# Patient Record
Sex: FEMALE | Race: WHITE | Employment: OTHER | URBAN - NONMETROPOLITAN AREA
[De-identification: names, ages, dates, MRNs, and addresses within clinical notes are randomized per-mention and may not be internally consistent; named-entity substitution may affect disease eponyms.]

---

## 2017-01-06 ENCOUNTER — OFFICE VISIT (OUTPATIENT)
Dept: FAMILY MEDICINE | Facility: OTHER | Age: 67
End: 2017-01-06
Attending: FAMILY MEDICINE
Payer: MEDICARE

## 2017-01-06 VITALS
HEART RATE: 73 BPM | DIASTOLIC BLOOD PRESSURE: 62 MMHG | OXYGEN SATURATION: 95 % | WEIGHT: 164.5 LBS | SYSTOLIC BLOOD PRESSURE: 132 MMHG | BODY MASS INDEX: 33.16 KG/M2 | TEMPERATURE: 97.6 F | HEIGHT: 59 IN

## 2017-01-06 DIAGNOSIS — N30.10 IC (INTERSTITIAL CYSTITIS): ICD-10-CM

## 2017-01-06 DIAGNOSIS — I10 ESSENTIAL HYPERTENSION: ICD-10-CM

## 2017-01-06 DIAGNOSIS — R82.79 ABNORMAL FINDINGS ON MICROBIOLOGICAL EXAMINATION OF URINE: ICD-10-CM

## 2017-01-06 DIAGNOSIS — R39.11 URINARY HESITANCY: ICD-10-CM

## 2017-01-06 DIAGNOSIS — Z23 NEED FOR PROPHYLACTIC VACCINATION AND INOCULATION AGAINST INFLUENZA: ICD-10-CM

## 2017-01-06 DIAGNOSIS — Z78.9 HEALTH MAINTENANCE ALTERATION: ICD-10-CM

## 2017-01-06 DIAGNOSIS — M17.0 PRIMARY OSTEOARTHRITIS OF BOTH KNEES: Primary | ICD-10-CM

## 2017-01-06 LAB
ALBUMIN UR-MCNC: NEGATIVE MG/DL
APPEARANCE UR: CLEAR
BACTERIA #/AREA URNS HPF: ABNORMAL /HPF
BILIRUB UR QL STRIP: NEGATIVE
COLOR UR AUTO: YELLOW
GLUCOSE UR STRIP-MCNC: NEGATIVE MG/DL
HGB UR QL STRIP: NEGATIVE
KETONES UR STRIP-MCNC: NEGATIVE MG/DL
LEUKOCYTE ESTERASE UR QL STRIP: ABNORMAL
NITRATE UR QL: NEGATIVE
PH UR STRIP: 5.5 PH (ref 5–7)
RBC #/AREA URNS AUTO: ABNORMAL /HPF (ref 0–2)
SP GR UR STRIP: 1.02 (ref 1–1.03)
URN SPEC COLLECT METH UR: ABNORMAL
UROBILINOGEN UR STRIP-ACNC: 0.2 EU/DL (ref 0.2–1)
WBC #/AREA URNS AUTO: ABNORMAL /HPF (ref 0–2)

## 2017-01-06 PROCEDURE — 81001 URINALYSIS AUTO W/SCOPE: CPT | Performed by: FAMILY MEDICINE

## 2017-01-06 PROCEDURE — 90662 IIV NO PRSV INCREASED AG IM: CPT | Performed by: FAMILY MEDICINE

## 2017-01-06 PROCEDURE — 87086 URINE CULTURE/COLONY COUNT: CPT | Performed by: FAMILY MEDICINE

## 2017-01-06 PROCEDURE — 99212 OFFICE O/P EST SF 10 MIN: CPT | Mod: 25

## 2017-01-06 PROCEDURE — G0008 ADMIN INFLUENZA VIRUS VAC: HCPCS | Performed by: FAMILY MEDICINE

## 2017-01-06 PROCEDURE — 99214 OFFICE O/P EST MOD 30 MIN: CPT | Performed by: FAMILY MEDICINE

## 2017-01-06 RX ORDER — GEMFIBROZIL 600 MG/1
TABLET, FILM COATED ORAL
COMMUNITY
Start: 2009-08-18 | End: 2017-01-06

## 2017-01-06 RX ORDER — POTASSIUM CHLORIDE 750 MG/1
TABLET, EXTENDED RELEASE ORAL
COMMUNITY
Start: 2009-08-18 | End: 2017-01-06

## 2017-01-06 RX ORDER — OXYCODONE AND ACETAMINOPHEN 5; 325 MG/1; MG/1
TABLET ORAL
Qty: 60 TABLET | Refills: 0 | Status: SHIPPED | OUTPATIENT
Start: 2017-01-06 | End: 2017-02-15

## 2017-01-06 RX ORDER — CIMETIDINE 800 MG
TABLET ORAL
COMMUNITY
Start: 2009-08-18 | End: 2017-01-06

## 2017-01-06 ASSESSMENT — PAIN SCALES - GENERAL: PAINLEVEL: SEVERE PAIN (7)

## 2017-01-06 NOTE — PROGRESS NOTES
Sdyni Hardin    2017    Chief Complaint   Patient presents with     RECHECK     Follow up on right knee surgery.     Flu Shot     fu vaccine.     Imm/Inj     prevnar       SUBJECTIVE:  Here for f/u.  Doing well.  Had 20 percocet sent and will need more.  She is reliable.  She is worried about a lot of aspects of the surgery and we discussed at length.  She is having urinary hesitancy without pain and we discussed.      Past Medical History   Diagnosis Date     Urinary tract infection, site not specified 2007     Osteoarthrosis, unspecified whether generalized or localized, lower leg 10/4/2001     Special screening for malignant neoplasms, colon 2006     Abnormal arterial blood gases 2006     Calculus of gallbladder without mention of cholecystitis or obstruction 2006     Acute upper respiratory infections of unspecified site 2001     Nonallopathic lesion of cervical region, not elsewhere classified 2003     Follow-up examination, following other surgery 2006     Headache(784.0) 12/10/2003     Lipoma of other skin and subcutaneous tissue 2006     History of CVA (cerebrovascular accident) 2011     Anxiety 10/24/2011     HTN (hypertension) 2012     Depression 2012     Insomnia 3/13/2012       Past Surgical History   Procedure Laterality Date     Shoulder x2  2009     left     Biopsy breast  2000     left - free of disease     Cholecystectomy  2006      section        section       Hysterectomy       Colonoscopy  2008     repeat in 10 years     Arthroscopy knee       Orthopedic surgery  2014     right shoulder replacement     Head & neck surgery       hematoma on head drained       Current Outpatient Prescriptions   Medication Sig Dispense Refill     oxyCODONE-acetaminophen (PERCOCET) 5-325 MG per tablet TAKE 1 TO 2 TABLETS BY MOUTH EVERY 4 HOURS AS NEEDED FOR PAIN 60 tablet 0     potassium chloride (K-TAB,KLOR-CON) 10  MEQ tablet TAKE 1 TABLET BY MOUTH 2 TIMES DAILY WITH FOOD 180 tablet 1     hydrochlorothiazide (HYDRODIURIL) 25 MG tablet TAKE 1 TABLET BY MOUTH DAILY 90 tablet 3     meloxicam (MOBIC) 15 MG tablet TAKE 1 TABLET BY MOUTH DAILY 90 tablet 1     atorvastatin (LIPITOR) 40 MG tablet TAKE 1 TABLET BY MOUTH DAILY 90 tablet 0     montelukast (SINGULAIR) 10 MG tablet TAKE 1 TABLET BY MOUTH DAILY 90 tablet 3     order for DME Equipment being ordered: Nebulizer supplies 2 each 11     order for DME Equipment being ordered: arely walker 1 each 0     ALPRAZolam (XANAX) 1 MG tablet TAKE 1 TABLET BY MOUTH 3 TIMES DAILY 90 tablet 3     escitalopram (LEXAPRO) 20 MG tablet TAKE 1 TABLET BY MOUTH 2 TIMES A  tablet 0     benazepril (LOTENSIN) 10 MG tablet TAKE 1 TABLET BY MOUTH DAILY 90 tablet 0     EPIPEN 2-JAIRO 0.3 MG/0.3ML injection INJECT 1 SYRINGE INTRAMUSCULARLY ONCE AS NEEDED FOR ANAPHYLAXIS 2 each 0     benzonatate (TESSALON) 200 MG capsule TAKE 1 CAPSULE BY MOUTH 3 TIMES A DAY AS NEEDED FOR COUGH 60 capsule 3     temazepam (RESTORIL) 30 MG capsule TAKE 1 CAPSULE BY MOUTH NIGHTLY AS NEEDED FOR SLEEP 90 capsule 5     DoxePIN (SINEQUAN) 25 MG capsule TAKE 1 CAPSULE BY MOUTH AT BEDTIME 90 capsule 3     VENTOLIN  (90 BASE) MCG/ACT inhaler USE 1 PUFF FOUR TIMES A DAY AS NEEDED 18 g 3     UNABLE TO FIND 1 suppository daily MEDICATION NAME: vaginal Valium       butalbital-acetaminophen-caffeine (FIORICET, ESGIC) -40 MG per tablet Take 1 tablet by mouth every 6 hours as needed 40 tablet 5     ipratropium - albuterol 0.5 mg/2.5 mg/3 mL (DUONEB) 0.5-2.5 (3) MG/3ML nebulization Take 1 vial (3 mLs) by nebulization every 6 hours as needed 360 mL 2     cimetidine (TAGAMET) 800 MG tablet Take 1 tablet (800 mg) by mouth At Bedtime As needed 90 tablet 3     aspirin 81 MG tablet Take 1 tablet by mouth daily       budesonide-formoterol (SYMBICORT) 160-4.5 MCG/ACT inhaler Inhale 2 puffs into the lungs 2 times daily. As needed 1  "Inhaler 11     [DISCONTINUED] Meloxicam (MOBIC PO) Take 10 mg by mouth       nystatin (MYCOSTATIN) 560468 UNIT/ML suspension Take 5 mLs (500,000 Units) by mouth 4 times daily 60 mL 0       Allergies   Allergen Reactions     Bee Pollen Other (See Comments)     Throat and eyes close up     Keflex [Cephalexin Hcl] Hives     Sulfonamide Derivatives Other (See Comments)     Throat closes     Ciprofloxacin Rash     Rash, bumps on lips     Bupropion Hcl Other (See Comments)     Made her very irritable     Celecoxib Other (See Comments) and Cough     Celebrex - wheeze     Clonazepam Hives     Codeine Difficulty breathing     \"My throat closes shut.\"        Erythromycin Other (See Comments)     Erythromycin base - abdominal pain     Lisinopril Other (See Comments)     Zestril - headaches     Nifedipine Hives     Procardia     Tramadol Hcl        Family History   Problem Relation Age of Onset     CANCER Daughter      cervical      CANCER Mother      skin     Breast Cancer Mother      CEREBROVASCULAR DISEASE Mother      Alzheimer Disease Mother      DIABETES Maternal Grandfather      HEART DISEASE Maternal Grandfather      MI     DIABETES Maternal Grandmother      DIABETES Paternal Grandmother      DIABETES Paternal Grandfather      Myocardial Infarction Father 76     myocardial infarction - cause of death       Social History     Social History     Marital Status:      Spouse Name: N/A     Number of Children: N/A     Years of Education: N/A     Occupational History     Not on file.     Social History Main Topics     Smoking status: Current Every Day Smoker -- 1.00 packs/day for 46 years     Types: Cigarettes     Smokeless tobacco: Never Used      Comment: tried to quit, relapse due to craving     Alcohol Use: 0.0 oz/week     0 Standard drinks or equivalent per week      Comment: rarely     Drug Use: No     Sexual Activity: No     Other Topics Concern      Service No     Blood Transfusions Yes     Permits if " needed     Caffeine Concern Yes     2 cups     Occupational Exposure No     Hobby Hazards No     Sleep Concern Yes     doxipen and xanax at bedtime     Stress Concern No     Weight Concern No     Special Diet No     Back Care No     Exercise No     Seat Belt No     encouraged to wear seat belt for safety     Self-Exams Yes     Parent/Sibling W/ Cabg, Mi Or Angioplasty Before 65f 55m? Yes     father     Social History Narrative       5 point ROS negative except as noted above in HPI, including Gen., Resp., CV, GI &  system review.     OBJECTIVE:  B/P: 132/62, T: 97.6, P: 73, R: Data Unavailable    GENERAL APPEARANCE: Alert, no acute distress  CV: regular rate and rhythm, no murmur, rub or gallop  RESP: lungs clear to auscultation bilaterally  ABDOMEN: normal bowel sounds, soft, nontender, no hepatosplenomegaly or other masses  SKIN: no suspicious lesions or rashes to visualized skin  NEURO: Alert, oriented x 3, speech and mentation normal    ASSESSMENT and PLAN:  (M17.0) Primary osteoarthritis of both knees  (primary encounter diagnosis)  Comment: ongoing  Plan: oxyCODONE-acetaminophen (PERCOCET) 5-325 MG per        tablet        Ok for percocet bid prn.  60 mg sent.     (R39.11) Urinary hesitancy  Comment: discussed.    Plan: *UA reflex to Microscopic and Culture        Getting UA.  Consider her IC as well.     (N30.10) IC (interstitial cystitis)  Comment: ongoing sx.   Plan: reviewed.  No change in the plans.      (Z78.9) Health maintenance alteration  Comment: flu shot.   Plan: will give prevnar at the preop as well.     (I10) Essential hypertension  Comment: stable.   Plan: no change.

## 2017-01-06 NOTE — PROGRESS NOTES
Injectable Influenza Immunization Documentation    1.  Is the person to be vaccinated sick today?  No    2. Does the person to be vaccinated have an allergy to eggs or to a component of the vaccine?  No    3. Has the person to be vaccinated today ever had a serious reaction to influenza vaccine in the past?  No    4. Has the person to be vaccinated ever had Guillain-Rio [place] syndrome?  No     Form completed by Rose Chaparro

## 2017-01-06 NOTE — MR AVS SNAPSHOT
After Visit Summary   1/6/2017    Sydni Hardin    MRN: [id number]           Patient Information     Date Of Birth          1950        Visit Information        Provider Department      1/6/2017 3:00 PM Gibran Alarcon MD [facility]        Today's Diagnoses     Primary osteoarthritis of both knees    -  1     Urinary hesitancy         IC (interstitial cystitis)         Health maintenance alteration         Essential hypertension           Care Instructions    F/u with ongoing concerns.         Follow-ups after your visit        Your next 10 appointments already scheduled     Feb 20, 2017  1:45 PM   (Arrive by 1:30 PM)   Pre-Op physical with Gibran Alarcon MD   [facility] ([facility])    [address]  [facility] [id number] [phone]            Feb 24, 2017  1:00 PM   (Arrive by 12:45 PM)   Return Visit with Moisés Olson MD    [facility] ([facility])    [address]  [facility] [address] [phone]              Who to contact     If you have questions or need follow up information about today's clinic visit or your schedule please contact [facility] ([facility] directly at [phone].  Normal or non-critical lab and imaging results will be communicated to you by MyChart, letter or phone within 4 business days after the clinic has received the results. If you do not hear from us within 7 days, please contact the clinic through MyChart or phone. If you have a critical or abnormal lab result, we will notify you by phone as soon as possible.  Submit refill requests through [employer] or call your pharmacy and they will forward the refill request to us. Please allow 3 business days for your refill to be completed.          Additional Information About Your Visit        [employer] Information     [employer] lets you send messages to your doctor, view your test results, renew your prescriptions, schedule appointments and more. To  "sign up, go to [email or web address] . Click on \"Log in\" on the left side of the screen, which will take you to the Welcome page. Then click on \"Sign up Now\" on the right side of the page.     You will be asked to enter the access code listed below, as well as some personal information. Please follow the directions to create your username and password.     Your access code is: [email or web address]  Expires: 3/7/2017 11:23 AM     Your access code will  in 90 days. If you need help or a new code, please call your [place] clinic or [phone].        Care EveryWhere ID     This is your Care EveryWhere ID. This could be used by other organizations to access your [place] medical records  [id number]        Your Vitals Were     Pulse Temperature Height BMI (Body Mass Index) Pulse Oximetry       73 97.6  F (36.4  C) (Tympanic) 4' 11\" (1.499 m) 33.21 kg/m2 95%        Blood Pressure from Last 3 Encounters:   17 132/62   16 132/74   10/05/16 126/76    Weight from Last 3 Encounters:   17 164 lb 8 oz (74.617 kg)   16 166 lb (75.297 kg)   10/05/16 166 lb (75.297 kg)              We Performed the Following     *UA reflex to Microscopic and Culture          Today's Medication Changes          These changes are accurate as of: 17  3:34 PM.  If you have any questions, ask your nurse or doctor.               These medicines have changed or have updated prescriptions.        Dose/Directions    meloxicam 15 MG tablet   Commonly known as:  MOBIC   This may have changed:  Another medication with the same name was removed. Continue taking this medication, and follow the directions you see here.   Used for:  Pain   Changed by:  Gibran Alarcon MD        TAKE 1 TABLET BY MOUTH DAILY   Quantity:  90 tablet   Refills:  1         Stop taking these medicines if you haven't already. Please contact your care team if you have questions.     LOPID 600 MG tablet   Generic drug:  gemfibrozil   Stopped by:  " Gibran Alarcon MD                Where to get your medicines      Some of these will need a paper prescription and others can be bought over the counter.  Ask your nurse if you have questions.     Bring a paper prescription for each of these medications    - oxyCODONE-acetaminophen 5-325 MG per tablet             Primary Care Provider Office Phone # Fax #    Gibran Alarcon [phone]       [address] [address]        Thank you!     Thank you for choosing [facility]  for your care. Our goal is always to provide you with excellent care. Hearing back from our patients is one way we can continue to improve our services. Please take a few minutes to complete the written survey that you may receive in the mail after your visit with us. Thank you!             Your Updated Medication List - Protect others around you: Learn how to safely use, store and throw away your medicines at www.disposemymeds.org.          This list is accurate as of: 1/6/17  3:34 PM.  Always use your most recent med list.                   Brand Name Dispense Instructions for use    ALPRAZolam 1 MG tablet    XANAX    90 tablet    TAKE 1 TABLET BY MOUTH 3 TIMES DAILY       aspirin 81 MG tablet      Take 1 tablet by mouth daily       atorvastatin 40 MG tablet    LIPITOR    90 tablet    TAKE 1 TABLET BY MOUTH DAILY       benazepril 10 MG tablet    LOTENSIN    90 tablet    TAKE 1 TABLET BY MOUTH DAILY       benzonatate 200 MG capsule    TESSALON    60 capsule    TAKE 1 CAPSULE BY MOUTH 3 TIMES A DAY AS NEEDED FOR COUGH       budesonide-formoterol 160-4.5 MCG/ACT Inhaler    SYMBICORT    1 Inhaler    Inhale 2 puffs into the lungs 2 times daily. As needed       butalbital-acetaminophen-caffeine -40 MG per tablet    FIORICET/ESGIC    40 tablet    Take 1 tablet by mouth every 6 hours as needed       cimetidine 800 MG tablet    TAGAMET    90 tablet    Take 1 tablet (800 mg) by  mouth At Bedtime As needed       doxepin 25 MG capsule    SINEquan    90 capsule    TAKE 1 CAPSULE BY MOUTH AT BEDTIME       EPIPEN 2-JAIRO 0.3 MG/0.3ML injection   Generic drug:  EPINEPHrine     2 each    INJECT 1 SYRINGE INTRAMUSCULARLY ONCE AS NEEDED FOR ANAPHYLAXIS       escitalopram 20 MG tablet    LEXAPRO    180 tablet    TAKE 1 TABLET BY MOUTH 2 TIMES A DAY       hydrochlorothiazide 25 MG tablet    HYDRODIURIL    90 tablet    TAKE 1 TABLET BY MOUTH DAILY       ipratropium - albuterol 0.5 mg/2.5 mg/3 mL 0.5-2.5 (3) MG/3ML neb solution    DUONEB    360 mL    Take 1 vial (3 mLs) by nebulization every 6 hours as needed       meloxicam 15 MG tablet    MOBIC    90 tablet    TAKE 1 TABLET BY MOUTH DAILY       montelukast 10 MG tablet    SINGULAIR    90 tablet    TAKE 1 TABLET BY MOUTH DAILY       nystatin 348005 UNIT/ML suspension    MYCOSTATIN    60 mL    Take 5 mLs (500,000 Units) by mouth 4 times daily       * order for DME     2 each    Equipment being ordered: Nebulizer supplies       * order for DME     1 each    Equipment being ordered: mcgee walker       oxyCODONE-acetaminophen 5-325 MG per tablet    PERCOCET    60 tablet    TAKE 1 TO 2 TABLETS BY MOUTH EVERY 4 HOURS AS NEEDED FOR PAIN       potassium chloride 10 MEQ tablet    K-TAB,KLOR-CON    180 tablet    TAKE 1 TABLET BY MOUTH 2 TIMES DAILY WITH FOOD       temazepam 30 MG capsule    RESTORIL    90 capsule    TAKE 1 CAPSULE BY MOUTH NIGHTLY AS NEEDED FOR SLEEP       UNABLE TO FIND      1 suppository daily MEDICATION NAME: vaginal Valium       VENTOLIN  (90 BASE) MCG/ACT Inhaler   Generic drug:  albuterol     18 g    USE 1 PUFF FOUR TIMES A DAY AS NEEDED       * Notice:  This list has 2 medication(s) that are the same as other medications prescribed for you. Read the directions carefully, and ask your doctor or other care provider to review them with you.

## 2017-01-06 NOTE — NURSING NOTE
"Chief Complaint   Patient presents with     RECHECK     Follow up on right knee surgery.     Flu Shot     fu vaccine.     Imm/Inj     prevnar       Initial /62 mmHg  Pulse 73  Temp(Src) 97.6  F (36.4  C) (Tympanic)  Ht 4' 11\" (1.499 m)  Wt 164 lb 8 oz (74.617 kg)  BMI 33.21 kg/m2  SpO2 95% Estimated body mass index is 33.21 kg/(m^2) as calculated from the following:    Height as of this encounter: 4' 11\" (1.499 m).    Weight as of this encounter: 164 lb 8 oz (74.617 kg).  BP completed using cuff size: regular  Josephine Kennedy LPN      "

## 2017-01-09 LAB
BACTERIA SPEC CULT: ABNORMAL
Lab: ABNORMAL
MICRO REPORT STATUS: ABNORMAL
SPECIMEN SOURCE: ABNORMAL

## 2017-02-15 DIAGNOSIS — M17.0 PRIMARY OSTEOARTHRITIS OF BOTH KNEES: ICD-10-CM

## 2017-02-16 RX ORDER — OXYCODONE AND ACETAMINOPHEN 5; 325 MG/1; MG/1
TABLET ORAL
Qty: 60 TABLET | Refills: 0 | Status: SHIPPED | OUTPATIENT
Start: 2017-02-16 | End: 2017-03-08

## 2017-02-16 NOTE — TELEPHONE ENCOUNTER
Per pt request [facility] . Will be there 02/17/17 in the afternoon.

## 2017-02-23 ENCOUNTER — OFFICE VISIT (OUTPATIENT)
Dept: FAMILY MEDICINE | Facility: OTHER | Age: 67
End: 2017-02-23
Attending: FAMILY MEDICINE
Payer: MEDICARE

## 2017-02-23 ENCOUNTER — TELEPHONE (OUTPATIENT)
Dept: FAMILY MEDICINE | Facility: OTHER | Age: 67
End: 2017-02-23

## 2017-02-23 VITALS
DIASTOLIC BLOOD PRESSURE: 57 MMHG | RESPIRATION RATE: 16 BRPM | BODY MASS INDEX: 33.06 KG/M2 | OXYGEN SATURATION: 95 % | HEIGHT: 59 IN | SYSTOLIC BLOOD PRESSURE: 94 MMHG | TEMPERATURE: 98.4 F | WEIGHT: 164 LBS | HEART RATE: 82 BPM

## 2017-02-23 DIAGNOSIS — Z72.0 TOBACCO ABUSE: ICD-10-CM

## 2017-02-23 DIAGNOSIS — R82.90 ABNORMAL URINE FINDINGS: ICD-10-CM

## 2017-02-23 DIAGNOSIS — R82.90 ABNORMAL URINALYSIS: Primary | ICD-10-CM

## 2017-02-23 DIAGNOSIS — M17.11 PRIMARY OSTEOARTHRITIS OF RIGHT KNEE: ICD-10-CM

## 2017-02-23 DIAGNOSIS — G47.00 PERSISTENT INSOMNIA: ICD-10-CM

## 2017-02-23 DIAGNOSIS — Z01.818 PREOP GENERAL PHYSICAL EXAM: Primary | ICD-10-CM

## 2017-02-23 LAB
ALBUMIN UR-MCNC: NEGATIVE MG/DL
APPEARANCE UR: CLEAR
BACTERIA #/AREA URNS HPF: ABNORMAL /HPF
BASOPHILS # BLD AUTO: 0.1 10E9/L (ref 0–0.2)
BASOPHILS NFR BLD AUTO: 0.6 %
BILIRUB UR QL STRIP: NEGATIVE
COLOR UR AUTO: YELLOW
DIFFERENTIAL METHOD BLD: NORMAL
EOSINOPHIL # BLD AUTO: 0.1 10E9/L (ref 0–0.7)
EOSINOPHIL NFR BLD AUTO: 1.4 %
ERYTHROCYTE [DISTWIDTH] IN BLOOD BY AUTOMATED COUNT: 13.3 % (ref 10–15)
GLUCOSE UR STRIP-MCNC: NEGATIVE MG/DL
HCT VFR BLD AUTO: 43.9 % (ref 35–47)
HGB BLD-MCNC: 15.1 G/DL (ref 11.7–15.7)
HGB UR QL STRIP: ABNORMAL
KETONES UR STRIP-MCNC: NEGATIVE MG/DL
LEUKOCYTE ESTERASE UR QL STRIP: ABNORMAL
LYMPHOCYTES # BLD AUTO: 2.9 10E9/L (ref 0.8–5.3)
LYMPHOCYTES NFR BLD AUTO: 28.2 %
MCH RBC QN AUTO: 30.4 PG (ref 26.5–33)
MCHC RBC AUTO-ENTMCNC: 34.4 G/DL (ref 31.5–36.5)
MCV RBC AUTO: 89 FL (ref 78–100)
MONOCYTES # BLD AUTO: 0.7 10E9/L (ref 0–1.3)
MONOCYTES NFR BLD AUTO: 6.9 %
NEUTROPHILS # BLD AUTO: 6.5 10E9/L (ref 1.6–8.3)
NEUTROPHILS NFR BLD AUTO: 62.9 %
NITRATE UR QL: NEGATIVE
PH UR STRIP: 5.5 PH (ref 5–7)
PLATELET # BLD AUTO: 321 10E9/L (ref 150–450)
RBC # BLD AUTO: 4.96 10E12/L (ref 3.8–5.2)
RBC #/AREA URNS AUTO: ABNORMAL /HPF (ref 0–2)
SP GR UR STRIP: 1.02 (ref 1–1.03)
URN SPEC COLLECT METH UR: ABNORMAL
UROBILINOGEN UR STRIP-ACNC: 0.2 EU/DL (ref 0.2–1)
WBC # BLD AUTO: 10.3 10E9/L (ref 4–11)
WBC #/AREA URNS AUTO: ABNORMAL /HPF (ref 0–2)

## 2017-02-23 PROCEDURE — 99214 OFFICE O/P EST MOD 30 MIN: CPT | Mod: 25 | Performed by: FAMILY MEDICINE

## 2017-02-23 PROCEDURE — 93005 ELECTROCARDIOGRAM TRACING: CPT

## 2017-02-23 PROCEDURE — 71020 ZZHC CHEST TWO VIEWS, FRONT/LAT: CPT | Mod: TC

## 2017-02-23 PROCEDURE — 36415 COLL VENOUS BLD VENIPUNCTURE: CPT | Performed by: FAMILY MEDICINE

## 2017-02-23 PROCEDURE — 85025 COMPLETE CBC W/AUTO DIFF WBC: CPT | Performed by: FAMILY MEDICINE

## 2017-02-23 PROCEDURE — 93010 ELECTROCARDIOGRAM REPORT: CPT | Performed by: INTERNAL MEDICINE

## 2017-02-23 PROCEDURE — 81001 URINALYSIS AUTO W/SCOPE: CPT | Performed by: FAMILY MEDICINE

## 2017-02-23 PROCEDURE — 87086 URINE CULTURE/COLONY COUNT: CPT | Performed by: FAMILY MEDICINE

## 2017-02-23 PROCEDURE — 99212 OFFICE O/P EST SF 10 MIN: CPT | Mod: 25

## 2017-02-23 PROCEDURE — 80048 BASIC METABOLIC PNL TOTAL CA: CPT | Performed by: FAMILY MEDICINE

## 2017-02-23 RX ORDER — BENZONATATE 200 MG/1
CAPSULE ORAL
Qty: 60 CAPSULE | Refills: 3 | Status: SHIPPED | OUTPATIENT
Start: 2017-02-23 | End: 2018-07-19

## 2017-02-23 RX ORDER — NICOTINE 21 MG/24HR
1 PATCH, TRANSDERMAL 24 HOURS TRANSDERMAL EVERY 24 HOURS
COMMUNITY
End: 2017-04-18

## 2017-02-23 RX ORDER — TEMAZEPAM 30 MG
CAPSULE ORAL
Qty: 90 CAPSULE | Refills: 5 | Status: SHIPPED | OUTPATIENT
Start: 2017-02-23 | End: 2017-03-24

## 2017-02-23 ASSESSMENT — ANXIETY QUESTIONNAIRES
5. BEING SO RESTLESS THAT IT IS HARD TO SIT STILL: SEVERAL DAYS
7. FEELING AFRAID AS IF SOMETHING AWFUL MIGHT HAPPEN: SEVERAL DAYS
GAD7 TOTAL SCORE: 7
2. NOT BEING ABLE TO STOP OR CONTROL WORRYING: SEVERAL DAYS
6. BECOMING EASILY ANNOYED OR IRRITABLE: SEVERAL DAYS
3. WORRYING TOO MUCH ABOUT DIFFERENT THINGS: SEVERAL DAYS
1. FEELING NERVOUS, ANXIOUS, OR ON EDGE: SEVERAL DAYS
IF YOU CHECKED OFF ANY PROBLEMS ON THIS QUESTIONNAIRE, HOW DIFFICULT HAVE THESE PROBLEMS MADE IT FOR YOU TO DO YOUR WORK, TAKE CARE OF THINGS AT HOME, OR GET ALONG WITH OTHER PEOPLE: SOMEWHAT DIFFICULT

## 2017-02-23 ASSESSMENT — PAIN SCALES - GENERAL: PAINLEVEL: SEVERE PAIN (6)

## 2017-02-23 ASSESSMENT — PATIENT HEALTH QUESTIONNAIRE - PHQ9: 5. POOR APPETITE OR OVEREATING: SEVERAL DAYS

## 2017-02-23 NOTE — PROGRESS NOTES
[address]  [facility] [place] [address]  [phone]  Dept: [phone]    PRE-OP EVALUATION:  Today's date: 2017    Sydni Hardin (: 1950) presents for pre-operative evaluation assessment as requested by Dr. Olson.  She requires evaluation and anesthesia risk assessment prior to undergoing surgery/procedure for treatment of right knee pain .  Proposed procedure: right total knee    Date of Surgery/ Procedure: 17    Hospital/Surgical Facility: [facility] [place]    Primary Physician: Gibran Alarcon  Type of Anesthesia Anticipated: to be determined    Patient has a Health Care Directive or Living Will:  YES     1. NO - Do you have a history of heart attack, stroke, stent, bypass or surgery on an artery in the head, neck, heart or legs?  2. NO - Do you ever have any pain or discomfort in your chest?  3. NO - Do you have a history of  Heart Failure?  4. YES - ARE YOUR TROUBLED BY SHORTNESS OF BREATH WHEN WALKING ON THE LEVEL, UP A SLIGHT HILL OR AT NIGHT? yes  5. NO - Do you currently have a cold, bronchitis or other respiratory infection?  6. YES - DO YOU HAVE A COUGH, SHORTNESS OF BREATH OR WHEEZING? yes  7. YES - DO YOU SOMETIMES GET PAINS IN THE CALVES OF YOUR LEGS WHEN YOU WALK? yes  8. NO - Do you or anyone in your family have previous history of blood clots?  9. NO - Do you or does anyone in your family have a serious bleeding problem such as prolonged bleeding following surgeries or cuts?  10. NO - Have you ever had problems with anemia or been told to take iron pills?  11. NO - Have you had any abnormal blood loss such as black, tarry or bloody stools, or abnormal vaginal bleeding?  12. NO - Have you ever had a blood transfusion?  13. YES - HAVE YOU OR ANY OF YOUR RELATIVES EVER HAD PROBLEMS WITH ANESTHESIA? Had low bp with anesthesia.  14. NO - Do you have sleep apnea, excessive snoring or daytime drowsiness?  15. NO - Do you have any prosthetic heart valves?  16.  YES - DO YOU HAVE PROSTHETIC JOINTS? shoulder  17. NO - Is there any chance that you may be pregnant?      HPI:                                                      Brief HPI related to upcoming procedure: severe knee oa, right      See problem list for active medical problems.  Problems all longstanding and stable, except as noted/documented.  See ROS for pertinent symptoms related to these conditions.                                                                                                  .    MEDICAL HISTORY:                                                      Patient Active Problem List    Diagnosis Date Noted     ACP (advance care planning) 10/05/2016     Priority: Medium     Advance Care Planning 10/5/2016: ACP Review of Chart / Resources Provided:  Reviewed chart for advance care plan.  Sydni SKINNER Bassemzach has no plan or code status on file however states presence of ACP document. Copy requested. Confirmed code status reflects current choices pending receipt of document/advance care plan review.  Confirmed/documented legally designated decision makers.  Added by Janie Robledo             Primary osteoarthritis of both knees 10/05/2016     Priority: Medium     IC (interstitial cystitis) 10/28/2015     Priority: Medium     SCC (squamous cell carcinoma), face 08/05/2015     Priority: Medium     Lung nodules 06/19/2015     Priority: Medium     Tobacco abuse 06/19/2015     Priority: Medium     Dysthymia 12/31/2014     Priority: Medium     Advanced care planning/counseling discussion 11/28/2012     Priority: Medium     Essential hypertension 01/05/2012     Priority: Medium     Problem list name updated by automated process. Provider to review       Calculus of gallbladder 02/02/2006     Priority: Medium     Problem list name updated by automated process. Provider to review       Headache 12/10/2003     Priority: Medium     Problem list name updated by automated process. Provider to review        Osteoarthrosis, unspecified whether generalized or localized, involving lower leg 10/04/2001     Priority: Medium     Problem list name updated by automated process. Provider to review        Past Medical History   Diagnosis Date     Abnormal arterial blood gases 2006     Acute upper respiratory infections of unspecified site 2001     Anxiety 10/24/2011     Calculus of gallbladder without mention of cholecystitis or obstruction 2006     Depression 2012     Follow-up examination, following other surgery 2006     Headache(784.0) 12/10/2003     History of CVA (cerebrovascular accident) 2011     HTN (hypertension) 2012     Insomnia 3/13/2012     Lipoma of other skin and subcutaneous tissue 2006     Nonallopathic lesion of cervical region, not elsewhere classified 2003     Osteoarthrosis, unspecified whether generalized or localized, lower leg 10/4/2001     Special screening for malignant neoplasms, colon 2006     Urinary tract infection, site not specified 2007     Past Surgical History   Procedure Laterality Date     Shoulder x2  2009     left     Biopsy breast  2000     left - free of disease     Cholecystectomy  2006      section        section       Hysterectomy       Colonoscopy  2008     repeat in 10 years     Arthroscopy knee       Orthopedic surgery  2014     right shoulder replacement     Head & neck surgery       hematoma on head drained     Current Outpatient Prescriptions   Medication Sig Dispense Refill     oxyCODONE-acetaminophen (PERCOCET) 5-325 MG per tablet TAKE 1 TO 2 TABLETS BY MOUTH EVERY 4 HOURS AS NEEDED FOR PAIN 60 tablet 0     doxepin (SINEQUAN) 25 MG capsule TAKE 1 CAPSULE BY MOUTH AT BEDTIME 90 capsule 3     potassium chloride (K-TAB,KLOR-CON) 10 MEQ tablet TAKE 1 TABLET BY MOUTH 2 TIMES DAILY WITH FOOD 180 tablet 1     hydrochlorothiazide (HYDRODIURIL) 25 MG tablet TAKE 1 TABLET BY MOUTH DAILY 90 tablet 3      meloxicam (MOBIC) 15 MG tablet TAKE 1 TABLET BY MOUTH DAILY 90 tablet 1     atorvastatin (LIPITOR) 40 MG tablet TAKE 1 TABLET BY MOUTH DAILY 90 tablet 0     montelukast (SINGULAIR) 10 MG tablet TAKE 1 TABLET BY MOUTH DAILY 90 tablet 3     order for DME Equipment being ordered: Nebulizer supplies 2 each 11     order for DME Equipment being ordered: mcgee walker 1 each 0     ALPRAZolam (XANAX) 1 MG tablet TAKE 1 TABLET BY MOUTH 3 TIMES DAILY 90 tablet 3     escitalopram (LEXAPRO) 20 MG tablet TAKE 1 TABLET BY MOUTH 2 TIMES A  tablet 0     benazepril (LOTENSIN) 10 MG tablet TAKE 1 TABLET BY MOUTH DAILY 90 tablet 0     EPIPEN 2-JAIRO 0.3 MG/0.3ML injection INJECT 1 SYRINGE INTRAMUSCULARLY ONCE AS NEEDED FOR ANAPHYLAXIS 2 each 0     benzonatate (TESSALON) 200 MG capsule TAKE 1 CAPSULE BY MOUTH 3 TIMES A DAY AS NEEDED FOR COUGH 60 capsule 3     temazepam (RESTORIL) 30 MG capsule TAKE 1 CAPSULE BY MOUTH NIGHTLY AS NEEDED FOR SLEEP 90 capsule 5     VENTOLIN  (90 BASE) MCG/ACT inhaler USE 1 PUFF FOUR TIMES A DAY AS NEEDED 18 g 3     nystatin (MYCOSTATIN) 150108 UNIT/ML suspension Take 5 mLs (500,000 Units) by mouth 4 times daily 60 mL 0     UNABLE TO FIND 1 suppository daily MEDICATION NAME: vaginal Valium       butalbital-acetaminophen-caffeine (FIORICET, ESGIC) -40 MG per tablet Take 1 tablet by mouth every 6 hours as needed 40 tablet 5     ipratropium - albuterol 0.5 mg/2.5 mg/3 mL (DUONEB) 0.5-2.5 (3) MG/3ML nebulization Take 1 vial (3 mLs) by nebulization every 6 hours as needed 360 mL 2     cimetidine (TAGAMET) 800 MG tablet Take 1 tablet (800 mg) by mouth At Bedtime As needed 90 tablet 3     aspirin 81 MG tablet Take 1 tablet by mouth daily       budesonide-formoterol (SYMBICORT) 160-4.5 MCG/ACT inhaler Inhale 2 puffs into the lungs 2 times daily. As needed 1 Inhaler 11     OTC products: None, except as noted above    Allergies   Allergen Reactions     Bee Pollen Other (See Comments)     Throat and  "eyes close up     Keflex [Cephalexin Hcl] Hives     Sulfonamide Derivatives Other (See Comments)     Throat closes     Ciprofloxacin Rash     Rash, bumps on lips     Bupropion Hcl Other (See Comments)     Made her very irritable     Celecoxib Other (See Comments) and Cough     Celebrex - wheeze     Clonazepam Hives     Codeine Difficulty breathing     \"My throat closes shut.\"        Erythromycin Other (See Comments)     Erythromycin base - abdominal pain     Lisinopril Other (See Comments)     Zestril - headaches     Nifedipine Hives     Procardia     Tramadol Hcl       Latex Allergy: NO    Social History   Substance Use Topics     Smoking status: Current Every Day Smoker     Packs/day: 1.00     Years: 46.00     Types: Cigarettes     Smokeless tobacco: Never Used      Comment: tried to quit, relapse due to craving     Alcohol use 0.0 oz/week     0 Standard drinks or equivalent per week      Comment: rarely     History   Drug Use No       REVIEW OF SYSTEMS:                                                    C: NEGATIVE for fever, chills, change in weight  I: NEGATIVE for worrisome rashes, moles or lesions  E: NEGATIVE for vision changes or irritation  E/M: NEGATIVE for ear, mouth and throat problems  R: NEGATIVE for significant cough or SOB  CV: NEGATIVE for chest pain, palpitations or peripheral edema  GI: NEGATIVE for nausea, abdominal pain, heartburn, or change in bowel habits  : NEGATIVE for frequency, dysuria, or hematuria  M: NEGATIVE for significant arthralgias or myalgia  N: NEGATIVE for weakness, dizziness or paresthesias  E: NEGATIVE for temperature intolerance, skin/hair changes  H: NEGATIVE for bleeding problems  P: NEGATIVE for changes in mood or affect    EXAM:                                                    There were no vitals taken for this visit.    GENERAL APPEARANCE: healthy, alert and no distress     EYES: EOMI,- PERRL     HENT: ear canals and TM's normal and nose and mouth without ulcers or " lesions     NECK: no adenopathy, no asymmetry, masses, or scars and thyroid normal to palpation     RESP: lungs clear to auscultation - no rales, rhonchi or wheezes     CV: regular rates and rhythm, normal S1 S2, no S3 or S4 and no murmur, click or rub -     ABDOMEN:  soft, nontender, no HSM or masses and bowel sounds normal     MS: extremities normal- no gross deformities noted, no evidence of inflammation in joints, FROM in all extremities.     SKIN: no suspicious lesions or rashes     NEURO: Normal strength and tone, sensory exam grossly normal, mentation intact and speech normal     PSYCH: mentation appears normal. and affect normal/bright     LYMPHATICS: No axillary, cervical, inguinal, or supraclavicular nodes    DIAGNOSTICS:                                                    EKG: appears normal, NSR, normal axis, normal intervals, no acute ST/T changes c/w ischemia, no LVH by voltage criteria, unchanged from previous tracings.  CXR clear.  Cbc normal.  Bmp pending.      Recent Labs   Lab Test  12/07/16   1024  06/22/16   1126   HGB  15.9*  16.1*   PLT  338  340   NA  133  135   POTASSIUM  4.1  4.4   CR  1.06*  0.86        IMPRESSION:                                                    Reason for surgery/procedure: right knee oa.   Diagnosis/reason for consult: preoperative clearance    The proposed surgical procedure is considered INTERMEDIATE risk.    REVISED CARDIAC RISK INDEX  The patient has the following serious cardiovascular risks for perioperative complications such as (MI, PE, VFib and 3  AV Block):  No serious cardiac risks  INTERPRETATION: 1 risks: Class II (low risk - 0.9% complication rate)    The patient has the following additional risks for perioperative complications:  No identified additional risks    No diagnosis found.    RECOMMENDATIONS:                                                      --Consult hospital rounder / IM to assist post-op medical management    --Patient is to take all  scheduled medications on the day of surgery EXCEPT for modifications listed below.    APPROVAL GIVEN to proceed with proposed procedure, without further diagnostic evaluation.  Patient is very worried about developing sepsis postoperatively, which has happened to her.  I told her the surgical staff and Dr. Olson would take every precaution to prevent this.         Signed Electronically by: Gibran Alarcon MD    Copy of this evaluation report is provided to requesting physician.    [facility] Preop Guidelines

## 2017-02-23 NOTE — NURSING NOTE
"Chief Complaint   Patient presents with     Pre-Op Exam     Smoking Cessation     Pt is in the process of quitting smoking. [place].       Initial BP 94/57 (BP Location: Right arm, Patient Position: Chair, Cuff Size: Adult Regular)  Pulse 82  Temp 98.4  F (36.9  C) (Tympanic)  Resp 16  Ht 4' 11\" (1.499 m)  Wt 166 lb 12.8 oz (75.7 kg)  SpO2 95%  BMI 33.69 kg/m2 Estimated body mass index is 33.69 kg/(m^2) as calculated from the following:    Height as of this encounter: 4' 11\" (1.499 m).    Weight as of this encounter: 166 lb 12.8 oz (75.7 kg).  Medication Reconciliation: complete   Rose Chaparro    "

## 2017-02-23 NOTE — MR AVS SNAPSHOT
After Visit Summary   2/23/2017    Sydni Hardin    MRN: [id number]           Patient Information     Date Of Birth          1950        Visit Information        Provider Department      2/23/2017 1:45 PM Gibran Alarcon MD [facility]        Today's Diagnoses     Preop general physical exam    -  1    Persistent insomnia        Primary osteoarthritis of right knee        Tobacco abuse        Abnormal urine findings          Care Instructions      Before Your Surgery      Call your surgeon if there is any change in your health. This includes signs of a cold or flu (such as a sore throat, runny nose, cough, rash or fever).    Do not smoke, drink alcohol or take over the counter medicine (unless your surgeon or primary care doctor tells you to) for the 24 hours before and after surgery.    If you take prescribed drugs: Follow your doctor s orders about which medicines to take and which to stop until after surgery.    Eating and drinking prior to surgery: follow the instructions from your surgeon    Take a shower or bath the night before surgery. Use the soap your surgeon gave you to gently clean your skin. If you do not have soap from your surgeon, use your regular soap. Do not shave or scrub the surgery site.  Wear clean pajamas and have clean sheets on your bed.         Follow-ups after your visit        Your next 10 appointments already scheduled     Feb 24, 2017  1:00 PM CST   (Arrive by 12:45 PM)   Return Visit with Moisés Olson MD    ORTHOPEDICS ([facility])    [address] [facility] [address] [phone]              Who to contact     If you have questions or need follow up information about today's clinic visit or your schedule please contact [facility] directly at [phone].  Normal or non-critical lab and imaging results will be communicated to you by MyChart, letter or phone within 4 business days after the clinic has  "received the results. If you do not hear from us within 7 days, please contact the clinic through [employer] or phone. If you have a critical or abnormal lab result, we will notify you by phone as soon as possible.  Submit refill requests through [employer] or call your pharmacy and they will forward the refill request to us. Please allow 3 business days for your refill to be completed.          Additional Information About Your Visit        Care EveryWhere ID     This is your Care EveryWhere ID. This could be used by other organizations to access your [facility] medical records  [id number]        Your Vitals Were     Pulse Temperature Respirations Height Pulse Oximetry BMI (Body Mass Index)    82 98.4  F (36.9  C) (Tympanic) 16 4' 10.75\" (1.492 m) 95% 33.41 kg/m2       Blood Pressure from Last 3 Encounters:   02/23/17 94/57   01/06/17 132/62   12/07/16 132/74    Weight from Last 3 Encounters:   02/23/17 164 lb (74.4 kg)   01/06/17 164 lb 8 oz (74.6 kg)   12/07/16 166 lb (75.3 kg)              We Performed the Following     *UA reflex to Microscopic and Culture     Basic metabolic panel     CBC with platelets and differential     EKG 12-lead complete w/read - (Clinic Performed)     Urine Culture Aerobic Bacterial     Urine Microscopic     XR CHEST 2 VW (Clinic Performed)          Today's Medication Changes          These changes are accurate as of: 2/23/17  5:27 PM.  If you have any questions, ask your nurse or doctor.               These medicines have changed or have updated prescriptions.        Dose/Directions    benzonatate 200 MG capsule   Commonly known as:  TESSALON   This may have changed:  See the new instructions.   Used for:  Preop general physical exam, Tobacco abuse   Changed by:  Gibran Alarcon MD        TAKE 1 CAPSULE BY MOUTH 3 TIMES A DAY AS NEEDED FOR COUGH   Quantity:  60 capsule   Refills:  3            Where to get your medicines      These medications were sent to [facility] - " [facility] - [address]  [address] [place] [address]     Phone:  [phone]     benzonatate 200 MG capsule         Some of these will need a paper prescription and others can be bought over the counter.  Ask your nurse if you have questions.     Bring a paper prescription for each of these medications     temazepam 30 MG capsule                Primary Care Provider Office Phone # Fax #    Gibran Alarcon [phone]       [facility] [address]  [facility] [address]        Thank you!     Thank you for choosing [facility]  for your care. Our goal is always to provide you with excellent care. Hearing back from our patients is one way we can continue to improve our services. Please take a few minutes to complete the written survey that you may receive in the mail after your visit with us. Thank you!             Your Updated Medication List - Protect others around you: Learn how to safely use, store and throw away your medicines at www.disposemymeds.org.          This list is accurate as of: 2/23/17  5:27 PM.  Always use your most recent med list.                   Brand Name Dispense Instructions for use    ALPRAZolam 1 MG tablet    XANAX    90 tablet    TAKE 1 TABLET BY MOUTH 3 TIMES DAILY       aspirin 81 MG tablet      Take 1 tablet by mouth daily       atorvastatin 40 MG tablet    LIPITOR    90 tablet    TAKE 1 TABLET BY MOUTH DAILY       benazepril 10 MG tablet    LOTENSIN    90 tablet    TAKE 1 TABLET BY MOUTH DAILY       benzonatate 200 MG capsule    TESSALON    60 capsule    TAKE 1 CAPSULE BY MOUTH 3 TIMES A DAY AS NEEDED FOR COUGH       budesonide-formoterol 160-4.5 MCG/ACT Inhaler    SYMBICORT    1 Inhaler    Inhale 2 puffs into the lungs 2 times daily. As needed       butalbital-acetaminophen-caffeine -40 MG per tablet    FIORICET/ESGIC    40 tablet    Take 1 tablet by mouth every 6 hours as needed       cimetidine 800 MG tablet    TAGAMET     90 tablet    Take 1 tablet (800 mg) by mouth At Bedtime As needed       doxepin 25 MG capsule    SINEquan    90 capsule    TAKE 1 CAPSULE BY MOUTH AT BEDTIME       EPIPEN 2-JAIRO 0.3 MG/0.3ML injection   Generic drug:  EPINEPHrine     2 each    INJECT 1 SYRINGE INTRAMUSCULARLY ONCE AS NEEDED FOR ANAPHYLAXIS       escitalopram 20 MG tablet    LEXAPRO    180 tablet    TAKE 1 TABLET BY MOUTH 2 TIMES A DAY       hydrochlorothiazide 25 MG tablet    HYDRODIURIL    90 tablet    TAKE 1 TABLET BY MOUTH DAILY       ipratropium - albuterol 0.5 mg/2.5 mg/3 mL 0.5-2.5 (3) MG/3ML neb solution    DUONEB    360 mL    Take 1 vial (3 mLs) by nebulization every 6 hours as needed       meloxicam 15 MG tablet    MOBIC    90 tablet    TAKE 1 TABLET BY MOUTH DAILY       montelukast 10 MG tablet    SINGULAIR    90 tablet    TAKE 1 TABLET BY MOUTH DAILY       nicotine 21 MG/24HR 24 hr patch    NICODERM CQ     Place 1 patch onto the skin every 24 hours       nystatin [id number] UNIT/ML suspension    MYCOSTATIN    60 mL    Take 5 mLs (500,000 Units) by mouth 4 times daily       * order for DME     2 each    Equipment being ordered: Nebulizer supplies       * order for DME     1 each    Equipment being ordered: mcgee walker       oxyCODONE-acetaminophen 5-325 MG per tablet    PERCOCET    60 tablet    TAKE 1 TO 2 TABLETS BY MOUTH EVERY 4 HOURS AS NEEDED FOR PAIN       potassium chloride 10 MEQ tablet    K-TAB,KLOR-CON    180 tablet    TAKE 1 TABLET BY MOUTH 2 TIMES DAILY WITH FOOD       temazepam 30 MG capsule    RESTORIL    90 capsule    TAKE 1 CAPSULE BY MOUTH NIGHTLY AS NEEDED FOR SLEEP       VENTOLIN  (90 BASE) MCG/ACT Inhaler   Generic drug:  albuterol     18 g    USE 1 PUFF FOUR TIMES A DAY AS NEEDED       * Notice:  This list has 2 medication(s) that are the same as other medications prescribed for you. Read the directions carefully, and ask your doctor or other care provider to review them with you.

## 2017-02-23 NOTE — NURSING NOTE
"Chief Complaint   Patient presents with     Pre-Op Exam     Smoking Cessation     Pt is in the process of quitting smoking. [place]       Initial BP 94/57 (BP Location: Right arm, Patient Position: Chair, Cuff Size: Adult Regular)  Pulse 82  Temp 98.4  F (36.9  C) (Tympanic)  Resp 16  Ht 4' 10.75\" (1.492 m)  Wt 164 lb (74.4 kg)  SpO2 95%  BMI 33.41 kg/m2 Estimated body mass index is 33.41 kg/(m^2) as calculated from the following:    Height as of this encounter: 4' 10.75\" (1.492 m).    Weight as of this encounter: 164 lb (74.4 kg).  Medication Reconciliation: complete   Rose Chaparro    "

## 2017-02-24 ENCOUNTER — OFFICE VISIT (OUTPATIENT)
Dept: ORTHOPEDICS | Facility: OTHER | Age: 67
End: 2017-02-24
Attending: ORTHOPAEDIC SURGERY
Payer: COMMERCIAL

## 2017-02-24 VITALS
BODY MASS INDEX: 32.25 KG/M2 | SYSTOLIC BLOOD PRESSURE: 112 MMHG | HEIGHT: 59 IN | OXYGEN SATURATION: 95 % | DIASTOLIC BLOOD PRESSURE: 76 MMHG | HEART RATE: 96 BPM | WEIGHT: 160 LBS | TEMPERATURE: 98.2 F

## 2017-02-24 DIAGNOSIS — M17.0 PRIMARY OSTEOARTHRITIS OF BOTH KNEES: Primary | ICD-10-CM

## 2017-02-24 LAB
ANION GAP SERPL CALCULATED.3IONS-SCNC: 12 MMOL/L (ref 3–14)
BACTERIA SPEC CULT: NORMAL
BUN SERPL-MCNC: 35 MG/DL (ref 7–30)
CALCIUM SERPL-MCNC: 8.8 MG/DL (ref 8.5–10.1)
CHLORIDE SERPL-SCNC: 104 MMOL/L (ref 94–109)
CO2 SERPL-SCNC: 21 MMOL/L (ref 20–32)
CREAT SERPL-MCNC: 1.43 MG/DL (ref 0.52–1.04)
GFR SERPL CREATININE-BSD FRML MDRD: 36 ML/MIN/1.7M2
GLUCOSE SERPL-MCNC: 107 MG/DL (ref 70–99)
MICRO REPORT STATUS: NORMAL
POTASSIUM SERPL-SCNC: 3.9 MMOL/L (ref 3.4–5.3)
SODIUM SERPL-SCNC: 137 MMOL/L (ref 133–144)
SPECIMEN SOURCE: NORMAL

## 2017-02-24 PROCEDURE — 99213 OFFICE O/P EST LOW 20 MIN: CPT | Performed by: ORTHOPAEDIC SURGERY

## 2017-02-24 PROCEDURE — 99212 OFFICE O/P EST SF 10 MIN: CPT

## 2017-02-24 ASSESSMENT — ANXIETY QUESTIONNAIRES: GAD7 TOTAL SCORE: 7

## 2017-02-24 ASSESSMENT — PAIN SCALES - GENERAL: PAINLEVEL: SEVERE PAIN (7)

## 2017-02-24 ASSESSMENT — PATIENT HEALTH QUESTIONNAIRE - PHQ9: SUM OF ALL RESPONSES TO PHQ QUESTIONS 1-9: 15

## 2017-02-24 NOTE — PROGRESS NOTES
Chief complaint: Severe DJD right knee    Subjective: This 67-year-old female has a long history of osteoarthritis of both knees.  Symptoms are worse on the right.  She has failed conservative treatment in the form of an oral NSAID, steroid injections, bilateral knee arthroscopies, and the use of a cane.  She requires Percocet and Tylenol to control her pain.  Her pain is severe enough to interfere with activities of daily living such as the distance she can walk and the number stairs she can climb.  She is in the process of planning a right TKA.    Since seen last she has attended the total joint class, seen her PCP and gained preoperative medical clearance and has chosen to go to inpatient rehabilitation from the hospital before returning home where she lives alone.  She has made arrangements for family members to attend to her when she returns home. She is trying to quit smoking and is down to less than 1 pack of  Cigarettes per day, which is a significant decrease.    Since seen last nothing is changed with respect to her musculoskeletal or neurologic review of systems.    Examination: Elderly but otherwise healthy-appearing female with appropriate mood and affect.  She ambulated into the office using a rolling walker.  Her right knee range of motion is 0-120 .  The knee is stable to ligamentous stressing.  The neurovascular that limb is intact.    X-rays: Most recent plain films the right knee were taken on 12/7/16 and show Kellgren-Varghsee Grade 4 changes.    Labs: Yesterday her H&H was 15 and 43.9. Her MRSA screen was negative.     Impression: Severe DJD right knee, failure conservative treatment.    Plan: Right TKA.  We selected a date March 13.  Today I answered all her remaining questions.  She understands that TKA is major surgery with the potential for major complications.  She consented to the procedure.  Her preoperative orders have been placed.    Joint Replacement Indications: This patient's  degenerative joint disease is evidenced by conventional radiography as described in the x-ray interpretation in the office note or notes, is producing pain that is interfering with activities of daily living as described in the office note or notes hence creating a functional disability, and has failed nonsurgical management for at least 3 months.  The patient therefore meets the preoperative criteria for total joint arthroplasty.

## 2017-02-24 NOTE — MR AVS SNAPSHOT
"              After Visit Summary   2/24/2017    Sydni Hardin    MRN: [id number]           Patient Information     Date Of Birth          1950        Visit Information        Provider Department      2/24/2017 1:00 PM Moisés Olson MD  ORTHOPEDICS        Today's Diagnoses     Primary osteoarthritis of both knees    -  1       Follow-ups after your visit        Your next 10 appointments already scheduled     Apr 12, 2017  1:20 PM CDT   (Arrive by 1:00 PM)   Return Visit with Moisés Olson MD    ORTHOPEDICS ([facility])    [address] [facility] [address] [phone]              Who to contact     If you have questions or need follow up information about today's clinic visit or your schedule please contact  ORTHOPEDICS directly at [phone].  Normal or non-critical lab and imaging results will be communicated to you by MyChart, letter or phone within 4 business days after the clinic has received the results. If you do not hear from us within 7 days, please contact the clinic through MyChart or phone. If you have a critical or abnormal lab result, we will notify you by phone as soon as possible.  Submit refill requests through [employer] or call your pharmacy and they will forward the refill request to us. Please allow 3 business days for your refill to be completed.          Additional Information About Your Visit        Care EveryWhere ID     This is your Care EveryWhere ID. This could be used by other organizations to access your [place] medical records  [id number]        Your Vitals Were     Pulse Temperature Height Pulse Oximetry BMI (Body Mass Index)       96 98.2  F (36.8  C) (Tympanic) 4' 11\" (1.499 m) 95% 32.32 kg/m2        Blood Pressure from Last 3 Encounters:   02/24/17 112/76   02/23/17 94/57   01/06/17 132/62    Weight from Last 3 Encounters:   02/24/17 160 lb (72.6 kg)   02/23/17 164 lb (74.4 kg)   01/06/17 164 lb 8 oz (74.6 kg)              Today, you had " the following     No orders found for display       Primary Care Provider Office Phone # Fax #    Gibran Alacron [phone]       [address] [facility] [address]        Thank you!     Thank you for choosing  ORTHOPEDICS  for your care. Our goal is always to provide you with excellent care. Hearing back from our patients is one way we can continue to improve our services. Please take a few minutes to complete the written survey that you may receive in the mail after your visit with us. Thank you!             Your Updated Medication List - Protect others around you: Learn how to safely use, store and throw away your medicines at www.disposemymeds.org.          This list is accurate as of: 2/24/17  2:18 PM.  Always use your most recent med list.                   Brand Name Dispense Instructions for use    ALPRAZolam 1 MG tablet    XANAX    90 tablet    TAKE 1 TABLET BY MOUTH 3 TIMES DAILY       aspirin 81 MG tablet      Take 1 tablet by mouth daily       atorvastatin 40 MG tablet    LIPITOR    90 tablet    TAKE 1 TABLET BY MOUTH DAILY       benazepril 10 MG tablet    LOTENSIN    90 tablet    TAKE 1 TABLET BY MOUTH DAILY       benzonatate 200 MG capsule    TESSALON    60 capsule    TAKE 1 CAPSULE BY MOUTH 3 TIMES A DAY AS NEEDED FOR COUGH       budesonide-formoterol 160-4.5 MCG/ACT Inhaler    SYMBICORT    1 Inhaler    Inhale 2 puffs into the lungs 2 times daily. As needed       butalbital-acetaminophen-caffeine -40 MG per tablet    FIORICET/ESGIC    40 tablet    Take 1 tablet by mouth every 6 hours as needed       cimetidine 800 MG tablet    TAGAMET    90 tablet    Take 1 tablet (800 mg) by mouth At Bedtime As needed       doxepin 25 MG capsule    SINEquan    90 capsule    TAKE 1 CAPSULE BY MOUTH AT BEDTIME       EPIPEN 2-JAIRO 0.3 MG/0.3ML injection   Generic drug:  EPINEPHrine     2 each    INJECT 1 SYRINGE INTRAMUSCULARLY ONCE AS NEEDED FOR ANAPHYLAXIS        escitalopram 20 MG tablet    LEXAPRO    180 tablet    TAKE 1 TABLET BY MOUTH 2 TIMES A DAY       hydrochlorothiazide 25 MG tablet    HYDRODIURIL    90 tablet    TAKE 1 TABLET BY MOUTH DAILY       ipratropium - albuterol 0.5 mg/2.5 mg/3 mL 0.5-2.5 (3) MG/3ML neb solution    DUONEB    360 mL    Take 1 vial (3 mLs) by nebulization every 6 hours as needed       meloxicam 15 MG tablet    MOBIC    90 tablet    TAKE 1 TABLET BY MOUTH DAILY       montelukast 10 MG tablet    SINGULAIR    90 tablet    TAKE 1 TABLET BY MOUTH DAILY       nicotine 21 MG/24HR 24 hr patch    NICODERM CQ     Place 1 patch onto the skin every 24 hours       nystatin 437117 UNIT/ML suspension    MYCOSTATIN    60 mL    Take 5 mLs (500,000 Units) by mouth 4 times daily       * order for DME     2 each    Equipment being ordered: Nebulizer supplies       * order for DME     1 each    Equipment being ordered: mcgee walker       oxyCODONE-acetaminophen 5-325 MG per tablet    PERCOCET    60 tablet    TAKE 1 TO 2 TABLETS BY MOUTH EVERY 4 HOURS AS NEEDED FOR PAIN       potassium chloride 10 MEQ tablet    K-TAB,KLOR-CON    180 tablet    TAKE 1 TABLET BY MOUTH 2 TIMES DAILY WITH FOOD       temazepam 30 MG capsule    RESTORIL    90 capsule    TAKE 1 CAPSULE BY MOUTH NIGHTLY AS NEEDED FOR SLEEP       VENTOLIN  (90 BASE) MCG/ACT Inhaler   Generic drug:  albuterol     18 g    USE 1 PUFF FOUR TIMES A DAY AS NEEDED       * Notice:  This list has 2 medication(s) that are the same as other medications prescribed for you. Read the directions carefully, and ask your doctor or other care provider to review them with you.

## 2017-02-25 LAB
BACTERIA SPEC CULT: ABNORMAL
MICRO REPORT STATUS: ABNORMAL
SPECIMEN SOURCE: ABNORMAL

## 2017-02-27 ENCOUNTER — TELEPHONE (OUTPATIENT)
Dept: FAMILY MEDICINE | Facility: OTHER | Age: 67
End: 2017-02-27

## 2017-02-27 DIAGNOSIS — B37.31 CANDIDIASIS OF VULVA AND VAGINA: Primary | ICD-10-CM

## 2017-02-27 RX ORDER — FLUCONAZOLE 150 MG/1
150 TABLET ORAL
Qty: 2 TABLET | Refills: 0 | Status: ON HOLD | OUTPATIENT
Start: 2017-02-27 | End: 2017-03-24

## 2017-02-27 NOTE — TELEPHONE ENCOUNTER
10:17 AM    Reason for Call: Phone Call    Description:  Sydni would like the nurse to call her right away with her lab results    Was an appointment offered for this call? No    Preferred method for responding to this message: [phone]    If we cannot reach you directly, may we leave a detailed response at the number you provided?   Yes      Queta Smith

## 2017-03-01 NOTE — H&P (VIEW-ONLY)
[address]  [facility] [address]  [phone]  Dept: [phone]    PRE-OP EVALUATION:  Today's date: 2017    Sydni Hardin (: 1950) presents for pre-operative evaluation assessment as requested by Dr. Olson.  She requires evaluation and anesthesia risk assessment prior to undergoing surgery/procedure for treatment of right knee pain .  Proposed procedure: right total knee    Date of Surgery/ Procedure: 17    Hospital/Surgical Facility: [facility]    Primary Physician: Gibran Alarcon  Type of Anesthesia Anticipated: to be determined    Patient has a Health Care Directive or Living Will:  YES     1. NO - Do you have a history of heart attack, stroke, stent, bypass or surgery on an artery in the head, neck, heart or legs?  2. NO - Do you ever have any pain or discomfort in your chest?  3. NO - Do you have a history of  Heart Failure?  4. YES - ARE YOUR TROUBLED BY SHORTNESS OF BREATH WHEN WALKING ON THE LEVEL, UP A SLIGHT HILL OR AT NIGHT? yes  5. NO - Do you currently have a cold, bronchitis or other respiratory infection?  6. YES - DO YOU HAVE A COUGH, SHORTNESS OF BREATH OR WHEEZING? yes  7. YES - DO YOU SOMETIMES GET PAINS IN THE CALVES OF YOUR LEGS WHEN YOU WALK? yes  8. NO - Do you or anyone in your family have previous history of blood clots?  9. NO - Do you or does anyone in your family have a serious bleeding problem such as prolonged bleeding following surgeries or cuts?  10. NO - Have you ever had problems with anemia or been told to take iron pills?  11. NO - Have you had any abnormal blood loss such as black, tarry or bloody stools, or abnormal vaginal bleeding?  12. NO - Have you ever had a blood transfusion?  13. YES - HAVE YOU OR ANY OF YOUR RELATIVES EVER HAD PROBLEMS WITH ANESTHESIA? Had low bp with anesthesia.  14. NO - Do you have sleep apnea, excessive snoring or daytime drowsiness?  15. NO - Do you have any prosthetic heart valves?  16.  YES - DO YOU HAVE PROSTHETIC JOINTS? shoulder  17. NO - Is there any chance that you may be pregnant?      HPI:                                                      Brief HPI related to upcoming procedure: severe knee oa, right      See problem list for active medical problems.  Problems all longstanding and stable, except as noted/documented.  See ROS for pertinent symptoms related to these conditions.                                                                                                  .    MEDICAL HISTORY:                                                      Patient Active Problem List    Diagnosis Date Noted     ACP (advance care planning) 10/05/2016     Priority: Medium     Advance Care Planning 10/5/2016: ACP Review of Chart / Resources Provided:  Reviewed chart for advance care plan.  Sydni SKINNER Bassemzach has no plan or code status on file however states presence of ACP document. Copy requested. Confirmed code status reflects current choices pending receipt of document/advance care plan review.  Confirmed/documented legally designated decision makers.  Added by Janie Robledo             Primary osteoarthritis of both knees 10/05/2016     Priority: Medium     IC (interstitial cystitis) 10/28/2015     Priority: Medium     SCC (squamous cell carcinoma), face 08/05/2015     Priority: Medium     Lung nodules 06/19/2015     Priority: Medium     Tobacco abuse 06/19/2015     Priority: Medium     Dysthymia 12/31/2014     Priority: Medium     Advanced care planning/counseling discussion 11/28/2012     Priority: Medium     Essential hypertension 01/05/2012     Priority: Medium     Problem list name updated by automated process. Provider to review       Calculus of gallbladder 02/02/2006     Priority: Medium     Problem list name updated by automated process. Provider to review       Headache 12/10/2003     Priority: Medium     Problem list name updated by automated process. Provider to review        Osteoarthrosis, unspecified whether generalized or localized, involving lower leg 10/04/2001     Priority: Medium     Problem list name updated by automated process. Provider to review        Past Medical History   Diagnosis Date     Abnormal arterial blood gases 2006     Acute upper respiratory infections of unspecified site 2001     Anxiety 10/24/2011     Calculus of gallbladder without mention of cholecystitis or obstruction 2006     Depression 2012     Follow-up examination, following other surgery 2006     Headache(784.0) 12/10/2003     History of CVA (cerebrovascular accident) 2011     HTN (hypertension) 2012     Insomnia 3/13/2012     Lipoma of other skin and subcutaneous tissue 2006     Nonallopathic lesion of cervical region, not elsewhere classified 2003     Osteoarthrosis, unspecified whether generalized or localized, lower leg 10/4/2001     Special screening for malignant neoplasms, colon 2006     Urinary tract infection, site not specified 2007     Past Surgical History   Procedure Laterality Date     Shoulder x2  2009     left     Biopsy breast  2000     left - free of disease     Cholecystectomy  2006      section        section       Hysterectomy       Colonoscopy  2008     repeat in 10 years     Arthroscopy knee       Orthopedic surgery  2014     right shoulder replacement     Head & neck surgery       hematoma on head drained     Current Outpatient Prescriptions   Medication Sig Dispense Refill     oxyCODONE-acetaminophen (PERCOCET) 5-325 MG per tablet TAKE 1 TO 2 TABLETS BY MOUTH EVERY 4 HOURS AS NEEDED FOR PAIN 60 tablet 0     doxepin (SINEQUAN) 25 MG capsule TAKE 1 CAPSULE BY MOUTH AT BEDTIME 90 capsule 3     potassium chloride (K-TAB,KLOR-CON) 10 MEQ tablet TAKE 1 TABLET BY MOUTH 2 TIMES DAILY WITH FOOD 180 tablet 1     hydrochlorothiazide (HYDRODIURIL) 25 MG tablet TAKE 1 TABLET BY MOUTH DAILY 90 tablet 3      meloxicam (MOBIC) 15 MG tablet TAKE 1 TABLET BY MOUTH DAILY 90 tablet 1     atorvastatin (LIPITOR) 40 MG tablet TAKE 1 TABLET BY MOUTH DAILY 90 tablet 0     montelukast (SINGULAIR) 10 MG tablet TAKE 1 TABLET BY MOUTH DAILY 90 tablet 3     order for DME Equipment being ordered: Nebulizer supplies 2 each 11     order for DME Equipment being ordered: mcgee walker 1 each 0     ALPRAZolam (XANAX) 1 MG tablet TAKE 1 TABLET BY MOUTH 3 TIMES DAILY 90 tablet 3     escitalopram (LEXAPRO) 20 MG tablet TAKE 1 TABLET BY MOUTH 2 TIMES A  tablet 0     benazepril (LOTENSIN) 10 MG tablet TAKE 1 TABLET BY MOUTH DAILY 90 tablet 0     EPIPEN 2-JAIRO 0.3 MG/0.3ML injection INJECT 1 SYRINGE INTRAMUSCULARLY ONCE AS NEEDED FOR ANAPHYLAXIS 2 each 0     benzonatate (TESSALON) 200 MG capsule TAKE 1 CAPSULE BY MOUTH 3 TIMES A DAY AS NEEDED FOR COUGH 60 capsule 3     temazepam (RESTORIL) 30 MG capsule TAKE 1 CAPSULE BY MOUTH NIGHTLY AS NEEDED FOR SLEEP 90 capsule 5     VENTOLIN  (90 BASE) MCG/ACT inhaler USE 1 PUFF FOUR TIMES A DAY AS NEEDED 18 g 3     nystatin (MYCOSTATIN) 935879 UNIT/ML suspension Take 5 mLs (500,000 Units) by mouth 4 times daily 60 mL 0     UNABLE TO FIND 1 suppository daily MEDICATION NAME: vaginal Valium       butalbital-acetaminophen-caffeine (FIORICET, ESGIC) -40 MG per tablet Take 1 tablet by mouth every 6 hours as needed 40 tablet 5     ipratropium - albuterol 0.5 mg/2.5 mg/3 mL (DUONEB) 0.5-2.5 (3) MG/3ML nebulization Take 1 vial (3 mLs) by nebulization every 6 hours as needed 360 mL 2     cimetidine (TAGAMET) 800 MG tablet Take 1 tablet (800 mg) by mouth At Bedtime As needed 90 tablet 3     aspirin 81 MG tablet Take 1 tablet by mouth daily       budesonide-formoterol (SYMBICORT) 160-4.5 MCG/ACT inhaler Inhale 2 puffs into the lungs 2 times daily. As needed 1 Inhaler 11     OTC products: None, except as noted above    Allergies   Allergen Reactions     Bee Pollen Other (See Comments)     Throat and  "eyes close up     Keflex [Cephalexin Hcl] Hives     Sulfonamide Derivatives Other (See Comments)     Throat closes     Ciprofloxacin Rash     Rash, bumps on lips     Bupropion Hcl Other (See Comments)     Made her very irritable     Celecoxib Other (See Comments) and Cough     Celebrex - wheeze     Clonazepam Hives     Codeine Difficulty breathing     \"My throat closes shut.\"        Erythromycin Other (See Comments)     Erythromycin base - abdominal pain     Lisinopril Other (See Comments)     Zestril - headaches     Nifedipine Hives     Procardia     Tramadol Hcl       Latex Allergy: NO    Social History   Substance Use Topics     Smoking status: Current Every Day Smoker     Packs/day: 1.00     Years: 46.00     Types: Cigarettes     Smokeless tobacco: Never Used      Comment: tried to quit, relapse due to craving     Alcohol use 0.0 oz/week     0 Standard drinks or equivalent per week      Comment: rarely     History   Drug Use No       REVIEW OF SYSTEMS:                                                    C: NEGATIVE for fever, chills, change in weight  I: NEGATIVE for worrisome rashes, moles or lesions  E: NEGATIVE for vision changes or irritation  E/M: NEGATIVE for ear, mouth and throat problems  R: NEGATIVE for significant cough or SOB  CV: NEGATIVE for chest pain, palpitations or peripheral edema  GI: NEGATIVE for nausea, abdominal pain, heartburn, or change in bowel habits  : NEGATIVE for frequency, dysuria, or hematuria  M: NEGATIVE for significant arthralgias or myalgia  N: NEGATIVE for weakness, dizziness or paresthesias  E: NEGATIVE for temperature intolerance, skin/hair changes  H: NEGATIVE for bleeding problems  P: NEGATIVE for changes in mood or affect    EXAM:                                                    There were no vitals taken for this visit.    GENERAL APPEARANCE: healthy, alert and no distress     EYES: EOMI,- PERRL     HENT: ear canals and TM's normal and nose and mouth without ulcers or " lesions     NECK: no adenopathy, no asymmetry, masses, or scars and thyroid normal to palpation     RESP: lungs clear to auscultation - no rales, rhonchi or wheezes     CV: regular rates and rhythm, normal S1 S2, no S3 or S4 and no murmur, click or rub -     ABDOMEN:  soft, nontender, no HSM or masses and bowel sounds normal     MS: extremities normal- no gross deformities noted, no evidence of inflammation in joints, FROM in all extremities.     SKIN: no suspicious lesions or rashes     NEURO: Normal strength and tone, sensory exam grossly normal, mentation intact and speech normal     PSYCH: mentation appears normal. and affect normal/bright     LYMPHATICS: No axillary, cervical, inguinal, or supraclavicular nodes    DIAGNOSTICS:                                                    EKG: appears normal, NSR, normal axis, normal intervals, no acute ST/T changes c/w ischemia, no LVH by voltage criteria, unchanged from previous tracings.  CXR clear.  Cbc normal.  Bmp pending.      Recent Labs   Lab Test  12/07/16   1024  06/22/16   1126   HGB  15.9*  16.1*   PLT  338  340   NA  133  135   POTASSIUM  4.1  4.4   CR  1.06*  0.86        IMPRESSION:                                                    Reason for surgery/procedure: right knee oa.   Diagnosis/reason for consult: preoperative clearance    The proposed surgical procedure is considered INTERMEDIATE risk.    REVISED CARDIAC RISK INDEX  The patient has the following serious cardiovascular risks for perioperative complications such as (MI, PE, VFib and 3  AV Block):  No serious cardiac risks  INTERPRETATION: 1 risks: Class II (low risk - 0.9% complication rate)    The patient has the following additional risks for perioperative complications:  No identified additional risks    No diagnosis found.    RECOMMENDATIONS:                                                      --Consult hospital rounder / IM to assist post-op medical management    --Patient is to take all  scheduled medications on the day of surgery EXCEPT for modifications listed below.    APPROVAL GIVEN to proceed with proposed procedure, without further diagnostic evaluation.  Patient is very worried about developing sepsis postoperatively, which has happened to her.  I told her the surgical staff and Dr. Olson would take every precaution to prevent this.         Signed Electronically by: Gibran Alarcon MD    Copy of this evaluation report is provided to requesting physician.    [place] Preop Guidelines

## 2017-03-01 NOTE — H&P (VIEW-ONLY)
Chief complaint: Severe DJD right knee    Subjective: This 67-year-old female has a long history of osteoarthritis of both knees.  Symptoms are worse on the right.  She has failed conservative treatment in the form of an oral NSAID, steroid injections, bilateral knee arthroscopies, and the use of a cane.  She requires Percocet and Tylenol to control her pain.  Her pain is severe enough to interfere with activities of daily living such as the distance she can walk and the number stairs she can climb.  She is in the process of planning a right TKA.    Since seen last she has attended the total joint class, seen her PCP and gained preoperative medical clearance and has chosen to go to inpatient rehabilitation from the hospital before returning home where she lives alone.  She has made arrangements for family members to attend to her when she returns home. She is trying to quit smoking and is down to less than 1 pack of  Cigarettes per day, which is a significant decrease.    Since seen last nothing is changed with respect to her musculoskeletal or neurologic review of systems.    Examination: Elderly but otherwise healthy-appearing female with appropriate mood and affect.  She ambulated into the office using a rolling walker.  Her right knee range of motion is 0-120 .  The knee is stable to ligamentous stressing.  The neurovascular that limb is intact.    X-rays: Most recent plain films the right knee were taken on 12/7/16 and show Kellgren-Varghese Grade 4 changes.    Labs: Yesterday her H&H was 15 and 43.9. Her MRSA screen was negative.     Impression: Severe DJD right knee, failure conservative treatment.    Plan: Right TKA.  We selected a date March 13.  Today I answered all her remaining questions.  She understands that TKA is major surgery with the potential for major complications.  She consented to the procedure.  Her preoperative orders have been placed.    Joint Replacement Indications: This patient's  degenerative joint disease is evidenced by conventional radiography as described in the x-ray interpretation in the office note or notes, is producing pain that is interfering with activities of daily living as described in the office note or notes hence creating a functional disability, and has failed nonsurgical management for at least 3 months.  The patient therefore meets the preoperative criteria for total joint arthroplasty.

## 2017-03-06 ENCOUNTER — TELEPHONE (OUTPATIENT)
Dept: ORTHOPEDICS | Facility: OTHER | Age: 67
End: 2017-03-06

## 2017-03-06 DIAGNOSIS — Z98.890 POSTOPERATIVE STATE: Primary | ICD-10-CM

## 2017-03-06 NOTE — TELEPHONE ENCOUNTER
Disregard last sentence, patients daughter stated apartment complex is telling them that they are not considering Dr. lAarcon recommendations , patients daughter advised that we have not any thing to do with long term care and that Dr. Olson will only give order according to her care for after TKA patients kirsty also stated she is still itching and Dr. Alarcon did put her on Diflucan for yeast infection, patients daughter stated they did reach out to her but no return call, writer called nurse and left message and will send this message to call patient due to possible treatment given for yeast infection is not working, Patient notified that writer has reach out to clinic,  and patient notified writer that  Dr. Alarcon nurse called and Dr. Alarcon would like to see her.  Please Read and close if you have taken care of this, Thank you.   Sandy Salazar LPN

## 2017-03-06 NOTE — TELEPHONE ENCOUNTER
Please sign the pt's order for a seat riser. Appt offered on 03/08/17 per BT or she could go to  to be seen sooner. Pt opts to wait for 03/08/17. Appt is scheduled.

## 2017-03-06 NOTE — OR NURSING
Email sent to total joint replacement team at [facility] as follows;     We have Sydni Hardin : 1950 is coming 17 for RTKA with Dr. Olson.  She attended total joint replacement class.  She has a daughter that lives with her but she is planning to go to SNF after hospitalization, hopefully [facility].  She did not know about Cryo-cuff and was instructed on it and will get prescription from Dr. Olson.  She s aware she will need to bring it in with her.  She has walker and grab bars, but does not have high rise toilet seat.  Bedroom and bathroom on main level and states she does not have steps to enter home.  Currently using NSAID daily and will hold for surgery.  Needs reinforcement with education.    Thank you,  Aimee Zhou R.N.  Pre-Anesthesia Testing  [facility]

## 2017-03-06 NOTE — TELEPHONE ENCOUNTER
1. Pt calls she has burning with urination and still has vaginal itching. I spoke to Dr Alarcon and notified appt is needed.  2. Pt needs a toilet seat riser for postop problems.

## 2017-03-06 NOTE — TELEPHONE ENCOUNTER
FYI: Pt has already been contacted regarding the vaginal itching and appt is to be scheduled this week. The approval for her daughter to live with her was for postop help only for a limited time. Dr Alarcon will discuss this with her at her appt.

## 2017-03-08 DIAGNOSIS — M17.0 PRIMARY OSTEOARTHRITIS OF BOTH KNEES: ICD-10-CM

## 2017-03-09 ENCOUNTER — DOCUMENTATION ONLY (OUTPATIENT)
Dept: ORTHOPEDICS | Facility: OTHER | Age: 67
End: 2017-03-09

## 2017-03-09 RX ORDER — OXYCODONE AND ACETAMINOPHEN 5; 325 MG/1; MG/1
TABLET ORAL
Qty: 60 TABLET | Refills: 0 | Status: ON HOLD | OUTPATIENT
Start: 2017-03-09 | End: 2017-03-24

## 2017-03-13 ENCOUNTER — ANESTHESIA (OUTPATIENT)
Dept: SURGERY | Facility: HOSPITAL | Age: 67
DRG: 470 | End: 2017-03-13
Payer: MEDICARE

## 2017-03-13 ENCOUNTER — HOSPITAL ENCOUNTER (OUTPATIENT)
Facility: HOSPITAL | Age: 67
Discharge: HOME | End: 2017-03-13
Attending: ORTHOPAEDIC SURGERY | Admitting: ORTHOPAEDIC SURGERY
Payer: MEDICARE

## 2017-03-13 ENCOUNTER — ANESTHESIA EVENT (OUTPATIENT)
Dept: SURGERY | Facility: HOSPITAL | Age: 67
DRG: 470 | End: 2017-03-13
Payer: MEDICARE

## 2017-03-13 ENCOUNTER — SURGERY (OUTPATIENT)
Age: 67
End: 2017-03-13

## 2017-03-13 VITALS
OXYGEN SATURATION: 95 % | WEIGHT: 160 LBS | RESPIRATION RATE: 18 BRPM | HEIGHT: 58 IN | HEART RATE: 78 BPM | SYSTOLIC BLOOD PRESSURE: 138 MMHG | TEMPERATURE: 98.2 F | DIASTOLIC BLOOD PRESSURE: 67 MMHG | BODY MASS INDEX: 33.58 KG/M2

## 2017-03-13 PROCEDURE — 25000125 ZZHC RX 250: Performed by: ANESTHESIOLOGY

## 2017-03-13 PROCEDURE — 25800025 ZZH RX 258: Performed by: ANESTHESIOLOGY

## 2017-03-13 PROCEDURE — 94640 AIRWAY INHALATION TREATMENT: CPT

## 2017-03-13 PROCEDURE — 25000125 ZZHC RX 250: Performed by: ORTHOPAEDIC SURGERY

## 2017-03-13 RX ORDER — ALBUTEROL SULFATE 0.83 MG/ML
2.5 SOLUTION RESPIRATORY (INHALATION) EVERY 4 HOURS PRN
Status: CANCELLED | OUTPATIENT
Start: 2017-03-13

## 2017-03-13 RX ORDER — DEXAMETHASONE SODIUM PHOSPHATE 4 MG/ML
4 INJECTION, SOLUTION INTRA-ARTICULAR; INTRALESIONAL; INTRAMUSCULAR; INTRAVENOUS; SOFT TISSUE
Status: CANCELLED | OUTPATIENT
Start: 2017-03-13

## 2017-03-13 RX ORDER — ONDANSETRON 4 MG/1
4 TABLET, ORALLY DISINTEGRATING ORAL EVERY 30 MIN PRN
Status: CANCELLED | OUTPATIENT
Start: 2017-03-13

## 2017-03-13 RX ORDER — ONDANSETRON 2 MG/ML
4 INJECTION INTRAMUSCULAR; INTRAVENOUS EVERY 30 MIN PRN
Status: CANCELLED | OUTPATIENT
Start: 2017-03-13

## 2017-03-13 RX ORDER — FENTANYL CITRATE 50 UG/ML
25-50 INJECTION, SOLUTION INTRAMUSCULAR; INTRAVENOUS
Status: CANCELLED | OUTPATIENT
Start: 2017-03-13

## 2017-03-13 RX ORDER — VANCOMYCIN HYDROCHLORIDE 1 G/200ML
1000 INJECTION, SOLUTION INTRAVENOUS
Status: COMPLETED | OUTPATIENT
Start: 2017-03-13 | End: 2017-03-13

## 2017-03-13 RX ORDER — MEPERIDINE HYDROCHLORIDE 25 MG/ML
12.5 INJECTION INTRAMUSCULAR; INTRAVENOUS; SUBCUTANEOUS EVERY 5 MIN PRN
Status: CANCELLED | OUTPATIENT
Start: 2017-03-13

## 2017-03-13 RX ORDER — VANCOMYCIN HYDROCHLORIDE 1 G/200ML
1000 INJECTION, SOLUTION INTRAVENOUS SEE ADMIN INSTRUCTIONS
Status: DISCONTINUED | OUTPATIENT
Start: 2017-03-13 | End: 2017-03-13 | Stop reason: HOSPADM

## 2017-03-13 RX ORDER — ALBUTEROL SULFATE 0.83 MG/ML
2.5 SOLUTION RESPIRATORY (INHALATION) ONCE
Status: COMPLETED | OUTPATIENT
Start: 2017-03-13 | End: 2017-03-13

## 2017-03-13 RX ORDER — LABETALOL HYDROCHLORIDE 5 MG/ML
10 INJECTION, SOLUTION INTRAVENOUS
Status: CANCELLED | OUTPATIENT
Start: 2017-03-13

## 2017-03-13 RX ORDER — BUPIVACAINE HYDROCHLORIDE 2.5 MG/ML
INJECTION, SOLUTION EPIDURAL; INFILTRATION; INTRACAUDAL
Status: DISCONTINUED
Start: 2017-03-13 | End: 2017-03-13 | Stop reason: HOSPADM

## 2017-03-13 RX ORDER — SODIUM CHLORIDE, SODIUM LACTATE, POTASSIUM CHLORIDE, CALCIUM CHLORIDE 600; 310; 30; 20 MG/100ML; MG/100ML; MG/100ML; MG/100ML
INJECTION, SOLUTION INTRAVENOUS CONTINUOUS
Status: DISCONTINUED | OUTPATIENT
Start: 2017-03-13 | End: 2017-03-13 | Stop reason: HOSPADM

## 2017-03-13 RX ORDER — HYDRALAZINE HYDROCHLORIDE 20 MG/ML
2.5-5 INJECTION INTRAMUSCULAR; INTRAVENOUS EVERY 10 MIN PRN
Status: CANCELLED | OUTPATIENT
Start: 2017-03-13

## 2017-03-13 RX ORDER — SODIUM CHLORIDE, SODIUM LACTATE, POTASSIUM CHLORIDE, CALCIUM CHLORIDE 600; 310; 30; 20 MG/100ML; MG/100ML; MG/100ML; MG/100ML
INJECTION, SOLUTION INTRAVENOUS CONTINUOUS
Status: CANCELLED | OUTPATIENT
Start: 2017-03-13

## 2017-03-13 RX ADMIN — VANCOMYCIN HYDROCHLORIDE 1000 MG: 1 INJECTION, SOLUTION INTRAVENOUS at 10:55

## 2017-03-13 RX ADMIN — SODIUM CHLORIDE, POTASSIUM CHLORIDE, SODIUM LACTATE AND CALCIUM CHLORIDE: 600; 310; 30; 20 INJECTION, SOLUTION INTRAVENOUS at 10:55

## 2017-03-13 RX ADMIN — ALBUTEROL SULFATE 2.5 MG: 2.5 SOLUTION RESPIRATORY (INHALATION) at 11:39

## 2017-03-13 ASSESSMENT — LIFESTYLE VARIABLES: TOBACCO_USE: 1

## 2017-03-13 ASSESSMENT — COPD QUESTIONNAIRES
COPD: 1
CAT_SEVERITY: MILD

## 2017-03-13 NOTE — OR NURSING
Case cancelled.Patient educated re:nerve block.Stood at bedside with assistance of 2.Daughter and brother to spend night.

## 2017-03-13 NOTE — IP AVS SNAPSHOT
MRN:[id number]                      After Visit Summary   3/13/2017    Sydni Hardin    MRN: [id number]           Thank you!     Thank you for choosing [facility] for your care. Our goal is always to provide you with excellent care. Hearing back from our patients is one way we can continue to improve our services. Please take a few minutes to complete the written survey that you may receive in the mail after you visit with us. Thank you!        Patient Information     Date Of Birth          1950        About your hospital stay     You were admitted on:  March 13, 2017 You last received care in the:  [facility] Preop/Phase II    You were discharged on:  March 13, 2017       Who to Call     For medical emergencies, please call 911.  For non-urgent questions about your medical care, please call your primary care provider or clinic, [phone]  For questions related to your surgery, please call your surgery clinic        Attending Provider     Provider Specialty    Moisés Olson MD Orthopedics       Primary Care Provider Office Phone # Fax #    Gibran Alarcon [phone]       [address] [facility] [address]        Your next 10 appointments already scheduled     Mar 21, 2017   Procedure with Moisés Olson MD   [facility] Periop Services ([facility] )    [address] [place] [id number] [phone]            Apr 12, 2017  1:20 PM CDT   (Arrive by 1:00 PM)   Return Visit with Moisés Olson MD    ORTHOPEDICS ([facility])    [address] [place] [id number] [phone]              Pending Results     No orders found from 3/11/2017 to 3/14/2017.            Admission Information     Date & Time Provider Department Dept. Phone    3/13/2017 Moisés Olson MD [facility] Preop/Phase [phone]      Your Vitals Were     Blood Pressure Pulse Temperature Respirations Height Weight    130/85 78 98.2  F (36.8  C)  "(Oral) 16 1.473 m (4' 10\") 72.6 kg (160 lb)    Pulse Oximetry BMI (Body Mass Index)                95% 33.44 kg/m2          Care EveryWhere ID     This is your Care EveryWhere ID. This could be used by other organizations to access your [facility] medical records  [id number]           Review of your medicines      UNREVIEWED medicines. Ask your doctor about these medicines        Dose / Directions    ALPRAZolam 1 MG tablet   Commonly known as:  XANAX   Used for:  Anxiety        TAKE 1 TABLET BY MOUTH 3 TIMES DAILY   Quantity:  90 tablet   Refills:  3       aspirin 81 MG tablet        Dose:  1 tablet   Take 1 tablet by mouth daily   Refills:  0       atorvastatin 40 MG tablet   Commonly known as:  LIPITOR   Used for:  Mixed hyperlipidemia        TAKE 1 TABLET BY MOUTH DAILY   Quantity:  90 tablet   Refills:  0       benazepril 10 MG tablet   Commonly known as:  LOTENSIN   Used for:  HTN (hypertension)        TAKE 1 TABLET BY MOUTH DAILY   Quantity:  90 tablet   Refills:  0       benzonatate 200 MG capsule   Commonly known as:  TESSALON   Used for:  Preop general physical exam, Tobacco abuse        TAKE 1 CAPSULE BY MOUTH 3 TIMES A DAY AS NEEDED FOR COUGH   Quantity:  60 capsule   Refills:  3       budesonide-formoterol 160-4.5 MCG/ACT Inhaler   Commonly known as:  SYMBICORT   Used for:  Asthma        Dose:  2 puff   Inhale 2 puffs into the lungs 2 times daily. As needed   Quantity:  1 Inhaler   Refills:  11       butalbital-acetaminophen-caffeine -40 MG per tablet   Commonly known as:  FIORICET/ESGIC   Used for:  Scalp hematoma, sequela        Dose:  1 tablet   Take 1 tablet by mouth every 6 hours as needed   Quantity:  40 tablet   Refills:  5       cimetidine 800 MG tablet   Commonly known as:  TAGAMET   Used for:  Esophageal reflux        Dose:  800 mg   Take 1 tablet (800 mg) by mouth At Bedtime As needed   Quantity:  90 tablet   Refills:  3       doxepin 25 MG capsule   Commonly known as:  SINEquan   Used " for:  Anxiety        TAKE 1 CAPSULE BY MOUTH AT BEDTIME   Quantity:  90 capsule   Refills:  3       EPIPEN 2-JAIRO 0.3 MG/0.3ML injection   Used for:  Anaphylactic reaction   Generic drug:  EPINEPHrine        INJECT 1 SYRINGE INTRAMUSCULARLY ONCE AS NEEDED FOR ANAPHYLAXIS   Quantity:  2 each   Refills:  0       escitalopram 20 MG tablet   Commonly known as:  LEXAPRO   Used for:  Dysthymia        TAKE 1 TABLET BY MOUTH 2 TIMES A DAY   Quantity:  180 tablet   Refills:  0       fluconazole 150 MG tablet   Commonly known as:  DIFLUCAN   Used for:  Candidiasis of vulva and vagina        Dose:  150 mg   Take 1 tablet (150 mg) by mouth every 72 hours   Quantity:  2 tablet   Refills:  0       hydrochlorothiazide 25 MG tablet   Commonly known as:  HYDRODIURIL   Used for:  Benign essential hypertension        TAKE 1 TABLET BY MOUTH DAILY   Quantity:  90 tablet   Refills:  3       ipratropium - albuterol 0.5 mg/2.5 mg/3 mL 0.5-2.5 (3) MG/3ML neb solution   Commonly known as:  DUONEB   Used for:  Acute bronchospasm        Dose:  1 vial   Take 1 vial (3 mLs) by nebulization every 6 hours as needed   Quantity:  360 mL   Refills:  2       meloxicam 15 MG tablet   Commonly known as:  MOBIC   Used for:  Pain        TAKE 1 TABLET BY MOUTH DAILY   Quantity:  90 tablet   Refills:  1       montelukast 10 MG tablet   Commonly known as:  SINGULAIR   Used for:  Mixed hyperlipidemia        TAKE 1 TABLET BY MOUTH DAILY   Quantity:  90 tablet   Refills:  3       nicotine 21 MG/24HR 24 hr patch   Commonly known as:  NICODERM CQ        Dose:  1 patch   Place 1 patch onto the skin every 24 hours   Refills:  0       nystatin 087689 UNIT/ML suspension   Commonly known as:  MYCOSTATIN   Used for:  Thrush        Dose:  749391 Units   Take 5 mLs (500,000 Units) by mouth 4 times daily   Quantity:  60 mL   Refills:  0       oxyCODONE-acetaminophen 5-325 MG per tablet   Commonly known as:  PERCOCET   Used for:  Primary osteoarthritis of both knees         TAKE 1 TO 2 TABLETS BY MOUTH EVERY 4 HOURS AS NEEDED FOR PAIN   Quantity:  60 tablet   Refills:  0       potassium chloride 10 MEQ tablet   Commonly known as:  K-TAB,KLOR-CON   Used for:  Benign essential hypertension        TAKE 1 TABLET BY MOUTH 2 TIMES DAILY WITH FOOD   Quantity:  180 tablet   Refills:  1       temazepam 30 MG capsule   Commonly known as:  RESTORIL   Used for:  Persistent insomnia        TAKE 1 CAPSULE BY MOUTH NIGHTLY AS NEEDED FOR SLEEP   Quantity:  90 capsule   Refills:  5       VENTOLIN  (90 BASE) MCG/ACT Inhaler   Used for:  Tobacco abuse   Generic drug:  albuterol        USE 1 PUFF FOUR TIMES A DAY AS NEEDED   Quantity:  18 g   Refills:  3         CONTINUE these medicines which have NOT CHANGED        Dose / Directions    * order for DME   Used for:  Primary osteoarthritis of right knee        Equipment being ordered: Nebulizer supplies   Quantity:  2 each   Refills:  11       * order for DME   Used for:  Primary osteoarthritis of right knee        Equipment being ordered: mcgee walker   Quantity:  1 each   Refills:  0       * order for DME   Used for:  Postoperative state        Equipment being ordered: toilet seat riser   Quantity:  1 each   Refills:  0       * Notice:  This list has 3 medication(s) that are the same as other medications prescribed for you. Read the directions carefully, and ask your doctor or other care provider to review them with you.             Protect others around you: Learn how to safely use, store and throw away your medicines at www.disposemymeds.org.             Medication List: This is a list of all your medications and when to take them. Check marks below indicate your daily home schedule. Keep this list as a reference.      Medications           Morning Afternoon Evening Bedtime As Needed    ALPRAZolam 1 MG tablet   Commonly known as:  XANAX   TAKE 1 TABLET BY MOUTH 3 TIMES DAILY                                aspirin 81 MG tablet   Take 1 tablet by  mouth daily                                atorvastatin 40 MG tablet   Commonly known as:  LIPITOR   TAKE 1 TABLET BY MOUTH DAILY                                benazepril 10 MG tablet   Commonly known as:  LOTENSIN   TAKE 1 TABLET BY MOUTH DAILY                                benzonatate 200 MG capsule   Commonly known as:  TESSALON   TAKE 1 CAPSULE BY MOUTH 3 TIMES A DAY AS NEEDED FOR COUGH                                budesonide-formoterol 160-4.5 MCG/ACT Inhaler   Commonly known as:  SYMBICORT   Inhale 2 puffs into the lungs 2 times daily. As needed                                butalbital-acetaminophen-caffeine -40 MG per tablet   Commonly known as:  FIORICET/ESGIC   Take 1 tablet by mouth every 6 hours as needed                                cimetidine 800 MG tablet   Commonly known as:  TAGAMET   Take 1 tablet (800 mg) by mouth At Bedtime As needed                                doxepin 25 MG capsule   Commonly known as:  SINEquan   TAKE 1 CAPSULE BY MOUTH AT BEDTIME                                EPIPEN 2-JAIRO 0.3 MG/0.3ML injection   INJECT 1 SYRINGE INTRAMUSCULARLY ONCE AS NEEDED FOR ANAPHYLAXIS   Generic drug:  EPINEPHrine                                escitalopram 20 MG tablet   Commonly known as:  LEXAPRO   TAKE 1 TABLET BY MOUTH 2 TIMES A DAY                                fluconazole 150 MG tablet   Commonly known as:  DIFLUCAN   Take 1 tablet (150 mg) by mouth every 72 hours                                hydrochlorothiazide 25 MG tablet   Commonly known as:  HYDRODIURIL   TAKE 1 TABLET BY MOUTH DAILY                                ipratropium - albuterol 0.5 mg/2.5 mg/3 mL 0.5-2.5 (3) MG/3ML neb solution   Commonly known as:  DUONEB   Take 1 vial (3 mLs) by nebulization every 6 hours as needed                                meloxicam 15 MG tablet   Commonly known as:  MOBIC   TAKE 1 TABLET BY MOUTH DAILY                                montelukast 10 MG tablet   Commonly known as:   SINGULAIR   TAKE 1 TABLET BY MOUTH DAILY                                nicotine 21 MG/24HR 24 hr patch   Commonly known as:  NICODERM CQ   Place 1 patch onto the skin every 24 hours                                nystatin [id number] UNIT/ML suspension   Commonly known as:  MYCOSTATIN   Take 5 mLs (500,000 Units) by mouth 4 times daily                                * order for DME   Equipment being ordered: Nebulizer supplies                                * order for DME   Equipment being ordered: mcgee walker                                * order for DME   Equipment being ordered: toilet seat riser                                oxyCODONE-acetaminophen 5-325 MG per tablet   Commonly known as:  PERCOCET   TAKE 1 TO 2 TABLETS BY MOUTH EVERY 4 HOURS AS NEEDED FOR PAIN                                potassium chloride 10 MEQ tablet   Commonly known as:  K-TAB,KLOR-CON   TAKE 1 TABLET BY MOUTH 2 TIMES DAILY WITH FOOD                                temazepam 30 MG capsule   Commonly known as:  RESTORIL   TAKE 1 CAPSULE BY MOUTH NIGHTLY AS NEEDED FOR SLEEP                                VENTOLIN  (90 BASE) MCG/ACT Inhaler   USE 1 PUFF FOUR TIMES A DAY AS NEEDED   Generic drug:  albuterol                                * Notice:  This list has 3 medication(s) that are the same as other medications prescribed for you. Read the directions carefully, and ask your doctor or other care provider to review them with you.              More Information        Anesthesia: Regional Anesthesia  You re scheduled for surgery. During surgery, you ll receive medication called anesthesia to keep you comfortable and pain-free. Your surgeon has decided that you ll receive regional anesthesia. This sheet tells you what to expect with this type of anesthesia.  What is regional anesthesia?  Regional anesthesia numbs one region of your body. The anesthesia may be given around nerves or into veins in your arms, neck, or legs (nerve block  or Medley block). Or it may be sent into the spinal fluid (spinal anesthesia) or into the space just outside the spinal fluid (epidural anesthesia). You may also be given sedatives to help you relax.  Nerve block or Della block  A small area of the body, such as an arm or leg, can be numbed using a nerve block or Della block.    Nerve block. During a nerve block, your skin is numbed. A needle is then inserted near nerves that serve the area to be numbed. Anesthetic is sent through the needle.    IV regional or Medley block. For this type of block, an IV line is put into a vein. The blood flow to the area to be numbed is blocked for a short time. Anesthetic is sent through the IV.  Spinal anesthesia  Spinal anesthesia numbs your body from about the waist down.    Anesthetic is injected into the spinal fluid. This is a substance that surrounds the spinal cord in your spinal column. The anesthetic blocks pain traveling from the body to the brain.    To receive the anesthetic, your skin is numbed at the injection site on your back.    A needle is then inserted into the spinal space. Anesthetic is sent into the spinal fluid through the needle.    Epidural anesthesia  Epidural anesthesia is most commonly used during childbirth and may also be used after surgical procedures of the chest, abdomen, and legs.    Anesthetic is injected into the epidural space. This is just outside the dural sac which contains the spinal fluid.    To receive the anesthetic, your skin is numbed at the injection site on your back.    A needle is then inserted into the epidural space. Anesthetic is sent into the epidural space through the needle.    A small flexible catheter may be attached to the needle and left in place. This allows for continuous injections or infusions of anesthetic.  Anesthesia tools and medications that might be near you during your procedure    Local anesthetic.  This medication is given through a needle numbs one region of your  body.    Electrocardiography leads (electrodes). These are used to record your heart rate and rhythm.    Blood pressure cuff. A cuff is placed on your arm to keep track of your blood pressure.    Pulse oximeter. This small clip is placed on the end of the finger. It measures your blood oxygen level.    Sedatives. These medications may be given through an IV. They help to relax you and keep you comfortable. You may stay awake or sleep lightly.    Oxygen. You may be given oxygen through a facemask.  Risks and possible complications  Regional anesthesia carries some risks. These include:    Nausea and vomiting    Headache    Backache    Decreased blood pressure    Allergic reaction to the anesthetic    Ongoing numbness (rare)    Irregular heartbeat (rare)    Cardiac arrest (rare)     0152-0270 [employer]. [address] [place] [address], [place] [address]. All rights reserved. This information is not intended as a substitute for professional medical care. Always follow your healthcare professional's instructions.

## 2017-03-13 NOTE — OR NURSING
Email sent to total joint replacement team at [facility] as follows;    We had Sydni Wilfred : 1950 was scheduled for RTKA with Dr. Olson and was cancelled and rescheduled to 17 afternoon.  Her plans for after surgery are the same.    Thank you,  Aimee Zhou R.N.  Pre-Anesthesia Testing

## 2017-03-13 NOTE — IP AVS SNAPSHOT
[facility] Preop/Phase II    [address] [address]    Phone:  [phone]                                       After Visit Summary   3/13/2017    Sydni Hardin    MRN: [id number]           After Visit Summary Signature Page     I have received my discharge instructions, and my questions have been answered. I have discussed any challenges I see with this plan with the nurse or doctor.    ..........................................................................................................................................  Patient/Patient Representative Signature      ..........................................................................................................................................  Patient Representative Print Name and Relationship to Patient    ..................................................               ................................................  Date                                            Time    ..........................................................................................................................................  Reviewed by Signature/Title    ...................................................              ..............................................  Date                                                            Time

## 2017-03-13 NOTE — ANESTHESIA PREPROCEDURE EVALUATION
Anesthesia Evaluation     . Pt has had prior anesthetic.     History of anesthetic complications    Per Patient: h/o Low BP      ROS/MED HX    ENT/Pulmonary:     (+)tobacco use, Current use 1.0 PPD packs/day  mild COPD, , . Other pulmonary disease Lung nodules.    Neurologic:     (+)migraines, CVA date: 9/2011     Cardiovascular:     (+) Dyslipidemia, hypertension-range: not on beta blocker, ---. : . . BRAND, . :. . Previous cardiac testing date:results:date: results:ECG reviewed date:2/23/2017 results:NSR@71, OWN date: results:          METS/Exercise Tolerance:     Hematologic:  - neg hematologic  ROS       Musculoskeletal:   (+) arthritis, , , other musculoskeletal- DJD, Cervicalgia, DJD      GI/Hepatic:  - neg GI/hepatic ROS       Renal/Genitourinary:     (+) chronic renal disease (Stage 3 (moderate)), type: CRI, Other Renal/ Genitourinary, IC (interstitial cystitis)      Endo:     (+) Obesity, .      Psychiatric:     (+) psychiatric history anxiety and depression      Infectious Disease:  - neg infectious disease ROS       Malignancy:   (+) Malignancy History of Skin          Other:    (+) H/O Chronic Pain,H/O chronic opiod use ,                    Physical Exam  Normal systems: cardiovascular and dental    Airway   Mallampati: III  TM distance: >3 FB  Neck ROM: full    Dental     Cardiovascular   Rhythm and rate: regular and normal      Pulmonary (+) decreased breath sounds and wheezes                       Anesthesia Plan      History & Physical Review  History and physical reviewed and following examination; no interval change.    ASA Status:  3 .    NPO Status:  > 8 hours    Plan for General, LMA and Periph. Nerve Block for postop pain with Intravenous and Propofol induction. Maintenance will be Balanced.    PONV prophylaxis:  Ondansetron (or other 5HT-3) and Dexamethasone or Solumedrol  Will provide Preop Neb      Postoperative Care  Postoperative pain management:  IV analgesics, Oral pain medications and  Peripheral nerve block (Single Shot).      Consents  Anesthetic plan, risks, benefits and alternatives discussed with:  Patient and Daughter/Son..                          .

## 2017-03-13 NOTE — ANESTHESIA PROCEDURE NOTES
Peripheral nerve/Neuraxial procedure note : Adductor canal  Pre-Procedure    Location: OR    Procedure Times:3/13/2017 12:00 PM and 3/13/2017 12:16 PM  Pre-Anesthestic Checklist: patient identified, IV checked, site marked, risks and benefits discussed, informed consent, monitors and equipment checked, pre-op evaluation, at physician/surgeon's request and post-op pain management    Timeout  Correct Patient: Yes   Correct Procedure: Yes   Correct Site: Yes   Correct Laterality: Yes   Correct Position: Yes   Site Marked: Yes   .   Procedure Documentation    .    Procedure:    Adductor canal.  Local skin infiltrated with mL of 2% lidocaine.     Ultrasound used to identify targeted nerve, plexus, or vascular marker and placed a needle adjacent to it. A permanent image is entered into the patient's record.  Patient Prep;mask, sterile gloves, chlorhexidine gluconate and isopropyl alcohol.  .  Needle: insulated, short bevel (22 G. 4 in). .  Spinal Needle: . . Insertion Method: Single Shot.     Assessment/Narrative  Paresthesias: No.  .  The placement was negative for: blood aspirated, painful injection and site bleeding.  Bolus given via needle..   Secured via.   Complications: none. Comments:  X30cc 0.375% Ropivacaine + 1:200K Epi + 4mg Decadron

## 2017-03-14 NOTE — H&P (VIEW-ONLY)
[address]  [facility] [address]  [phone]  Dept: [phone]    PRE-OP EVALUATION:  Today's date: 2017    Sydni Hardin (: 1950) presents for pre-operative evaluation assessment as requested by Dr. Olson.  She requires evaluation and anesthesia risk assessment prior to undergoing surgery/procedure for treatment of right knee pain .  Proposed procedure: right total knee    Date of Surgery/ Procedure: 17    [place]ospital/[place] [facility] [place]    Primary Physician: Gibran Alarcon  Type of Anesthesia Anticipated: to be determined    Patient has a Health Care Directive or Living Will:  YES     1. NO - Do you have a history of heart attack, stroke, stent, bypass or surgery on an artery in the head, neck, heart or legs?  2. NO - Do you ever have any pain or discomfort in your chest?  3. NO - Do you have a history of  Heart Failure?  4. YES - ARE YOUR TROUBLED BY SHORTNESS OF BREATH WHEN WALKING ON THE LEVEL, UP A SLIGHT HILL OR AT NIGHT? yes  5. NO - Do you currently have a cold, bronchitis or other respiratory infection?  6. YES - DO YOU HAVE A COUGH, SHORTNESS OF BREATH OR WHEEZING? yes  7. YES - DO YOU SOMETIMES GET PAINS IN THE CALVES OF YOUR LEGS WHEN YOU WALK? yes  8. NO - Do you or anyone in your family have previous history of blood clots?  9. NO - Do you or does anyone in your family have a serious bleeding problem such as prolonged bleeding following surgeries or cuts?  10. NO - Have you ever had problems with anemia or been told to take iron pills?  11. NO - Have you had any abnormal blood loss such as black, tarry or bloody stools, or abnormal vaginal bleeding?  12. NO - Have you ever had a blood transfusion?  13. YES - HAVE YOU OR ANY OF YOUR RELATIVES EVER HAD PROBLEMS WITH ANESTHESIA? Had low bp with anesthesia.  14. NO - Do you have sleep apnea, excessive snoring or daytime drowsiness?  15. NO - Do you have any prosthetic heart valves?  16.  YES - DO YOU HAVE PROSTHETIC JOINTS? shoulder  17. NO - Is there any chance that you may be pregnant?      HPI:                                                      Brief HPI related to upcoming procedure: severe knee oa, right      See problem list for active medical problems.  Problems all longstanding and stable, except as noted/documented.  See ROS for pertinent symptoms related to these conditions.                                                                                                  .    MEDICAL HISTORY:                                                      Patient Active Problem List    Diagnosis Date Noted     ACP (advance care planning) 10/05/2016     Priority: Medium     Advance Care Planning 10/5/2016: ACP Review of Chart / Resources Provided:  Reviewed chart for advance care plan.  Sydni SKINNER Bassemzach has no plan or code status on file however states presence of ACP document. Copy requested. Confirmed code status reflects current choices pending receipt of document/advance care plan review.  Confirmed/documented legally designated decision makers.  Added by Janie Robledo             Primary osteoarthritis of both knees 10/05/2016     Priority: Medium     IC (interstitial cystitis) 10/28/2015     Priority: Medium     SCC (squamous cell carcinoma), face 08/05/2015     Priority: Medium     Lung nodules 06/19/2015     Priority: Medium     Tobacco abuse 06/19/2015     Priority: Medium     Dysthymia 12/31/2014     Priority: Medium     Advanced care planning/counseling discussion 11/28/2012     Priority: Medium     Essential hypertension 01/05/2012     Priority: Medium     Problem list name updated by automated process. Provider to review       Calculus of gallbladder 02/02/2006     Priority: Medium     Problem list name updated by automated process. Provider to review       Headache 12/10/2003     Priority: Medium     Problem list name updated by automated process. Provider to review        Osteoarthrosis, unspecified whether generalized or localized, involving lower leg 10/04/2001     Priority: Medium     Problem list name updated by automated process. Provider to review        Past Medical History   Diagnosis Date     Abnormal arterial blood gases 2006     Acute upper respiratory infections of unspecified site 2001     Anxiety 10/24/2011     Calculus of gallbladder without mention of cholecystitis or obstruction 2006     Depression 2012     Follow-up examination, following other surgery 2006     Headache(784.0) 12/10/2003     History of CVA (cerebrovascular accident) 2011     HTN (hypertension) 2012     Insomnia 3/13/2012     Lipoma of other skin and subcutaneous tissue 2006     Nonallopathic lesion of cervical region, not elsewhere classified 2003     Osteoarthrosis, unspecified whether generalized or localized, lower leg 10/4/2001     Special screening for malignant neoplasms, colon 2006     Urinary tract infection, site not specified 2007     Past Surgical History   Procedure Laterality Date     Shoulder x2  2009     left     Biopsy breast  2000     left - free of disease     Cholecystectomy  2006      section        section       Hysterectomy       Colonoscopy  2008     repeat in 10 years     Arthroscopy knee       Orthopedic surgery  2014     right shoulder replacement     Head & neck surgery       hematoma on head drained     Current Outpatient Prescriptions   Medication Sig Dispense Refill     oxyCODONE-acetaminophen (PERCOCET) 5-325 MG per tablet TAKE 1 TO 2 TABLETS BY MOUTH EVERY 4 HOURS AS NEEDED FOR PAIN 60 tablet 0     doxepin (SINEQUAN) 25 MG capsule TAKE 1 CAPSULE BY MOUTH AT BEDTIME 90 capsule 3     potassium chloride (K-TAB,KLOR-CON) 10 MEQ tablet TAKE 1 TABLET BY MOUTH 2 TIMES DAILY WITH FOOD 180 tablet 1     hydrochlorothiazide (HYDRODIURIL) 25 MG tablet TAKE 1 TABLET BY MOUTH DAILY 90 tablet 3      meloxicam (MOBIC) 15 MG tablet TAKE 1 TABLET BY MOUTH DAILY 90 tablet 1     atorvastatin (LIPITOR) 40 MG tablet TAKE 1 TABLET BY MOUTH DAILY 90 tablet 0     montelukast (SINGULAIR) 10 MG tablet TAKE 1 TABLET BY MOUTH DAILY 90 tablet 3     order for DME Equipment being ordered: Nebulizer supplies 2 each 11     order for DME Equipment being ordered: mcgee walker 1 each 0     ALPRAZolam (XANAX) 1 MG tablet TAKE 1 TABLET BY MOUTH 3 TIMES DAILY 90 tablet 3     escitalopram (LEXAPRO) 20 MG tablet TAKE 1 TABLET BY MOUTH 2 TIMES A  tablet 0     benazepril (LOTENSIN) 10 MG tablet TAKE 1 TABLET BY MOUTH DAILY 90 tablet 0     EPIPEN 2-JAIRO 0.3 MG/0.3ML injection INJECT 1 SYRINGE INTRAMUSCULARLY ONCE AS NEEDED FOR ANAPHYLAXIS 2 each 0     benzonatate (TESSALON) 200 MG capsule TAKE 1 CAPSULE BY MOUTH 3 TIMES A DAY AS NEEDED FOR COUGH 60 capsule 3     temazepam (RESTORIL) 30 MG capsule TAKE 1 CAPSULE BY MOUTH NIGHTLY AS NEEDED FOR SLEEP 90 capsule 5     VENTOLIN  (90 BASE) MCG/ACT inhaler USE 1 PUFF FOUR TIMES A DAY AS NEEDED 18 g 3     nystatin (MYCOSTATIN) 734911 UNIT/ML suspension Take 5 mLs (500,000 Units) by mouth 4 times daily 60 mL 0     UNABLE TO FIND 1 suppository daily MEDICATION NAME: vaginal Valium       butalbital-acetaminophen-caffeine (FIORICET, ESGIC) -40 MG per tablet Take 1 tablet by mouth every 6 hours as needed 40 tablet 5     ipratropium - albuterol 0.5 mg/2.5 mg/3 mL (DUONEB) 0.5-2.5 (3) MG/3ML nebulization Take 1 vial (3 mLs) by nebulization every 6 hours as needed 360 mL 2     cimetidine (TAGAMET) 800 MG tablet Take 1 tablet (800 mg) by mouth At Bedtime As needed 90 tablet 3     aspirin 81 MG tablet Take 1 tablet by mouth daily       budesonide-formoterol (SYMBICORT) 160-4.5 MCG/ACT inhaler Inhale 2 puffs into the lungs 2 times daily. As needed 1 Inhaler 11     OTC products: None, except as noted above    Allergies   Allergen Reactions     Bee Pollen Other (See Comments)     Throat and  "eyes close up     Keflex [Cephalexin Hcl] Hives     Sulfonamide Derivatives Other (See Comments)     Throat closes     Ciprofloxacin Rash     Rash, bumps on lips     Bupropion Hcl Other (See Comments)     Made her very irritable     Celecoxib Other (See Comments) and Cough     Celebrex - wheeze     Clonazepam Hives     Codeine Difficulty breathing     \"My throat closes shut.\"        Erythromycin Other (See Comments)     Erythromycin base - abdominal pain     Lisinopril Other (See Comments)     Zestril - headaches     Nifedipine Hives     Procardia     Tramadol Hcl       Latex Allergy: NO    Social History   Substance Use Topics     Smoking status: Current Every Day Smoker     Packs/day: 1.00     Years: 46.00     Types: Cigarettes     Smokeless tobacco: Never Used      Comment: tried to quit, relapse due to craving     Alcohol use 0.0 oz/week     0 Standard drinks or equivalent per week      Comment: rarely     History   Drug Use No       REVIEW OF SYSTEMS:                                                    C: NEGATIVE for fever, chills, change in weight  I: NEGATIVE for worrisome rashes, moles or lesions  E: NEGATIVE for vision changes or irritation  E/M: NEGATIVE for ear, mouth and throat problems  R: NEGATIVE for significant cough or SOB  CV: NEGATIVE for chest pain, palpitations or peripheral edema  GI: NEGATIVE for nausea, abdominal pain, heartburn, or change in bowel habits  : NEGATIVE for frequency, dysuria, or hematuria  M: NEGATIVE for significant arthralgias or myalgia  N: NEGATIVE for weakness, dizziness or paresthesias  E: NEGATIVE for temperature intolerance, skin/hair changes  H: NEGATIVE for bleeding problems  P: NEGATIVE for changes in mood or affect    EXAM:                                                    There were no vitals taken for this visit.    GENERAL APPEARANCE: healthy, alert and no distress     EYES: EOMI,- PERRL     HENT: ear canals and TM's normal and nose and mouth without ulcers or " lesions     NECK: no adenopathy, no asymmetry, masses, or scars and thyroid normal to palpation     RESP: lungs clear to auscultation - no rales, rhonchi or wheezes     CV: regular rates and rhythm, normal S1 S2, no S3 or S4 and no murmur, click or rub -     ABDOMEN:  soft, nontender, no HSM or masses and bowel sounds normal     MS: extremities normal- no gross deformities noted, no evidence of inflammation in joints, FROM in all extremities.     SKIN: no suspicious lesions or rashes     NEURO: Normal strength and tone, sensory exam grossly normal, mentation intact and speech normal     PSYCH: mentation appears normal. and affect normal/bright     LYMPHATICS: No axillary, cervical, inguinal, or supraclavicular nodes    DIAGNOSTICS:                                                    EKG: appears normal, NSR, normal axis, normal intervals, no acute ST/T changes c/w ischemia, no LVH by voltage criteria, unchanged from previous tracings.  CXR clear.  Cbc normal.  Bmp pending.      Recent Labs   Lab Test  12/07/16   1024  06/22/16   1126   HGB  15.9*  16.1*   PLT  338  340   NA  133  135   POTASSIUM  4.1  4.4   CR  1.06*  0.86        IMPRESSION:                                                    Reason for surgery/procedure: right knee oa.   Diagnosis/reason for consult: preoperative clearance    The proposed surgical procedure is considered INTERMEDIATE risk.    REVISED CARDIAC RISK INDEX  The patient has the following serious cardiovascular risks for perioperative complications such as (MI, PE, VFib and 3  AV Block):  No serious cardiac risks  INTERPRETATION: 1 risks: Class II (low risk - 0.9% complication rate)    The patient has the following additional risks for perioperative complications:  No identified additional risks    No diagnosis found.    RECOMMENDATIONS:                                                      --Consult hospital rounder / IM to assist post-op medical management    --Patient is to take all  scheduled medications on the day of surgery EXCEPT for modifications listed below.    APPROVAL GIVEN to proceed with proposed procedure, without further diagnostic evaluation.  Patient is very worried about developing sepsis postoperatively, which has happened to her.  I told her the surgical staff and Dr. Olson would take every precaution to prevent this.         Signed Electronically by: Gibran Alarcon MD    Copy of this evaluation report is provided to requesting physician.    [facility] Preop Guidelines

## 2017-03-15 ENCOUNTER — OFFICE VISIT (OUTPATIENT)
Dept: ORTHOPEDICS | Facility: OTHER | Age: 67
End: 2017-03-15
Attending: ORTHOPAEDIC SURGERY
Payer: COMMERCIAL

## 2017-03-15 VITALS
HEART RATE: 66 BPM | HEIGHT: 58 IN | SYSTOLIC BLOOD PRESSURE: 132 MMHG | BODY MASS INDEX: 33.58 KG/M2 | TEMPERATURE: 98.4 F | OXYGEN SATURATION: 93 % | DIASTOLIC BLOOD PRESSURE: 68 MMHG | WEIGHT: 160 LBS

## 2017-03-15 DIAGNOSIS — M17.0 PRIMARY OSTEOARTHRITIS OF BOTH KNEES: Primary | ICD-10-CM

## 2017-03-15 PROCEDURE — 99213 OFFICE O/P EST LOW 20 MIN: CPT | Performed by: ORTHOPAEDIC SURGERY

## 2017-03-15 PROCEDURE — 99212 OFFICE O/P EST SF 10 MIN: CPT

## 2017-03-15 ASSESSMENT — PAIN SCALES - GENERAL: PAINLEVEL: SEVERE PAIN (6)

## 2017-03-15 NOTE — NURSING NOTE
"Chief Complaint   Patient presents with     Consult     Discuss surgery.       Initial /68 (BP Location: Left arm, Patient Position: Chair, Cuff Size: Adult Regular)  Pulse 66  Temp 98.4  F (36.9  C) (Tympanic)  Ht 4' 10\" (1.473 m)  Wt 160 lb (72.6 kg)  SpO2 93%  BMI 33.44 kg/m2 Estimated body mass index is 33.44 kg/(m^2) as calculated from the following:    Height as of this encounter: 4' 10\" (1.473 m).    Weight as of this encounter: 160 lb (72.6 kg).  Medication Reconciliation: complete   Josephine Kennedy LPN      "

## 2017-03-15 NOTE — PROGRESS NOTES
Chief complaint: Severe DJD right knee    Subjective: This 67-year-old female was supposed to get her right knee replaced yesterday afternoon.  She was ready to be brought into the OR room but an instrument was found to have been insufficiently cleaned after it had been used on a previous surgery, so all the instruments had to be resterilized, a process that took 4 hours, and would have put start of the case into the evening hours.  It was therefore canceled and rescheduled for 3/21/17.  The patient stated that she wanted to see me in the office for a private discussion before hand.  We therefore added her on to today's schedule.    Objective: Healthy-appearing elderly female who is obviously emotionally upset about yesterday's incident.  She was accompanied to the office by her daughter.  She ambulates with a rolling walker.    Patient counseling: The patient expressed an interest in not having the surgery performed until next fall.  Her daughter and I both told her that we thought that would be a mistake.  I emphasized that happened yesterday was done as a safety precaution that was in her best interest.  If the instruments were not properly cleansed and sterilized, she'd be at  risk of an infection in her new knee, which could be catastrophic.  The longer she puts off the surgery the more time she has to relive the unpleasant emotions that the cancellation caused her.  She finally agreed to proceed with the surgery as planned on 3/21/17.    Impression: Severe DJD right knee, failure conservative treatment.    Plan: Right TKA 3/21/17    Medical decision makin minutes was spent with this patient and her daughter in a face-to-face encounter, 27 minutes of which were spent in addressing her fears, explaining what happened, and counseling her on her options.

## 2017-03-15 NOTE — MR AVS SNAPSHOT
"              After Visit Summary   3/15/2017    Sydni Hardin    MRN: [id number]           Patient Information     Date Of Birth          1950        Visit Information        Provider Department      3/15/2017 1:40 PM Moisés Olson MD  ORTHOPEDICS         Follow-ups after your visit        Your next 10 appointments already scheduled     Mar 21, 2017   Procedure with Moisés Olson MD   [facility] ([facility] )    [address] [place] [id number] [phone]            Apr 18, 2017  2:20 PM CDT   (Arrive by 2:00 PM)   Return Visit with Moisés Olson MD    [facility] ([facility])    [address] [id number] [phone]              Who to contact     If you have questions or need follow up information about today's clinic visit or your schedule please contact  ORTHOPEDICS directly at [phone].  Normal or non-critical lab and imaging results will be communicated to you by MyChart, letter or phone within 4 business days after the clinic has received the results. If you do not hear from us within 7 days, please contact the clinic through [employer] or phone. If you have a critical or abnormal lab result, we will notify you by phone as soon as possible.  Submit refill requests through [employer] or call your pharmacy and they will forward the refill request to us. Please allow 3 business days for your refill to be completed.          Additional Information About Your Visit        Care EveryWhere ID     This is your Care EveryWhere ID. This could be used by other organizations to access your [facility] medical records  [id number]        Your Vitals Were     Pulse Temperature Height Pulse Oximetry BMI (Body Mass Index)       66 98.4  F (36.9  C) (Tympanic) 4' 10\" (1.473 m) 93% 33.44 kg/m2        Blood Pressure from Last 3 Encounters:   03/15/17 132/68   03/13/17 138/67   02/24/17 112/76    Weight from Last 3 Encounters:   03/15/17 160 " lb (72.6 kg)   03/13/17 160 lb (72.6 kg)   02/24/17 160 lb (72.6 kg)              Today, you had the following     No orders found for display       Primary Care Provider Office Phone # Fax #    Gibran Alarcon [phone]       [facility] [address] [facility] [address]        Thank you!     Thank you for choosing  ORTHOPEDICS  for your care. Our goal is always to provide you with excellent care. Hearing back from our patients is one way we can continue to improve our services. Please take a few minutes to complete the written survey that you may receive in the mail after your visit with us. Thank you!             Your Updated Medication List - Protect others around you: Learn how to safely use, store and throw away your medicines at www.disposemymeds.org.          This list is accurate as of: 3/15/17  2:03 PM.  Always use your most recent med list.                   Brand Name Dispense Instructions for use    ALPRAZolam 1 MG tablet    XANAX    90 tablet    TAKE 1 TABLET BY MOUTH 3 TIMES DAILY       aspirin 81 MG tablet      Take 1 tablet by mouth daily       atorvastatin 40 MG tablet    LIPITOR    90 tablet    TAKE 1 TABLET BY MOUTH DAILY       benazepril 10 MG tablet    LOTENSIN    90 tablet    TAKE 1 TABLET BY MOUTH DAILY       benzonatate 200 MG capsule    TESSALON    60 capsule    TAKE 1 CAPSULE BY MOUTH 3 TIMES A DAY AS NEEDED FOR COUGH       budesonide-formoterol 160-4.5 MCG/ACT Inhaler    SYMBICORT    1 Inhaler    Inhale 2 puffs into the lungs 2 times daily. As needed       butalbital-acetaminophen-caffeine -40 MG per tablet    FIORICET/ESGIC    40 tablet    Take 1 tablet by mouth every 6 hours as needed       cimetidine 800 MG tablet    TAGAMET    90 tablet    Take 1 tablet (800 mg) by mouth At Bedtime As needed       doxepin 25 MG capsule    SINEquan    90 capsule    TAKE 1 CAPSULE BY MOUTH AT BEDTIME       EPIPEN 2-JAIRO 0.3 MG/0.3ML injection   Generic drug:   EPINEPHrine     2 each    INJECT 1 SYRINGE INTRAMUSCULARLY ONCE AS NEEDED FOR ANAPHYLAXIS       escitalopram 20 MG tablet    LEXAPRO    180 tablet    TAKE 1 TABLET BY MOUTH 2 TIMES A DAY       fluconazole 150 MG tablet    DIFLUCAN    2 tablet    Take 1 tablet (150 mg) by mouth every 72 hours       hydrochlorothiazide 25 MG tablet    HYDRODIURIL    90 tablet    TAKE 1 TABLET BY MOUTH DAILY       ipratropium - albuterol 0.5 mg/2.5 mg/3 mL 0.5-2.5 (3) MG/3ML neb solution    DUONEB    360 mL    Take 1 vial (3 mLs) by nebulization every 6 hours as needed       meloxicam 15 MG tablet    MOBIC    90 tablet    TAKE 1 TABLET BY MOUTH DAILY       montelukast 10 MG tablet    SINGULAIR    90 tablet    TAKE 1 TABLET BY MOUTH DAILY       nicotine 21 MG/24HR 24 hr patch    NICODERM CQ     Place 1 patch onto the skin every 24 hours       nystatin [id number] UNIT/ML suspension    MYCOSTATIN    60 mL    Take 5 mLs (500,000 Units) by mouth 4 times daily       * order for DME     2 each    Equipment being ordered: Nebulizer supplies       * order for DME     1 each    Equipment being ordered: arely walker       * order for DME     1 each    Equipment being ordered: toilet seat riser       oxyCODONE-acetaminophen 5-325 MG per tablet    PERCOCET    60 tablet    TAKE 1 TO 2 TABLETS BY MOUTH EVERY 4 HOURS AS NEEDED FOR PAIN       potassium chloride 10 MEQ tablet    K-TAB,KLOR-CON    180 tablet    TAKE 1 TABLET BY MOUTH 2 TIMES DAILY WITH FOOD       temazepam 30 MG capsule    RESTORIL    90 capsule    TAKE 1 CAPSULE BY MOUTH NIGHTLY AS NEEDED FOR SLEEP       VENTOLIN  (90 BASE) MCG/ACT Inhaler   Generic drug:  albuterol     18 g    USE 1 PUFF FOUR TIMES A DAY AS NEEDED       * Notice:  This list has 3 medication(s) that are the same as other medications prescribed for you. Read the directions carefully, and ask your doctor or other care provider to review them with you.

## 2017-03-20 ENCOUNTER — TELEPHONE (OUTPATIENT)
Dept: ORTHOPEDICS | Facility: OTHER | Age: 67
End: 2017-03-20

## 2017-03-20 NOTE — TELEPHONE ENCOUNTER
Patients daughter called and was stating what time should she not eat or drink patients daughter informed NPO after 4 am , patients daughter informed writer surgery states to be in admitting at 12:30, writer informed patients daughter be in admitting at the time they want you there. All questions were answered and patient daughter agreed to plan.  Sandy Salazar LPN

## 2017-03-21 ENCOUNTER — SURGERY (OUTPATIENT)
Age: 67
End: 2017-03-21

## 2017-03-21 ENCOUNTER — HOSPITAL ENCOUNTER (INPATIENT)
Facility: HOSPITAL | Age: 67
LOS: 3 days | Discharge: NURSING, REHAB OR LONG-TERM CARE | DRG: 470 | End: 2017-03-24
Attending: ORTHOPAEDIC SURGERY | Admitting: ORTHOPAEDIC SURGERY
Payer: MEDICARE

## 2017-03-21 DIAGNOSIS — K59.1 FUNCTIONAL DIARRHEA: ICD-10-CM

## 2017-03-21 DIAGNOSIS — M17.0 PRIMARY OSTEOARTHRITIS OF BOTH KNEES: ICD-10-CM

## 2017-03-21 DIAGNOSIS — S00.03XS SCALP HEMATOMA, SEQUELA: ICD-10-CM

## 2017-03-21 DIAGNOSIS — Z96.651 S/P TOTAL KNEE REPLACEMENT USING CEMENT, RIGHT: Primary | ICD-10-CM

## 2017-03-21 DIAGNOSIS — G47.00 PERSISTENT INSOMNIA: ICD-10-CM

## 2017-03-21 DIAGNOSIS — F41.9 ANXIETY: ICD-10-CM

## 2017-03-21 PROCEDURE — 25800025 ZZH RX 258: Performed by: ANESTHESIOLOGY

## 2017-03-21 PROCEDURE — C1776 JOINT DEVICE (IMPLANTABLE): HCPCS | Performed by: ORTHOPAEDIC SURGERY

## 2017-03-21 PROCEDURE — 36000093 ZZH SURGERY LEVEL 4 1ST 30 MIN: Performed by: ORTHOPAEDIC SURGERY

## 2017-03-21 PROCEDURE — 27447 TOTAL KNEE ARTHROPLASTY: CPT | Performed by: NURSE ANESTHETIST, CERTIFIED REGISTERED

## 2017-03-21 PROCEDURE — 12000000 ZZH R&B MED SURG/OB

## 2017-03-21 PROCEDURE — 40000275 ZZH STATISTIC RCP TIME EA 10 MIN

## 2017-03-21 PROCEDURE — 71000014 ZZH RECOVERY PHASE 1 LEVEL 2 FIRST HR: Performed by: ORTHOPAEDIC SURGERY

## 2017-03-21 PROCEDURE — 25000125 ZZHC RX 250

## 2017-03-21 PROCEDURE — 27110028 ZZH OR GENERAL SUPPLY NON-STERILE: Performed by: ORTHOPAEDIC SURGERY

## 2017-03-21 PROCEDURE — 40000305 ZZH STATISTIC PRE PROC ASSESS I: Performed by: ORTHOPAEDIC SURGERY

## 2017-03-21 PROCEDURE — 71000015 ZZH RECOVERY PHASE 1 LEVEL 2 EA ADDTL HR: Performed by: ORTHOPAEDIC SURGERY

## 2017-03-21 PROCEDURE — 25000128 H RX IP 250 OP 636: Performed by: NURSE ANESTHETIST, CERTIFIED REGISTERED

## 2017-03-21 PROCEDURE — 25800025 ZZH RX 258: Performed by: PHYSICIAN ASSISTANT

## 2017-03-21 PROCEDURE — 76942 ECHO GUIDE FOR BIOPSY: CPT | Mod: 26 | Performed by: ANESTHESIOLOGY

## 2017-03-21 PROCEDURE — 37000009 ZZH ANESTHESIA TECHNICAL FEE, EACH ADDTL 15 MIN: Performed by: ORTHOPAEDIC SURGERY

## 2017-03-21 PROCEDURE — 27447 TOTAL KNEE ARTHROPLASTY: CPT | Mod: AS | Performed by: PHYSICIAN ASSISTANT

## 2017-03-21 PROCEDURE — 3E0T3BZ INTRODUCTION OF ANESTHETIC AGENT INTO PERIPHERAL NERVES AND PLEXI, PERCUTANEOUS APPROACH: ICD-10-PCS | Performed by: ANESTHESIOLOGY

## 2017-03-21 PROCEDURE — 25000125 ZZHC RX 250: Performed by: NURSE ANESTHETIST, CERTIFIED REGISTERED

## 2017-03-21 PROCEDURE — 25000125 ZZHC RX 250: Performed by: ANESTHESIOLOGY

## 2017-03-21 PROCEDURE — 88300 SURGICAL PATH GROSS: CPT | Mod: TC | Performed by: ORTHOPAEDIC SURGERY

## 2017-03-21 PROCEDURE — 40000786 ZZHCL STATISTIC ACTIVE MRSA SURVEILLANCE CULTURE: Performed by: ORTHOPAEDIC SURGERY

## 2017-03-21 PROCEDURE — 25000128 H RX IP 250 OP 636: Performed by: ORTHOPAEDIC SURGERY

## 2017-03-21 PROCEDURE — 25000125 ZZHC RX 250: Performed by: ORTHOPAEDIC SURGERY

## 2017-03-21 PROCEDURE — 94640 AIRWAY INHALATION TREATMENT: CPT | Mod: 76

## 2017-03-21 PROCEDURE — 25000128 H RX IP 250 OP 636: Performed by: PHYSICIAN ASSISTANT

## 2017-03-21 PROCEDURE — A9270 NON-COVERED ITEM OR SERVICE: HCPCS | Mod: GY | Performed by: ORTHOPAEDIC SURGERY

## 2017-03-21 PROCEDURE — 27810169 ZZH OR IMPLANT GENERAL: Performed by: ORTHOPAEDIC SURGERY

## 2017-03-21 PROCEDURE — 37000008 ZZH ANESTHESIA TECHNICAL FEE, 1ST 30 MIN: Performed by: ORTHOPAEDIC SURGERY

## 2017-03-21 PROCEDURE — C9290 INJ, BUPIVACAINE LIPOSOME: HCPCS | Performed by: ORTHOPAEDIC SURGERY

## 2017-03-21 PROCEDURE — 99222 1ST HOSP IP/OBS MODERATE 55: CPT | Performed by: INTERNAL MEDICINE

## 2017-03-21 PROCEDURE — 27447 TOTAL KNEE ARTHROPLASTY: CPT | Performed by: ANESTHESIOLOGY

## 2017-03-21 PROCEDURE — 25000132 ZZH RX MED GY IP 250 OP 250 PS 637: Mod: GY | Performed by: PHYSICIAN ASSISTANT

## 2017-03-21 PROCEDURE — 25000132 ZZH RX MED GY IP 250 OP 250 PS 637: Mod: GY | Performed by: ORTHOPAEDIC SURGERY

## 2017-03-21 PROCEDURE — 0SRC0J9 REPLACEMENT OF RIGHT KNEE JOINT WITH SYNTHETIC SUBSTITUTE, CEMENTED, OPEN APPROACH: ICD-10-PCS | Performed by: ORTHOPAEDIC SURGERY

## 2017-03-21 PROCEDURE — 36000063 ZZH SURGERY LEVEL 4 EA 15 ADDTL MIN: Performed by: ORTHOPAEDIC SURGERY

## 2017-03-21 PROCEDURE — 94640 AIRWAY INHALATION TREATMENT: CPT

## 2017-03-21 PROCEDURE — 25000566 ZZH SEVOFLURANE, EA 15 MIN: Performed by: ANESTHESIOLOGY

## 2017-03-21 PROCEDURE — 27447 TOTAL KNEE ARTHROPLASTY: CPT | Mod: RT | Performed by: ORTHOPAEDIC SURGERY

## 2017-03-21 PROCEDURE — 27210794 ZZH OR GENERAL SUPPLY STERILE: Performed by: ORTHOPAEDIC SURGERY

## 2017-03-21 PROCEDURE — A9270 NON-COVERED ITEM OR SERVICE: HCPCS | Mod: GY | Performed by: PHYSICIAN ASSISTANT

## 2017-03-21 PROCEDURE — 64447 NJX AA&/STRD FEMORAL NRV IMG: CPT | Mod: 59 | Performed by: ANESTHESIOLOGY

## 2017-03-21 DEVICE — IMPLANTABLE DEVICE: Type: IMPLANTABLE DEVICE | Site: KNEE | Status: FUNCTIONAL

## 2017-03-21 DEVICE — BONE CEMENT-SIMPLEX: Type: IMPLANTABLE DEVICE | Site: KNEE | Status: FUNCTIONAL

## 2017-03-21 RX ORDER — FENTANYL CITRATE 50 UG/ML
25-50 INJECTION, SOLUTION INTRAMUSCULAR; INTRAVENOUS
Status: DISCONTINUED | OUTPATIENT
Start: 2017-03-21 | End: 2017-03-21 | Stop reason: HOSPADM

## 2017-03-21 RX ORDER — NALOXONE HYDROCHLORIDE 0.4 MG/ML
.1-.4 INJECTION, SOLUTION INTRAMUSCULAR; INTRAVENOUS; SUBCUTANEOUS
Status: DISCONTINUED | OUTPATIENT
Start: 2017-03-21 | End: 2017-03-24 | Stop reason: HOSPADM

## 2017-03-21 RX ORDER — HYDROCHLOROTHIAZIDE 25 MG/1
25 TABLET ORAL DAILY
Status: DISCONTINUED | OUTPATIENT
Start: 2017-03-21 | End: 2017-03-24 | Stop reason: HOSPADM

## 2017-03-21 RX ORDER — SODIUM CHLORIDE, SODIUM LACTATE, POTASSIUM CHLORIDE, CALCIUM CHLORIDE 600; 310; 30; 20 MG/100ML; MG/100ML; MG/100ML; MG/100ML
INJECTION, SOLUTION INTRAVENOUS CONTINUOUS
Status: DISCONTINUED | OUTPATIENT
Start: 2017-03-21 | End: 2017-03-22

## 2017-03-21 RX ORDER — LIDOCAINE HYDROCHLORIDE 20 MG/ML
INJECTION, SOLUTION INFILTRATION; PERINEURAL PRN
Status: DISCONTINUED | OUTPATIENT
Start: 2017-03-21 | End: 2017-03-21

## 2017-03-21 RX ORDER — FENTANYL CITRATE 50 UG/ML
INJECTION, SOLUTION INTRAMUSCULAR; INTRAVENOUS PRN
Status: DISCONTINUED | OUTPATIENT
Start: 2017-03-21 | End: 2017-03-21

## 2017-03-21 RX ORDER — ALBUTEROL SULFATE 0.83 MG/ML
SOLUTION RESPIRATORY (INHALATION)
Status: COMPLETED
Start: 2017-03-21 | End: 2017-03-21

## 2017-03-21 RX ORDER — DEXAMETHASONE SODIUM PHOSPHATE 4 MG/ML
4 INJECTION, SOLUTION INTRA-ARTICULAR; INTRALESIONAL; INTRAMUSCULAR; INTRAVENOUS; SOFT TISSUE
Status: DISCONTINUED | OUTPATIENT
Start: 2017-03-21 | End: 2017-03-21 | Stop reason: HOSPADM

## 2017-03-21 RX ORDER — ALBUTEROL SULFATE 0.83 MG/ML
2.5 SOLUTION RESPIRATORY (INHALATION) ONCE
Status: COMPLETED | OUTPATIENT
Start: 2017-03-21 | End: 2017-03-21

## 2017-03-21 RX ORDER — NICOTINE 21 MG/24HR
1 PATCH, TRANSDERMAL 24 HOURS TRANSDERMAL EVERY 24 HOURS
Status: DISCONTINUED | OUTPATIENT
Start: 2017-03-21 | End: 2017-03-24 | Stop reason: HOSPADM

## 2017-03-21 RX ORDER — ACETAMINOPHEN 325 MG/1
975 TABLET ORAL EVERY 8 HOURS
Status: COMPLETED | OUTPATIENT
Start: 2017-03-21 | End: 2017-03-24

## 2017-03-21 RX ORDER — ALBUTEROL SULFATE 90 UG/1
1 AEROSOL, METERED RESPIRATORY (INHALATION) EVERY 6 HOURS PRN
Status: DISCONTINUED | OUTPATIENT
Start: 2017-03-21 | End: 2017-03-24 | Stop reason: HOSPADM

## 2017-03-21 RX ORDER — HYDROXYZINE HYDROCHLORIDE 10 MG/1
10 TABLET, FILM COATED ORAL EVERY 6 HOURS PRN
Status: DISCONTINUED | OUTPATIENT
Start: 2017-03-21 | End: 2017-03-24 | Stop reason: HOSPADM

## 2017-03-21 RX ORDER — CIMETIDINE 800 MG
800 TABLET ORAL AT BEDTIME
Status: DISCONTINUED | OUTPATIENT
Start: 2017-03-21 | End: 2017-03-22

## 2017-03-21 RX ORDER — IPRATROPIUM BROMIDE AND ALBUTEROL SULFATE 2.5; .5 MG/3ML; MG/3ML
1 SOLUTION RESPIRATORY (INHALATION) EVERY 6 HOURS PRN
Status: DISCONTINUED | OUTPATIENT
Start: 2017-03-21 | End: 2017-03-24 | Stop reason: HOSPADM

## 2017-03-21 RX ORDER — PROPOFOL 10 MG/ML
INJECTION, EMULSION INTRAVENOUS PRN
Status: DISCONTINUED | OUTPATIENT
Start: 2017-03-21 | End: 2017-03-21

## 2017-03-21 RX ORDER — MONTELUKAST SODIUM 10 MG/1
10 TABLET ORAL AT BEDTIME
Status: DISCONTINUED | OUTPATIENT
Start: 2017-03-21 | End: 2017-03-24 | Stop reason: HOSPADM

## 2017-03-21 RX ORDER — ONDANSETRON 2 MG/ML
4 INJECTION INTRAMUSCULAR; INTRAVENOUS EVERY 30 MIN PRN
Status: DISCONTINUED | OUTPATIENT
Start: 2017-03-21 | End: 2017-03-21 | Stop reason: HOSPADM

## 2017-03-21 RX ORDER — ALPRAZOLAM 1 MG
1 TABLET ORAL 3 TIMES DAILY PRN
Status: DISCONTINUED | OUTPATIENT
Start: 2017-03-21 | End: 2017-03-24 | Stop reason: HOSPADM

## 2017-03-21 RX ORDER — LIDOCAINE 40 MG/G
CREAM TOPICAL
Status: DISCONTINUED | OUTPATIENT
Start: 2017-03-25 | End: 2017-03-24 | Stop reason: HOSPADM

## 2017-03-21 RX ORDER — LABETALOL HYDROCHLORIDE 5 MG/ML
10 INJECTION, SOLUTION INTRAVENOUS
Status: DISCONTINUED | OUTPATIENT
Start: 2017-03-21 | End: 2017-03-21 | Stop reason: HOSPADM

## 2017-03-21 RX ORDER — ESCITALOPRAM OXALATE 20 MG/1
20 TABLET ORAL 2 TIMES DAILY
Status: DISCONTINUED | OUTPATIENT
Start: 2017-03-21 | End: 2017-03-24 | Stop reason: HOSPADM

## 2017-03-21 RX ORDER — VANCOMYCIN HYDROCHLORIDE 1 G/200ML
1000 INJECTION, SOLUTION INTRAVENOUS SEE ADMIN INSTRUCTIONS
Status: DISCONTINUED | OUTPATIENT
Start: 2017-03-21 | End: 2017-03-21

## 2017-03-21 RX ORDER — POLYMYXIN B SULFATE 500000 [IU]/1
INJECTION, POWDER, LYOPHILIZED, FOR SOLUTION INTRAMUSCULAR; INTRATHECAL; INTRAVENOUS; OPHTHALMIC PRN
Status: DISCONTINUED | OUTPATIENT
Start: 2017-03-21 | End: 2017-03-21 | Stop reason: HOSPADM

## 2017-03-21 RX ORDER — OXYCODONE HYDROCHLORIDE 5 MG/1
5-10 TABLET ORAL
Status: DISCONTINUED | OUTPATIENT
Start: 2017-03-21 | End: 2017-03-24 | Stop reason: HOSPADM

## 2017-03-21 RX ORDER — MEPERIDINE HYDROCHLORIDE 25 MG/ML
12.5 INJECTION INTRAMUSCULAR; INTRAVENOUS; SUBCUTANEOUS EVERY 5 MIN PRN
Status: DISCONTINUED | OUTPATIENT
Start: 2017-03-21 | End: 2017-03-21 | Stop reason: HOSPADM

## 2017-03-21 RX ORDER — SODIUM CHLORIDE, SODIUM LACTATE, POTASSIUM CHLORIDE, CALCIUM CHLORIDE 600; 310; 30; 20 MG/100ML; MG/100ML; MG/100ML; MG/100ML
INJECTION, SOLUTION INTRAVENOUS CONTINUOUS
Status: DISCONTINUED | OUTPATIENT
Start: 2017-03-21 | End: 2017-03-21 | Stop reason: HOSPADM

## 2017-03-21 RX ORDER — VANCOMYCIN HYDROCHLORIDE 1 G/200ML
1000 INJECTION, SOLUTION INTRAVENOUS EVERY 12 HOURS
Status: COMPLETED | OUTPATIENT
Start: 2017-03-22 | End: 2017-03-22

## 2017-03-21 RX ORDER — HYDROMORPHONE HYDROCHLORIDE 1 MG/ML
0.2 INJECTION, SOLUTION INTRAMUSCULAR; INTRAVENOUS; SUBCUTANEOUS
Status: DISCONTINUED | OUTPATIENT
Start: 2017-03-21 | End: 2017-03-24 | Stop reason: HOSPADM

## 2017-03-21 RX ORDER — ASPIRIN 325 MG
325 TABLET ORAL 2 TIMES DAILY
Status: DISCONTINUED | OUTPATIENT
Start: 2017-03-21 | End: 2017-03-22

## 2017-03-21 RX ORDER — DEXAMETHASONE SODIUM PHOSPHATE 10 MG/ML
INJECTION, SOLUTION INTRAMUSCULAR; INTRAVENOUS PRN
Status: DISCONTINUED | OUTPATIENT
Start: 2017-03-21 | End: 2017-03-21

## 2017-03-21 RX ORDER — BENZONATATE 100 MG/1
200 CAPSULE ORAL 3 TIMES DAILY PRN
Status: DISCONTINUED | OUTPATIENT
Start: 2017-03-21 | End: 2017-03-24 | Stop reason: HOSPADM

## 2017-03-21 RX ORDER — AMOXICILLIN 250 MG
1-2 CAPSULE ORAL 2 TIMES DAILY
Status: DISCONTINUED | OUTPATIENT
Start: 2017-03-21 | End: 2017-03-22

## 2017-03-21 RX ORDER — LISINOPRIL 10 MG/1
10 TABLET ORAL ONCE
Status: COMPLETED | OUTPATIENT
Start: 2017-03-21 | End: 2017-03-21

## 2017-03-21 RX ORDER — VANCOMYCIN HYDROCHLORIDE 1 G/200ML
1000 INJECTION, SOLUTION INTRAVENOUS
Status: COMPLETED | OUTPATIENT
Start: 2017-03-21 | End: 2017-03-21

## 2017-03-21 RX ORDER — ONDANSETRON 2 MG/ML
INJECTION INTRAMUSCULAR; INTRAVENOUS PRN
Status: DISCONTINUED | OUTPATIENT
Start: 2017-03-21 | End: 2017-03-21

## 2017-03-21 RX ORDER — BENAZEPRIL HYDROCHLORIDE 10 MG/1
10 TABLET ORAL DAILY
Status: DISCONTINUED | OUTPATIENT
Start: 2017-03-22 | End: 2017-03-24 | Stop reason: HOSPADM

## 2017-03-21 RX ORDER — HYDRALAZINE HYDROCHLORIDE 20 MG/ML
2.5-5 INJECTION INTRAMUSCULAR; INTRAVENOUS EVERY 10 MIN PRN
Status: DISCONTINUED | OUTPATIENT
Start: 2017-03-21 | End: 2017-03-21 | Stop reason: HOSPADM

## 2017-03-21 RX ORDER — ATORVASTATIN CALCIUM 40 MG/1
40 TABLET, FILM COATED ORAL DAILY
Status: DISCONTINUED | OUTPATIENT
Start: 2017-03-21 | End: 2017-03-24 | Stop reason: HOSPADM

## 2017-03-21 RX ORDER — TEMAZEPAM 15 MG/1
30 CAPSULE ORAL
Status: DISCONTINUED | OUTPATIENT
Start: 2017-03-21 | End: 2017-03-24 | Stop reason: HOSPADM

## 2017-03-21 RX ORDER — POTASSIUM CHLORIDE 750 MG/1
10 TABLET, EXTENDED RELEASE ORAL 2 TIMES DAILY
Status: DISCONTINUED | OUTPATIENT
Start: 2017-03-21 | End: 2017-03-24 | Stop reason: HOSPADM

## 2017-03-21 RX ORDER — ONDANSETRON 4 MG/1
4 TABLET, ORALLY DISINTEGRATING ORAL EVERY 6 HOURS PRN
Status: DISCONTINUED | OUTPATIENT
Start: 2017-03-21 | End: 2017-03-24 | Stop reason: HOSPADM

## 2017-03-21 RX ORDER — DOXEPIN HYDROCHLORIDE 25 MG/1
25 CAPSULE ORAL AT BEDTIME
Status: DISCONTINUED | OUTPATIENT
Start: 2017-03-21 | End: 2017-03-24 | Stop reason: HOSPADM

## 2017-03-21 RX ORDER — BUPIVACAINE HYDROCHLORIDE AND EPINEPHRINE 2.5; 5 MG/ML; UG/ML
INJECTION, SOLUTION INFILTRATION; PERINEURAL PRN
Status: DISCONTINUED | OUTPATIENT
Start: 2017-03-21 | End: 2017-03-21 | Stop reason: HOSPADM

## 2017-03-21 RX ORDER — ALBUTEROL SULFATE 0.83 MG/ML
2.5 SOLUTION RESPIRATORY (INHALATION) EVERY 4 HOURS PRN
Status: DISCONTINUED | OUTPATIENT
Start: 2017-03-21 | End: 2017-03-21 | Stop reason: HOSPADM

## 2017-03-21 RX ORDER — ONDANSETRON 2 MG/ML
4 INJECTION INTRAMUSCULAR; INTRAVENOUS EVERY 6 HOURS PRN
Status: DISCONTINUED | OUTPATIENT
Start: 2017-03-21 | End: 2017-03-24 | Stop reason: HOSPADM

## 2017-03-21 RX ORDER — ONDANSETRON 4 MG/1
4 TABLET, ORALLY DISINTEGRATING ORAL EVERY 30 MIN PRN
Status: DISCONTINUED | OUTPATIENT
Start: 2017-03-21 | End: 2017-03-21 | Stop reason: HOSPADM

## 2017-03-21 RX ADMIN — VANCOMYCIN HYDROCHLORIDE 1000 MG: 1 INJECTION, SOLUTION INTRAVENOUS at 12:42

## 2017-03-21 RX ADMIN — DEXAMETHASONE SODIUM PHOSPHATE 10 MG: 10 INJECTION, SOLUTION INTRAMUSCULAR; INTRAVENOUS at 14:12

## 2017-03-21 RX ADMIN — SODIUM CHLORIDE, POTASSIUM CHLORIDE, SODIUM LACTATE AND CALCIUM CHLORIDE: 600; 310; 30; 20 INJECTION, SOLUTION INTRAVENOUS at 14:30

## 2017-03-21 RX ADMIN — ALBUTEROL SULFATE 1 PUFF: 90 AEROSOL, METERED RESPIRATORY (INHALATION) at 23:07

## 2017-03-21 RX ADMIN — DOXEPIN HYDROCHLORIDE 25 MG: 25 CAPSULE ORAL at 22:40

## 2017-03-21 RX ADMIN — HYDROMORPHONE HYDROCHLORIDE 0.2 MG: 1 INJECTION, SOLUTION INTRAMUSCULAR; INTRAVENOUS; SUBCUTANEOUS at 19:52

## 2017-03-21 RX ADMIN — LISINOPRIL 10 MG: 10 TABLET ORAL at 19:51

## 2017-03-21 RX ADMIN — FENTANYL CITRATE 50 MCG: 50 INJECTION, SOLUTION INTRAMUSCULAR; INTRAVENOUS at 17:15

## 2017-03-21 RX ADMIN — ALBUTEROL SULFATE 2.5 MG: 0.83 SOLUTION RESPIRATORY (INHALATION) at 15:44

## 2017-03-21 RX ADMIN — ASPIRIN 325 MG: 325 TABLET ORAL at 22:40

## 2017-03-21 RX ADMIN — HYDROCHLOROTHIAZIDE 25 MG: 25 TABLET ORAL at 19:51

## 2017-03-21 RX ADMIN — LIDOCAINE HYDROCHLORIDE 40 MG: 20 INJECTION, SOLUTION INFILTRATION; PERINEURAL at 13:38

## 2017-03-21 RX ADMIN — SODIUM CHLORIDE, POTASSIUM CHLORIDE, SODIUM LACTATE AND CALCIUM CHLORIDE 1000 ML: 600; 310; 30; 20 INJECTION, SOLUTION INTRAVENOUS at 12:32

## 2017-03-21 RX ADMIN — VANCOMYCIN HYDROCHLORIDE 1 G: 1 INJECTION, SOLUTION INTRAVENOUS at 12:45

## 2017-03-21 RX ADMIN — FENTANYL CITRATE 50 MCG: 50 INJECTION, SOLUTION INTRAMUSCULAR; INTRAVENOUS at 13:38

## 2017-03-21 RX ADMIN — ALBUTEROL SULFATE 2.5 MG: 2.5 SOLUTION RESPIRATORY (INHALATION) at 12:22

## 2017-03-21 RX ADMIN — ALBUTEROL SULFATE 2.5 MG: 2.5 SOLUTION RESPIRATORY (INHALATION) at 15:44

## 2017-03-21 RX ADMIN — FENTANYL CITRATE 50 MCG: 50 INJECTION, SOLUTION INTRAMUSCULAR; INTRAVENOUS at 13:29

## 2017-03-21 RX ADMIN — BUPIVACAINE 20 ML: 13.3 INJECTION, SUSPENSION, LIPOSOMAL INFILTRATION at 14:47

## 2017-03-21 RX ADMIN — ACETAMINOPHEN 975 MG: 325 TABLET, FILM COATED ORAL at 19:51

## 2017-03-21 RX ADMIN — MIDAZOLAM HYDROCHLORIDE 2 MG: 1 INJECTION, SOLUTION INTRAMUSCULAR; INTRAVENOUS at 13:37

## 2017-03-21 RX ADMIN — BUPIVACAINE HYDROCHLORIDE AND EPINEPHRINE BITARTRATE 30 ML: 2.5; .005 INJECTION, SOLUTION INFILTRATION; PERINEURAL at 14:48

## 2017-03-21 RX ADMIN — PROPOFOL 150 MG: 10 INJECTION, EMULSION INTRAVENOUS at 13:38

## 2017-03-21 RX ADMIN — FENTANYL CITRATE 50 MCG: 50 INJECTION, SOLUTION INTRAMUSCULAR; INTRAVENOUS at 16:07

## 2017-03-21 RX ADMIN — ESCITALOPRAM 20 MG: 20 TABLET, FILM COATED ORAL at 22:40

## 2017-03-21 RX ADMIN — FENTANYL CITRATE 50 MCG: 50 INJECTION, SOLUTION INTRAMUSCULAR; INTRAVENOUS at 14:07

## 2017-03-21 RX ADMIN — SODIUM CHLORIDE, POTASSIUM CHLORIDE, SODIUM LACTATE AND CALCIUM CHLORIDE: 600; 310; 30; 20 INJECTION, SOLUTION INTRAVENOUS at 18:54

## 2017-03-21 RX ADMIN — ONDANSETRON 4 MG: 2 INJECTION INTRAMUSCULAR; INTRAVENOUS at 14:12

## 2017-03-21 RX ADMIN — FENTANYL CITRATE 50 MCG: 50 INJECTION, SOLUTION INTRAMUSCULAR; INTRAVENOUS at 14:03

## 2017-03-21 RX ADMIN — MONTELUKAST 10 MG: 10 TABLET, FILM COATED ORAL at 22:41

## 2017-03-21 RX ADMIN — POLYMYXIN B 500000 UNITS: 500000 INJECTION, POWDER, LYOPHILIZED, FOR SOLUTION INTRAMUSCULAR; INTRATHECAL; INTRAVENOUS; OPHTHALMIC at 14:47

## 2017-03-21 RX ADMIN — RANITIDINE HYDROCHLORIDE 150 MG: 150 TABLET, FILM COATED ORAL at 22:42

## 2017-03-21 NOTE — IP AVS SNAPSHOT
` ` Patient Information     Patient Name Sex Sydni Holguin ([id number]) Female 1950       Room Bed    3230 3230-1      Patient Demographics     Address Phone    [address]  [place] [address] [phone] (Home)  [phone] (Mobile) *Preferred*      Patient Ethnicity & Race     Ethnic Group Patient Race    American White      Emergency Contact(s)     Name Relation Home Work Mobile    Devorah Townsend Daughter   [phone]    GABY TOWNSEND Daughter [phone]      Yenifer Elliott Friend [phone]      Axel Isaac Brother [phone]      eugenia kaur [phone]        Documents on File        Status Date Received Description       Documents for the Patient    Consent for Services - Hospital/Clinic-ESign Received () 13     [facility] Privacy Notice-ESign Received 13     Insurance Card Received 13 MDCR    Patient ID Received 13 [place]    Consent for EHR Access-Received-ESign Received 13     Consent for EHR Access-Decline-ESign       Consent for EHR Access  03/15/13     Business/Insurance/Care Coordination/Health Form - Patient.1   PHYSICIAN FYI NOTIFICATION OF MISSED VISIT - 3/22/13 VISIT MISSED - DR KHAN    Business/Insurance/Care Coordination/Health Form - Patient.1   PHYSICIAN FYI NOTIFICATION OF MISSED VISIT - 3/26/13 VISIT MISSED - DR KHAN    Physical Therapy Certification Received 13    Insurance Card Received () 13 BCBS    Power of  Received 13 per pt on file     Advance Directives and Living Will Received 13 per pt on file    HIM JASMINE Authorization  13 Patient    Insurance Card Received () 14 MDCA    Physical Therapy Certification Received 03/28/14 3-21-14    Consent for Services - Hospital/Clinic-ESign Received () 14     Physical Therapy Certification Received 14    Physical Therapy Certification Received 14 RECEIVED  14    Consent for Services - Hospital/Clinic Received () 14     HIM JASMINE Authorization  14 SFR    HIM JASMINE Authorization  07/01/15 [facility]    Consent for Services - Hospital/Clinic-ESign Received () 08/05/15     Physical Therapy Certification Received 08/06/15     Consent for Services/Privacy Notice - Hospital/Clinic-Esign       Privacy Notice - [place] Received 17     Consent for Services/Privacy Notice - Hospital/Clinic-Esign Received () 16     HIM JASMINE Authorization - File Only  16 AUTHORIZATION FOR RELEASE OF PROTECTED HEALTH INFORMATION    Business/Insurance/Care Coordination/Health Form - Patient   PRESCRIPTION DRUG APPROVAL-[employer]-2016    Consent for Services/Privacy Notice - Hospital/Clinic Received 17     Insurance Card Received 17 [facility]    Business/Insurance/Care Coordination/Health Form - Patient   ACCOMMODATION REQUEST--[facility]--2017    Business/Insurance/Care Coordination/Health Form - Patient   REASONABLE ACCOMMODATION REQUEST VERIFACATION 2017       Documents for the Encounter    CMS IM for Patient Signature Received 17 Given 3/22 Epic down     Discharge Attachment   TOTAL KNEE REPLACEMENT, DISCHARGE INSTRUCTIONS FOR (ENGLISH)    Discharge Attachment   KNEE REPLACEMENT, AFTER: HOSPITAL RECOVERY (ENGLISH)    Discharge Attachment   KNEE REPLACEMENT, AFTER: CONTROLLING SWELLING (ENGLISH)    Discharge Attachment   PREVENTING DEEP VEIN THROMBOSIS AFTER SURGERY (ENGLISH)    Discharge Attachment   ASPIRIN ORAL TABLET (ENGLISH)    Discharge Attachment   LOPERAMIDE HYDROCHLORIDE ORAL TABLET (ENGLISH)    Discharge Attachment   KNEE REPLACEMENT, TOTAL (ENGLISH)    Discharge Attachment   OXYCODONE HYDROCHLORIDE, ACETAMINOPHEN ORAL TABLET (ENGLISH)    Discharge Attachment   ALPRAZOLAM ORAL TABLET (ENGLISH)      Admission Information     Attending Provider Admitting Provider Admission Type Admission Date/Time     Moisés Olson MD Denoncourt, Paul M, MD Elective 03/21/17  1142    Discharge Date Hospital Service Auth/Cert Status Service Area     General Medicine Incomplete [facility] SERVICES    Unit Room/Bed Admission Status       [facility] MEDICAL SURGICAL 3230/3230-1 Admission (Confirmed)       Admission     Complaint    OSTEOARTHRITIS BOTH KNEES, S/P total knee replacement using cement, right      Hospital Account     Name Acct ID Class Status Primary Coverage    Sydni Tovar [id number] Inpatient Open MEDICARE - MEDICARE FOR HB SUPPLEMENT            Guarantor Account (for Hospital Account #[id number])     Name Relation to Pt Service Area Active? Acct Type    Sydni Tovar Self RANGE Yes Personal/Family    Address Phone          [address]  [place] [address] [phone](H)              Coverage Information (for Hospital Account #[id number])     1. MEDICARE/MEDICARE FOR HB SUPPLEMENT     F/O Payor/Plan Precert #    MEDICARE/MEDICARE FOR HB SUPPLEMENT     Subscriber Subscriber #    Sydni Tovar [id number]    Address Phone    [employer] CLAIMS  [address]  [place] [address] [phone]          2. BCBS/BCBS PLATINUM BLUE     F/O Payor/Plan Precert #    BCBS/BCBS PLATINUM BLUE     Subscriber Subscriber #    Sydni Tovar [id number]    Address Phone    [address] [address]  [place] [address] [phone]

## 2017-03-21 NOTE — ANESTHESIA PROCEDURE NOTES
Peripheral nerve/Neuraxial procedure note : Adductor canal  Pre-Procedure    Location: pre-op    Procedure Times:3/21/2017 12:45 PM and 3/21/2017 1:08 PM  Pre-Anesthestic Checklist: patient identified, IV checked, site marked, risks and benefits discussed, informed consent, monitors and equipment checked, pre-op evaluation, at physician/surgeon's request and post-op pain management    Timeout  Correct Patient: Yes   Correct Procedure: Yes   Correct Site: Yes   Correct Laterality: Yes   Correct Position: Yes   Site Marked: Yes   .   Procedure Documentation    .    Procedure:    Adductor canal.  Local skin infiltrated with 2 mL of 2% lidocaine.     Ultrasound used to identify targeted nerve, plexus, or vascular marker and placed a needle adjacent to it. A permanent image is entered into the patient's record.  Patient Prep;mask, sterile gloves, chlorhexidine gluconate and isopropyl alcohol.  .  Needle: insulated, short bevel (22 G. 2 in). .  Spinal Needle: . . Insertion Method: Single Shot.     Assessment/Narrative  Paresthesias: No.  .  The placement was negative for: blood aspirated, painful injection and site bleeding.  Bolus given via needle..   Secured via.   Complications: none. Comments:  X30cc 0.375% Ropivacaine + 1:200K Epi + 4mg Decadron

## 2017-03-21 NOTE — OR NURSING
Pt into PACU, pt has audible wheezes, neb given and pt continues to have wheezes.  O2sats om 2L NC is 92%.  Pt denies pain

## 2017-03-21 NOTE — OR NURSING
Pateint discharged to med/surg.  Shivam score 9/10. Pain level 4/10.  Discharged from unit via bed.  Hand off report given to himanshu healy

## 2017-03-21 NOTE — IP AVS SNAPSHOT
Joseyjarvis Sydni SUSANNE #[id number] (CSN: [id number])  (67 year old F)  (Adm: 17)     [id number]               [facility]: [phone]            Patient Demographics     Patient Name Sex          Age SSN Address Phone    Sydni Tovar Female 1950 (67 year old) [id number]  [place] [address] [phone] (Home)  [phone] (Mobile) *Preferred*      Emergency Contact(s)     Name Relation Home Work Mobile    Devorah Townsend Daughter   [phone]    GABY TOWNSEND Daughter [phone]      Yenifer Elliott Friend [phone]      Axel Isaac Brother [phone]      eugenia kaur [phone]        Admission Information     Attending Provider Admitting Provider Admission Type Admission Date/Time    Moisés Olson MD Denoncourt, Paul M, MD Elective 17  1142    Discharge Date Hospital Service Auth/Cert Status Service Area     General Medicine Incomplete [facility] SERVICES    Unit Room/Bed Admission Status       [facility] 323/3230-1 Admission (Confirmed)       Admission     Complaint    OSTEOARTHRITIS BOTH KNEES, S/P total knee replacement using cement, right      Hospital Account     Name Acct ID Class Status Primary Coverage    JoseylazachSydni SUSANNE [id number] Inpatient Open MEDICARE - MEDICARE FOR HB SUPPLEMENT            Guarantor Account (for Hospital Account #[id number])     Name Relation to Pt Service Area Active? Acct Type    Sydni Tovar Self RANGE Yes Personal/Family    Address Phone          [address]  [place] [address] [phone](H)              Coverage Information (for Hospital Account #[id number])     1. MEDICARE/MEDICARE FOR HB SUPPLEMENT     F/O Payor/Plan Precert #    MEDICARE/MEDICARE FOR HB SUPPLEMENT     Subscriber Subscriber #    JoseyjarvisUbaldory SUSANNE [id number]    Address Phone    ATTN CLAIMS  [address]  [facility] [place] [address] [phone]          2. BCBS/BCBS  "PLATINUM BLUE     F/O Payor/Plan Precert #    BCBS/BCBS PLATINUM BLUE     Subscriber Subscriber #    Sydni Tovar [id number]    Address Phone    [address] [address]  [place] [address] [phone]                                                      INTERAGENCY TRANSFER FORM - PHYSICIAN ORDERS   3/21/2017                       [facility]: [phone]            Attending Provider: Moisés Olson MD     Allergies:  Bee Pollen, Codeine, Keflex [Cephalexin Hcl], Sulfonamide Derivatives, Ciprofloxacin, Bupropion Hcl, Celecoxib, Clonazepam, Erythromycin, Lisinopril, Nifedipine, Tramadol Hcl    Infection:  None   Service:  GENERAL MEDI    Ht:  1.473 m (4' 10\")   Wt:  72.6 kg (160 lb)   Admission Wt:  72.6 kg (160 lb)    BMI:  33.44 kg/m 2   BSA:  1.72 m 2            ED Clinical Impression     Diagnosis Description Comment Added By Time Added    Primary osteoarthritis of both knees [M17.0] Primary osteoarthritis of both knees [M17.0]  Jennifer Junior NP 3/24/2017 10:54 AM    S/P total knee replacement using cement, right [Z96.651] S/P total knee replacement using cement, right [Z96.651]  Jennifer Junior NP 3/24/2017 10:54 AM    Anxiety [F41.9] Anxiety [F41.9]  Jennifer Junior NP 3/24/2017 10:55 AM    Functional diarrhea [K59.1] Functional diarrhea [K59.1]  Jennifer Junior NP 3/24/2017 10:57 AM      Hospital Problems as of 3/24/2017              Priority Class Noted POA    Essential hypertension   1/5/2012 Yes    Dysthymia   12/31/2014 Yes    Tobacco abuse   6/19/2015 Yes    S/P total knee replacement using cement, right Medium  3/21/2017 Yes      Non-Hospital Problems as of 3/24/2017              Priority Class Noted    Osteoarthrosis, unspecified whether generalized or localized, involving lower leg   10/4/2001    Headache   12/10/2003    Calculus of gallbladder   2/2/2006    Asthma Medium  3/20/2009    Advanced care planning/counseling discussion   11/28/2012    Lung nodules   " 6/19/2015    SCC (squamous cell carcinoma), face Medium  8/5/2015    IC (interstitial cystitis) Medium  10/28/2015    Primary osteoarthritis of both knees Medium  10/5/2016    ACP (advance care planning)   10/5/2016      Code Status History     Date Active Date Inactive Code Status Order ID Comments User Context    3/24/2017 11:05 AM  Full Code [id number]  Jennifer Junior, NP Outpatient    3/21/2017  6:12 PM 3/24/2017 11:05 AM Full Code [id number]  Mitchell Pelletier PA-C Inpatient      Current Code Status     Date Active Code Status Order ID Comments User Context       Prior      Summary of Visit     Reason for your hospital stay       Total knee replacement                Medication Review      START taking        Dose / Directions Comments    aspirin 81 MG EC tablet   Replaces:  aspirin 81 MG tablet        Dose:  81 mg   Take 1 tablet (81 mg) by mouth 2 times daily   Refills:  0        loperamide 2 MG capsule   Commonly known as:  IMODIUM   Used for:  Functional diarrhea        Dose:  2 mg   Take 1 capsule (2 mg) by mouth 4 times daily as needed for diarrhea   Quantity:  20 capsule   Refills:  0          CONTINUE these medications which may have CHANGED, or have new prescriptions. If we are uncertain of the size of tablets/capsules you have at home, strength may be listed as something that might have changed.        Dose / Directions Comments    ALPRAZolam 1 MG tablet   Commonly known as:  XANAX   This may have changed:  See the new instructions.   Used for:  Anxiety        Dose:  1 mg   Take 1 tablet (1 mg) by mouth 3 times daily as needed for anxiety   Quantity:  30 tablet   Refills:  0        oxyCODONE-acetaminophen 5-325 MG per tablet   Commonly known as:  PERCOCET   This may have changed:  See the new instructions.   Used for:  Primary osteoarthritis of both knees        Dose:  1-2 tablet   Take 1-2 tablets by mouth every 4 hours as needed for moderate to severe pain   Quantity:  30 tablet   Refills:  0           CONTINUE these medications which have NOT CHANGED        Dose / Directions Comments    atorvastatin 40 MG tablet   Commonly known as:  LIPITOR   Used for:  Mixed hyperlipidemia        TAKE 1 TABLET BY MOUTH DAILY   Quantity:  90 tablet   Refills:  0        benazepril 10 MG tablet   Commonly known as:  LOTENSIN   Used for:  HTN (hypertension)        TAKE 1 TABLET BY MOUTH DAILY   Quantity:  90 tablet   Refills:  0        benzonatate 200 MG capsule   Commonly known as:  TESSALON   Used for:  Preop general physical exam, Tobacco abuse        TAKE 1 CAPSULE BY MOUTH 3 TIMES A DAY AS NEEDED FOR COUGH   Quantity:  60 capsule   Refills:  3        budesonide-formoterol 160-4.5 MCG/ACT Inhaler   Commonly known as:  SYMBICORT   Used for:  Asthma        Dose:  2 puff   Inhale 2 puffs into the lungs 2 times daily. As needed   Quantity:  1 Inhaler   Refills:  11        butalbital-acetaminophen-caffeine -40 MG per tablet   Commonly known as:  FIORICET/ESGIC   Used for:  Scalp hematoma, sequela        Dose:  1 tablet   Take 1 tablet by mouth every 6 hours as needed   Quantity:  40 tablet   Refills:  5        cimetidine 800 MG tablet   Commonly known as:  TAGAMET   Used for:  Esophageal reflux        Dose:  800 mg   Take 1 tablet (800 mg) by mouth At Bedtime As needed   Quantity:  90 tablet   Refills:  3        doxepin 25 MG capsule   Commonly known as:  SINEquan   Used for:  Anxiety        TAKE 1 CAPSULE BY MOUTH AT BEDTIME   Quantity:  90 capsule   Refills:  3        EPIPEN 2-JAIRO 0.3 MG/0.3ML injection   Used for:  Anaphylactic reaction   Generic drug:  EPINEPHrine        INJECT 1 SYRINGE INTRAMUSCULARLY ONCE AS NEEDED FOR ANAPHYLAXIS   Quantity:  2 each   Refills:  0        escitalopram 20 MG tablet   Commonly known as:  LEXAPRO   Used for:  Dysthymia        TAKE 1 TABLET BY MOUTH 2 TIMES A DAY   Quantity:  180 tablet   Refills:  0        hydrochlorothiazide 25 MG tablet   Commonly known as:  HYDRODIURIL   Used  for:  Benign essential hypertension        TAKE 1 TABLET BY MOUTH DAILY   Quantity:  90 tablet   Refills:  3        ipratropium - albuterol 0.5 mg/2.5 mg/3 mL 0.5-2.5 (3) MG/3ML neb solution   Commonly known as:  DUONEB   Used for:  Acute bronchospasm        Dose:  1 vial   Take 1 vial (3 mLs) by nebulization every 6 hours as needed   Quantity:  360 mL   Refills:  2        meloxicam 15 MG tablet   Commonly known as:  MOBIC   Used for:  Pain        TAKE 1 TABLET BY MOUTH DAILY   Quantity:  90 tablet   Refills:  1        montelukast 10 MG tablet   Commonly known as:  SINGULAIR   Used for:  Mixed hyperlipidemia        TAKE 1 TABLET BY MOUTH DAILY   Quantity:  90 tablet   Refills:  3        nicotine 21 MG/24HR 24 hr patch   Commonly known as:  NICODERM CQ        Dose:  1 patch   Place 1 patch onto the skin every 24 hours   Refills:  0        * order for DME   Used for:  Primary osteoarthritis of right knee        Equipment being ordered: Nebulizer supplies   Quantity:  2 each   Refills:  11        * order for DME   Used for:  Primary osteoarthritis of right knee        Equipment being ordered: mcgee walker   Quantity:  1 each   Refills:  0        * order for DME   Used for:  Postoperative state        Equipment being ordered: toilet seat riser   Quantity:  1 each   Refills:  0        potassium chloride 10 MEQ tablet   Commonly known as:  K-TAB,KLOR-CON   Used for:  Benign essential hypertension        TAKE 1 TABLET BY MOUTH 2 TIMES DAILY WITH FOOD   Quantity:  180 tablet   Refills:  1        temazepam 30 MG capsule   Commonly known as:  RESTORIL   Used for:  Persistent insomnia        TAKE 1 CAPSULE BY MOUTH NIGHTLY AS NEEDED FOR SLEEP   Quantity:  90 capsule   Refills:  5        VENTOLIN  (90 BASE) MCG/ACT Inhaler   Used for:  Tobacco abuse   Generic drug:  albuterol        USE 1 PUFF FOUR TIMES A DAY AS NEEDED   Quantity:  18 g   Refills:  3        * Notice:  This list has 3 medication(s) that are the same as  other medications prescribed for you. Read the directions carefully, and ask your doctor or other care provider to review them with you.      STOP taking     aspirin 81 MG tablet   Replaced by:  aspirin 81 MG EC tablet                   After Care     Activity - Ambulate in hallway       Every shift       Activity - Up with assistive device           Advance Diet as Tolerated       Follow this diet upon discharge: Orders Placed This Encounter      Regular Diet Adult       Fall precautions           General info for SNF       Length of Stay Estimate: Short Term Care: Estimated # of Days <30  Condition at Discharge: Improving  Level of care:skilled   Rehabilitation Potential: Fair  Admission H&P remains valid and up-to-date: Yes  Recent Chemotherapy: N/A  Use Nursing Home Standing Orders: Yes       Mantoux instructions       Give two-step Mantoux (PPD) Per Facility Policy Yes       Weight bearing status       As tolerated       Wound care (specify)       Site:   Right knee  Instructions:  Change dressing daily or when soiled. May shower with press and seal wrap over dressing. Change dressing if it becomes wet. Sutures/staples to be removed 14 days post-op at follow-up.             Referrals     Occupational Therapy Adult Consult       Evaluate and treat as clinically indicated.    Reason:  TKA       Physical Therapy Adult Consult       Evaluate and treat as clinically indicated.    Reason:  TKA             Follow-Up Appointment Instructions     Follow-up and recommended labs and tests        Dr. Olson/Mitchell Pelletier 4 weeks post-op.             Your next 10 appointments already scheduled     Apr 18, 2017  2:20 PM CDT   (Arrive by 2:00 PM)   Return Visit with Moisés Olson MD    ORTHOPEDICS ([facility])    [address] [facility] [address]   [phone]              Statement of Approval     Ordered          03/24/17 1105  I have reviewed and agree with all the recommendations and orders  "detailed in this document.  EFFECTIVE NOW     Approved and electronically signed by:  Jennifer Junior NP                                                 INTERAGENCY TRANSFER FORM - NURSING   3/21/2017                       [facility]: [phone]            Attending Provider: Moisés Olson MD     Allergies:  Bee Pollen, Codeine, Keflex [Cephalexin Hcl], Sulfonamide Derivatives, Ciprofloxacin, Bupropion Hcl, Celecoxib, Clonazepam, Erythromycin, Lisinopril, Nifedipine, Tramadol Hcl    Infection:  None   Service:  GENERAL MEDI    Ht:  1.473 m (4' 10\")   Wt:  72.6 kg (160 lb)   Admission Wt:  72.6 kg (160 lb)    BMI:  33.44 kg/m 2   BSA:  1.72 m 2            Advance Directives        Does patient have a scanned Advance Directive/ACP document in EPIC?           No        Immunizations     Name Date      Influenza (High Dose) 3 valent vaccine 01/06/17     Influenza (High Dose) 3 valent vaccine 11/17/14     Influenza Vaccine IM 3yrs+ 4 Valent IIV4 12/26/13     Pneumococcal 23 valent 10/18/12     TDAP Vaccine (Boostrix) 08/15/13       ASSESSMENT     Discharge Profile Flowsheet     EXPECTED DISCHARGE     Patient's communication style  spoken language (English or Bilingual) 12/18/15 1357    Expected Discharge Date  03/24/17 03/22/17 1239   FINAL RESOURCES      DISCHARGE NEEDS ASSESSMENT     Resources List  Skilled Nursing Facility 03/22/17 1239    Patient/family verbalizes understanding of discharge plan recommendations?  Yes 03/22/17 1239   Other Resources  Transportation Services 03/24/17 1125    Medical Team notified of plan?  yes 03/23/17 0750   Skilled Nursing Facility  -- ([facility]) 03/22/17 1239    Readmission Within The Last 30 Days  no previous admission in last 30 days 03/24/17 1047   PAS Number  -- ([id number]) 03/24/17 1047    Anticipated Changes Related to Illness  none 03/21/17 2319   Senior Linkage Line Referral Placed  03/24/17 03/24/17 1047    Equipment Currently Used at Home  " "walker, rolling 03/22/17 1513   Referrals Placed  SNF;Transportation 03/24/17 1047    Transportation Available  car 03/22/17 1513   SKIN      # of Referrals Placed by CTS  Senior Linkage Line;;Transportation;Communication hand-offs to next level of Care Providers (SNF) 03/24/17 1047   Inspection  Full 03/24/17 0814    Key Recommendations  -- (F/U with PCP and F/U with Dr Olson ) 03/24/17 1125   Skin areas NOT inspected  Buttock, left;Buttock, right;Coccyx 03/24/17 0233    ASSESSMENT OF FUNCTIONAL STATUS     Procedural focused assessment (identify areas inspected)   Knee, right 03/24/17 0814    Prior to admission patient needed assistance with:  -- (N/A) 03/23/17 0750   Skin WDL  ex 03/24/17 0814    GASTROINTESTINAL (ADULT,PEDIATRIC,OB)     Skin Temperature  warm 03/24/17 0814    GI WDL  ex 03/24/17 1014   Skin Moisture  dry 03/24/17 0814    Abdominal Appearance  obese;nondistended 03/24/17 1014   Skin Elasticity  quick return to original state 03/24/17 0814    Last Bowel Movement  03/23/17 03/24/17 1014   Skin Integrity  bruise(s);incision(s);tattoo(s) 03/24/17 0814    GI Signs/Symptoms  diarrhea 03/23/17 2118   SAFETY      Passing flatus  yes 03/24/17 1014   Safety WDL  WDL 03/24/17 0814    COMMUNICATION ASSESSMENT                        Assessment WDL (Within Defined Limits) Definitions           Safety WDL     Effective: 09/28/15    Row Information: <b>WDL Definition:</b> Bed in low position, wheels locked; call light in reach; upper side rails up x 2; ID band on<br> <font color=\"gray\"><i>Item=AS safety wdl>>List=AS safety wdl>>Version=F14</i></font>      Skin WDL     Effective: 09/28/15    Row Information: <b>WDL Definition:</b> Warm; dry; intact; elastic; without discoloration; pressure points without redness<br> <font color=\"gray\"><i>Item=AS skin wdl>>List=AS skin wdl>>Version=F14</i></font>      Vitals     Vital Signs Flowsheet     VITAL SIGNS     Pain Orientation  Right 03/24/17 1132    Temp  " 97.6  F (36.4  C) 03/24/17 0758   Pain Descriptors  Burning 03/24/17 1132    Temp src  Tympanic 03/24/17 0758   Pain Management Interventions  analgesia administered 03/24/17 1132    Resp  16 03/24/17 0758   Pain Intervention(s)  Medication (See eMAR) 03/24/17 1132    Pulse  72 03/21/17 1220   Response to Interventions  Decrease in pain 03/24/17 1133    Heart Rate  77 03/24/17 0758   HEIGHT AND WEIGHT      Pulse/Heart Rate Source  Monitor 03/24/17 0758   Weight  72.6 kg (160 lb) 03/21/17 1220    BP  135/63 03/24/17 0758   POSITIONING      Patient Position  Lying 03/21/17 1220   Body Position  independently positioning 03/24/17 1014    OXYGEN THERAPY     Head of Bed (HOB)  HOB at 20 degrees 03/23/17 2244    SpO2  92 % 03/24/17 0758   Chair  Upright in chair 03/24/17 1014    O2 Device  None (Room air) 03/24/17 0758   Positioning/Transfer Devices  pillows;in use 03/23/17 2244    Oxygen Delivery  1.5 LPM 03/24/17 0315   ECG      PAIN/COMFORT     ECG Rhythm  Normal sinus rhythm 03/21/17 1540    Patient Currently in Pain  yes 03/24/17 1132   DAILY CARE      Preferred Pain Scale  number (Numeric Rating Pain Scale) 03/24/17 1132   Activity Type  up in chair 03/24/17 1014    Patient's Stated Pain Goal  No pain 03/24/17 0721   Activity Level of Assistance  assistance, 1 person 03/24/17 1014    0-10 Pain Scale  5 03/24/17 1132   Activity Assistive Device  gait belt;walker 03/24/17 1014    Pain Location  Knee 03/24/17 1132                 Patient Lines/Drains/Airways Status    Active LINES/DRAINS/AIRWAYS     Name: Placement date: Placement time: Site: Days: Last dressing change:    Peripheral IV 03/13/17 Right  03/13/17   1050      11     Wound 10/16/13 Right Finger (Comment which one) 10/16/13   2013   Finger (Comment which one)   1254     Wound 10/16/13 Lip 10/16/13   2018   Lip   1254     Wound 08/08/14 Right Arm Abrasion(s) 08/08/14   1735   Arm   958     Incision/Surgical Site 03/21/17 Right Knee 03/21/17   1449    2              Patient Lines/Drains/Airways Status    Active PICC/CVC     None            Intake/Output Detail Report     Date Intake     Output   Net    Shift P.O. I.V. IV Piggyback Total Urine Blood Total       Noc 03/22/17 2300 - 03/23/17 0659 -- -- -- -- 0 -- -- 0    Day 03/23/17 0700 - 03/23/17 1459 100 -- -- 100 -- -- -- 100    Jessie 03/23/17 1500 - 03/23/17 2259 180 -- -- 180 400 -- 400 -220    Noc 03/23/17 2300 - 03/24/17 0659 -- -- -- -- -- -- -- 0    Day 03/24/17 0700 - 03/24/17 1459 240 -- -- 240 400 -- 400 -160      Last Void/BM       Most Recent Value    Urine Occurrence 1 at 03/24/2017 1019    Stool Occurrence 1 at 03/23/2017 2241      Case Management/Discharge Planning     Case Management/Discharge Planning Flowsheet     REFERRAL INFORMATION     Major Change/Loss/Stressor  child(whit) 03/23/17 0750    Did the Initial Social Work Assessment result in a Social Work Case?  Yes 03/24/17 1047   EXPECTED DISCHARGE      Admission Type  inpatient 03/22/17 1034   Expected Discharge Date  03/24/17 03/22/17 1239    Arrived From  admitted as an inpatient 03/22/17 1034   DISCHARGE PLANNING      Referral Source  admission list;case finding 03/22/17 1034   Patient/family verbalizes understanding of discharge plan recommendations?  Yes 03/22/17 1239    # of Referrals Placed by CTS  Senior Linkage Line;;Transportation;Communication hand-offs to next level of Care Providers (SNF) 03/24/17 1047   Medical Team notified of plan?  yes 03/23/17 0750    Reason For Consult  discharge planning 03/22/17 1239   Readmission Within The Last 30 Days  no previous admission in last 30 days 03/24/17 1047    Record Reviewed  history and physical;medical record;patient profile;plan of care 03/22/17 1239   Anticipated Changes Related to Illness  none 03/21/17 2319     Assigned to Case  Malathi BRADSHAW 03/22/17 1239   Transportation Available  car 03/22/17 1513    [facility] 03/22/17 1239   Key  Recommendations  -- (F/U with PCP and F/U with Dr Olson ) 03/24/17 1125    Primary Care MD Name  Gibran Alarcon 03/22/17 1239   FINAL NOTE      LIVING ENVIRONMENT     Final Note  -- (See Care Plan) 03/23/17 0750    Lives With  child(whit), adult 03/22/17 1513   FINAL RESOURCES      Living Arrangements  apartment 03/22/17 1513   Equipment Currently Used at Home  walker, rolling 03/22/17 1513    Provides Primary Care For  no one 03/23/17 0750   Resources List  Skilled Nursing Facility 03/22/17 1239    Quality Of Family Relationships  supportive 03/24/17 1125   Other Resources  Transportation Services 03/24/17 1125    Able to Return to Prior Living Arrangements  other (see comments) (SNF prior to home) 03/23/17 0750   Skilled Nursing Facility  -- ([facility]) 03/22/17 1239    HOME SAFETY     PAS Number  -- ([id number]) 03/24/17 1047    Patient Feels Safe Living in Home?  yes 03/23/17 0750   Senior Linkage Line Referral Placed  03/24/17 03/24/17 1047    ASSESSMENT OF FAMILY/SOCIAL SUPPORT     Referrals Placed  SNF;Transportation 03/24/17 1047    Marital Status   03/23/17 0750   ABUSE RISK SCREEN      Who is your support system?  Sibling(s) 03/23/17 0750   QUESTION TO PATIENT:  Has a member of your family or a partner(now or in the past) intimidated, hurt, manipulated, or controlled you in any way?  no 03/21/17 1205    ASSESSMENT OF FUNCTIONAL STATUS     QUESTION TO PATIENT: Do you feel safe going back to the place where you are living?  yes 03/21/17 1205    Prior to admission patient needed assistance with:  -- (N/A) 03/23/17 0750   OBSERVATION: Is there reason to believe there has been maltreatment of a vulnerable adult (ie. Physical/Sexual/Emotional abuse, self neglect, lack of adequate food, shelter, medical care, or financial exploitation)?  no 03/21/17 1205    EMPLOYMENT     (R) MENTAL HEALTH SUICIDE RISK      Do you work full or part-time?  no 03/23/17 0750   Are you depressed or being treated  for depression?  Yes 03/21/17 2319    COPING/STRESS                         [facility]: [phone]            Medication Administration Report for Sydni Hardin as of 03/24/17 1145   Legend:    Given Hold Not Given Due Canceled Entry Other Actions    Time Time (Time) Time  Time-Action       Inactive    Active    Linked        Medications 03/18/17 03/19/17 03/20/17 03/21/17 03/22/17 03/23/17 03/24/17    albuterol (PROAIR HFA/PROVENTIL HFA/VENTOLIN HFA) Inhaler 1 puff  Dose: 1 puff Freq: EVERY 6 HOURS PRN Route: IN  PRN Reason: wheezing  Start: 03/21/17 1812   Admin Instructions: Call pharmacy if needed        2307 (1 puff)-Given        0741 (1 puff)-Given       1528 (1 puff)-Given [C]             ALPRAZolam (XANAX) tablet 1 mg  Dose: 1 mg Freq: 3 TIMES DAILY PRN Route: PO  PRN Reason: anxiety  Start: 03/21/17 1812   Admin Instructions: Avoid taking with grapefruit juice         1252 (1 mg)-Given       2102 (1 mg)-Given             aspirin EC EC tablet 81 mg  Dose: 81 mg Freq: 2 TIMES DAILY Route: PO  Start: 03/22/17 0930   Admin Instructions: DO NOT CRUSH.         0943 (81 mg)-Given       2102 (81 mg)-Given        1020 (81 mg)-Given       2007 (81 mg)-Given        0903 (81 mg)-Given       [ ] 2100           atorvastatin (LIPITOR) tablet 40 mg  Dose: 40 mg Freq: DAILY Route: PO  Start: 03/21/17 2100       (2244)-Not Given        2103 (40 mg)-Given        2007 (40 mg)-Given        [ ] 2100           benazepril (LOTENSIN) tablet 10 mg  Dose: 10 mg Freq: DAILY Route: PO  Start: 03/22/17 0900        (0919)-Not Given        (1324)-Not Given [C]        (0908)-Not Given           benzocaine-menthol (CEPACOL) 15-3.6 MG lozenge 1-2 lozenge  Dose: 1-2 lozenge Freq: EVERY 1 HOUR PRN Route: BU  PRN Reason: sore throat  PRN Comment: sore throat without fever  Start: 03/21/17 1812              benzonatate (TESSALON) capsule 200 mg  Dose: 200 mg Freq: 3 TIMES DAILY PRN Route: PO  PRN Reason: cough  Start: 03/21/17  1812              doxepin (SINEquan) capsule 25 mg  Dose: 25 mg Freq: AT BEDTIME Route: PO  Start: 03/21/17 2200       2240 (25 mg)-Given        2103 (25 mg)-Given        2151 (25 mg)-Given        [ ] 2200           escitalopram (LEXAPRO) tablet 20 mg  Dose: 20 mg Freq: 2 TIMES DAILY Route: PO  Start: 03/21/17 2100       2240 (20 mg)-Given        0937 (20 mg)-Given       2106 (20 mg)-Given        1019 (20 mg)-Given       2007 (20 mg)-Given        0904 (20 mg)-Given       [ ] 2100           famotidine (PEPCID) tablet 20 mg  Dose: 20 mg Freq: DAILY Route: PO  Start: 03/23/17 0900         1215 (20 mg)-Given        0904 (20 mg)-Given           fluticasone-vilanterol (BREO ELLIPTA) 100-25 MCG/INH oral inhaler 1 puff  Dose: 1 puff Freq: DAILY Route: IN  Start: 03/22/17 0700   Admin Instructions: *Do not use more frequently than twice daily.*  Rinse mouth after use.<br>Formulary alternate for symbicort         0741 (1 puff)-Given        0712 (1 puff)-Given        0811 (1 puff)-Given           hydrochlorothiazide (HYDRODIURIL) tablet 25 mg  Dose: 25 mg Freq: DAILY Route: PO  Start: 03/21/17 1815       1951 (25 mg)-Given        0936 (25 mg)-Given        1214 (25 mg)-Given        0904 (25 mg)-Given           HYDROmorphone (PF) (DILAUDID) injection 0.2 mg  Dose: 0.2 mg Freq: EVERY 2 HOURS PRN Route: IV  PRN Reason: severe pain  PRN Comment: or if patient unable to take PO  Start: 03/21/17 1812   Admin Instructions: Hold while on PCA.        1952 (0.2 mg)-Given        0033 (0.2 mg)-Given       1756 (0.2 mg)-Given        0554 (0.2 mg)-Given       1017 (0.2 mg)-Given       1248 (0.2 mg)-Given       1437 (0.2 mg)-Given       2300 (0.2 mg)-Given            hydrOXYzine (ATARAX) tablet 10 mg  Dose: 10 mg Freq: EVERY 6 HOURS PRN Route: PO  PRN Reason: itching  Start: 03/21/17 1812   Admin Instructions: Caution to be used when administering multiple CNS depressing meds within a short time frame.               ipratropium - albuterol  "0.5 mg/2.5 mg/3 mL (DUONEB) neb solution 3 mL  Dose: 1 vial Freq: EVERY 6 HOURS PRN Route: NEBULIZATION  PRN Reason: shortness of breath / dyspnea  Start: 03/21/17 1812         0712 (3 mL)-Given       1338 (3 mL)-Given            lidocaine 1 % 1 mL  Dose: 1 mL Freq: EVERY 1 HOUR PRN Route: OTHER  PRN Comment: mild pain with VAD insertion or accessing implanted port,  Start: 03/21/17 1147   Admin Instructions: Do NOT give if patient has a history of allergy to any local anesthetic or any \"deepthi\" product. MAX dose 1 mL subcutaneous OR intradermal in divided doses.               loperamide (IMODIUM) capsule 2 mg  Dose: 2 mg Freq: 4 TIMES DAILY PRN Route: PO  PRN Reason: diarrhea  Start: 03/23/17 0741              montelukast (SINGULAIR) tablet 10 mg  Dose: 10 mg Freq: AT BEDTIME Route: PO  Start: 03/21/17 2200       2241 (10 mg)-Given        2106 (10 mg)-Given        2151 (10 mg)-Given        [ ] 2200           naloxone (NARCAN) injection 0.1-0.4 mg  Dose: 0.1-0.4 mg Freq: EVERY 2 MIN PRN Route: IV  PRN Reason: opioid reversal  Start: 03/21/17 1541   Admin Instructions: For respiratory rate LESS than or EQUAL to 8.  Partial reversal dose:  0.1 mg titrated q 2 minutes for Analgesia Side Effects Monitoring Sedation Level of 3 (frequently drowsy, arousable, drifts to sleep during conversation).Full reversal dose:  0.4 mg bolus for Analgesia Side Effects Monitoring Sedation Level of 4 (somnolent, minimal or no response to stimulation).               naloxone (NARCAN) injection 0.1-0.4 mg  Dose: 0.1-0.4 mg Freq: EVERY 2 MIN PRN Route: IV  PRN Reason: opioid reversal  Start: 03/21/17 1812   Admin Instructions: For respiratory rate LESS than or EQUAL to 8.  Partial reversal dose:  0.1 mg titrated q 2 minutes for Analgesia Side Effects Monitoring Sedation Level of 3 (frequently drowsy, arousable, drifts to sleep during conversation).Full reversal dose:  0.4 mg bolus for Analgesia Side Effects Monitoring Sedation Level of 4 " (somnolent, minimal or no response to stimulation).               nicotine (NICODERM CQ) 21 MG/24HR 24 hr patch 1 patch  Dose: 1 patch Freq: EVERY 24 HOURS Route: TD  Start: 03/21/17 1815       (2009)-Not Given        (1924)-Not Given        (1820)-Not Given        [ ] 1815           nicotine Patch in Place  Freq: EVERY 8 HOURS Route: TD  Start: 03/21/17 1845   Admin Instructions: Chart every shift, confirming that patch is still in place on patient (no barcode scan needed). See patch order for dose information.        (2009)-Not Given        (0157)-Not Given [C]              (1924)-Not Given        (0415)-Not Given [C]       (1218)-Not Given       (1820)-Not Given [C]        0629 ( )-Patch Removed [C]              [ ] 1845           nicotine patch REMOVAL  Freq: DAILY Route: TD  Start: 03/22/17 0900   Admin Instructions: Remove patch when new patch is applied or patch is discontinued.         0936-Hold [C]        1218-Hold [C]        0905 ( )-Patch Removed [C]           ondansetron (ZOFRAN-ODT) ODT tab 4 mg  Dose: 4 mg Freq: EVERY 6 HOURS PRN Route: PO  PRN Reason: nausea  Start: 03/21/17 1812   Admin Instructions: This is Step 1 of nausea and vomiting management.  If nausea not resolved in 15 minutes, go to Step 2 prochlorperazine (COMPAZINE). Do not push through foil backing. Peel back foil and gently remove. Place on tongue immediately. Administration with liquid unnecessary              Or  ondansetron (ZOFRAN) injection 4 mg  Dose: 4 mg Freq: EVERY 6 HOURS PRN Route: IV  PRN Reasons: nausea,vomiting  Start: 03/21/17 1812   Admin Instructions: This is Step 1 of nausea and vomiting management.  If nausea not resolved in 15 minutes, go to Step 2 prochlorperazine (COMPAZINE).  Irritant.               oxyCODONE (ROXICODONE) IR tablet 5-10 mg  Dose: 5-10 mg Freq: EVERY 3 HOURS PRN Route: PO  PRN Reason: moderate to severe pain  Start: 03/21/17 1812   Admin Instructions: IF CrCl is UNKNOWN start at lowest end of  dosing range.  Hold while on PCA or with regular IV opioid dosing.         0033 (5 mg)-Given       0530 (5 mg)-Given       0934 (10 mg)-Given [C]       1404 (10 mg)-Given During Downtime       1756 (5 mg)-Given       2103 (10 mg)-Given        0426 (10 mg)-Given       1212 (10 mg)-Given       1751 (10 mg)-Given       2151 (10 mg)-Given        0126 (10 mg)-Given       0904 (5 mg)-Given           potassium chloride (K-TAB,KLOR-CON) CR tablet 10 mEq  Dose: 10 mEq Freq: 2 TIMES DAILY Route: PO  Start: 03/21/17 2100   Admin Instructions: DO NOT CRUSH.        (2242)-Not Given        0936 (10 mEq)-Given       2102 (10 mEq)-Given        1020 (10 mEq)-Given       2151 (10 mEq)-Given        0904 (10 mEq)-Given       [ ] 2100           sodium chloride (PF) 0.9% PF flush 3 mL  Dose: 3 mL Freq: EVERY 8 HOURS Route: IK  Start: 03/21/17 1815   Admin Instructions: And Q1H PRN, to lock peripheral IV dormant line.        (2009)-Not Given        (0156)-Not Given       0938 (3 mL)-Given       1756 (3 mL)-Given        0215 (3 mL)-Given       1217 (3 mL)-Given       1819 (3 mL)-Given        0126 (3 mL)-Given              [ ] 1815           sodium chloride (PF) 0.9% PF flush 3 mL  Dose: 3 mL Freq: EVERY 1 HOUR PRN Route: IK  PRN Reason: line flush  PRN Comment: for peripheral IV flush post IV meds  Start: 03/21/17 1812              sodium chloride (PF) 0.9% PF flush 3 mL  Dose: 3 mL Freq: EVERY 1 HOUR PRN Route: IK  PRN Reasons: line flush,post meds or blood draw  Start: 03/21/17 1147   Admin Instructions: for peripheral IV flush post IV meds               temazepam (RESTORIL) capsule 30 mg  Dose: 30 mg Freq: AT BEDTIME PRN Route: PO  PRN Reason: sleep  Start: 03/21/17 1812        0033 (30 mg)-Given            Future Medications  Medications 03/18/17 03/19/17 03/20/17 03/21/17 03/22/17 03/23/17 03/24/17       lidocaine (LMX4) kit  Freq: EVERY 1 HOUR PRN Route: Top  PRN Reason: pain  PRN Comment: with VAD insertion or accessing implanted  "port.  Start: 03/25/17 1500   Admin Instructions: Do NOT give if patient has a history of allergy to any local anesthetic or any \"deepthi\" product.   Apply 30 minutes prior to VAD insertion or port access.  MAX Dose:  2.5 g (  of 5 g tube)               lidocaine 1 % 1 mL  Dose: 1 mL Freq: EVERY 1 HOUR PRN Route: OTHER  PRN Comment: mild pain with VAD insertion or accessing implanted port  Start: 03/25/17 1500   Admin Instructions: Do NOT give if patient has a history of allergy to any local anesthetic or any \"deepthi\" product. MAX dose 1 mL subcutaneous OR intradermal in divided doses.              Completed Medications  Medications 03/18/17 03/19/17 03/20/17 03/21/17 03/22/17 03/23/17 03/24/17         Dose: 975 mg Freq: EVERY 8 HOURS Route: PO  Start: 03/21/17 1930   End: 03/24/17 1131   Admin Instructions: Do not use if patient has an active opioid/acetaminophen analgesic order for pain  Maximum acetaminophen dose from all sources = 75 mg/kg/day not to exceed 4 grams/day.        1951 (975 mg)-Given        0430 (975 mg)-Given During Downtime       1206 (975 mg)-Given [C]       1922 (975 mg)-Given        0425 (975 mg)-Given       1214 (975 mg)-Given       2005 (975 mg)-Given        0303 (975 mg)-Given       1131 (975 mg)-Given             Dose: 2.5 mg Freq: ONCE Route: NEBULIZATION  Start: 03/21/17 1200   End: 03/21/17 1222   Admin Instructions: Give in Pre Op        1222 (2.5 mg)-Given                Dose: 150 mg Freq: ONCE Route: PO  Indications of Use: FUNGAL INFECTION PROPHYLAXIS  Start: 03/22/17 0930   End: 03/22/17 1207        1207 (150 mg)-Given [C]               Dose: 10 mg Freq: ONCE Route: PO  Start: 03/21/17 1945   End: 03/21/17 1951   Admin Instructions: Patient willing to take 1 lisinopril tonight until family can bring in benazepril tomorrow (allergy reaction is \"headache\")        1951 (10 mg)-Given                Dose: 150 mg Freq: AT BEDTIME Route: PO  Start: 03/21/17 2200   End: 03/21/17 2242   Admin " Instructions: Until family can bring in cimetidine tomorrow        2242 (150 mg)-Given                Dose: 1,000 mg Freq: EVERY 12 HOURS Route: IV  Indications of Use: SURGICAL PROPHYLAXIS  Start: 03/22/17 0045   End: 03/22/17 0117   Admin Instructions: To be given 12 hours after intra op dose, see eMAR  Vesicant. IF central catheter, doses below 2 g may be given over 1 hour.         0017 (1,000 mg)-New Bag               Dose: 1,000 mg Freq: PRE-OP/PRE-PROCEDURE Route: IV  Indications of Use: SURGICAL PROPHYLAXIS  Start: 03/21/17 1227   End: 03/21/17 1342   Admin Instructions: Give dose within 60 minutes PRIOR to incision. Infuse over 60 minutes. If patient has experienced Red Man's Syndrome with vancomycin (VANCOCIN) infusion, prolong the infusion to 120 minutes.  Vesicant. IF central catheter, doses below 2 g may be given over 1 hour.        1242 (1,000 mg)-New Bag             Discontinued Medications  Medications 03/18/17 03/19/17 03/20/17 03/21/17 03/22/17 03/23/17 03/24/17         Dose: 2.5 mg Freq: EVERY 4 HOURS PRN Route: NEBULIZATION  PRN Reason: shortness of breath / dyspnea  Start: 03/21/17 1536   End: 03/21/17 1757       1544 (2.5 mg)-Given       1757-Med Discontinued            Dose: 325 mg Freq: 2 TIMES DAILY Route: PO  Start: 03/21/17 2100   End: 03/22/17 0928       2240 (325 mg)-Given        0928-Med Discontinued  (0931)-Not Given               Freq: PRN  Start: 03/21/17 1448   End: 03/21/17 1757       1448 (30 mL)-Given       1757-Med Discontinued            Freq: PRN  Start: 03/21/17 1447   End: 03/21/17 1757       1447 (20 mL)-Given       1757-Med Discontinued            Dose: 800 mg Freq: AT BEDTIME Route: PO  Start: 03/21/17 2200   End: 03/22/17 0925       (2245)-Not Given        0925-Med Discontinued           Dose: 4 mg Freq: ONCE PRN Route: IV  PRN Reason: nausea  PRN Comment: nausea  (If not administered in the OR and Post Op Nausea and Vomiting persists after droperidol dosing if  ordered)  Start: 03/21/17 1536   End: 03/21/17 1757       1757-Med Discontinued            Dose: 4 mg Freq: ONCE PRN Route: IV  PRN Reason: other  PRN Comment: nausea  (If not administered in the OR and Post Op Nausea and Vomiting persists after droperidol dosing if ordered)  Start: 03/21/17 1536   End: 03/21/17 1757   Admin Instructions: May repeat times 1        1757-Med Discontinued            Dose: 20 mg Freq: 2 TIMES DAILY Route: PO  Start: 03/22/17 0930   End: 03/22/17 2035        0943 (20 mg)-Given       2035-Med Discontinued           Dose: 25-50 mcg Freq: EVERY 2 MIN PRN Route: IV  PRN Reason: other  PRN Comment: acute pain  Start: 03/21/17 1536   End: 03/21/17 1757   Admin Instructions: MAX cumulative dose = 250 mcg.    Use Fentanyl initially, as a short acting agent for acute pain control.  If insufficient, or a longer acting agent is needed, begin Morphine or Hydromorphone if ordered.        1607 (50 mcg)-Given       1715 (50 mcg)-Given       1757-Med Discontinued            Dose: 2.5-5 mg Freq: EVERY 10 MIN PRN Route: IV  PRN Reason: high blood pressure  PRN Comment: for Systolic Blood Pressure greater than 160 and Heart Rate greater than 60 bpm.  Start: 03/21/17 1536   End: 03/21/17 1757   Admin Instructions: Max cumulative dose = 20 mg.  FOR USE IN PACU ONLY, DC WHEN TRANSFERRED TO FLOOR.        1757-Med Discontinued            Dose: 10 mg Freq: ONCE PRN Route: IV  PRN Reason: high blood pressure  PRN Comment: for Systemic Blood Pressure greater than 160 and Heart Rate greater than 60 bpm.    Start: 03/21/17 1536   End: 03/21/17 1757   Admin Instructions: For PACU USE ONLY.  DC WHEN TRANSFERRED TO FLOOR.        1757-Med Discontinued            Rate: 75 mL/hr Freq: CONTINUOUS Route: IV  Start: 03/21/17 1815   End: 03/22/17 0930   Admin Instructions: Change to saline lock when PO well tolerated.        1854 ( )-New Bag        0930-Med Discontinued           Rate: 10 mL/hr Freq: CONTINUOUS Route:  IV  Last Dose: 1,000 mL (03/21/17 1232)  Start: 03/21/17 1200   End: 03/22/17 0930       1232 (1,000 mL)-New Bag        0930-Med Discontinued           Rate: 100 mL/hr Freq: CONTINUOUS Route: IV  Last Dose: 800 mL (03/21/17 1548)  Start: 03/21/17 1545   End: 03/21/17 1757   Admin Instructions: Continue until IV catheter is weaned        1548 (800 mL)-Rate/Dose Verify [C]       1757-Med Discontinued            Dose: 12.5 mg Freq: EVERY 5 MIN PRN Route: IV  PRN Comment: post anesthesia shivering if Respiratory Rate greater than 10  Start: 03/21/17 1536   End: 03/21/17 1757       1757-Med Discontinued            Dose: 4 mg Freq: EVERY 30 MIN PRN Route: PO  PRN Reason: nausea  Start: 03/21/17 1536   End: 03/21/17 1757   Admin Instructions: MAX total dose = 8 mg, including OR dosing. If not resolved in 15 minutes, then go to step 2 (Prochlorperazine if ordered).        1757-Med Discontinued         Or    Dose: 4 mg Freq: EVERY 30 MIN PRN Route: IV  PRN Reason: nausea  Start: 03/21/17 1536   End: 03/21/17 1757   Admin Instructions: MAX total dose = 8 mg, including OR dosing. If not resolved in 15 minutes, then go to step 2 (Prochlorperazine if ordered).  Irritant.        1757-Med Discontinued            Freq: PRN  Start: 03/21/17 1447   End: 03/21/17 1757       1447 (500,000 Units)-Given       1757-Med Discontinued            Dose: 5 mg Freq: EVERY 6 HOURS PRN Route: IV  PRN Reasons: nausea,vomiting  Start: 03/21/17 1536   End: 03/21/17 1757   Admin Instructions: This is Step 2 of the nausea and vomiting protocol.   If nausea not resolved in 15 minutes, give Metoclopramide if ordered (step 3 of nausea and vomiting protocol)          1757-Med Discontinued            Dose: 1-2 tablet Freq: 2 TIMES DAILY Route: PO  Start: 03/21/17 2100   End: 03/22/17 1001   Admin Instructions: Start with 1 tablet PO BID, If no bowel movement in 24 hours, increase to 2 tablets PO BID.  Hold for loose stools.        (4117)-Not Given         (0937)-Not Given [C]       1001-Med Discontinued           Dose: 1,000 mg Freq: SEE ADMIN INSTRUCTIONS Route: IV  Indications of Use: SURGICAL PROPHYLAXIS  Start: 03/21/17 1227   End: 03/21/17 1857   Admin Instructions: Intra Op Dose. First dose due 8 hours after pre-op dose. ONLY give if CrCl greater than 50 mL/min. Infuse over 60 minutes. If patient has experienced Red Man's Syndrome with vancomycin (VANCOCIN) infusion, prolong the infusion to 120 minutes.  Give every 8 hours while patient in OR.  Vesicant. IF central catheter, doses below 2 g may be given over 1 hour.        1245 (1 g)-Given              1857-Med Discontinued       Medications 03/18/17 03/19/17 03/20/17 03/21/17 03/22/17 03/23/17 03/24/17               INTERAGENCY TRANSFER FORM - NOTES (H&P, Discharge Summary, Consults, Procedures, Therapies)   3/21/2017                       [facility]: [phone]               History & Physicals      Interval H&P Note by Aimee Zhou RN at 3/14/2017  9:56 AM     Author:  Aimee Zhou RN Service:  (none) Author Type:  Registered Nurse    Filed:  3/14/2017  9:57 AM Date of Service:  3/14/2017  9:56 AM Note Created:  3/14/2017  9:56 AM    Related:  Original note: H&P (View-Only) by Gibran Alarcon MD filed at 2/23/2017  5:27 PM Status:  Signed :  Aimee Zhou RN (Registered Nurse)         [facility][JK1.1]     Revision History        User Key Date/Time User Provider Type Action    > JK1.1 3/14/2017  9:57 AM Aimee Zhou RN Registered Nurse Sign            Interval H&P Note by Aimee Zhou RN at 3/14/2017  9:56 AM     Author:  Aimee Zhou RN Service:  (none) Author Type:  Registered Nurse    Filed:  3/14/2017  9:56 AM Date of Service:  3/14/2017  9:56 AM Note Created:  3/14/2017  9:56 AM    Related:  Original note: H&P (View-Only) by Moisés Olson MD filed at 2/24/2017  5:38 PM Status:  Signed :  Aimee Zhou RN (Registered Nurse)         [facility][JK1.1]     Revision History         User Key Date/Time User Provider Type Action    > JK1.1 3/14/2017  9:56 AM Aimee Zhou RN Registered Nurse Sign            H&P (View-Only) by Moisés Olson MD at 2/24/2017  1:00 PM     Author:  Moisés Olson MD Service:  (none) Author Type:  Physician    Filed:  2/24/2017  5:38 PM Date of Service:  2/24/2017  1:00 PM Note Created:  3/14/2017  9:56 AM    Status:  Signed :  Moisés Olson MD (Physician)         Chief complaint: Severe DJD right knee    Subjective: This 67-year-old female has a long history of osteoarthritis of both knees.  Symptoms are worse on the right.  She has failed conservative treatment in the form of an oral NSAID, steroid injections, bilateral knee arthroscopies, and the use of a cane.  She requires Percocet and Tylenol to control her pain.  Her pain is severe enough to interfere with activities of daily living such as the distance she can walk and the number stairs she can climb.  She is in the process of planning a right TKA.    Since seen last she has attended the total joint class, seen her PCP and gained preoperative medical clearance and has chosen to go to inpatient rehabilitation from the hospital before returning home where she lives alone.  She has made arrangements for family members to attend to her when she returns home.[PD1.1] She is trying to quit smoking and is down to less than 1 pack of  Cigarettes per day, which is a significant decrease.[PD1.2]    Since seen last nothing is changed with respect to her musculoskeletal or neurologic review of systems.    Examination: Elderly but otherwise healthy-appearing female with appropriate mood and affect.  She ambulated into the office using a rolling walker.  Her right knee range of motion is 0-120 .  The knee is stable to ligamentous stressing.  The neurovascular that limb is intact.    X-rays: Most recent plain films the right knee were taken on 12/7/16 and show Kellgren-Varghese Grade 4  changes.    Labs: Yesterday her H&H was 15 and 43.9.[PD1.1] Her MRSA screen was negative.[PD1.2]     Impression: Severe DJD right knee, failure conservative treatment.    Plan: Right TKA.  We selected a date .  Today I answered all her remaining questions.  She understands that TKA is major surgery with the potential for major complications.  She consented to the procedure.  Her preoperative orders have been placed.    Joint Replacement Indications: This patient's degenerative joint disease is evidenced by conventional radiography as described in the x-ray interpretation in the office note or notes, is producing pain that is interfering with activities of daily living as described in the office note or notes hence creating a functional disability, and has failed nonsurgical management for at least 3 months.  The patient therefore meets the preoperative criteria for total joint arthroplasty.[PD1.1]               Revision History        User Key Date/Time User Provider Type Action    > PD1.2 3/14/2017  9:56 AM Moisés Olson MD Physician Sign     PD1.1 2017  1:45 PM Moisés Olson MD Physician             H&P (View-Only) by Gibran Alarcon MD at 2017  2:00 PM     Author:  Gibran Alarcon MD Service:  (none) Author Type:  Physician    Filed:  2017  5:27 PM Date of Service:  2017  2:00 PM Note Created:  3/14/2017  9:57 AM    Status:  Signed :  Gibran Alarcon MD (Physician)           [address] [address]  [phone]  Dept: [phone]    PRE-OP EVALUATION:  Today's date: 2017    Sydni Hardin (: 1950) presents for pre-operative evaluation assessment as requested by Dr. Olson.  She requires evaluation and anesthesia risk assessment prior to undergoing surgery/procedure for treatment of[SR1.1] right knee pain[SR1.2] .  Proposed procedure:[SR1.1] right total knee[SR1.2]    Date of Surgery/  Procedure:[SR1.1] 03-06-17[SR1.2]    Hospital/Surgical Facility: [facility] [place]    Primary Physician: Gibran Alarcon  Type of Anesthesia Anticipated: to be determined    Patient has a Health Care Directive or Living Will:  YES[SR1.1]     1. NO - Do you have a history of heart attack, stroke, stent, bypass or surgery on an artery in the head, neck, heart or legs?  2. NO - Do you ever have any pain or discomfort in your chest?  3. NO - Do you have a history of  Heart Failure?  4. YES - ARE YOUR TROUBLED BY SHORTNESS OF BREATH WHEN WALKING ON THE LEVEL, UP A SLIGHT HILL OR AT NIGHT? yes  5. NO - Do you currently have a cold, bronchitis or other respiratory infection?  6. YES - DO YOU HAVE A COUGH, SHORTNESS OF BREATH OR WHEEZING? yes  7. YES - DO YOU SOMETIMES GET PAINS IN THE CALVES OF YOUR LEGS WHEN YOU WALK? yes  8. NO - Do you or anyone in your family have previous history of blood clots?  9. NO - Do you or does anyone in your family have a serious bleeding problem such as prolonged bleeding following surgeries or cuts?  10. NO - Have you ever had problems with anemia or been told to take iron pills?  11. NO - Have you had any abnormal blood loss such as black, tarry or bloody stools, or abnormal vaginal bleeding?  12. NO - Have you ever had a blood transfusion?  13. YES - HAVE YOU OR ANY OF YOUR RELATIVES EVER HAD PROBLEMS WITH ANESTHESIA?[SR1.2] Had low bp with anesthesia.[BT1.1]  14. NO - Do you have sleep apnea, excessive snoring or daytime drowsiness?  15. NO - Do you have any prosthetic heart valves?  16. YES - DO YOU HAVE PROSTHETIC JOINTS? shoulder  17. NO - Is there any chance that you may be pregnant?      HPI:[SR1.2]                                                      Brief HPI related to upcoming procedure:[SR1.1] severe knee oa, right      See problem list for active medical problems.  Problems all longstanding and stable, except as noted/documented.  See ROS for pertinent symptoms related to these  conditions.                                                                                                  .[BT1.1]    MEDICAL HISTORY:                                                      Patient Active Problem List    Diagnosis Date Noted     ACP (advance care planning) 10/05/2016     Priority: Medium     Advance Care Planning 10/5/2016: ACP Review of Chart / Resources Provided:  Reviewed chart for advance care plan.  Sydni Hardin has no plan or code status on file however states presence of ACP document. Copy requested. Confirmed code status reflects current choices pending receipt of document/advance care plan review.  Confirmed/documented legally designated decision makers.  Added by Janie Robledo             Primary osteoarthritis of both knees 10/05/2016     Priority: Medium     IC (interstitial cystitis) 10/28/2015     Priority: Medium     SCC (squamous cell carcinoma), face 08/05/2015     Priority: Medium     Lung nodules 06/19/2015     Priority: Medium     Tobacco abuse 06/19/2015     Priority: Medium     Dysthymia 12/31/2014     Priority: Medium     Advanced care planning/counseling discussion 11/28/2012     Priority: Medium     Essential hypertension 01/05/2012     Priority: Medium     Problem list name updated by automated process. Provider to review       Calculus of gallbladder 02/02/2006     Priority: Medium     Problem list name updated by automated process. Provider to review       Headache 12/10/2003     Priority: Medium     Problem list name updated by automated process. Provider to review       Osteoarthrosis, unspecified whether generalized or localized, involving lower leg 10/04/2001     Priority: Medium     Problem list name updated by automated process. Provider to review        Past Medical History   Diagnosis Date     Abnormal arterial blood gases 4/26/2006     Acute upper respiratory infections of unspecified site 7/2/2001     Anxiety 10/24/2011     Calculus of gallbladder  without mention of cholecystitis or obstruction 2006     Depression 2012     Follow-up examination, following other surgery 2006     Headache(784.0) 12/10/2003     History of CVA (cerebrovascular accident) 2011     HTN (hypertension) 2012     Insomnia 3/13/2012     Lipoma of other skin and subcutaneous tissue 2006     Nonallopathic lesion of cervical region, not elsewhere classified 2003     Osteoarthrosis, unspecified whether generalized or localized, lower leg 10/4/2001     Special screening for malignant neoplasms, colon 2006     Urinary tract infection, site not specified 2007     Past Surgical History   Procedure Laterality Date     Shoulder x2  2009     left     Biopsy breast  2000     left - free of disease     Cholecystectomy  2006      section        section       Hysterectomy       Colonoscopy  2008     repeat in 10 years     Arthroscopy knee       Orthopedic surgery  2014     right shoulder replacement     Head & neck surgery       hematoma on head drained     Current Outpatient Prescriptions   Medication Sig Dispense Refill     oxyCODONE-acetaminophen (PERCOCET) 5-325 MG per tablet TAKE 1 TO 2 TABLETS BY MOUTH EVERY 4 HOURS AS NEEDED FOR PAIN 60 tablet 0     doxepin (SINEQUAN) 25 MG capsule TAKE 1 CAPSULE BY MOUTH AT BEDTIME 90 capsule 3     potassium chloride (K-TAB,KLOR-CON) 10 MEQ tablet TAKE 1 TABLET BY MOUTH 2 TIMES DAILY WITH FOOD 180 tablet 1     hydrochlorothiazide (HYDRODIURIL) 25 MG tablet TAKE 1 TABLET BY MOUTH DAILY 90 tablet 3     meloxicam (MOBIC) 15 MG tablet TAKE 1 TABLET BY MOUTH DAILY 90 tablet 1     atorvastatin (LIPITOR) 40 MG tablet TAKE 1 TABLET BY MOUTH DAILY 90 tablet 0     montelukast (SINGULAIR) 10 MG tablet TAKE 1 TABLET BY MOUTH DAILY 90 tablet 3     order for DME Equipment being ordered: Nebulizer supplies 2 each 11     order for DME Equipment being ordered: mcgee walker 1 each 0     ALPRAZolam  "(XANAX) 1 MG tablet TAKE 1 TABLET BY MOUTH 3 TIMES DAILY 90 tablet 3     escitalopram (LEXAPRO) 20 MG tablet TAKE 1 TABLET BY MOUTH 2 TIMES A  tablet 0     benazepril (LOTENSIN) 10 MG tablet TAKE 1 TABLET BY MOUTH DAILY 90 tablet 0     EPIPEN 2-JAIRO 0.3 MG/0.3ML injection INJECT 1 SYRINGE INTRAMUSCULARLY ONCE AS NEEDED FOR ANAPHYLAXIS 2 each 0     benzonatate (TESSALON) 200 MG capsule TAKE 1 CAPSULE BY MOUTH 3 TIMES A DAY AS NEEDED FOR COUGH 60 capsule 3     temazepam (RESTORIL) 30 MG capsule TAKE 1 CAPSULE BY MOUTH NIGHTLY AS NEEDED FOR SLEEP 90 capsule 5     VENTOLIN  (90 BASE) MCG/ACT inhaler USE 1 PUFF FOUR TIMES A DAY AS NEEDED 18 g 3     nystatin (MYCOSTATIN) 698503 UNIT/ML suspension Take 5 mLs (500,000 Units) by mouth 4 times daily 60 mL 0     UNABLE TO FIND 1 suppository daily MEDICATION NAME: vaginal Valium       butalbital-acetaminophen-caffeine (FIORICET, ESGIC) -40 MG per tablet Take 1 tablet by mouth every 6 hours as needed 40 tablet 5     ipratropium - albuterol 0.5 mg/2.5 mg/3 mL (DUONEB) 0.5-2.5 (3) MG/3ML nebulization Take 1 vial (3 mLs) by nebulization every 6 hours as needed 360 mL 2     cimetidine (TAGAMET) 800 MG tablet Take 1 tablet (800 mg) by mouth At Bedtime As needed 90 tablet 3     aspirin 81 MG tablet Take 1 tablet by mouth daily       budesonide-formoterol (SYMBICORT) 160-4.5 MCG/ACT inhaler Inhale 2 puffs into the lungs 2 times daily. As needed 1 Inhaler 11     OTC products:[SR1.1] None, except as noted above[BT1.1]    Allergies   Allergen Reactions     Bee Pollen Other (See Comments)     Throat and eyes close up     Keflex [Cephalexin Hcl] Hives     Sulfonamide Derivatives Other (See Comments)     Throat closes     Ciprofloxacin Rash     Rash, bumps on lips     Bupropion Hcl Other (See Comments)     Made her very irritable     Celecoxib Other (See Comments) and Cough     Celebrex - wheeze     Clonazepam Hives     Codeine Difficulty breathing     \"My throat closes " "shut.\"        Erythromycin Other (See Comments)     Erythromycin base - abdominal pain     Lisinopril Other (See Comments)     Zestril - headaches     Nifedipine Hives     Procardia     Tramadol Hcl       Latex Allergy:[SR1.1] NO[BT1.1]    Social History   Substance Use Topics     Smoking status: Current Every Day Smoker     Packs/day: 1.00     Years: 46.00     Types: Cigarettes     Smokeless tobacco: Never Used      Comment: tried to quit, relapse due to craving     Alcohol use 0.0 oz/week     0 Standard drinks or equivalent per week      Comment: rarely     History   Drug Use No       REVIEW OF SYSTEMS:[SR1.1]                                                    C: NEGATIVE for fever, chills, change in weight  I: NEGATIVE for worrisome rashes, moles or lesions  E: NEGATIVE for vision changes or irritation  E/M: NEGATIVE for ear, mouth and throat problems  R: NEGATIVE for significant cough or SOB  CV: NEGATIVE for chest pain, palpitations or peripheral edema  GI: NEGATIVE for nausea, abdominal pain, heartburn, or change in bowel habits  : NEGATIVE for frequency, dysuria, or hematuria  M: NEGATIVE for significant arthralgias or myalgia  N: NEGATIVE for weakness, dizziness or paresthesias  E: NEGATIVE for temperature intolerance, skin/hair changes  H: NEGATIVE for bleeding problems  P: NEGATIVE for changes in mood or affect    EXAM:[BT1.1]                                                    There were no vitals taken for this visit.[SR1.1]    GENERAL APPEARANCE: healthy, alert and no distress     EYES: EOMI,- PERRL     HENT: ear canals and TM's normal and nose and mouth without ulcers or lesions     NECK: no adenopathy, no asymmetry, masses, or scars and thyroid normal to palpation     RESP: lungs clear to auscultation - no rales, rhonchi or wheezes     CV: regular rates and rhythm, normal S1 S2, no S3 or S4 and no murmur, click or rub -     ABDOMEN:  soft, nontender, no HSM or masses and bowel sounds normal     MS: " extremities normal- no gross deformities noted, no evidence of inflammation in joints, FROM in all extremities.     SKIN: no suspicious lesions or rashes     NEURO: Normal strength and tone, sensory exam grossly normal, mentation intact and speech normal     PSYCH: mentation appears normal. and affect normal/bright     LYMPHATICS: No axillary, cervical, inguinal, or supraclavicular nodes    DIAGNOSTI[BT1.1]CS:[SR1.1]                                                    EKG: appears normal, NSR, normal axis, normal intervals, no acute ST/T changes c/w ischemia, no LVH by voltage criteria, unchanged from previous tracings[BT1.1].  CXR clear.  Cbc normal.  Bmp pending.[BT1.2]      Recent Labs   Lab Test  12/07/16   1024  06/22/16   1126   HGB  15.9*  16.1*   PLT  338  340   NA  133  135   POTASSIUM  4.1  4.4   CR  1.06*  0.86        IMPRESSION:[SR1.1]                                                    Reason for surgery/procedure: right knee oa.   Diagnosis/reason for consult: preoperative clearance[BT1.1]    The proposed surgical procedure is considered[SR1.1] INTERMEDIATE[BT1.1] risk.    REVISED CARDIAC RISK INDEX  The patient has the following serious cardiovascular risks for perioperative complications such as (MI, PE, VFib and 3  AV Block):[SR1.1]  No serious cardiac risks[BT1.1]  INTERPRETATION:[SR1.1] 1 risks: Class II (low risk - 0.9% complication rate)[BT1.1]    The patient has the following additional risks for perioperative complications:[SR1.1]  No identified additional risks[BT1.1]    No diagnosis found.    RECOMMENDATIONS:[SR1.1]                                                      --Consult hospital rounder / IM to assist post-op medical management    --Patient is to take all scheduled medications on the day of surgery EXCEPT for modifications listed below.    APPROVAL GIVEN to proceed with proposed procedure, without further diagnostic evaluation[BT1.1].  Patient is very worried about developing sepsis  postoperatively, which has happened to her.  I told her the surgical staff and Dr. Olson would take every precaution to prevent this.[BT1.2]         Signed Electronically by: Gibran Alarcon MD    Copy of this evaluation report is provided to requesting physician.    [facility] Preop Guidelines[SR1.1]     Revision History        User Key Date/Time User Provider Type Action    > BT1.2 3/14/2017  9:57 AM Gibran Alarcon MD Physician Sign     BT1.1 2/23/2017  2:47 PM Gibran Alarcon MD Physician      SR1.2 2/23/2017  2:06 PM Rose Chaparro LPN Licensed Nurse      SR1.1 2/23/2017  2:00 PM Rose Chaparro LPN Licensed Nurse                   Discharge Summaries     No notes of this type exist for this encounter.         Consult Notes      Consults signed by James Guerra MD at 3/22/2017  6:08 AM      Author:  James Guerra MD Service:  Internal Medicine Author Type:  Physician    Filed:  3/22/2017  6:08 AM Date of Service:  3/21/2017  6:24 PM Note Created:  3/21/2017  9:01 PM    Status:  Signed :  James Guerra MD (Physician)         REASON FOR CONSULTATION:  Dr. Olson asked me to evaluate this patient perioperatively for medical management.      HISTORY OF PRESENT ILLNESS:  Ms. Sydni Tovar is a 67-year-old with history of obesity, osteoarthritis.  The patient was admitted for elective right TKA.  The patient was seen postoperatively.  Patient has no immediate postoperative complications or new symptoms.      PAST MEDICAL HISTORY:   1.  Osteoarthritis.   2.  Several orthopedic procedures, including knee and shoulder surgeries.   3.  Hysterectomy.   4.  Hypertension.   5.  Obesity.   6.  Hypercholesterolemia.   7.  Depression and anxiety.   8.  Insomnia.      MEDICATIONS:  Prior to admission include:   1.  Albuterol p.r.n.   2.  Xanax p.r.n.   3.  Lipitor q.h.s.    4.  Lotensin 10 q. day.     5.  Tessalon Perles p.r.n.   6.  Symbicort b.i.d.    7.  Tagamet at bedtime.    8.  Doxepin q.h.s.    9.  Lexapro twice a day.   10.  Hydrochlorothiazide q. day.   11.  P.r.n. DuoNeb.   12.  Singular 10 mg q. day.   13.  Nicoderm CQ as needed.   14.  Potassium chloride.   15.  Temazepam 30 mg at bedtime.     16.  Previously on aspirin 81 mg p.o. q. day.    17.  P.r.n. Fioricet.   18.  Epinephrine pen p.r.n.    19.  Diflucan every 3 days.   20.  Mobic 15 mg q. day.   21.  Nystatin suspension 4 times a day.   22.  Oxycodone/acetaminophen p.r.n.      ALLERGIES:  Adverse reactions to bee, pollen, Keflex, sulfonamide, Cipro, bupropion, celecoxib, codeine, clonazepam, erythromycin and lisinopril.      SOCIAL HISTORY:  The patient has up until recently been a smoker.  The patient's alcohol use is negligible.      FAMILY HISTORY:  The patient reports no relevant family history.      REVIEW OF SYSTEMS:  Other than discussed in the HPI above, noncontributory on comprehensive review.      PHYSICAL EXAMINATION:   GENERAL:  The patient was resting comfortably in the bed.  She was examined in the postoperative holding area.   VITAL SIGNS:  Blood pressure is 130s/60s, heart rate is in the 70s.  She is afebrile. Oxygenation is 95% on 4 L of oxygen.   PSYCHIATRIC:  Pleasant affect.   NEUROLOGIC:  Alert and oriented with no peripheral nervous system deficit.   PRECORDIUM:  No heave or thrill.  Normal S1, normal S2.   LUNGS:  Distant in keeping with adiposity but clear with no added sounds.   ABDOMEN:  Adipose, soft, nontender.   EXTREMITIES:  The patient is able to move toes of both feet.  The patient's right leg is in an immobilizer and bandaged.   SKIN:  Warm, nondiaphoretic.      IMPRESSION:   1.  Osteoarthritis, status post total knee arthroplasty.  Pain control and prophylaxis for deep venous thrombosis per Orthopedic Surgery.  We will focus on pain control.   2.  History of hyperactive airways disorder.  Almost certainly has obesity hypoventilation syndrome and sleep apnea.  We will provide supplemental  oxygen.  She does not appear to have any new exacerbating factors other than anesthetic.   3.  Hypertension.  We will continue her PTA medications.   4.  Hypercholesterolemia.  We will continue statin.   5.  Obesity.   6.  Prophylaxis for deep venous thrombosis per Orthopedic Surgery.   7.  Code status is full code.        I want to thank Dr. Olson for involving us in this charming lady's care.         JAMES GUERRA MD             D: 2017 18:24   T: 2017 19:01   MT: [id number]      Name:     AGUSTINA BIRMINGHAM   MRN:      [id number]        Account:       [id number]   :      1950           Consult Date:  2017      Document: [id number]       cc: Moisés Olson MD   [PS1.1]   Revision History        User Key Date/Time User Provider Type Action    > PS1.1 3/22/2017  6:08 AM James Guerra MD Physician Sign                     Progress Notes - Physician (Notes for yesterday and today)      Progress Notes by Moisés Olson MD at 3/24/2017  7:55 AM     Author:  Moisés Olson MD Service:  Orthopedics Author Type:  Physician    Filed:  3/24/2017  8:24 AM Date of Service:  3/24/2017  7:55 AM Note Created:  3/24/2017  7:55 AM    Status:  Signed :  Moisés Olson MD (Physician)         Orthopedic Inpatient Progress Note    Chief complaint: Postoperative day 3  right TKA    Subjective: Leeanne had more pain yesterday, due to the Exparel wearing off.  As a result she only ambulated 30 feet.  She tolerated 90  on the CPM.  Overnight she refused the knee immobilizer.  She states she still is having loose stools.  The hospitalists have investigated her loose stools and found that this is a chronic condition, predating her knee surgery, and related to anxiety.    Objective: Temperature 97.6.  /63.  Pulse 77.  She is awake alert and oriented this morning.  The neurovascular status of her right leg was intact.  Her dressing was changed.  The wound was clean and dry.   There was a small bloodstain on the overlying dressing.  Her calves were not swollen.     X-rays: Her postoperative x-ray taken on 3/22/17 shows a well aligned TKA with no complicating features.    Labs: A C. difficile toxin screen on 3/22/17 was negative.    Impression: Orthopedically stable on postoperative day #3    Plan: She will be transferred to an inpatient rehabilitation Center for inpatient PT, today.  She will follow-up in my office at 4 weeks postop.    She'll require orders for the rehabilitation facility for right knee physical therapy--weightbearing as tolerated, BID adult strength aspirin for DVT prophylaxis, cold therapy ( she has her own Cryo-cuff) and pain medication.  She has an appointment to follow up with me 4 weeks after surgery. The cold therapy may be discontinued on 3/28/17.[PD1.1]     Revision History        User Key Date/Time User Provider Type Action    > PD1.1 3/24/2017  8:24 AM Moisés Olson MD Physician Sign            Progress Notes by Mitchell Pelletier PA-C at 3/23/2017  8:00 AM     Author:  Mitchell Pelletier PA-C Service:  (none) Author Type:  Physician Assistant - ALIYA    Filed:  3/23/2017  8:04 AM Date of Service:  3/23/2017  8:00 AM Note Created:  3/23/2017  8:00 AM    Status:  Signed :  Mitchell Pelletier PA-C (Physician Assistant - C)         [facility]    Orthopedics  Daily Post-Op Note    Assessment & Plan   Procedure(s):  ARTHROPLASTY KNEE, RIGHT   -2 Days Post-Op  Doing well.  Clean wound without signs of infection.  Normal healing wound.  No immediate surgical complications identified.  No excessive bleeding  Pain control less this morning likely due to block wearing off.  Tolerating physical therapy and rehabilitation well.  Plan:  -Ambulate  -Advance activity as tolerated  -Continue supportive and symptomatic treatment  -Start or continue physical therapy  -Pain control measures  -Advance diet as tolerated  -Routine wound care  -Aspirin  for DVT prophylaxis  -Additional problems being followed by medicine service  -D/C planning for once patient meets criteria  Active Problems:    Essential hypertension    Dysthymia    Tobacco abuse    S/P total knee replacement using cement, right      Mitchell Yovanny Pelletier PA-C    Interval History   Patient seen today along with Dr. Olson and physical therapist and .  Doing well.  Continues to improve.  Pain control not as good this morning as block appears to be wearing off.  No fevers.  Continues to have diarrhea being worked up by medicine.  Ambulated 250 feet yesterday with PT.  CPM to 90, flexion in seated position to 110.  No further bleeding from incision.    Physical Exam   Temp: 98.9  F (37.2  C) Temp src: Tympanic BP: 119/60   Heart Rate: 70 Resp: 18 SpO2: 94 % O2 Device: None (Room air)    Vitals:    03/21/17 1214   Weight: 160 lb (72.6 kg)     Vital Signs with Ranges  Temp:  [98.1  F (36.7  C)-98.9  F (37.2  C)] 98.9  F (37.2  C)  Heart Rate:  [66-70] 70  Resp:  [16-18] 18  BP: (104-119)/(42-60) 119/60  SpO2:  [92 %-94 %] 94 %  I/O last 3 completed shifts:  In: 663 [P.O.:660; I.V.:3]  Out: 2300 [Urine:2300]    Dressing dry and intact.  Wound clean and dry with no drainage today.  Surrounding skin with minimal erythema.  Compartments soft and nttp.  Distal CMS intact RLE.      Data[TH1.1]   Results for orders placed or performed during the hospital encounter of 03/21/17 (from the past 24 hour(s))   XR Post Surgical Imaging [place]    Narrative    RIGHT KNEE POST-SURGICAL IMAGING    REPORT: There is a total knee prosthesis in place.  Prosthetic  elements appear well seated.  Gas is seen in the soft tissues.  Exam Date: Mar 22, 2017 09:28:00 AM  Author: NAYAN RENEE  This report is final and signed     Clostridium difficile toxin B PCR   Result Value Ref Range    Specimen Description Feces     C Diff Toxin B PCR  NEG     Negative  Negative: Clostridium difficile target DNA sequences NOT  detected, presumed   negative for Clostridium difficile toxin B or the number of bacteria present   may be below the limit of detection for the test.   FDA approved assay performed using [employer] GeneXpert real-time PCR.   A negative result does not exclude actual disease due to Clostridium difficile   and may be due to improper collection, handling and storage of the specimen or   the number of organisms in the specimen is below the detection limit of the   assay.     Hemoglobin   Result Value Ref Range    Hemoglobin 11.4 (L) 11.7 - 15.7 g/dL[TH1.2]          Revision History        User Key Date/Time User Provider Type Action    > TH1.2 3/23/2017  8:04 AM Mitchell Pelletier PA-C Physician Assistant - ALIYA Sign     TH1.1 3/23/2017  8:00 AM Mitchell Pelletier PA-C Physician Assistant - ALIYA                   Procedure Notes     No notes of this type exist for this encounter.         Progress Notes - Therapies (Notes from 03/21/17 through 03/24/17)      Progress Notes by Marie Frye OTR/L at 3/22/2017  3:13 PM     Author:  Marie Frye OTR/L Service:  Acute IP Rehab Author Type:  Occupational Therapist    Filed:  3/22/2017  3:13 PM Date of Service:  3/22/2017  3:13 PM Note Created:  3/22/2017  3:13 PM    Status:  Signed :  Marie Frye OTR/L (Occupational Therapist)          03/22/17 1310   Quick Adds   Type of Visit Initial Occupational Therapy Evaluation   Living Environment   Lives With child(whit), adult   Living Arrangements apartment   Home Accessibility no concerns   Transportation Available car   Living Environment Comment Daughter has been living with pt since last November   Self-Care   Dominant Hand right   Usual Activity Tolerance good   Current Activity Tolerance moderate   Regular Exercise no   Equipment Currently Used at Home walker, rolling   Functional Level Prior   Ambulation 1-->assistive equipment   Transferring 0-->independent   Toileting 0-->independent   Bathing  0-->independent   Dressing 0-->independent   Eating 0-->independent   Communication 0-->understands/communicates without difficulty   Swallowing 0-->swallows foods/liquids without difficulty   Cognition 0 - no cognition issues reported   Fall history within last six months no   General Information   Onset of Illness/Injury or Date of Surgery - Date 03/21/17   Referring Physician Mitchell Pelletier PA-C   Patient/Family Goals Statement go to short-term rehab then return home   Additional Occupational Profile Info/Pertinent History of Current Problem Pt is S/P R TKA. Pt's PMH includes osteoarthritis, depression, anxiety and insomnia. Pt has also had several shoulder surgeries that have caused limitations in UE ROM   Precautions/Limitations fall precautions   Weight-Bearing Status - LUE full weight-bearing   Weight-Bearing Status - RUE full weight-bearing   Weight-Bearing Status - LLE full weight-bearing   Weight-Bearing Status - RLE weight-bearing as tolerated   Cognitive Status Examination   Orientation orientation to person, place and time   Level of Consciousness alert   Able to Follow Commands WNL/WFL   Personal Safety (Cognitive) WNL/WFL   Memory intact   Attention No deficits were identified   Organization/Problem Solving No deficits were identified   Executive Function No deficits were identified   Visual Perception   Visual Perception Wears glasses   Sensory Examination   Sensory Quick Adds No deficits were identified   Pain Assessment   Patient Currently in Pain Yes, see Vital Sign flowsheet  (4/10)   Range of Motion (ROM)   ROM Comment B UE WFL but limited due to previous shoulder surgeries   Strength   Strength Comments B UE grossly 3/5   Muscle Tone Assessment   Muscle Tone Quick Adds No deficits were identified   Coordination   Upper Extremity Coordination No deficits were identified   Transfer Skill: Bed to Chair/Chair to Bed   Level of [place]: Bed to Chair stand-by assist   Physical Assist/Nonphysical  Assist: Bed to Chair verbal cues;supervision   Weight-Bearing Restrictions weight-bearing as tolerated   Assistive Device - Transfer Skill Bed to Chair Chair to Bed Rehab Eval standard walker   Toilet Transfer   Toilet Transfer Toilet Transfer Skill   Transfer Skill: Toilet Transfer   Level of [place]: Toilet stand-by assist   Physical Assist/Nonphysical Assist: Toilet verbal cues;supervision   Weight-Bearing Restrictions: Toilet weight-bearing as tolerated   Assistive Device grab bars;standard walker   Upper Body Dressing   Level of [place]: Dress Upper Body independent   Lower Body Dressing   Level of [place]: Dress Lower Body stand-by assist   Physical Assist/Nonphysical Assist: Dress Lower Body supervision;verbal cues   Toileting   Level of [place]: Toilet stand-by assist   Physical Assist/Nonphysical Assist: Toilet supervision   Grooming   Level of [place]: Grooming stand-by assist   Physical Assist/Nonphysical Assist: Grooming set-up required   Instrumental Activities of Daily Living (IADL)   Previous Responsibilities meal prep;housekeeping;shopping;medication management   Activities of Daily Living Analysis   Impairments Contributing to Impaired Activities of Daily Living pain;post surgical precautions;flexibility decreased   General Therapy Interventions   Planned Therapy Interventions ADL retraining;progressive activity/exercise   Clinical Impression   Criteria for Skilled Therapeutic Interventions Met yes, treatment indicated   OT Diagnosis decreased ADL function   Influenced by the following impairments pain, post-surgical precautions   Assessment of Occupational Performance 3-5 Performance Deficits   Identified Performance Deficits pain, decreased flexibility and strength in UEs, psychosocial    Clinical Decision Making (Complexity) Moderate complexity   Therapy Frequency 5 times/wk   Predicted Duration of Therapy Intervention (days/wks) 3 days   Anticipated Discharge Disposition  "Transitional Care Facility   Risks and Benefits of Treatment have been explained. Yes   Patient, Family & other staff in agreement with plan of care Yes   Clinical Impression Comments Pt may benefit from short-term rehab to return to prior level of function   [facility] TM \"6 Clicks\"   2016, Trustees of [facility], under license to [employer].  All rights reserved.   6 Clicks Short Forms Daily Activity Inpatient Short Form   [facility]  \"6 Clicks\" Daily Activity Inpatient Short Form   1. Putting on and taking off regular lower body clothing? 3 - A Little   2. Bathing (including washing, rinsing, drying)? 3 - A Little   3. Toileting, which includes using toilet, bedpan or urinal? 4 - None   4. Putting on and taking off regular upper body clothing? 4 - None   5. Taking care of personal grooming such as brushing teeth? 3 - A Little   6. Eating meals? 4 - None   Daily Activity Raw Score (Score out of 24.Lower scores equate to lower levels of function) 21   Total Evaluation Time   Total Evaluation Time (Minutes) 30[ME1.1]        Revision History        User Key Date/Time User Provider Type Action    > ME1.1 3/22/2017  3:13 PM Marie Frye, OTR/L Occupational Therapist Sign            Progress Notes by Danielle Shin PT at 3/22/2017 12:45 PM     Author:  Danielle Shin, PT Service:  (none) Author Type:  Physical Therapist    Filed:  3/22/2017 12:45 PM Date of Service:  3/22/2017 12:45 PM Note Created:  3/22/2017 12:45 PM    Status:  Signed :  Danielle Shin PT (Physical Therapist)          03/22/17 1100   Quick Adds   Type of Visit Initial PT Evaluation   Living Environment   Lives With alone   Living Arrangements apartment   Home Accessibility no concerns   Number of Stairs to Enter Home 0   Transportation Available family or friend will provide   Self-Care   Usual Activity Tolerance good   Current Activity Tolerance moderate   Equipment Currently " Used at Home walker, rolling   Functional Level Prior   Ambulation 1-->assistive equipment   Transferring 0-->independent   Toileting 0-->independent   Bathing 0-->independent   Dressing 0-->independent   Eating 0-->independent   Communication 0-->understands/communicates without difficulty   Swallowing 0-->swallows foods/liquids without difficulty   Cognition 0 - no cognition issues reported   Fall history within last six months no   Which of the above functional risks had a recent onset or change? ambulation;transferring;toileting   General Information   Onset of Illness/Injury or Date of Surgery - Date 03/21/17   Referring Physician Dr. Olson   Pertinent History of Current Problem (include personal factors and/or comorbidities that impact the POC) S/P R TKA    Precautions/Limitations no known precautions/limitations   Weight-Bearing Status - LLE full weight-bearing   Weight-Bearing Status - RLE full weight-bearing   Cognitive Status Examination   Orientation orientation to person, place and time   Level of Consciousness alert   Follows Commands and Answers Questions 100% of the time   Personal Safety and Judgment intact   Memory intact   Pain Assessment   Patient Currently in Pain No   Integumentary/Edema   Integumentary/Edema Comments Very mild edema   Posture    Posture Not impaired   Range of Motion (ROM)   ROM Comment R knee ROM actively 0-110 degrees   Strength   Strength Comments Good quad set, SLR independently   Bed Mobility   Bed Mobility Bed mobility skill: Sit to supine   Bed Mobility Skill: Sit to Supine   Level of [place]: Sit/Supine independent   Physical Assist/Nonphysical Assist: Sit/Supine set-up required   Transfer Skills   Transfer Transfer Skill: Sit to Stand   Transfer Skill:  Sit to Stand   Level of [place]: Sit/Stand contact guard   Physical Assist/Nonphysical Assist: Sit/Stand 1 person assist   Weightbearing Restrictions: Sit/Stand full weight-bearing   Assistive Device for  "Transfer: Sit/Stand rolling walker   Gait   Gait Gait Skill   Gait Skills   Level of [place]: Gait contact guard   Physical Assist/Nonphysical Assist: Gait 1 person assist   Weight-Bearing Restrictions: Gait full weight-bearing   Gait Distance 200 feet   Balance   Balance no deficits were identified   Sensory Examination   Sensory Perception no deficits were identified   Coordination   Coordination no deficits were identified   Muscle Tone   Muscle Tone no deficits were identified   Modality Interventions   Planned Modality Interventions Cryotherapy   General Therapy Interventions   Planned Therapy Interventions bed mobility training;gait training;joint mobilization;manual therapy;ROM;strengthening;stretching;transfer training;home program guidelines;progressive activity/exercise   Clinical Impression   Criteria for Skilled Therapeutic Intervention yes, treatment indicated   PT Diagnosis Weakness, pain, impaired gait   Influenced by the following impairments Impaired gait, weakness, impaired ROM   Functional limitations due to impairments Decreased tolerance for functional activity tasks   Clinical Presentation Stable/Uncomplicated   Clinical Decision Making (Complexity) Low complexity   Therapy Frequency` daily   Predicted Duration of Therapy Intervention (days/wks) 7 days   Anticipated Discharge Disposition Transitional Care Facility;Home with Home Therapy   Risk & Benefits of therapy have been explained Yes   Patient, Family & other staff in agreement with plan of care Yes   [email or web address] TM \"6 Clicks\"   2016, Trustees of [facility], under license to [employer].  All rights reserved.   6 Clicks Short Forms Basic Mobility Inpatient Short Form   [facility]  \"6 Clicks\" V.2 Basic Mobility Inpatient Short Form   1. Turning from your back to your side while in a flat bed without using bedrails? 4 - None   2. Moving from lying on your back to sitting on the side of a flat bed " without using bedrails? 4 - None   3. Moving to and from a bed to a chair (including a wheelchair)? 3 - A Little   4. Standing up from a chair using your arms (e.g., wheelchair, or bedside chair)? 3 - A Little   5. To walk in hospital room? 3 - A Little   6. Climbing 3-5 steps with a railing? 2 - A Lot   Basic Mobility Raw Score (Score out of 24.Lower scores equate to lower levels of function) 19   Total Evaluation Time   Total Evaluation Time (Minutes) 15[NF1.1]        Revision History        User Key Date/Time User Provider Type Action    > NF1.1 3/22/2017 12:45 PM Danielle Shin, PT Physical Therapist Sign                                                      INTERAGENCY TRANSFER FORM - LAB / IMAGING / EKG / EMG RESULTS   3/21/2017                       [facility]: [phone]            Unresulted Labs (24h ago through future)    Start       Ordered    Unscheduled  Hemoglobin  CONDITIONAL X 2,   Routine     Comments:  Release on POD 1 and POD 2 if the morning Hgb is less than 8.0    03/21/17 1812         Lab Results - 3 Days      Surgical pathology exam [[id number]]  Resulted: 03/23/17 1644, Result status: Final result    Ordering provider: Moisés Olson MD  03/21/17 1444 Resulting lab: [facility]    Specimen Information    Type Source Collected On   Tissue Knee, Right 03/21/17 1444          Components       Value Reference Range Flag Lab   Copath Report --      Result:         Patient Name: AGUSTINA BIRMINGHAM  MR#: [id number]  Specimen #:   Collected: 3/21/2017  Received: 3/22/2017  Reported: 3/23/2017 16:38  Ordering Phy(s): MOISÉS OLSON  Additional Phy(s): WYATT KHAN    For improved result formatting, select 'View Enhanced Report Format'  under Linked Documents section.    SPECIMEN(S):  Bone fragment(s) and tissue, right knee    FINAL DIAGNOSIS:  Right knee, total arthroplasty  - Tissue removed for arthritis    Electronically signed out by:    Chad Herrmann M.D.    CLINICAL  HISTORY:  Osteoarthritis, both knees; right total knee replacement    GROSS:  There are pieces of bone and soft tissue which are approximately 8 x 8 x  4.5 cm in aggregate.  The articular surfaces have severe eburnation with  fibrillation.  The cut surfaces have a uniform yellow trabeculated  appearance.  The soft tissue is yellow and caba. (Dictated by: Chad Herrmann MD 3/22/2017 03:30 PM)    CPT Codes  A: 22568-XX    TESTING LAB LOCATION:  [facility] enter  [address] [place] [id number]  [phone]    COLLECTION SITE:  Client: [facility]  Location: [facility]              Active MRSA Surveillance Culture [[id number]]  Resulted: 03/23/17 0658, Result status: Final result    Ordering provider: Moisés Olson MD  03/21/17 2157 Resulting lab: [facility]    Specimen Information    Type Source Collected On   Swab Nose 03/21/17 2200          Components       Value Reference Range Flag Lab   Specimen Description Nares   HI   Culture Micro No MRSA isolated   HI   Micro Report Status FINAL 03/23/2017   [facility]            Hemoglobin [[id number]] (Abnormal)  Resulted: 03/23/17 0614, Result status: Final result    Ordering provider: Mitchell Pelletier PA-C  03/23/17 0000 Resulting lab: [facility]    Specimen Information    Type Source Collected On   Blood  03/23/17 0557          Components       Value Reference Range Flag Lab   Hemoglobin 11.4 11.7 - 15.7 g/dL L HI            Clostridium difficile toxin B PCR [[id number]]  Resulted: 03/22/17 1941, Result status: Final result    Ordering provider: Jennifer Junior NP  03/22/17 0932 Resulting lab: [facility]    Specimen Information    Type Source Collected On   Stool  03/22/17 1848          Components       Value Reference Range Flag Lab   Specimen Description Feces   HI   C Diff Toxin B PCR -- NEG  HI   Result:         Negative  Negative: Clostridium difficile target DNA  sequences NOT detected, presumed   negative for Clostridium difficile toxin B or the number of bacteria present   may be below the limit of detection for the test.   FDA approved assay performed using [employer] GeneXpert real-time PCR.   A negative result does not exclude actual disease due to Clostridium difficile   and may be due to improper collection, handling and storage of the specimen or   the number of organisms in the specimen is below the detection limit of the   assay.              Glucose [[id number]] (Abnormal)  Resulted: 03/22/17 0802, Result status: Final result    Ordering provider: Moisés Olson MD  03/22/17 0752 Resulting lab: [facility]    Specimen Information    Type Source Collected On   Blood  03/22/17 0545          Components       Value Reference Range Flag Lab   Glucose 129 70 - 99 mg/dL H HI            CBC with platelets [[id number]] (Abnormal)  Resulted: 03/22/17 0557, Result status: Final result    Ordering provider: James Guerra MD  03/22/17 0001 Resulting lab: [facility]    Specimen Information    Type Source Collected On   Blood  03/22/17 0545          Components       Value Reference Range Flag Lab   WBC 7.9 4.0 - 11.0 10e9/L  HI   RBC Count 3.89 3.8 - 5.2 10e12/L  HI   Hemoglobin 12.0 11.7 - 15.7 g/dL  HI   Hematocrit 34.9 35.0 - 47.0 % L HI   MCV 90 78 - 100 fl  HI   MCH 30.8 26.5 - 33.0 pg  HI   MCHC 34.4 31.5 - 36.5 g/dL  HI   RDW 12.7 10.0 - 15.0 %  HI   Platelet Count 214 150 - 450 10e9/L  HI            Testing Performed By     Lab - Abbreviation Name Director Address Valid Date Range    88 - Unknown [facility] Unknown Unknown 10/30/02 0000 - Present    210 - HI [facility] Unknown [address]  [place] [address] 05/08/15 1057 - Present               Imaging Results - 3 Days      XR [facility] [place] [[id number]]  Resulted: 03/22/17 1048, Result status: Final result    Ordering provider: Wesley  Moisés GALLO MD  03/22/17 0909 Resulted by: Henry Sanches MD    Performed: 03/22/17 0915 - 03/22/17 0928 Resulting lab: [facility]    Narrative:           RIGHT KNEE POST-SURGICAL IMAGING    REPORT: There is a total knee prosthesis in place.  Prosthetic  elements appear well seated.  Gas is seen in the soft tissues.  Exam Date: Mar 22, 2017 09:28:00 AM  Author: HENRY SANCHES  This report is final and signed      Specimen Information    Type Source Collected On     03/22/17 0912            Testing Performed By     Lab - Abbreviation Name Director Address Valid Date Range    183 - QP [facility] Unknown Unknown 11/29/12 1227 - Present            Encounter-Level Documents:     There are no encounter-level documents.      Order-Level Documents:     There are no order-level documents.

## 2017-03-21 NOTE — ANESTHESIA CARE TRANSFER NOTE
Patient: Sydni Hardin    Procedure(s):  RIGHT TOTAL KNEE REPLACEMENT - Wound Class: I-Clean    Diagnosis: OSTEOARTHRITIS BOTH KNEES  Diagnosis Additional Information: No value filed.    Anesthesia Type:   General, LMA, Periph. Nerve Block for postop pain     Note:  Airway :Nasal Cannula  Patient transferred to:PACU        Vitals: (Last set prior to Anesthesia Care Transfer)    CRNA VITALS  3/21/2017 1457 - 3/21/2017 1534      3/21/2017             Resp Rate (observed): (!)  1    Resp Rate (set): 8                Electronically Signed By: ANTONY Roberto CRNA  March 21, 2017  3:34 PM

## 2017-03-21 NOTE — OR NURSING
Dr. Olson in to talk to pt, pt able to answer questions appropriately, will bring to floor, does fall asleep quickly after talking to pt.

## 2017-03-21 NOTE — IP AVS SNAPSHOT
MRN:[id number]                      After Visit Summary   3/21/2017    Sydni Hardin    MRN: [id number]           Thank you!     Thank you for choosing [facility] for your care. Our goal is always to provide you with excellent care. Hearing back from our patients is one way we can continue to improve our services. Please take a few minutes to complete the written survey that you may receive in the mail after you visit with us. Thank you!        Patient Information     Date Of Birth          1950        About your hospital stay     You were admitted on:  March 21, 2017 You last received care in the:  [facility]    You were discharged on:  March 24, 2017        Reason for your hospital stay       Total knee replacement                  Who to Call     For medical emergencies, please call 911.  For non-urgent questions about your medical care, please call your primary care provider or clinic, [phone]  For questions related to your surgery, please call your surgery clinic        Attending Provider     Provider Specialty    Moisés Olson MD Orthopedics       Primary Care Provider Office Phone # Fax #    Gibran Alarcon [phone]       [address] [address]        After Care Instructions     Activity - Ambulate in hallway       Every shift            Activity - Up with assistive device           Advance Diet as Tolerated       Follow this diet upon discharge: Orders Placed This Encounter      Regular Diet Adult            Fall precautions           General info for SNF       Length of Stay Estimate: Short Term Care: Estimated # of Days <30  Condition at Discharge: Improving  Level of care:skilled   Rehabilitation Potential: Fair  Admission H&P remains valid and up-to-date: Yes  Recent Chemotherapy: N/A  Use Nursing Home Standing Orders: Yes            Mantoux instructions       Give two-step Mantoux (PPD) Per Facility  Policy Yes            Weight bearing status       As tolerated            Wound care (specify)       Site:   Right knee  Instructions:  Change dressing daily or when soiled. May shower with press and seal wrap over dressing. Change dressing if it becomes wet. Sutures/staples to be removed 14 days post-op at follow-up.                  Follow-up Appointments     Follow-up and recommended labs and tests        Dr. Olson/Mitchell Pelletier 4 weeks post-op.                  Your next 10 appointments already scheduled     Apr 18, 2017  2:20 PM CDT   (Arrive by 2:00 PM)   Return Visit with Moisés Olson MD    ORTHOPEDICS ([facility])    [address] [facility] [id number] [phone]              Additional Services     Occupational Therapy Adult Consult       Evaluate and treat as clinically indicated.    Reason:  TKA            Physical Therapy Adult Consult       Evaluate and treat as clinically indicated.    Reason:  TKA                  Pending Results     No orders found from 3/19/2017 to 3/22/2017.            Statement of Approval     Ordered          03/24/17 1105  I have reviewed and agree with all the recommendations and orders detailed in this document.  EFFECTIVE NOW     Approved and electronically signed by:  Jennifer Junior NP             Admission Information     Date & Time Provider Department Dept. Phone    3/21/2017 Moisés Olson MD [facility] [phone]      Your Vitals Were     Blood Pressure Pulse Temperature Respirations Weight Pulse Oximetry    135/63 72 97.6  F (36.4  C) (Tympanic) 16 72.6 kg (160 lb) 92%    BMI (Body Mass Index)                   33.44 kg/m2           Care EveryWhere ID     This is your Care EveryWhere ID. This could be used by other organizations to access your [facility] medical records  [id number]           Review of your medicines      START taking        Dose / Directions    aspirin 81 MG EC tablet   Replaces:  aspirin 81 MG tablet         Dose:  81 mg   Take 1 tablet (81 mg) by mouth 2 times daily   Refills:  0       loperamide 2 MG capsule   Commonly known as:  IMODIUM   Used for:  Functional diarrhea        Dose:  2 mg   Take 1 capsule (2 mg) by mouth 4 times daily as needed for diarrhea   Quantity:  20 capsule   Refills:  0         CONTINUE these medicines which may have CHANGED, or have new prescriptions. If we are uncertain of the size of tablets/capsules you have at home, strength may be listed as something that might have changed.        Dose / Directions    ALPRAZolam 1 MG tablet   Commonly known as:  XANAX   This may have changed:  See the new instructions.   Used for:  Anxiety        Dose:  1 mg   Take 1 tablet (1 mg) by mouth 3 times daily as needed for anxiety   Quantity:  30 tablet   Refills:  0       oxyCODONE-acetaminophen 5-325 MG per tablet   Commonly known as:  PERCOCET   This may have changed:  See the new instructions.   Used for:  Primary osteoarthritis of both knees        Dose:  1-2 tablet   Take 1-2 tablets by mouth every 4 hours as needed for moderate to severe pain   Quantity:  30 tablet   Refills:  0         CONTINUE these medicines which have NOT CHANGED        Dose / Directions    atorvastatin 40 MG tablet   Commonly known as:  LIPITOR   Used for:  Mixed hyperlipidemia        TAKE 1 TABLET BY MOUTH DAILY   Quantity:  90 tablet   Refills:  0       benazepril 10 MG tablet   Commonly known as:  LOTENSIN   Used for:  HTN (hypertension)        TAKE 1 TABLET BY MOUTH DAILY   Quantity:  90 tablet   Refills:  0       benzonatate 200 MG capsule   Commonly known as:  TESSALON   Used for:  Preop general physical exam, Tobacco abuse        TAKE 1 CAPSULE BY MOUTH 3 TIMES A DAY AS NEEDED FOR COUGH   Quantity:  60 capsule   Refills:  3       budesonide-formoterol 160-4.5 MCG/ACT Inhaler   Commonly known as:  SYMBICORT   Used for:  Asthma        Dose:  2 puff   Inhale 2 puffs into the lungs 2 times daily. As needed   Quantity:  1  Inhaler   Refills:  11       butalbital-acetaminophen-caffeine -40 MG per tablet   Commonly known as:  FIORICET/ESGIC   Used for:  Scalp hematoma, sequela        Dose:  1 tablet   Take 1 tablet by mouth every 6 hours as needed   Quantity:  40 tablet   Refills:  5       cimetidine 800 MG tablet   Commonly known as:  TAGAMET   Used for:  Esophageal reflux        Dose:  800 mg   Take 1 tablet (800 mg) by mouth At Bedtime As needed   Quantity:  90 tablet   Refills:  3       doxepin 25 MG capsule   Commonly known as:  SINEquan   Used for:  Anxiety        TAKE 1 CAPSULE BY MOUTH AT BEDTIME   Quantity:  90 capsule   Refills:  3       EPIPEN 2-JAIRO 0.3 MG/0.3ML injection   Used for:  Anaphylactic reaction   Generic drug:  EPINEPHrine        INJECT 1 SYRINGE INTRAMUSCULARLY ONCE AS NEEDED FOR ANAPHYLAXIS   Quantity:  2 each   Refills:  0       escitalopram 20 MG tablet   Commonly known as:  LEXAPRO   Used for:  Dysthymia        TAKE 1 TABLET BY MOUTH 2 TIMES A DAY   Quantity:  180 tablet   Refills:  0       hydrochlorothiazide 25 MG tablet   Commonly known as:  HYDRODIURIL   Used for:  Benign essential hypertension        TAKE 1 TABLET BY MOUTH DAILY   Quantity:  90 tablet   Refills:  3       ipratropium - albuterol 0.5 mg/2.5 mg/3 mL 0.5-2.5 (3) MG/3ML neb solution   Commonly known as:  DUONEB   Used for:  Acute bronchospasm        Dose:  1 vial   Take 1 vial (3 mLs) by nebulization every 6 hours as needed   Quantity:  360 mL   Refills:  2       meloxicam 15 MG tablet   Commonly known as:  MOBIC   Used for:  Pain        TAKE 1 TABLET BY MOUTH DAILY   Quantity:  90 tablet   Refills:  1       montelukast 10 MG tablet   Commonly known as:  SINGULAIR   Used for:  Mixed hyperlipidemia        TAKE 1 TABLET BY MOUTH DAILY   Quantity:  90 tablet   Refills:  3       nicotine 21 MG/24HR 24 hr patch   Commonly known as:  NICODERM CQ        Dose:  1 patch   Place 1 patch onto the skin every 24 hours   Refills:  0       * order for  DME   Used for:  Primary osteoarthritis of right knee        Equipment being ordered: Nebulizer supplies   Quantity:  2 each   Refills:  11       * order for DME   Used for:  Primary osteoarthritis of right knee        Equipment being ordered: mcgee walker   Quantity:  1 each   Refills:  0       * order for DME   Used for:  Postoperative state        Equipment being ordered: toilet seat riser   Quantity:  1 each   Refills:  0       potassium chloride 10 MEQ tablet   Commonly known as:  K-TAB,KLOR-CON   Used for:  Benign essential hypertension        TAKE 1 TABLET BY MOUTH 2 TIMES DAILY WITH FOOD   Quantity:  180 tablet   Refills:  1       temazepam 30 MG capsule   Commonly known as:  RESTORIL   Used for:  Persistent insomnia        TAKE 1 CAPSULE BY MOUTH NIGHTLY AS NEEDED FOR SLEEP   Quantity:  90 capsule   Refills:  5       VENTOLIN  (90 BASE) MCG/ACT Inhaler   Used for:  Tobacco abuse   Generic drug:  albuterol        USE 1 PUFF FOUR TIMES A DAY AS NEEDED   Quantity:  18 g   Refills:  3       * Notice:  This list has 3 medication(s) that are the same as other medications prescribed for you. Read the directions carefully, and ask your doctor or other care provider to review them with you.      STOP taking     aspirin 81 MG tablet   Replaced by:  aspirin 81 MG EC tablet                Where to get your medicines      Some of these will need a paper prescription and others can be bought over the counter. Ask your nurse if you have questions.     Bring a paper prescription for each of these medications     ALPRAZolam 1 MG tablet    oxyCODONE-acetaminophen 5-325 MG per tablet       You don't need a prescription for these medications     aspirin 81 MG EC tablet    loperamide 2 MG capsule                Protect others around you: Learn how to safely use, store and throw away your medicines at www.disposemymeds.org.             Medication List: This is a list of all your medications and when to take them. Check  marks below indicate your daily home schedule. Keep this list as a reference.      Medications           Morning Afternoon Evening Bedtime As Needed    ALPRAZolam 1 MG tablet   Commonly known as:  XANAX   Take 1 tablet (1 mg) by mouth 3 times daily as needed for anxiety   Last time this was given:  1 mg on 3/22/2017  9:02 PM                                aspirin 81 MG EC tablet   Take 1 tablet (81 mg) by mouth 2 times daily   Last time this was given:  81 mg on 3/24/2017  9:03 AM                                atorvastatin 40 MG tablet   Commonly known as:  LIPITOR   TAKE 1 TABLET BY MOUTH DAILY   Last time this was given:  40 mg on 3/23/2017  8:07 PM                                benazepril 10 MG tablet   Commonly known as:  LOTENSIN   TAKE 1 TABLET BY MOUTH DAILY                                benzonatate 200 MG capsule   Commonly known as:  TESSALON   TAKE 1 CAPSULE BY MOUTH 3 TIMES A DAY AS NEEDED FOR COUGH                                budesonide-formoterol 160-4.5 MCG/ACT Inhaler   Commonly known as:  SYMBICORT   Inhale 2 puffs into the lungs 2 times daily. As needed                                butalbital-acetaminophen-caffeine -40 MG per tablet   Commonly known as:  FIORICET/ESGIC   Take 1 tablet by mouth every 6 hours as needed                                cimetidine 800 MG tablet   Commonly known as:  TAGAMET   Take 1 tablet (800 mg) by mouth At Bedtime As needed                                doxepin 25 MG capsule   Commonly known as:  SINEquan   TAKE 1 CAPSULE BY MOUTH AT BEDTIME   Last time this was given:  25 mg on 3/23/2017  9:51 PM                                EPIPEN 2-JAIRO 0.3 MG/0.3ML injection   INJECT 1 SYRINGE INTRAMUSCULARLY ONCE AS NEEDED FOR ANAPHYLAXIS   Generic drug:  EPINEPHrine                                escitalopram 20 MG tablet   Commonly known as:  LEXAPRO   TAKE 1 TABLET BY MOUTH 2 TIMES A DAY   Last time this was given:  20 mg on 3/24/2017  9:04 AM                                 hydrochlorothiazide 25 MG tablet   Commonly known as:  HYDRODIURIL   TAKE 1 TABLET BY MOUTH DAILY   Last time this was given:  25 mg on 3/24/2017  9:04 AM                                ipratropium - albuterol 0.5 mg/2.5 mg/3 mL 0.5-2.5 (3) MG/3ML neb solution   Commonly known as:  DUONEB   Take 1 vial (3 mLs) by nebulization every 6 hours as needed   Last time this was given:  3 mLs on 3/23/2017  1:38 PM                                loperamide 2 MG capsule   Commonly known as:  IMODIUM   Take 1 capsule (2 mg) by mouth 4 times daily as needed for diarrhea                                meloxicam 15 MG tablet   Commonly known as:  MOBIC   TAKE 1 TABLET BY MOUTH DAILY                                montelukast 10 MG tablet   Commonly known as:  SINGULAIR   TAKE 1 TABLET BY MOUTH DAILY   Last time this was given:  10 mg on 3/23/2017  9:51 PM                                nicotine 21 MG/24HR 24 hr patch   Commonly known as:  NICODERM CQ   Place 1 patch onto the skin every 24 hours                                * order for DME   Equipment being ordered: Nebulizer supplies                                * order for DME   Equipment being ordered: mcgee walker                                * order for DME   Equipment being ordered: toilet seat riser                                oxyCODONE-acetaminophen 5-325 MG per tablet   Commonly known as:  PERCOCET   Take 1-2 tablets by mouth every 4 hours as needed for moderate to severe pain                                potassium chloride 10 MEQ tablet   Commonly known as:  K-TAB,KLOR-CON   TAKE 1 TABLET BY MOUTH 2 TIMES DAILY WITH FOOD   Last time this was given:  10 mEq on 3/24/2017  9:04 AM                                temazepam 30 MG capsule   Commonly known as:  RESTORIL   TAKE 1 CAPSULE BY MOUTH NIGHTLY AS NEEDED FOR SLEEP   Last time this was given:  30 mg on 3/22/2017 12:33 AM                                VENTOLIN  (90 BASE) MCG/ACT Inhaler    USE 1 PUFF FOUR TIMES A DAY AS NEEDED   Last time this was given:  1 puff on 3/22/2017  3:28 PM   Generic drug:  albuterol                                * Notice:  This list has 3 medication(s) that are the same as other medications prescribed for you. Read the directions carefully, and ask your doctor or other care provider to review them with you.              More Information        Discharge Instructions for Total Knee Replacement  You have undergone knee replacement surgery. The knee joint forms where the thighbone, shinbone, and kneecap meet. The knee joint is supported by muscles and ligaments, and is lined with a cushioning called cartilage. Over time, cartilage wears away. This can make the knee feel stiff and painful. Your doctor replaced your painful joint with a knee prosthesis (artificial joint) to relieve pain and restore movement. Here are some instructions to follow once at home.  Home care    When you are allowed to shower, carefully wash your incision with soap and water. Rinse the incision well. Then gently pat it dry. Don t rub the incision, or apply creams or lotions. Sit on a shower stool or chair when you shower to keep from falling.    Take pain medication as directed by your doctor.  Sitting and sleeping    Sit in chairs with arms. The arms make it easier for you to stand up or sit down.    Don t sit for more than 30 to 45 minutes at one time.    Nap if you are tired, but don t stay in bed all day.    Sleep with a pillow under your ankle, not your knee. Be sure to change the position of your leg during the night.  Moving safely    The key to successful recovery is movement with walking and exercising your knee as directed by your doctor. You should be able to put full weight on your leg unless your doctor tells you otherwise.     Walk up and down stairs with support. Try one step at a time--good knee up, bad knee down. Use the railing if possible.    Don t drive until your doctor says  it s OK. Most people can start driving about 6 weeks after surgery. Don t drive while you are taking opioid pain medication.  Other precautions    Avoid soaking your knee in water (no hot tubs, bathtubs, swimming pools) until your doctor says it s OK.    Wear the support stockings you were given in the hospital, as instructed by your doctor. You may wear these stockings for four to six weeks after surgery. If needed, you can place a bandage over the incision to prevent irritation from clothing or support stockings.    Arrange your household to keep the items you need handy. Keep everything else out of the way. Remove items that may cause you to fall, such as throw rugs and electrical cords.    Use nonslip bath mats, grab bars, an elevated toilet seat, and a shower chair in your bathroom.    Until your balance, flexibility, and strength improve, use a cane, crutches, a walker, handrails, or someone to help you.    Keep your hands free by using a backpack, flores pack, apron, or pockets to carry things.    Prevent infection. Ask your doctor for instructions if you haven t already received them. Any infection will need to be treated immediately with antibiotics. Call your doctor right away if you think you might have an infection.    Tell your dentist that you have an artificial joint and take antibiotics as prescribed before any dental work.    Tell all your health care providers about your artificial joint before any medical procedure.    Maintain a healthy weight. Get help to lose any extra pounds. Added body weight puts stress on the knee.    Take any medication you may have been given after surgery. This may include blood-thinning medications to prevent blood clots or antibiotics to prevent infection.  Follow-up  You will need to have your staples removed two to three weeks following surgery.     When to seek medical attention  Call 911 right away if you have:    Chest pain.    Shortness of breath.    Any pain or  tenderness in your calf.  Otherwise, call your doctor immediately if you have:    Fever of 100.4 degrees Fahrenheit (38 degrees Celsius) or higher, or shaking chills.    Stiffness, or inability to move the knee.    Increased swelling in your leg.    Increased redness, tenderness, or swelling in or around the knee incision.    Drainage from the knee incision.    Increased knee pain.     5418-8016 [employer]. [address] [place] [address], [place] [address]. All rights reserved. This information is not intended as a substitute for professional medical care. Always follow your healthcare professional's instructions.                After Knee Replacement: Hospital Recovery  Your healthcare team will help you get up soon after surgery. They will show you how to sit up, stand, and use the bathroom safely. You ll also spend time in bed. Use this time to do exercises as directed by your healthcare team. When lying down, place a rolled-up towel under your ankle to help straighten your knee. Don't put a rolled-up towel or pillow under the knee itself.     Sitting up  The first few times you sit up, a healthcare provider will help you move your leg. Soon, you ll be able to sit by yourself at the edge of the bed with your feet touching the floor.    Standing with support  A healthcare provider will help you stand. A safety belt may be placed around your waist to prevent a fall. A knee immobilizer may be put on your leg to protect the knee as you stand and use a walker.  Going to the bathroom  At first you ll be assisted to the bathroom. Later, you ll go on your own with a walker. A commode chair over the toilet helps make sitting easier. The toilet seat will be raised to make sitting easier. To use it, follow these steps:    Back up until you feel the back of your legs touch the commode.    With your weight on your unoperated leg, place the operated leg in front of you. Reach back for the armrest. Lower yourself onto  the commode.    To stand up, push up using the armrest, keeping your weight on your unoperated leg.    0647-9670 [employer]. [address] [place] [address]. All rights reserved. This information is not intended as a substitute for professional medical care. Always follow your healthcare professional's instructions.                After Knee Replacement: Controlling Swelling  Swelling is common after total knee replacement. It may be worse after exercise. To help control swelling, follow the steps below.  Ice your knee  Wrap an ice pack or bag of frozen peas in a thin cloth, then place it on your knee. Don t use ice for more than 20 minutes at a time. If you have an ice machine, use it as directed.  Elevate your leg  Elevate your leg above your heart. Ask your health care provider about safe positions to do this.  Do ankle pumps    Continue doing ankle pumps. They help reduce swelling, improve circulation, and prevent blood clots. Point, then flex both feet slowly. Repeat this 10 to 30 times each hour.    4599-0098 [employer]. [address] [place] [address]. All rights reserved. This information is not intended as a substitute for professional medical care. Always follow your healthcare professional's instructions.                Preventing Deep Vein Thrombosis After Surgery  In the days and weeks after surgery you have the highest chance of developing deep vein thrombosis (DVT). This condition occurs when a blood clot forms in a deep vein, often in the leg. If the clot breaks loose and travels to a lung, severe health problems and even death can result. This sheet describes ways that DVT can be prevented after surgery. Follow your health care providers  instructions so that you will have a safe recovery.  In the Facility    Your doctor will prescribe one or more of the following:    Anticoagulant (blood thinner). This is medicine that prevents blood clots. It is  given to you right after surgery by a shot or through an IV. Or you take the medicine by mouth. The most common blood thinners are aspirin, warfarin (also called Coumadin) and heparin.    Compression stockings. These are elastic stockings that fit tightly around the ankle, reducing in pressure as they go up the leg. This helps keep blood flowing toward the heart, so that it is less likely to pool in the legs and cause a blood clot. When they re first put on, the stockings may be uncomfortable. But after a while you should become used to them. However, if the stockings cause pain, let the nurse know right away.    Exercises. Simple exercises while in bed or sitting in a chair can help prevent DVT. Move your feet in a [place] or up and down 10 times an hour to improve blood flow.    Ambulation (getting out of bed and walking). After surgery as soon as you re able, you ll be helped out of bed by your nurse. Moving around improves blood flow and helps prevent blood clots.    Sequential compression devices (also called intermittent pneumatic compression devices). Special sleeves are wrapped around the legs. Air tubes connect the sleeves to an electric pump. The pump inflates and deflates the sleeves. As the sleeves inflate, they gently squeeze the muscles of the legs. As they deflate, the muscles relax. This action promotes blood flow and prevents blood clots.  At Home    Deep vein thrombosis can still occur even after you go home. To prevent DVT, follow any specific instructions your health care provider gave you and the guidelines below.    Anticoagulant (blood thinner). Take this medicine as directed. Be sure you know what foods and other medicines can interact with your blood thinner. Follow any steps to avoid harmful interactions. Also, ask your health care provider what to do if you forget to take a dose.    Compression stockings. Wear these as instructed. Your health care provider will tell you how often to wear  and remove the stockings. Follow these instructions closely. Each time you remove the stockings, check your legs and feet for red spots or sores. If you see anything unusual, call your health care provider right away.    Returning to activity. The key to preventing DVT after surgery is to be active as much as you can. This improves circulation so that blood flow doesn t slow down enough to form a clot. Day by day, increase the amount of activity you do around your home. When resting in bed or sitting in a chair, keep doing the foot exercises you did in the hospital.    Sequential compression device. Your doctor may have you use this at home for a time. If so, follow the instructions carefully on how often and for how long each time to use the device.  When to Seek Medical Attention  Call 911 right away if you have any of the following:    Sudden chest pain    Shortness of breath    Fast heartbeat    Sweating    Fainting    Coughing up blood    Pain, swelling, redness, or warmth in the calf or thigh  Otherwise, call your doctor right away if you have any of the following:    Signs of problems with your blood thinner, such as:    Cough with blood or bloody sputum    Bruises    Heavy or uncontrolled bleeding    Bleeding in the urine, stool, or vomit    Black or tarry stools     7178-3059 [employer]. [address] [place] [address], [place] [id number]. All rights reserved. This information is not intended as a substitute for professional medical care. Always follow your healthcare professional's instructions.                Patient Education    Aspirin Chewable tablet    Aspirin Chewing-gum, medicated    Aspirin Gastro-resistant tablet    Aspirin Oral tablet    Aspirin Oral tablet, extended-release    Aspirin Rectal suppository  Aspirin Oral tablet  What is this medicine?  ASPIRIN (AS pir in) is a pain reliever. It is used to treat mild pain and fever. This medicine is also used as directed by a doctor to prevent  and to treat heart attacks, to prevent strokes, and to treat arthritis or inflammation.  This medicine may be used for other purposes; ask your health care provider or pharmacist if you have questions.  What should I tell my health care provider before I take this medicine?  They need to know if you have any of these conditions:    anemia    asthma    bleeding problems    child with chickenpox, the flu, or other viral infection    diabetes    gout    if you frequently drink alcohol containing drinks    kidney disease    liver disease    low level of vitamin K    lupus    smoke tobacco    stomach ulcers or other problems    an unusual or allergic reaction to aspirin, tartrazine dye, other medicines, dyes, or preservatives    pregnant or trying to get pregnant    breast-feeding  How should I use this medicine?  Take this medicine by mouth with a glass of water. Follow the directions on the package or prescription label. You can take this medicine with or without food. If it upsets your stomach, take it with food. Do not take your medicine more often than directed.  Talk to your pediatrician regarding the use of this medicine in children. While this drug may be prescribed for children as young as 12 years of age for selected conditions, precautions do apply. Children and teenagers should not use this medicine to treat chicken pox or flu symptoms unless directed by a doctor.  Patients over 65 years old may have a stronger reaction and need a smaller dose.  Overdosage: If you think you have taken too much of this medicine contact a poison control center or emergency room at once.  NOTE: This medicine is only for you. Do not share this medicine with others.  What if I miss a dose?  If you are taking this medicine on a regular schedule and miss a dose, take it as soon as you can. If it is almost time for your next dose, take only that dose. Do not take double or extra doses.  What may interact with this medicine?  Do not  take this medicine with any of the following medications:    cidofovir    ketorolac    probenecid  This medicine may also interact with the following medications:    alcohol    alendronate    bismuth subsalicylate    flavocoxid    herbal supplements like feverfew, garlic, alice, ginkgo biloba, horse chestnut    medicines for diabetes or glaucoma like acetazolamide, methazolamide    medicines for gout    medicines that treat or prevent blood clots like enoxaparin, heparin, ticlopidine, warfarin    other aspirin and aspirin-like medicines    NSAIDs, medicines for pain and inflammation, like ibuprofen or naproxen    pemetrexed    sulfinpyrazone    varicella live vaccine  This list may not describe all possible interactions. Give your health care provider a list of all the medicines, herbs, non-prescription drugs, or dietary supplements you use. Also tell them if you smoke, drink alcohol, or use illegal drugs. Some items may interact with your medicine.  What should I watch for while using this medicine?  If you are treating yourself for pain, tell your doctor or health care professional if the pain lasts more than 10 days, if it gets worse, or if there is a new or different kind of pain. Tell your doctor if you see redness or swelling. Also, check with your doctor if you have a fever that lasts for more than 3 days. Only take this medicine to prevent heart attacks or blood clotting if prescribed by your doctor or health care professional.  Do not take aspirin or aspirin-like medicines with this medicine. Too much aspirin can be dangerous. Always read the labels carefully.  This medicine can irritate your stomach or cause bleeding problems. Do not smoke cigarettes or drink alcohol while taking this medicine. Do not lie down for 30 minutes after taking this medicine to prevent irritation to your throat.  If you are scheduled for any medical or dental procedure, tell your healthcare provider that you are taking this  medicine. You may need to stop taking this medicine before the procedure.  This medicine may be used to treat migraines. If you take migraine medicines for 10 or more days a month, your migraines may get worse. Keep a diary of headache days and medicine use. Contact your healthcare professional if your migraine attacks occur more frequently.  What side effects may I notice from receiving this medicine?  Side effects that you should report to your doctor or health care professional as soon as possible:    allergic reactions like skin rash, itching or hives, swelling of the face, lips, or tongue    breathing problems    changes in hearing, ringing in the ears    confusion    general ill feeling or flu-like symptoms    pain on swallowing    redness, blistering, peeling or loosening of the skin, including inside the mouth or nose    signs and symptoms of bleeding such as bloody or black, tarry stools; red or dark-brown urine; spitting up blood or brown material that looks like coffee grounds; red spots on the skin; unusual bruising or bleeding from the eye, gums, or nose    trouble passing urine or change in the amount of urine    unusually weak or tired    yellowing of the eyes or skin  Side effects that usually do not require medical attention (report to your doctor or health care professional if they continue or are bothersome):    diarrhea or constipation    headache    nausea, vomiting    stomach gas, heartburn  This list may not describe all possible side effects. Call your doctor for medical advice about side effects. You may report side effects to FDA at 7-266-FDA-7402.  Where should I keep my medicine?  Keep out of the reach of children.  Store at room temperature between 15 and 30 degrees C (59 and 86 degrees F). Protect from heat and moisture. Do not use this medicine if it has a strong vinegar smell. Throw away any unused medicine after the expiration date.  NOTE:This sheet is a summary. It may not cover all  possible information. If you have questions about this medicine, talk to your doctor, pharmacist, or health care provider. Copyright  2016 [employer]                Patient Education    Loperamide Hydrochloride Oral capsule    Loperamide Hydrochloride Oral solution    Loperamide Hydrochloride Oral suspension    Loperamide Hydrochloride Oral tablet  Loperamide Hydrochloride Oral tablet  What is this medicine?  LOPERAMIDE (sergo PER a mide) is used to treat diarrhea.  This medicine may be used for other purposes; ask your health care provider or pharmacist if you have questions.  What should I tell my health care provider before I take this medicine?  They need to know if you have any of these conditions:    a black or bloody stool    bacterial food poisoning    colitis or mucus in your stool    currently taking an antibiotic medication for an infection    fever    liver disease    severe abdominal pain, swelling or bulging    an unusual or allergic reaction to loperamide, other medicines, foods, dyes, or preservatives    pregnant or trying to get pregnant    breast-feeding  How should I use this medicine?  Take this medicine by mouth with a glass of water. Follow the directions on the prescription label. Take your doses at regular intervals. Do not take your medicine more often than directed.  Talk to your pediatrician regarding the use of this medicine in children. Special care may be needed.  Overdosage: If you think you have taken too much of this medicine contact a poison control center or emergency room at once.  NOTE: This medicine is only for you. Do not share this medicine with others.  What if I miss a dose?  This does not apply. This medicine is not for regular use. Only take this medicine while you continue to have loose bowel movements. Do not take more medicine than recommended by the packaging label or by your healthcare professional.  What may interact with this medicine?  Do not take this medicine  with any of the following medications:    alosetron  This medicine may also interact with the following medications:    quinidine    ritonavir    saquinavir  This list may not describe all possible interactions. Give your health care provider a list of all the medicines, herbs, non-prescription drugs, or dietary supplements you use. Also tell them if you smoke, drink alcohol, or use illegal drugs. Some items may interact with your medicine.  What should I watch for while using this medicine?  Do not take this medicine for more than 1 week without asking your doctor or health care professional. If your symptoms do not start to get better after two days, you may have a problem that needs further evaluation. Check with your doctor or health care professional right away if you develop a fever, severe abdominal pain, swelling or bulging, or if you have have bloody/black diarrhea or stools.  You may get drowsy or dizzy. Do not drive, use machinery, or do anything that needs mental alertness until you know how this medicine affects you. Do not stand or sit up quickly, especially if you are an older patient. This reduces the risk of dizzy or fainting spells. Alcohol can increase possible drowsiness and dizziness. Avoid alcoholic drinks.  Your mouth may get dry. Chewing sugarless gum or sucking hard candy, and drinking plenty of water may help. Contact your doctor if the problem does not go away or is severe. Drinking plenty of water can also help prevent dehydration that can occur with diarrhea.  Elderly patients may have a more variable response to the effects of this medicine, and are more susceptible to the effects of dehydration.  What side effects may I notice from receiving this medicine?  Side effects that you should report to your doctor or health care professional as soon as possible:    allergic reactions like skin rash, itching or hives, swelling of the face, lips, or tongue    bloated, swollen feeling in your  abdomen    blurred vision    loss of appetite    stomach pain  Side effects that usually do not require medical attention (report to your doctor or health care professional if they continue or are bothersome):    constipation    drowsiness or dizziness    dry mouth    nausea, vomiting  This list may not describe all possible side effects. Call your doctor for medical advice about side effects. You may report side effects to FDA at 2-336-IOT-6503.  Where should I keep my medicine?  Keep out of the reach of children.  Store at room temperature between 15 and 25 degrees C (59 and 77 degrees F). Keep container tightly closed. Throw away any unused medicine after the expiration date.  NOTE:This sheet is a summary. It may not cover all possible information. If you have questions about this medicine, talk to your doctor, pharmacist, or health care provider. Copyright  2016 [employer]                Total Knee Replacement  During total knee replacement surgery, your damaged knee joint is replaced with an artificial joint, called a prosthesis. This surgery almost always reduces joint pain and improves your quality of life.     The parts of the prosthesis are secured to the bones of the knee. Together they form the new joint.   Before your surgery  You will most likely arrive at the hospital on the morning of the surgery. Be sure to follow all of your doctor s instructions on preparing for surgery:    Stop eating or drinking 10 hours before surgery.    If you take a daily medicine, ask if you should still take it the morning of surgery.    At the hospital, your temperature, pulse, breathing, and blood pressure will be checked.    An IV (intravenous) line will be started to provide fluids and medicines needed during surgery.  The surgical procedure  When the surgical team is ready, you ll be taken to the operating room. There you ll be given anesthesia to help you sleep through surgery, or to make you numb from the waist  down. Then an incision is made on the front or side of your knee. Any damaged bone is cleaned away, and the new joint components are put into place. The incision is closed with surgical staples or stitches.  After your surgery  After surgery, you ll be sent to the recovery room. When you are fully awake, you ll be moved to your room. The nurses will give you medicines to ease your pain. You may have a catheter (small tube) in your bladder. A continuous passive motion machine may be used on your knee to keep it from getting stiff. A sequential compression machine may be used to prevent blood clots by gently squeezing then releasing your leg. You may be given medicine to prevent blood clots. Soon, healthcare providers will help you get up and moving.  When to call your doctor  Once at home, call your doctor if you have any of the symptoms below:    An increase in knee pain    Pain or swelling in the calf or leg    Unusual redness, heat, or drainage at the incision site    Fever of 100.4 F (38 C) or higher    Shaking chills    Trouble breathing or chest pain (call 911)     5367-8135 [employer]. [address] [place] [address]. All rights reserved. This information is not intended as a substitute for professional medical care. Always follow your healthcare professional's instructions.                Patient Education    Oxycodone Hydrochloride, Acetaminophen Oral capsule    Oxycodone Hydrochloride, Acetaminophen Oral solution    Oxycodone Hydrochloride, Acetaminophen Oral tablet    Oxycodone Hydrochloride, Acetaminophen Oral tablet, biphasic release  Oxycodone Hydrochloride, Acetaminophen Oral tablet  What is this medicine?  ACETAMINOPHEN; OXYCODONE (a set a RICHARD scott fen; ox i KOE done) is a pain reliever. It is used to treat moderate to severe pain.  This medicine may be used for other purposes; ask your health care provider or pharmacist if you have questions.  What should I tell my health care  provider before I take this medicine?  They need to know if you have any of these conditions:    brain tumor    Crohn's disease, inflammatory bowel disease, or ulcerative colitis    drug abuse or addiction    head injury    heart or circulation problems    if you often drink alcohol    kidney disease or problems going to the bathroom    liver disease    lung disease, asthma, or breathing problems    an unusual or allergic reaction to acetaminophen, oxycodone, other opioid analgesics, other medicines, foods, dyes, or preservatives    pregnant or trying to get pregnant    breast-feeding  How should I use this medicine?  Take this medicine by mouth with a full glass of water. Follow the directions on the prescription label. You can take it with or without food. If it upsets your stomach, take it with food. Take your medicine at regular intervals. Do not take it more often than directed.  Talk to your pediatrician regarding the use of this medicine in children. Special care may be needed.  Patients over 65 years old may have a stronger reaction and need a smaller dose.  Overdosage: If you think you have taken too much of this medicine contact a poison control center or emergency room at once.  NOTE: This medicine is only for you. Do not share this medicine with others.  What if I miss a dose?  If you miss a dose, take it as soon as you can. If it is almost time for your next dose, take only that dose. Do not take double or extra doses.  What may interact with this medicine?    alcohol    antihistamines    barbiturates like amobarbital, butalbital, butabarbital, methohexital, pentobarbital, phenobarbital, thiopental, and secobarbital    benztropine    drugs for bladder problems like solifenacin, trospium, oxybutynin, tolterodine, hyoscyamine, and methscopolamine    drugs for breathing problems like ipratropium and tiotropium    drugs for certain stomach or intestine problems like propantheline, homatropine methylbromide,  glycopyrrolate, atropine, belladonna, and dicyclomine    general anesthetics like etomidate, ketamine, nitrous oxide, propofol, desflurane, enflurane, halothane, isoflurane, and sevoflurane    medicines for depression, anxiety, or psychotic disturbances    medicines for sleep    muscle relaxants    naltrexone    narcotic medicines (opiates) for pain    phenothiazines like perphenazine, thioridazine, chlorpromazine, mesoridazine, fluphenazine, prochlorperazine, promazine, and trifluoperazine    scopolamine    tramadol    trihexyphenidyl  This list may not describe all possible interactions. Give your health care provider a list of all the medicines, herbs, non-prescription drugs, or dietary supplements you use. Also tell them if you smoke, drink alcohol, or use illegal drugs. Some items may interact with your medicine.  What should I watch for while using this medicine?  Tell your doctor or health care professional if your pain does not go away, if it gets worse, or if you have new or a different type of pain. You may develop tolerance to the medicine. Tolerance means that you will need a higher dose of the medication for pain relief. Tolerance is normal and is expected if you take this medicine for a long time.  Do not suddenly stop taking your medicine because you may develop a severe reaction. Your body becomes used to the medicine. This does NOT mean you are addicted. Addiction is a behavior related to getting and using a drug for a non-medical reason. If you have pain, you have a medical reason to take pain medicine. Your doctor will tell you how much medicine to take. If your doctor wants you to stop the medicine, the dose will be slowly lowered over time to avoid any side effects.  You may get drowsy or dizzy. Do not drive, use machinery, or do anything that needs mental alertness until you know how this medicine affects you. Do not stand or sit up quickly, especially if you are an older patient. This reduces  the risk of dizzy or fainting spells. Alcohol may interfere with the effect of this medicine. Avoid alcoholic drinks.  There are different types of narcotic medicines (opiates) for pain. If you take more than one type at the same time, you may have more side effects. Give your health care provider a list of all medicines you use. Your doctor will tell you how much medicine to take. Do not take more medicine than directed. Call emergency for help if you have problems breathing.  The medicine will cause constipation. Try to have a bowel movement at least every 2 to 3 days. If you do not have a bowel movement for 3 days, call your doctor or health care professional.  Do not take Tylenol (acetaminophen) or medicines that have acetaminophen with this medicine. Too much acetaminophen can be very dangerous. Many nonprescription medicines contain acetaminophen. Always read the labels carefully to avoid taking more acetaminophen.  What side effects may I notice from receiving this medicine?  Side effects that you should report to your doctor or health care professional as soon as possible:    allergic reactions like skin rash, itching or hives, swelling of the face, lips, or tongue    breathing difficulties, wheezing    confusion    light headedness or fainting spells    severe stomach pain    unusually weak or tired    yellowing of the skin or the whites of the eyes  Side effects that usually do not require medical attention (report to your doctor or health care professional if they continue or are bothersome):    dizziness    drowsiness    nausea    vomiting  This list may not describe all possible side effects. Call your doctor for medical advice about side effects. You may report side effects to FDA at 5-873-FDA-8720.  Where should I keep my medicine?  Keep out of the reach of children. This medicine can be abused. Keep your medicine in a safe place to protect it from theft. Do not share this medicine with anyone.  Selling or giving away this medicine is dangerous and against the law.  Store at room temperature between 20 and 25 degrees C (68 and 77 degrees F). Keep container tightly closed. Protect from light.  This medicine may cause accidental overdose and death if it is taken by other adults, children, or pets. Flush any unused medicine down the toilet to reduce the chance of harm. Do not use the medicine after the expiration date.  NOTE: This sheet is a summary. It may not cover all possible information. If you have questions about this medicine, talk to your doctor, pharmacist, or health care provider.  NOTE:This sheet is a summary. It may not cover all possible information. If you have questions about this medicine, talk to your doctor, pharmacist, or health care provider. Copyright  2016 [employer]                Patient Education    Alprazolam Oral disintegrating tablet    Alprazolam Oral solution    Alprazolam Oral tablet    Alprazolam Oral tablet, extended-release  Alprazolam Oral tablet  What is this medicine?  ALPRAZOLAM (al PRAY carri barrientos) is a benzodiazepine. It is used to treat anxiety and panic attacks.  This medicine may be used for other purposes; ask your health care provider or pharmacist if you have questions.  What should I tell my health care provider before I take this medicine?  They need to know if you have any of these conditions:    an alcohol or drug abuse problem    bipolar disorder, depression, psychosis or other mental health conditions    glaucoma    kidney or liver disease    lung or breathing disease    myasthenia gravis    Parkinson's disease    porphyria    seizures or a history of seizures    suicidal thoughts    an unusual or allergic reaction to alprazolam, other benzodiazepines, foods, dyes, or preservatives    pregnant or trying to get pregnant    breast-feeding  How should I use this medicine?  Take this medicine by mouth with a glass of water. Follow the directions on the  prescription label. Take your medicine at regular intervals. Do not take it more often than directed. If you have been taking this medicine regularly for some time, do not suddenly stop taking it. You must gradually reduce the dose or you may get severe side effects. Ask your doctor or health care professional for advice. Even after you stop taking this medicine it can still affect your body for several days.  Talk to your pediatrician regarding the use of this medicine in children. Special care may be needed.  Overdosage: If you think you have taken too much of this medicine contact a poison control center or emergency room at once.  NOTE: This medicine is only for you. Do not share this medicine with others.  What if I miss a dose?  If you miss a dose, take it as soon as you can. If it is almost time for your next dose, take only that dose. Do not take double or extra doses.  What may interact with this medicine?  Do not take this medicine with any of the following medications:    certain medicines for HIV infection or AIDS    ketoconazole    itraconazole  This medicine may also interact with the following medications:    birth control pills    certain macrolide antibiotics like clarithromycin, erythromycin, troleandomycin    cimetidine    cyclosporine    ergotamine    grapefruit juice    herbal or dietary supplements like kava kava, melatonin, dehydroepiandrosterone, DHEA, [place] Wort or valerian    imatinib, STI-571    isoniazid    levodopa    medicines for depression, anxiety, or psychotic disturbances    prescription pain medicines    rifampin, rifapentine, or rifabutin    some medicines for blood pressure or heart problems    some medicines for seizures like carbamazepine, oxcarbazepine, phenobarbital, phenytoin, primidone  This list may not describe all possible interactions. Give your health care provider a list of all the medicines, herbs, non-prescription drugs, or dietary supplements you use. Also  tell them if you smoke, drink alcohol, or use illegal drugs. Some items may interact with your medicine.  What should I watch for while using this medicine?  Visit your doctor or health care professional for regular checks on your progress. Your body can become dependent on this medicine. Ask your doctor or health care professional if you still need to take it.  You may get drowsy or dizzy. Do not drive, use machinery, or do anything that needs mental alertness until you know how this medicine affects you. To reduce the risk of dizzy and fainting spells, do not stand or sit up quickly, especially if you are an older patient. Alcohol may increase dizziness and drowsiness. Avoid alcoholic drinks.  Do not treat yourself for coughs, colds or allergies without asking your doctor or health care professional for advice. Some ingredients can increase possible side effects.  What side effects may I notice from receiving this medicine?  Side effects that you should report to your doctor or health care professional as soon as possible:    allergic reactions like skin rash, itching or hives, swelling of the face, lips, or tongue    confusion, forgetfulness    depression    difficulty sleeping    difficulty speaking    feeling faint or lightheaded, falls    mood changes, excitability or aggressive behavior    muscle cramps    trouble passing urine or change in the amount of urine    unusually weak or tired  Side effects that usually do not require medical attention (report to your doctor or health care professional if they continue or are bothersome):    change in sex drive or performance    changes in appetite  This list may not describe all possible side effects. Call your doctor for medical advice about side effects. You may report side effects to FDA at 8-344-FDA-9151.  Where should I keep my medicine?  Keep out of the reach of children. This medicine can be abused. Keep your medicine in a safe place to protect it from  theft. Do not share this medicine with anyone. Selling or giving away this medicine is dangerous and against the law.  Store at room temperature between 20 and 25 degrees C (68 and 77 degrees F). This medicine may cause accidental overdose and death if taken by other adults, children, or pets. Mix any unused medicine with a substance like cat litter or coffee grounds. Then throw the medicine away in a sealed container like a sealed bag or a coffee can with a lid. Do not use the medicine after the expiration date.  NOTE: This sheet is a summary. It may not cover all possible information. If you have questions about this medicine, talk to your doctor, pharmacist, or health care provider.  NOTE:This sheet is a summary. It may not cover all possible information. If you have questions about this medicine, talk to your doctor, pharmacist, or health care provider. Copyright  2016 Gold Standard

## 2017-03-21 NOTE — IP AVS SNAPSHOT
[facility]    [address] [place] [id number]    Phone:  [phone]    Fax:  [phone]                                       After Visit Summary   3/21/2017    Sydni Hardin    MRN: [id number]           After Visit Summary Signature Page     I have received my discharge instructions, and my questions have been answered. I have discussed any challenges I see with this plan with the nurse or doctor.    ..........................................................................................................................................  Patient/Patient Representative Signature      ..........................................................................................................................................  Patient Representative Print Name and Relationship to Patient    ..................................................               ................................................  Date                                            Time    ..........................................................................................................................................  Reviewed by Signature/Title    ...................................................              ..............................................  Date                                                            Time

## 2017-03-21 NOTE — BRIEF OP NOTE
[facility] Brief Operative Note    Pre-operative diagnosis: OSTEOARTHRITIS Right Knee   Post-operative diagnosis Same   Procedure: Procedure(s):  RIGHT TOTAL KNEE REPLACEMENT - Wound Class: I-Clean   Surgeon: Moisés Olson MD   Assistants(s): CLIFFORD KHAN   Estimated blood loss: 20ml    Specimens: Bone cuts   Findings:

## 2017-03-22 ENCOUNTER — APPOINTMENT (OUTPATIENT)
Dept: GENERAL RADIOLOGY | Facility: HOSPITAL | Age: 67
DRG: 470 | End: 2017-03-22
Attending: ORTHOPAEDIC SURGERY
Payer: MEDICARE

## 2017-03-22 ENCOUNTER — APPOINTMENT (OUTPATIENT)
Dept: PHYSICAL THERAPY | Facility: HOSPITAL | Age: 67
DRG: 470 | End: 2017-03-22
Attending: ORTHOPAEDIC SURGERY
Payer: MEDICARE

## 2017-03-22 ENCOUNTER — APPOINTMENT (OUTPATIENT)
Dept: OCCUPATIONAL THERAPY | Facility: HOSPITAL | Age: 67
DRG: 470 | End: 2017-03-22
Attending: PHYSICIAN ASSISTANT
Payer: MEDICARE

## 2017-03-22 LAB
C DIFF TOX B STL QL: NORMAL
ERYTHROCYTE [DISTWIDTH] IN BLOOD BY AUTOMATED COUNT: 12.7 % (ref 10–15)
GLUCOSE SERPL-MCNC: 129 MG/DL (ref 70–99)
HCT VFR BLD AUTO: 34.9 % (ref 35–47)
HGB BLD-MCNC: 12 G/DL (ref 11.7–15.7)
MCH RBC QN AUTO: 30.8 PG (ref 26.5–33)
MCHC RBC AUTO-ENTMCNC: 34.4 G/DL (ref 31.5–36.5)
MCV RBC AUTO: 90 FL (ref 78–100)
PLATELET # BLD AUTO: 214 10E9/L (ref 150–450)
RBC # BLD AUTO: 3.89 10E12/L (ref 3.8–5.2)
SPECIMEN SOURCE: NORMAL
WBC # BLD AUTO: 7.9 10E9/L (ref 4–11)

## 2017-03-22 PROCEDURE — 97166 OT EVAL MOD COMPLEX 45 MIN: CPT | Mod: GO

## 2017-03-22 PROCEDURE — 25000125 ZZHC RX 250: Performed by: PHYSICIAN ASSISTANT

## 2017-03-22 PROCEDURE — 12000000 ZZH R&B MED SURG/OB

## 2017-03-22 PROCEDURE — 40000832 H STATISTIC POST OPERATIVE IMAGING: Mod: TC

## 2017-03-22 PROCEDURE — 94640 AIRWAY INHALATION TREATMENT: CPT

## 2017-03-22 PROCEDURE — 40000275 ZZH STATISTIC RCP TIME EA 10 MIN

## 2017-03-22 PROCEDURE — 85027 COMPLETE CBC AUTOMATED: CPT | Performed by: INTERNAL MEDICINE

## 2017-03-22 PROCEDURE — 82947 ASSAY GLUCOSE BLOOD QUANT: CPT | Performed by: ORTHOPAEDIC SURGERY

## 2017-03-22 PROCEDURE — 25000128 H RX IP 250 OP 636: Performed by: PHYSICIAN ASSISTANT

## 2017-03-22 PROCEDURE — A9270 NON-COVERED ITEM OR SERVICE: HCPCS | Mod: GY | Performed by: NURSE PRACTITIONER

## 2017-03-22 PROCEDURE — 97110 THERAPEUTIC EXERCISES: CPT | Mod: GP

## 2017-03-22 PROCEDURE — 36415 COLL VENOUS BLD VENIPUNCTURE: CPT | Performed by: INTERNAL MEDICINE

## 2017-03-22 PROCEDURE — 97161 PT EVAL LOW COMPLEX 20 MIN: CPT | Mod: GP

## 2017-03-22 PROCEDURE — 87493 C DIFF AMPLIFIED PROBE: CPT | Performed by: NURSE PRACTITIONER

## 2017-03-22 PROCEDURE — 40000133 ZZH STATISTIC OT WARD VISIT

## 2017-03-22 PROCEDURE — 94640 AIRWAY INHALATION TREATMENT: CPT | Mod: 76

## 2017-03-22 PROCEDURE — 40000193 ZZH STATISTIC PT WARD VISIT

## 2017-03-22 PROCEDURE — 97530 THERAPEUTIC ACTIVITIES: CPT | Mod: GP

## 2017-03-22 PROCEDURE — 25000132 ZZH RX MED GY IP 250 OP 250 PS 637: Mod: GY | Performed by: NURSE PRACTITIONER

## 2017-03-22 PROCEDURE — 25000132 ZZH RX MED GY IP 250 OP 250 PS 637: Mod: GY | Performed by: PHYSICIAN ASSISTANT

## 2017-03-22 PROCEDURE — A9270 NON-COVERED ITEM OR SERVICE: HCPCS | Mod: GY | Performed by: PHYSICIAN ASSISTANT

## 2017-03-22 RX ORDER — FLUCONAZOLE 150 MG/1
150 TABLET ORAL ONCE
Status: COMPLETED | OUTPATIENT
Start: 2017-03-22 | End: 2017-03-22

## 2017-03-22 RX ORDER — ASPIRIN 81 MG/1
81 TABLET ORAL 2 TIMES DAILY
Status: DISCONTINUED | OUTPATIENT
Start: 2017-03-22 | End: 2017-03-24 | Stop reason: HOSPADM

## 2017-03-22 RX ORDER — FAMOTIDINE 20 MG/1
20 TABLET, FILM COATED ORAL 2 TIMES DAILY
Status: DISCONTINUED | OUTPATIENT
Start: 2017-03-22 | End: 2017-03-22

## 2017-03-22 RX ORDER — FAMOTIDINE 20 MG/1
20 TABLET, FILM COATED ORAL DAILY
Status: DISCONTINUED | OUTPATIENT
Start: 2017-03-23 | End: 2017-03-24 | Stop reason: HOSPADM

## 2017-03-22 RX ADMIN — ACETAMINOPHEN 975 MG: 325 TABLET, FILM COATED ORAL at 12:06

## 2017-03-22 RX ADMIN — ALBUTEROL SULFATE 1 PUFF: 90 AEROSOL, METERED RESPIRATORY (INHALATION) at 07:41

## 2017-03-22 RX ADMIN — OXYCODONE HYDROCHLORIDE 10 MG: 5 TABLET ORAL at 09:34

## 2017-03-22 RX ADMIN — ESCITALOPRAM 20 MG: 20 TABLET, FILM COATED ORAL at 21:06

## 2017-03-22 RX ADMIN — FLUTICASONE FUROATE AND VILANTEROL TRIFENATATE 1 PUFF: 100; 25 POWDER RESPIRATORY (INHALATION) at 07:41

## 2017-03-22 RX ADMIN — FLUCONAZOLE 150 MG: 150 TABLET ORAL at 12:07

## 2017-03-22 RX ADMIN — ASPIRIN 81 MG: 81 TABLET, COATED ORAL at 09:43

## 2017-03-22 RX ADMIN — TEMAZEPAM 30 MG: 15 CAPSULE ORAL at 00:33

## 2017-03-22 RX ADMIN — OXYCODONE HYDROCHLORIDE 5 MG: 5 TABLET ORAL at 17:56

## 2017-03-22 RX ADMIN — FAMOTIDINE 20 MG: 20 TABLET, FILM COATED ORAL at 09:43

## 2017-03-22 RX ADMIN — ATORVASTATIN CALCIUM 40 MG: 40 TABLET, FILM COATED ORAL at 21:03

## 2017-03-22 RX ADMIN — ESCITALOPRAM 20 MG: 20 TABLET, FILM COATED ORAL at 09:37

## 2017-03-22 RX ADMIN — POTASSIUM CHLORIDE 10 MEQ: 750 TABLET, FILM COATED, EXTENDED RELEASE ORAL at 21:02

## 2017-03-22 RX ADMIN — ACETAMINOPHEN 975 MG: 325 TABLET, FILM COATED ORAL at 19:22

## 2017-03-22 RX ADMIN — VANCOMYCIN HYDROCHLORIDE 1000 MG: 1 INJECTION, SOLUTION INTRAVENOUS at 00:17

## 2017-03-22 RX ADMIN — ASPIRIN 81 MG: 81 TABLET, COATED ORAL at 21:02

## 2017-03-22 RX ADMIN — OXYCODONE HYDROCHLORIDE 10 MG: 5 TABLET ORAL at 14:04

## 2017-03-22 RX ADMIN — OXYCODONE HYDROCHLORIDE 5 MG: 5 TABLET ORAL at 05:30

## 2017-03-22 RX ADMIN — ALBUTEROL SULFATE 1 PUFF: 90 AEROSOL, METERED RESPIRATORY (INHALATION) at 15:28

## 2017-03-22 RX ADMIN — OXYCODONE HYDROCHLORIDE 5 MG: 5 TABLET ORAL at 00:33

## 2017-03-22 RX ADMIN — OXYCODONE HYDROCHLORIDE 10 MG: 5 TABLET ORAL at 21:03

## 2017-03-22 RX ADMIN — ALPRAZOLAM 1 MG: 1 TABLET ORAL at 12:52

## 2017-03-22 RX ADMIN — ACETAMINOPHEN 975 MG: 325 TABLET, FILM COATED ORAL at 04:30

## 2017-03-22 RX ADMIN — HYDROMORPHONE HYDROCHLORIDE 0.2 MG: 1 INJECTION, SOLUTION INTRAMUSCULAR; INTRAVENOUS; SUBCUTANEOUS at 00:33

## 2017-03-22 RX ADMIN — DOXEPIN HYDROCHLORIDE 25 MG: 25 CAPSULE ORAL at 21:03

## 2017-03-22 RX ADMIN — HYDROCHLOROTHIAZIDE 25 MG: 25 TABLET ORAL at 09:36

## 2017-03-22 RX ADMIN — MONTELUKAST 10 MG: 10 TABLET, FILM COATED ORAL at 21:06

## 2017-03-22 RX ADMIN — HYDROMORPHONE HYDROCHLORIDE 0.2 MG: 1 INJECTION, SOLUTION INTRAMUSCULAR; INTRAVENOUS; SUBCUTANEOUS at 17:56

## 2017-03-22 RX ADMIN — ALPRAZOLAM 1 MG: 1 TABLET ORAL at 21:02

## 2017-03-22 RX ADMIN — POTASSIUM CHLORIDE 10 MEQ: 750 TABLET, FILM COATED, EXTENDED RELEASE ORAL at 09:36

## 2017-03-22 ASSESSMENT — PAIN DESCRIPTION - DESCRIPTORS
DESCRIPTORS: CONSTANT
DESCRIPTORS: CONSTANT

## 2017-03-22 ASSESSMENT — ACTIVITIES OF DAILY LIVING (ADL): PREVIOUS_RESPONSIBILITIES: MEAL PREP;HOUSEKEEPING;SHOPPING;MEDICATION MANAGEMENT

## 2017-03-22 NOTE — PLAN OF CARE
Problem: Patient Goal: Rt Focus  Goal: 1. Patient Goal: RT Focus  [facility] - Respiratory Clinical Assessment     Current Patient History:    Respiratory History: COPD    Smoking History: 1 ppd    Oxygen dependency: No,    Oxygen prescribed: N/A     3/21/2017 7:56 PM Patient Initial Assessment:     Level of Consciousness: alert , cooperative    Skin color: pink    Lung sounds:Clear and diminshed    Respirations:  Normal with easy respirations and no use of accessory muscles to breathe    Respiratory symptoms: non-productive cough    Cough/Sputum:  dry    Current oxygenation status: 92% R/A

## 2017-03-22 NOTE — OP NOTE
DATE OF PROCEDURE:  03/21/2017.      PREOPERATIVE DIAGNOSIS:  Osteoarthritis, right knee.      POSTOPERATIVE DIAGNOSIS:  Osteoarthritis, right knee.      PROCEDURE:  Right total knee arthroplasty.      SURGEON:  Moisés Olson MD      ASSISTANT:  Mitchell Pelletier PA-C      ANESTHESIA:  General plus adductor canal block.      ESTIMATED BLOOD LOSS:  20 mL.      TOTAL TOURNIQUET TIME:  49 minutes at 275 mmHg.      SPECIMENS:  Bone cuts to pathology.      DRAINS:  None.      COMPLICATIONS:  None.      COMPONENTS IMPLANTED:  Cemented Moisés Persona size 7 narrow femur, size C tibia with a 10 mm polyethylene insert with vitamin E, 32 mm all polyethylene patella.      OPERATIVE SUMMARY:  Preoperatively, Sydni Hardin received intravenous vancomycin for prophylaxis against infection.  Postoperatively, her orders contain an order for less than 24 hours of IV vancomycin at which time the medication is discontinued.  Vancomycin was selected as the antibiotic because, although the patient says she does not recall it, her daughter claims that she believes her mother had an infection with MRSA.      Preoperatively she received intravenous tranexamic acid for intraoperative hemostasis.  She also received an adductor canal block by the anesthesiologist for postoperative analgesia.  In the operating room, she was given a general anesthetic in a supine position.  The right lower extremity was triple prepped then draped.  A timeout was held.  Following Esmarch exsanguination of the leg a right thigh tourniquet was inflated to 275 mmHg.  A longitudinal anterior midline incision was made over the right knee approximately 9 inches in length.  It was continued through the subcutaneous tissues and hemostasis accomplished by electrocautery.  A medial parapatellar arthrotomy was performed, permitting patellar eversion.  Both menisci and the anterior cruciate ligament and most of the retropatellar fat pad were sharply excised.  Severe DJD  was noted in all 3 compartments.  Intramedullary alignment was used to position the distal femoral cutting jig set to remove 10 mm of distal femur in 5 degrees of anatomic valgus and in 0 degrees of flexion.  This and all other bone cuts were made with an oscillating saw while the soft tissues were protected with retractors.  Intramedullary alignment was used to position the proximal tibial cutting jig set to remove 10 mm of proximal tibia as referenced from the relatively unaffected lateral tibial plateau, perpendicular to the long axis of the tibia, rotationally aligned with the center of the ankle, in 3 degrees of posterior slope.  The cut surface of the femur was measured to a size 7 narrow component.  Size 7, 4-in-1 cutting block was used to make the anterior, posterior and chamfer cuts on the femur.  One of the pieces of chamfered bone was placed in the femoral intramedullary canal hole to act as a cement restrictor.  All remaining osteophytes were now removed from the femur, tibia and patella.  The flexion and extension gaps were assessed.  They each measured 10 mm, but both were tight medially.  The superficial medial collateral ligament and the posterior oblique ligament were elevated from the proximal tibia which gave appropriate collateral ligament balance.  The posterior compartments were examined.  No posterior osteophytes or loose bodies were found.  A cocktail of Exparel plus Marcaine plus epinephrine was injected into the posterior capsular tissues as well as into the tissues on the medial and lateral gutter for postoperative analgesia.  The cut surface of the tibia was sized to a size C component.  With the trial components in place, the knee was fully extended.  Attention was turned to the patella.      The patella measured 21 mm in thickness.  It was sized to a 32 mm component.  8.5 mm of articular surface were removed with a patellar cutting jig.  Three lug holes were drilled.  The lateral portal  patella was chamfered.  A size 32 mm patellar trial component was applied.  The patella was reduced.  With the 10 mm trial tibial spacer and the tibial component trial, the knee was ranged.  It was found to extend fully without hyperextending.  It flexed to at least 120 degrees.  It was stable to varus and valgus stresses in flexion and in mid flexion and in extension.  The posterior drawer test was negative as was the lift off test.  The trial components were removed.  The tibial component was reapplied and pinned in place with its central axis aligned with the center of the tibial tubercle.  It was used to guide the drill and keel punch for the real component.  They were then removed, so the bone surfaces could be lavaged with a liter of saline followed by a liter of antibiotic solution through a Pulsavac system.  The bone surfaces were dried while 1 bag of methyl methacrylate was mixed in a vacuum mixer.  Three real components were cemented into place.  Excess cement was removed.  A trial 10 mm polyethylene insert was placed in the tibial component and the knee was placed in extension to pressurize the components while the remaining cement hardened.  Range of motion and stability were rechecked and were unchanged, so the real 10 mm thick tibial insert impregnated with vitamin E was inserted.  Additional Exparel plus Marcaine plus epinephrine was injected into the anterior soft tissues.  The tourniquet was released after a total time of 49 minutes.  Prompt return of cap refill was noted in the foot.  Hemostasis was accomplished with electrocautery.  The wound was copiously irrigated with saline.      One-half gram of vancomycin powder was placed in the joint.  The arthrotomy was then closed with #1 PDS suture, except for the distal 2 sutures which were placed with #1 Vicryl to prevent a painful knot.  One-half gram of vancomycin powder was placed in the subcutaneous tissues.  Those tissues was then closed with 2-0  Vicryl sutures.  The skin was closed with 2-0 nylon sutures.  Xeroform and a sterile dressing were applied followed by a cold therapy pad.  A knee immobilizer was obtained for the patient to use at night.  The patient was uneventfully awakened and transferred to a bed where her right leg was placed in a CPM machine locked at 90 degrees.  The leg will remain in that position for an hour to encourage hemostasis.  The CPM machine will then start to range her at a range of 0-70 degrees.      The patient tolerated the procedure well and there were no complications.  Estimated blood loss was 20 mL.  All sponges and instrument counts were correct.  The bone cuts were sent to pathology as a specimen.  No drains were used.        The first assistant was necessary for leg holding and ranging, irrigation lavage and aspiration, removal of cutting jigs and trial implants and excess cement, and application of dressings.         NAYANA HERNANDEZ MD             D: 2017 15:33   T: 2017 21:16   MT:       Name:     AGUSTINA BIRMINGHAM   MRN:      [id number]        Account:        [id number]   :      1950           Procedure Date: 2017      Document: [id number]

## 2017-03-22 NOTE — PLAN OF CARE
Problem: Goal Outcome Summary  Goal: Goal Outcome Summary  Outcome: Therapy, progress toward functional goals as expected  GAIT/AMBULATION: wheeled walker  EQUIPMENT NEEDS AT DISCHARGE: wheeled walker tor bailey  D/C RECOMMENDATIONS: nursing home  TREATMENT AT D/C: rehab  COMMENTS: continue with current rehab plan

## 2017-03-22 NOTE — PLAN OF CARE
Face to face report given with opportunity to observe patient.  Report given to ALANIS Osorio and Charleen Jackman  3/22/2017, 7:52 AM

## 2017-03-22 NOTE — PROGRESS NOTES
[facility]    Hospitalist Progress Note    Date of Service (when I saw the patient): 03/22/2017    Assessment & Plan   Sydni Hardin is a 67 year old female who was admitted on 3/21/2017.     Active Problems:    S/P total knee replacement using cement, right: Doing well. No complication noted. Pain control was difficult overnight but she is more comfortable today since muscle spasms have abated.       Essential hypertension: Refuses lisinopril this morning as it gives her headache. Attempting to get benazopril from home.       Dysthymia: Continue home dose SSRI.       Tobacco abuse: Nicotine patch.     DVT Prophylaxis: Refuses DVT prophylaxis due to history gastric ulcer. She does finally agree to ASA 81mg EC BID.  Code Status: Full Code    Disposition: Expected discharge in 2 days once surgical cleared.    Jennifer Junior, CNP    Interval History Denies chest pain, shortness of breath, abdominal pain or nausea. Eating and drinking well.     -Data reviewed today: I reviewed all new labs and imaging results over the last 24 hours. I personally reviewed no images or EKG's today.    Physical Exam   Temp: 98.2  F (36.8  C) Temp src: Tympanic BP: (!) 104/47   Heart Rate: 66 Resp: 16 SpO2: 92 % O2 Device: None (Room air) Oxygen Delivery: 3 LPM  Vitals:    03/21/17 1214   Weight: 72.6 kg (160 lb)     Vital Signs with Ranges  Temp:  [97.4  F (36.3  C)-98.8  F (37.1  C)] 98.2  F (36.8  C)  Heart Rate:  [66-92] 66  Resp:  [10-24] 16  BP: (101-150)/(47-94) 104/47  SpO2:  [86 %-96 %] 92 %  I/O last 3 completed shifts:  In: 1439 [P.O.:1080; I.V.:359]  Out: 1170 [Urine:1150; Blood:20]    Peripheral IV 03/13/17 Right  (Active)   Site Assessment WDL 3/13/2017 11:07 AM   Line Status Infusing 3/13/2017 11:07 AM   Phlebitis Scale 0-->no symptoms 3/13/2017 11:07 AM   Infiltration Scale 0 3/13/2017 11:07 AM   Extravasation? No 3/13/2017 11:07 AM   Number of days:9       Peripheral IV 03/21/17 Right Hand (Active)    Site Assessment WDL 3/22/2017 12:00 PM   Line Status Saline locked 3/22/2017 12:00 PM   Phlebitis Scale 0-->no symptoms 3/22/2017 12:00 PM   Infiltration Scale 0 3/22/2017 12:00 PM   Extravasation? No 3/22/2017 12:00 PM   Dressing Intervention New dressing  3/21/2017 12:31 PM   Number of days:1       Wound 10/16/13 Right Finger (Comment which one) (Active)   Number of days:1253       Wound 10/16/13 Lip (Active)   Number of days:1253       Wound 08/08/14 Right Arm Abrasion(s) (Active)   Number of days:957       Incision/Surgical Site 03/21/17 Right Knee (Active)   Incision Assessment WDL 3/22/2017  8:29 AM   Juanita-Incision Assessment UTV 3/22/2017  8:29 AM   Closure Sutures 3/22/2017  5:00 AM   Incision Drainage Amount Moderate 3/22/2017  5:00 AM   Drainage Description Serosanguinous 3/22/2017  5:00 AM   Dressing Intervention Dressing removed 3/22/2017  5:00 AM   Number of days:1     Line/device assessment(s) completed for medical necessity    Constitutional: Awake and alert, no acute distress.   Respiratory: Clear but diminished bilaterally. No rhonchi,wheezes or crackles.   Cardiovascular: HRR, no murmur, rubs,thrills.  GI: Obese,soft,nontender. Bowel sounds are present.   Skin/Integumen: No open areas,rashes or bruises.   Other:  Left leg ace wrapped with ice pack.  No drainage on dressing. CMS intact bilateral LE.    Medications        famotidine  20 mg Oral BID     aspirin EC  81 mg Oral BID     atorvastatin  40 mg Oral Daily     benazepril  10 mg Oral Daily     fluticasone-vilanterol  1 puff Inhalation Daily     doxepin  25 mg Oral At Bedtime     escitalopram  20 mg Oral BID     hydrochlorothiazide  25 mg Oral Daily     montelukast  10 mg Oral At Bedtime     nicotine  1 patch Transdermal Q24H     potassium chloride  10 mEq Oral BID     sodium chloride (PF)  3 mL Intracatheter Q8H     acetaminophen  975 mg Oral Q8H     nicotine   Transdermal Daily     nicotine   Transdermal Q8H       Data     Recent Labs  Lab  03/22/17  0545   WBC 7.9   HGB 12.0   MCV 90      *       Recent Results (from the past 24 hour(s))   [facility] [place]    Narrative    RIGHT KNEE POST-SURGICAL IMAGING    REPORT: There is a total knee prosthesis in place.  Prosthetic  elements appear well seated.  Gas is seen in the soft tissues.  Exam Date: Mar 22, 2017 09:28:00 AM  Author: NAYAN RENEE  This report is final and signed

## 2017-03-22 NOTE — CONSULTS
REASON FOR CONSULTATION:  Dr. Olson asked me to evaluate this patient perioperatively for medical management.      HISTORY OF PRESENT ILLNESS:  Ms. Sydni Tovar is a 67-year-old with history of obesity, osteoarthritis.  The patient was admitted for elective right TKA.  The patient was seen postoperatively.  Patient has no immediate postoperative complications or new symptoms.      PAST MEDICAL HISTORY:   1.  Osteoarthritis.   2.  Several orthopedic procedures, including knee and shoulder surgeries.   3.  Hysterectomy.   4.  Hypertension.   5.  Obesity.   6.  Hypercholesterolemia.   7.  Depression and anxiety.   8.  Insomnia.      MEDICATIONS:  Prior to admission include:   1.  Albuterol p.r.n.   2.  Xanax p.r.n.   3.  Lipitor q.h.s.    4.  Lotensin 10 q. day.     5.  Tessalon Perles p.r.n.   6.  Symbicort b.i.d.    7.  Tagamet at bedtime.   8.  Doxepin q.h.s.    9.  Lexapro twice a day.   10.  Hydrochlorothiazide q. day.   11.  P.r.n. DuoNeb.   12.  Singular 10 mg q. day.   13.  Nicoderm CQ as needed.   14.  Potassium chloride.   15.  Temazepam 30 mg at bedtime.     16.  Previously on aspirin 81 mg p.o. q. day.    17.  P.r.n. Fioricet.   18.  Epinephrine pen p.r.n.    19.  Diflucan every 3 days.   20.  Mobic 15 mg q. day.   21.  Nystatin suspension 4 times a day.   22.  Oxycodone/acetaminophen p.r.n.      ALLERGIES:  Adverse reactions to bee, pollen, Keflex, sulfonamide, Cipro, bupropion, celecoxib, codeine, clonazepam, erythromycin and lisinopril.      SOCIAL HISTORY:  The patient has up until recently been a smoker.  The patient's alcohol use is negligible.      FAMILY HISTORY:  The patient reports no relevant family history.      REVIEW OF SYSTEMS:  Other than discussed in the HPI above, noncontributory on comprehensive review.      PHYSICAL EXAMINATION:   GENERAL:  The patient was resting comfortably in the bed.  She was examined in the postoperative holding area.   VITAL SIGNS:  Blood pressure is  130s/60s, heart rate is in the 70s.  She is afebrile. Oxygenation is 95% on 4 L of oxygen.   PSYCHIATRIC:  Pleasant affect.   NEUROLOGIC:  Alert and oriented with no peripheral nervous system deficit.   PRECORDIUM:  No heave or thrill.  Normal S1, normal S2.   LUNGS:  Distant in keeping with adiposity but clear with no added sounds.   ABDOMEN:  Adipose, soft, nontender.   EXTREMITIES:  The patient is able to move toes of both feet.  The patient's right leg is in an immobilizer and bandaged.   SKIN:  Warm, nondiaphoretic.      IMPRESSION:   1.  Osteoarthritis, status post total knee arthroplasty.  Pain control and prophylaxis for deep venous thrombosis per Orthopedic Surgery.  We will focus on pain control.   2.  History of hyperactive airways disorder.  Almost certainly has obesity hypoventilation syndrome and sleep apnea.  We will provide supplemental oxygen.  She does not appear to have any new exacerbating factors other than anesthetic.   3.  Hypertension.  We will continue her PTA medications.   4.  Hypercholesterolemia.  We will continue statin.   5.  Obesity.   6.  Prophylaxis for deep venous thrombosis per Orthopedic Surgery.   7.  Code status is full code.        I want to thank Dr. Olson for involving us in this charming lady's care.         ANGELINE ESPOSITO MD             D: 2017 18:24   T: 2017 19:01   MT: [id number]      Name:     AGUSTINA BIRMINGHAM   MRN:      [id number]        Account:       [id number]   :      [birth year]           Consult Date:  2017      Document: [id number]       cc: Moisés Olson MD

## 2017-03-22 NOTE — PROGRESS NOTES
[facility]    Orthopedics  Daily Post-Op Note    Assessment & Plan   Procedure(s):  ARTHROPLASTY KNEE, RIGHT   -1 Day Post-Op  Doing overall well.  No immediate surgical complications identified.  Did have dressing change already today due to bleeding through bandages.  Has not yet worked with PT.  Patient reports diarrhea for past 4-5 days.  Did not inform anyone of this prior to surgery.  Plan:  -Ambulate  -Continue supportive and symptomatic treatment  -Start or continue physical therapy  -Pain control measures  -Advance diet as tolerated  -Routine wound care with daily dressing changes.    -Aspirin for DVT prophylaxis.  Hospitalist to change to 81 mg EC aspirin twice daily due to ulcer.  -Additional problems being followed by medicine service.  Discussed recent onset diarrhea.  Active Problems:    S/P total knee replacement using cement, right      Mitchell Pelletier PA-C    Interval History   Patient seen today along with Dr. Olson and physical therapist and .  Stable.  Pain appears reasonably controlled.  No fevers. Patient reports recent onset of diarrhea 4-5 days back.  This has continued.  Has not worked with PT yet.  Has been on CPM up to 75.      Physical Exam   Temp: 98.1  F (36.7  C) Temp src: Tympanic BP: 112/57 Pulse: 72 Heart Rate: 74 Resp: 20 SpO2: 93 % O2 Device: None (Room air) Oxygen Delivery: 3 LPM  Vitals:    03/21/17 1214   Weight: 160 lb (72.6 kg)     Vital Signs with Ranges  Temp:  [97.2  F (36.2  C)-98.8  F (37.1  C)] 98.1  F (36.7  C)  Pulse:  [72] 72  Heart Rate:  [71-92] 74  Resp:  [10-24] 20  BP: (101-150)/(48-94) 112/57  SpO2:  [86 %-96 %] 93 %  I/O last 3 completed shifts:  In: 956 [P.O.:600; I.V.:356]  Out: 420 [Urine:400; Blood:20]    New dressing dry and intact.  Compartments soft and nttp BLE.  Distal CMS intact RLE.    Data   Results for orders placed or performed during the hospital encounter of 03/21/17 (from the past 24 hour(s))   Active MRSA  Surveillance Culture   Result Value [facility]    Specimen Description Nares     Culture Micro Culture in progress     Micro Report Status Pending    CBC with platelets   Result Value [facility]    WBC 7.9 4.0 - 11.0 10e9/L    RBC Count 3.89 3.8 - 5.2 10e12/L    Hemoglobin 12.0 11.7 - 15.7 g/dL    Hematocrit 34.9 (L) 35.0 - 47.0 %    MCV 90 78 - 100 fl    MCH 30.8 26.5 - 33.0 pg    MCHC 34.4 31.5 - 36.5 g/dL    RDW 12.7 10.0 - 15.0 %    Platelet Count 214 150 - 450 10e9/L   Glucose   Result Value [facility]    Glucose 129 (H) 70 - 99 mg/dL

## 2017-03-22 NOTE — PROGRESS NOTES
03/22/17 1100   Quick Adds   Type of Visit Initial PT Evaluation   Living Environment   Lives With alone   Living Arrangements apartment   Home Accessibility no concerns   Number of Stairs to Enter Home 0   Transportation Available family or friend will provide   Self-Care   Usual Activity Tolerance good   Current Activity Tolerance moderate   Equipment Currently Used at Home walker, rolling   Functional Level Prior   Ambulation 1-->assistive equipment   Transferring 0-->independent   Toileting 0-->independent   Bathing 0-->independent   Dressing 0-->independent   Eating 0-->independent   Communication 0-->understands/communicates without difficulty   Swallowing 0-->swallows foods/liquids without difficulty   Cognition 0 - no cognition issues reported   Fall history within last six months no   Which of the above functional risks had a recent onset or change? ambulation;transferring;toileting   General Information   Onset of Illness/Injury or Date of Surgery - Date 03/21/17   Referring Physician Dr. Olson   Pertinent History of Current Problem (include personal factors and/or comorbidities that impact the POC) S/P R TKA    Precautions/Limitations no known precautions/limitations   Weight-Bearing Status - LLE full weight-bearing   Weight-Bearing Status - RLE full weight-bearing   Cognitive Status Examination   Orientation orientation to person, place and time   Level of Consciousness alert   Follows Commands and Answers Questions 100% of the time   Personal Safety and Judgment intact   Memory intact   Pain Assessment   Patient Currently in Pain No   Integumentary/Edema   Integumentary/Edema Comments Very mild edema   Posture    Posture Not impaired   Range of Motion (ROM)   ROM Comment R knee ROM actively 0-110 degrees   Strength   Strength Comments Good quad set, SLR independently   Bed Mobility   Bed Mobility Bed mobility skill: Sit to supine   Bed Mobility Skill: Sit to Supine   Level of Felt:  "Sit/Supine independent   Physical Assist/Nonphysical Assist: Sit/Supine set-up required   Transfer Skills   Transfer Transfer Skill: Sit to Stand   Transfer Skill:  Sit to Stand   Level of Taylor: Sit/Stand contact guard   Physical Assist/Nonphysical Assist: Sit/Stand 1 person assist   Weightbearing Restrictions: Sit/Stand full weight-bearing   Assistive Device for Transfer: Sit/Stand rolling walker   Gait   Gait Gait Skill   Gait Skills   Level of Taylor: Gait contact guard   Physical Assist/Nonphysical Assist: Gait 1 person assist   Weight-Bearing Restrictions: Gait full weight-bearing   Gait Distance 200 feet   Balance   Balance no deficits were identified   Sensory Examination   Sensory Perception no deficits were identified   Coordination   Coordination no deficits were identified   Muscle Tone   Muscle Tone no deficits were identified   Modality Interventions   Planned Modality Interventions Cryotherapy   General Therapy Interventions   Planned Therapy Interventions bed mobility training;gait training;joint mobilization;manual therapy;ROM;strengthening;stretching;transfer training;home program guidelines;progressive activity/exercise   Clinical Impression   Criteria for Skilled Therapeutic Intervention yes, treatment indicated   PT Diagnosis Weakness, pain, impaired gait   Influenced by the following impairments Impaired gait, weakness, impaired ROM   Functional limitations due to impairments Decreased tolerance for functional activity tasks   Clinical Presentation Stable/Uncomplicated   Clinical Decision Making (Complexity) Low complexity   Therapy Frequency` daily   Predicted Duration of Therapy Intervention (days/wks) 7 days   Anticipated Discharge Disposition Transitional Care Facility;Home with Home Therapy   Risk & Benefits of therapy have been explained Yes   Patient, Family & other staff in agreement with plan of care Yes   [facility] AM-PAC TM \"6 Clicks\"   2016, Trustees of [place] " "[place], under license to [employer].  All rights reserved.   6 Clicks Short Forms Basic Mobility Inpatient Short Form   [facility] AM-PAC  \"6 Clicks\" V.2 Basic Mobility Inpatient Short Form   1. Turning from your back to your side while in a flat bed without using bedrails? 4 - None   2. Moving from lying on your back to sitting on the side of a flat bed without using bedrails? 4 - None   3. Moving to and from a bed to a chair (including a wheelchair)? 3 - A Little   4. Standing up from a chair using your arms (e.g., wheelchair, or bedside chair)? 3 - A Little   5. To walk in hospital room? 3 - A Little   6. Climbing 3-5 steps with a railing? 2 - A Lot   Basic Mobility Raw Score (Score out of 24.Lower scores equate to lower levels of function) 19   Total Evaluation Time   Total Evaluation Time (Minutes) 15     "

## 2017-03-22 NOTE — PLAN OF CARE
Problem: Goal Outcome Summary  Goal: Goal Outcome Summary  Outcome: No Change  Pt came up to floor from surgery at approx 1800. Pt A&O x3, VSS accept at 2300 BP was 101/50 MD notified (see note).  Pain managed with IV DIlaudid and scheduled Tylenol.  Bleeding thru dressing (size of quarter) upon admit but has not increased.  CMS is good, pt ambulated minimum of 100 ft with asst x1 and use of walker and tolerated very well.  Was on CPM for 2 hours, cryocuff on and ice changed at 2300, SCD's on and immobilizer placed on at night.  Has continuous pulse ox on.  Tolerated clear liquid diet to be advanced. Uses IS with encouragement, TCDB with encouragement.  Pt voided twice this shift and had 1 small amt  loose stool. Has LR infusing at 75 mls/hr.  Bed alarm on and call light in reach. Pt has meds in purse and refused to have sent to pharmacy, states she will not take home meds here but didn't want to leave them home as daughter had a recent OD and didn't want them left with her.  Pt says friend Florencia is coming tomorrow and she is sending meds home with him.    Face to face report given with opportunity to observe patient.     Report given to Emilia Turner   3/21/2017  11:41 PM

## 2017-03-22 NOTE — PLAN OF CARE
Problem: Goal Outcome Summary  Goal: Goal Outcome Summary  Outcome: Improving  Temp: 98.1  F (36.7  C) Temp src: Tympanic BP: 112/57 Pulse: 72 Heart Rate: 74 Resp: 20 SpO2: 93 % O2 Device: None (Room air) Pt A&O x4, c/o of right knee pain 9/10 prior to PRN pain relievers. Pt advanced to regular diet, eats boxed meal prior to HS. Pt sleeps without interruption until 0430 when up for scheduled Tylenol. CMS intact. Pt ambulates with assist of one and FWW. Immobilizer on for HS. SCD's in use. Cryocuff on while pt is in bed. Dressing saturated with serosaguinous drainage, old dressing removed and new 4x4's, ABD pads, kerlix and ACE wrap applied. Pt on CPM 0530, set to 90 degrees, pt tolerating well. Pt makes needs known, safety checks and rounding complete, call light within reach, bed alarms in use, no falls or injury this shift.     Problem: Knee Replacement, Total (Adult)  Goal: Signs and Symptoms of Listed Potential Problems Will be Absent or Manageable (Knee Replacement, Total)  Signs and symptoms of listed potential problems will be absent or manageable by discharge/transition of care (reference Knee Replacement, Total (Adult) CPG).   Outcome: Improving    03/22/17 0655   Knee Replacement, Total   Problems Assessed (Total Knee Replacement) all   Problems Present (Total Knee Replacement) pain

## 2017-03-22 NOTE — PROGRESS NOTES
03/22/17 1310   Quick Adds   Type of Visit Initial Occupational Therapy Evaluation   Living Environment   Lives With child(whit), adult   Living Arrangements apartment   Home Accessibility no concerns   Transportation Available car   Living Environment Comment Daughter has been living with pt since last November   Self-Care   Dominant Hand right   Usual Activity Tolerance good   Current Activity Tolerance moderate   Regular Exercise no   Equipment Currently Used at Home walker, rolling   Functional Level Prior   Ambulation 1-->assistive equipment   Transferring 0-->independent   Toileting 0-->independent   Bathing 0-->independent   Dressing 0-->independent   Eating 0-->independent   Communication 0-->understands/communicates without difficulty   Swallowing 0-->swallows foods/liquids without difficulty   Cognition 0 - no cognition issues reported   Fall history within last six months no   General Information   Onset of Illness/Injury or Date of Surgery - Date 03/21/17   Referring Physician Mitchell Pelletier PA-C   Patient/Family Goals Statement go to short-term rehab then return home   Additional Occupational Profile Info/Pertinent History of Current Problem Pt is S/P R TKA. Pt's PMH includes osteoarthritis, depression, anxiety and insomnia. Pt has also had several shoulder surgeries that have caused limitations in UE ROM   Precautions/Limitations fall precautions   Weight-Bearing Status - LUE full weight-bearing   Weight-Bearing Status - RUE full weight-bearing   Weight-Bearing Status - LLE full weight-bearing   Weight-Bearing Status - RLE weight-bearing as tolerated   Cognitive Status Examination   Orientation orientation to person, place and time   Level of Consciousness alert   Able to Follow Commands WNL/WFL   Personal Safety (Cognitive) WNL/WFL   Memory intact   Attention No deficits were identified   Organization/Problem Solving No deficits were identified   Executive Function No deficits were identified   Visual  Perception   Visual Perception Wears glasses   Sensory Examination   Sensory Quick Adds No deficits were identified   Pain Assessment   Patient Currently in Pain Yes, see Vital Sign flowsheet  (4/10)   Range of Motion (ROM)   ROM Comment B UE WFL but limited due to previous shoulder surgeries   Strength   Strength Comments B UE grossly 3/5   Muscle Tone Assessment   Muscle Tone Quick Adds No deficits were identified   Coordination   Upper Extremity Coordination No deficits were identified   Transfer Skill: Bed to Chair/Chair to Bed   Level of St[place]: Bed to Chair stand-by assist   Physical Assist/Nonphysical Assist: Bed to Chair verbal cues;supervision   Weight-Bearing Restrictions weight-bearing as tolerated   Assistive Device - Transfer Skill Bed to Chair Chair to Bed Rehab Eval standard walker   Toilet Transfer   Toilet Transfer Toilet Transfer Skill   Transfer Skill: Toilet Transfer   Level of [place]: Toilet stand-by assist   Physical Assist/Nonphysical Assist: Toilet verbal cues;supervision   Weight-Bearing Restrictions: Toilet weight-bearing as tolerated   Assistive Device grab bars;standard walker   Upper Body Dressing   Level of [place]: Dress Upper Body independent   Lower Body Dressing   Level of [place]: Dress Lower Body stand-by assist   Physical Assist/Nonphysical Assist: Dress Lower Body supervision;verbal cues   Toileting   Level of [place]: Toilet stand-by assist   Physical Assist/Nonphysical Assist: Toilet supervision   Grooming   Level of Stitzer: Grooming stand-by assist   Physical Assist/Nonphysical Assist: Grooming set-up required   Instrumental Activities of Daily Living (IADL)   Previous Responsibilities meal prep;housekeeping;shopping;medication management   Activities of Daily Living Analysis   Impairments Contributing to Impaired Activities of Daily Living pain;post surgical precautions;flexibility decreased   General Therapy Interventions   Planned Therapy  "Interventions ADL retraining;progressive activity/exercise   Clinical Impression   Criteria for Skilled Therapeutic Interventions Met yes, treatment indicated   OT Diagnosis decreased ADL function   Influenced by the following impairments pain, post-surgical precautions   Assessment of Occupational Performance 3-5 Performance Deficits   Identified Performance Deficits pain, decreased flexibility and strength in UEs, psychosocial    Clinical Decision Making (Complexity) Moderate complexity   Therapy Frequency 5 times/wk   Predicted Duration of Therapy Intervention (days/wks) 3 days   Anticipated Discharge Disposition Transitional Care Facility   Risks and Benefits of Treatment have been explained. Yes   Patient, Family & other staff in agreement with plan of care Yes   Clinical Impression Comments Pt may benefit from short-term rehab to return to prior level of function   [facility] TM \"6 Clicks\"   2016, Trustees of [facility], under license to [employer].  All rights reserved.   6 Clicks Short Forms Daily Activity Inpatient Short Form   [facility]  \"6 Clicks\" Daily Activity Inpatient Short Form   1. Putting on and taking off regular lower body clothing? 3 - A Little   2. Bathing (including washing, rinsing, drying)? 3 - A Little   3. Toileting, which includes using toilet, bedpan or urinal? 4 - None   4. Putting on and taking off regular upper body clothing? 4 - None   5. Taking care of personal grooming such as brushing teeth? 3 - A Little   6. Eating meals? 4 - None   Daily Activity Raw Score (Score out of 24.Lower scores equate to lower levels of function) 21   Total Evaluation Time   Total Evaluation Time (Minutes) 30     "

## 2017-03-22 NOTE — PLAN OF CARE
[facility] Inpatient Admission Note:    Patient admitted to 3230/3230-1 at approximately 1800 via bed accompanied by transport tech from surgery . Report received from Ivis THAPA in SBAR format at 1800 via face to face in room. Patient came up in bed to bed via na. Patient is alert and oriented X 3, reports pain; rates at 7 on 0-10 scale.  Patient oriented to room, unit, hourly rounding, and plan of care. Explained admission packet and patient handbook with patient bill of rights brochure. Will continue to monitor and document as needed.     Inpatient Nursing criteria listed below was met:      Health care directives status obtained and documented: Yes      Care Everywhere authorization obtained Yes      MRSA swab completed for patient 65 years and older: Yes      Patient identifies a surrogate decision maker: No If yes, who: Contact Information:      Core Measure diagnosis present:Yes. If yes, state diagnosis: SCIP       If initial lactic acid >2.0, repeat lactic acid drawn within one hour of arrival to unit: NA. If no, state reason:       Vaccination assessment and education completed: Yes   Vaccinations received prior to admission: Pneumovax yes  Influenza(seasonal)  YES   Vaccination(s) ordered: pt up to date      Clergy visit ordered if patient requests: N/A      Skin issues/needs documented: Yes      Isolation Patient: no Education given, correct sign in place and documentation row added to PCS:  na      Fall Prevention Yes: Care plan updated, education given and documented, sticker and magnet in place: Yes      Care Plan initiated: Yes      Education Documented (including assessment): Yes      Patient has discharge needs : Yes If yes, please explain:rehab for knee replacement

## 2017-03-22 NOTE — PROVIDER NOTIFICATION
Notified MD of pts /50 .  's systolic all shift until after receiving Lisinipril and Hydrochlorothiazide.  MD ok with recent BP.

## 2017-03-22 NOTE — PLAN OF CARE
Problem: Goal Outcome Summary  Goal: Goal Outcome Summary  Outcome: No Change  Pt is A&Ox 4 but forgetful at times, reorients herself. Heart rate regular and S1, S2 heard. Trace edema noted in  RLE. Inspiritory wheezes noted and on the right base that did clear with cough. Bowel sounds present in all 4 quadrants but diminished, non-distended, non-tender to palpation. Pain this shift  7/10 down to 5/10. Pt reports pain to be Constant in right knee. No falls or injuries this shift, uses call light appropriately. All vitals stable, pt's bp was 104/47, Ledy was notified and stated to give her antihypertensives.  Pt was informed that pharmacy can not get one of her antihypertensives and was told to call her family to bring it in.  Daughter was her with pt and had multiple questions about pain medications and discharge plan.  Pt also had multiple questions and this writer and ALANIS Osorio tried to explain but pt and daughter verbalized understanding but needs reinforcement d/t continually asking different staff. Continuous o2 can be removed at 1800 and dressing change can be done at that time per orders. Pt reports diarrhea and is on enteric isolation until stool sample is collected. Pt was placed on CPM machine at 1430.          Face to face report given with opportunity to observe patient.    Report given to ALANIS Mae   3/22/2017  3:35 PM

## 2017-03-23 ENCOUNTER — APPOINTMENT (OUTPATIENT)
Dept: PHYSICAL THERAPY | Facility: HOSPITAL | Age: 67
DRG: 470 | End: 2017-03-23
Attending: ORTHOPAEDIC SURGERY
Payer: MEDICARE

## 2017-03-23 LAB
BACTERIA SPEC CULT: NORMAL
COPATH REPORT: NORMAL
HGB BLD-MCNC: 11.4 G/DL (ref 11.7–15.7)
MICRO REPORT STATUS: NORMAL
SPECIMEN SOURCE: NORMAL

## 2017-03-23 PROCEDURE — 97110 THERAPEUTIC EXERCISES: CPT | Mod: GP

## 2017-03-23 PROCEDURE — 40000275 ZZH STATISTIC RCP TIME EA 10 MIN

## 2017-03-23 PROCEDURE — 85018 HEMOGLOBIN: CPT | Performed by: PHYSICIAN ASSISTANT

## 2017-03-23 PROCEDURE — 25000132 ZZH RX MED GY IP 250 OP 250 PS 637: Mod: GY | Performed by: PHYSICIAN ASSISTANT

## 2017-03-23 PROCEDURE — 99232 SBSQ HOSP IP/OBS MODERATE 35: CPT | Performed by: NURSE PRACTITIONER

## 2017-03-23 PROCEDURE — 94640 AIRWAY INHALATION TREATMENT: CPT

## 2017-03-23 PROCEDURE — A9270 NON-COVERED ITEM OR SERVICE: HCPCS | Mod: GY | Performed by: ORTHOPAEDIC SURGERY

## 2017-03-23 PROCEDURE — 97116 GAIT TRAINING THERAPY: CPT | Mod: GP

## 2017-03-23 PROCEDURE — 25000125 ZZHC RX 250: Performed by: PHYSICIAN ASSISTANT

## 2017-03-23 PROCEDURE — A9270 NON-COVERED ITEM OR SERVICE: HCPCS | Mod: GY | Performed by: PHYSICIAN ASSISTANT

## 2017-03-23 PROCEDURE — 25000128 H RX IP 250 OP 636: Performed by: PHYSICIAN ASSISTANT

## 2017-03-23 PROCEDURE — 36415 COLL VENOUS BLD VENIPUNCTURE: CPT | Performed by: PHYSICIAN ASSISTANT

## 2017-03-23 PROCEDURE — 25000132 ZZH RX MED GY IP 250 OP 250 PS 637: Mod: GY | Performed by: ORTHOPAEDIC SURGERY

## 2017-03-23 PROCEDURE — 94640 AIRWAY INHALATION TREATMENT: CPT | Mod: 76

## 2017-03-23 PROCEDURE — 25000132 ZZH RX MED GY IP 250 OP 250 PS 637: Mod: GY | Performed by: NURSE PRACTITIONER

## 2017-03-23 PROCEDURE — A9270 NON-COVERED ITEM OR SERVICE: HCPCS | Mod: GY | Performed by: NURSE PRACTITIONER

## 2017-03-23 PROCEDURE — 12000000 ZZH R&B MED SURG/OB

## 2017-03-23 PROCEDURE — 40000193 ZZH STATISTIC PT WARD VISIT

## 2017-03-23 PROCEDURE — 94799 UNLISTED PULMONARY SVC/PX: CPT

## 2017-03-23 RX ORDER — LOPERAMIDE HCL 2 MG
2 CAPSULE ORAL 4 TIMES DAILY PRN
Status: DISCONTINUED | OUTPATIENT
Start: 2017-03-23 | End: 2017-03-24 | Stop reason: HOSPADM

## 2017-03-23 RX ADMIN — ACETAMINOPHEN 975 MG: 325 TABLET, FILM COATED ORAL at 20:05

## 2017-03-23 RX ADMIN — POTASSIUM CHLORIDE 10 MEQ: 750 TABLET, FILM COATED, EXTENDED RELEASE ORAL at 10:20

## 2017-03-23 RX ADMIN — ACETAMINOPHEN 975 MG: 325 TABLET, FILM COATED ORAL at 12:14

## 2017-03-23 RX ADMIN — OXYCODONE HYDROCHLORIDE 10 MG: 5 TABLET ORAL at 04:26

## 2017-03-23 RX ADMIN — IPRATROPIUM BROMIDE AND ALBUTEROL SULFATE 3 ML: .5; 3 SOLUTION RESPIRATORY (INHALATION) at 07:12

## 2017-03-23 RX ADMIN — OXYCODONE HYDROCHLORIDE 10 MG: 5 TABLET ORAL at 17:51

## 2017-03-23 RX ADMIN — FLUTICASONE FUROATE AND VILANTEROL TRIFENATATE 1 PUFF: 100; 25 POWDER RESPIRATORY (INHALATION) at 07:12

## 2017-03-23 RX ADMIN — HYDROCHLOROTHIAZIDE 25 MG: 25 TABLET ORAL at 12:14

## 2017-03-23 RX ADMIN — ESCITALOPRAM 20 MG: 20 TABLET, FILM COATED ORAL at 10:19

## 2017-03-23 RX ADMIN — HYDROMORPHONE HYDROCHLORIDE 0.2 MG: 1 INJECTION, SOLUTION INTRAMUSCULAR; INTRAVENOUS; SUBCUTANEOUS at 14:37

## 2017-03-23 RX ADMIN — ASPIRIN 81 MG: 81 TABLET, COATED ORAL at 10:20

## 2017-03-23 RX ADMIN — DOXEPIN HYDROCHLORIDE 25 MG: 25 CAPSULE ORAL at 21:51

## 2017-03-23 RX ADMIN — ACETAMINOPHEN 975 MG: 325 TABLET, FILM COATED ORAL at 04:25

## 2017-03-23 RX ADMIN — ASPIRIN 81 MG: 81 TABLET, COATED ORAL at 20:07

## 2017-03-23 RX ADMIN — HYDROMORPHONE HYDROCHLORIDE 0.2 MG: 1 INJECTION, SOLUTION INTRAMUSCULAR; INTRAVENOUS; SUBCUTANEOUS at 10:17

## 2017-03-23 RX ADMIN — MONTELUKAST 10 MG: 10 TABLET, FILM COATED ORAL at 21:51

## 2017-03-23 RX ADMIN — ATORVASTATIN CALCIUM 40 MG: 40 TABLET, FILM COATED ORAL at 20:07

## 2017-03-23 RX ADMIN — POTASSIUM CHLORIDE 10 MEQ: 750 TABLET, FILM COATED, EXTENDED RELEASE ORAL at 21:51

## 2017-03-23 RX ADMIN — ESCITALOPRAM 20 MG: 20 TABLET, FILM COATED ORAL at 20:07

## 2017-03-23 RX ADMIN — FAMOTIDINE 20 MG: 20 TABLET ORAL at 12:15

## 2017-03-23 RX ADMIN — OXYCODONE HYDROCHLORIDE 10 MG: 5 TABLET ORAL at 12:12

## 2017-03-23 RX ADMIN — HYDROMORPHONE HYDROCHLORIDE 0.2 MG: 1 INJECTION, SOLUTION INTRAMUSCULAR; INTRAVENOUS; SUBCUTANEOUS at 05:54

## 2017-03-23 RX ADMIN — OXYCODONE HYDROCHLORIDE 10 MG: 5 TABLET ORAL at 21:51

## 2017-03-23 RX ADMIN — HYDROMORPHONE HYDROCHLORIDE 0.2 MG: 1 INJECTION, SOLUTION INTRAMUSCULAR; INTRAVENOUS; SUBCUTANEOUS at 12:48

## 2017-03-23 RX ADMIN — IPRATROPIUM BROMIDE AND ALBUTEROL SULFATE 3 ML: .5; 3 SOLUTION RESPIRATORY (INHALATION) at 13:38

## 2017-03-23 RX ADMIN — HYDROMORPHONE HYDROCHLORIDE 0.2 MG: 1 INJECTION, SOLUTION INTRAMUSCULAR; INTRAVENOUS; SUBCUTANEOUS at 23:00

## 2017-03-23 ASSESSMENT — PAIN DESCRIPTION - DESCRIPTORS
DESCRIPTORS: SHARP
DESCRIPTORS: ACHING
DESCRIPTORS: SHARP
DESCRIPTORS: SHARP

## 2017-03-23 NOTE — PROGRESS NOTES
AGUSTINA WOODARD -  visit during rounds. Patient just up from surgery and somewhat confused. No spiritual care requests noted at this time.

## 2017-03-23 NOTE — PLAN OF CARE
Problem: Goal Outcome Summary  Goal: Goal Outcome Summary  Outcome: Improving  Temp: 98.9  F (37.2  C) Temp src: Tympanic BP: 119/60   Heart Rate: 70 Resp: 18 SpO2: 92 % O2 Device: None (Room air)   Pt sleeps without interruption until 0212 when she is assessed. CMS intact, pt denies numbness or tingling. LS clear/diminished. Immobilizer, Cryocuff, SCD's in use.  Pt falls back asleep following assessment. Pt denies pain at 0420 when assessed for scheduled pain medication and PRN dose prior to activity and placement of CPM. Pt up to bathroom with assist of 1 and FWW, one loose BM. CPM on at 0520, pt c/o pain, paused for 5 minutes, speed decreased and PRN dose of 0.2mg IV dilaudid given. Pt tolerating CPM at 90 degrees at a slower setting. Pt makes needs known, safety checks and rounding complete, call light within reach, bed alarms on for safety, no falls or injury this shift.    Problem: Knee Replacement, Total (Adult)  Goal: Signs and Symptoms of Listed Potential Problems Will be Absent or Manageable (Knee Replacement, Total)  Signs and symptoms of listed potential problems will be absent or manageable by discharge/transition of care (reference Knee Replacement, Total (Adult) CPG).   Outcome: Improving    03/23/17 0650   Knee Replacement, Total   Problems Assessed (Total Knee Replacement) all   Problems Present (Total Knee Replacement) pain

## 2017-03-23 NOTE — PLAN OF CARE
Problem: Goal Outcome Summary  Goal: Goal Outcome Summary  Outcome: No Change  Attempted to see pt 2xs today. Pt was not available at 1st attempt and was eating and confused at 2nd attempt.

## 2017-03-23 NOTE — PROGRESS NOTES
[facility]    Orthopedics  Daily Post-Op Note    Assessment & Plan   Procedure(s):  ARTHROPLASTY KNEE, RIGHT   -2 Days Post-Op  Doing well.  Clean wound without signs of infection.  Normal healing wound.  No immediate surgical complications identified.  No excessive bleeding  Pain control less this morning likely due to block wearing off.  Tolerating physical therapy and rehabilitation well.  Plan:  -Ambulate  -Advance activity as tolerated  -Continue supportive and symptomatic treatment  -Start or continue physical therapy  -Pain control measures  -Advance diet as tolerated  -Routine wound care  -Aspirin for DVT prophylaxis  -Additional problems being followed by medicine service  -D/C planning for once patient meets criteria  Active Problems:    Essential hypertension    Dysthymia    Tobacco abuse    S/P total knee replacement using cement, right      ERIN Warren-ALIYA    Interval History   Patient seen today along with Dr. Olson and physical therapist and .  Doing well.  Continues to improve.  Pain control not as good this morning as block appears to be wearing off.  No fevers.  Continues to have diarrhea being worked up by medicine.  Ambulated 250 feet yesterday with PT.  CPM to 90, flexion in seated position to 110.  No further bleeding from incision.    Physical Exam   Temp: 98.9  F (37.2  C) Temp src: Tympanic BP: 119/60   Heart Rate: 70 Resp: 18 SpO2: 94 % O2 Device: None (Room air)    Vitals:    03/21/17 1214   Weight: 160 lb (72.6 kg)     Vital Signs with Ranges  Temp:  [98.1  F (36.7  C)-98.9  F (37.2  C)] 98.9  F (37.2  C)  Heart Rate:  [66-70] 70  Resp:  [16-18] 18  BP: (104-119)/(42-60) 119/60  SpO2:  [92 %-94 %] 94 %  I/O last 3 completed shifts:  In: 663 [P.O.:660; I.V.:3]  Out: 2300 [Urine:2300]    Dressing dry and intact.  Wound clean and dry with no drainage today.  Surrounding skin with minimal erythema.  Compartments soft and nttp.  Distal CMS intact  RLE.      Data   Results for orders placed or performed during the hospital encounter of 03/21/17 (from the past 24 hour(s))   XR Post Surgical Imaging [place]    Narrative    RIGHT KNEE POST-SURGICAL IMAGING    REPORT: There is a total knee prosthesis in place.  Prosthetic  elements appear well seated.  Gas is seen in the soft tissues.  Exam Date: Mar 22, 2017 09:28:00 AM  Author: NAYAN RENEE  This report is final and signed     Clostridium difficile toxin B PCR   Result Value Ref Range    Specimen Description Feces     C Diff Toxin B PCR  NEG     Negative  Negative: Clostridium difficile target DNA sequences NOT detected, presumed   negative for Clostridium difficile toxin B or the number of bacteria present   may be below the limit of detection for the test.   FDA approved assay performed using [employer] GeneXpert real-time PCR.   A negative result does not exclude actual disease due to Clostridium difficile   and may be due to improper collection, handling and storage of the specimen or   the number of organisms in the specimen is below the detection limit of the   assay.     Hemoglobin   Result Value Ref Range    Hemoglobin 11.4 (L) 11.7 - 15.7 g/dL

## 2017-03-23 NOTE — ANESTHESIA POSTPROCEDURE EVALUATION
Patient: Sydni Hardin    Procedure(s):  RIGHT TOTAL KNEE REPLACEMENT - Wound Class: I-Clean    Diagnosis:OSTEOARTHRITIS BOTH KNEES  Diagnosis Additional Information: No value filed.    Anesthesia Type:  General, LMA, Periph. Nerve Block for postop pain    Note:  Anesthesia Post Evaluation    Patient location during evaluation: PACU, Floor and Bedside  Patient participation: Able to fully participate in evaluation  Level of consciousness: awake and alert  Pain management: adequate  Airway patency: patent  Cardiovascular status: acceptable  Respiratory status: acceptable  Hydration status: stable  PONV: none     Anesthetic complications: None          Last vitals:  Vitals:    03/23/17 0212 03/23/17 0712 03/23/17 1208   BP: 119/60  134/60   Pulse:      Resp: 18  16   Temp: 98.9  F (37.2  C)  98  F (36.7  C)   SpO2: 92% 94% 92%         Electronically Signed By: Fadi Wright MD  March 23, 2017  12:28 PM

## 2017-03-23 NOTE — PROGRESS NOTES
Participated in care rounds, met with patient.    Patient plans to go to [facility] when discharged from hospital. Will continue to work with patient on discharge planning.

## 2017-03-23 NOTE — PROGRESS NOTES
[facility]    Hospitalist Progress Note    Date of Service (when I saw the patient): 03/23/2017    Assessment & Plan   Sydni Hardin is a 67 year old female who was admitted on 3/21/2017.     Active Problems:    S/P total knee replacement using cement, right: Doing well. No complication noted. Pain control remains difficult.       Essential hypertension: Attempting to get benazopril from home.       Dysthymia: Continue home dose SSRI.       Tobacco abuse: Nicotine patch.     DVT Prophylaxis: Refuses DVT prophylaxis due to history gastric ulcer. She does finally agree to ASA 81mg EC BID.  Code Status: Full Code    Disposition: Expected discharge in 2 days once surgical cleared.    Jennifer Junior, CNP    Interval History Denies chest pain, shortness of breath, abdominal pain or nausea. Eating and drinking well.     -Data reviewed today: I reviewed all new labs and imaging results over the last 24 hours. I personally reviewed no images or EKG's today.    Physical Exam   Temp: 98  F (36.7  C) Temp src: Tympanic BP: 134/60   Heart Rate: 76 Resp: 16 SpO2: 96 % O2 Device: None (Room air)    Vitals:    03/21/17 1214   Weight: 72.6 kg (160 lb)     Vital Signs with Ranges  Temp:  [98  F (36.7  C)-98.9  F (37.2  C)] 98  F (36.7  C)  Heart Rate:  [67-76] 76  Resp:  [16-18] 16  BP: (119-134)/(42-60) 134/60  SpO2:  [92 %-96 %] 96 %  I/O last 3 completed shifts:  In: 280 [P.O.:280]  Out: 1550 [Urine:1550]    Peripheral IV 03/13/17 Right  (Active)   Site Assessment WDL 3/13/2017 11:07 AM   Line Status Infusing 3/13/2017 11:07 AM   Phlebitis Scale 0-->no symptoms 3/13/2017 11:07 AM   Infiltration Scale 0 3/13/2017 11:07 AM   Extravasation? No 3/13/2017 11:07 AM   Number of days:10       Peripheral IV 03/21/17 Right Hand (Active)   Site Assessment WDL 3/23/2017 12:28 PM   Line Status Saline locked;Checked every 1-2 hour 3/23/2017 12:28 PM   Phlebitis Scale 0-->no symptoms 3/23/2017 12:28 PM   Infiltration Scale 0  3/23/2017  2:16 AM   Extravasation? No 3/22/2017 12:00 PM   Dressing Intervention New dressing  3/21/2017 12:31 PM   Number of days:2       Wound 10/16/13 Right Finger (Comment which one) (Active)   Number of days:1254       Wound 10/16/13 Lip (Active)   Number of days:1254       Wound 08/08/14 Right Arm Abrasion(s) (Active)   Number of days:958       Incision/Surgical Site 03/21/17 Right Knee (Active)   Incision Assessment WDL 3/23/2017 12:56 PM   Juanita-Incision Assessment Ecchymosis 3/23/2017 12:56 PM   Closure Sutures 3/23/2017 12:56 PM   Incision Drainage Amount Small 3/23/2017 12:56 PM   Drainage Description Serosanguinous 3/23/2017 12:56 PM   Incision Care Other (Comment) 3/22/2017  8:37 PM   Dressing Intervention New dressing applied 3/23/2017 12:56 PM   Number of days:2     Line/device assessment(s) completed for medical necessity    Constitutional: Awake and alert, no acute distress.   Respiratory: Clear but diminished bilaterally. No rhonchi,wheezes or crackles.   Cardiovascular: HRR, no murmur, rubs,thrills.  GI: Obese,soft,nontender. Bowel sounds are present.   Skin/Integumen: No open areas,rashes or bruises.   Other:  Left leg ace wrapped with ice pack.  No drainage on dressing. CMS intact bilateral LE.    Medications        aspirin EC  81 mg Oral BID     famotidine  20 mg Oral Daily     atorvastatin  40 mg Oral Daily     benazepril  10 mg Oral Daily     fluticasone-vilanterol  1 puff Inhalation Daily     doxepin  25 mg Oral At Bedtime     escitalopram  20 mg Oral BID     hydrochlorothiazide  25 mg Oral Daily     montelukast  10 mg Oral At Bedtime     nicotine  1 patch Transdermal Q24H     potassium chloride  10 mEq Oral BID     sodium chloride (PF)  3 mL Intracatheter Q8H     acetaminophen  975 mg Oral Q8H     nicotine   Transdermal Daily     nicotine   Transdermal Q8H       Data     Recent Labs  Lab 03/23/17  0557 03/22/17  0545   WBC  --  7.9   HGB 11.4* 12.0   MCV  --  90   PLT  --  214   GLC  --   129*       No results found for this or any previous visit (from the past 24 hour(s)).

## 2017-03-23 NOTE — PLAN OF CARE
Problem: Patient Goal: Social Work Focus  Goal: 1. Patient Goal: Social Work Focus  Met with patient, assessment completed.     She lives in an apartment with her daughter. She shared that her daughter is only there temporary. She said her daughter has a lot of Mental Health issues going on now so not much of a support. Her brother You Isaac is her POA and her Guardian. She shared that her brother is a good support for her. She uses [facility] and see's Dr. Alarcon for her PCP.      She smokes a pack and a half a day and is not interested in any information on quitting. She says she is a social drinker and only drinks when she goes out.      Her plan is to go to [facility] for SNF. Updated Zoë at [facility] on possible placement for Friday.      Patient see's Isaias Kilgore in [place] every other week. She also see's Father Syed more often.

## 2017-03-23 NOTE — PLAN OF CARE
Problem: Patient Goal: Rt Focus  Goal: 1. Patient Goal: RT Focus  Outcome: No Change  Continues on room air. Q6 nebs given at patients request. Flutter instruction done

## 2017-03-23 NOTE — PLAN OF CARE
Problem: Goal Outcome Summary  Goal: Goal Outcome Summary  Outcome: No Change  A&O. Sleepy and tearful with pain once during the shift due to c/o right knee pain, see MAR for medication administration with minimal relief. VSS, afebrile. RLE with good cms, pulse palpable, denies numbness or tingling. Dressing change this shift per plan of care. The incision is wnl with surrounding skin having some bruising present. Cryocuff to site for comfort and swelling maintenance. CPM on this afternoon following PT working with Pt. LS audibly wheezy at times.No appetite, encouraged to eat and drink. IV saline locked. Declined a nicotine patch. Asked Pt to have her Lotensin brought in to use during hospitalization, with no arrival yet.     Face to face report given with opportunity to observe patient.     Report given to ALANIS Post   3/23/2017  4:51 PM

## 2017-03-23 NOTE — PLAN OF CARE
Problem: Goal Outcome Summary  Goal: Goal Outcome Summary  Outcome: Improving  Pt A&O x4, VSS, rates pain 7/10 and pain managed with IV Dilaudid, 5-10 mg Roxicode and Xanax given at bedtime for anxiety.  Dressing changed tonite, serosanguinous drainage as charted.  CMS intact, pulses palpableCryocuff in place and ice changed this shift, immobilizer on tonite at bedtime, was on CPM 2x this shift and tolerated well at 0-90 degrees.  SCD's on.  Pt gets up to BR with minimal asst and steady on feet.  Up in chair for supper.  Family and friends here to visit this evening.  Stool sample sent to lab and Enteric precautions removed as C-Diff was negative.  Pt is tolerating a regular diet. Alarms on and call light in reach.   Face to face report given with opportunity to observe patient.     Report given to Emilia Turner   3/22/2017  11:06 PM

## 2017-03-24 ENCOUNTER — APPOINTMENT (OUTPATIENT)
Dept: PHYSICAL THERAPY | Facility: HOSPITAL | Age: 67
DRG: 470 | End: 2017-03-24
Attending: ORTHOPAEDIC SURGERY
Payer: MEDICARE

## 2017-03-24 VITALS
OXYGEN SATURATION: 92 % | DIASTOLIC BLOOD PRESSURE: 63 MMHG | SYSTOLIC BLOOD PRESSURE: 135 MMHG | TEMPERATURE: 97.6 F | RESPIRATION RATE: 16 BRPM | HEART RATE: 72 BPM | BODY MASS INDEX: 33.44 KG/M2 | WEIGHT: 160 LBS

## 2017-03-24 PROCEDURE — A9270 NON-COVERED ITEM OR SERVICE: HCPCS | Mod: GY | Performed by: NURSE PRACTITIONER

## 2017-03-24 PROCEDURE — A9270 NON-COVERED ITEM OR SERVICE: HCPCS | Mod: GY | Performed by: ORTHOPAEDIC SURGERY

## 2017-03-24 PROCEDURE — 25000132 ZZH RX MED GY IP 250 OP 250 PS 637: Mod: GY | Performed by: NURSE PRACTITIONER

## 2017-03-24 PROCEDURE — 99239 HOSP IP/OBS DSCHRG MGMT >30: CPT | Performed by: NURSE PRACTITIONER

## 2017-03-24 PROCEDURE — 94640 AIRWAY INHALATION TREATMENT: CPT

## 2017-03-24 PROCEDURE — 25000132 ZZH RX MED GY IP 250 OP 250 PS 637: Mod: GY | Performed by: ORTHOPAEDIC SURGERY

## 2017-03-24 PROCEDURE — A9270 NON-COVERED ITEM OR SERVICE: HCPCS | Mod: GY | Performed by: PHYSICIAN ASSISTANT

## 2017-03-24 PROCEDURE — 94799 UNLISTED PULMONARY SVC/PX: CPT

## 2017-03-24 PROCEDURE — 40000275 ZZH STATISTIC RCP TIME EA 10 MIN

## 2017-03-24 PROCEDURE — 40000193 ZZH STATISTIC PT WARD VISIT: Performed by: PHYSICAL THERAPIST

## 2017-03-24 PROCEDURE — 97530 THERAPEUTIC ACTIVITIES: CPT | Mod: GP | Performed by: PHYSICAL THERAPIST

## 2017-03-24 PROCEDURE — 25000132 ZZH RX MED GY IP 250 OP 250 PS 637: Mod: GY | Performed by: PHYSICIAN ASSISTANT

## 2017-03-24 PROCEDURE — 97116 GAIT TRAINING THERAPY: CPT | Mod: GP | Performed by: PHYSICAL THERAPIST

## 2017-03-24 RX ORDER — BUTALBITAL, ACETAMINOPHEN AND CAFFEINE 50; 325; 40 MG/1; MG/1; MG/1
1 TABLET ORAL EVERY 6 HOURS PRN
Qty: 5 TABLET | Refills: 0 | Status: SHIPPED | OUTPATIENT
Start: 2017-03-24 | End: 2017-08-21

## 2017-03-24 RX ORDER — TEMAZEPAM 30 MG
CAPSULE ORAL
Qty: 10 CAPSULE | Refills: 0 | Status: SHIPPED | OUTPATIENT
Start: 2017-03-24 | End: 2017-10-23

## 2017-03-24 RX ORDER — ALPRAZOLAM 1 MG
1 TABLET ORAL 3 TIMES DAILY PRN
Qty: 30 TABLET | Refills: 0 | Status: SHIPPED | OUTPATIENT
Start: 2017-03-24 | End: 2017-05-17

## 2017-03-24 RX ORDER — OXYCODONE AND ACETAMINOPHEN 5; 325 MG/1; MG/1
1-2 TABLET ORAL EVERY 4 HOURS PRN
Qty: 30 TABLET | Refills: 0 | Status: ON HOLD | OUTPATIENT
Start: 2017-03-24 | End: 2017-03-27

## 2017-03-24 RX ORDER — LOPERAMIDE HCL 2 MG
2 CAPSULE ORAL 4 TIMES DAILY PRN
Qty: 20 CAPSULE | DISCHARGE
Start: 2017-03-24 | End: 2017-04-18

## 2017-03-24 RX ADMIN — FAMOTIDINE 20 MG: 20 TABLET ORAL at 09:04

## 2017-03-24 RX ADMIN — HYDROCHLOROTHIAZIDE 25 MG: 25 TABLET ORAL at 09:04

## 2017-03-24 RX ADMIN — OXYCODONE HYDROCHLORIDE 10 MG: 5 TABLET ORAL at 01:26

## 2017-03-24 RX ADMIN — ACETAMINOPHEN 975 MG: 325 TABLET, FILM COATED ORAL at 11:31

## 2017-03-24 RX ADMIN — ACETAMINOPHEN 975 MG: 325 TABLET, FILM COATED ORAL at 03:03

## 2017-03-24 RX ADMIN — FLUTICASONE FUROATE AND VILANTEROL TRIFENATATE 1 PUFF: 100; 25 POWDER RESPIRATORY (INHALATION) at 08:11

## 2017-03-24 RX ADMIN — OXYCODONE HYDROCHLORIDE 5 MG: 5 TABLET ORAL at 09:04

## 2017-03-24 RX ADMIN — POTASSIUM CHLORIDE 10 MEQ: 750 TABLET, FILM COATED, EXTENDED RELEASE ORAL at 09:04

## 2017-03-24 RX ADMIN — ASPIRIN 81 MG: 81 TABLET, COATED ORAL at 09:03

## 2017-03-24 RX ADMIN — ESCITALOPRAM 20 MG: 20 TABLET, FILM COATED ORAL at 09:04

## 2017-03-24 ASSESSMENT — PAIN DESCRIPTION - DESCRIPTORS
DESCRIPTORS: BURNING
DESCRIPTORS: BURNING;SHARP
DESCRIPTORS: BURNING
DESCRIPTORS: BURNING;SHARP
DESCRIPTORS: SHARP

## 2017-03-24 NOTE — PLAN OF CARE
Problem: Goal Outcome Summary  Goal: Goal Outcome Summary  Outcome: No Change  Occupational Therapy Discharge Summary     Reason for therapy discharge:    Discharged to transitional care facility.     Progress towards therapy goal(s). See goals on Care Plan in [facility] electronic health record for goal details.  Goals partially met.  Barriers to achieving goals:   limited tolerance for therapy and discharge from facility.     Therapy recommendation(s):    Continued therapy is recommended.  Rationale/Recommendations:  pt is not at baseline for ADL function.

## 2017-03-24 NOTE — PLAN OF CARE
Problem: Goal Outcome Summary  Goal: Goal Outcome Summary  Outcome: Adequate for Discharge Date Met:  03/24/17  Physical Therapy Discharge Summary     Reason for therapy discharge:    Discharged to transitional care facility.     Progress towards therapy goal(s). See goals on Care Plan in [facility] electronic health record for goal details.  Goals partially met.  Barriers to achieving goals:   limited tolerance for therapy and discharge from facility.     Therapy recommendation(s):    Continued therapy is recommended.  Rationale/Recommendations:  to increase ROM, strength, and safety/independence with functional mobility.

## 2017-03-24 NOTE — PLAN OF CARE
Problem: Discharge Planning  Goal: Discharge Planning (Adult, OB, Behavioral, Peds)  Outcome: Adequate for Discharge Date Met:  03/24/17  Patient discharged at 12:25 PM via wheel chair accompanied by other:[facility]  and staff. Prescriptions filled and sent with patient upon discharge. All belongings sent with patient.      Discharge instructions reviewed with Yaneth at [facility]. Listed belongings gathered and returned to patient. Yes     Patient discharged to [facility].   Report called to Nursing Home:  Yaneth     Core Measures and Vaccines  Core Measures applicable during stay: Yes. If yes, state diagnosis: Total Right Knee Replacement  Pneumonia and Influenza given prior to discharge, if indicated: No     Surgical Patient   Surgical Procedures during stay: Yes  Did patient receive discharge instruction on wound care and recognition of infection symptoms? Yes     MISC  Follow up appointment made:  Yes  Home and hospital aquired medications returned to patient: N/A  Patient reports pain was well managed at discharge: Yes

## 2017-03-24 NOTE — PLAN OF CARE
Problem: Goal Outcome Summary  Goal: Goal Outcome Summary  Outcome: No Change  Patient slept well on shift. Patient continuously refused immobilizer on shift despite constant education and reinforcement. Bandage remains in place with no drainage noted. Patient received PRN milan x1 and scheduled tylenol. Patient expressed pain 8/10 despite falling asleep during conversation. CMS remains intact. Also refused cryotherapy on shift. CPM machine placed at 0500 patient quickly returned to sleep following the beginning of use. Patient was not out of bed on shift. Vitals remain stable. /60  Pulse 72  Temp 98.3  F (36.8  C) (Tympanic)  Resp 16  Wt 72.6 kg (160 lb)  SpO2 92%  BMI 33.44 kg/m2. IV remains saline locked. Remains free of any fall or injury on shift.         Problem: Knee Replacement, Total (Adult)  Goal: Signs and Symptoms of Listed Potential Problems Will be Absent or Manageable (Knee Replacement, Total)  Signs and symptoms of listed potential problems will be absent or manageable by discharge/transition of care (reference Knee Replacement, Total (Adult) CPG).   Outcome: No Change    03/23/17 1642   Knee Replacement, Total   Problems Assessed (Total Knee Replacement) all   Problems Present (Total Knee Replacement) pain         Face to face report given with opportunity to observe patient.    Report given to ALANIS Rodas   3/24/2017  7:24 AM

## 2017-03-24 NOTE — DISCHARGE SUMMARY
[facility]    Discharge Summary  Hospitalist    Date of Admission:  3/21/2017  Date of Discharge:  3/24/2017 12:30 PM  Discharging Provider: Jennifer Junior NP  Date of Service (when I saw the patient): 03/24/17    Discharge Diagnoses   Active Problems:    S/P total knee replacement using cement, right    Essential hypertension    Dysthymia    Tobacco abuse      History of Present Illness   Sydni Hardin is an 67 year old female who presented for planned [facility] total knee arthroplasty.     Hospital Course       S/P total knee replacement using cement, right: Post operative period was uneventful with exception of pain control. Her pain has been generally well controlled except with any movements or ambulating. Last night her pain control did improve significantly. She has been ambulating the halls with physical therapy. Will continue with ice through 3/28, prescription written for oxycodone. Patient adamantly refuses any DVT prophylaxis including Xarelto, Lovenox, and full strength aspirin due to an old ulcer. She does agree to take 81 mg EC ASA BID and is sent out on this. I did educate her again on discharge the risks of DVT after joint surgery and importance of frequent ambulation.       Essential hypertension: Stable during her stay. No changes made on home medications.     Dysthymia    Tobacco abuse     Jennifer Junior, CNP    Code Status   Full Code       Primary Care Physician   Gibran Alarcon      Discharge Disposition   Discharged to nursing home  Condition at discharge: Stable    Consultations This Hospital Stay   OCCUPATIONAL THERAPY ADULT IP CONSULT  PHYSICAL THERAPY ADULT IP CONSULT  HOSPITALIST IP CONSULT  SOCIAL WORK IP CONSULT  PHYSICAL THERAPY ADULT IP CONSULT  OCCUPATIONAL THERAPY ADULT IP CONSULT    Time Spent on this Encounter   IJennifer, personally saw the patient today and spent less than or equal to 30 minutes discharging this patient.    Discharge Orders      General info for SNF   Length of Stay Estimate: Short Term Care: Estimated # of Days <30  Condition at Discharge: Improving  Level of care:skilled   Rehabilitation Potential: Fair  Admission H&P remains valid and up-to-date: Yes  Recent Chemotherapy: N/A  Use Nursing Home Standing Orders: Yes     Mantoux instructions   Give two-step Mantoux (PPD) Per Facility Policy Yes     Reason for your hospital stay   Total knee replacement     Wound care (specify)   Site:   Right knee  Instructions:  Change dressing daily or when soiled. May shower with press and seal wrap over dressing. Change dressing if it becomes wet. Sutures/staples to be removed 14 days post-op at follow-up.     Activity - Up with assistive device     Activity - Ambulate in hallway   Every shift     Weight bearing status   As tolerated     Follow-up and recommended labs and tests    Dr. Olson/Mitchell Pelletier 14 days post-op for follow-up/suture removal.     Full Code     Physical Therapy Adult Consult   Evaluate and treat as clinically indicated.    Reason:  TKA     Occupational Therapy Adult Consult   Evaluate and treat as clinically indicated.    Reason:  TKA     Fall precautions     Advance Diet as Tolerated   Follow this diet upon discharge: Orders Placed This Encounter     Regular Diet Adult       Discharge Medications   Current Discharge Medication List      START taking these medications    Details   aspirin EC 81 MG EC tablet Take 1 tablet (81 mg) by mouth 2 times daily    Associated Diagnoses: S/P total knee replacement using cement, right      loperamide (IMODIUM) 2 MG capsule Take 1 capsule (2 mg) by mouth 4 times daily as needed for diarrhea  Qty: 20 capsule    Associated Diagnoses: Functional diarrhea         CONTINUE these medications which have CHANGED    Details   oxyCODONE-acetaminophen (PERCOCET) 5-325 MG per tablet Take 1-2 tablets by mouth every 4 hours as needed for moderate to severe pain  Qty: 30 tablet, Refills: 0     Associated Diagnoses: Primary osteoarthritis of both knees      ALPRAZolam (XANAX) 1 MG tablet Take 1 tablet (1 mg) by mouth 3 times daily as needed for anxiety  Qty: 30 tablet, Refills: 0    Associated Diagnoses: Anxiety         CONTINUE these medications which have NOT CHANGED    Details   !! order for DME Equipment being ordered: toilet seat riser  Qty: 1 each, Refills: 0    Associated Diagnoses: Postoperative state      temazepam (RESTORIL) 30 MG capsule TAKE 1 CAPSULE BY MOUTH NIGHTLY AS NEEDED FOR SLEEP  Qty: 90 capsule, Refills: 5    Associated Diagnoses: Persistent insomnia      benzonatate (TESSALON) 200 MG capsule TAKE 1 CAPSULE BY MOUTH 3 TIMES A DAY AS NEEDED FOR COUGH  Qty: 60 capsule, Refills: 3    Associated Diagnoses: Preop general physical exam; Tobacco abuse      doxepin (SINEQUAN) 25 MG capsule TAKE 1 CAPSULE BY MOUTH AT BEDTIME  Qty: 90 capsule, Refills: 3    Associated Diagnoses: Anxiety      potassium chloride (K-TAB,KLOR-CON) 10 MEQ tablet TAKE 1 TABLET BY MOUTH 2 TIMES DAILY WITH FOOD  Qty: 180 tablet, Refills: 1    Associated Diagnoses: Benign essential hypertension      hydrochlorothiazide (HYDRODIURIL) 25 MG tablet TAKE 1 TABLET BY MOUTH DAILY  Qty: 90 tablet, Refills: 3    Associated Diagnoses: Benign essential hypertension      meloxicam (MOBIC) 15 MG tablet TAKE 1 TABLET BY MOUTH DAILY  Qty: 90 tablet, Refills: 1    Associated Diagnoses: Pain      atorvastatin (LIPITOR) 40 MG tablet TAKE 1 TABLET BY MOUTH DAILY  Qty: 90 tablet, Refills: 0    Associated Diagnoses: Mixed hyperlipidemia      montelukast (SINGULAIR) 10 MG tablet TAKE 1 TABLET BY MOUTH DAILY  Qty: 90 tablet, Refills: 3    Associated Diagnoses: Mixed hyperlipidemia      !! order for DME Equipment being ordered: Nebulizer supplies  Qty: 2 each, Refills: 11    Associated Diagnoses: Primary osteoarthritis of right knee      !! order for DME Equipment being ordered: mcgee walker  Qty: 1 each, Refills: 0    Associated Diagnoses:  "Primary osteoarthritis of right knee      escitalopram (LEXAPRO) 20 MG tablet TAKE 1 TABLET BY MOUTH 2 TIMES A DAY  Qty: 180 tablet, Refills: 0    Associated Diagnoses: Dysthymia      benazepril (LOTENSIN) 10 MG tablet TAKE 1 TABLET BY MOUTH DAILY  Qty: 90 tablet, Refills: 0    Associated Diagnoses: HTN (hypertension)      butalbital-acetaminophen-caffeine (FIORICET, ESGIC) -40 MG per tablet Take 1 tablet by mouth every 6 hours as needed  Qty: 40 tablet, Refills: 5    Associated Diagnoses: Scalp hematoma, sequela      ipratropium - albuterol 0.5 mg/2.5 mg/3 mL (DUONEB) 0.5-2.5 (3) MG/3ML nebulization Take 1 vial (3 mLs) by nebulization every 6 hours as needed  Qty: 360 mL, Refills: 2    Associated Diagnoses: Acute bronchospasm      cimetidine (TAGAMET) 800 MG tablet Take 1 tablet (800 mg) by mouth At Bedtime As needed  Qty: 90 tablet, Refills: 3    Associated Diagnoses: Esophageal reflux      budesonide-formoterol (SYMBICORT) 160-4.5 MCG/ACT inhaler Inhale 2 puffs into the lungs 2 times daily. As needed  Qty: 1 Inhaler, Refills: 11    Associated Diagnoses: Asthma      nicotine (NICODERM CQ) 21 MG/24HR 24 hr patch Place 1 patch onto the skin every 24 hours      EPIPEN 2-JAIRO 0.3 MG/0.3ML injection INJECT 1 SYRINGE INTRAMUSCULARLY ONCE AS NEEDED FOR ANAPHYLAXIS  Qty: 2 each, Refills: 0    Associated Diagnoses: Anaphylactic reaction      VENTOLIN  (90 BASE) MCG/ACT inhaler USE 1 PUFF FOUR TIMES A DAY AS NEEDED  Qty: 18 g, Refills: 3    Associated Diagnoses: Tobacco abuse       !! - Potential duplicate medications found. Please discuss with provider.      STOP taking these medications       aspirin 81 MG tablet Comments:   Reason for Stopping:             Allergies   Allergies   Allergen Reactions     Bee Pollen Other (See Comments)     Throat and eyes close up     Codeine Difficulty breathing     \"My throat closes shut.\"     Takes Percocet at home     Keflex [Cephalexin Hcl] Hives     Sulfonamide " Derivatives Other (See Comments)     Throat closes     Ciprofloxacin Rash     Rash, bumps on lips     Bupropion Hcl Other (See Comments)     Made her very irritable     Celecoxib Other (See Comments) and Cough     Celebrex - wheeze     Clonazepam Hives     Erythromycin Other (See Comments)     Erythromycin base - abdominal pain     Lisinopril Other (See Comments)     Zestril - headaches     Nifedipine Hives     Procardia     Tramadol Hcl      Data   Most Recent 3 CBC's:  Recent Labs   Lab Test  03/23/17   0557  03/22/17   0545  02/23/17   1453  12/07/16   1024   WBC   --   7.9  10.3  8.6   HGB  11.4*  12.0  15.1  15.9*   MCV   --   90  89  88   PLT   --   214  321  338      Most Recent 3 BMP's:  Recent Labs   Lab Test  03/22/17   0545  02/23/17   1453  12/07/16   1024  06/22/16   1126   NA   --   137  133  135   POTASSIUM   --   3.9  4.1  4.4   CHLORIDE   --   104  98  100   CO2   --   21  28  30   BUN   --   35*  29  18   CR   --   1.43*  1.06*  0.86   ANIONGAP   --   12  7  5   JOSÉ   --   8.8  9.1  9.1   GLC  129*  107*  95  107*     Most Recent 2 LFT's:  Recent Labs   Lab Test  12/07/16   1024  06/22/16   1126   AST  18  19   ALT  25  28   ALKPHOS  134  152*   BILITOTAL  0.5  0.4     Most Recent INR's and Anticoagulation Dosing History:  Anticoagulation Dose History     There is no flowsheet data to display.        Most Recent 3 Troponin's:No lab results found.  Most Recent Cholesterol Panel:  Recent Labs   Lab Test  03/03/16   0916   CHOL  133   LDL  68   HDL  29*   TRIG  180*     Most Recent 6 Bacteria Isolates From Any Culture (See EPIC Reports for Culture Details):  Recent Labs   Lab Test  03/21/17   2200  02/23/17   1758  02/23/17   1504  01/06/17   1548  12/07/16   1135  12/07/16   1027   CULT  No MRSA isolated  Canceled, Test credited  Duplicate request    >100,000 colonies/mL Mixed bacterial brian No further identification or   sensitivity done  *  10,000 to 50,000 colonies/mL Mixed bacterial brian No  further identification or   sensitivity done  *  No MRSA isolated  10,000 to 50,000 colonies/mL Mixed bacterial brian No further identification or   sensitivity done  *     Most Recent TSH, T4 and A1c Labs:No lab results found.  Results for orders placed or performed during the hospital encounter of 03/21/17   XR Post Surgical Imaging [place]    Narrative    RIGHT KNEE POST-SURGICAL IMAGING    REPORT: There is a total knee prosthesis in place.  Prosthetic  elements appear well seated.  Gas is seen in the soft tissues.  Exam Date: Mar 22, 2017 09:28:00 AM  Author: NAYAN RENEE  This report is final and signed

## 2017-03-24 NOTE — PROGRESS NOTES
Name: Sydni Hardin    MRN#: [id number]    Reason for Hospitalization: S/P total knee replacement using cement, right [Z96.651]    Discharge Date: March 24, 2017    Patient / Family response to discharge plan: Agreeable to SNF at [facility] via [facility]     Follow-Up Appt: Future Appointments  Date Time Provider Department Center   3/24/2017 11:00 AM Marie Frye OTR/L [facility]   4/18/2017 2:20 PM Moisés Olson MD [facility]       Other Providers (Care Coordinator, County Services, PCA services etc): No    Discharge Disposition: short-term care facility    [facility] via [facility] 12:00 pm    Met with patient this morning, she wanted SW to call her daughter and ask if she was able to transport patient to [facility]. Daughter Devorah unable to transport. Patient unhappy with daughter not transporting but okay with SW setting up ride. She would like [facility] for transportation. [facility] able to provide transportation at 12:00pm (noon).    Patient also agreeable to the NO smoking policy at [facility] and that her room would be shared.  [id number]          Carin Nunes

## 2017-03-24 NOTE — PLAN OF CARE
Problem: Goal Outcome Summary  Goal: Goal Outcome Summary  Outcome: Therapy, progress toward functional goals as expected  Patient uses CPM machine with little to no complaints.  Adminstered pain medication as ordered PRN.  Daughter and patient educated on pain measure times.  Daughter questions transportation to [facility] in AM.  Daughter states she would rather her mother use the safest means to get there.  Patient ambulated with gait belt, walker and assist of 1 staff to and from the bathroom with little to no difficulty.  Was up in chair for dinner, ate 2 small pieces of pizza, and took in 240 ml's of fluids. Observed right knee, no drainage noted on dressing. Patient continues to use cryo-cuff and CPM. Will continue to observe.     Face to face report given with opportunity to observe patient.    Report given to ALANIS Villalobos.    Flaquita Zhu   3/23/2017  11:58 PM

## 2017-03-24 NOTE — PROGRESS NOTES
Orthopedic Inpatient Progress Note    Chief complaint: Postoperative day 3  right TKA    Subjective: Leeanne had more pain yesterday, due to the Exparel wearing off.  As a result she only ambulated 30 feet.  She tolerated 90  on the CPM.  Overnight she refused the knee immobilizer.  She states she still is having loose stools.  The hospitalists have investigated her loose stools and found that this is a chronic condition, predating her knee surgery, and related to anxiety.    Objective: Temperature 97.6.  /63.  Pulse 77.  She is awake alert and oriented this morning.  The neurovascular status of her right leg was intact.  Her dressing was changed.  The wound was clean and dry.  There was a small bloodstain on the overlying dressing.  Her calves were not swollen.     X-rays: Her postoperative x-ray taken on 3/22/17 shows a well aligned TKA with no complicating features.    Labs: A C. difficile toxin screen on 3/22/17 was negative.    Impression: Orthopedically stable on postoperative day #3    Plan: She will be transferred to an inpatient rehabilitation Center for inpatient PT, today.  She will follow-up in my office at 4 weeks postop.    She'll require orders for the rehabilitation facility for right knee physical therapy--weightbearing as tolerated, BID adult strength aspirin for DVT prophylaxis, cold therapy ( she has her own Cryo-cuff) and pain medication.  She has an appointment to follow up with me 4 weeks after surgery. The cold therapy may be discontinued on 3/28/17.

## 2017-03-25 ENCOUNTER — HOSPITAL ENCOUNTER (OUTPATIENT)
Facility: HOSPITAL | Age: 67
Setting detail: OBSERVATION
Discharge: NURSING, REHAB OR LONG-TERM CARE | End: 2017-03-27
Attending: PHYSICIAN ASSISTANT | Admitting: NURSE PRACTITIONER
Payer: MEDICARE

## 2017-03-25 ENCOUNTER — MEDICAL CORRESPONDENCE (OUTPATIENT)
Dept: HEALTH INFORMATION MANAGEMENT | Facility: HOSPITAL | Age: 67
End: 2017-03-25

## 2017-03-25 DIAGNOSIS — M17.0 PRIMARY OSTEOARTHRITIS OF BOTH KNEES: ICD-10-CM

## 2017-03-25 DIAGNOSIS — Z98.890 POSTOPERATIVE STATE: ICD-10-CM

## 2017-03-25 DIAGNOSIS — J18.9 PNEUMONIA OF LEFT LOWER LOBE DUE TO INFECTIOUS ORGANISM: Primary | ICD-10-CM

## 2017-03-25 DIAGNOSIS — D72.829 LEUKOCYTOSIS, UNSPECIFIED TYPE: ICD-10-CM

## 2017-03-25 DIAGNOSIS — N17.9 ACUTE KIDNEY INJURY (H): ICD-10-CM

## 2017-03-25 PROBLEM — E86.0 DEHYDRATION: Status: ACTIVE | Noted: 2017-03-25

## 2017-03-25 PROBLEM — M25.569 KNEE PAIN: Status: ACTIVE | Noted: 2017-03-25

## 2017-03-25 LAB
ALBUMIN UR-MCNC: NEGATIVE MG/DL
ANION GAP SERPL CALCULATED.3IONS-SCNC: 7 MMOL/L (ref 3–14)
APPEARANCE UR: CLEAR
BASOPHILS # BLD AUTO: 0.1 10E9/L (ref 0–0.2)
BASOPHILS NFR BLD AUTO: 0.3 %
BILIRUB UR QL STRIP: NEGATIVE
BUN SERPL-MCNC: 21 MG/DL (ref 7–30)
CALCIUM SERPL-MCNC: 8.9 MG/DL (ref 8.5–10.1)
CHLORIDE SERPL-SCNC: 98 MMOL/L (ref 94–109)
CO2 SERPL-SCNC: 29 MMOL/L (ref 20–32)
COLOR UR AUTO: YELLOW
CREAT SERPL-MCNC: 1.6 MG/DL (ref 0.52–1.04)
CRP SERPL-MCNC: 140 MG/L (ref 0–8)
DIFFERENTIAL METHOD BLD: ABNORMAL
EOSINOPHIL # BLD AUTO: 0.2 10E9/L (ref 0–0.7)
EOSINOPHIL NFR BLD AUTO: 0.9 %
ERYTHROCYTE [DISTWIDTH] IN BLOOD BY AUTOMATED COUNT: 12.6 % (ref 10–15)
ERYTHROCYTE [SEDIMENTATION RATE] IN BLOOD BY WESTERGREN METHOD: 39 MM/H (ref 0–30)
GFR SERPL CREATININE-BSD FRML MDRD: 32 ML/MIN/1.7M2
GLUCOSE SERPL-MCNC: 117 MG/DL (ref 70–99)
GLUCOSE UR STRIP-MCNC: NEGATIVE MG/DL
HCT VFR BLD AUTO: 35.2 % (ref 35–47)
HGB BLD-MCNC: 12 G/DL (ref 11.7–15.7)
HGB UR QL STRIP: NEGATIVE
IMM GRANULOCYTES # BLD: 0.1 10E9/L (ref 0–0.4)
IMM GRANULOCYTES NFR BLD: 0.8 %
KETONES UR STRIP-MCNC: NEGATIVE MG/DL
LACTATE SERPL-SCNC: 1.5 MMOL/L (ref 0.4–2)
LEUKOCYTE ESTERASE UR QL STRIP: NEGATIVE
LYMPHOCYTES # BLD AUTO: 2.9 10E9/L (ref 0.8–5.3)
LYMPHOCYTES NFR BLD AUTO: 17.3 %
MCH RBC QN AUTO: 30.8 PG (ref 26.5–33)
MCHC RBC AUTO-ENTMCNC: 34.1 G/DL (ref 31.5–36.5)
MCV RBC AUTO: 90 FL (ref 78–100)
MONOCYTES # BLD AUTO: 1 10E9/L (ref 0–1.3)
MONOCYTES NFR BLD AUTO: 5.9 %
NEUTROPHILS # BLD AUTO: 12.6 10E9/L (ref 1.6–8.3)
NEUTROPHILS NFR BLD AUTO: 74.8 %
NITRATE UR QL: NEGATIVE
NRBC # BLD AUTO: 0 10*3/UL
NRBC BLD AUTO-RTO: 0 /100
PH UR STRIP: 5.5 PH (ref 4.7–8)
PLATELET # BLD AUTO: 341 10E9/L (ref 150–450)
POTASSIUM SERPL-SCNC: 3.3 MMOL/L (ref 3.4–5.3)
RBC # BLD AUTO: 3.9 10E12/L (ref 3.8–5.2)
SODIUM SERPL-SCNC: 134 MMOL/L (ref 133–144)
SP GR UR STRIP: 1.01 (ref 1–1.03)
URN SPEC COLLECT METH UR: NORMAL
UROBILINOGEN UR STRIP-MCNC: NORMAL MG/DL (ref 0–2)
WBC # BLD AUTO: 16.9 10E9/L (ref 4–11)

## 2017-03-25 PROCEDURE — 96374 THER/PROPH/DIAG INJ IV PUSH: CPT

## 2017-03-25 PROCEDURE — 96375 TX/PRO/DX INJ NEW DRUG ADDON: CPT

## 2017-03-25 PROCEDURE — 99285 EMERGENCY DEPT VISIT HI MDM: CPT | Mod: 25

## 2017-03-25 PROCEDURE — 40000275 ZZH STATISTIC RCP TIME EA 10 MIN

## 2017-03-25 PROCEDURE — 85025 COMPLETE CBC W/AUTO DIFF WBC: CPT | Performed by: PHYSICIAN ASSISTANT

## 2017-03-25 PROCEDURE — 87040 BLOOD CULTURE FOR BACTERIA: CPT | Performed by: NURSE PRACTITIONER

## 2017-03-25 PROCEDURE — 80048 BASIC METABOLIC PNL TOTAL CA: CPT | Performed by: PHYSICIAN ASSISTANT

## 2017-03-25 PROCEDURE — 25000132 ZZH RX MED GY IP 250 OP 250 PS 637: Mod: GY | Performed by: NURSE PRACTITIONER

## 2017-03-25 PROCEDURE — 99284 EMERGENCY DEPT VISIT MOD MDM: CPT | Performed by: PHYSICIAN ASSISTANT

## 2017-03-25 PROCEDURE — 25000128 H RX IP 250 OP 636: Performed by: PHYSICIAN ASSISTANT

## 2017-03-25 PROCEDURE — 86140 C-REACTIVE PROTEIN: CPT | Performed by: PHYSICIAN ASSISTANT

## 2017-03-25 PROCEDURE — 71020 ZZHC CHEST TWO VIEWS, FRONT/LAT: CPT | Mod: TC

## 2017-03-25 PROCEDURE — 81003 URINALYSIS AUTO W/O SCOPE: CPT | Performed by: PHYSICIAN ASSISTANT

## 2017-03-25 PROCEDURE — A9270 NON-COVERED ITEM OR SERVICE: HCPCS | Mod: GY | Performed by: HOSPITALIST

## 2017-03-25 PROCEDURE — 25000132 ZZH RX MED GY IP 250 OP 250 PS 637: Mod: GY | Performed by: PHYSICIAN ASSISTANT

## 2017-03-25 PROCEDURE — 25000125 ZZHC RX 250: Performed by: PHYSICIAN ASSISTANT

## 2017-03-25 PROCEDURE — 40000786 ZZHCL STATISTIC ACTIVE MRSA SURVEILLANCE CULTURE: Performed by: NURSE PRACTITIONER

## 2017-03-25 PROCEDURE — 96361 HYDRATE IV INFUSION ADD-ON: CPT

## 2017-03-25 PROCEDURE — 25000132 ZZH RX MED GY IP 250 OP 250 PS 637: Mod: GY | Performed by: HOSPITALIST

## 2017-03-25 PROCEDURE — 85652 RBC SED RATE AUTOMATED: CPT | Performed by: PHYSICIAN ASSISTANT

## 2017-03-25 PROCEDURE — G0378 HOSPITAL OBSERVATION PER HR: HCPCS

## 2017-03-25 PROCEDURE — 99220 ZZC INITIAL OBSERVATION CARE,LEVL III: CPT | Performed by: NURSE PRACTITIONER

## 2017-03-25 PROCEDURE — 25000128 H RX IP 250 OP 636: Performed by: NURSE PRACTITIONER

## 2017-03-25 PROCEDURE — A9270 NON-COVERED ITEM OR SERVICE: HCPCS | Mod: GY | Performed by: NURSE PRACTITIONER

## 2017-03-25 PROCEDURE — 36415 COLL VENOUS BLD VENIPUNCTURE: CPT | Performed by: NURSE PRACTITIONER

## 2017-03-25 PROCEDURE — 83605 ASSAY OF LACTIC ACID: CPT | Performed by: NURSE PRACTITIONER

## 2017-03-25 PROCEDURE — A9270 NON-COVERED ITEM OR SERVICE: HCPCS | Mod: GY | Performed by: PHYSICIAN ASSISTANT

## 2017-03-25 PROCEDURE — 25000125 ZZHC RX 250: Performed by: NURSE PRACTITIONER

## 2017-03-25 RX ORDER — IPRATROPIUM BROMIDE AND ALBUTEROL SULFATE 2.5; .5 MG/3ML; MG/3ML
1 SOLUTION RESPIRATORY (INHALATION) EVERY 4 HOURS PRN
Status: DISCONTINUED | OUTPATIENT
Start: 2017-03-25 | End: 2017-03-27 | Stop reason: HOSPADM

## 2017-03-25 RX ORDER — NALOXONE HYDROCHLORIDE 0.4 MG/ML
.1-.4 INJECTION, SOLUTION INTRAMUSCULAR; INTRAVENOUS; SUBCUTANEOUS
Status: DISCONTINUED | OUTPATIENT
Start: 2017-03-25 | End: 2017-03-27 | Stop reason: HOSPADM

## 2017-03-25 RX ORDER — OXYCODONE AND ACETAMINOPHEN 5; 325 MG/1; MG/1
1-2 TABLET ORAL EVERY 4 HOURS PRN
Status: DISCONTINUED | OUTPATIENT
Start: 2017-03-25 | End: 2017-03-26

## 2017-03-25 RX ORDER — ATORVASTATIN CALCIUM 40 MG/1
40 TABLET, FILM COATED ORAL DAILY
Status: DISCONTINUED | OUTPATIENT
Start: 2017-03-26 | End: 2017-03-27 | Stop reason: HOSPADM

## 2017-03-25 RX ORDER — DOXEPIN HYDROCHLORIDE 25 MG/1
25 CAPSULE ORAL AT BEDTIME
Status: DISCONTINUED | OUTPATIENT
Start: 2017-03-25 | End: 2017-03-27 | Stop reason: HOSPADM

## 2017-03-25 RX ORDER — ONDANSETRON 4 MG/1
4 TABLET, ORALLY DISINTEGRATING ORAL EVERY 6 HOURS PRN
Status: DISCONTINUED | OUTPATIENT
Start: 2017-03-25 | End: 2017-03-27 | Stop reason: HOSPADM

## 2017-03-25 RX ORDER — LOPERAMIDE HCL 2 MG
2 CAPSULE ORAL 4 TIMES DAILY PRN
Status: DISCONTINUED | OUTPATIENT
Start: 2017-03-25 | End: 2017-03-27 | Stop reason: HOSPADM

## 2017-03-25 RX ORDER — ESCITALOPRAM OXALATE 20 MG/1
20 TABLET ORAL 2 TIMES DAILY
Status: DISCONTINUED | OUTPATIENT
Start: 2017-03-25 | End: 2017-03-27 | Stop reason: HOSPADM

## 2017-03-25 RX ORDER — ESCITALOPRAM OXALATE 20 MG/1
20 TABLET ORAL AT BEDTIME
Status: DISCONTINUED | OUTPATIENT
Start: 2017-03-25 | End: 2017-03-25

## 2017-03-25 RX ORDER — NICOTINE 21 MG/24HR
1 PATCH, TRANSDERMAL 24 HOURS TRANSDERMAL EVERY 24 HOURS
Status: DISCONTINUED | OUTPATIENT
Start: 2017-03-26 | End: 2017-03-27 | Stop reason: HOSPADM

## 2017-03-25 RX ORDER — BENAZEPRIL HYDROCHLORIDE 10 MG/1
10 TABLET ORAL DAILY
Status: CANCELLED | OUTPATIENT
Start: 2017-03-25

## 2017-03-25 RX ORDER — ASPIRIN 81 MG/1
81 TABLET ORAL 2 TIMES DAILY
Status: DISCONTINUED | OUTPATIENT
Start: 2017-03-25 | End: 2017-03-27 | Stop reason: HOSPADM

## 2017-03-25 RX ORDER — MONTELUKAST SODIUM 10 MG/1
10 TABLET ORAL DAILY
Status: DISCONTINUED | OUTPATIENT
Start: 2017-03-26 | End: 2017-03-27 | Stop reason: HOSPADM

## 2017-03-25 RX ORDER — POTASSIUM CHLORIDE 1500 MG/1
20 TABLET, EXTENDED RELEASE ORAL ONCE
Status: COMPLETED | OUTPATIENT
Start: 2017-03-25 | End: 2017-03-25

## 2017-03-25 RX ORDER — SODIUM CHLORIDE 9 MG/ML
1000 INJECTION, SOLUTION INTRAVENOUS CONTINUOUS
Status: DISCONTINUED | OUTPATIENT
Start: 2017-03-25 | End: 2017-03-27 | Stop reason: HOSPADM

## 2017-03-25 RX ORDER — OXYCODONE AND ACETAMINOPHEN 5; 325 MG/1; MG/1
1 TABLET ORAL ONCE
Status: COMPLETED | OUTPATIENT
Start: 2017-03-25 | End: 2017-03-25

## 2017-03-25 RX ORDER — POTASSIUM CHLORIDE 750 MG/1
10 TABLET, EXTENDED RELEASE ORAL 2 TIMES DAILY WITH MEALS
Status: DISCONTINUED | OUTPATIENT
Start: 2017-03-25 | End: 2017-03-27 | Stop reason: HOSPADM

## 2017-03-25 RX ORDER — ALPRAZOLAM 1 MG
1 TABLET ORAL 3 TIMES DAILY PRN
Status: DISCONTINUED | OUTPATIENT
Start: 2017-03-25 | End: 2017-03-26

## 2017-03-25 RX ORDER — ONDANSETRON 2 MG/ML
4 INJECTION INTRAMUSCULAR; INTRAVENOUS EVERY 6 HOURS PRN
Status: DISCONTINUED | OUTPATIENT
Start: 2017-03-25 | End: 2017-03-27 | Stop reason: HOSPADM

## 2017-03-25 RX ADMIN — RANITIDINE HYDROCHLORIDE 300 MG: 150 TABLET, FILM COATED ORAL at 16:32

## 2017-03-25 RX ADMIN — ASPIRIN 81 MG: 81 TABLET, COATED ORAL at 21:06

## 2017-03-25 RX ADMIN — AZITHROMYCIN MONOHYDRATE 500 MG: 500 INJECTION, POWDER, LYOPHILIZED, FOR SOLUTION INTRAVENOUS at 19:13

## 2017-03-25 RX ADMIN — SODIUM CHLORIDE 1000 ML: 9 INJECTION, SOLUTION INTRAVENOUS at 18:11

## 2017-03-25 RX ADMIN — OXYCODONE AND ACETAMINOPHEN 1 TABLET: 5; 325 TABLET ORAL at 22:16

## 2017-03-25 RX ADMIN — VANCOMYCIN HYDROCHLORIDE 1500 MG: 1 INJECTION, POWDER, LYOPHILIZED, FOR SOLUTION INTRAVENOUS at 15:50

## 2017-03-25 RX ADMIN — ESCITALOPRAM 20 MG: 20 TABLET, FILM COATED ORAL at 21:06

## 2017-03-25 RX ADMIN — PANTOPRAZOLE SODIUM 40 MG: 40 INJECTION, POWDER, FOR SOLUTION INTRAVENOUS at 19:11

## 2017-03-25 RX ADMIN — ALPRAZOLAM 1 MG: 1 TABLET ORAL at 19:20

## 2017-03-25 RX ADMIN — OXYCODONE HYDROCHLORIDE AND ACETAMINOPHEN 1 TABLET: 5; 325 TABLET ORAL at 16:32

## 2017-03-25 RX ADMIN — POTASSIUM CHLORIDE 20 MEQ: 20 TABLET, EXTENDED RELEASE ORAL at 15:54

## 2017-03-25 RX ADMIN — LOPERAMIDE HYDROCHLORIDE 2 MG: 2 CAPSULE ORAL at 22:10

## 2017-03-25 RX ADMIN — SODIUM CHLORIDE 500 MG: 9 INJECTION, SOLUTION INTRAVENOUS at 18:10

## 2017-03-25 RX ADMIN — SODIUM CHLORIDE 1000 ML: 9 INJECTION, SOLUTION INTRAVENOUS at 15:40

## 2017-03-25 RX ADMIN — DOXEPIN HYDROCHLORIDE 25 MG: 25 CAPSULE ORAL at 21:06

## 2017-03-25 RX ADMIN — OXYCODONE AND ACETAMINOPHEN 1 TABLET: 5; 325 TABLET ORAL at 21:06

## 2017-03-25 RX ADMIN — POTASSIUM CHLORIDE 10 MEQ: 750 TABLET, FILM COATED, EXTENDED RELEASE ORAL at 19:20

## 2017-03-25 ASSESSMENT — ENCOUNTER SYMPTOMS
SHORTNESS OF BREATH: 0
FEVER: 0
DIZZINESS: 0
CHILLS: 0
COLOR CHANGE: 1
DYSURIA: 0
APPETITE CHANGE: 1
HEADACHES: 0
CHEST TIGHTNESS: 0
ACTIVITY CHANGE: 1
NAUSEA: 0
VOMITING: 0
ABDOMINAL PAIN: 0

## 2017-03-25 NOTE — IP AVS SNAPSHOT
[facility]    [address] [place] [id number]    Phone:  [phone]    Fax:  [phone]                                       After Visit Summary   3/25/2017    Sydni Hardin    MRN: [id number]           After Visit Summary Signature Page     I have received my discharge instructions, and my questions have been answered. I have discussed any challenges I see with this plan with the nurse or doctor.    ..........................................................................................................................................  Patient/Patient Representative Signature      ..........................................................................................................................................  Patient Representative Print Name and Relationship to Patient    ..................................................               ................................................  Date                                            Time    ..........................................................................................................................................  Reviewed by Signature/Title    ...................................................              ..............................................  Date                                                            Time

## 2017-03-25 NOTE — IP AVS SNAPSHOT
Sydni Tovar #[id number] (CSN: [id number])  (67 year old F)  (Adm: 17)     [id number]               [facility]: [phone]            Patient Demographics     Patient Name Sex          Age SSN Address Phone    Sydni Tovar Female 1950 (67 year old) [id number]  [place] [address] [phone] (Home)  [phone] (Mobile) *Preferred*      Emergency Contact(s)     Name Relation Home Work Mobile    Devorah Townsend Daughter   [phone]    GABY TOWNSEND Daughter [phone]      Yenifer Elliott Friend [phone]      Axel Isaac Brother [phone]      eugenia kaur [phone]        Admission Information     Attending Provider Admitting Provider Admission Type Admission Date/Time    Shun Iyer MD Sharp, Sereen D, MD Emergency 17  1424    Discharge Date Hospital Service Auth/Cert Status Service Area     General Medicine Incomplete [facility]    Unit Room/Bed Admission Status       [facility] 311/3112-1 Admission (Confirmed)       Admission     Complaint    Knee pain      Hospital Account     Name Acct ID Class Status Primary Coverage    Sydni Tovar [id number] Observation Open MEDICARE - MEDICARE FOR HB SUPPLEMENT            Guarantor Account (for Hospital Account #[id number])     Name Relation to Pt Service Area Active? Acct Type    Sydni Tovar Self RANGE Yes Personal/Family    Address Phone          [address]  [place] [address] [phone](H)              Coverage Information (for Hospital Account #[id number])     1. MEDICARE/MEDICARE FOR HB SUPPLEMENT     F/O Payor/Plan Precert #    MEDICARE/MEDICARE FOR HB SUPPLEMENT     Subscriber Subscriber #    Sydni Tovar [id number]    Address Phone    ATTN CLAIMS  [address]  [facility] [place] [address] [phone]          2. BCBS/BCBS PLATINUM BLUE     F/O Payor/Plan Precert #    BCBS/BCBS PLATINUM  "BLUE     Subscriber Subscriber #    Sydni Tovar [id number]    Address Phone    [address] [address]  [place] [address] [phone]                                                      INTERAGENCY TRANSFER FORM - PHYSICIAN ORDERS   3/25/2017                       [facility]: [phone]            Attending Provider: Shun Iyer MD     Allergies:  Bee Pollen, Codeine, Keflex [Cephalexin Hcl], Sulfonamide Derivatives, Ciprofloxacin, Bupropion Hcl, Celecoxib, Clonazepam, Erythromycin, Lisinopril, Nifedipine, Strawberries [Strawberry], Tramadol Hcl    Infection:  None   Service:  GENERAL MEDI    Ht:  1.473 m (4' 10\")   Wt:  78.2 kg (172 lb 6.4 oz)   Admission Wt:  75.8 kg (167 lb 1.7 oz)    BMI:  36.03 kg/m 2   BSA:  1.79 m 2            ED Clinical Impression     Diagnosis Description Comment Added By Time Added    Acute kidney injury (H) [N17.9] Acute kidney injury (H) [N17.9]  Savi Mahmood PA-C 3/25/2017  3:32 PM    Leukocytosis, unspecified type [D72.829] Leukocytosis, unspecified type [D72.829]  Savi Mahmood PA-C 3/25/2017  6:35 PM    Postoperative state [Z98.890] Postoperative state [Z98.890]  Savi Mahmood PA-C 3/25/2017  6:36 PM      Hospital Problems as of 3/27/2017              Priority Class Noted POA    Asthma Medium  3/20/2009 Yes    Essential hypertension   1/5/2012 Yes    Dysthymia   12/31/2014 Yes    S/P total knee replacement using cement, right Medium  3/21/2017 Yes    * (Principal)Leukocytosis Medium  3/25/2017 Yes    Dehydration Medium  3/25/2017 Yes    Knee pain Medium  3/25/2017 Yes      Non-Hospital Problems as of 3/27/2017              Priority Class Noted    Osteoarthrosis, unspecified whether generalized or localized, involving lower leg   10/4/2001    Headache   12/10/2003    Calculus of gallbladder   2/2/2006    Advanced care planning/counseling discussion   11/28/2012    Lung nodules   6/19/2015    Tobacco abuse   6/19/2015    SCC (squamous cell carcinoma), " face Medium  8/5/2015    IC (interstitial cystitis) Medium  10/28/2015    Primary osteoarthritis of both knees Medium  10/5/2016    ACP (advance care planning)   10/5/2016      Code Status History     Date Active Date Inactive Code Status Order ID Comments User Context    3/27/2017 10:22 AM  Full Code [id number]  Shun Iyer MD Outpatient    3/25/2017  5:30 PM 3/27/2017 10:22 AM Full Code [id number]  Jennifer Junior NP Inpatient    3/24/2017 11:05 AM 3/25/2017  5:30 PM Full Code [id number]  Jennifer Junior NP Outpatient    3/21/2017  6:12 PM 3/24/2017 11:05 AM Full Code [id number]  Mitchell Pelletier PA-C Inpatient      Current Code Status     Date Active Code Status Order ID Comments User Context       Prior      Summary of Visit     Reason for your hospital stay       Sydni Hardin is a 67 year old woman who presented with concerns from her family of possible knee infection after recent TKA and sedation. The patient was concerned as well of knee pain. pertinent history is significant for total knee arthroplasty 3/21 with discharge 3/24.  Her postoperative course had been unremarkable with the exception for moderate degree of knee pain.  Evaluation on her re-presentation showed a mild leukocytosis and chest radiograph with right lower lobe infiltrate suggesting atelectasis versus inflammatory process. She responded to usual treatment.  Preemptive antibiotics were continued.  Chest radiograph this morning is improved.  However, pneumonia cannot be eliminated with confidence.  Oral levofloxacin is continued.  In other respects, she is discharged with plans for her to continue rehabilitation.  Importance of therapy and other rehabilitation emphasized with the patient.  Pain has been under good control with several times daily doses of Percocet.                Medication Review      START taking        Dose / Directions Comments    levofloxacin 750 MG tablet   Commonly known as:  LEVAQUIN    Indication:  Community Acquired Pneumonia   Used for:  Pneumonia of left lower lobe due to infectious organism        Dose:  750 mg   Start taking on:  3/28/2017   Take 1 tablet (750 mg) by mouth daily   Quantity:  3 tablet   Refills:  0          CONTINUE these medications which have NOT CHANGED        Dose / Directions Comments    ALPRAZolam 1 MG tablet   Commonly known as:  XANAX   Used for:  Anxiety        Dose:  1 mg   Take 1 tablet (1 mg) by mouth 3 times daily as needed for anxiety   Quantity:  30 tablet   Refills:  0        aspirin 81 MG EC tablet   Used for:  S/P total knee replacement using cement, right        Dose:  81 mg   Take 1 tablet (81 mg) by mouth 2 times daily   Refills:  0        atorvastatin 40 MG tablet   Commonly known as:  LIPITOR   Used for:  Mixed hyperlipidemia        TAKE 1 TABLET BY MOUTH DAILY   Quantity:  90 tablet   Refills:  0        benazepril 10 MG tablet   Commonly known as:  LOTENSIN   Used for:  HTN (hypertension)        TAKE 1 TABLET BY MOUTH DAILY   Quantity:  90 tablet   Refills:  0        benzonatate 200 MG capsule   Commonly known as:  TESSALON   Used for:  Preop general physical exam, Tobacco abuse        TAKE 1 CAPSULE BY MOUTH 3 TIMES A DAY AS NEEDED FOR COUGH   Quantity:  60 capsule   Refills:  3        budesonide-formoterol 160-4.5 MCG/ACT Inhaler   Commonly known as:  SYMBICORT   Used for:  Asthma        Dose:  2 puff   Inhale 2 puffs into the lungs 2 times daily. As needed   Quantity:  1 Inhaler   Refills:  11        butalbital-acetaminophen-caffeine -40 MG per tablet   Commonly known as:  FIORICET/ESGIC   Used for:  Scalp hematoma, sequela        Dose:  1 tablet   Take 1 tablet by mouth every 6 hours as needed   Quantity:  5 tablet   Refills:  0        cimetidine 800 MG tablet   Commonly known as:  TAGAMET   Used for:  Esophageal reflux        Dose:  800 mg   Take 1 tablet (800 mg) by mouth At Bedtime As needed   Quantity:  90 tablet   Refills:  3         DOXEPIN HCL PO        Dose:  25 mg   Take 25 mg by mouth At Bedtime   Refills:  0        EPIPEN 2-JAIRO 0.3 MG/0.3ML injection   Used for:  Anaphylactic reaction   Generic drug:  EPINEPHrine        INJECT 1 SYRINGE INTRAMUSCULARLY ONCE AS NEEDED FOR ANAPHYLAXIS   Quantity:  2 each   Refills:  0        escitalopram 20 MG tablet   Commonly known as:  LEXAPRO   Used for:  Dysthymia        TAKE 1 TABLET BY MOUTH 2 TIMES A DAY   Quantity:  180 tablet   Refills:  0        hydrochlorothiazide 25 MG tablet   Commonly known as:  HYDRODIURIL   Used for:  Benign essential hypertension        TAKE 1 TABLET BY MOUTH DAILY   Quantity:  90 tablet   Refills:  3        ipratropium - albuterol 0.5 mg/2.5 mg/3 mL 0.5-2.5 (3) MG/3ML neb solution   Commonly known as:  DUONEB   Used for:  Acute bronchospasm        Dose:  1 vial   Take 1 vial (3 mLs) by nebulization every 6 hours as needed   Quantity:  360 mL   Refills:  2        loperamide 2 MG capsule   Commonly known as:  IMODIUM   Used for:  Functional diarrhea        Dose:  2 mg   Take 1 capsule (2 mg) by mouth 4 times daily as needed for diarrhea   Quantity:  20 capsule   Refills:  0        meloxicam 15 MG tablet   Commonly known as:  MOBIC   Used for:  Pain        TAKE 1 TABLET BY MOUTH DAILY   Quantity:  90 tablet   Refills:  1        montelukast 10 MG tablet   Commonly known as:  SINGULAIR   Used for:  Mixed hyperlipidemia        TAKE 1 TABLET BY MOUTH DAILY   Quantity:  90 tablet   Refills:  3        nicotine 21 MG/24HR 24 hr patch   Commonly known as:  NICODERM CQ        Dose:  1 patch   Place 1 patch onto the skin every 24 hours   Refills:  0        * order for DME   Used for:  Primary osteoarthritis of right knee        Equipment being ordered: Nebulizer supplies   Quantity:  2 each   Refills:  11        * order for DME   Used for:  Primary osteoarthritis of right knee        Equipment being ordered: mcgee walker   Quantity:  1 each   Refills:  0        * order for DME         Equipment being ordered: toilet seat riser   Quantity:  1 each   Refills:  0        oxyCODONE-acetaminophen 5-325 MG per tablet   Commonly known as:  PERCOCET   Used for:  Primary osteoarthritis of both knees        Dose:  1-2 tablet   Take 1-2 tablets by mouth every 4 hours as needed for moderate to severe pain   Quantity:  10 tablet   Refills:  0    Not in addition to medication prescribed at discharge 3/24       potassium chloride 10 MEQ tablet   Commonly known as:  K-TAB,KLOR-CON   Used for:  Benign essential hypertension        TAKE 1 TABLET BY MOUTH 2 TIMES DAILY WITH FOOD   Quantity:  180 tablet   Refills:  1        temazepam 30 MG capsule   Commonly known as:  RESTORIL   Used for:  Persistent insomnia        TAKE 1 CAPSULE BY MOUTH NIGHTLY AS NEEDED FOR SLEEP   Quantity:  10 capsule   Refills:  0        VENTOLIN  (90 BASE) MCG/ACT Inhaler   Used for:  Tobacco abuse   Generic drug:  albuterol        USE 1 PUFF FOUR TIMES A DAY AS NEEDED   Quantity:  18 g   Refills:  3        * Notice:  This list has 3 medication(s) that are the same as other medications prescribed for you. Read the directions carefully, and ask your doctor or other care provider to review them with you.            After Care     Activity - Up with assistive device       Per TKA protocol       Activity - Up with nursing assistance       Per TKA protocol       Additional Discharge Instructions       Change dressing daily or when soiled. May shower with press and seal wrap over dressing. Change dressing if it becomes wet. Sutures/staples to be removed 14 days post-op at follow-up.       Advance Diet as Tolerated       Follow this diet upon discharge: Orders Placed This Encounter      Snacks/Supplements Adult: Between Meals      Regular Diet Adult  High protein diet encouraged       Fall precautions           General info for SNF       Length of Stay Estimate: Short Term Care: Estimated # of Days <30  Condition at Discharge:  Improving  Level of care:skilled   Rehabilitation Potential: Good  Admission H&P remains valid and up-to-date: Yes  Recent Chemotherapy: N/A  Use Nursing Home Standing Orders: Yes       Mantoux instructions       Give two-step Mantoux (PPD) Per Facility Policy Yes               Further instructions from your care team       YOU HAVE A FOLLOW UP APPOINTMENT WITH DR. KHAN AT THE [facility] IN [place] ON April 18TH AT 10:45. IF YOU HAVE ANY QUESTIONS OR NEED TO RESCHEDULE PLEASE CALL [phone].      DRESSING CHANGE TO RIGHT KNEE HAS BEEN DONE WITH 4X4 GAUZE AND METAPORE TAPE.     Referrals     Physical Therapy Adult Consult       Evaluate and treat as clinically indicated.    Reason:  Per  Right TKA protocol             Follow-Up Appointment Instructions     Follow Up and recommended labs and tests       Follow up with primary care provider in 2-3 weeks for follow up  Follow up with Dr. Olson in 14 days after surgery (4/4 or as available)             Your next 10 appointments already scheduled     Apr 18, 2017 11:00 AM CDT   (Arrive by 10:45 AM)   SHORT with Gibran Khan MD   [facility] ([facility])    [address]  [facility] [address]   [phone]            Apr 18, 2017  2:20 PM CDT   (Arrive by 2:00 PM)   Return Visit with Moisés Olson MD    ORTHOPEDICS ([facility])    [address] [facility] [id number] [phone]              Statement of Approval     Ordered          03/27/17 1023  I have reviewed and agree with all the recommendations and orders detailed in this document.  EFFECTIVE NOW     Approved and electronically signed by:  Shun Iyer MD                                                 INTERAGENCY TRANSFER FORM - NURSING   3/25/2017                       [facility]: [phone]            Attending Provider: Shun Iyer MD     Allergies:  Bee Pollen, Codeine, Keflex [Cephalexin Hcl], Sulfonamide Derivatives,  "Ciprofloxacin, Bupropion Hcl, Celecoxib, Clonazepam, Erythromycin, Lisinopril, Nifedipine, Strawberries [Strawberry], Tramadol Hcl    Infection:  None   Service:  GENERAL MEDI    Ht:  1.473 m (4' 10\")   Wt:  78.2 kg (172 lb 6.4 oz)   Admission Wt:  75.8 kg (167 lb 1.7 oz)    BMI:  36.03 kg/m 2   BSA:  1.79 m 2            Advance Directives        Does patient have a scanned Advance Directive/ACP document in EPIC?           No        Immunizations     Name Date      Influenza (High Dose) 3 valent vaccine 01/06/17     Influenza (High Dose) 3 valent vaccine 11/17/14     Influenza Vaccine IM 3yrs+ 4 Valent IIV4 12/26/13     Pneumococcal 23 valent 10/18/12     TDAP Vaccine (Boostrix) 08/15/13       ASSESSMENT     Discharge Profile Flowsheet     EXPECTED DISCHARGE     Passing flatus  yes 03/27/17 1025    Expected Discharge Date  03/27/17 03/26/17 1414   COMMUNICATION ASSESSMENT      DISCHARGE NEEDS ASSESSMENT     Patient's communication style  spoken language (English or Bilingual) 03/25/17 1818    Patient/family verbalizes understanding of discharge plan recommendations?  Yes 03/26/17 1414   FINAL RESOURCES      Medical Team notified of plan?  yes 03/26/17 1414   Resources List  Skilled Nursing Facility 03/26/17 1414    Readmission Within The Last 30 Days  current reason for admission unrelated to previous admission 03/26/17 1414   Other Resources  -- (none) 03/26/17 1414    Equipment Currently Used at Home  walker, rolling 03/26/17 1414   Skilled Nursing Facility  -- ([facility]) 03/26/17 1414    Transportation Available  van, wheelchair accessible 03/26/17 1414   PAS Number  -- ([id number]) 03/24/17 1047    Current Discharge Risk  dependent with mobility/activities of daily living 03/26/17 1414   Senior Linkage Line Referral Placed  03/24/17 03/24/17 1047    Interventions provided  reminder about participation in her PT 03/26/17 1414   F/U Appointment Brochure Provided  -- (N/A) 03/26/17 1414 " "   # of Referrals Placed by CTS  Senior Linkage Line;;Transportation;Communication hand-offs to next level of Care Providers (SNF) 03/24/17 1047   Referrals Placed  -- (Skilled Nursing) 03/26/17 1414    Key Recommendations  fully participating in treatment plan, choice of placement in her facility 03/26/17 1414   SKIN      Does Patient Need a Referral for Clinic CC  No 03/26/17 1414   Inspection  Partial (identify areas NOT inspected) 03/27/17 1025    Discharge Planning Comments  -- (discussion of her stressors) 03/26/17 1414   Skin areas NOT inspected  Hip, left;Hip, right;Buttock, left;Buttock, right;Sacrum;Coccyx;Knee, right 03/27/17 1025    ASSESSMENT OF FUNCTIONAL STATUS     Skin WDL  ex 03/27/17 1025    Prior to admission patient needed assistance with:  Shopping 03/26/17 1414   Skin Color/Characteristics  bruised (ecchymotic) 03/27/17 1025    GASTROINTESTINAL (ADULT,PEDIATRIC,OB)     Skin Temperature  warm 03/27/17 1025    GI WDL  WDL 03/27/17 1025   Skin Moisture  dry 03/27/17 1025    All Quadrants Bowel Sounds  audible and active in all quadrants 03/27/17 1025   Skin Integrity  bruise(s);incision(s) 03/27/17 1025    All Quadrants Palpation  soft/nontender 03/25/17 1447   SAFETY      Last Bowel Movement  03/27/17 03/27/17 1025   Safety WDL  WDL 03/27/17 1025    GI Signs/Symptoms  diarrhea 03/26/17 1653                      Assessment WDL (Within Defined Limits) Definitions           Safety WDL     Effective: 09/28/15    Row Information: <b>WDL Definition:</b> Bed in low position, wheels locked; call light in reach; upper side rails up x 2; ID band on<br> <font color=\"gray\"><i>Item=AS safety wdl>>List=AS safety wdl>>Version=F14</i></font>      Skin WDL     Effective: 09/28/15    Row Information: <b>WDL Definition:</b> Warm; dry; intact; elastic; without discoloration; pressure points without redness<br> <font color=\"gray\"><i>Item=AS skin wdl>>List=AS skin wdl>>Version=F14</i></font>      Vitals     " Vital Signs Flowsheet     VITAL SIGNS     Pain Intervention(s)  Repositioned 03/27/17 0939    Temp  98.7  F (37.1  C) 03/27/17 0743   Response to Interventions  Decrease in pain 03/27/17 0939    Temp src  Tympanic 03/27/17 0743   HEIGHT AND WEIGHT      Resp  16 03/27/17 0749   Weight  78.2 kg (172 lb 6.4 oz) (standing) 03/27/17 0701    Pulse  79 03/25/17 1704   MORALES COMA SCALE      Heart Rate  75 03/27/17 0749   Best Eye Response  4-->(E4) spontaneous 03/27/17 1025    Pulse/Heart Rate Source  Monitor 03/27/17 0743   Best Motor Response  6-->(M6) obeys commands 03/27/17 1025    BP  139/61 03/27/17 0751   Best Verbal Response  5-->(V5) oriented 03/27/17 1025    OXYGEN THERAPY     Morales Coma Scale Score  15 03/27/17 1025    SpO2  93 % 03/27/17 0759   POSITIONING      O2 Device  None (Room air) 03/27/17 0759   Body Position  supine, head elevated 03/27/17 1022    Oxygen Delivery  -- 03/26/17 2251   Head of Bed (HOB)  HOB at 30-45 degrees 03/27/17 1022    PAIN/COMFORT     Chair  Recline and up in chair 03/27/17 0919    Patient Currently in Pain  yes 03/27/17 0939   Positioning/Transfer Devices  pillows;in use 03/26/17 1653    Preferred Pain Scale  number (Numeric Rating Pain Scale) 03/27/17 0857   DAILY CARE      0-10 Pain Scale  8 03/27/17 0828   Activity Type  ambulated in room 03/27/17 1022    Pain Location  Knee 03/27/17 0939   Activity Level of Assistance  assistance, 1 person 03/27/17 1022    Pain Orientation  Right 03/27/17 0939   Activity Assistive Device  gait belt;walker 03/27/17 1022    Pain Descriptors  Discomfort 03/27/17 0939                 Patient Lines/Drains/Airways Status    Active LINES/DRAINS/AIRWAYS     Name: Placement date: Placement time: Site: Days: Last dressing change:    Peripheral IV 03/13/17 Right  03/13/17   1050      14     Peripheral IV 03/26/17 Left Lower forearm 03/26/17   2007   Lower forearm   less than 1     Wound 10/16/13 Right Finger (Comment which one) 10/16/13   2013    Finger (Comment which one)   1257     Wound 10/16/13 Lip 10/16/13   2018   Lip   1257     Wound 08/08/14 Right Arm Abrasion(s) 08/08/14   1735   Arm   961     Incision/Surgical Site 03/21/17 Right Knee 03/21/17   1449    5             Patient Lines/Drains/Airways Status    Active PICC/CVC     None            Intake/Output Detail Report     Date Intake     Output   Net    Shift P.O. I.V. IV Piggyback Total Urine Blood Total       Noc 03/25/17 2300 - 03/26/17 0659 -- 1680 -- 1680 -- -- -- 1680    Day 03/26/17 0700 - 03/26/17 1459 240 -- -- 240 -- -- -- 240    Jessie 03/26/17 1500 - 03/26/17 2259 330 2579 -- 2909 -- -- -- 2909    Noc 03/26/17 2300 - 03/27/17 0659 -- -- -- -- -- -- -- 0    Day 03/27/17 0700 - 03/27/17 1459 -- 1132 -- 1132 400 -- 400 732      Last Void/BM       Most Recent Value    Urine Occurrence 3 at 03/27/2017 0701    Stool Occurrence 3 at 03/27/2017 0701      Case Management/Discharge Planning     Case Management/Discharge Planning Flowsheet     REFERRAL INFORMATION     DISCHARGE PLANNING      Did the Initial Social Work Assessment result in a Social Work Case?  Yes 03/26/17 1414   Patient/family verbalizes understanding of discharge plan recommendations?  Yes 03/26/17 1414    Admission Type  observation 03/26/17 1414   Medical Team notified of plan?  yes 03/26/17 1414    Arrived From  skilled nursing facility 03/26/17 1414   Readmission Within The Last 30 Days  current reason for admission unrelated to previous admission 03/26/17 1414    Referral Source  case finding 03/26/17 1414   Transportation Available  van, wheelchair accessible 03/26/17 1414    # of Referrals Placed by CTS  Senior Linkage Line;;Transportation;Communication hand-offs to next level of Care Providers (SNF) 03/24/17 1047   Current Discharge Risk  dependent with mobility/activities of daily living 03/26/17 1414    Reason For Consult  discharge planning 03/26/17 1414   Discharge Planning Comments  -- (discussion of her  stressors) 03/26/17 1414    Record Reviewed  medical record 03/26/17 1414   Interventions provided  reminder about participation in her PT 03/26/17 1414     Assigned to Case  Arin Rico 03/26/17 1414   Key Recommendations  fully participating in treatment plan, choice of placement in her facility 03/26/17 1414    [facility] 03/26/17 1414   Does Patient Need a Referral for Clinic CC  No 03/26/17 1414    Primary Care MD Name  Gibran Alarcon 03/26/17 1414   FINAL NOTE      LIVING ENVIRONMENT     Final Note  See care plan 03/26/17 1414    Lives With  child(whit), adult 03/26/17 1414   FINAL RESOURCES      Living Arrangements  apartment 03/26/17 1414   Equipment Currently Used at Home  walker, rolling 03/26/17 1414    Provides Primary Care For  no one 03/26/17 1414   Resources List  Skilled Nursing Facility 03/26/17 1414    Able to Return to Prior Living Arrangements  yes 03/26/17 1414   Other Resources  -- (none) 03/26/17 1414    HOME SAFETY     Skilled Nursing Facility  -- ([facility]) 03/26/17 1414    Patient Feels Safe Living in Home?  yes 03/26/17 1414   PAS Number  -- ([id number]) 03/24/17 1047    ASSESSMENT OF FAMILY/SOCIAL SUPPORT     Senior Linkage Line Referral Placed  03/24/17 03/24/17 1047    Marital Status   03/26/17 1414   F/U Appointment Brochure Provided  -- (N/A) 03/26/17 1414    Who is your support system?  -- (Friends; Sister Dahiana) 03/26/17 1414   Referrals Placed  -- (Skilled Nursing) 03/26/17 1414    ASSESSMENT OF FUNCTIONAL STATUS     ABUSE RISK SCREEN      Prior to admission patient needed assistance with:  Shopping 03/26/17 1414   QUESTION TO PATIENT:  Has a member of your family or a partner(now or in the past) intimidated, hurt, manipulated, or controlled you in any way?  no 03/25/17 1431    EMPLOYMENT     QUESTION TO PATIENT: Do you feel safe going back to the place where you are living?  yes 03/25/17 1431    Do  you work full or part-time?  no 03/26/17 1414   OBSERVATION: Is there reason to believe there has been maltreatment of a vulnerable adult (ie. Physical/Sexual/Emotional abuse, self neglect, lack of adequate food, shelter, medical care, or financial exploitation)?  no 03/25/17 1431    COPING/STRESS     (R) MENTAL HEALTH SUICIDE RISK      Major Change/Loss/Stressor  child(whit);family problems;hospitalization 03/26/17 1414   Willingness to Contact Staff Member if Feeling Like Hurting Self  yes 03/25/17 1837    EXPECTED DISCHARGE     Are you depressed or being treated for depression?  Yes (Takes antidepressant) 03/25/17 1837    Expected Discharge Date  03/27/17 03/26/17 1414                       [facility]: [phone]            Medication Administration Report for Sydni Hardin as of 03/27/17 1231   Legend:    Given Hold Not Given Due Canceled Entry Other Actions    Time Time (Time) Time  Time-Action       Inactive    Active    Linked        Medications 03/21/17 03/22/17 03/23/17 03/24/17 03/25/17 03/26/17 03/27/17    0.9% sodium chloride infusion  Rate: 125 mL/hr Freq: CONTINUOUS Route: IV  Last Dose: 1,000 mL (03/26/17 0844)  Start: 03/25/17 1507   Admin Instructions: Administer after the bolus.         1811 (1,000 mL)-New Bag        0844 (1,000 mL)-New Bag            ALPRAZolam (XANAX) tablet 0.5 mg  Dose: 0.5 mg Freq: 3 TIMES DAILY PRN Route: PO  PRN Reason: anxiety  Start: 03/26/17 1311   Admin Instructions: Avoid taking with grapefruit juice          2235 (0.5 mg)-Given            aspirin EC EC tablet 81 mg  Dose: 81 mg Freq: 2 TIMES DAILY Route: PO  Start: 03/25/17 2100   Admin Instructions: DO NOT CRUSH.         2106 (81 mg)-Given        1201 (81 mg)-Given       2102 (81 mg)-Given        0831 (81 mg)-Given       [ ] 2100           atorvastatin (LIPITOR) tablet 40 mg  Dose: 40 mg Freq: DAILY Route: PO  Start: 03/26/17 0900         1202 (40 mg)-Given        0831 (40 mg)-Given           doxepin  (SINEquan) capsule 25 mg  Dose: 25 mg Freq: AT BEDTIME Route: PO  Start: 03/25/17 2200        2106 (25 mg)-Given        2102 (25 mg)-Given        [ ] 2200           escitalopram (LEXAPRO) tablet 20 mg  Dose: 20 mg Freq: 2 TIMES DAILY Route: PO  Start: 03/25/17 2100        2106 (20 mg)-Given        1201 (20 mg)-Given       2102 (20 mg)-Given        0831 (20 mg)-Given       [ ] 2100           fluticasone-vilanterol (BREO ELLIPTA) 200-25 MCG/INH oral inhaler 1 puff  Dose: 1 puff Freq: DAILY Route: IN  Start: 03/25/17 1830   Admin Instructions: Formulary alternate for<br>Symbicort 160/4.5 mcg<br>  Rinse mouth after use.         (1924)-Not Given        (0916)-Not Given       1129 (1 puff)-Given       (2102)-Not Given        0759 (1 puff)-Given           ipratropium - albuterol 0.5 mg/2.5 mg/3 mL (DUONEB) neb solution 3 mL  Dose: 1 vial Freq: EVERY 4 HOURS PRN Route: NEBULIZATION  PRN Reasons: wheezing,shortness of breath / dyspnea  Start: 03/25/17 1730        (1754)-Not Given        0338 (3 mL)-Given       (0917)-Not Given       1116 (3 mL)-Given       1458 (3 mL)-Given       2247 (3 mL)-Given        (0759)-Not Given           loperamide (IMODIUM) capsule 2 mg  Dose: 2 mg Freq: 4 TIMES DAILY PRN Route: PO  PRN Reason: diarrhea  Start: 03/25/17 1730        2210 (2 mg)-Given        1201 (2 mg)-Given       1933 (2 mg)-Given            montelukast (SINGULAIR) tablet 10 mg  Dose: 10 mg Freq: DAILY Route: PO  Start: 03/26/17 0900         1201 (10 mg)-Given        0831 (10 mg)-Given           naloxone (NARCAN) injection 0.1-0.4 mg  Dose: 0.1-0.4 mg Freq: EVERY 2 MIN PRN Route: IV  PRN Reason: opioid reversal  Start: 03/25/17 2871   Admin Instructions: For respiratory rate LESS than or EQUAL to 8.  Partial reversal dose:  0.1 mg titrated q 2 minutes for Analgesia Side Effects Monitoring Sedation Level of 3 (frequently drowsy, arousable, drifts to sleep during conversation).Full reversal dose:  0.4 mg bolus for Analgesia Side  Effects Monitoring Sedation Level of 4 (somnolent, minimal or no response to stimulation).               nicotine (NICODERM CQ) 21 MG/24HR 24 hr patch 1 patch  Dose: 1 patch Freq: EVERY 24 HOURS Route: TD  Start: 03/26/17 0900         (1208)-Not Given [C]        (0831)-Not Given           nicotine Patch in Place  Freq: EVERY 8 HOURS Route: TD  Start: 03/25/17 1800   Admin Instructions: Chart every shift, confirming that patch is still in place on patient (no barcode scan needed). See patch order for dose information.         (1859)-Not Given [C]        (0321)-Not Given [C]       (1207)-Not Given               (0224)-Not Given       (0832)-Not Given       [ ] 1600           nicotine patch REMOVAL  Freq: DAILY Route: TD  Start: 03/26/17 0900   Admin Instructions: Remove patch when new patch is applied or patch is discontinued.          1207-Hold [C]        0832 ( )-Patch Removed [C]           ondansetron (ZOFRAN-ODT) ODT tab 4 mg  Dose: 4 mg Freq: EVERY 6 HOURS PRN Route: PO  PRN Reason: nausea  Start: 03/25/17 1730   Admin Instructions: This is Step 1 of nausea and vomiting management.  If nausea not resolved in 15 minutes, go to Step 2 prochlorperazine (COMPAZINE). Do not push through foil backing. Peel back foil and gently remove. Place on tongue immediately. Administration with liquid unnecessary              Or  ondansetron (ZOFRAN) injection 4 mg  Dose: 4 mg Freq: EVERY 6 HOURS PRN Route: IV  PRN Reasons: nausea,vomiting  Start: 03/25/17 1730   Admin Instructions: This is Step 1 of nausea and vomiting management.  If nausea not resolved in 15 minutes, go to Step 2 prochlorperazine (COMPAZINE).  Irritant.               oxyCODONE-acetaminophen (PERCOCET) 5-325 MG per tablet 1 tablet  Dose: 1 tablet Freq: EVERY 4 HOURS PRN Route: PO  PRN Reason: moderate to severe pain  Start: 03/26/17 1643   Admin Instructions: Maximum acetaminophen dose from all sources= 75 mg/kg/day not to exceed 4 grams          1933 (1  tablet)-Given       2346 (1 tablet)-Given        0828 (1 tablet)-Given           pantoprazole (PROTONIX) 40 mg IV push injection  Dose: 40 mg Freq: EVERY 24 HOURS Route: IV  Start: 03/25/17 1745   Admin Instructions: Reconstitute vial with 10mLs Saline and administer IV Push  Irritant.         1911 (40 mg)-Given        2102 (40 mg)-Given        [ ] 1745           potassium chloride (K-TAB,KLOR-CON) CR tablet 10 mEq  Dose: 10 mEq Freq: 2 TIMES DAILY WITH MEALS Route: PO  Start: 03/25/17 1800   Admin Instructions: DO NOT CRUSH.         1920 (10 mEq)-Given        1202 (10 mEq)-Given       1933 (10 mEq)-Given        0831 (10 mEq)-Given       [ ] 1700           sodium chloride (PF) 0.9% PF flush 3 mL  Dose: 3 mL Freq: EVERY 8 HOURS Route: IK  Start: 03/25/17 1434   Admin Instructions: And Q1H PRN, to lock peripheral IV dormant line.         1633 (3 mL)-Given        (0139)-Not Given       (1208)-Not Given       (1620)-Not Given        (0459)-Not Given       (0913)-Not Given       [ ] 1600           sodium chloride (PF) 0.9% PF flush 3 mL  Dose: 3 mL Freq: EVERY 1 HOUR PRN Route: IK  PRN Reason: line flush  PRN Comment: for peripheral IV flush post IV meds  Start: 03/25/17 1433             Future Medications  Medications 03/21/17 03/22/17 03/23/17 03/24/17 03/25/17 03/26/17 03/27/17       levofloxacin (LEVAQUIN) tablet 750 mg  Dose: 750 mg Freq: DAILY Route: PO  Indications of Use: COMMUNITY ACQUIRED PNEUMONIA  Start: 03/28/17 0800   Admin Instructions: Administer at least 2 hrs before or 4 hrs after aluminum, calcium, iron, zinc or magnesium containing products.              Completed Medications  Medications 03/21/17 03/22/17 03/23/17 03/24/17 03/25/17 03/26/17 03/27/17         Dose: 1,000 mL Freq: ONCE Route: IV  Last Dose: 1,000 mL (03/26/17 9515)  Start: 03/26/17 1645   End: 03/26/17 1758         1658 (1,000 mL)-New Bag              Dose: 1,000 mL Freq: ONCE Route: IV  Last Dose: 1,000 mL (03/25/17 1634)  Start:  03/25/17 1507   End: 03/25/17 1628        1540 (1,000 mL)-New Bag       1628-Stopped [C]       1634 (1,000 mL)-Restarted               Dose: 1 tablet Freq: ONCE Route: PO  Start: 03/25/17 1622   End: 03/25/17 1632   Admin Instructions: Maximum acetaminophen dose from all sources= 75 mg/kg/day not to exceed 4 grams         1632 (1 tablet)-Given               Dose: 20 mEq Freq: ONCE Route: PO  Start: 03/25/17 1509   End: 03/25/17 1554   Admin Instructions: DO NOT CRUSH         1554 (20 mEq)-Given               Dose: 300 mg Freq: ONCE Route: PO  Start: 03/25/17 1621   End: 03/25/17 1632        1632 (300 mg)-Given               Dose: 1,500 mg Freq: ONCE Route: IV  Indications of Use: POSTOPERATIVE INFECTION  Last Dose: 1,500 mg (03/25/17 1550)  Start: 03/25/17 1539   End: 03/25/17 1720   Admin Instructions: For an adult with peripheral catheter and dose of 2-2.5 g, infuse over 2 hours.  IF central catheter, doses below 2 g may be given over 1 hour.         1550 (1,500 mg)-New Bag            Discontinued Medications  Medications 03/21/17 03/22/17 03/23/17 03/24/17 03/25/17 03/26/17 03/27/17         Dose: 1 mg Freq: 3 TIMES DAILY PRN Route: PO  PRN Reason: anxiety  Start: 03/25/17 1730   End: 03/26/17 1311   Admin Instructions: Avoid taking with grapefruit juice         1920 (1 mg)-Given        1311-Med Discontinued          Dose: 500 mg Freq: EVERY 24 HOURS Route: IV  Indications of Use: HEALTHCARE-ASSOCIATED PNEUMONIA  Last Dose: 500 mg (03/25/17 1913)  Start: 03/25/17 1800   End: 03/26/17 0642        1913 (500 mg)-New Bag        0642-Med Discontinued          Dose: 20 mg Freq: AT BEDTIME Route: PO  Start: 03/25/17 2200   End: 03/25/17 2059 2059-Med Discontinued           Dose: 750 mg Freq: EVERY 24 HOURS Route: IV  Indications of Use: HEALTHCARE-ASSOCIATED PNEUMONIA  Last Dose: 750 mg (03/27/17 0644)  Start: 03/26/17 0730   End: 03/27/17 0757   Admin Instructions: Irritant. Administer at a rate of no greater  than 100 mL/hr          0726 (750 mg)-New Bag        0644 (750 mg)-New Bag       0757-Med Discontinued         Dose: 500 mg Freq: EVERY 8 HOURS Route: IV  Indications of Use: HEALTHCARE-ASSOCIATED PNEUMONIA  Last Dose: 500 mg (03/26/17 0107)  Start: 03/25/17 1730   End: 03/26/17 0642        1810 (500 mg)-New Bag        0107 (500 mg)-New Bag       0642-Med Discontinued          Dose: 1-2 tablet Freq: EVERY 4 HOURS PRN Route: PO  PRN Reason: moderate to severe pain  Start: 03/25/17 1730   End: 03/26/17 1643   Admin Instructions: Maximum acetaminophen dose from all sources= 75 mg/kg/day not to exceed 4 grams         2106 (1 tablet)-Given       2216 (1 tablet)-Given        1202 (2 tablet)-Given       1643-Med Discontinued     Medications 03/21/17 03/22/17 03/23/17 03/24/17 03/25/17 03/26/17 03/27/17               INTERAGENCY TRANSFER FORM - NOTES (H&P, Discharge Summary, Consults, Procedures, Therapies)   3/25/2017                       [facility]: [phone]               History & Physicals      H&P by Jennifer Junior NP at 3/25/2017  5:07 PM     Author:  Jennifer Junior NP Service:  Hospitalist Author Type:  Nurse Practitioner    Filed:  3/25/2017  5:07 PM Date of Service:  3/25/2017  5:07 PM Note Created:  3/25/2017  4:20 PM    Status:  Signed :  Jennifer Junior NP (Nurse Practitioner)         [facility]    History and Physical  Hospitalist       Date of Admission:  3/25/2017  Date of Service (when I saw the patient):[SL1.1] 03/25/17[SL1.2]    Assessment & Plan   Sydni Hardin is a 67 year old female who presents with concerns of knee incisional infection      Leukocytosis: WBC 16. However, she is afebrile, normotensive, no tachycardia. She is fatigued and sleepy however she took pain medications prior to arriving in the ED. Possible this is just reactive but unlikely as she had a WBC of 7/9 day after surgery. UA is negative. Denies abdominal pain though she does have  "chronic intermittent diarrhea and history of gastric ulcer with continued occasional pain that is unchanged recently. No cough,soutum production or hypoxia. Blood cultures and lactic acid ordered as well as CXR. With diarrhea will get stool for c-diff although she already tested negative 2 days ago.       S/P total knee replacement using cement, right: There is some erythema noted to the incision,particularly the proximal end with some edema, however no clearly defined border or drainage from the incision. No warmth to touch. Will consult orthopedics if she develops indications of worsening infection.      Fatigue: For me her mentation is normal. She is alert and oriented. She is cureently on pain medication as well as anxiolytics.       Asthma: Stable on daily medications. She denies any shortness of breath and lungs sound clear. CXR pending. Will continue home dose medications as well as duonebs as needed.       Dehydration: Mucous membranes dry, mild elevation of creatinine, will continue IVF overnight and reassess in AM. I/O.       Essential hypertension: Stable, will hold benazepril as not on our formulary and blood pressures look good right now. She did have it this AM.       Dysthymia: Daughter reports she has been refusing to eat and refusing supplements at the nursing home. Her daughter also states that \"she hasn't eaten anything since Sunday\". However I have personally knowledge that the patient was eating while she was in the hospital as I physically saw her eating meals. I educated her on importance of good nutrition in wound healing. She related to me during her hospital stay that she has had a lot of stress at home as well as in preparing for surgery. She tells me that she hasn't been eating much at the SNF because she isn't hungry.  Patient tells me she thinks she wants to go home to her house with outpatient PT when she leaves the hospital.     DVT Prophylaxis: Patient refuses any DVT prevention " "other then 81mg ASA BID due to her history of gastric ulcer. Also ordered foot pumps.   Code Status: Full Code    Disposition: Expected discharge in 1-2 days once acute infectious process ruled out.    Jennifer Junior, CNP    Primary Care Physician   Gibran Alarcon    Chief Complaint   Knee pain    History is obtained from the patient    History of Present Illness   Sydni Hardin is a 67 year old female who presents with concerns from family of both knee infection and sedation. I evaluated the patient in the ED. On my arrival she was alone in her room. Upon entering her room she was awake and alert and talking with her nurse at bedside. She appeared to recognize me and stated \"I'm back\". She states that her knee was hurting. She denies any shortness of breath, chest pain or abdominal pain. She is laughing and joking with me initially. Her daughter enters the room and the patient notably shuts down. Her daughter states that she has not eaten since last Sunday, has been refusing supplements at the nursing home and becoming \"combative\" when they tried to give them to her. Her daughter is also concerned that she is so tired and fatigued,and states she has had multiple episodes of sepsis post-op.     Past Medical History    I have reviewed this patient's medical history and updated it with pertinent information if needed.   Past Medical History:   Diagnosis Date     Abnormal arterial blood gases 4/26/2006     Acute upper respiratory infections of unspecified site 7/2/2001     Anxiety 10/24/2011     Calculus of gallbladder without mention of cholecystitis or obstruction 2/2/2006     Depression 1/5/2012     Follow-up examination, following other surgery 2/28/2006     Headache(784.0) 12/10/2003     History of CVA (cerebrovascular accident) 9/28/2011     HTN (hypertension) 1/5/2012     Insomnia 3/13/2012     Lipoma of other skin and subcutaneous tissue 1/18/2006     Nonallopathic lesion of cervical region, not " elsewhere classified 2003     Osteoarthrosis, unspecified whether generalized or localized, lower leg 10/4/2001     Special screening for malignant neoplasms, colon 2006     Urinary tract infection, site not specified 2007       Past Surgical History   I have reviewed this patient's surgical history and updated it with pertinent information if needed.  Past Surgical History:   Procedure Laterality Date     ARTHROPLASTY KNEE Right 3/21/2017    Procedure: ARTHROPLASTY KNEE;  Surgeon: Moisés Olson MD;  Location: [facility]     ARTHROSCOPY KNEE       BIOPSY BREAST  2000    left - free of disease      SECTION        SECTION       CHOLECYSTECTOMY       COLONOSCOPY  2008    repeat in 10 years     HEAD & NECK SURGERY  2014    hematoma on head drained     HYSTERECTOMY       ORTHOPEDIC SURGERY  2014    right shoulder replacement     shoulder x2  2009    left       Prior to Admission Medications   Prior to Admission Medications   Prescriptions Last Dose Informant Patient Reported? Taking?   ALPRAZolam (XANAX) 1 MG tablet 3/24/2017 at Unknown time  No Yes   Sig: Take 1 tablet (1 mg) by mouth 3 times daily as needed for anxiety   DOXEPIN HCL PO 3/24/2017 at Unknown time  Yes Yes   Sig: Take 25 mg by mouth At Bedtime   EPIPEN 2-JAIRO 0.3 MG/0.3ML injection Unknown at Unknown time  No No   Sig: INJECT 1 SYRINGE INTRAMUSCULARLY ONCE AS NEEDED FOR ANAPHYLAXIS   VENTOLIN  (90 BASE) MCG/ACT inhaler 3/24/2017 at Unknown time  No Yes   Sig: USE 1 PUFF FOUR TIMES A DAY AS NEEDED   aspirin EC 81 MG EC tablet 3/25/2017 at Unknown time  No Yes   Sig: Take 1 tablet (81 mg) by mouth 2 times daily   atorvastatin (LIPITOR) 40 MG tablet 3/25/2017 at Unknown time  No Yes   Sig: TAKE 1 TABLET BY MOUTH DAILY   benazepril (LOTENSIN) 10 MG tablet 3/25/2017 at Unknown time  No Yes   Sig: TAKE 1 TABLET BY MOUTH DAILY   benzonatate (TESSALON) 200 MG capsule 3/24/2017 at Unknown time  No Yes   Sig:  TAKE 1 CAPSULE BY MOUTH 3 TIMES A DAY AS NEEDED FOR COUGH   budesonide-formoterol (SYMBICORT) 160-4.5 MCG/ACT inhaler 3/24/2017 at Unknown time  No Yes   Sig: Inhale 2 puffs into the lungs 2 times daily. As needed   butalbital-acetaminophen-caffeine (FIORICET/ESGIC) -40 MG per tablet 3/24/2017 at Unknown time  No Yes   Sig: Take 1 tablet by mouth every 6 hours as needed   cimetidine (TAGAMET) 800 MG tablet 3/24/2017 at Unknown time  No Yes   Sig: Take 1 tablet (800 mg) by mouth At Bedtime As needed   escitalopram (LEXAPRO) 20 MG tablet 3/24/2017 at Unknown time  No Yes   Sig: TAKE 1 TABLET BY MOUTH 2 TIMES A DAY   hydrochlorothiazide (HYDRODIURIL) 25 MG tablet 3/24/2017 at Unknown time  No Yes   Sig: TAKE 1 TABLET BY MOUTH DAILY   ipratropium - albuterol 0.5 mg/2.5 mg/3 mL (DUONEB) 0.5-2.5 (3) MG/3ML nebulization 3/24/2017 at Unknown time  No Yes   Sig: Take 1 vial (3 mLs) by nebulization every 6 hours as needed   loperamide (IMODIUM) 2 MG capsule 3/24/2017 at Unknown time  No Yes   Sig: Take 1 capsule (2 mg) by mouth 4 times daily as needed for diarrhea   meloxicam (MOBIC) 15 MG tablet 3/25/2017 at Unknown time  No Yes   Sig: TAKE 1 TABLET BY MOUTH DAILY   montelukast (SINGULAIR) 10 MG tablet 3/25/2017 at Unknown time  No Yes   Sig: TAKE 1 TABLET BY MOUTH DAILY   nicotine (NICODERM CQ) 21 MG/24HR 24 hr patch 3/25/2017 at Unknown time  Yes Yes   Sig: Place 1 patch onto the skin every 24 hours   order for DME Unknown at Unknown time  No No   Sig: Equipment being ordered: Nebulizer supplies   order for DME Unknown at Unknown time  No No   Sig: Equipment being ordered: mcgee walker   order for DME Unknown at Unknown time  No No   Sig: Equipment being ordered: toilet seat riser   oxyCODONE-acetaminophen (PERCOCET) 5-325 MG per tablet 3/24/2017 at Unknown time  No Yes   Sig: Take 1-2 tablets by mouth every 4 hours as needed for moderate to severe pain   potassium chloride (K-TAB,KLOR-CON) 10 MEQ tablet 3/24/2017  "at Unknown time  No Yes   Sig: TAKE 1 TABLET BY MOUTH 2 TIMES DAILY WITH FOOD   temazepam (RESTORIL) 30 MG capsule 3/24/2017 at Unknown time  No Yes   Sig: TAKE 1 CAPSULE BY MOUTH NIGHTLY AS NEEDED FOR SLEEP      Facility-Administered Medications: None     Allergies   Allergies   Allergen Reactions     Bee Pollen Other (See Comments)     Throat and eyes close up     Codeine Difficulty breathing     \"My throat closes shut.\"     Takes Percocet at home     Keflex [Cephalexin Hcl] Hives     Sulfonamide Derivatives Other (See Comments)     Throat closes     Ciprofloxacin Rash     Rash, bumps on lips     Bupropion Hcl Other (See Comments)     Made her very irritable     Celecoxib Other (See Comments) and Cough     Celebrex - wheeze     Clonazepam Hives     Erythromycin Other (See Comments)     Erythromycin base - abdominal pain     Lisinopril Other (See Comments)     Zestril - headaches     Nifedipine Hives     Procardia     Tramadol Hcl        Social History   I have reviewed this patient's social history and updated it with pertinent information if needed. Sydni SKINNER Wilfred  reports that she has been smoking Cigarettes.  She has a 46.00 pack-year smoking history. She has never used smokeless tobacco. She reports that she drinks alcohol. She reports that she does not use illicit drugs.    Family History   I have reviewed this patient's family history and updated it with pertinent information if needed.   Family History   Problem Relation Age of Onset     CANCER Mother      skin     Breast Cancer Mother      CEREBROVASCULAR DISEASE Mother      Alzheimer Disease Mother      Myocardial Infarction Father 76     myocardial infarction - cause of death     CANCER Daughter      cervical      DIABETES Maternal Grandfather      HEART DISEASE Maternal Grandfather      MI     DIABETES Maternal Grandmother      DIABETES Paternal Grandmother      DIABETES Paternal Grandfather        Review of Systems   CONSTITUTIONAL:  negative for  " "fevers, chills, sweats and malaise. Positive for anorexia that began prior to surgery and fatigue.   HEENT:  negative for  nasal congestion, sore mouth and sore throat  RESPIRATORY:  negative for  dry cough, cough with sputum, wheezing, chest pain and pleuritic pain  CARDIOVASCULAR:  negative for  chest pain, palpitations, orthopnea, exertional chest pressure/discomfort, edema, syncope  GASTROINTESTINAL:  Positive for occasional \"stomach pain\" related to ulcer, denies abdominal pain or nausea/vomiting.   GENITOURINARY:  negative for frequency, dysuria and hesitancy  MUSCULOSKELETAL:  positive for  Right knee pain,generalized pain  BEHAVIOR/PSYCH:  positive for poor appetite, decreased energy level, depressed mood, fatigue and anxiety    Physical Exam   Temp: 96.8  F (36  C) Temp src: Tympanic BP: 124/69   Heart Rate: 80 Resp: 16 SpO2: 93 % O2 Device: None (Room air)    Vital Signs with Ranges  Temp:  [96.8  F (36  C)] 96.8  F (36  C)  Heart Rate:  [80-88] 80  Resp:  [16] 16  BP: (100-124)/(50-85) 124/69  SpO2:  [91 %-94 %] 93 %  0 lbs 0 oz    Constitutional: Awake and alert for me,initially, falls asleep quickly but awakens to voice. Does not appear acutely ill, no acute distress.  HEENT: Normocephalic, pupils equal, mucous membranes pink and dry, no plaques noted.   Respiratory: Clear but diminished bilaterally, no wheezes,rhonchi or crackles.   Cardiovascular: HRR, no murmur, rubs or thrills.   GI: Obese,soft,nontender, bowel sounds hypoactive.   Skin: No rashes or bruising noted. Right knee incision has ome mild erythema,particularly to the proximal end, but edges are well approximated with no drainage. No warmth to touch.   Musculoskeletal: No peripheral edema noted. Moves arms and legs independently. Equal strength upper extremities.  Neurologic/psychiatric: Awake and alert and talking with me animatedly until her daughter arrived. She then stopped speaking and started mumbling and sitting in the cot with her " eyes closed. She occasionally did make some comments but seems confused as to what the current topic of conversation was. She is oriented x3 and has quick recall of recent events. She follows commands easily.      Data   Data reviewed today:  I personally reviewed ED labs and vitals. CXR pending.     Recent Labs  Lab 03/25/17  1439 03/23/17  0557 03/22/17  0545   WBC 16.9*  --  7.9   HGB 12.0 11.4* 12.0   MCV 90  --  90     --  214     --   --    POTASSIUM 3.3*  --   --    CHLORIDE 98  --   --    CO2 29  --   --    BUN 21  --   --    CR 1.60*  --   --    ANIONGAP 7  --   --    JOSÉ 8.9  --   --    *  --  129*       No results found for this or any previous visit (from the past 24 hour(s)).[SL1.1]       Revision History        User Key Date/Time User Provider Type Action    > SL1.2 3/25/2017  5:07 PM Jennifer Junior NP Nurse Practitioner Sign     SL1.1 3/25/2017  4:20 PM Jennifer Junior, NP Nurse Practitioner                   Discharge Summaries     No notes of this type exist for this encounter.      Consult Notes     No notes of this type exist for this encounter.      Progress Notes - Physician (Notes for yesterday and today)     No notes of this type exist for this encounter.      Procedure Notes     No notes of this type exist for this encounter.      Progress Notes - Therapies (Notes from 03/24/17 through 03/27/17)     No notes of this type exist for this encounter.                                          INTERAGENCY TRANSFER FORM - LAB / IMAGING / EKG / EMG RESULTS   3/25/2017                       [facility]: [phone]            Unresulted Labs (24h ago through future)    Start       Ordered    03/27/17 0915  UA with Microscopic reflex to Culture  ROUTINE,   Routine      03/27/17 0904         Lab Results - 3 Days      Urine Culture Aerobic Bacterial [[id number]]  Resulted: 03/27/17 0854, Result status: Preliminary result    Ordering provider: Jennifer Junior, NP   03/26/17 1309 Resulting lab: [facility]    Specimen Information    Type Source Collected On   Urine Urine catheter 03/26/17 1315          Components       Value Reference Range Flag Lab   Specimen Description Pending      Culture Micro --   HI   Result:         Canceled, Test credited  Cancelled by lab - Specimen never received.  Called to Nursing called on 3/26 at 1425 regarding insufficient specimen. Nursing  intended to recollect on 3/26. No specimen received by 3/27. Kristyn called on   3/27 to request new order for UC and recollection at 0848 by MRN.     Micro Report Status Pending   HI   Result:              CBC with platelets differential [[id number]] (Abnormal)  Resulted: 03/27/17 0810, Result status: Final result    Ordering provider: Shun Iyer MD  03/27/17 0756 Resulting lab: [facility]    Specimen Information    Type Source Collected On   Blood  03/27/17 0805          Components       Value Reference Range Flag Lab   WBC 10.8 4.0 - 11.0 10e9/L  HI   RBC Count 3.00 3.8 - 5.2 10e12/L L HI   Hemoglobin 9.3 11.7 - 15.7 g/dL L HI   Hematocrit 28.0 35.0 - 47.0 % L HI   MCV 93 78 - 100 fl  HI   MCH 31.0 26.5 - 33.0 pg  HI   MCHC 33.2 31.5 - 36.5 g/dL  HI   RDW 13.1 10.0 - 15.0 %  HI   Platelet Count 339 150 - 450 10e9/L  HI   Diff Method Automated Method   HI   % Neutrophils 59.4 %  HI   % Lymphocytes 30.0 %  HI   % Monocytes 5.6 %  HI   % Eosinophils 4.3 %  HI   % Basophils 0.4 %  HI   % Immature Granulocytes 0.3 %  HI   Nucleated RBCs 0 0 /100  HI   Absolute Neutrophil 6.4 1.6 - 8.3 10e9/L  HI   Absolute Lymphocytes 3.2 0.8 - 5.3 10e9/L  HI   Absolute Monocytes 0.6 0.0 - 1.3 10e9/L  HI   Absolute Eosinophils 0.5 0.0 - 0.7 10e9/L  HI   Absolute Basophils 0.0 0.0 - 0.2 10e9/L  HI   Abs Immature Granulocytes 0.0 0 - 0.4 10e9/L  HI   Absolute Nucleated RBC 0.0   HI            Active MRSA Surveillance Culture [[id number]]  Resulted: 03/27/17 0712, Result status: Final  result    Ordering provider: Jennifer Junior NP  03/25/17 1848 Resulting lab: [facility]    Specimen Information    Type Source Collected On   Swab Nose 03/25/17 1905          Components       Value Reference Range Flag Lab   Specimen Description Nares   HI   Culture Micro No MRSA isolated   HI   Micro Report Status FINAL 03/27/2017   HI            Blood culture [[id number]]  Resulted: 03/27/17 0706, Result status: Preliminary result    Ordering provider: Jennifer Junior NP  03/25/17 1557 Resulting lab: [facility]    Specimen Information    Type Source Collected On   Blood  03/25/17 1630          Components       Value Reference Range Flag Lab   Specimen Description Blood   HI   Special Requests Left Arm 2 mL PEDI ONLY   HI   Culture Micro No growth after 2 days   HI   Micro Report Status Pending   HI            Blood culture [[id number]]  Resulted: 03/27/17 0706, Result status: Preliminary result    Ordering provider: Jennifer Junior NP  03/25/17 1557 Resulting lab: [facility]    Specimen Information    Type Source Collected On   Blood  03/25/17 1618          Components       Value Reference Range Flag Lab   Specimen Description Blood   HI   Special Requests Right Arm 8mL IN AEROBIC ONLY   HI   Culture Micro No growth after 2 days   HI   Micro Report Status Pending   HI            CBC with platelets differential [[id number]] (Abnormal)  Resulted: 03/26/17 0615, Result status: Final result    Ordering provider: Jennifer Junior NP  03/26/17 0000 Resulting lab: [facility]    Specimen Information    Type Source Collected On   Blood  03/26/17 0536          Components       Value Reference Range Flag Lab   WBC 13.9 4.0 - 11.0 10e9/L H HI   RBC Count 3.08 3.8 - 5.2 10e12/L L HI   Hemoglobin 9.4 11.7 - 15.7 g/dL L HI   Hematocrit 28.0 35.0 - 47.0 % L HI   MCV 91 78 - 100 fl  HI   MCH 30.5 26.5 - 33.0 pg  HI   MCHC 33.6 31.5 - 36.5  g/dL  HI   RDW 13.0 10.0 - 15.0 %  HI   Platelet Count 284 150 - 450 10e9/L  HI   Diff Method Automated Method   HI   % Neutrophils 56.7 %  HI   % Lymphocytes 31.5 %  HI   % Monocytes 6.6 %  HI   % Eosinophils 4.3 %  HI   % Basophils 0.4 %  HI   % Immature Granulocytes 0.5 %  HI   Nucleated RBCs 0 0 /100  HI   Absolute Neutrophil 7.9 1.6 - 8.3 10e9/L  HI   Absolute Lymphocytes 4.4 0.8 - 5.3 10e9/L  HI   Absolute Monocytes 0.9 0.0 - 1.3 10e9/L  HI   Absolute Eosinophils 0.6 0.0 - 0.7 10e9/L  HI   Absolute Basophils 0.1 0.0 - 0.2 10e9/L  HI   Abs Immature Granulocytes 0.1 0 - 0.4 10e9/L  HI   Absolute Nucleated RBC 0.0   HI            Basic metabolic panel [[id number]] (Abnormal)  Resulted: 03/26/17 0611, Result status: Final result    Ordering provider: Jennifer Junior NP  03/26/17 0000 Resulting lab: [facility]    Specimen Information    Type Source Collected On   Blood  03/26/17 0536          Components       Value Reference Range Flag Lab   Sodium 139 133 - 144 mmol/L  HI   Potassium 3.6 3.4 - 5.3 mmol/L  HI   Chloride 107 94 - 109 mmol/L  HI   Carbon Dioxide 23 20 - 32 mmol/L  HI   Anion Gap 9 3 - 14 mmol/L  HI   Glucose 92 70 - 99 mg/dL  HI   Urea Nitrogen 25 7 - 30 mg/dL  HI   Creatinine 1.37 0.52 - 1.04 mg/dL H HI   GFR Estimate 38 >60 mL/min/1.7m2 L HI   Comment:  Non  GFR Calc   GFR Estimate If Black 46 >60 mL/min/1.7m2 L HI   Comment:  African American GFR Calc   Calcium 7.5 8.5 - 10.1 mg/dL L HI            Lactic acid [[id number]]  Resulted: 03/25/17 1715, Result status: Final result    Ordering provider: Jennifer Junior NP  03/25/17 7557 Resulting lab: [facility]    Specimen Information    Type Source Collected On   Blood  03/25/17 1630          Components       Value Reference Range Flag Lab   Lactic Acid 1.5 0.4 - 2.0 mmol/L  HI            UA reflex to Microscopic [[id number]]  Resulted: 03/25/17 1530, Result status: Final result    Ordering  provider: Savi Mahmood PA-C  03/25/17 1508 Resulting lab: [facility]    Specimen Information    Type Source Collected On   Urine  03/25/17 1520          Components       Value Reference Range Flag Lab   Color Urine Yellow   HI   Appearance Urine Clear   HI   Glucose Urine Negative NEG mg/dL  HI   Bilirubin Urine Negative NEG  HI   Ketones Urine Negative NEG mg/dL  HI   Specific Gravity Urine 1.007 1.003 - 1.035  HI   Blood Urine Negative NEG  HI   pH Urine 5.5 4.7 - 8.0 pH  HI   Protein Albumin Urine Negative NEG mg/dL  HI   Urobilinogen mg/dL Normal 0.0 - 2.0 mg/dL  HI   Nitrite Urine Negative NEG  HI   Leukocyte Esterase Urine Negative NEG  HI   Source Catheterized Urine   HI            Erythrocyte sedimentation rate auto [[id number]] (Abnormal)  Resulted: 03/25/17 1505, Result status: Final result    Ordering provider: Savi Mahmood PA-C  03/25/17 1433 Resulting lab: [facility]    Specimen Information    Type Source Collected On   Blood  03/25/17 1439          Components       Value Reference Range Flag Lab   Sed Rate 39 0 - 30 mm/h H HI            Basic metabolic panel [[id number]] (Abnormal)  Resulted: 03/25/17 1505, Result status: Final result    Ordering provider: Savi Mahmood PA-C  03/25/17 1433 Resulting lab: [facility]    Specimen Information    Type Source Collected On   Blood  03/25/17 1439          Components       Value Reference Range Flag Lab   Sodium 134 133 - 144 mmol/L  HI   Potassium 3.3 3.4 - 5.3 mmol/L L HI   Chloride 98 94 - 109 mmol/L  HI   Carbon Dioxide 29 20 - 32 mmol/L  HI   Anion Gap 7 3 - 14 mmol/L  HI   Glucose 117 70 - 99 mg/dL H HI   Urea Nitrogen 21 7 - 30 mg/dL  HI   Creatinine 1.60 0.52 - 1.04 mg/dL H HI   GFR Estimate 32 >60 mL/min/1.7m2 L HI   Comment:  Non  GFR Calc   GFR Estimate If Black 39 >60 mL/min/1.7m2 L HI   Comment:  African American GFR Calc   Calcium 8.9 8.5 - 10.1 mg/dL  HI            CRP  inflammation [[id number]] (Abnormal)  Resulted: 03/25/17 1505, Result status: Final result    Ordering provider: Savi Mahmood PA-C  03/25/17 1433 Resulting lab: [facility]    Specimen Information    Type Source Collected On   Blood  03/25/17 1439          Components       Value Reference Range Flag Lab   CRP Inflammation 140.0 0.0 - 8.0 mg/L H HI            CBC with platelets differential [[id number]] (Abnormal)  Resulted: 03/25/17 1450, Result status: Final result    Ordering provider: Savi Mahmood PA-C  03/25/17 1433 Resulting lab: [facility]    Specimen Information    Type Source Collected On   Blood  03/25/17 1439          Components       Value Reference Range Flag Lab   WBC 16.9 4.0 - 11.0 10e9/L H HI   RBC Count 3.90 3.8 - 5.2 10e12/L  HI   Hemoglobin 12.0 11.7 - 15.7 g/dL  HI   Hematocrit 35.2 35.0 - 47.0 %  HI   MCV 90 78 - 100 fl  HI   MCH 30.8 26.5 - 33.0 pg  HI   MCHC 34.1 31.5 - 36.5 g/dL  HI   RDW 12.6 10.0 - 15.0 %  HI   Platelet Count 341 150 - 450 10e9/L  HI   Diff Method Automated Method   HI   % Neutrophils 74.8 %  HI   % Lymphocytes 17.3 %  HI   % Monocytes 5.9 %  HI   % Eosinophils 0.9 %  HI   % Basophils 0.3 %  HI   % Immature Granulocytes 0.8 %  HI   Nucleated RBCs 0 0 /100  HI   Absolute Neutrophil 12.6 1.6 - 8.3 10e9/L H HI   Absolute Lymphocytes 2.9 0.8 - 5.3 10e9/L  HI   Absolute Monocytes 1.0 0.0 - 1.3 10e9/L  HI   Absolute Eosinophils 0.2 0.0 - 0.7 10e9/L  HI   Absolute Basophils 0.1 0.0 - 0.2 10e9/L  HI   Abs Immature Granulocytes 0.1 0 - 0.4 10e9/L  HI   Absolute Nucleated RBC 0.0   HI            Testing Performed By     Lab - Abbreviation Name Director Address Valid Date Range    210 - HI [facility] Unknown [address] [address] 05/08/15 1057 - Present               Imaging Results - 3 Days      XR Chest 2 Views [[id number]]  Resulted: 03/27/17 1113, Result status: In process    Ordering provider: Jaylon  Shun PLATA MD  03/27/17 0802 Performed: 03/27/17 0815 - 03/27/17 0935    Resulting lab: [facility]           Specimen Information    Type Source Collected On     03/27/17 0925            XR Chest 2 Views [[id number]]  Resulted: 03/26/17 1500, Result status: Final result    Ordering provider: Jennifer Junior NP  03/25/17 1617 Resulted by: Joyce Bruce MD    Performed: 03/25/17 1618 - 03/25/17 1653 Resulting lab: [facility]    Narrative:           CHEST X-RAY    CLINICAL HISTORY:  Postop leukocytosis.    COMPARISON: Today's study is compared to a prior examination which is  dated March 23, 2017.    FINDINGS: Frontal and lateral views of the chest were obtained.  There  are airspace opacities in the left lower lobe which are new.  The  right lung is clear.  Heart size is normal.   No pneumothorax or  pleural effusions.    IMPRESSION: LEFT BASILAR ATELECTASIS OR INFILTRATE.  Exam Date: Mar 25, 2017 04:53:54 PM  Author: JOYCE BRUCE  This report is final and signed      Specimen Information    Type Source Collected On     03/25/17 1635            Testing Performed By     Lab - Abbreviation Name Director Address Valid Date Range    183 - MK FILIPPOJANIS Unknown Unknown 11/29/12 1227 - Present            Encounter-Level Documents:     There are no encounter-level documents.      Order-Level Documents:     There are no order-level documents.

## 2017-03-25 NOTE — ED NOTES
Pt's brother, You, updated on pt's status. You is POA.  Attempted to call and given report. Waiting for call back to give report.

## 2017-03-25 NOTE — IP AVS SNAPSHOT
` ` Patient Information     Patient Name Sex Sydni Holguin ([id number]) Female 1950       Room Bed    3112 3112-1      Patient Demographics     Address Phone    [address]  [place] [address] [phone] (Home)  [phone] (Mobile) *Preferred*      Patient Ethnicity & Race     Ethnic Group Patient Race    American White      Emergency Contact(s)     Name Relation Home Work Mobile    Devorah Townsend Daughter   [phone]    GABY TOWNSEND Daughter [phone]      Yenifer Elliott Friend [phone]      Axel Isaac Brother [phone]      eugenia kaur [phone]        Documents on File        Status Date Received Description       Documents for the Patient    Consent for Services - Hospital/Clinic-ESign Received () 13     [facility] Privacy Notice-ESign Received 13     Insurance Card Received 13 MDCR    Patient ID Received 13 [place]    Consent for EHR Access-Received-ESign Received 13     Consent for EHR Access-Decline-ESign       Consent for EHR Access  03/15/13     Business/Insurance/Care Coordination/Health Form - Patient.1   PHYSICIAN FYI NOTIFICATION OF MISSED VISIT - 3/22/13 VISIT MISSED - DR KHAN    Business/Insurance/Care Coordination/Health Form - Patient.1   PHYSICIAN FYI NOTIFICATION OF MISSED VISIT - 3/26/13 VISIT MISSED - DR KHAN    Physical Therapy Certification Received 13    Insurance Card Received () 13 BCBS    Power of  Received 13 per pt on file     Advance Directives and Living Will Received 13 per pt on file    HIM JASMINE Authorization  13 Patient    Insurance Card Received () 14 MDCA    Physical Therapy Certification Received 03/28/14 3-21-14    Consent for Services - Hospital/Clinic-ESign Received () 14     Physical Therapy Certification Received 14    Physical Therapy Certification Received 14 RECEIVED  14    Consent for Services - Hospital/Clinic Received () 14     HIM JASMINE Authorization  14 [facility]    HIM JASMINE Authorization  07/01/15 [facility]    Consent for Services - Hospital/Clinic-ESign Received () 08/05/15     Physical Therapy Certification Received 08/06/15     Consent for Services/Privacy Notice - Hospital/Clinic-Esign       Privacy Notice - Sargeant Received 17     Consent for Services/Privacy Notice - Hospital/Clinic-Esign Received () 16     HIM JASMINE Authorization - File Only  16 AUTHORIZATION FOR RELEASE OF PROTECTED HEALTH INFORMATION    Business/Insurance/Care Coordination/Health Form - Patient   PRESCRIPTION DRUG APPROVAL-MEDICA-2016    Consent for Services/Privacy Notice - Hospital/Clinic Received 17     Insurance Card Received 17 [facility]    Business/Insurance/Care Coordination/Health Form - Patient   ACCOMMODATION REQUEST--[facility]--2017    Business/Insurance/Care Coordination/Health Form - Patient   REASONABLE ACCOMMODATION REQUEST VERIFACATION 2017       Documents for the Encounter    Observation Notice Received 17     Discharge Attachment   LEVOFLOXACIN ORAL TABLET (ENGLISH)    Discharge Attachment   KNEE, POST-OP TIPS (ENGLISH)      Admission Information     Attending Provider Admitting Provider Admission Type Admission Date/Time    Shun Iyer MD Sharp, Isac ZHAO MD Emergency 17  1424    Discharge Date Hospital Service Auth/Cert Status Service Area     General Medicine Incomplete [facility] SERVICES    Unit Room/Bed Admission Status       [facility] 3112/3112-1 Admission (Confirmed)       Admission     Complaint    Knee pain      Hospital Account     Name Acct ID Class Status Primary Coverage    Sydni Tovar [id number] Observation Open MEDICARE - MEDICARE FOR HB SUPPLEMENT            Guarantor Account (for Hospital Account #[id number])     Name Relation to Pt  Service Area Active? Acct Type    Sydni Tovar Self RANGE Yes Personal/Family    Address Phone          [address]  [place] [address] [phone](H)              Coverage Information (for Hospital Account #[id number])     1. MEDICARE/MEDICARE FOR HB SUPPLEMENT     F/O Payor/Plan Precert #    MEDICARE/MEDICARE FOR HB SUPPLEMENT     Subscriber Subscriber #    Sydni Tovar [id number]    Address Phone    [employer]  [address]  [place] [address] [phone]          2. BCBS/BCBS PLATINUM BLUE     F/O Payor/Plan Precert #    BCBS/BCBS PLATINUM BLUE     Subscriber Subscriber #    Sydni Tovar [id number]    Address Phone    [address] [address]  [place], [place] [id number] [phone]

## 2017-03-25 NOTE — IP AVS SNAPSHOT
MRN:[id number]                      After Visit Summary   3/25/2017    Sydni Hardin    MRN: [id number]           Thank you!     Thank you for choosing [facility] for your care. Our goal is always to provide you with excellent care. Hearing back from our patients is one way we can continue to improve our services. Please take a few minutes to complete the written survey that you may receive in the mail after you visit with us. Thank you!        Patient Information     Date Of Birth          1950        About your hospital stay     You were admitted on:  March 25, 2017 You last received care in the:  [facility]    You were discharged on:  March 27, 2017        Reason for your hospital stay       Sydni Hardin is a 67 year old woman who presented with concerns from her family of possible knee infection after recent TKA and sedation. The patient was concerned as well of knee pain. pertinent history is significant for total knee arthroplasty 3/21 with discharge 3/24.  Her postoperative course had been unremarkable with the exception for moderate degree of knee pain.  Evaluation on her re-presentation showed a mild leukocytosis and chest radiograph with right lower lobe infiltrate suggesting atelectasis versus inflammatory process. She responded to usual treatment.  Preemptive antibiotics were continued.  Chest radiograph this morning is improved.  However, pneumonia cannot be eliminated with confidence.  Oral levofloxacin is continued.  In other respects, she is discharged with plans for her to continue rehabilitation.  Importance of therapy and other rehabilitation emphasized with the patient.  Pain has been under good control with several times daily doses of Percocet.                  Who to Call     For medical emergencies, please call 911.  For non-urgent questions about your medical care, please call your primary care provider or clinic, [phone]          Attending  Provider     Provider Specialty    Savi Mahmood PA-C Emergency Medicine    Jennifer Junior, NP Nurse Practitioner    Shun Iyer MD Internal Medicine       Primary Care Provider Office Phone # Fax #    Gibran Alarcon [phone]       [facility] [address] [facility] [address]  [facility] [address]        After Care Instructions     Activity - Up with assistive device       Per TKA protocol            Activity - Up with nursing assistance       Per TKA protocol            Additional Discharge Instructions       Change dressing daily or when soiled. May shower with press and seal wrap over dressing. Change dressing if it becomes wet. Sutures/staples to be removed 14 days post-op at follow-up.            Advance Diet as Tolerated       Follow this diet upon discharge: Orders Placed This Encounter      Snacks/Supplements Adult: Between Meals      Regular Diet Adult  High protein diet encouraged            Fall precautions           General info for SNF       Length of Stay Estimate: Short Term Care: Estimated # of Days <30  Condition at Discharge: Improving  Level of care:skilled   Rehabilitation Potential: Good  Admission H&P remains valid and up-to-date: Yes  Recent Chemotherapy: N/A  Use Nursing Home Standing Orders: Yes            Mantoux instructions       Give two-step Mantoux (PPD) Per Facility Policy Yes                  Follow-up Appointments     Follow Up and recommended labs and tests       Follow up with primary care provider in 2-3 weeks for follow up  Follow up with Dr. Olson in 14 days after surgery (4/4 or as available)                  Your next 10 appointments already scheduled     Apr 18, 2017 11:00 AM CDT   (Arrive by 10:45 AM)   SHORT with Gibran Alarcon MD   [facility] ([facility])    [address] [facility] [id number] [phone]            Apr 18, 2017  2:20 PM CDT   (Arrive by 2:00 PM)   Return Visit with Moisés Olson MD     ORTHOPEDICS ([facility])    [address] [facility] [address] [phone]              Additional Services     Physical Therapy Adult Consult       Evaluate and treat as clinically indicated.    Reason:  Per  Right TKA protocol                  Further instructions from your care team       YOU HAVE A FOLLOW UP APPOINTMENT WITH DR. KHAN AT THE [facility] IN [place] ON April 18TH AT 10:45. IF YOU HAVE ANY QUESTIONS OR NEED TO RESCHEDULE PLEASE CALL [phone].      DRESSING CHANGE TO RIGHT KNEE HAS BEEN DONE WITH 4X4 GAUZE AND METAPORE TAPE.     Pending Results     Date and Time Order Name Status Description    3/27/2017 0802 XR Chest 2 Views In process     3/25/2017 1557 Blood culture Preliminary     3/25/2017 1557 Blood culture Preliminary             Statement of Approval     Ordered          03/27/17 1023  I have reviewed and agree with all the recommendations and orders detailed in this document.  EFFECTIVE NOW     Approved and electronically signed by:  Shun Iyer MD             Admission Information     Date & Time Provider Department Dept. Phone    3/25/2017 Shun Iyer MD [facility] [phone]      Your Vitals Were     Blood Pressure Pulse Temperature Respirations Weight Pulse Oximetry    139/61 79 98.7  F (37.1  C) (Tympanic) 16 78.2 kg (172 lb 6.4 oz) 93%    BMI (Body Mass Index)                   36.03 kg/m2           Care EveryWhere ID     This is your Care EveryWhere ID. This could be used by other organizations to access your [facility] medical records  [id number]           Review of your medicines      START taking        Dose / Directions    levofloxacin 750 MG tablet   Commonly known as:  LEVAQUIN   Indication:  Community Acquired Pneumonia   Used for:  Pneumonia of left lower lobe due to infectious organism        Dose:  750 mg   Start taking on:  3/28/2017   Take 1 tablet (750 mg) by mouth daily   Quantity:  3 tablet   Refills:  0          CONTINUE these medicines which have NOT CHANGED        Dose / Directions    ALPRAZolam 1 MG tablet   Commonly known as:  XANAX   Used for:  Anxiety        Dose:  1 mg   Take 1 tablet (1 mg) by mouth 3 times daily as needed for anxiety   Quantity:  30 tablet   Refills:  0       aspirin 81 MG EC tablet   Used for:  S/P total knee replacement using cement, right        Dose:  81 mg   Take 1 tablet (81 mg) by mouth 2 times daily   Refills:  0       atorvastatin 40 MG tablet   Commonly known as:  LIPITOR   Used for:  Mixed hyperlipidemia        TAKE 1 TABLET BY MOUTH DAILY   Quantity:  90 tablet   Refills:  0       benazepril 10 MG tablet   Commonly known as:  LOTENSIN   Used for:  HTN (hypertension)        TAKE 1 TABLET BY MOUTH DAILY   Quantity:  90 tablet   Refills:  0       benzonatate 200 MG capsule   Commonly known as:  TESSALON   Used for:  Preop general physical exam, Tobacco abuse        TAKE 1 CAPSULE BY MOUTH 3 TIMES A DAY AS NEEDED FOR COUGH   Quantity:  60 capsule   Refills:  3       budesonide-formoterol 160-4.5 MCG/ACT Inhaler   Commonly known as:  SYMBICORT   Used for:  Asthma        Dose:  2 puff   Inhale 2 puffs into the lungs 2 times daily. As needed   Quantity:  1 Inhaler   Refills:  11       butalbital-acetaminophen-caffeine -40 MG per tablet   Commonly known as:  FIORICET/ESGIC   Used for:  Scalp hematoma, sequela        Dose:  1 tablet   Take 1 tablet by mouth every 6 hours as needed   Quantity:  5 tablet   Refills:  0       cimetidine 800 MG tablet   Commonly known as:  TAGAMET   Used for:  Esophageal reflux        Dose:  800 mg   Take 1 tablet (800 mg) by mouth At Bedtime As needed   Quantity:  90 tablet   Refills:  3       DOXEPIN HCL PO        Dose:  25 mg   Take 25 mg by mouth At Bedtime   Refills:  0       EPIPEN 2-JAIRO 0.3 MG/0.3ML injection   Used for:  Anaphylactic reaction   Generic drug:  EPINEPHrine        INJECT 1 SYRINGE INTRAMUSCULARLY ONCE AS NEEDED FOR ANAPHYLAXIS    Quantity:  2 each   Refills:  0       escitalopram 20 MG tablet   Commonly known as:  LEXAPRO   Used for:  Dysthymia        TAKE 1 TABLET BY MOUTH 2 TIMES A DAY   Quantity:  180 tablet   Refills:  0       hydrochlorothiazide 25 MG tablet   Commonly known as:  HYDRODIURIL   Used for:  Benign essential hypertension        TAKE 1 TABLET BY MOUTH DAILY   Quantity:  90 tablet   Refills:  3       ipratropium - albuterol 0.5 mg/2.5 mg/3 mL 0.5-2.5 (3) MG/3ML neb solution   Commonly known as:  DUONEB   Used for:  Acute bronchospasm        Dose:  1 vial   Take 1 vial (3 mLs) by nebulization every 6 hours as needed   Quantity:  360 mL   Refills:  2       loperamide 2 MG capsule   Commonly known as:  IMODIUM   Used for:  Functional diarrhea        Dose:  2 mg   Take 1 capsule (2 mg) by mouth 4 times daily as needed for diarrhea   Quantity:  20 capsule   Refills:  0       meloxicam 15 MG tablet   Commonly known as:  MOBIC   Used for:  Pain        TAKE 1 TABLET BY MOUTH DAILY   Quantity:  90 tablet   Refills:  1       montelukast 10 MG tablet   Commonly known as:  SINGULAIR   Used for:  Mixed hyperlipidemia        TAKE 1 TABLET BY MOUTH DAILY   Quantity:  90 tablet   Refills:  3       nicotine 21 MG/24HR 24 hr patch   Commonly known as:  NICODERM CQ        Dose:  1 patch   Place 1 patch onto the skin every 24 hours   Refills:  0       * order for DME   Used for:  Primary osteoarthritis of right knee        Equipment being ordered: Nebulizer supplies   Quantity:  2 each   Refills:  11       * order for DME   Used for:  Primary osteoarthritis of right knee        Equipment being ordered: mcgee walker   Quantity:  1 each   Refills:  0       * order for DME        Equipment being ordered: toilet seat riser   Quantity:  1 each   Refills:  0       oxyCODONE-acetaminophen 5-325 MG per tablet   Commonly known as:  PERCOCET   Used for:  Primary osteoarthritis of both knees        Dose:  1-2 tablet   Take 1-2 tablets by mouth every 4  hours as needed for moderate to severe pain   Quantity:  10 tablet   Refills:  0       potassium chloride 10 MEQ tablet   Commonly known as:  K-TAB,KLOR-CON   Used for:  Benign essential hypertension        TAKE 1 TABLET BY MOUTH 2 TIMES DAILY WITH FOOD   Quantity:  180 tablet   Refills:  1       temazepam 30 MG capsule   Commonly known as:  RESTORIL   Used for:  Persistent insomnia        TAKE 1 CAPSULE BY MOUTH NIGHTLY AS NEEDED FOR SLEEP   Quantity:  10 capsule   Refills:  0       VENTOLIN  (90 BASE) MCG/ACT Inhaler   Used for:  Tobacco abuse   Generic drug:  albuterol        USE 1 PUFF FOUR TIMES A DAY AS NEEDED   Quantity:  18 g   Refills:  3       * Notice:  This list has 3 medication(s) that are the same as other medications prescribed for you. Read the directions carefully, and ask your doctor or other care provider to review them with you.         Where to get your medicines      Some of these will need a paper prescription and others can be bought over the counter. Ask your nurse if you have questions.     You don't need a prescription for these medications     levofloxacin 750 MG tablet         Information about where to get these medications is not yet available     ! Ask your nurse or doctor about these medications     oxyCODONE-acetaminophen 5-325 MG per tablet                Protect others around you: Learn how to safely use, store and throw away your medicines at www.disposemymeds.org.             Medication List: This is a list of all your medications and when to take them. Check marks below indicate your daily home schedule. Keep this list as a reference.      Medications           Morning Afternoon Evening Bedtime As Needed    ALPRAZolam 1 MG tablet   Commonly known as:  XANAX   Take 1 tablet (1 mg) by mouth 3 times daily as needed for anxiety   Last time this was given:  0.5 mg on 3/26/2017 10:35 PM                                aspirin 81 MG EC tablet   Take 1 tablet (81 mg) by mouth 2  times daily   Last time this was given:  81 mg on 3/27/2017  8:31 AM                                atorvastatin 40 MG tablet   Commonly known as:  LIPITOR   TAKE 1 TABLET BY MOUTH DAILY   Last time this was given:  40 mg on 3/27/2017  8:31 AM                                benazepril 10 MG tablet   Commonly known as:  LOTENSIN   TAKE 1 TABLET BY MOUTH DAILY                                benzonatate 200 MG capsule   Commonly known as:  TESSALON   TAKE 1 CAPSULE BY MOUTH 3 TIMES A DAY AS NEEDED FOR COUGH                                budesonide-formoterol 160-4.5 MCG/ACT Inhaler   Commonly known as:  SYMBICORT   Inhale 2 puffs into the lungs 2 times daily. As needed                                butalbital-acetaminophen-caffeine -40 MG per tablet   Commonly known as:  FIORICET/ESGIC   Take 1 tablet by mouth every 6 hours as needed                                cimetidine 800 MG tablet   Commonly known as:  TAGAMET   Take 1 tablet (800 mg) by mouth At Bedtime As needed                                DOXEPIN HCL PO   Take 25 mg by mouth At Bedtime   Last time this was given:  25 mg on 3/26/2017  9:02 PM                                EPIPEN 2-JAIRO 0.3 MG/0.3ML injection   INJECT 1 SYRINGE INTRAMUSCULARLY ONCE AS NEEDED FOR ANAPHYLAXIS   Generic drug:  EPINEPHrine                                escitalopram 20 MG tablet   Commonly known as:  LEXAPRO   TAKE 1 TABLET BY MOUTH 2 TIMES A DAY   Last time this was given:  20 mg on 3/27/2017  8:31 AM                                hydrochlorothiazide 25 MG tablet   Commonly known as:  HYDRODIURIL   TAKE 1 TABLET BY MOUTH DAILY                                ipratropium - albuterol 0.5 mg/2.5 mg/3 mL 0.5-2.5 (3) MG/3ML neb solution   Commonly known as:  DUONEB   Take 1 vial (3 mLs) by nebulization every 6 hours as needed   Last time this was given:  3 mLs on 3/26/2017 10:47 PM                                levofloxacin 750 MG tablet   Commonly known as:  LEVAQUIN    Take 1 tablet (750 mg) by mouth daily   Start taking on:  3/28/2017                                loperamide 2 MG capsule   Commonly known as:  IMODIUM   Take 1 capsule (2 mg) by mouth 4 times daily as needed for diarrhea   Last time this was given:  2 mg on 3/26/2017  7:33 PM                                meloxicam 15 MG tablet   Commonly known as:  MOBIC   TAKE 1 TABLET BY MOUTH DAILY                                montelukast 10 MG tablet   Commonly known as:  SINGULAIR   TAKE 1 TABLET BY MOUTH DAILY   Last time this was given:  10 mg on 3/27/2017  8:31 AM                                nicotine 21 MG/24HR 24 hr patch   Commonly known as:  NICODERM CQ   Place 1 patch onto the skin every 24 hours                                * order for DME   Equipment being ordered: Nebulizer supplies                                * order for DME   Equipment being ordered: mcgee walker                                * order for DME   Equipment being ordered: toilet seat riser                                oxyCODONE-acetaminophen 5-325 MG per tablet   Commonly known as:  PERCOCET   Take 1-2 tablets by mouth every 4 hours as needed for moderate to severe pain   Last time this was given:  1 tablet on 3/27/2017  8:28 AM                                potassium chloride 10 MEQ tablet   Commonly known as:  K-TAB,KLOR-CON   TAKE 1 TABLET BY MOUTH 2 TIMES DAILY WITH FOOD   Last time this was given:  10 mEq on 3/27/2017  8:31 AM                                temazepam 30 MG capsule   Commonly known as:  RESTORIL   TAKE 1 CAPSULE BY MOUTH NIGHTLY AS NEEDED FOR SLEEP                                VENTOLIN  (90 BASE) MCG/ACT Inhaler   USE 1 PUFF FOUR TIMES A DAY AS NEEDED   Generic drug:  albuterol                                * Notice:  This list has 3 medication(s) that are the same as other medications prescribed for you. Read the directions carefully, and ask your doctor or other care provider to review them with  you.              More Information        Patient Education    Levofloxacin Ophthalmic drops, solution    Levofloxacin Oral solution    Levofloxacin Oral tablet    Levofloxacin Solution for injection    Levofloxacin, Dextrose Solution for injection  Levofloxacin Oral tablet  What is this medicine?  LEVOFLOXACIN (cornelia laura FLOX a sin) is a quinolone antibiotic. It is used to treat certain kinds of bacterial infections. It will not work for colds, flu, or other viral infections.  This medicine may be used for other purposes; ask your health care provider or pharmacist if you have questions.  What should I tell my health care provider before I take this medicine?  They need to know if you have any of these conditions:    cerebral disease    irregular heartbeat    kidney disease    seizure disorder    an unusual or allergic reaction to levofloxacin, other antibiotics or medicines, foods, dyes, or preservatives    pregnant or trying to get pregnant    breast-feeding  How should I use this medicine?  Take this medicine by mouth with a full glass of water. Follow the directions on the prescription label. This medicine can be taken with or without food. Take your medicine at regular intervals. Do not take your medicine more often than directed. Do not skip doses or stop your medicine early even if you feel better. Do not stop taking except on your doctor's advice.  A special MedGuide will be given to you by the pharmacist with each prescription and refill. Be sure to read this information carefully each time.  Talk to your pediatrician regarding the use of this medicine in children. While this drug may be prescribed for children as young as 6 months for selected conditions, precautions do apply.  Overdosage: If you think you have taken too much of this medicine contact a poison control center or emergency room at once.  NOTE: This medicine is only for you. Do not share this medicine with others.  What if I miss a dose?  If  you miss a dose, take it as soon as you remember. If it is almost time for your next dose, take only that dose. Do not take double or extra doses.  What may interact with this medicine?  Do not take this medicine with any of the following medications:    arsenic trioxide    chloroquine    droperidol    medicines for irregular heart rhythm like amiodarone, disopyramide, dofetilide, flecainide, quinidine, procainamide, sotalol    some medicines for depression or mental problems like phenothiazines, pimozide, and ziprasidone  This medicine may also interact with the following medications:    amoxapine    antacids    cisapride    dairy products    didanosine (ddI) buffered tablets or powder    haloperidol    multivitamins    NSAIDS, medicines for pain and inflammation, like ibuprofen or naproxen    retinoid products like tretinoin or isotretinoin    risperidone    some other antibiotics like clarithromycin or erythromycin    sucralfate    theophylline    warfarin  This list may not describe all possible interactions. Give your health care provider a list of all the medicines, herbs, non-prescription drugs, or dietary supplements you use. Also tell them if you smoke, drink alcohol, or use illegal drugs. Some items may interact with your medicine.  What should I watch for while using this medicine?  Tell your doctor or health care professional if your symptoms do not improve or if they get worse. Drink several glasses of water a day and cut down on drinks that contain caffeine. You must not get dehydrated while taking this medicine.  You may get drowsy or dizzy. Do not drive, use machinery, or do anything that needs mental alertness until you know how this medicine affects you. Do not sit or stand up quickly, especially if you are an older patient. This reduces the risk of dizzy or fainting spells.  This medicine can make you more sensitive to the sun. Keep out of the sun. If you cannot avoid being in the sun, wear  protective clothing and use a sunscreen. Do not use sun lamps or tanning beds/booths. Contact your doctor if you get a sunburn.  If you are a diabetic monitor your blood glucose carefully. If you get an unusual reading stop taking this medicine and call your doctor right away.  Do not treat diarrhea with over-the-counter products. Contact your doctor if you have diarrhea that lasts more than 2 days or if the diarrhea is severe and watery.  Avoid antacids, calcium, iron, and zinc products for 2 hours before and 2 hours after taking a dose of this medicine.  What side effects may I notice from receiving this medicine?  Side effects that you should report to your doctor or health care professional as soon as possible:    allergic reactions like skin rash or hives, swelling of the face, lips, or tongue    changes in vision    confusion, nightmares or hallucinations    difficulty breathing    irregular heartbeat, chest pain    joint, muscle or tendon pain    pain or difficulty passing urine    persistent headache with or without blurred vision    redness, blistering, peeling or loosening of the skin, including inside the mouth    seizures    unusual pain, numbness, tingling, or weakness    vaginal irritation, discharge  Side effects that usually do not require medical attention (report to your doctor or health care professional if they continue or are bothersome):    diarrhea    dry mouth    headache    stomach upset, nausea    trouble sleeping  This list may not describe all possible side effects. Call your doctor for medical advice about side effects. You may report side effects to FDA at 0-347-FDA-2569.  Where should I keep my medicine?  Keep out of the reach of children.  Store at room temperature between 15 and 30 degrees C (59 and 86 degrees F). Keep in a tightly closed container. Throw away any unused medicine after the expiration date.  NOTE:This sheet is a summary. It may not cover all possible information. If  you have questions about this medicine, talk to your doctor, pharmacist, or health care provider. Copyright  2016 [employer]                Post-Op Tips: Knee  At home, you play a major role in your recovery. The better you care for your knee, the faster you may heal. And the faster you heal, the sooner you can resume your normal activities. Follow your doctor s instructions about how to care for your knee after surgery.  Home recovery         Elevating and icing your knee decrease pain and swelling.      Apply a bag of ice to your knee and elevate your knee above heart level, as directed.    For the first few days, your doctor may recommend that you not bear weight on your knee. Use a cane, crutches, or walker as advised by your doctor.    Your doctor may also prescribe pain medications or anti-inflammatories, or both.    To prevent infection, keep your incisions dry by using a plastic bag when icing your knee or taking a shower. Be sure to follow your doctor's instruction on how long to wait before showering or bathing.  Strengthening your knee  You may be instructed to do exercises, possibly using weights. To help strengthen your knee, and avoid injuries, follow these tips:    Start out slowly, then increase repetitions  or duration as recommended.    Take short, frequent walks each day. Ask your doctor when you can start, how long your walks should be and how often you should take them.    Physical therapy may also be recommended to help you strengthen your knee and increase its range of motion.    Increase any exercise you do as approved by your doctor or physical therapist.  Talk with your healthcare provider about what exercise program is best for you. Always tell your healthcare provider if an exercise causes new or lasting pain.     7532-6997 [employer]. [address] [place] [address], [place] [address]. All rights reserved. This information is not intended as a substitute for professional medical  care. Always follow your healthcare professional's instructions.

## 2017-03-25 NOTE — PHARMACY-VANCOMYCIN DOSING SERVICE
Pharmacy consulted to dose Vancomycin for postoperative infection.  Will dose Vancomycin 1500 mg IV x1.  If patient is admitted and provider wishes to continue Vancomycin therapy, please order an inpatient pharmacy consult

## 2017-03-25 NOTE — H&P
"[facility]    History and Physical  Hospitalist       Date of Admission:  3/25/2017  Date of Service (when I saw the patient): 03/25/17    Assessment & Plan   Sydni Hardin is a 67 year old female who presents with concerns of knee incisional infection      Leukocytosis: WBC 16. However, she is afebrile, normotensive, no tachycardia. She is fatigued and sleepy however she took pain medications prior to arriving in the ED. Possible this is just reactive but unlikely as she had a WBC of 7/9 day after surgery. UA is negative. Denies abdominal pain though she does have chronic intermittent diarrhea and history of gastric ulcer with continued occasional pain that is unchanged recently. No cough,soutum production or hypoxia. Blood cultures and lactic acid ordered as well as CXR. With diarrhea will get stool for c-diff although she already tested negative 2 days ago.       S/P total knee replacement using cement, right: There is some erythema noted to the incision,particularly the proximal end with some edema, however no clearly defined border or drainage from the incision. No warmth to touch. Will consult orthopedics if she develops indications of worsening infection.      Fatigue: For me her mentation is normal. She is alert and oriented. She is cureently on pain medication as well as anxiolytics.       Asthma: Stable on daily medications. She denies any shortness of breath and lungs sound clear. CXR pending. Will continue home dose medications as well as duonebs as needed.       Dehydration: Mucous membranes dry, mild elevation of creatinine, will continue IVF overnight and reassess in AM. I/O.       Essential hypertension: Stable, will hold benazepril as not on our formulary and blood pressures look good right now. She did have it this AM.       Dysthymia: Daughter reports she has been refusing to eat and refusing supplements at the nursing home. Her daughter also states that \"she hasn't eaten anything " "since Sunday\". However I have personally knowledge that the patient was eating while she was in the hospital as I physically saw her eating meals. I educated her on importance of good nutrition in wound healing. She related to me during her hospital stay that she has had a lot of stress at home as well as in preparing for surgery. She tells me that she hasn't been eating much at the SNF because she isn't hungry.  Patient tells me she thinks she wants to go home to her house with outpatient PT when she leaves the hospital.     DVT Prophylaxis: Patient refuses any DVT prevention other then 81mg ASA BID due to her history of gastric ulcer. Also ordered foot pumps.   Code Status: Full Code    Disposition: Expected discharge in 1-2 days once acute infectious process ruled out.    Jennifer Junior, CNP    Primary Care Physician   Gibran Alarcon    Chief Complaint   Knee pain    History is obtained from the patient    History of Present Illness   Sydni Hardin is a 67 year old female who presents with concerns from family of both knee infection and sedation. I evaluated the patient in the ED. On my arrival she was alone in her room. Upon entering her room she was awake and alert and talking with her nurse at bedside. She appeared to recognize me and stated \"I'm back\". She states that her knee was hurting. She denies any shortness of breath, chest pain or abdominal pain. She is laughing and joking with me initially. Her daughter enters the room and the patient notably shuts down. Her daughter states that she has not eaten since last Sunday, has been refusing supplements at the nursing home and becoming \"combative\" when they tried to give them to her. Her daughter is also concerned that she is so tired and fatigued,and states she has had multiple episodes of sepsis post-op.     Past Medical History    I have reviewed this patient's medical history and updated it with pertinent information if needed.   Past Medical " History:   Diagnosis Date     Abnormal arterial blood gases 2006     Acute upper respiratory infections of unspecified site 2001     Anxiety 10/24/2011     Calculus of gallbladder without mention of cholecystitis or obstruction 2006     Depression 2012     Follow-up examination, following other surgery 2006     Headache(784.0) 12/10/2003     History of CVA (cerebrovascular accident) 2011     HTN (hypertension) 2012     Insomnia 3/13/2012     Lipoma of other skin and subcutaneous tissue 2006     Nonallopathic lesion of cervical region, not elsewhere classified 2003     Osteoarthrosis, unspecified whether generalized or localized, lower leg 10/4/2001     Special screening for malignant neoplasms, colon 2006     Urinary tract infection, site not specified 2007       Past Surgical History   I have reviewed this patient's surgical history and updated it with pertinent information if needed.  Past Surgical History:   Procedure Laterality Date     ARTHROPLASTY KNEE Right 3/21/2017    Procedure: ARTHROPLASTY KNEE;  Surgeon: Moisés Olson MD;  Location: [facility]     ARTHROSCOPY KNEE       BIOPSY BREAST  2000    left - free of disease      SECTION        SECTION       CHOLECYSTECTOMY       COLONOSCOPY  2008    repeat in 10 years     HEAD & NECK SURGERY  2014    hematoma on head drained     HYSTERECTOMY       ORTHOPEDIC SURGERY  2014    right shoulder replacement     shoulder x2  2009    left       Prior to Admission Medications   Prior to Admission Medications   Prescriptions Last Dose Informant Patient Reported? Taking?   ALPRAZolam (XANAX) 1 MG tablet 3/24/2017 at Unknown time  No Yes   Sig: Take 1 tablet (1 mg) by mouth 3 times daily as needed for anxiety   DOXEPIN HCL PO 3/24/2017 at Unknown time  Yes Yes   Sig: Take 25 mg by mouth At Bedtime   EPIPEN 2-JAIRO 0.3 MG/0.3ML injection Unknown at Unknown time  No No   Sig: INJECT 1 SYRINGE  INTRAMUSCULARLY ONCE AS NEEDED FOR ANAPHYLAXIS   VENTOLIN  (90 BASE) MCG/ACT inhaler 3/24/2017 at Unknown time  No Yes   Sig: USE 1 PUFF FOUR TIMES A DAY AS NEEDED   aspirin EC 81 MG EC tablet 3/25/2017 at Unknown time  No Yes   Sig: Take 1 tablet (81 mg) by mouth 2 times daily   atorvastatin (LIPITOR) 40 MG tablet 3/25/2017 at Unknown time  No Yes   Sig: TAKE 1 TABLET BY MOUTH DAILY   benazepril (LOTENSIN) 10 MG tablet 3/25/2017 at Unknown time  No Yes   Sig: TAKE 1 TABLET BY MOUTH DAILY   benzonatate (TESSALON) 200 MG capsule 3/24/2017 at Unknown time  No Yes   Sig: TAKE 1 CAPSULE BY MOUTH 3 TIMES A DAY AS NEEDED FOR COUGH   budesonide-formoterol (SYMBICORT) 160-4.5 MCG/ACT inhaler 3/24/2017 at Unknown time  No Yes   Sig: Inhale 2 puffs into the lungs 2 times daily. As needed   butalbital-acetaminophen-caffeine (FIORICET/ESGIC) -40 MG per tablet 3/24/2017 at Unknown time  No Yes   Sig: Take 1 tablet by mouth every 6 hours as needed   cimetidine (TAGAMET) 800 MG tablet 3/24/2017 at Unknown time  No Yes   Sig: Take 1 tablet (800 mg) by mouth At Bedtime As needed   escitalopram (LEXAPRO) 20 MG tablet 3/24/2017 at Unknown time  No Yes   Sig: TAKE 1 TABLET BY MOUTH 2 TIMES A DAY   hydrochlorothiazide (HYDRODIURIL) 25 MG tablet 3/24/2017 at Unknown time  No Yes   Sig: TAKE 1 TABLET BY MOUTH DAILY   ipratropium - albuterol 0.5 mg/2.5 mg/3 mL (DUONEB) 0.5-2.5 (3) MG/3ML nebulization 3/24/2017 at Unknown time  No Yes   Sig: Take 1 vial (3 mLs) by nebulization every 6 hours as needed   loperamide (IMODIUM) 2 MG capsule 3/24/2017 at Unknown time  No Yes   Sig: Take 1 capsule (2 mg) by mouth 4 times daily as needed for diarrhea   meloxicam (MOBIC) 15 MG tablet 3/25/2017 at Unknown time  No Yes   Sig: TAKE 1 TABLET BY MOUTH DAILY   montelukast (SINGULAIR) 10 MG tablet 3/25/2017 at Unknown time  No Yes   Sig: TAKE 1 TABLET BY MOUTH DAILY   nicotine (NICODERM CQ) 21 MG/24HR 24 hr patch 3/25/2017 at Unknown time  Yes  "Yes   Sig: Place 1 patch onto the skin every 24 hours   order for DME Unknown at Unknown time  No No   Sig: Equipment being ordered: Nebulizer supplies   order for DME Unknown at Unknown time  No No   Sig: Equipment being ordered: mcgee walker   order for DME Unknown at Unknown time  No No   Sig: Equipment being ordered: toilet seat riser   oxyCODONE-acetaminophen (PERCOCET) 5-325 MG per tablet 3/24/2017 at Unknown time  No Yes   Sig: Take 1-2 tablets by mouth every 4 hours as needed for moderate to severe pain   potassium chloride (K-TAB,KLOR-CON) 10 MEQ tablet 3/24/2017 at Unknown time  No Yes   Sig: TAKE 1 TABLET BY MOUTH 2 TIMES DAILY WITH FOOD   temazepam (RESTORIL) 30 MG capsule 3/24/2017 at Unknown time  No Yes   Sig: TAKE 1 CAPSULE BY MOUTH NIGHTLY AS NEEDED FOR SLEEP      Facility-Administered Medications: None     Allergies   Allergies   Allergen Reactions     Bee Pollen Other (See Comments)     Throat and eyes close up     Codeine Difficulty breathing     \"My throat closes shut.\"     Takes Percocet at home     Keflex [Cephalexin Hcl] Hives     Sulfonamide Derivatives Other (See Comments)     Throat closes     Ciprofloxacin Rash     Rash, bumps on lips     Bupropion Hcl Other (See Comments)     Made her very irritable     Celecoxib Other (See Comments) and Cough     Celebrex - wheeze     Clonazepam Hives     Erythromycin Other (See Comments)     Erythromycin base - abdominal pain     Lisinopril Other (See Comments)     Zestril - headaches     Nifedipine Hives     Procardia     Tramadol Hcl        Social History   I have reviewed this patient's social history and updated it with pertinent information if needed. Sydni SUSANNE Hardin  reports that she has been smoking Cigarettes.  She has a 46.00 pack-year smoking history. She has never used smokeless tobacco. She reports that she drinks alcohol. She reports that she does not use illicit drugs.    Family History   I have reviewed this patient's family history " "and updated it with pertinent information if needed.   Family History   Problem Relation Age of Onset     CANCER Mother      skin     Breast Cancer Mother      CEREBROVASCULAR DISEASE Mother      Alzheimer Disease Mother      Myocardial Infarction Father 76     myocardial infarction - cause of death     CANCER Daughter      cervical      DIABETES Maternal Grandfather      HEART DISEASE Maternal Grandfather      MI     DIABETES Maternal Grandmother      DIABETES Paternal Grandmother      DIABETES Paternal Grandfather        Review of Systems   CONSTITUTIONAL:  negative for  fevers, chills, sweats and malaise. Positive for anorexia that began prior to surgery and fatigue.   HEENT:  negative for  nasal congestion, sore mouth and sore throat  RESPIRATORY:  negative for  dry cough, cough with sputum, wheezing, chest pain and pleuritic pain  CARDIOVASCULAR:  negative for  chest pain, palpitations, orthopnea, exertional chest pressure/discomfort, edema, syncope  GASTROINTESTINAL:  Positive for occasional \"stomach pain\" related to ulcer, denies abdominal pain or nausea/vomiting.   GENITOURINARY:  negative for frequency, dysuria and hesitancy  MUSCULOSKELETAL:  positive for  Right knee pain,generalized pain  BEHAVIOR/PSYCH:  positive for poor appetite, decreased energy level, depressed mood, fatigue and anxiety    Physical Exam   Temp: 96.8  F (36  C) Temp src: Tympanic BP: 124/69   Heart Rate: 80 Resp: 16 SpO2: 93 % O2 Device: None (Room air)    Vital Signs with Ranges  Temp:  [96.8  F (36  C)] 96.8  F (36  C)  Heart Rate:  [80-88] 80  Resp:  [16] 16  BP: (100-124)/(50-85) 124/69  SpO2:  [91 %-94 %] 93 %  0 lbs 0 oz    Constitutional: Awake and alert for me,initially, falls asleep quickly but awakens to voice. Does not appear acutely ill, no acute distress.  HEENT: Normocephalic, pupils equal, mucous membranes pink and dry, no plaques noted.   Respiratory: Clear but diminished bilaterally, no wheezes,rhonchi or crackles. "   Cardiovascular: HRR, no murmur, rubs or thrills.   GI: Obese,soft,nontender, bowel sounds hypoactive.   Skin: No rashes or bruising noted. Right knee incision has ome mild erythema,particularly to the proximal end, but edges are well approximated with no drainage. No warmth to touch.   Musculoskeletal: No peripheral edema noted. Moves arms and legs independently. Equal strength upper extremities.  Neurologic/psychiatric: Awake and alert and talking with me animatedly until her daughter arrived. She then stopped speaking and started mumbling and sitting in the cot with her eyes closed. She occasionally did make some comments but seems confused as to what the current topic of conversation was. She is oriented x3 and has quick recall of recent events. She follows commands easily.      Data   Data reviewed today:  I personally reviewed ED labs and vitals. CXR pending.     Recent Labs  Lab 03/25/17  1439 03/23/17  0557 03/22/17  0545   WBC 16.9*  --  7.9   HGB 12.0 11.4* 12.0   MCV 90  --  90     --  214     --   --    POTASSIUM 3.3*  --   --    CHLORIDE 98  --   --    CO2 29  --   --    BUN 21  --   --    CR 1.60*  --   --    ANIONGAP 7  --   --    JOSÉ 8.9  --   --    *  --  129*       No results found for this or any previous visit (from the past 24 hour(s)).

## 2017-03-25 NOTE — ED NOTES
Daughter is at bedside and has all of pt's belongings from the nursing home. Aware that the pt is going to be admitted to the hospital.

## 2017-03-25 NOTE — ED PROVIDER NOTES
History     Chief Complaint   Patient presents with     Knee Pain     c/o increased rt knee pain, notes total knee replacement and [place] [facility] [place]     The history is provided by the patient.     Sydni Hardin is a 67 year old female who presented to the ED via EMS for evaluation of lethargy and right knee infection.  She underwent total knee on 3/21 by Dr. Olson.  She was discharged to the NH for rehab yesterday.  Staff reports that seems more confused and her knee looks red.  No fevers.  No cough.  She has not been eating.      Past Medical History:   Diagnosis Date     Abnormal arterial blood gases 2006     Acute upper respiratory infections of unspecified site 2001     Anxiety 10/24/2011     Calculus of gallbladder without mention of cholecystitis or obstruction 2006     Depression 2012     Follow-up examination, following other surgery 2006     Headache(784.0) 12/10/2003     History of CVA (cerebrovascular accident) 2011     HTN (hypertension) 2012     Insomnia 3/13/2012     Lipoma of other skin and subcutaneous tissue 2006     Nonallopathic lesion of cervical region, not elsewhere classified 2003     Osteoarthrosis, unspecified whether generalized or localized, lower leg 10/4/2001     Special screening for malignant neoplasms, colon 2006     Urinary tract infection, site not specified 2007      Past Surgical History:   Procedure Laterality Date     ARTHROPLASTY KNEE Right 3/21/2017    Procedure: ARTHROPLASTY KNEE;  Surgeon: Moisés Olson MD;  Location: [facility]     ARTHROSCOPY KNEE       BIOPSY BREAST  2000    left - free of disease      SECTION        SECTION       CHOLECYSTECTOMY       COLONOSCOPY  2008    repeat in 10 years     HEAD & NECK SURGERY  2014    hematoma on head drained     HYSTERECTOMY       ORTHOPEDIC SURGERY  2014    right shoulder replacement     shoulder x2  2009    left      Social  History     Social History     Marital status:      Spouse name: N/A     Number of children: N/A     Years of education: N/A     Social History Main Topics     Smoking status: Current Every Day Smoker     Packs/day: 1.00     Years: 46.00     Types: Cigarettes     Smokeless tobacco: Never Used      Comment: tried to quit, relapse due to craving     Alcohol use 0.0 oz/week     0 Standard drinks or equivalent per week      Comment: rarely     Drug use: No     Sexual activity: No     Other Topics Concern      Service No     Blood Transfusions Yes     Permits if needed     Caffeine Concern Yes     2 cups     Occupational Exposure No     Hobby Hazards No     Sleep Concern Yes     doxipen and xanax at bedtime     Stress Concern No     Weight Concern No     Special Diet No     Back Care No     Exercise No     Seat Belt No     encouraged to wear seat belt for safety     Self-Exams Yes     Parent/Sibling W/ Cabg, Mi Or Angioplasty Before 65f 55m? Yes     father     Social History Narrative      Family History   Problem Relation Age of Onset     CANCER Mother      skin     Breast Cancer Mother      CEREBROVASCULAR DISEASE Mother      Alzheimer Disease Mother      Myocardial Infarction Father 76     myocardial infarction - cause of death     CANCER Daughter      cervical      DIABETES Maternal Grandfather      HEART DISEASE Maternal Grandfather      MI     DIABETES Maternal Grandmother      DIABETES Paternal Grandmother      DIABETES Paternal Grandfather        I have reviewed the Medications, Allergies, Past Medical and Surgical History, and Social History in the Epic system.    Review of Systems   Constitutional: Positive for activity change and appetite change. Negative for chills and fever.   Respiratory: Negative for chest tightness and shortness of breath.    Cardiovascular: Negative for chest pain.   Gastrointestinal: Negative for abdominal pain, nausea and vomiting.   Genitourinary: Negative for dysuria.    Musculoskeletal:        Right knee pain    Skin: Positive for color change.   Neurological: Negative for dizziness and headaches.       Physical Exam   BP: 107/67  Pulse: 79  Heart Rate: 88  Temp: 96.8  F (36  C)  Resp: 16  SpO2: 94 %  Physical Exam   Constitutional: She is oriented to person, place, and time. She appears well-developed and well-nourished. No distress.   Eyes: Conjunctivae and EOM are normal. Pupils are equal, round, and reactive to light.   Neck: Normal range of motion. Neck supple.   Cardiovascular: Normal rate and regular rhythm.    Pulmonary/Chest: Effort normal and breath sounds normal.   Abdominal: Soft. There is no tenderness.   Musculoskeletal: She exhibits edema and tenderness.   Examination of the right knee shows some mild erythema WITHOUT heat.  She also displays rather normal passive knee ROM WITHOUT pain.  No drainage    Neurological: She is alert and oriented to person, place, and time.   No focal findings    Skin: Skin is warm and dry.   Psychiatric: She has a normal mood and affect.   Nursing note and vitals reviewed.      ED Course     ED Course     Procedures        Medications   sodium chloride (PF) 0.9% PF flush 3 mL (not administered)   sodium chloride (PF) 0.9% PF flush 3 mL (3 mLs Intracatheter Given 3/25/17 1633)   0.9% sodium chloride BOLUS (1,000 mLs Intravenous Restarted 3/25/17 1634)     Followed by   0.9% sodium chloride infusion (1,000 mLs Intravenous New Bag 3/25/17 1811)   ALPRAZolam (XANAX) tablet 1 mg (not administered)   aspirin EC EC tablet 81 mg (not administered)   atorvastatin (LIPITOR) tablet 40 mg (not administered)   fluticasone-vilanterol (BREO ELLIPTA) 200-25 MCG/INH oral inhaler 1 puff (not administered)   doxepin (SINEquan) capsule 25 mg (not administered)   escitalopram (LEXAPRO) tablet 20 mg (not administered)   ipratropium - albuterol 0.5 mg/2.5 mg/3 mL (DUONEB) neb solution 3 mL (3 mLs Nebulization Not Given 3/25/17 5314)   loperamide (IMODIUM)  capsule 2 mg (not administered)   montelukast (SINGULAIR) tablet 10 mg (not administered)   nicotine (NICODERM CQ) 21 MG/24HR 24 hr patch 1 patch (not administered)   oxyCODONE-acetaminophen (PERCOCET) 5-325 MG per tablet 1-2 tablet (not administered)   potassium chloride (K-TAB,KLOR-CON) CR tablet 10 mEq (not administered)   naloxone (NARCAN) injection 0.1-0.4 mg (not administered)   ondansetron (ZOFRAN-ODT) ODT tab 4 mg (not administered)     Or   ondansetron (ZOFRAN) injection 4 mg (not administered)   pantoprazole (PROTONIX) 40 mg IV push injection (not administered)   meropenem (MERREM) 500 mg in NaCl 0.9 % 50 mL intermittent infusion (500 mg Intravenous New Bag 3/25/17 1810)   azithromycin (ZITHROMAX) 500 mg in NaCl 0.9 % 250 mL intermittent infusion (not administered)   nicotine Patch in Place (not administered)   nicotine patch REMOVAL (not administered)   potassium chloride SA (K-DUR/KLOR-CON M) CR tablet 20 mEq (20 mEq Oral Given 3/25/17 1554)   vancomycin (VANCOCIN) 1,500 mg in NaCl 0.9 % 500 mL intermittent infusion (1,500 mg Intravenous New Bag 3/25/17 1550)   ranitidine (ZANTAC) tablet 300 mg (300 mg Oral Given 3/25/17 1632)   oxyCODONE-acetaminophen (PERCOCET) 5-325 MG per tablet 1 tablet (1 tablet Oral Given 3/25/17 1632)     Results for orders placed or performed during the hospital encounter of 03/25/17 (from the past 24 hour(s))   CBC with platelets differential   Result Value Ref Range    WBC 16.9 (H) 4.0 - 11.0 10e9/L    RBC Count 3.90 3.8 - 5.2 10e12/L    Hemoglobin 12.0 11.7 - 15.7 g/dL    Hematocrit 35.2 35.0 - 47.0 %    MCV 90 78 - 100 fl    MCH 30.8 26.5 - 33.0 pg    MCHC 34.1 31.5 - 36.5 g/dL    RDW 12.6 10.0 - 15.0 %    Platelet Count 341 150 - 450 10e9/L    Diff Method Automated Method     % Neutrophils 74.8 %    % Lymphocytes 17.3 %    % Monocytes 5.9 %    % Eosinophils 0.9 %    % Basophils 0.3 %    % Immature Granulocytes 0.8 %    Nucleated RBCs 0 0 /100    Absolute Neutrophil 12.6 (H)  1.6 - 8.3 10e9/L    Absolute Lymphocytes 2.9 0.8 - 5.3 10e9/L    Absolute Monocytes 1.0 0.0 - 1.3 10e9/L    Absolute Eosinophils 0.2 0.0 - 0.7 10e9/L    Absolute Basophils 0.1 0.0 - 0.2 10e9/L    Abs Immature Granulocytes 0.1 0 - 0.4 10e9/L    Absolute Nucleated RBC 0.0    Basic metabolic panel   Result Value Ref Range    Sodium 134 133 - 144 mmol/L    Potassium 3.3 (L) 3.4 - 5.3 mmol/L    Chloride 98 94 - 109 mmol/L    Carbon Dioxide 29 20 - 32 mmol/L    Anion Gap 7 3 - 14 mmol/L    Glucose 117 (H) 70 - 99 mg/dL    Urea Nitrogen 21 7 - 30 mg/dL    Creatinine 1.60 (H) 0.52 - 1.04 mg/dL    GFR Estimate 32 (L) >60 mL/min/1.7m2    GFR Estimate If Black 39 (L) >60 mL/min/1.7m2    Calcium 8.9 8.5 - 10.1 mg/dL   CRP inflammation   Result Value Ref Range    CRP Inflammation 140.0 (H) 0.0 - 8.0 mg/L   Erythrocyte sedimentation rate auto   Result Value Ref Range    Sed Rate 39 (H) 0 - 30 mm/h   UA reflex to Microscopic   Result Value Ref Range    Color Urine Yellow     Appearance Urine Clear     Glucose Urine Negative NEG mg/dL    Bilirubin Urine Negative NEG    Ketones Urine Negative NEG mg/dL    Specific Gravity Urine 1.007 1.003 - 1.035    Blood Urine Negative NEG    pH Urine 5.5 4.7 - 8.0 pH    Protein Albumin Urine Negative NEG mg/dL    Urobilinogen mg/dL Normal 0.0 - 2.0 mg/dL    Nitrite Urine Negative NEG    Leukocyte Esterase Urine Negative NEG    Source Catheterized Urine    Lactic acid   Result Value Ref Range    Lactic Acid 1.5 0.4 - 2.0 mmol/L        Critical Care time:  none               Labs Ordered and Resulted from Time of ED Arrival Up to the Time of Departure from the ED   CBC WITH PLATELETS DIFFERENTIAL - Abnormal; Notable for the following:        Result Value    WBC 16.9 (*)     Absolute Neutrophil 12.6 (*)     All other components within normal limits   BASIC METABOLIC PANEL - Abnormal; Notable for the following:     Potassium 3.3 (*)     Glucose 117 (*)     Creatinine 1.60 (*)     GFR Estimate 32 (*)      GFR Estimate If Black 39 (*)     All other components within normal limits   CRP INFLAMMATION - Abnormal; Notable for the following:     CRP Inflammation 140.0 (*)     All other components within normal limits   ERYTHROCYTE SEDIMENTATION RATE AUTO - Abnormal; Notable for the following:     Sed Rate 39 (*)     All other components within normal limits   URINE MACROSCOPIC WITH REFLEX TO MICRO   PERIPHERAL IV CATHETER   BLOOD CULTURE   BLOOD CULTURE       Assessments & Plan (with Medical Decision Making)   With the above findings and just recent discharge, we will admit and follow her status.  Family is very relieved.  I was going to cover her with Levaquin because of the leukocytosis, but she has an allergy to cipro.  I elected to cover her with Vancomycin.  Apparently has an acute change in the edema and skin color, but I cannot find any true exam findings of joint infection. Discussed with Jennifer Junior, who discussed with Dr. Crowell.      I have reviewed the nursing notes.    I have reviewed the findings, diagnosis, plan and need for follow up with the patient.    Current Discharge Medication List          Final diagnoses:   Acute kidney injury (H)   Leukocytosis, unspecified type   Postoperative state       3/25/2017   [facility]     Savi Mahmood PA-C  03/25/17 6995

## 2017-03-25 NOTE — PLAN OF CARE
Problem: Patient Goal: Rt Focus  Goal: 1. Patient Goal: RT Focus  [facility] - Respiratory Clinical Assessment     Current Patient History:    Respiratory History: tobacco abuse    Smoking History: 1.5 ppd    Oxygen dependency: No,    Oxygen prescribed: none     3/25/2017 5:48 PM Patient Initial Assessment:     Level of Consciousness: alert , cooperative    Skin color: pink    Lung sounds:dim but clear    Respirations:  Normal with easy respirations and no use of accessory muscles to breathe    Respiratory symptoms: no other unusual symptoms    Cough/Sputum:  productive of clear sputum    Current oxygenation status: 92% on RA

## 2017-03-25 NOTE — ED NOTES
Pt's daughter reports that the pt hasn't been eating, has been refusing meals at nursing home. Pt reports that she just hasn't been hungry.

## 2017-03-25 NOTE — ED NOTES
Pt comes to the ER via ambulance for knee pain and lethargy. Pt's family reports that the pt is more confused and acting different. Recent knee surgery, was recently discharge from the hospital.  Pt to room 10 from ambulance. Pt is alert, drowsy. Provider to bedside.

## 2017-03-26 LAB
ANION GAP SERPL CALCULATED.3IONS-SCNC: 9 MMOL/L (ref 3–14)
BASOPHILS # BLD AUTO: 0.1 10E9/L (ref 0–0.2)
BASOPHILS NFR BLD AUTO: 0.4 %
BUN SERPL-MCNC: 25 MG/DL (ref 7–30)
CALCIUM SERPL-MCNC: 7.5 MG/DL (ref 8.5–10.1)
CHLORIDE SERPL-SCNC: 107 MMOL/L (ref 94–109)
CO2 SERPL-SCNC: 23 MMOL/L (ref 20–32)
CREAT SERPL-MCNC: 1.37 MG/DL (ref 0.52–1.04)
DIFFERENTIAL METHOD BLD: ABNORMAL
EOSINOPHIL # BLD AUTO: 0.6 10E9/L (ref 0–0.7)
EOSINOPHIL NFR BLD AUTO: 4.3 %
ERYTHROCYTE [DISTWIDTH] IN BLOOD BY AUTOMATED COUNT: 13 % (ref 10–15)
GFR SERPL CREATININE-BSD FRML MDRD: 38 ML/MIN/1.7M2
GLUCOSE SERPL-MCNC: 92 MG/DL (ref 70–99)
HCT VFR BLD AUTO: 28 % (ref 35–47)
HGB BLD-MCNC: 9.4 G/DL (ref 11.7–15.7)
IMM GRANULOCYTES # BLD: 0.1 10E9/L (ref 0–0.4)
IMM GRANULOCYTES NFR BLD: 0.5 %
LYMPHOCYTES # BLD AUTO: 4.4 10E9/L (ref 0.8–5.3)
LYMPHOCYTES NFR BLD AUTO: 31.5 %
MCH RBC QN AUTO: 30.5 PG (ref 26.5–33)
MCHC RBC AUTO-ENTMCNC: 33.6 G/DL (ref 31.5–36.5)
MCV RBC AUTO: 91 FL (ref 78–100)
MONOCYTES # BLD AUTO: 0.9 10E9/L (ref 0–1.3)
MONOCYTES NFR BLD AUTO: 6.6 %
NEUTROPHILS # BLD AUTO: 7.9 10E9/L (ref 1.6–8.3)
NEUTROPHILS NFR BLD AUTO: 56.7 %
NRBC # BLD AUTO: 0 10*3/UL
NRBC BLD AUTO-RTO: 0 /100
PLATELET # BLD AUTO: 284 10E9/L (ref 150–450)
POTASSIUM SERPL-SCNC: 3.6 MMOL/L (ref 3.4–5.3)
RBC # BLD AUTO: 3.08 10E12/L (ref 3.8–5.2)
SODIUM SERPL-SCNC: 139 MMOL/L (ref 133–144)
WBC # BLD AUTO: 13.9 10E9/L (ref 4–11)

## 2017-03-26 PROCEDURE — 40000275 ZZH STATISTIC RCP TIME EA 10 MIN

## 2017-03-26 PROCEDURE — 99225 ZZC SUBSEQUENT OBSERVATION CARE,LEVEL II: CPT | Performed by: NURSE PRACTITIONER

## 2017-03-26 PROCEDURE — 25000128 H RX IP 250 OP 636: Performed by: NURSE PRACTITIONER

## 2017-03-26 PROCEDURE — 25000132 ZZH RX MED GY IP 250 OP 250 PS 637: Mod: GY | Performed by: NURSE PRACTITIONER

## 2017-03-26 PROCEDURE — 25000132 ZZH RX MED GY IP 250 OP 250 PS 637: Mod: GY | Performed by: HOSPITALIST

## 2017-03-26 PROCEDURE — 25000125 ZZHC RX 250: Performed by: NURSE PRACTITIONER

## 2017-03-26 PROCEDURE — 85025 COMPLETE CBC W/AUTO DIFF WBC: CPT | Performed by: NURSE PRACTITIONER

## 2017-03-26 PROCEDURE — 80048 BASIC METABOLIC PNL TOTAL CA: CPT | Performed by: NURSE PRACTITIONER

## 2017-03-26 PROCEDURE — 25000128 H RX IP 250 OP 636

## 2017-03-26 PROCEDURE — 94640 AIRWAY INHALATION TREATMENT: CPT

## 2017-03-26 PROCEDURE — 94640 AIRWAY INHALATION TREATMENT: CPT | Mod: 76

## 2017-03-26 PROCEDURE — 96376 TX/PRO/DX INJ SAME DRUG ADON: CPT

## 2017-03-26 PROCEDURE — A9270 NON-COVERED ITEM OR SERVICE: HCPCS | Mod: GY | Performed by: HOSPITALIST

## 2017-03-26 PROCEDURE — A9270 NON-COVERED ITEM OR SERVICE: HCPCS | Mod: GY | Performed by: NURSE PRACTITIONER

## 2017-03-26 PROCEDURE — G0378 HOSPITAL OBSERVATION PER HR: HCPCS

## 2017-03-26 PROCEDURE — 36415 COLL VENOUS BLD VENIPUNCTURE: CPT | Performed by: NURSE PRACTITIONER

## 2017-03-26 PROCEDURE — 25000128 H RX IP 250 OP 636: Performed by: PHYSICIAN ASSISTANT

## 2017-03-26 PROCEDURE — 96375 TX/PRO/DX INJ NEW DRUG ADDON: CPT

## 2017-03-26 RX ORDER — OXYCODONE AND ACETAMINOPHEN 5; 325 MG/1; MG/1
1 TABLET ORAL EVERY 4 HOURS PRN
Status: DISCONTINUED | OUTPATIENT
Start: 2017-03-26 | End: 2017-03-27 | Stop reason: HOSPADM

## 2017-03-26 RX ORDER — ALPRAZOLAM 0.5 MG
0.5 TABLET ORAL 3 TIMES DAILY PRN
Status: DISCONTINUED | OUTPATIENT
Start: 2017-03-26 | End: 2017-03-27 | Stop reason: HOSPADM

## 2017-03-26 RX ORDER — LEVOFLOXACIN 5 MG/ML
INJECTION, SOLUTION INTRAVENOUS
Status: COMPLETED
Start: 2017-03-26 | End: 2017-03-26

## 2017-03-26 RX ORDER — LEVOFLOXACIN 5 MG/ML
750 INJECTION, SOLUTION INTRAVENOUS EVERY 24 HOURS
Status: DISCONTINUED | OUTPATIENT
Start: 2017-03-26 | End: 2017-03-27

## 2017-03-26 RX ADMIN — SODIUM CHLORIDE 1000 ML: 9 INJECTION, SOLUTION INTRAVENOUS at 08:44

## 2017-03-26 RX ADMIN — ASPIRIN 81 MG: 81 TABLET, COATED ORAL at 12:01

## 2017-03-26 RX ADMIN — IPRATROPIUM BROMIDE AND ALBUTEROL SULFATE 3 ML: .5; 3 SOLUTION RESPIRATORY (INHALATION) at 22:47

## 2017-03-26 RX ADMIN — OXYCODONE AND ACETAMINOPHEN 1 TABLET: 5; 325 TABLET ORAL at 23:46

## 2017-03-26 RX ADMIN — IPRATROPIUM BROMIDE AND ALBUTEROL SULFATE 3 ML: .5; 3 SOLUTION RESPIRATORY (INHALATION) at 14:58

## 2017-03-26 RX ADMIN — ASPIRIN 81 MG: 81 TABLET, COATED ORAL at 21:02

## 2017-03-26 RX ADMIN — IPRATROPIUM BROMIDE AND ALBUTEROL SULFATE 3 ML: .5; 3 SOLUTION RESPIRATORY (INHALATION) at 11:16

## 2017-03-26 RX ADMIN — LEVOFLOXACIN 750 MG: 5 INJECTION, SOLUTION INTRAVENOUS at 07:26

## 2017-03-26 RX ADMIN — POTASSIUM CHLORIDE 10 MEQ: 750 TABLET, FILM COATED, EXTENDED RELEASE ORAL at 12:02

## 2017-03-26 RX ADMIN — ESCITALOPRAM 20 MG: 20 TABLET, FILM COATED ORAL at 12:01

## 2017-03-26 RX ADMIN — SODIUM CHLORIDE 1000 ML: 9 INJECTION, SOLUTION INTRAVENOUS at 16:58

## 2017-03-26 RX ADMIN — LOPERAMIDE HYDROCHLORIDE 2 MG: 2 CAPSULE ORAL at 19:33

## 2017-03-26 RX ADMIN — IPRATROPIUM BROMIDE AND ALBUTEROL SULFATE 3 ML: .5; 3 SOLUTION RESPIRATORY (INHALATION) at 03:38

## 2017-03-26 RX ADMIN — SODIUM CHLORIDE 500 MG: 9 INJECTION, SOLUTION INTRAVENOUS at 01:07

## 2017-03-26 RX ADMIN — DOXEPIN HYDROCHLORIDE 25 MG: 25 CAPSULE ORAL at 21:02

## 2017-03-26 RX ADMIN — MONTELUKAST SODIUM 10 MG: 10 TABLET, FILM COATED ORAL at 12:01

## 2017-03-26 RX ADMIN — POTASSIUM CHLORIDE 10 MEQ: 750 TABLET, FILM COATED, EXTENDED RELEASE ORAL at 19:33

## 2017-03-26 RX ADMIN — FLUTICASONE FUROATE AND VILANTEROL TRIFENATATE 1 PUFF: 200; 25 POWDER RESPIRATORY (INHALATION) at 11:29

## 2017-03-26 RX ADMIN — PANTOPRAZOLE SODIUM 40 MG: 40 INJECTION, POWDER, FOR SOLUTION INTRAVENOUS at 21:02

## 2017-03-26 RX ADMIN — ALPRAZOLAM 0.5 MG: 0.5 TABLET ORAL at 22:35

## 2017-03-26 RX ADMIN — ATORVASTATIN CALCIUM 40 MG: 40 TABLET, FILM COATED ORAL at 12:02

## 2017-03-26 RX ADMIN — ESCITALOPRAM 20 MG: 20 TABLET, FILM COATED ORAL at 21:02

## 2017-03-26 RX ADMIN — LOPERAMIDE HYDROCHLORIDE 2 MG: 2 CAPSULE ORAL at 12:01

## 2017-03-26 RX ADMIN — OXYCODONE AND ACETAMINOPHEN 1 TABLET: 5; 325 TABLET ORAL at 19:33

## 2017-03-26 RX ADMIN — OXYCODONE AND ACETAMINOPHEN 2 TABLET: 5; 325 TABLET ORAL at 12:02

## 2017-03-26 NOTE — PLAN OF CARE
Problem: Goal Outcome Summary  Goal: Goal Outcome Summary  Outcome: No Change  [facility]patient Admission Note:     Patient admitted to 3112/3112-1 at approximately 1711 via cart accompanied by family and transport tech from emergency room . Report received from ER nurse in SBAR format via telephone. Patient ambulated to bed via assist of 1. Patient is alert and oriented X 3, reports pain; rates at 7 on 0-10 scale.  Patient oriented to room, unit, hourly rounding, and plan of care. Explained admission packet and patient handbook with patient bill of rights brochure. Will continue to monitor and document as needed.      Inpatient Nursing criteria listed below was met:        Health care directives status obtained and documented: patient has POLST.        Care Everywhere authorization obtained No        MRSA swab completed for patient 65 years and older: Yes        Patient identifies a surrogate decision maker: Yes If yes, who: her brother, You, is POA. Contact Information: see epic for number.        Core Measure diagnosis present:No.         If initial lactic acid >2.0, repeat lactic acid drawn within one hour of arrival to unit: NA.        Vaccination assessment and education completed: Yes              Vaccinations received prior to admission: Pneumovax yes  Influenza(seasonal)  YES              Vaccination(s) ordered: patient is up to date.        Clergy visit ordered if patient requests: declines.        Skin issues/needs documented: relaying to nights to take knee dressing off.        Isolation Patient: no        Fall Prevention Yes: Care plan updated, education given and documented, sticker and magnet in place: Yes        Care Plan initiated: Yes        Education Documented (including assessment): Yes        Patient has discharge needs : patient states she thinks she would do better at home instead of nursing home.  Patient has c/o right knee pain this shift, she's rated pain 6-8/10. She was given  "Xanax after 1800 due to not being able to have pain medication until after 2030. She was then given 1 Percocet and followed with additional percocet an hour later. Patient arrived to the floor in a decent mood. I assisted her to the bathroom and she was fine, but she then became passive aggressive when I asked her to sit in the chair for supper versus going back to bed and having to get up again. Patient wouldn't answer questions right away. She made comments like \"why didn't they just take me to [facility]?\" She voiced that she was upset with the rehab plan at the nursing home, that she hadn't had any rehab since she arrived there yesterday \"after 5:00 p.m.\" Unsure if patient is confused/mixed up at times because of comments that she makes. Patient was alert and oriented x 3 upon arrival to floor. Daughter was teary while she was in patient's room, she had made the comment during conversation that she was \"in heaven for a month.\" Jw was present during this time. Conversation is flighty at times, changing subjects/topics quickly. Patient did not know where she was when she was woken up after 2100, she was more pleasant though. Patient spoke with a friend on the phone, she spoke around direct conversation as if she didn't want me to know what exactly she was talking about. Xanax was found on patient's belly while she was laying in bed, I was giving her Xanax and thought she dropped it but she had it in her hand with her potassium. I did not ask patient about it, but I removed it and wasted it with another nurse per pharmacy.  She is assist of 1 with the walker, she is steady on her feet. She is unable to do straight leg raises in bed, she needs help moving her leg off a pillow. Patient continues to pull pillow under her right knee after being told she can't have it there. Right knee is swollen as is right leg and right ankle. Bruising noted around right thigh and below right knee. Patient denies numbness or " tingling. Relayed to night shift to take dressing down, ER had it down but we should document it within 24 hours. Cryo cuff remains in place. Oral intake was poor, she only ate a dessert bar for supper. Drinking water. Patient c/o having diarrhea since Tuesday. She had 2 stools this shift, one she was incontinent. Area around rectum is red but blanchable, barrier cream being applied. Urine output adequate.   Alarms in place. IVF and IV antibiotic continue. ASA 81 mg PO bid continues.   Face to face report given with opportunity to observe patient.     Report given to Liz Erazo   3/26/2017  12:28 AM

## 2017-03-26 NOTE — PROGRESS NOTES
[facility]    Hospitalist Progress Note    Date of Service (when I saw the patient): 03/26/2017    Assessment & Plan   Sydni Hardin is a 67 year old female who was admitted on 3/25/2017.       Leukocytosis: Improved overnight with rehydration and antibiotics. Remains afebrile.       HAP: Patient has underlying asthma and per report was not moving or eating at the SNF. Looking at her CXR compared to prior she may have increased density on lateral view as well as patchy opacity in the bilateral bases,radiology read pending. No signs of sepsis. With healthcare exposure and multiple allergies she was started on vanco,meropenem and Azithromycin last night. However, with no signs of respiratory distress or sepsis will de-escalate to Levaquin. She has noted allergy to Cipro however she has taken Levaquin prescribed by her PCP without problems.       S/P total knee replacement using cement, right: Incision looks in normal state of healing with minimal border erythema.       Asthma: She does have some wheezes but air movement is still rather good. Suspect poor pulmonary toilet as much as anything. Minimal hypoxia so will not start steroids at this time.       Lethargy: Staff and SNF concerned about lethargy while she was there. Since arrival she continues to preferentially sleep throughout the day. However she is completely oriented and is able to make her needs known. She wakes without problems to use the bathroom and to ask for PRN medications. Suspect that she may have been taking her home medications in addition to medications given by nursing home staff as she was found with an ativan in her bed here last night that was not dispensed from our pharmacy. Also suspect that she is having acute exacerbation of her depression with anxiety that is contributing to her lethargy as well as her poor oral intake.        Dehydration: Resolved. Her mucous membranes are moist after fluid bolus and IVF overnight.  Voiding without difficulty, creatinine improved to baseline.       Essential hypertension: Stable without her usual benazepril. Okay to continue to hold as not available here.       Depression with anxiety: Continue SSRI, will reduce home ativan from 1 mg to 0.5mg with her fatigue and lethargy. Suspect acute exacerbation clouding her medical issues with recent stressors. She states her daughter was in inpatient psychiatric care after a suicide attempt and has since been living with her. According to the patient they have a long history of a adrián relationship.     DVT Prophylaxis: 81mg EC ASA and foot pumps as she refuses any other chemical DVT prophylaxis   Code Status: Full Code    Disposition: Expected discharge in 1 days once afebrile x48 hours, orthopedics available to assess knee if needed.    Jennifer Junior, CNP    Interval History   Sleeping this morning. Denies chest pain, shortness of breath or abdominal pain. Noted a significant cough during my assessment however she denies that she had a cough during the night.     -Data reviewed today: I reviewed all new labs and imaging results over the last 24 hours. I personally reviewed no images or EKG's today.    Physical Exam   Temp: 98.6  F (37  C) Temp src: Tympanic BP: 123/53 Pulse: 79 Heart Rate: 77 Resp: 18 SpO2: 94 % O2 Device: None (Room air) Oxygen Delivery: 1 LPM  Vitals:    03/26/17 0640   Weight: 75.8 kg (167 lb 1.7 oz)     Vital Signs with Ranges  Temp:  [96.8  F (36  C)-98.9  F (37.2  C)] 98.6  F (37  C)  Pulse:  [79] 79  Heart Rate:  [69-88] 77  Resp:  [16-20] 18  BP: (100-142)/(47-85) 123/53  SpO2:  [85 %-96 %] 94 %  I/O last 3 completed shifts:  In: 1680 [I.V.:1680]  Out: -     Peripheral IV 03/13/17 Right  (Active)   Site Assessment WDL 3/13/2017 11:07 AM   Line Status Infusing 3/13/2017 11:07 AM   Phlebitis Scale 0-->no symptoms 3/13/2017 11:07 AM   Infiltration Scale 0 3/13/2017 11:07 AM   Extravasation? No 3/13/2017 11:07 AM   Number of  days:13       Peripheral IV 03/25/17 Left (Active)   Site Assessment WDL 3/26/2017 12:01 PM   Line Status Infusing;Checked every 1-2 hour 3/26/2017 12:01 PM   Phlebitis Scale 0-->no symptoms 3/26/2017 12:01 PM   Infiltration Scale 0 3/26/2017 12:01 PM   Number of days:1       Wound 10/16/13 Right Finger (Comment which one) (Active)   Number of days:1257       Wound 10/16/13 Lip (Active)   Number of days:1257       Wound 08/08/14 Right Arm Abrasion(s) (Active)   Number of days:961       Incision/Surgical Site 03/21/17 Right Knee (Active)   Incision Assessment Drainage 3/26/2017  6:15 AM   Juanita-Incision Assessment Warm 3/26/2017  6:15 AM   Closure Sutures 3/26/2017  6:15 AM   Incision Drainage Amount Scant 3/26/2017  6:15 AM   Drainage Description Serosanguinous 3/26/2017  6:15 AM   Incision Care Normal saline 3/26/2017  6:15 AM   Dressing Intervention New dressing applied 3/26/2017  6:15 AM   Number of days:5     Line/device assessment(s) completed for medical necessity    Constitutional: Sleeps unless aroused, poor eye contact, no acute distress.  Respiratory: Occasional scattered wheezes, diminished right mid-base but without crackles or rhonchi. Easy respirations with equal chest rise and fall.   Cardiovascular: HRR, no murmur,rubs or thrills. No peripheral pitting edema.   GI: Soft, obese, bowel sounds hypoactive.   Skin/Integumen: Right knee incision intact with minimal serous drainage noted on bandage. No erythema beyond 2-3 cm that would be expected so soon after surgery.       Medications     NaCl 1,000 mL (03/26/17 0844)       levofloxacin  750 mg Intravenous Q24H     sodium chloride (PF)  3 mL Intracatheter Q8H     aspirin  81 mg Oral BID     atorvastatin  40 mg Oral Daily     fluticasone-vilanterol  1 puff Inhalation Daily     doxepin (SINEquan) capsule 25 mg  25 mg Oral At Bedtime     montelukast  10 mg Oral Daily     nicotine  1 patch Transdermal Q24H     potassium chloride  10 mEq Oral BID w/meals      pantoprazole  40 mg Intravenous Q24H     nicotine   Transdermal Q8H     nicotine   Transdermal Daily     escitalopram  20 mg Oral BID       Data     Recent Labs  Lab 03/26/17  0536 03/25/17  1439 03/23/17  0557 03/22/17  0545   WBC 13.9* 16.9*  --  7.9   HGB 9.4* 12.0 11.4* 12.0   MCV 91 90  --  90    341  --  214    134  --   --    POTASSIUM 3.6 3.3*  --   --    CHLORIDE 107 98  --   --    CO2 23 29  --   --    BUN 25 21  --   --    CR 1.37* 1.60*  --   --    ANIONGAP 9 7  --   --    JOSÉ 7.5* 8.9  --   --    GLC 92 117*  --  129*       No results found for this or any previous visit (from the past 24 hour(s)).

## 2017-03-26 NOTE — PLAN OF CARE
Problem: Patient Goal: Rt Focus  Goal: 1. Patient Goal: RT Focus  Pt.refused morning duoneb- Given neb at 11:00 AM -wheezes heard -BS- clearer in the afternoon-SPO2 93% on room Air

## 2017-03-26 NOTE — PROGRESS NOTES
Patient given Xanax 1 mg PO per request after being told it was too early for pain medication. I noticed a blue pill on patient's belly while she was in bed attempting to take Xanax and Potassium that I gave her. I thought patient dropped pill but realized she still had blue pill in her hand as well. I did not ask patient what this blue pill on her belly was, I did take pill. Pharmacy was called and pill was identified as Xanax 1 mg. Wasting Xanax 1 mg that was picked up off patient's belly with Estephanie Kendall RN down sink per pharmacy. Unit assistant did go through patient's purse looking for her phone  and did not find any pill bottles or other pills loose in her purse. Will monitor.

## 2017-03-26 NOTE — PLAN OF CARE
Problem: Patient Goal: Social Work Focus  Goal: 1. Patient Goal: Social Work Focus  This is a 67 year old woman who returned from [facility].  She left on Friday.  She states that she did not  Have any therapy when she went to the facility.  She also shared she does not like the room.  She would like to be in the homemakers section.  I shared I would ask this on Monday morning.  She is inquiring if there are other options.  I did say there are two other facilities that are choices there is [facility] and [facility].  She did say that her friend went to [facility].  I did offer this as an option if there is not a chance she can get changed to a new space at [facility].   Her MG score is low.  She sees Dr Pollack on [address] for her teeth and Dr Vega for her eyes.  She has a health  directive.  Her medications are at [facility].  She has a daughter who is living with her that causes her stress.  Daily she is able to dress herself independently.    For mobility she uses a walker with a seat.  She has had a couple of falls in the last year.  She is able to drive and does her own cooking.  In inquiring about her work history she shared she worked at [employer] for several years the [employer] and [employer].    She resides in the [facility] on the main floor, with a grab bar on the wall in the shower.  Her daughter is currently residing with her and this causes her stress.  Her stress is also helped by her seeing her counselor Isaias Kilgore.  She is a  of many years.  At night she takes medicine to helps her sleep at night.  Her libertad is very important to her.  She did have the [place] [facility] prior to her surgery.  Information re-presented to [facility] and also to [facility].  Continune to support and assist patient.

## 2017-03-26 NOTE — PLAN OF CARE
Problem: Patient Goal: Rt Focus  Goal: 1. Patient Goal: RT Focus  Outcome: No Change  Patient on 1lpm nc 96%.  No resp distress.  No treatments given.

## 2017-03-26 NOTE — PLAN OF CARE
Problem: Goal Outcome Summary  Goal: Goal Outcome Summary  Outcome: No Change  Pt was very somnolent this shift.  Will wake up by voice and state her name and  but then quickly falls back to sleep.  Denies any pain. Able to assess more on second assessment.  Pt able to state day and time of day, hospital and name and . BP 97/45 and 104/49.  When up to bathroom pt very alert but still have some bouts of confusion/forgetfulness.  Pt's sats were 90% in the beginning of the shift and went down to 85-86% with audible EW's.  Neb treatment given and pt placed on 1 liter of 02 with sats of 93-95%.  Attempted to wean off and pt went down to 85% while asleep.  IV patent.  Antibiotic hung this shift.  Cryocuff on all shift along with dressing which has been CDI this shift.  Pneumoboots placed this shift.  CMS + in bilateral legs. Bruising noted on right upper thigh.  Dressing changed this shift.  Pt had a warm to touch red/pink edematous knee with slight amount of serosanguinous fluid draining from it.  Lungs have rhonchi in right lower lobe and have EW heard throughout. When pt asleep EW are sometimes audible while in room.  Call light within reach and encouraged to use with any needs.  Checked on pt often to assess for any needs.  No change to skin integrity and bed alarms on this whole shift.      Face to face report given with opportunity to observe patient.    Report given to Albania Chaparro   3/26/2017  8:46 AM

## 2017-03-27 VITALS
WEIGHT: 172.4 LBS | OXYGEN SATURATION: 93 % | BODY MASS INDEX: 36.03 KG/M2 | HEART RATE: 79 BPM | TEMPERATURE: 98.7 F | RESPIRATION RATE: 16 BRPM | DIASTOLIC BLOOD PRESSURE: 61 MMHG | SYSTOLIC BLOOD PRESSURE: 139 MMHG

## 2017-03-27 LAB
BACTERIA SPEC CULT: NORMAL
BASOPHILS # BLD AUTO: 0 10E9/L (ref 0–0.2)
BASOPHILS NFR BLD AUTO: 0.4 %
DIFFERENTIAL METHOD BLD: ABNORMAL
EOSINOPHIL # BLD AUTO: 0.5 10E9/L (ref 0–0.7)
EOSINOPHIL NFR BLD AUTO: 4.3 %
ERYTHROCYTE [DISTWIDTH] IN BLOOD BY AUTOMATED COUNT: 13.1 % (ref 10–15)
HCT VFR BLD AUTO: 28 % (ref 35–47)
HGB BLD-MCNC: 9.3 G/DL (ref 11.7–15.7)
IMM GRANULOCYTES # BLD: 0 10E9/L (ref 0–0.4)
IMM GRANULOCYTES NFR BLD: 0.3 %
LYMPHOCYTES # BLD AUTO: 3.2 10E9/L (ref 0.8–5.3)
LYMPHOCYTES NFR BLD AUTO: 30 %
MCH RBC QN AUTO: 31 PG (ref 26.5–33)
MCHC RBC AUTO-ENTMCNC: 33.2 G/DL (ref 31.5–36.5)
MCV RBC AUTO: 93 FL (ref 78–100)
MICRO REPORT STATUS: NORMAL
MONOCYTES # BLD AUTO: 0.6 10E9/L (ref 0–1.3)
MONOCYTES NFR BLD AUTO: 5.6 %
NEUTROPHILS # BLD AUTO: 6.4 10E9/L (ref 1.6–8.3)
NEUTROPHILS NFR BLD AUTO: 59.4 %
NRBC # BLD AUTO: 0 10*3/UL
NRBC BLD AUTO-RTO: 0 /100
PLATELET # BLD AUTO: 339 10E9/L (ref 150–450)
RBC # BLD AUTO: 3 10E12/L (ref 3.8–5.2)
SPECIMEN SOURCE: NORMAL
WBC # BLD AUTO: 10.8 10E9/L (ref 4–11)

## 2017-03-27 PROCEDURE — A9270 NON-COVERED ITEM OR SERVICE: HCPCS | Mod: GY | Performed by: NURSE PRACTITIONER

## 2017-03-27 PROCEDURE — 40000275 ZZH STATISTIC RCP TIME EA 10 MIN

## 2017-03-27 PROCEDURE — 36415 COLL VENOUS BLD VENIPUNCTURE: CPT | Performed by: INTERNAL MEDICINE

## 2017-03-27 PROCEDURE — G0378 HOSPITAL OBSERVATION PER HR: HCPCS

## 2017-03-27 PROCEDURE — 99217 ZZC OBSERVATION CARE DISCHARGE: CPT | Performed by: INTERNAL MEDICINE

## 2017-03-27 PROCEDURE — 25000132 ZZH RX MED GY IP 250 OP 250 PS 637: Mod: GY | Performed by: HOSPITALIST

## 2017-03-27 PROCEDURE — 85025 COMPLETE CBC W/AUTO DIFF WBC: CPT | Performed by: INTERNAL MEDICINE

## 2017-03-27 PROCEDURE — 94640 AIRWAY INHALATION TREATMENT: CPT

## 2017-03-27 PROCEDURE — A9270 NON-COVERED ITEM OR SERVICE: HCPCS | Mod: GY | Performed by: HOSPITALIST

## 2017-03-27 PROCEDURE — 96376 TX/PRO/DX INJ SAME DRUG ADON: CPT

## 2017-03-27 PROCEDURE — 71020 ZZHC CHEST TWO VIEWS, FRONT/LAT: CPT | Mod: TC

## 2017-03-27 PROCEDURE — 25000128 H RX IP 250 OP 636: Performed by: NURSE PRACTITIONER

## 2017-03-27 PROCEDURE — 25000132 ZZH RX MED GY IP 250 OP 250 PS 637: Mod: GY | Performed by: NURSE PRACTITIONER

## 2017-03-27 RX ORDER — OXYCODONE AND ACETAMINOPHEN 5; 325 MG/1; MG/1
1-2 TABLET ORAL EVERY 4 HOURS PRN
Qty: 10 TABLET | Refills: 0 | Status: SHIPPED | DISCHARGE
Start: 2017-03-27 | End: 2017-04-12

## 2017-03-27 RX ORDER — LEVOFLOXACIN 750 MG/1
750 TABLET, FILM COATED ORAL DAILY
Qty: 3 TABLET | Refills: 0 | DISCHARGE
Start: 2017-03-28 | End: 2017-04-18

## 2017-03-27 RX ORDER — LEVOFLOXACIN 750 MG/1
750 TABLET, FILM COATED ORAL DAILY
Status: DISCONTINUED | OUTPATIENT
Start: 2017-03-28 | End: 2017-03-27 | Stop reason: HOSPADM

## 2017-03-27 RX ADMIN — MONTELUKAST SODIUM 10 MG: 10 TABLET, FILM COATED ORAL at 08:31

## 2017-03-27 RX ADMIN — ESCITALOPRAM 20 MG: 20 TABLET, FILM COATED ORAL at 08:31

## 2017-03-27 RX ADMIN — POTASSIUM CHLORIDE 10 MEQ: 750 TABLET, FILM COATED, EXTENDED RELEASE ORAL at 08:31

## 2017-03-27 RX ADMIN — OXYCODONE AND ACETAMINOPHEN 1 TABLET: 5; 325 TABLET ORAL at 08:28

## 2017-03-27 RX ADMIN — LEVOFLOXACIN 750 MG: 5 INJECTION, SOLUTION INTRAVENOUS at 06:44

## 2017-03-27 RX ADMIN — ATORVASTATIN CALCIUM 40 MG: 40 TABLET, FILM COATED ORAL at 08:31

## 2017-03-27 RX ADMIN — FLUTICASONE FUROATE AND VILANTEROL TRIFENATATE 1 PUFF: 200; 25 POWDER RESPIRATORY (INHALATION) at 07:59

## 2017-03-27 RX ADMIN — ASPIRIN 81 MG: 81 TABLET, COATED ORAL at 08:31

## 2017-03-27 NOTE — PLAN OF CARE
Brother, You Isaac, called questioning why patient will be discharged today if she currently has pneumonia.  Explained that patients do not stay in hospitals until pneumonias are completely resolve, we watch symptoms and diagnostics.  Verbalized understanding of the explanation, and since his sister's symptoms have improved, did not need to question this any further.

## 2017-03-27 NOTE — PLAN OF CARE
Problem: Patient Goal: Rt Focus  Goal: 1. Patient Goal: RT Focus  Outcome: No Change  Patient in no respiratory distress.  Nebs given as needed.  Breath sounds clear diminished.  94% on room air.

## 2017-03-27 NOTE — DISCHARGE INSTRUCTIONS
YOU HAVE A FOLLOW UP APPOINTMENT WITH DR. KHAN AT THE [facility] IN [place] ON April 18TH AT 10:45. IF YOU HAVE ANY QUESTIONS OR NEED TO RESCHEDULE PLEASE CALL [phone].      DRESSING CHANGE TO RIGHT KNEE HAS BEEN DONE WITH 4X4 GAUZE AND METAPORE TAPE.

## 2017-03-27 NOTE — PLAN OF CARE
Problem: Goal Outcome Summary  Goal: Goal Outcome Summary  Outcome: No Change  Patient slept well on shift. States pain is 8/10. Cryocuff remains in place and provides minimal relief per pt. Received PRN percocet x1 on shift and patient has been sleeping since. Vitals remain stable. Blood pressure holding appropriately. /52  Pulse 79  Temp 98.7  F (37.1  C) (Tympanic)  Resp 18  Wt 75.8 kg (167 lb 1.7 oz)  SpO2 92%  BMI 34.93 kg/m2. 1-2+ edema noted to the right lower extremity. Dressing remains clean and dry with no noted drainage. CMS remains intact. Bruising noted to the upper right thigh. IV fluids continue to infuse, antibiotic regimen continues.         Problem: Infection, Risk/Actual (Adult)  Goal: Identify Related Risk Factors and Signs and Symptoms  Related risk factors and signs and symptoms are identified upon initiation of Human Response Clinical Practice Guideline (CPG)   Outcome: No Change    03/27/17 0508   Infection, Risk/Actual   Infection, Risk/Actual: Related Risk Factors age extremes;surgery/procedure   Signs and Symptoms (Infection, Risk/Actual) pain;edema         Face to face report given with opportunity to observe patient.    Report given to ALANIS Maya   3/27/2017  7:29 AM

## 2017-03-27 NOTE — PROGRESS NOTES
Name: Sydni Hardin    MRN#: [id number]    Reason for Hospitalization: Leukocytosis    Discharge Date: March 27, 2017    Patient / Family response to discharge plan: Patient agreeable to returning to [facility]    Follow-Up Appt: Future Appointments  Date Time Provider Department Center   4/18/2017 2:20 PM Moisés Olson MD [facility]       Other Providers (Care Coordinator, [facility] Services, PCA services etc): No    Discharge Disposition: SNF            Returning to [facility] via [facility] 1:00     Patient is returning to [facility] when discharged from hospital. She was hoping her daughter Devorah would provide transportation but is unable to and asked writer to set up a ride with [employer] [employer].   Spoke with [facility], patient is able to return to facility today.     Per patient's request did call [facility] and [facility] to see if they allowed smoking at their facility, neither facility allows smoking. Let patient know, she still would like to return to [facility].    Carin Nunes

## 2017-03-27 NOTE — PLAN OF CARE
Problem: Goal Outcome Summary  Goal: Goal Outcome Summary  Outcome: No Change  Physical Therapy Update: Patient is Observation from SNF returning to SNF. Skilled PT eval not needed for this patient. Therapist did stop in with patient and reviewed all exercises with patient. Handout of TKA exercises given to patient so that she can perform independently while here. Patient had to use bathroom and therapist and nursing aide assisted patient to bathroom Mod A with FWW. Recommendations for up for all meals, use bathroom instead of commode and patient to perform exercises independently 2x/day with goal of 10 reps or more for each exercise written on white board. Also spoke with RN that walk with nursing in hallways with w/c follow would be ideal. Upcoming d/c to SNF pending.

## 2017-03-27 NOTE — PLAN OF CARE
"Problem: Goal Outcome Summary  Goal: Goal Outcome Summary  Outcome: No Change  Patient slept until RT woke her up just after 1100. She was quite passive aggressive for some time after being woken up. When I walked into the room patient stated \"oh you again.\" RT was present at the time. When I told patient that I just needed to quick check her vital signs and check her over she rolled her eyes at me. Patient c/o 8/10 right knee pain, she was given 2 Percocet PO. She was asked what she wanted to eat and needed quite a bit of encouragement and then assistance to order, I actually called kitchen because she stated \"you can.\" Patient declined to get up to the chair for breakfast but was assisted to the bathroom about 40 minutes later. She declined to stay up in the chair, she wanted to go back to bed. Patient then dozed off at some point until after 1600 when I woke her up to assess her. Her speech was somewhat garbled and she was somewhat mixed up but pleasant. BP found to be 90/42 manually. O2 sat was 88% on room air so O2 was placed @ 2 LPM NC. She c/o 6/10 right knee pain but would quickly doze off and need to be woken up to finish assessment. MD was updated due to current patient condition and vitals, reviewed medications patient had had since last night. 1 liter bolus was given. She was assisted to the chair for supper, she requested cereal and a muffin. Patient was encouraged to ambulate every time she was up to the bathroom to help move right knee. She did sit up in the chair for a bit before getting back to bed for new IV placement. Patient declined to ambulate after that, besides to the bathroom. She was given 1 Percocet per order after 1930 because she was alert and appropriate. Patient remained pleasant for afternoon shift. Unit assistant came to me in the med room with bottle of prescriptions from patient's cosmetic bag, unit assistant happened to notice bottles when refilling Cryo cuff and did bring them to " me. Prescription bottles included Alprazolam, Temazepam, Doxipen and a Ventolin inhaler. Medications were counted and documented by charge nurse and additional nurse on medications from home sheet. Supervisor was notified. Conclusion was made by myself and supervisor to leave prescription bottles in zip lock bag in [facility] with copy of medication from home sheet and supervisor will walk them down to pharmacy at 0645 when pharmacy arrives. RLE continues to have 2-3+ edema. Ace wrap remains clean, dry and intact. There is quite a bit of bruising noted on right thigh and on lower leg below knee. Patient denies numbness and tingling. Cryo cuff has been in place for most of the shift. IVF and and IV antibiotic continue.     Face to face report given with opportunity to observe patient.     Report given to Griselda Erazo   3/27/2017  2:08 AM

## 2017-03-27 NOTE — DISCHARGE SUMMARY
[facility]    Discharge Summary  Hospitalist    Date of Admission:  3/25/2017  Date of Discharge:  3/27/2017  1:30 PM  Discharging Provider: Shun Doyle  Date of Service (when I saw the patient): 03/27/17    Discharge Diagnoses   Possible health care associated left lower lobe pneumonia   favor pneumococcus given indolent course  Possible atelectasis  Recent right total knee arthoplasty  Moderate intermittent asthma  Metabolic encephalpathy marked by somnolence, resolved  Volume depletion  Depression with anxiety    History of Present Illness   Sydni Hardin is a 67 year old woman who presented from SNF with concerns from family of knee infection and sedation. She had been discharged 3/24 after TKA 3/21. On evaluation in EDshe was awake and alert and talking with her nurse at bedside. She did note that her knee was hurting. She noted no shortness of breath, chest pain or abdominal pain. Her daughter stated that oral intake had been limited and that the patient had been refusing supplements at the nursing home.      Hospital Course   Sydni Hardin was admitted on 3/25/2017.  The following problems were addressed during her hospitalization:    Possible pneumonia:   Persistent infiltrate, although certainly over her hospital course , either extent of infiltrate or intensity has not worsened.  Mild leukocytosis on presentation with rapid improvement.  Somewhat more rapid than would be expected from an antibiotic effect of significant infection. It is possibly slightly improved.  Cannot, however, absolutely eliminate an infectious process, although favor atelectasis.Patient has underlying asthma and by report was not moving greatly  In rehabilitation.  As cannot eliminate a possible infectious process.  We will continue levofloxacin.  Oral absorption is excellent and continued IV treatment, however, is not required.      Recent right total knee arthroplasty  Incision.  Excellent in appearance  on the time of her re-presentation.  No evidence of wound infection.  Unfortunately, she presented to hospital on Friday and rehabilitation has been limited because of logistics. Have strongly recommended to her, as have other practitioners, that rehabilitation is critical in a successful knee replacement.  On the day of discharge.  She acknowledged this and in fact expressed anticipation at resuming rehabilitation in the skilled nursing facility.     Asthma:   Overall, this appears to be mild intermittent asthma.  Initially she had some tidal wheezing.  Overall, however, gas exchange has been excellent and she has not required extensive treatment.   Most important measure will be to maximize pulmonary hygiene with coughing and deep breathing.      Metabolic encephalopathy:   Staff at her SNF concerned about lethargy while she was there. Since arrival she continues to preferentially sleep throughout the day. However she is completely oriented and is able to make her needs known. She wakes without problems to use the bathroom and to ask for PRN medications. The morning of discharge she is awake, alert and interactive.  Cannot eliminate some degree of metabolic encephalopathy on presentation.  This largely appears to be resolved.  The influence of behavioral issues are difficult to dissected from any frankly embolic process.  Certainly, she has had no focal findings.  Cannot eliminate some degree of somnolence on the basis of medications, especially medications for pain.     Volume depletion:   Mild volume depletion on presentation, likely because of poor oral intake while at skilled nursing facility.  After modest crystalloid fluid repletion over the first 18 hours or so , she showed marked improvement.  She is tolerating oral intake without difficulty.    Nutritional support.  Have encouraged high-protein diet.  Discussed with her options of  Nutritional supplements as has dietitian.  These may not be absolutely  "mandated if she is able to tolerate  A good balanced diet.     Depression with anxiety:   Overall mood appears reasonably well controlled. Continue SSRI. Have reduced home ativan from 1 mg to 0.5mg with her fatigue and lethargy without any evidence of worsening anxiety. Some degree of increased social stressors may have been clouding her medical issues. She states her daughter was in inpatient psychiatric care after a suicide attempt and has since been living with her. According to the patient they have a long history of a adrián relationship.     Essential hypertension:   Blood pressure control appears to be under reasonable control.  Of resume her chronic medications at the time of discharge.   Shun Doyle      Pending Results   These results will be followed up by Dr. Alarcon  Unresulted Labs Ordered in the Past 30 Days of this Admission     Date and Time Order Name Status Description    3/25/2017 1557 Blood culture Preliminary     3/25/2017 1557 Blood culture Preliminary           Code Status   Full Code       Primary Care Physician   Gibran Alarcon    Vital signs:  Temp: 98.7  F (37.1  C) Temp src: Tympanic BP: 139/61   Heart Rate: 75 Resp: 16 SpO2: 93 % O2 Device: None (Room air) Oxygen Delivery: 2 LPM (turned down to 1 LPM)   Weight: 78.2 kg (172 lb 6.4 oz) (standing)  Estimated body mass index is 36.03 kg/(m^2) as calculated from the following:    Height as of 3/15/17: 1.473 m (4' 10\").    Weight as of this encounter: 78.2 kg (172 lb 6.4 oz).    Awake, alert, interactive woman lying in bed on medical wards.  HEENT: Pupils equal, conjugate. No icterus or nystagmus. Oral mucosa moist. No facial asymmetry.   Neck: Supple, jugular veins not elevated. Trachea midline   Chest: No chest wall movement asymmetry. Aeration preserved to bases. Accessory muscles not in use. Expiratory time not increased. No tidal wheezes. No rhonchi. No discrete crackles.   Cardiac: PMI not displaced. S1, S2 unremarkable. No " S3, S4. P2 not accentuated. No murmurs.   Abdomen: Soft. No palpation or percussion tenderness. No distention. Normoactive bowel sounds. Liver and spleen not increased in size. No bruits, masses, or pulsations.   Extremities: No lower extremity edema. Incision at knee with mild edema.  Minimal palpation tenderness.  Incision line unremarkable.  No significant drainage.  Distal pulses easily palpable.  Extremities warm distally. No eccymoses, clubbing.   Neurologic: Mental state above. Motor 5/5 and bilaterally equal. Tone preserved. No fasiculations or tremors. Sensation intact to light touch. DTR 2/4 and bilaterally equal.     Discharge Disposition   Discharged to rehabilitation facility  Condition at discharge: Stable    Consultations This Hospital Stay   PHARMACY TO DOSE VANCO  PHYSICAL THERAPY ADULT IP CONSULT  PHYSICAL THERAPY ADULT IP CONSULT    Time Spent on this Encounter   I, Shun Doyle, personally saw the patient today and spent greater than 30 minutes discharging this patient.    Discharge Orders     General info for SNF   Length of Stay Estimate: Short Term Care: Estimated # of Days <30  Condition at Discharge: Improving  Level of care:skilled   Rehabilitation Potential: Good  Admission H&P remains valid and up-to-date: Yes  Recent Chemotherapy: N/A  Use Nursing Home Standing Orders: Yes     Mantoux instructions   Give two-step Mantoux (PPD) Per Facility Policy Yes     Reason for your hospital stay   Sydni Hardin is a 67 year old woman who presented with concerns from her family of possible knee infection after recent TKA and sedation. The patient was concerned as well of knee pain. pertinent history is significant for total knee arthroplasty 3/21 with discharge 3/24.  Her postoperative course had been unremarkable with the exception for moderate degree of knee pain.  Evaluation on her re-presentation showed a mild leukocytosis and chest radiograph with right lower lobe infiltrate  suggesting atelectasis versus inflammatory process. She responded to usual treatment.  Preemptive antibiotics were continued.  Chest radiograph this morning is improved.  However, pneumonia cannot be eliminated with confidence.  Oral levofloxacin is continued.  In other respects, she is discharged with plans for her to continue rehabilitation.  Importance of therapy and other rehabilitation emphasized with the patient.  Pain has been under good control with several times daily doses of Percocet.     Additional Discharge Instructions   Change dressing daily or when soiled. May shower with press and seal wrap over dressing. Change dressing if it becomes wet. Sutures/staples to be removed 14 days post-op at follow-up.     Follow Up and recommended labs and tests   Follow up with primary care provider in 2-3 weeks for follow up  Follow up with Dr. Olson in 14 days after surgery (4/4 or as available)     Activity - Up with assistive device   Per TKA protocol     Activity - Up with nursing assistance   Per TKA protocol     Full Code     Physical Therapy Adult Consult   Evaluate and treat as clinically indicated.    Reason:  Per  Right TKA protocol     Fall precautions     Advance Diet as Tolerated   Follow this diet upon discharge: Orders Placed This Encounter     Snacks/Supplements Adult: Between Meals     Regular Diet Adult  High protein diet encouraged       Discharge Medications   Current Discharge Medication List      START taking these medications    Details   levofloxacin (LEVAQUIN) 750 MG tablet Take 1 tablet (750 mg) by mouth daily  Qty: 3 tablet, Refills: 0    Associated Diagnoses: Pneumonia of left lower lobe due to infectious organism         CONTINUE these medications which have CHANGED    Details   oxyCODONE-acetaminophen (PERCOCET) 5-325 MG per tablet Take 1-2 tablets by mouth every 4 hours as needed for moderate to severe pain  Qty: 10 tablet, Refills: 0    Comments: Not in addition to medication  prescribed at discharge 3/24  Associated Diagnoses: Primary osteoarthritis of both knees         CONTINUE these medications which have NOT CHANGED    Details   DOXEPIN HCL PO Take 25 mg by mouth At Bedtime      aspirin EC 81 MG EC tablet Take 1 tablet (81 mg) by mouth 2 times daily    Associated Diagnoses: S/P total knee replacement using cement, right      ALPRAZolam (XANAX) 1 MG tablet Take 1 tablet (1 mg) by mouth 3 times daily as needed for anxiety  Qty: 30 tablet, Refills: 0    Associated Diagnoses: Anxiety      loperamide (IMODIUM) 2 MG capsule Take 1 capsule (2 mg) by mouth 4 times daily as needed for diarrhea  Qty: 20 capsule    Associated Diagnoses: Functional diarrhea      butalbital-acetaminophen-caffeine (FIORICET/ESGIC) -40 MG per tablet Take 1 tablet by mouth every 6 hours as needed  Qty: 5 tablet, Refills: 0    Associated Diagnoses: Scalp hematoma, sequela      temazepam (RESTORIL) 30 MG capsule TAKE 1 CAPSULE BY MOUTH NIGHTLY AS NEEDED FOR SLEEP  Qty: 10 capsule, Refills: 0    Associated Diagnoses: Persistent insomnia      nicotine (NICODERM CQ) 21 MG/24HR 24 hr patch Place 1 patch onto the skin every 24 hours      benzonatate (TESSALON) 200 MG capsule TAKE 1 CAPSULE BY MOUTH 3 TIMES A DAY AS NEEDED FOR COUGH  Qty: 60 capsule, Refills: 3    Associated Diagnoses: Preop general physical exam; Tobacco abuse      potassium chloride (K-TAB,KLOR-CON) 10 MEQ tablet TAKE 1 TABLET BY MOUTH 2 TIMES DAILY WITH FOOD  Qty: 180 tablet, Refills: 1    Associated Diagnoses: Benign essential hypertension      hydrochlorothiazide (HYDRODIURIL) 25 MG tablet TAKE 1 TABLET BY MOUTH DAILY  Qty: 90 tablet, Refills: 3    Associated Diagnoses: Benign essential hypertension      meloxicam (MOBIC) 15 MG tablet TAKE 1 TABLET BY MOUTH DAILY  Qty: 90 tablet, Refills: 1    Associated Diagnoses: Pain      atorvastatin (LIPITOR) 40 MG tablet TAKE 1 TABLET BY MOUTH DAILY  Qty: 90 tablet, Refills: 0    Associated Diagnoses: Mixed  hyperlipidemia      montelukast (SINGULAIR) 10 MG tablet TAKE 1 TABLET BY MOUTH DAILY  Qty: 90 tablet, Refills: 3    Associated Diagnoses: Mixed hyperlipidemia      escitalopram (LEXAPRO) 20 MG tablet TAKE 1 TABLET BY MOUTH 2 TIMES A DAY  Qty: 180 tablet, Refills: 0    Associated Diagnoses: Dysthymia      benazepril (LOTENSIN) 10 MG tablet TAKE 1 TABLET BY MOUTH DAILY  Qty: 90 tablet, Refills: 0    Associated Diagnoses: HTN (hypertension)      VENTOLIN  (90 BASE) MCG/ACT inhaler USE 1 PUFF FOUR TIMES A DAY AS NEEDED  Qty: 18 g, Refills: 3    Associated Diagnoses: Tobacco abuse      ipratropium - albuterol 0.5 mg/2.5 mg/3 mL (DUONEB) 0.5-2.5 (3) MG/3ML nebulization Take 1 vial (3 mLs) by nebulization every 6 hours as needed  Qty: 360 mL, Refills: 2    Associated Diagnoses: Acute bronchospasm      cimetidine (TAGAMET) 800 MG tablet Take 1 tablet (800 mg) by mouth At Bedtime As needed  Qty: 90 tablet, Refills: 3    Associated Diagnoses: Esophageal reflux      budesonide-formoterol (SYMBICORT) 160-4.5 MCG/ACT inhaler Inhale 2 puffs into the lungs 2 times daily. As needed  Qty: 1 Inhaler, Refills: 11    Associated Diagnoses: Asthma      !! order for DME Equipment being ordered: toilet seat riser  Qty: 1 each, Refills: 0    Associated Diagnoses: Postoperative state      !! order for DME Equipment being ordered: Nebulizer supplies  Qty: 2 each, Refills: 11    Associated Diagnoses: Primary osteoarthritis of right knee      !! order for DME Equipment being ordered: mcgee walker  Qty: 1 each, Refills: 0    Associated Diagnoses: Primary osteoarthritis of right knee      EPIPEN 2-JAIRO 0.3 MG/0.3ML injection INJECT 1 SYRINGE INTRAMUSCULARLY ONCE AS NEEDED FOR ANAPHYLAXIS  Qty: 2 each, Refills: 0    Associated Diagnoses: Anaphylactic reaction       !! - Potential duplicate medications found. Please discuss with provider.        Allergies   Allergies   Allergen Reactions     Bee Pollen Other (See Comments)     Throat and  "eyes close up     Codeine Difficulty breathing     \"My throat closes shut.\"     Takes Percocet at home     Keflex [Cephalexin Hcl] Hives     Sulfonamide Derivatives Other (See Comments)     Throat closes     Ciprofloxacin Rash     Rash, bumps on lips     Bupropion Hcl Other (See Comments)     Made her very irritable     Celecoxib Other (See Comments) and Cough     Celebrex - wheeze  Takes ASA at home     Clonazepam Hives     Takes Xanax at home     Erythromycin Other (See Comments)     Erythromycin base - abdominal pain     Lisinopril Other (See Comments)     Zestril - headaches     Nifedipine Hives     Procardia     Strawberries [Strawberry]      Tramadol Hcl      Takes percocet at home     Data   Most Recent 3 CBC's:  Recent Labs   Lab Test  03/27/17   0805  03/26/17   0536  03/25/17   1439   WBC  10.8  13.9*  16.9*   HGB  9.3*  9.4*  12.0   MCV  93  91  90   PLT  339  284  341      Most Recent 3 BMP's:  Recent Labs   Lab Test  03/26/17   0536  03/25/17   1439  03/22/17   0545  02/23/17   1453   NA  139  134   --   137   POTASSIUM  3.6  3.3*   --   3.9   CHLORIDE  107  98   --   104   CO2  23  29   --   21   BUN  25  21   --   35*   CR  1.37*  1.60*   --   1.43*   ANIONGAP  9  7   --   12   JOSÉ  7.5*  8.9   --   8.8   GLC  92  117*  129*  107*     Most Recent 2 LFT's:  Recent Labs   Lab Test  12/07/16   1024  06/22/16   1126   AST  18  19   ALT  25  28   ALKPHOS  134  152*   BILITOTAL  0.5  0.4     Most Recent INR's and Anticoagulation Dosing History:  Anticoagulation Dose History     There is no flowsheet data to display.        Most Recent 3 Troponin's:No lab results found.  Most Recent Cholesterol Panel:  Recent Labs   Lab Test  03/03/16   0916   CHOL  133   LDL  68   HDL  29*   TRIG  180*     Most Recent 6 Bacteria Isolates From Any Culture (See EPIC Reports for Culture Details):  Recent Labs   Lab Test  03/25/17   1905  03/25/17   1630  03/25/17   1618  03/21/17   2200  02/23/17   1758  02/23/17   1504   CULT "  No MRSA isolated  No growth after 2 days  No growth after 2 days  No MRSA isolated  Canceled, Test credited  Duplicate request    >100,000 colonies/mL Mixed bacterial brian No further identification or   sensitivity done  *     Most Recent TSH, T4 and A1c Labs:No lab results found.  Results for orders placed or performed during the hospital encounter of 03/25/17   XR Chest 2 Views    Narrative    CHEST X-RAY    CLINICAL HISTORY:  Postop leukocytosis.    COMPARISON: Today's study is compared to a prior examination which is  dated March 23, 2017.    FINDINGS: Frontal and lateral views of the chest were obtained.  There  are airspace opacities in the left lower lobe which are new.  The  right lung is clear.  Heart size is normal.   No pneumothorax or  pleural effusions.    IMPRESSION: LEFT BASILAR ATELECTASIS OR INFILTRATE.  Exam Date: Mar 25, 2017 04:53:54 PM  Author: JOYCE BRUCE  This report is final and signed

## 2017-03-27 NOTE — PLAN OF CARE
"Upon initial assessment patient stated \"It is time for my treat\" and requested that staff hand her a plain black bag so she could grab one. (The same bag home medications previously discovered in) Patient quickly became agitated saying her \"treats\" were gone and that he daughter must have took them. She then request a pain medication. Staff brought the patient her PRN percocet. Patient was handed the medication cup to which patient removed the pill and stated she must check the number on the pill to make sure she is actually getting her \"treat\". Will continue to monitor.   "

## 2017-03-27 NOTE — PLAN OF CARE
No falls or injuries. Afebrile. VSS. Received Norco x1 for complaints of right knee pain. Lungs dim with exp wheezes. HR regular. Bowels active. Dressing to right knee changed. No drainage noted. Sutures approximated. Erythema remains on incision site. Patient refused cryo cuff throughout morning and also refused PCD's. Patient d/c back to [facility] for therapy. Daughter Devorah updated. Patient was sent with home medications that were locked in pharmacy throughout stay, [place] nurse manager was updated of this. Patient also has 4 packs of cigarettes in suitcase that nurse was made aware of as well. Patient was d/c without cryo cuff. Cryo cuff bagged with patient's name. Patient and patient's daughter were updated on this information by .     Patient discharged at 13:30 PM via wheel chair accompanied by other:[employer] and staff. Prescriptions sent to patients preferred pharmacy, home medications returned to patient-[facility] THAPA at [facility] is aware. All belongings sent with patient.     Discharge instructions reviewed with [place] nurse manager at [facility]. Listed belongings gathered and returned to patient. yes    Patient discharged to [facility] .   Report called to Nursing Home:  [facility]     Core Measures and Vaccines  Core Measures applicable during stay: No. If yes, state diagnosis: Leukocytosis   Pneumonia and Influenza given prior to discharge, if indicated: N/A    Surgical Patient   Surgical Procedures during stay: No  Did patient receive discharge instruction on wound care and recognition of infection symptoms? Yes    MISC  Follow up appointment made:  Yes  Home and hospital aquired medications returned to patient: Yes  Patient reports pain was well managed at discharge: Yes

## 2017-03-27 NOTE — PROGRESS NOTES
"Pt referred via nutrition indicator risk for weight loss in the past 2 months  67 yr old female  Her with leukocytosis  On regular diet  Per patient \"I am not eating or drinking anything in between meals\" and \"I haven't felt like eating much here because I don't like the food\"  Per hospitalist progress note, may have had reduced oral intake lately due to depression/anxiety   Hx of gastric ulcer, so does have chronic intermittent diarrhea as well as occasional abdominal pain  Since admit into hospital, patient has had a good appetite.  Today we discussed importance of hydration and adequate nutrition in wound healing.  We talked about getting a source of protein at meals.  4'10\"  172 lbs  BMI: 36.03    Wt Readings from Last 10 Encounters:   03/27/17 78.2 kg (172 lb 6.4 oz)   03/21/17 72.6 kg (160 lb)   03/15/17 72.6 kg (160 lb)   03/13/17 72.6 kg (160 lb)   02/24/17 72.6 kg (160 lb)   02/23/17 74.4 kg (164 lb)   01/06/17 74.6 kg (164 lb 8 oz)   12/07/16 75.3 kg (166 lb)   10/05/16 75.3 kg (166 lb)   09/19/16 75.3 kg (166 lb)     RBC 3, Hemoglobin 9.3, Hematocrit 28  Creatinine 1.37, GFR 38, Calcium 7.5    Patient would benefit from iron supplement/MVI.  Due to adequate intake here, does not necessarily need Ensure Enlive, unless meal intake is under 75%.  Ensure patient gets protein sources at each meal. Ensure patient is drinking water throughout the day.    Patient declined nutrition education today.    Will remain available.    Kaylie Ramírez RD  "

## 2017-03-31 LAB
BACTERIA SPEC CULT: NORMAL
BACTERIA SPEC CULT: NORMAL
Lab: NORMAL
Lab: NORMAL
MICRO REPORT STATUS: NORMAL
MICRO REPORT STATUS: NORMAL
SPECIMEN SOURCE: NORMAL
SPECIMEN SOURCE: NORMAL

## 2017-04-03 ENCOUNTER — TELEPHONE (OUTPATIENT)
Dept: ORTHOPEDICS | Facility: OTHER | Age: 67
End: 2017-04-03

## 2017-04-03 NOTE — TELEPHONE ENCOUNTER
"Patient called today to make appointment for suture removal Patient states \" I told the nursing home and physical therapy No they are not taking them out, Patient sent to scheduling to be placed on Wednesday Schedule for suture removal.  Sandy Salazar LPN    "

## 2017-04-04 ENCOUNTER — TELEPHONE (OUTPATIENT)
Dept: FAMILY MEDICINE | Facility: OTHER | Age: 67
End: 2017-04-04

## 2017-04-04 DIAGNOSIS — B37.0 THRUSH: ICD-10-CM

## 2017-04-04 NOTE — TELEPHONE ENCOUNTER
2:25 PM    Reason for Call: Phone Call    Description: Pt called and states that she is going to need a ne handicap sticker for her car it expires this month and was wondering if she needed to get an application before her appointment on the (04/1817) or if we have them here would like a call back regarding this.    Was an appointment offered for this call? No    Preferred method for responding to this message: Telephone [phone]    If we cannot reach you directly, may we leave a detailed response at the number you provided? Yes    Can this message wait until your PCP/provider returns, if available today? Not applicable,     Alisha Sanches

## 2017-04-04 NOTE — TELEPHONE ENCOUNTER
I do not see that you have filled this out in the past. Would you like to fill it out or have Dr Olson do this? Pt had a knee replacement in March.

## 2017-04-05 ENCOUNTER — OFFICE VISIT (OUTPATIENT)
Dept: ORTHOPEDICS | Facility: OTHER | Age: 67
End: 2017-04-05
Attending: PHYSICIAN ASSISTANT
Payer: MEDICARE

## 2017-04-05 VITALS
BODY MASS INDEX: 33.17 KG/M2 | RESPIRATION RATE: 18 BRPM | SYSTOLIC BLOOD PRESSURE: 115 MMHG | WEIGHT: 158 LBS | HEART RATE: 110 BPM | HEIGHT: 58 IN | DIASTOLIC BLOOD PRESSURE: 72 MMHG | OXYGEN SATURATION: 93 % | TEMPERATURE: 98.1 F

## 2017-04-05 DIAGNOSIS — Z96.651 STATUS POST TOTAL RIGHT KNEE REPLACEMENT: Primary | ICD-10-CM

## 2017-04-05 PROCEDURE — 99212 OFFICE O/P EST SF 10 MIN: CPT

## 2017-04-05 PROCEDURE — 99024 POSTOP FOLLOW-UP VISIT: CPT | Performed by: PHYSICIAN ASSISTANT

## 2017-04-05 ASSESSMENT — PAIN SCALES - GENERAL: PAINLEVEL: MODERATE PAIN (4)

## 2017-04-05 NOTE — MR AVS SNAPSHOT
After Visit Summary   4/5/2017    Sydni Hardin    MRN: [id number]           Patient Information     Date Of Birth          1950        Visit Information        Provider Department      4/5/2017 2:00 PM Mitchell Pelletier PA-C  ORTH[facility]EDICS        Today's Diagnoses     Status post total right knee replacement    -  1      Care Instructions    Begin outpatient PT.  Someone will contact you within the next 1-2 business days to schedule your first appointment with physical therapy.  If you have not heard from someone within 2 days, please contact our office.    Rest, ice and elevation as much as practical.  Continue immobilizer at night until follow up with Dr. Olson.  Continue aspirin 81 mg twice daily until you follow up with Dr. Olson.    Follow up with Dr. Olson on 4/18/17 as already scheduled.        Follow-ups after your visit        Additional Services     PHYSICAL THERAPY REFERRAL       *This therapy referral will be filtered to a centralized scheduling office at [facility] and the patient will receive a call to schedule an appointment at a [facility] location most convenient for them. *     [facility] provides Physical Therapy evaluation and treatment and many specialty services across the [facility] system.  If requesting a specialty program, please choose from the list below.    If you have not heard from the scheduling office within 2 business days, please call [phone] for all locations, with the exception of [place], please call [phone].  Treatment: Evaluation & Treatment  Special Instructions/Modalities: Eval and treat.  Standard TKA protocol for right TKA.  2 visits/wk x 4-6 weeks.  Call with questions or concerns.  Special Programs: None    Please be aware that coverage of these services is subject to the terms and limitations of your health insurance plan.  Call member services at your health plan with any  benefit or coverage questions.      **Note to Provider:  If you are referring outside of [facility] for the therapy appointment, please list the name of the location in the  special instructions  above, print the referral and give to the patient to schedule the appointment.                  Follow-up notes from your care team     Return for recheck right knee as already scheduled with Dr. ZHAO.      Your next 10 appointments already scheduled     Apr 07, 2017 11:00 AM CDT   Evaluation with Maria L Collado PT   [facility] ([facility] )    [address] [address]   [phone]            Apr 18, 2017 11:00 AM CDT   (Arrive by 10:45 AM)   SHORT with Gibran Alarcon MD   [facility] ([facility])    [address] [facility] [id number] [phone]            Apr 18, 2017  2:20 PM CDT   (Arrive by 2:00 PM)   Return Visit with Moisés Olson MD    ORTHOPEDICS ([facility])    [address] [place] [address] [phone]              Who to contact     If you have questions or need follow up information about today's clinic visit or your schedule please contact  ORTHOPEDICS directly at [phone].  Normal or non-critical lab and imaging results will be communicated to you by MyChart, letter or phone within 4 business days after the clinic has received the results. If you do not hear from us within 7 days, please contact the clinic through MyChart or phone. If you have a critical or abnormal lab result, we will notify you by phone as soon as possible.  Submit refill requests through [facility] or call your pharmacy and they will forward the refill request to us. Please allow 3 business days for your refill to be completed.          Additional Information About Your Visit        Care EveryWhere ID     This is your Care EveryWhere ID. This could be used by other organizations to access your [facility] medical records  [id number]        Your  "Vitals Were     Pulse Temperature Respirations Height Pulse Oximetry BMI (Body Mass Index)    110 98.1  F (36.7  C) (Tympanic) 18 4' 10\" (1.473 m) 93% 33.02 kg/m2       Blood Pressure from Last 3 Encounters:   04/05/17 115/72   03/27/17 139/61   03/24/17 135/63    Weight from Last 3 Encounters:   04/05/17 158 lb (71.7 kg)   03/27/17 172 lb 6.4 oz (78.2 kg)   03/21/17 160 lb (72.6 kg)              We Performed the Following     PHYSICAL THERAPY REFERRAL        Primary Care Provider Office Phone # Fax #    Gibran Alarcon [phone]       [address] [address]        Thank you!     Thank you for choosing  [facility]ED[facility]  for your care. Our goal is always to provide you with excellent care. Hearing back from our patients is one way we can continue to improve our services. Please take a few minutes to complete the written survey that you may receive in the mail after your visit with us. Thank you!             Your Updated Medication List - Protect others around you: Learn how to safely use, store and throw away your medicines at www.disposemymeds.org.          This list is accurate as of: 4/5/17  2:35 PM.  Always use your most recent med list.                   Brand Name Dispense Instructions for use    ALPRAZolam 1 MG tablet    XANAX    30 tablet    Take 1 tablet (1 mg) by mouth 3 times daily as needed for anxiety       aspirin 81 MG EC tablet      Take 1 tablet (81 mg) by mouth 2 times daily       atorvastatin 40 MG tablet    LIPITOR    90 tablet    TAKE 1 TABLET BY MOUTH DAILY       benazepril 10 MG tablet    LOTENSIN    90 tablet    TAKE 1 TABLET BY MOUTH DAILY       benzonatate 200 MG capsule    TESSALON    60 capsule    TAKE 1 CAPSULE BY MOUTH 3 TIMES A DAY AS NEEDED FOR COUGH       budesonide-formoterol 160-4.5 MCG/ACT Inhaler    SYMBICORT    1 Inhaler    Inhale 2 puffs into the lungs 2 times daily. As needed       butalbital-acetaminophen-caffeine -40 " MG per tablet    FIORICET/ESGIC    5 tablet    Take 1 tablet by mouth every 6 hours as needed       cimetidine 800 MG tablet    TAGAMET    90 tablet    Take 1 tablet (800 mg) by mouth At Bedtime As needed       DOXEPIN HCL PO      Take 25 mg by mouth At Bedtime       EPIPEN 2-JAIRO 0.3 MG/0.3ML injection   Generic drug:  EPINEPHrine     2 each    INJECT 1 SYRINGE INTRAMUSCULARLY ONCE AS NEEDED FOR ANAPHYLAXIS       escitalopram 20 MG tablet    LEXAPRO    180 tablet    TAKE 1 TABLET BY MOUTH 2 TIMES A DAY       hydrochlorothiazide 25 MG tablet    HYDRODIURIL    90 tablet    TAKE 1 TABLET BY MOUTH DAILY       ipratropium - albuterol 0.5 mg/2.5 mg/3 mL 0.5-2.5 (3) MG/3ML neb solution    DUONEB    360 mL    Take 1 vial (3 mLs) by nebulization every 6 hours as needed       levofloxacin 750 MG tablet    LEVAQUIN    3 tablet    Take 1 tablet (750 mg) by mouth daily       loperamide 2 MG capsule    IMODIUM    20 capsule    Take 1 capsule (2 mg) by mouth 4 times daily as needed for diarrhea       meloxicam 15 MG tablet    MOBIC    90 tablet    TAKE 1 TABLET BY MOUTH DAILY       montelukast 10 MG tablet    SINGULAIR    90 tablet    TAKE 1 TABLET BY MOUTH DAILY       nicotine 21 MG/24HR 24 hr patch    NICODERM CQ     Place 1 patch onto the skin every 24 hours       * order for DME     2 each    Equipment being ordered: Nebulizer supplies       * order for DME     1 each    Equipment being ordered: mcgee walker       * order for DME     1 each    Equipment being ordered: toilet seat riser       oxyCODONE-acetaminophen 5-325 MG per tablet    PERCOCET    10 tablet    Take 1-2 tablets by mouth every 4 hours as needed for moderate to severe pain       potassium chloride 10 MEQ tablet    K-TAB,KLOR-CON    180 tablet    TAKE 1 TABLET BY MOUTH 2 TIMES DAILY WITH FOOD       temazepam 30 MG capsule    RESTORIL    10 capsule    TAKE 1 CAPSULE BY MOUTH NIGHTLY AS NEEDED FOR SLEEP       VENTOLIN  (90 BASE) MCG/ACT Inhaler   Generic  drug:  albuterol     18 g    USE 1 PUFF FOUR TIMES A DAY AS NEEDED       * Notice:  This list has 3 medication(s) that are the same as other medications prescribed for you. Read the directions carefully, and ask your doctor or other care provider to review them with you.

## 2017-04-05 NOTE — NURSING NOTE
"Chief Complaint   Patient presents with     Surgical Followup     RTKA suture removal       Initial /72 (BP Location: Left arm, Patient Position: Chair, Cuff Size: Adult Large)  Pulse 110  Temp 98.1  F (36.7  C) (Tympanic)  Resp 18  Ht 4' 10\" (1.473 m)  Wt 158 lb (71.7 kg)  SpO2 93%  BMI 33.02 kg/m2 Estimated body mass index is 33.02 kg/(m^2) as calculated from the following:    Height as of this encounter: 4' 10\" (1.473 m).    Weight as of this encounter: 158 lb (71.7 kg).  Medication Reconciliation: unable or not appropriate to perform   Sandy Salazar LPN      "

## 2017-04-05 NOTE — PATIENT INSTRUCTIONS
Begin outpatient PT.  Someone will contact you within the next 1-2 business days to schedule your first appointment with physical therapy.  If you have not heard from someone within 2 days, please contact our office.    Rest, ice and elevation as much as practical.  Continue immobilizer at night until follow up with Dr. Olson.  Continue aspirin 81 mg twice daily until you follow up with Dr. Olson.    Follow up with Dr. Olson on 4/18/17 as already scheduled.

## 2017-04-05 NOTE — PROGRESS NOTES
"Chief complaint: Suture removal right knee    Subjective: Sydni is a 67-year-old female who returns today along with her daughter for suture removal following right total knee replacement performed on 3/21/17.  She did not want the NH to remove them.  Following surgery, she had a relatively uneventful hospital stay and was discharged on 3/24/17 to a nursing home.  The following day however she was brought back in to the ER with increased confusion and concerns of the right knee appearing erythematous.  She was readmitted.  Upon workup, she was found to have an elevated WBC.  It was subsequently felt she had possible pneumonia and she was treated with antibiotic therapy.  She responded well quite quickly.  The orthopedic team was never informed of her readmission as there was minimal concern from our providers here regarding her incision or possible underlying infection in the knee.  She unfortunately was in over the weekend and had limited PT.  She was ultimately discharged again on 3/27/17 back to the nursing home.  She has since returned home as of yesterday where she lives with her daughter.    She currently indicates satisfactory improvement since the time of surgery.  She feels much better.  She is happy she went through with the procedure.  She indicates her range of motion is up to 115  of flexion.  When asked if she continues to wear the immobilizer, she was unaware that she should be wearing this at night.  Pain is otherwise under good control.  She denies any calf pain, numbness or tingling.  She denies fevers or chills.    Objective:   /72 (BP Location: Left arm, Patient Position: Chair, Cuff Size: Adult Large)  Pulse 110  Temp 98.1  F (36.7  C) (Tympanic)  Resp 18  Ht 4' 10\" (1.473 m)  Wt 158 lb (71.7 kg)  SpO2 93%  BMI 33.02 kg/m2  Patient ambulates into the room today with only cane assistance.  Surgical incision appears to be healing well.  Sutures remain intact.  There is no evidence of " erythema or drainage.  Skin is normothermic to touch.  She has minimal edema and effusion/hemarthrosis.  Range of motion today reveals 3-5  lack of extension.  She has flexion to approximately 110 .  Compartments soft and nontender throughout the right lower extremity.  Minimal lower extremity edema.  Distal neurovascular status grossly intact.    Sutures removed today without difficulty.  Wound gently cleansed and Steri-Strips applied.  Wound care is discussed.    Assessment: Stable postoperative recheck status post right total knee arthroplasty performed on 3/21/17    Plan: She has yet to be set up with either home physical therapy or outpatient physical therapy following discharge from the nursing home.  A referral was placed for her to begin outpatient therapy twice weekly here at [place].  I contacted the therapy department and she is now scheduled to be seen this Friday at 11 AM.  She will otherwise continue her home exercises until that time.  I recommended she go back into the immobilizer at night until she follows up with Dr. Olson.  She will continue her 81 mg aspirin twice daily for DVT prophylaxis.  She will continue rest, ice, and elevation as much as practical.  At her request, I did provide her with a slip to give her landlord requesting there be a safety bar installed near her toilet.  She has an appointment on 4/18/17 with Dr. Olson for recheck.  She will follow up at that time, sooner if issues or concerns.  Again she is satisfied with her progress.  All of her and her daughter's questions were answered.    Mitchell Pelletier PA-C

## 2017-04-06 RX ORDER — NYSTATIN 100000/ML
SUSPENSION, ORAL (FINAL DOSE FORM) ORAL
Qty: 60 ML | Refills: 0 | Status: SHIPPED | OUTPATIENT
Start: 2017-04-06 | End: 2017-10-05

## 2017-04-06 NOTE — TELEPHONE ENCOUNTER
Last office visit 02/23/17.  Nystatin not listed on patients current medication list. Please review and sign if appropriate.

## 2017-04-06 NOTE — TELEPHONE ENCOUNTER
Nystatin 100,000 units/ml susp      Last Written Prescription Date: (12-30-15)  Last Fill Quantity: ,  # refills:    Last Office Visit with G, P or [facility] prescribing provider: 2-  No longer on medication list                                         Next 5 appointments (look out 90 days)     Apr 18, 2017 11:00 AM CDT   (Arrive by 10:45 AM)   SHORT with Gibran Alarcon MD   [facility] ([facility])    [address]  [facility] [address]   [phone]            Apr 18, 2017  2:20 PM CDT   (Arrive by 2:00 PM)   Return Visit with Moisés Olson MD    ORTHOPEDICS ([facility])    [address] [facility] [address] [phone]

## 2017-04-11 ENCOUNTER — HOSPITAL ENCOUNTER (OUTPATIENT)
Dept: PHYSICAL THERAPY | Facility: HOSPITAL | Age: 67
Setting detail: THERAPIES SERIES
End: 2017-04-11
Attending: PHYSICIAN ASSISTANT
Payer: MEDICARE

## 2017-04-11 PROCEDURE — 97110 THERAPEUTIC EXERCISES: CPT | Mod: GP | Performed by: PHYSICAL THERAPIST

## 2017-04-11 PROCEDURE — 97161 PT EVAL LOW COMPLEX 20 MIN: CPT | Mod: GP | Performed by: PHYSICAL THERAPIST

## 2017-04-11 PROCEDURE — G8979 MOBILITY GOAL STATUS: HCPCS | Mod: GP,CI | Performed by: PHYSICAL THERAPIST

## 2017-04-11 PROCEDURE — G8978 MOBILITY CURRENT STATUS: HCPCS | Mod: GP,CK | Performed by: PHYSICAL THERAPIST

## 2017-04-11 PROCEDURE — 97140 MANUAL THERAPY 1/> REGIONS: CPT | Mod: GP | Performed by: PHYSICAL THERAPIST

## 2017-04-11 ASSESSMENT — ACTIVITIES OF DAILY LIVING (ADL)
PAIN: THE SYMPTOM AFFECTS MY ACTIVITY MODERATELY
STIFFNESS: THE SYMPTOM AFFECTS MY ACTIVITY MODERATELY
SWELLING: THE SYMPTOM AFFECTS MY ACTIVITY SEVERELY
WALK: ACTIVITY IS SOMEWHAT DIFFICULT
RISE FROM A CHAIR: ACTIVITY IS FAIRLY DIFFICULT
HOW_WOULD_YOU_RATE_THE_OVERALL_FUNCTION_OF_YOUR_KNEE_DURING_YOUR_USUAL_DAILY_ACTIVITIES?: NEARLY NORMAL
GIVING WAY, BUCKLING OR SHIFTING OF KNEE: I DO NOT HAVE THE SYMPTOM
LIMPING: I DO NOT HAVE THE SYMPTOM
KNEE_ACTIVITY_OF_DAILY_LIVING_SCORE: 45.71
AS_A_RESULT_OF_YOUR_KNEE_INJURY,_HOW_WOULD_YOU_RATE_YOUR_CURRENT_LEVEL_OF_DAILY_ACTIVITY?: NEARLY NORMAL
SIT WITH YOUR KNEE BENT: ACTIVITY IS NOT DIFFICULT
GO UP STAIRS: ACTIVITY IS VERY DIFFICULT
RAW_SCORE: 32
GO DOWN STAIRS: ACTIVITY IS VERY DIFFICULT
KNEEL ON THE FRONT OF YOUR KNEE: I AM UNABLE TO DO THE ACTIVITY
WEAKNESS: THE SYMPTOM AFFECTS MY ACTIVITY SEVERELY
KNEE_ACTIVITY_OF_DAILY_LIVING_SUM: 32
SQUAT: I AM UNABLE TO DO THE ACTIVITY
STAND: ACTIVITY IS MINIMALLY DIFFICULT

## 2017-04-11 NOTE — PROGRESS NOTES
04/11/17 1400   General Information   Type of Visit Initial OP Ortho PT Evaluation   Start of Care Date 03/21/17   Referring Physician Dr Olson/Mitchell Pelletier PA-C   Patient/Family Goals Statement Full use of leg   Orders Evaluate and Treat   Date of Order 04/05/17   Insurance Type Medicare   Medical Diagnosis s/p R TKA   Surgical/Medical history reviewed Yes   Precautions/Limitations no known precautions/limitations   General Information Comments PMH: asthma, depression, high-BP   Body Part(s)   Body Part(s) Knee   Presentation and Etiology   Pertinent history of current problem (include personal factors and/or comorbidities that impact the POC) Patient presents to PT following a R TKA performed by Dr Olson on 3/21/2017. She went to a SNF and then returned to the hospital with suspected pneumonia. She then returned to the SNF and was discharged home with the recommendation to continue PT. She saw Mitchell Pelletier PA-C on 4/5/2017 and he provided new orders for her to resume her therapy in outpatient. She has been performing ROM, quad and glute exercises at home and has been ambulating with a 4WW. She states overall she feels she is doing well, pain is manageable and she feels her mobility is improving. She follows up with Dr Olson and her PCP (Dr Alarcon) on 4/18/2017.   Impairments A. Pain;C. Swelling;D. Decreased ROM;E. Decreased flexibility;F. Decreased strength and endurance;H. Impaired gait;I. Impaired skin integrity   Functional Limitations perform activities of daily living   Symptom Location L knee, calf, ankle   How/Where did it occur From Degenerative Joint Disease   Onset date of current episode/exacerbation 03/21/17  (date of surgery)   Chronicity New   Pain rating (0-10 point scale) Best (/10);Worst (/10)   Best (/10) 4/10   Worst (/10) 7/10   Pain quality C. Aching   Frequency of pain/symptoms B. Intermittent   Pain/symptoms are: Worse during the day   Pain/symptoms exacerbated by B.  Walking;K. Home tasks   Pain/symptoms eased by C. Rest;H. Cold;I. OTC medication(s)   Progression of symptoms since onset: Improved   Current / Previous Interventions   Diagnostic Tests: (Imaging prior to surgery)   Prior Level of Function   Prior Level of Function-Mobility Used cane for ambulation due to knee pain prior to surgery   Prior Level of Function-ADLs IND   Current Level of Function   Current Community Support Family/friend caregiver   Patient role/employment history F. Retired   Living environment Apartment/condo   Home/community accessibility no stairs   Current equipment-Gait/Locomotion Standard cane  (4WW)   Current equipment-ADL Wall grab bar   Fall Risk Screen   Fall screen completed by PT   Per patient - Fall 2 or more times in past year? No   Per patient - Fall with injury in past year? No   Is patient a fall risk? Yes   Fall screen comments due to post-op status   System Outcome Measures   Outcome Measures (knee outcome)   Knee Objective Findings   Side (if bilateral, select both right and left) Right   Observation Patient ambulates back to treatment room in no acute distress   Integumentary  Surgical incision healing well - steri strips still intact on superior and inferior portions of incision - no indications of infection   Posture WNL   Gait/Locomotion slightly antalgic and decreased knee EXT during gait and decreased step length   Balance/Proprioception (Single Leg Stance) NT   Knee Special Test Comments Swelling: superior patella 41.5cm, joint line 37.5 cm. Gait: good mechanics with 4ww - walks some at home without AD and fair mechanics - still slight limp due to decr knee EXT and impaired quad firing, but likely to progress through ADs quickly.  Other special tests NT due to post-surgical status   Accessory Motion/Joint Mobility Hypo patella superiorly, hypo tib-femoral joint   Right Knee Extension AROM 4 from neutral   Right Knee Extension PROM 2 from neutral   Right Knee Flexion AROM 112    Right Knee Flexion PROM 116   Right Knee Flexion Strength 5/5   Right Knee Extension Strength not formally tested   Right Hip Abduction Strength 4/5   Right Quad Set Strength 4-/5 - tends to use glute for knee EXT vs quad   R VMO Strength 4-/5   Right Gastrocnemius Flexibility hypo + soft tissue restriction - improved following manual techniques   Right Hamstring Flexibility hypo + mild soft tissue restriction   Right ITB Flexibility min hypo   Planned Therapy Interventions   Planned Therapy Interventions balance training;gait training;joint mobilization;manual therapy;neuromuscular re-education;ROM;strengthening;stretching   Planned Modality Interventions   Planned Modality Interventions Comments as needed   Clinical Impression   Criteria for Skilled Therapeutic Interventions Met yes, treatment indicated   PT Diagnosis s/p R TKA   Influenced by the following impairments pain, weakness, swelling   Functional limitations due to impairments difficulty walking, performing ADLs and LE dressing   Clinical Presentation Stable/Uncomplicated   Clinical Presentation Rationale due to lack of additional co-morbidities that factor into her expectation of progress   Clinical Decision Making (Complexity) Low complexity   Therapy Frequency 2 times/Week   Predicted Duration of Therapy Intervention (days/wks) up to 8 weeks   Risk & Benefits of therapy have been explained Yes   Patient, Family & other staff in agreement with plan of care Yes   Clinical Impression Comments Presentation is consistent with s/p R TKA   Education Assessment   Preferred Learning Style Demonstration   Barriers to Learning No barriers   ORTHO GOALS   PT Ortho Eval Goals 1;2;3;4   Ortho Goal 1   Goal Identifier STG 1   Goal Description Patient will demonstrate increased R knee FLEX PROM to 120 in order to improve LE dressing abilities   Target Date 05/09/17   Ortho Goal 2   Goal Identifier LTG 1   Goal Description Patient will demonstrate normal, reciprocal  gait pattern without use of AD for 150 ft in order to ambulate safely in the community   Target Date 06/06/17   Ortho Goal 3   Goal Identifier STG 2   Goal Description Patient will report decreased worst PL to 4/10 or less in R knee in order to improve her walking ability   Target Date 05/09/17   Ortho Goal 4   Goal Identifier LTG 2   Goal Description Patient will report overall 80%+ improvement in her R knee function in order to perform all ADL, home and community activities safely and with good mechanics   Target Date 06/06/17   Total Evaluation Time   Total Evaluation Time 25 eval, 25 treat   I certify the need for these services furnished under this plan of treatment and while under my care. (Physician co-signature of this document indicates review and certification of the therapy plan).

## 2017-04-12 DIAGNOSIS — I10 HTN (HYPERTENSION): ICD-10-CM

## 2017-04-12 DIAGNOSIS — I10 BENIGN ESSENTIAL HYPERTENSION: ICD-10-CM

## 2017-04-12 DIAGNOSIS — F34.1 DYSTHYMIA: ICD-10-CM

## 2017-04-12 DIAGNOSIS — M17.0 PRIMARY OSTEOARTHRITIS OF BOTH KNEES: ICD-10-CM

## 2017-04-13 RX ORDER — BENAZEPRIL HYDROCHLORIDE 10 MG/1
TABLET ORAL
Qty: 90 TABLET | Refills: 2 | Status: SHIPPED | OUTPATIENT
Start: 2017-04-13 | End: 2018-01-05

## 2017-04-13 RX ORDER — OXYCODONE AND ACETAMINOPHEN 5; 325 MG/1; MG/1
TABLET ORAL
Qty: 60 TABLET | Refills: 0 | Status: SHIPPED | OUTPATIENT
Start: 2017-04-13 | End: 2017-05-04

## 2017-04-13 RX ORDER — ESCITALOPRAM OXALATE 20 MG/1
TABLET ORAL
Qty: 180 TABLET | Refills: 0 | Status: SHIPPED | OUTPATIENT
Start: 2017-04-13 | End: 2017-07-06

## 2017-04-13 RX ORDER — POTASSIUM CHLORIDE 750 MG/1
TABLET, EXTENDED RELEASE ORAL
Qty: 180 TABLET | Refills: 0 | Status: SHIPPED | OUTPATIENT
Start: 2017-04-13 | End: 2017-07-27

## 2017-04-13 NOTE — TELEPHONE ENCOUNTER
Called informed written RX is ready to  at  [facility]    Per patient request  to [facility]  Michelle Yarbrough

## 2017-04-17 ENCOUNTER — HOSPITAL ENCOUNTER (OUTPATIENT)
Dept: PHYSICAL THERAPY | Facility: HOSPITAL | Age: 67
Setting detail: THERAPIES SERIES
End: 2017-04-17
Attending: PHYSICIAN ASSISTANT
Payer: MEDICARE

## 2017-04-17 PROCEDURE — 97110 THERAPEUTIC EXERCISES: CPT | Mod: GP

## 2017-04-17 PROCEDURE — 97140 MANUAL THERAPY 1/> REGIONS: CPT | Mod: GP

## 2017-04-17 PROCEDURE — 40000718 ZZHC STATISTIC PT DEPARTMENT ORTHO VISIT

## 2017-04-17 NOTE — PROGRESS NOTES
04/17/17 1355   Signing Clinician's Name / Credentials   Signing clinician's name / credentials Anisha Ellison PTA   Session Number   Session Number 2   Session Tracking and Supervision   PT Assistant Visit Number 1   Ortho Goal 1   Goal Identifier STG 1   Goal Description Patient will demonstrate increased R knee FLEX PROM to 120 in order to improve LE dressing abilities   Target Date 05/09/17   Ortho Goal 2   Goal Identifier LTG 1   Goal Description Patient will demonstrate normal, reciprocal gait pattern without use of AD for 150 ft in order to ambulate safely in the community   Target Date 06/06/17   Ortho Goal 3   Goal Identifier STG 2   Goal Description Patient will report decreased worst PL to 4/10 or less in R knee in order to improve her walking ability   Target Date 05/09/17   Ortho Goal 4   Goal Identifier LTG 2   Goal Description Patient will report overall 80%+ improvement in her R knee function in order to perform all ADL, home and community activities safely and with good mechanics   Target Date 06/06/17   Subjective Report   Subjective Report Pt reports knee pain is 4/10 feels like a tooth ache. 2:00-2:30 Improved mobility noted by patient in terms of greater walking tolerance and tolerance with transfers.    Objective Measure 1   Objective Measure Knee ROM   Details 0-120   Treatment Interventions   Interventions Therapeutic Procedure/Exercise;Manual Therapy   Therapeutic Procedure/exercise   Minutes 15   Skilled Intervention ther ex, stretching   Patient Response good   Treatment Detail Bike 3 minutes, // bars: hip abd, heel raises, hs curls mini squats 2x10 reps, quad sets 10 reps 3 second hold, ROM 0-120 degress AROM.   Manual Therapy   Minutes 10   Skilled Intervention joint mobs and STM   Patient Response good - increased EXT following   Treatment Detail STM to the gastroc and soleus region with rolling pin and manual prone.   Assessments Completed   Assessments Completed Pt is improving with  motion and strength making good progress toward recovery from TKA. Will continue PT services 2x/week for 4-6 weeks. Patient's ROM is improving, deficits in strength and eccentric control still present and will need to be addressed with PT services and home program.    Plan   Plan for next session Continue per PT POC.   Total Session Time   Total Session Time 25

## 2017-04-18 ENCOUNTER — OFFICE VISIT (OUTPATIENT)
Dept: FAMILY MEDICINE | Facility: OTHER | Age: 67
End: 2017-04-18
Attending: FAMILY MEDICINE
Payer: COMMERCIAL

## 2017-04-18 ENCOUNTER — OFFICE VISIT (OUTPATIENT)
Dept: ORTHOPEDICS | Facility: OTHER | Age: 67
End: 2017-04-18
Attending: ORTHOPAEDIC SURGERY
Payer: MEDICARE

## 2017-04-18 VITALS
SYSTOLIC BLOOD PRESSURE: 122 MMHG | HEART RATE: 83 BPM | BODY MASS INDEX: 33.17 KG/M2 | OXYGEN SATURATION: 96 % | WEIGHT: 158 LBS | TEMPERATURE: 98.3 F | DIASTOLIC BLOOD PRESSURE: 60 MMHG | HEIGHT: 58 IN

## 2017-04-18 VITALS
WEIGHT: 158 LBS | HEART RATE: 76 BPM | SYSTOLIC BLOOD PRESSURE: 132 MMHG | HEIGHT: 58 IN | OXYGEN SATURATION: 93 % | BODY MASS INDEX: 33.17 KG/M2 | DIASTOLIC BLOOD PRESSURE: 70 MMHG | TEMPERATURE: 98.2 F

## 2017-04-18 DIAGNOSIS — F41.9 ANXIETY: ICD-10-CM

## 2017-04-18 DIAGNOSIS — M17.11 PRIMARY OSTEOARTHRITIS OF RIGHT KNEE: Primary | ICD-10-CM

## 2017-04-18 DIAGNOSIS — Z96.651 STATUS POST TOTAL RIGHT KNEE REPLACEMENT: Primary | ICD-10-CM

## 2017-04-18 DIAGNOSIS — M17.12 PRIMARY OSTEOARTHRITIS OF LEFT KNEE: ICD-10-CM

## 2017-04-18 PROCEDURE — 99213 OFFICE O/P EST LOW 20 MIN: CPT | Mod: 24 | Performed by: FAMILY MEDICINE

## 2017-04-18 PROCEDURE — 99212 OFFICE O/P EST SF 10 MIN: CPT

## 2017-04-18 PROCEDURE — 99024 POSTOP FOLLOW-UP VISIT: CPT | Performed by: ORTHOPAEDIC SURGERY

## 2017-04-18 RX ORDER — DOXEPIN HYDROCHLORIDE 10 MG/1
10 CAPSULE ORAL AT BEDTIME
Qty: 90 CAPSULE | Refills: 3 | Status: SHIPPED | OUTPATIENT
Start: 2017-04-18 | End: 2018-01-25

## 2017-04-18 ASSESSMENT — PAIN SCALES - GENERAL
PAINLEVEL: SEVERE PAIN (6)
PAINLEVEL: SEVERE PAIN (6)

## 2017-04-18 NOTE — PROGRESS NOTES
Sydni SKINNER Wilfred    2017    No chief complaint on file.      SUBJECTIVE:  Here for f/u, right TKA and subsequent possible pneumonia.  Back to baseline now.  We discussed some maintenance things.  See below.  25 minutes spent with patient over 50% in counseling regarding her cares going forward, her parking permit, and the like.      Past Medical History:   Diagnosis Date     Abnormal arterial blood gases 2006     Acute upper respiratory infections of unspecified site 2001     Anxiety 10/24/2011     Calculus of gallbladder without mention of cholecystitis or obstruction 2006     Depression 2012     Follow-up examination, following other surgery 2006     Headache(784.0) 12/10/2003     History of CVA (cerebrovascular accident) 2011     HTN (hypertension) 2012     Insomnia 3/13/2012     Lipoma of other skin and subcutaneous tissue 2006     Nonallopathic lesion of cervical region, not elsewhere classified 2003     Osteoarthrosis, unspecified whether generalized or localized, lower leg 10/4/2001     Special screening for malignant neoplasms, colon 2006     Urinary tract infection, site not specified 2007       Past Surgical History:   Procedure Laterality Date     ARTHROPLASTY KNEE Right 3/21/2017    Procedure: ARTHROPLASTY KNEE;  Surgeon: Moisés Olson MD;  Location: [facility]     ARTHROSCOPY KNEE       BIOPSY BREAST  2000    left - free of disease      SECTION        SECTION       CHOLECYSTECTOMY       COLONOSCOPY  2008    repeat in 10 years     HEAD & NECK SURGERY  2014    hematoma on head drained     HYSTERECTOMY       ORTHOPEDIC SURGERY  2014    right shoulder replacement     shoulder x2  2009    left       Current Outpatient Prescriptions   Medication Sig Dispense Refill     doxepin (SINEQUAN) 10 MG capsule Take 1 capsule (10 mg) by mouth At Bedtime 90 capsule 3     potassium chloride (K-TAB,KLOR-CON) 10 MEQ tablet TAKE  1 TABLET BY MOUTH 2 TIMES DAILY WITH FOOD 180 tablet 0     benazepril (LOTENSIN) 10 MG tablet TAKE 1 TABLET BY MOUTH DAILY 90 tablet 2     escitalopram (LEXAPRO) 20 MG tablet TAKE 1 TABLET BY MOUTH 2 TIMES A  tablet 0     oxyCODONE-acetaminophen (PERCOCET) 5-325 MG per tablet TAKE 1 TO 2 TABLETS BY MOUTH EVERY 4 HOURS AS NEEDED FOR PAIN 60 tablet 0     nystatin (MYCOSTATIN) 236686 UNIT/ML suspension TAKE 1 TEASPOON (5MLS) BY MOUTH 4 TIMES DAILY 60 mL 0     order for DME Equipment being ordered: Please install safety bar in bathroom to assist patient in getting up from toilet. 1 Units 0     levofloxacin (LEVAQUIN) 750 MG tablet Take 1 tablet (750 mg) by mouth daily 3 tablet 0     aspirin EC 81 MG EC tablet Take 1 tablet (81 mg) by mouth 2 times daily       ALPRAZolam (XANAX) 1 MG tablet Take 1 tablet (1 mg) by mouth 3 times daily as needed for anxiety 30 tablet 0     loperamide (IMODIUM) 2 MG capsule Take 1 capsule (2 mg) by mouth 4 times daily as needed for diarrhea 20 capsule      butalbital-acetaminophen-caffeine (FIORICET/ESGIC) -40 MG per tablet Take 1 tablet by mouth every 6 hours as needed 5 tablet 0     temazepam (RESTORIL) 30 MG capsule TAKE 1 CAPSULE BY MOUTH NIGHTLY AS NEEDED FOR SLEEP 10 capsule 0     order for DME Equipment being ordered: toilet seat riser 1 each 0     nicotine (NICODERM CQ) 21 MG/24HR 24 hr patch Place 1 patch onto the skin every 24 hours       benzonatate (TESSALON) 200 MG capsule TAKE 1 CAPSULE BY MOUTH 3 TIMES A DAY AS NEEDED FOR COUGH 60 capsule 3     hydrochlorothiazide (HYDRODIURIL) 25 MG tablet TAKE 1 TABLET BY MOUTH DAILY 90 tablet 3     meloxicam (MOBIC) 15 MG tablet TAKE 1 TABLET BY MOUTH DAILY 90 tablet 1     atorvastatin (LIPITOR) 40 MG tablet TAKE 1 TABLET BY MOUTH DAILY 90 tablet 0     montelukast (SINGULAIR) 10 MG tablet TAKE 1 TABLET BY MOUTH DAILY 90 tablet 3     order for DME Equipment being ordered: Nebulizer supplies 2 each 11     order for DME Equipment  "being ordered: mcgee walker 1 each 0     EPIPEN 2-JAIRO 0.3 MG/0.3ML injection INJECT 1 SYRINGE INTRAMUSCULARLY ONCE AS NEEDED FOR ANAPHYLAXIS 2 each 0     VENTOLIN  (90 BASE) MCG/ACT inhaler USE 1 PUFF FOUR TIMES A DAY AS NEEDED 18 g 3     ipratropium - albuterol 0.5 mg/2.5 mg/3 mL (DUONEB) 0.5-2.5 (3) MG/3ML nebulization Take 1 vial (3 mLs) by nebulization every 6 hours as needed 360 mL 2     cimetidine (TAGAMET) 800 MG tablet Take 1 tablet (800 mg) by mouth At Bedtime As needed 90 tablet 3     budesonide-formoterol (SYMBICORT) 160-4.5 MCG/ACT inhaler Inhale 2 puffs into the lungs 2 times daily. As needed 1 Inhaler 11       Allergies   Allergen Reactions     Bee Pollen Other (See Comments)     Throat and eyes close up     Codeine Difficulty breathing     \"My throat closes shut.\"     Takes Percocet at home     Keflex [Cephalexin Hcl] Hives     Sulfonamide Derivatives Other (See Comments)     Throat closes     Ciprofloxacin Rash     Rash, bumps on lips     Bupropion Hcl Other (See Comments)     Made her very irritable     Celecoxib Other (See Comments) and Cough     Celebrex - wheeze  Takes ASA at home     Clonazepam Hives     Takes Xanax at home     Erythromycin Other (See Comments)     Erythromycin base - abdominal pain     Lisinopril Other (See Comments)     Zestril - headaches     Nifedipine Hives     Procardia     Strawberries [Strawberry]      Tramadol Hcl      Takes percocet at home       Family History   Problem Relation Age of Onset     CANCER Mother      skin     Breast Cancer Mother      CEREBROVASCULAR DISEASE Mother      Alzheimer Disease Mother      Myocardial Infarction Father 76     myocardial infarction - cause of death     CANCER Daughter      cervical      DIABETES Maternal Grandfather      HEART DISEASE Maternal Grandfather      MI     DIABETES Maternal Grandmother      DIABETES Paternal Grandmother      DIABETES Paternal Grandfather        Social History     Social History     Marital " status:      Spouse name: N/A     Number of children: N/A     Years of education: N/A     Occupational History     Not on file.     Social History Main Topics     Smoking status: Current Every Day Smoker     Packs/day: 1.00     Years: 46.00     Types: Cigarettes     Smokeless tobacco: Never Used      Comment: tried to quit, relapse due to craving     Alcohol use 0.0 oz/week     0 Standard drinks or equivalent per week      Comment: rarely     Drug use: No     Sexual activity: No     Other Topics Concern      Service No     Blood Transfusions Yes     Permits if needed     Caffeine Concern Yes     2 cups     Occupational Exposure No     Hobby Hazards No     Sleep Concern Yes     doxipen and xanax at bedtime     Stress Concern No     Weight Concern No     Special Diet No     Back Care No     Exercise No     Seat Belt No     encouraged to wear seat belt for safety     Self-Exams Yes     Parent/Sibling W/ Cabg, Mi Or Angioplasty Before 65f 55m? Yes     father     Social History Narrative       5 point ROS negative except as noted above in HPI, including Gen., Resp., CV, GI &  system review.     OBJECTIVE:  B/P: Data Unavailable, T: Data Unavailable, P: Data Unavailable, R: Data Unavailable    GENERAL APPEARANCE: Alert, no acute distress  CV: regular rate and rhythm, no murmur, rub or gallop  RESP: lungs clear to auscultation bilaterally  ABDOMEN: normal bowel sounds, soft, nontender, no hepatosplenomegaly or other masses  SKIN: no suspicious lesions or rashes to visualized skin  NEURO: Alert, oriented x 3, speech and mentation normal    ASSESSMENT and PLAN:  (M17.11) Primary osteoarthritis of right knee  (primary encounter diagnosis)  Comment: severe pain but doing well.    Plan: doxepin (SINEQUAN) 10 MG capsule        No change.  Appreciate Dr. Olson's cares.      (F41.9) Anxiety  Comment: ongoing  Plan: stable.     (M17.12) Primary osteoarthritis of left knee  Comment: no change.    Plan: she is  going to plan on getting shots with ortho.     I wrote out the permanent handicapped permit for her knee oa and need to rest.      I let Sydni know about her bad mouthing my nurse and how it would not be tolerated at all.  She felt very bad and really likes my nurse and it won't happen again.  Lengthy discussion today.

## 2017-04-18 NOTE — MR AVS SNAPSHOT
After Visit Summary   4/18/2017    Sydni Hardin    MRN: [id number]           Patient Information     Date Of Birth          1950        Visit Information        Provider Department      4/18/2017 3:20 PM Moisés Olson MD  [facility]         Follow-ups after your visit        Your next 10 appointments already scheduled     Apr 21, 2017  2:00 PM CDT   Treatment with Ankita A Sulma, PT   [facility] ([facility] )    [address] [address]   [phone]            Apr 24, 2017  2:00 PM CDT   Treatment with Ankita A Sulma, PT   [facility] ([facility] )    [address] [address]   [phone]            Apr 27, 2017  2:00 PM CDT   Treatment with Ankita A Sulma, PT   [facility] ([facility] )    [address] [address]   [phone]            May 01, 2017  2:00 PM CDT   Treatment with Ankita A Sulma, PT   [facility] ([facility] )    [address] [address]   [phone]            May 05, 2017  1:00 PM CDT   Treatment with Ankita A Sulma, PT   [facility] ([facility] )    [address] [address]   [phone]            May 18, 2017  2:40 PM CDT   (Arrive by 2:25 PM)   Return Visit with Moisés Olson MD    [facility] ([facility])    [address] [place] [address] [id number] [phone]              Who to contact     If you have questions or need follow up information about today's clinic visit or your schedule please contact  ORTH[facility]ED[facility] directly at [phone].  Normal or non-critical lab and imaging results will be communicated to you by MyChart, letter or phone within 4 business days after the clinic has received the results. If you do not hear from us within 7 days, please contact the clinic through MyChart or phone. If you have a critical or abnormal lab  "result, we will notify you by phone as soon as possible.  Submit refill requests through [address] or call your pharmacy and they will forward the refill request to us. Please allow 3 business days for your refill to be completed.          Additional Information About Your Visit        Care EveryWhere ID     This is your Care EveryWhere ID. This could be used by other organizations to access your [facility] medical records  [id number]        Your Vitals Were     Pulse Temperature Height Pulse Oximetry BMI (Body Mass Index)       83 98.3  F (36.8  C) (Tympanic) 4' 10\" (1.473 m) 96% 33.02 kg/m2        Blood Pressure from Last 3 Encounters:   04/18/17 122/60   04/05/17 115/72   03/27/17 139/61    Weight from Last 3 Encounters:   04/18/17 158 lb (71.7 kg)   04/05/17 158 lb (71.7 kg)   03/27/17 172 lb 6.4 oz (78.2 kg)              Today, you had the following     No orders found for display         Today's Medication Changes          These changes are accurate as of: 4/18/17  3:43 PM.  If you have any questions, ask your nurse or doctor.               These medicines have changed or have updated prescriptions.        Dose/Directions    doxepin 10 MG capsule   Commonly known as:  SINEquan   This may have changed:  how much to take   Used for:  Primary osteoarthritis of right knee   Changed by:  Gibran Alarcon MD        Dose:  10 mg   Take 1 capsule (10 mg) by mouth At Bedtime   Quantity:  90 capsule   Refills:  3            Where to get your medicines      These medications were sent to [facility] - [address]  [address] [address]     Phone:  [phone]     doxepin 10 MG capsule                Primary Care Provider Office Phone # Fax #    Gibran Alarcon [phone]       [address] [place]   [place] [address]        Thank you!     Thank you for choosing  [facility]  for your care. Our goal is always to provide you with excellent " care. Hearing back from our patients is one way we can continue to improve our services. Please take a few minutes to complete the written survey that you may receive in the mail after your visit with us. Thank you!             Your Updated Medication List - Protect others around you: Learn how to safely use, store and throw away your medicines at www.disposemymeds.org.          This list is accurate as of: 4/18/17  3:43 PM.  Always use your most recent med list.                   Brand Name Dispense Instructions for use    ALPRAZolam 1 MG tablet    XANAX    30 tablet    Take 1 tablet (1 mg) by mouth 3 times daily as needed for anxiety       aspirin 81 MG EC tablet      Take 1 tablet (81 mg) by mouth 2 times daily       atorvastatin 40 MG tablet    LIPITOR    90 tablet    TAKE 1 TABLET BY MOUTH DAILY       benazepril 10 MG tablet    LOTENSIN    90 tablet    TAKE 1 TABLET BY MOUTH DAILY       benzonatate 200 MG capsule    TESSALON    60 capsule    TAKE 1 CAPSULE BY MOUTH 3 TIMES A DAY AS NEEDED FOR COUGH       budesonide-formoterol 160-4.5 MCG/ACT Inhaler    SYMBICORT    1 Inhaler    Inhale 2 puffs into the lungs 2 times daily. As needed       butalbital-acetaminophen-caffeine -40 MG per tablet    FIORICET/ESGIC    5 tablet    Take 1 tablet by mouth every 6 hours as needed       cimetidine 800 MG tablet    TAGAMET    90 tablet    Take 1 tablet (800 mg) by mouth At Bedtime As needed       doxepin 10 MG capsule    SINEquan    90 capsule    Take 1 capsule (10 mg) by mouth At Bedtime       EPIPEN 2-JAIRO 0.3 MG/0.3ML injection   Generic drug:  EPINEPHrine     2 each    INJECT 1 SYRINGE INTRAMUSCULARLY ONCE AS NEEDED FOR ANAPHYLAXIS       escitalopram 20 MG tablet    LEXAPRO    180 tablet    TAKE 1 TABLET BY MOUTH 2 TIMES A DAY       hydrochlorothiazide 25 MG tablet    HYDRODIURIL    90 tablet    TAKE 1 TABLET BY MOUTH DAILY       ipratropium - albuterol 0.5 mg/2.5 mg/3 mL 0.5-2.5 (3) MG/3ML neb solution    DUONEB     360 mL    Take 1 vial (3 mLs) by nebulization every 6 hours as needed       levofloxacin 750 MG tablet    LEVAQUIN    3 tablet    Take 1 tablet (750 mg) by mouth daily       loperamide 2 MG capsule    IMODIUM    20 capsule    Take 1 capsule (2 mg) by mouth 4 times daily as needed for diarrhea       meloxicam 15 MG tablet    MOBIC    90 tablet    TAKE 1 TABLET BY MOUTH DAILY       montelukast 10 MG tablet    SINGULAIR    90 tablet    TAKE 1 TABLET BY MOUTH DAILY       nicotine 21 MG/24HR 24 hr patch    NICODERM CQ     Place 1 patch onto the skin every 24 hours       nystatin 000336 UNIT/ML suspension    MYCOSTATIN    60 mL    TAKE 1 TEASPOON (5MLS) BY MOUTH 4 TIMES DAILY       * order for DME     2 each    Equipment being ordered: Nebulizer supplies       * order for DME     1 each    Equipment being ordered: mcgee walker       * order for DME     1 each    Equipment being ordered: toilet seat riser       * order for DME     1 Units    Equipment being ordered: Please install safety bar in bathroom to assist patient in getting up from toilet.       oxyCODONE-acetaminophen 5-325 MG per tablet    PERCOCET    60 tablet    TAKE 1 TO 2 TABLETS BY MOUTH EVERY 4 HOURS AS NEEDED FOR PAIN       potassium chloride 10 MEQ tablet    K-TAB,KLOR-CON    180 tablet    TAKE 1 TABLET BY MOUTH 2 TIMES DAILY WITH FOOD       temazepam 30 MG capsule    RESTORIL    10 capsule    TAKE 1 CAPSULE BY MOUTH NIGHTLY AS NEEDED FOR SLEEP       VENTOLIN  (90 BASE) MCG/ACT Inhaler   Generic drug:  albuterol     18 g    USE 1 PUFF FOUR TIMES A DAY AS NEEDED       * Notice:  This list has 4 medication(s) that are the same as other medications prescribed for you. Read the directions carefully, and ask your doctor or other care provider to review them with you.

## 2017-04-18 NOTE — NURSING NOTE
"Chief Complaint   Patient presents with     Surgical Followup     s/p 4 week follow up right total knee replacement.       Initial /60 (BP Location: Left arm, Patient Position: Chair, Cuff Size: Adult Regular)  Pulse 83  Temp 98.3  F (36.8  C) (Tympanic)  Ht 4' 10\" (1.473 m)  Wt 158 lb (71.7 kg)  SpO2 96%  BMI 33.02 kg/m2 Estimated body mass index is 33.02 kg/(m^2) as calculated from the following:    Height as of this encounter: 4' 10\" (1.473 m).    Weight as of this encounter: 158 lb (71.7 kg).  Medication Reconciliation: complete   Josephine Kennedy LPN      "

## 2017-04-18 NOTE — PROGRESS NOTES
Outpatient Physical Therapy Progress Note     Patient: Sydni Hardin  : 1950    Beginning/End Dates of Reporting Period:  2017 to 2017    Referring Provider: Dr Olson/Mitchell Pelletier PA-C    Therapy Diagnosis: s/p R TKA     Client Self Report: Patient has been seen in outpatient PT for 2 visits since starting PT on 2017.  She reports knee pain is around a 4/10 and it feels like a tooth ache. She was seen in clinic from 2-2:30pm.  She has been performing exercises as prescribed and is very determined to do what she needs to do so that she can regain full function. She follows up with her surgeon on 2017/    Objective Measurements:  Objective Measure: Knee PROM  Details: 0-120.  Quad set is returning nicely although still noted weakness in VMO.  Is performing ROM exercises at home as well as closed-chain strengthening, progressive gait training and strengthening for her hips, glutes, core and quads.  She reports her pain is manageable with 4/10 with exercise and minimal at rest.      Goals:  Goal Identifier STG 1   Goal Description Patient will demonstrate increased R knee FLEX PROM to 120 in order to improve LE dressing abilities   Target Date 17   Date Met   2017   Progress:     Goal Identifier LTG 1   Goal Description Patient will demonstrate normal, reciprocal gait pattern without use of AD for 150 ft in order to ambulate safely in the community   Target Date 17   Date Met      Progress: Improving     Goal Identifier STG 2   Goal Description Patient will report decreased worst PL to 4/10 or less in R knee in order to improve her walking ability   Target Date 17   Date Met  2017   Progress:     Goal Identifier LTG 2   Goal Description Patient will report overall 80%+ improvement in her R knee function in order to perform all ADL, home and community activities safely and with good mechanics   Target Date 17   Date Met      Progress:        Progress Toward Goals:   Progress this reporting period: Patient is making good progress      Plan:  Continue therapy per current plan of care - will continue seeing patient 1-2x/week until gait and strength are back to within functional limits    Discharge:  No

## 2017-04-18 NOTE — PROGRESS NOTES
Chief complaint: Right TKA 3/21/17    Subjective: This 67-year-old female reports she is doing very well with her right TKA.  She has been home from the nursing home for 2 weeks.  She walks with a rolling walker but frequently walks short distances in her home with nothing.  In physical therapy yesterda, she achieved 120  of active assisted right knee flexion.    Objective: Her right knee incision is well-healed with no evidence of infection.  The knee has no effusion.  Range of motion is 0-120 .  The knee is stable to ligamentous stressing.  Extension strength is 4+.  The neurovascular status of the right leg is intact.    X-rays: Plain films the right knee taken after surgery show a well aligned cemented TKA with no complicating features.    Impression: Excellent one month check right TKA    Plan: She will go back to her preoperative aspirin regimen.  She'll continue with physical therapy.  She may drive when she feels she can do so safely.  She will return in a month for her next check.

## 2017-04-18 NOTE — NURSING NOTE
"Chief Complaint   Patient presents with     Forms     Pt needs permit for handicap parking     Medication Refill     Pt needs medication refilled.     Medication Request     Pt wans to discuss antibiotics before dental visits due to joint replacements.       Initial /70 (BP Location: Left arm, Patient Position: Chair, Cuff Size: Adult Regular)  Pulse 76  Temp 98.2  F (36.8  C) (Tympanic)  Ht 4' 10\" (1.473 m)  Wt 158 lb (71.7 kg)  SpO2 93%  BMI 33.02 kg/m2 Estimated body mass index is 33.02 kg/(m^2) as calculated from the following:    Height as of this encounter: 4' 10\" (1.473 m).    Weight as of this encounter: 158 lb (71.7 kg).  Medication Reconciliation: complete   SLADE PRATT      "

## 2017-04-21 ENCOUNTER — HOSPITAL ENCOUNTER (OUTPATIENT)
Dept: PHYSICAL THERAPY | Facility: HOSPITAL | Age: 67
Setting detail: THERAPIES SERIES
End: 2017-04-21
Attending: PHYSICIAN ASSISTANT
Payer: MEDICARE

## 2017-04-21 PROCEDURE — 97110 THERAPEUTIC EXERCISES: CPT | Mod: GP | Performed by: PHYSICAL THERAPIST

## 2017-04-21 PROCEDURE — 97140 MANUAL THERAPY 1/> REGIONS: CPT | Mod: GP | Performed by: PHYSICAL THERAPIST

## 2017-04-24 ENCOUNTER — HOSPITAL ENCOUNTER (OUTPATIENT)
Dept: PHYSICAL THERAPY | Facility: HOSPITAL | Age: 67
Setting detail: THERAPIES SERIES
End: 2017-04-24
Attending: PHYSICIAN ASSISTANT
Payer: MEDICARE

## 2017-04-24 PROCEDURE — 97110 THERAPEUTIC EXERCISES: CPT | Mod: GP | Performed by: PHYSICAL THERAPIST

## 2017-04-27 ENCOUNTER — HOSPITAL ENCOUNTER (OUTPATIENT)
Dept: PHYSICAL THERAPY | Facility: HOSPITAL | Age: 67
Setting detail: THERAPIES SERIES
End: 2017-04-27
Attending: PHYSICIAN ASSISTANT
Payer: MEDICARE

## 2017-04-27 PROCEDURE — 97110 THERAPEUTIC EXERCISES: CPT | Mod: GP | Performed by: PHYSICAL THERAPIST

## 2017-04-28 ENCOUNTER — OFFICE VISIT (OUTPATIENT)
Dept: ORTHOPEDICS | Facility: OTHER | Age: 67
End: 2017-04-28
Attending: ORTHOPAEDIC SURGERY
Payer: MEDICARE

## 2017-04-28 VITALS
DIASTOLIC BLOOD PRESSURE: 70 MMHG | OXYGEN SATURATION: 98 % | HEIGHT: 58 IN | SYSTOLIC BLOOD PRESSURE: 110 MMHG | BODY MASS INDEX: 32.75 KG/M2 | WEIGHT: 156 LBS | TEMPERATURE: 97.6 F | HEART RATE: 86 BPM

## 2017-04-28 DIAGNOSIS — Z96.651 STATUS POST TOTAL RIGHT KNEE REPLACEMENT: Primary | ICD-10-CM

## 2017-04-28 PROCEDURE — 99024 POSTOP FOLLOW-UP VISIT: CPT | Performed by: ORTHOPAEDIC SURGERY

## 2017-04-28 PROCEDURE — 99212 OFFICE O/P EST SF 10 MIN: CPT

## 2017-04-28 ASSESSMENT — PAIN SCALES - GENERAL: PAINLEVEL: NO PAIN (0)

## 2017-04-28 NOTE — PROGRESS NOTES
Chief complaint: Status post right TKA 3/21/17.    Subjective: This 67-year-old female has been doing very well after her right TKA.  She called today to tell if she was concerned about possibly having developed an infection in her knee.  We had her come in urgently.    She states that a few days ago a pimple developed in the proximal end of her incision.  It then opened and a drop of purulent looking material came out.  She washed it and put antibiotic ointment on it and a Band-Aid.  No more purulence has developed.    Examination: Healthy-appearing female in no obvious distress.  She ambulated into the office without an appliance.  Her right knee incision has at its proximal and a superficial lesion consistent with a stitch abscess that has drained it is now healing.  No purulence is seen. There is no erythema around it.  There is no effusion in the joint.  Range of motion is 0-120  at physical therapy.    Impression: Sterile stitch abscess right knee incision    Plan: I reassured her that this is nothing serious.  I explained what the lesion is.  She is to keep it clean and apply antibiotic ointment and a Band-Aid until it seals over.  She is not to let it interfere with her rehabilitation.  She is to keep her regularly scheduled appointment.

## 2017-04-28 NOTE — MR AVS SNAPSHOT
After Visit Summary   4/28/2017    Sydni Hardin    MRN: [id number]           Patient Information     Date Of Birth          1950        Visit Information        Provider Department      4/28/2017 3:20 PM Moisés Olson MD  ORTHOPEDICS        Today's Diagnoses     Status post total right knee replacement    -  1       Follow-ups after your visit        Your next 10 appointments already scheduled     May 01, 2017  2:00 PM CDT   Treatment with Ankita Ozuna, PT   [facility] ([facility] )    [address] [id number] [phone]            May 05, 2017  1:00 PM CDT   Treatment with Ankitalinda Ozuna, PT   [facility] ([facility] )    [address] [address] [phone]            May 18, 2017  2:40 PM CDT   (Arrive by 2:25 PM)   Return Visit with Moisés Olson MD    ORTHOPEDICS ([facility])    [address] [place] [address] [phone]              Who to contact     If you have questions or need follow up information about today's clinic visit or your schedule please contact  ORTHOPEDICS directly at [phone].  Normal or non-critical lab and imaging results will be communicated to you by MyChart, letter or phone within 4 business days after the clinic has received the results. If you do not hear from us within 7 days, please contact the clinic through MyChart or phone. If you have a critical or abnormal lab result, we will notify you by phone as soon as possible.  Submit refill requests through [employer] or call your pharmacy and they will forward the refill request to us. Please allow 3 business days for your refill to be completed.          Additional Information About Your Visit        Care EveryWhere ID     This is your Care EveryWhere ID. This could be used by other organizations to access your [place] medical records  [id number]        Your Vitals Were     Pulse  "Temperature Height Pulse Oximetry Breastfeeding? BMI (Body Mass Index)    86 97.6  F (36.4  C) (Tympanic) 4' 10\" (1.473 m) 98% No 32.6 kg/m2       Blood Pressure from Last 3 Encounters:   04/28/17 110/70   04/18/17 122/60   04/18/17 132/70    Weight from Last 3 Encounters:   04/28/17 156 lb (70.8 kg)   04/18/17 158 lb (71.7 kg)   04/18/17 158 lb (71.7 kg)              Today, you had the following     No orders found for display       Primary Care Provider Office Phone # Fax #    Gibran Alarcon [phone]       [address] [address]        Thank you!     Thank you for choosing  ORTHOPEDICS  for your care. Our goal is always to provide you with excellent care. Hearing back from our patients is one way we can continue to improve our services. Please take a few minutes to complete the written survey that you may receive in the mail after your visit with us. Thank you!             Your Updated Medication List - Protect others around you: Learn how to safely use, store and throw away your medicines at www.disposemymeds.org.          This list is accurate as of: 4/28/17  3:45 PM.  Always use your most recent med list.                   Brand Name Dispense Instructions for use    ALPRAZolam 1 MG tablet    XANAX    30 tablet    Take 1 tablet (1 mg) by mouth 3 times daily as needed for anxiety       aspirin 81 MG EC tablet      Take 1 tablet (81 mg) by mouth 2 times daily       atorvastatin 40 MG tablet    LIPITOR    90 tablet    TAKE 1 TABLET BY MOUTH DAILY       benazepril 10 MG tablet    LOTENSIN    90 tablet    TAKE 1 TABLET BY MOUTH DAILY       benzonatate 200 MG capsule    TESSALON    60 capsule    TAKE 1 CAPSULE BY MOUTH 3 TIMES A DAY AS NEEDED FOR COUGH       budesonide-formoterol 160-4.5 MCG/ACT Inhaler    SYMBICORT    1 Inhaler    Inhale 2 puffs into the lungs 2 times daily. As needed       butalbital-acetaminophen-caffeine -40 MG per tablet    " FIORICET/ESGIC    5 tablet    Take 1 tablet by mouth every 6 hours as needed       cimetidine 800 MG tablet    TAGAMET    90 tablet    Take 1 tablet (800 mg) by mouth At Bedtime As needed       doxepin 10 MG capsule    SINEquan    90 capsule    Take 1 capsule (10 mg) by mouth At Bedtime       EPIPEN 2-JAIRO 0.3 MG/0.3ML injection   Generic drug:  EPINEPHrine     2 each    INJECT 1 SYRINGE INTRAMUSCULARLY ONCE AS NEEDED FOR ANAPHYLAXIS       escitalopram 20 MG tablet    LEXAPRO    180 tablet    TAKE 1 TABLET BY MOUTH 2 TIMES A DAY       hydrochlorothiazide 25 MG tablet    HYDRODIURIL    90 tablet    TAKE 1 TABLET BY MOUTH DAILY       ipratropium - albuterol 0.5 mg/2.5 mg/3 mL 0.5-2.5 (3) MG/3ML neb solution    DUONEB    360 mL    Take 1 vial (3 mLs) by nebulization every 6 hours as needed       meloxicam 15 MG tablet    MOBIC    90 tablet    TAKE 1 TABLET BY MOUTH DAILY       montelukast 10 MG tablet    SINGULAIR    90 tablet    TAKE 1 TABLET BY MOUTH DAILY       nystatin [id number] UNIT/ML suspension    MYCOSTATIN    60 mL    TAKE 1 TEASPOON (5MLS) BY MOUTH 4 TIMES DAILY       * order for DME     2 each    Equipment being ordered: Nebulizer supplies       * order for DME     1 each    Equipment being ordered: mcgee walker       * order for DME     1 each    Equipment being ordered: toilet seat riser       * order for DME     1 Units    Equipment being ordered: Please install safety bar in bathroom to assist patient in getting up from toilet.       oxyCODONE-acetaminophen 5-325 MG per tablet    PERCOCET    60 tablet    TAKE 1 TO 2 TABLETS BY MOUTH EVERY 4 HOURS AS NEEDED FOR PAIN       potassium chloride 10 MEQ tablet    K-TAB,KLOR-CON    180 tablet    TAKE 1 TABLET BY MOUTH 2 TIMES DAILY WITH FOOD       temazepam 30 MG capsule    RESTORIL    10 capsule    TAKE 1 CAPSULE BY MOUTH NIGHTLY AS NEEDED FOR SLEEP       VENTOLIN  (90 BASE) MCG/ACT Inhaler   Generic drug:  albuterol     18 g    USE 1 PUFF FOUR TIMES A DAY  AS NEEDED       * Notice:  This list has 4 medication(s) that are the same as other medications prescribed for you. Read the directions carefully, and ask your doctor or other care provider to review them with you.

## 2017-04-28 NOTE — NURSING NOTE
"Chief Complaint   Patient presents with     RECHECK     Follow up right knee possible infection.       Initial /70 (BP Location: Left arm, Patient Position: Chair, Cuff Size: Adult Regular)  Pulse 86  Temp 97.6  F (36.4  C) (Tympanic)  Ht 4' 10\" (1.473 m)  Wt 156 lb (70.8 kg)  SpO2 98%  Breastfeeding? No  BMI 32.6 kg/m2 Estimated body mass index is 32.6 kg/(m^2) as calculated from the following:    Height as of this encounter: 4' 10\" (1.473 m).    Weight as of this encounter: 156 lb (70.8 kg).  Medication Reconciliation: complete   Josephine Kennedy LPN      "

## 2017-05-04 DIAGNOSIS — M17.0 PRIMARY OSTEOARTHRITIS OF BOTH KNEES: ICD-10-CM

## 2017-05-05 ENCOUNTER — HOSPITAL ENCOUNTER (OUTPATIENT)
Dept: PHYSICAL THERAPY | Facility: HOSPITAL | Age: 67
Setting detail: THERAPIES SERIES
End: 2017-05-05
Attending: PHYSICIAN ASSISTANT
Payer: MEDICARE

## 2017-05-05 PROCEDURE — G8979 MOBILITY GOAL STATUS: HCPCS | Mod: GP,CI | Performed by: PHYSICAL THERAPIST

## 2017-05-05 PROCEDURE — G8978 MOBILITY CURRENT STATUS: HCPCS | Mod: GP,CJ | Performed by: PHYSICAL THERAPIST

## 2017-05-05 PROCEDURE — 97110 THERAPEUTIC EXERCISES: CPT | Mod: GP | Performed by: PHYSICAL THERAPIST

## 2017-05-05 NOTE — PROGRESS NOTES
"Outpatient Physical Therapy Progress Note     Patient: Sydni Hardin  : 1950    Beginning/End Dates of Reporting Period:  2017 to 2017    Referring Provider: Dr Olson    Therapy Diagnosis: s/p R TKA     Client Self Report: Patient was seen from 1-1:30pm. Reports she accidentially knelt on her knee the other day and said it was very painful and has been sore since.  She continues to do her HEP as prescribed and feels she continues to gain strength. She saw Dr Olson as she was concerned she may have an infection, however he indicated she may have a stitch that is underneath her skin trying to come out and that it was nothign to worry about. She states she is overall 60% back to normal since having surgery and her biggest deficit is her strength with stairs and a slight limp with walking    Objective Measurements:  Objective Measure: ROM, gait, strength, HEP  Details: ROM remains 0-120 without pain, can tolerated 124 FLEX PROM with tolerated stretch.  Quad strength is improving, but still decreased eccentric control with stepping down from a 4\"+ step.  Pain remains 1-2/10 unless she overdoes activity, such as cleaning her whole home.  Her HEP consists of quad strengthening, hip ABD and ER strengthening, squats, balance, toe raises, step ups and lateral step downs 4-6\" steps - recently prescribed.  Overall, she is doing quite well and her incision is healing up very well except for the very top where a stitch may be trying to emerge. Ambulates without AD with slight limp, but this improves when cued on quad and glute activation during single leg stance weight-bearing    Goals:  Goal Identifier STG 1   Goal Description Patient will demonstrate increased R knee FLEX PROM to 120 in order to improve LE dressing abilities   Target Date 17   Date Met  17   Progress:     Goal Identifier LTG 1   Goal Description Patient will demonstrate normal, reciprocal gait pattern without use of " AD for 150 ft in order to ambulate safely in the community   Target Date 06/06/17   Date Met   (Still slight limp)   Progress:     Goal Identifier STG 2   Goal Description Patient will report decreased worst PL to 4/10 or less in R knee in order to improve her walking ability   Target Date 05/09/17   Date Met  05/05/17   Progress:     Goal Identifier LTG 2   Goal Description Patient will report overall 80%+ improvement in her R knee function in order to perform all ADL, home and community activities safely and with good mechanics   Target Date 06/06/17   Date Met   (Improving - currently 60%)   Progress:       Progress Toward Goals:   Progress this reporting period: Patient is making good progress towards goals and is expected to do well with continued focus on strengthening, gait and stair negotiation      Plan:  Continue therapy per current plan of care.    Discharge:  No

## 2017-05-08 RX ORDER — OXYCODONE AND ACETAMINOPHEN 5; 325 MG/1; MG/1
TABLET ORAL
Qty: 60 TABLET | Refills: 0 | Status: SHIPPED | OUTPATIENT
Start: 2017-05-08 | End: 2017-06-01

## 2017-05-15 ENCOUNTER — HOSPITAL ENCOUNTER (OUTPATIENT)
Dept: PHYSICAL THERAPY | Facility: HOSPITAL | Age: 67
Setting detail: THERAPIES SERIES
End: 2017-05-15
Attending: ORTHOPAEDIC SURGERY
Payer: MEDICARE

## 2017-05-15 PROCEDURE — G8978 MOBILITY CURRENT STATUS: HCPCS | Mod: GP,CI | Performed by: PHYSICAL THERAPIST

## 2017-05-15 PROCEDURE — G8979 MOBILITY GOAL STATUS: HCPCS | Mod: GP,CI | Performed by: PHYSICAL THERAPIST

## 2017-05-15 PROCEDURE — 97110 THERAPEUTIC EXERCISES: CPT | Mod: GP | Performed by: PHYSICAL THERAPIST

## 2017-05-16 NOTE — PROGRESS NOTES
Outpatient Physical Therapy Progress Note     Patient: Sydni Hardin  : 1950    Beginning/End Dates of Reporting Period:  2017 to 2017    Referring Provider: Dr Olson    Therapy Diagnosis: s/p R TKA     Client Self Report: Patient was seen from 1:35-2pm.  She reports being very tired today from a gathering she hosted on Saturday. She comes in today without an AD and states she walks around most of the time without an AD, but will take it for longer distances. Overall, she reports feeling roughly 60-75% back to normal. She has been practicing the stairs for strengthening with supervision from a friend and is up to going up and down the stairs outside of her apartment.  She rates her worst pain around a 2-3/10 in her right knee, but she now has pain in her left knee that is more bothersome and she is hoping for a cortisone injection to help ease those symptoms. She follows up with her surgeon this week    Objective Measurements:  Objective Measure: R knee ROM, gait, HEP, pain  Details: R knee AROM: 0-124 without pain, gait: no AD today, but slight limp noted due to minor quad weakness - improved when cued on proper mechanics, but patient was fatigued today, therefore limp was more noted.  Her HEP consists with weight-bearing strengthening with squats and step exercises focusing on quad, glute and hip strengthening.  She is also performing regular ROM exercises to preserve her good motion. She has been putting aloe vera on her scar to keep it soft and moving well although she is still concerned that one of her stitches has not come out yet.  Her main goal is to be able to walk well at her grandson's highschool graduation.  Her pain is minimal  2-3/10 in R knee      Goals:  Goal Identifier STG 1   Goal Description Patient will demonstrate increased R knee FLEX PROM to 120 in order to improve LE dressing abilities   Target Date 17   Date Met  17   Progress:     Goal Identifier LTG  1   Goal Description Patient will demonstrate normal, reciprocal gait pattern without use of AD for 150 ft in order to ambulate safely in the community   Target Date 06/06/17   Date Met   (still slight limp)   Progress:     Goal Identifier STG 2   Goal Description Patient will report decreased worst PL to 4/10 or less in R knee in order to improve her walking ability   Target Date 05/09/17   Date Met  05/05/17   Progress:     Goal Identifier LTG 2   Goal Description Patient will report overall 80%+ improvement in her R knee function in order to perform all ADL, home and community activities safely and with good mechanics   Target Date 06/06/17   Date Met   (Improving - reports 60-75%)   Progress:       Progress Toward Goals:   Progress this reporting period: Patient is making good progress towards goals      Plan:  Changes to therapy plan of care: Continue PT 1x/week for up to 5 more visits for strengthening    Discharge:  No

## 2017-05-17 DIAGNOSIS — F41.9 ANXIETY: ICD-10-CM

## 2017-05-17 RX ORDER — ALPRAZOLAM 1 MG
1 TABLET ORAL 3 TIMES DAILY PRN
Qty: 30 TABLET | Refills: 0 | Status: SHIPPED | OUTPATIENT
Start: 2017-05-17 | End: 2017-07-06

## 2017-05-17 NOTE — TELEPHONE ENCOUNTER
xanax      Last Written Prescription Date: 3/24/17  Last Fill Quantity: 30,  # refills: 0   Last Office Visit with [facility] or [facility] prescribing provider: 4/18/17                                         Next 5 appointments (look out 90 days)     May 18, 2017  2:40 PM CDT   (Arrive by 2:25 PM)   Return Visit with Moisés Olson MD    ORTHOPEDICS ([facility])    [address] [facility] [address] [phone]            May 30, 2017  1:30 PM CDT   (Arrive by 1:15 PM)   SHORT with Gibran Alarcon MD   [facility] ([facility])    [address] [facility] [address]   [phone]

## 2017-05-18 ENCOUNTER — OFFICE VISIT (OUTPATIENT)
Dept: ORTHOPEDICS | Facility: OTHER | Age: 67
End: 2017-05-18
Attending: ORTHOPAEDIC SURGERY
Payer: MEDICARE

## 2017-05-18 VITALS
OXYGEN SATURATION: 94 % | BODY MASS INDEX: 31.91 KG/M2 | DIASTOLIC BLOOD PRESSURE: 70 MMHG | HEIGHT: 58 IN | WEIGHT: 152 LBS | TEMPERATURE: 99.4 F | HEART RATE: 78 BPM | SYSTOLIC BLOOD PRESSURE: 120 MMHG

## 2017-05-18 DIAGNOSIS — M17.12 PRIMARY OSTEOARTHRITIS OF LEFT KNEE: Primary | ICD-10-CM

## 2017-05-18 DIAGNOSIS — Z96.651 STATUS POST TOTAL RIGHT KNEE REPLACEMENT: ICD-10-CM

## 2017-05-18 PROCEDURE — 20610 DRAIN/INJ JOINT/BURSA W/O US: CPT | Mod: LT | Performed by: ORTHOPAEDIC SURGERY

## 2017-05-18 PROCEDURE — 99024 POSTOP FOLLOW-UP VISIT: CPT | Performed by: ORTHOPAEDIC SURGERY

## 2017-05-18 PROCEDURE — 99212 OFFICE O/P EST SF 10 MIN: CPT

## 2017-05-18 PROCEDURE — 73564 X-RAY EXAM KNEE 4 OR MORE: CPT | Mod: TC,LT

## 2017-05-18 RX ORDER — DEXAMETHASONE SODIUM PHOSPHATE 4 MG/ML
4 INJECTION, SOLUTION INTRA-ARTICULAR; INTRALESIONAL; INTRAMUSCULAR; INTRAVENOUS; SOFT TISSUE ONCE
Qty: 1 ML | Refills: 0 | OUTPATIENT
Start: 2017-05-18 | End: 2017-05-18

## 2017-05-18 ASSESSMENT — PAIN SCALES - GENERAL: PAINLEVEL: SEVERE PAIN (7)

## 2017-05-18 NOTE — PROGRESS NOTES
Chief complaint:  1.  Right TKA 3/21/17  2.  DJD left knee    Subjective: This 67-year-old female is now almost 2 months out from her right TKA.  She now has more pain from her arthritic left knee and she has from her replaced right knee, although both hurt more during inclement weather.  She is ambulating without an appliance.  She is in physical therapy where she has accomplished active range of motion of 0-124  without pain.  The therapist plans to continue working with her for 5 more weeks.    She requests a steroid injection into her left knee.  That joint has not been x-rayed since late 2014.    Nothing else has changed with respect to her musculoskeletal or neurologic review of systems since seen last.    Examination: Healthy-appearing female with appropriate mood and affect.   Right knee: Her incision is well-healed with no evidence of infection.  It has no effusion.  Its range of motion is 0-120 .  Extension strength is 4+.  Left knee: skin intact.  Mild varus deformity.  No effusion.  Tender over medial joint line.  Range of motion 0-120 .  Mild MCL laxity with good end points on varus valgus stressing.  Stable to anterior and posterior drawer testing.  Aniket's maneuvers negative.  Extension strength grade  4+.  Neurovascular status left leg is intact.    X-rays: Plain films left knee taken today show near complete loss of medial joint space, tricompartmental osteophyte formation, and varus deformity.    Impression:  #1.  Satisfactory 8 week check right TKA  #2.  Severe DJD left knee    Procedure: After obtaining written informed consent and sterilely prepping the site a timeout was held.  Sterile technique was employed as the left knee was then injected with 4 mg of dexamethasone mixed with 3mL of Carbocaine.  Topical ethyl chloride spray was used as a local anesthetic.  The patient tolerated the procedure well and there were no complications.     Plan:  Continue PT right knee.  Return in 4 weeks.

## 2017-05-18 NOTE — NURSING NOTE
"Chief Complaint   Patient presents with     Musculoskeletal Problem     8 weeks post op right knee        Initial /70 (BP Location: Left arm, Patient Position: Chair, Cuff Size: Adult Large)  Pulse 78  Temp 99.4  F (37.4  C) (Tympanic)  Ht 4' 10\" (1.473 m)  Wt 152 lb (68.9 kg)  SpO2 94%  BMI 31.77 kg/m2 Estimated body mass index is 31.77 kg/(m^2) as calculated from the following:    Height as of this encounter: 4' 10\" (1.473 m).    Weight as of this encounter: 152 lb (68.9 kg).  Medication Reconciliation: complete   Malathi Barton LPN      "

## 2017-05-18 NOTE — MR AVS SNAPSHOT
"              After Visit Summary   5/18/2017    Sydni Hardin    MRN: [id number]           Patient Information     Date Of Birth          1950        Visit Information        Provider Department      5/18/2017 2:40 PM Moisés Olson MD  ORTHOPEDICS        Today's Diagnoses     Primary osteoarthritis of left knee    -  1    Status post total right knee replacement           Follow-ups after your visit        Your next 10 appointments already scheduled     May 19, 2017  1:30 PM CDT   Treatment with Ankita Ozuna PT   [facility] ([facility] )    [address] [address]   [phone]            May 30, 2017  1:30 PM CDT   (Arrive by 1:15 PM)   SHORT with Gibran Alarcon MD   [facility] ([facility])    [address] [facility] [address] [phone]              Who to contact     If you have questions or need follow up information about today's clinic visit or your schedule please contact  ORTHOPEDICS directly at [phone].  Normal or non-critical lab and imaging results will be communicated to you by MyChart, letter or phone within 4 business days after the clinic has received the results. If you do not hear from us within 7 days, please contact the clinic through MyChart or phone. If you have a critical or abnormal lab result, we will notify you by phone as soon as possible.  Submit refill requests through [employer] or call your pharmacy and they will forward the refill request to us. Please allow 3 business days for your refill to be completed.          Additional Information About Your Visit        Care EveryWhere ID     This is your Care EveryWhere ID. This could be used by other organizations to access your [place] medical records  [id number]        Your Vitals Were     Pulse Temperature Height Pulse Oximetry BMI (Body Mass Index)       78 99.4  F (37.4  C) (Tympanic) 4' 10\" (1.473 m) 94% 31.77 kg/m2        Blood " Pressure from Last 3 Encounters:   05/18/17 120/70   04/28/17 110/70   04/18/17 122/60    Weight from Last 3 Encounters:   05/18/17 152 lb (68.9 kg)   04/28/17 156 lb (70.8 kg)   04/18/17 158 lb (71.7 kg)              We Performed the Following     XR KNEE LEFT G/E 4 VIEWS (Clinic Performed)        Primary Care Provider Office Phone # Fax #    Gibran Alarcon [phone]       [facility] [address] [facility] [address]        Thank you!     Thank you for choosing  ORTHOPEDICS  for your care. Our goal is always to provide you with excellent care. Hearing back from our patients is one way we can continue to improve our services. Please take a few minutes to complete the written survey that you may receive in the mail after your visit with us. Thank you!             Your Updated Medication List - Protect others around you: Learn how to safely use, store and throw away your medicines at www.disposemymeds.org.          This list is accurate as of: 5/18/17  4:06 PM.  Always use your most recent med list.                   Brand Name Dispense Instructions for use    ALPRAZolam 1 MG tablet    XANAX    30 tablet    Take 1 tablet (1 mg) by mouth 3 times daily as needed for anxiety       aspirin 81 MG EC tablet      Take 1 tablet (81 mg) by mouth 2 times daily       atorvastatin 40 MG tablet    LIPITOR    90 tablet    TAKE 1 TABLET BY MOUTH DAILY       benazepril 10 MG tablet    LOTENSIN    90 tablet    TAKE 1 TABLET BY MOUTH DAILY       benzonatate 200 MG capsule    TESSALON    60 capsule    TAKE 1 CAPSULE BY MOUTH 3 TIMES A DAY AS NEEDED FOR COUGH       budesonide-formoterol 160-4.5 MCG/ACT Inhaler    SYMBICORT    1 Inhaler    Inhale 2 puffs into the lungs 2 times daily. As needed       butalbital-acetaminophen-caffeine -40 MG per tablet    FIORICET/ESGIC    5 tablet    Take 1 tablet by mouth every 6 hours as needed       cimetidine 800 MG tablet    TAGAMET    90 tablet    Take 1  tablet (800 mg) by mouth At Bedtime As needed       doxepin 10 MG capsule    SINEquan    90 capsule    Take 1 capsule (10 mg) by mouth At Bedtime       EPIPEN 2-JAIRO 0.3 MG/0.3ML injection   Generic drug:  EPINEPHrine     2 each    INJECT 1 SYRINGE INTRAMUSCULARLY ONCE AS NEEDED FOR ANAPHYLAXIS       escitalopram 20 MG tablet    LEXAPRO    180 tablet    TAKE 1 TABLET BY MOUTH 2 TIMES A DAY       hydrochlorothiazide 25 MG tablet    HYDRODIURIL    90 tablet    TAKE 1 TABLET BY MOUTH DAILY       ipratropium - albuterol 0.5 mg/2.5 mg/3 mL 0.5-2.5 (3) MG/3ML neb solution    DUONEB    360 mL    Take 1 vial (3 mLs) by nebulization every 6 hours as needed       meloxicam 15 MG tablet    MOBIC    90 tablet    TAKE 1 TABLET BY MOUTH DAILY       montelukast 10 MG tablet    SINGULAIR    90 tablet    TAKE 1 TABLET BY MOUTH DAILY       nystatin [id number] UNIT/ML suspension    MYCOSTATIN    60 mL    TAKE 1 TEASPOON (5MLS) BY MOUTH 4 TIMES DAILY       * order for DME     2 each    Equipment being ordered: Nebulizer supplies       * order for DME     1 each    Equipment being ordered: mcgee walker       * order for DME     1 each    Equipment being ordered: toilet seat riser       * order for DME     1 Units    Equipment being ordered: Please install safety bar in bathroom to assist patient in getting up from toilet.       oxyCODONE-acetaminophen 5-325 MG per tablet    PERCOCET    60 tablet    TAKE 1 TO 2 TABLETS BY MOUTH EVERY 4 HOURS AS NEEDED FOR PAIN       potassium chloride 10 MEQ tablet    K-TAB,KLOR-CON    180 tablet    TAKE 1 TABLET BY MOUTH 2 TIMES DAILY WITH FOOD       temazepam 30 MG capsule    RESTORIL    10 capsule    TAKE 1 CAPSULE BY MOUTH NIGHTLY AS NEEDED FOR SLEEP       VENTOLIN  (90 BASE) MCG/ACT Inhaler   Generic drug:  albuterol     18 g    USE 1 PUFF FOUR TIMES A DAY AS NEEDED       * Notice:  This list has 4 medication(s) that are the same as other medications prescribed for you. Read the directions  carefully, and ask your doctor or other care provider to review them with you.

## 2017-05-26 ENCOUNTER — HOSPITAL ENCOUNTER (OUTPATIENT)
Dept: PHYSICAL THERAPY | Facility: HOSPITAL | Age: 67
Setting detail: THERAPIES SERIES
End: 2017-05-26
Attending: PHYSICIAN ASSISTANT
Payer: MEDICARE

## 2017-05-26 PROCEDURE — 97110 THERAPEUTIC EXERCISES: CPT | Mod: GP

## 2017-05-26 PROCEDURE — 40000718 ZZHC STATISTIC PT DEPARTMENT ORTHO VISIT

## 2017-05-30 ENCOUNTER — HOSPITAL ENCOUNTER (OUTPATIENT)
Dept: PHYSICAL THERAPY | Facility: HOSPITAL | Age: 67
Setting detail: THERAPIES SERIES
End: 2017-05-30
Attending: PHYSICIAN ASSISTANT
Payer: MEDICARE

## 2017-05-30 PROCEDURE — G8980 MOBILITY D/C STATUS: HCPCS | Mod: GP,CH | Performed by: PHYSICAL THERAPIST

## 2017-05-30 PROCEDURE — 97140 MANUAL THERAPY 1/> REGIONS: CPT | Mod: GP | Performed by: PHYSICAL THERAPIST

## 2017-05-30 PROCEDURE — 97110 THERAPEUTIC EXERCISES: CPT | Mod: GP | Performed by: PHYSICAL THERAPIST

## 2017-05-30 PROCEDURE — G8979 MOBILITY GOAL STATUS: HCPCS | Mod: GP,CI | Performed by: PHYSICAL THERAPIST

## 2017-06-01 DIAGNOSIS — M17.0 PRIMARY OSTEOARTHRITIS OF BOTH KNEES: ICD-10-CM

## 2017-06-01 NOTE — TELEPHONE ENCOUNTER
Pt called to see if this request for refill was received, pt was told that it is waiting for the PCP Dr. Alarcon to sign off on it.  Pt states she will be leaving town this weekend and was hoping that the refill will be made prior to her leaving.  Nurse please call primary number when it is ready for . Thank you.

## 2017-06-02 NOTE — TELEPHONE ENCOUNTER
oxyCODONE-acetaminophen (PERCOCET) 5-325 MG per tablet      Last Written Prescription Date: 05/08/2017  Last Fill Quantity: 60,  # refills: 0   Last Office Visit with [facility], [facility] or [facility] prescribing provider: 04/18/2017                                         Next 5 appointments (look out 90 days)     Jun 22, 2017  2:40 PM CDT   (Arrive by 2:20 PM)   Return Visit with Moisés Olson MD    ORTHOPEDICS ([facility])    [address] [facility] [address] [phone]

## 2017-06-05 RX ORDER — OXYCODONE AND ACETAMINOPHEN 5; 325 MG/1; MG/1
TABLET ORAL
Qty: 60 TABLET | Refills: 0 | Status: SHIPPED | OUTPATIENT
Start: 2017-06-05 | End: 2017-06-26

## 2017-06-05 NOTE — TELEPHONE ENCOUNTER
Pt called to inform RX is ready to be picked up. Pt requested it be sent to [facility] via  and will be there 06/06/17 in the afternoon.

## 2017-06-09 ENCOUNTER — TELEPHONE (OUTPATIENT)
Dept: ORTHOPEDICS | Facility: OTHER | Age: 67
End: 2017-06-09

## 2017-06-09 NOTE — TELEPHONE ENCOUNTER
Spoke with patients daughter and advised that most likely are the sutures under the skin and as long at is not open she can follow up if issue does not  Resolved, patient daughter agreed to plan.  Sandy Salazar LPN

## 2017-06-09 NOTE — TELEPHONE ENCOUNTER
Patient stating two lumps on the top more on left painful 6/10 and noticed on this weekend were in [place] and patient did do a lot of steps, Please advise.  Sandy Salazar, KALLIE

## 2017-06-13 ENCOUNTER — OFFICE VISIT (OUTPATIENT)
Dept: ORTHOPEDICS | Facility: OTHER | Age: 67
End: 2017-06-13
Attending: ORTHOPAEDIC SURGERY
Payer: COMMERCIAL

## 2017-06-13 VITALS
SYSTOLIC BLOOD PRESSURE: 100 MMHG | RESPIRATION RATE: 18 BRPM | HEART RATE: 67 BPM | BODY MASS INDEX: 34 KG/M2 | HEIGHT: 58 IN | DIASTOLIC BLOOD PRESSURE: 68 MMHG | OXYGEN SATURATION: 94 % | TEMPERATURE: 98 F | WEIGHT: 162 LBS

## 2017-06-13 DIAGNOSIS — M17.12 PRIMARY OSTEOARTHRITIS OF LEFT KNEE: ICD-10-CM

## 2017-06-13 DIAGNOSIS — Z96.651 STATUS POST TOTAL RIGHT KNEE REPLACEMENT: Primary | ICD-10-CM

## 2017-06-13 PROCEDURE — 99212 OFFICE O/P EST SF 10 MIN: CPT | Mod: 24 | Performed by: ORTHOPAEDIC SURGERY

## 2017-06-13 PROCEDURE — 99024 POSTOP FOLLOW-UP VISIT: CPT | Performed by: ORTHOPAEDIC SURGERY

## 2017-06-13 PROCEDURE — 99212 OFFICE O/P EST SF 10 MIN: CPT

## 2017-06-13 ASSESSMENT — PAIN SCALES - GENERAL: PAINLEVEL: SEVERE PAIN (7)

## 2017-06-13 NOTE — PROGRESS NOTES
Chief complaint:  #1.  DJD left knee  #2.  Right TKA 3/21/17  #3.  History of gastric ulcers making NSAIDs risky    Subjective: This 67-year-old female was seen on 5/18/17 with left knee pain.  X-ray showed near complete loss of medial joint space, tricompartmental osteophyte formation, and a mild varus deformity.  She was already on meloxicam.  She has a sulfa allergy contraindicating Celebrex.  Other NSAIDs are contraindicated by a history of gastric ulcer disease.  I gave her an intra-articular steroid injection.    Today she reports that that injection significantly lessened her left knee pain for only about a week.  She then traveled to [place] to attend a graduation and had to climb many sets of stairs many times a day for 3 days.  That brought back her left knee pain to wear it was before the injection.  The pain causes her to favor the left knee by bearing more weight on the right knee where she had a TKA performed on 3/21/17.  It is making the right knee hurt more.  She has had to resort to taking Percocet for the pain.  She is not using any ambulatory appliance.    Nothing else has changed with respect to her musculoskeletal or neurologic review of systems since seen last.    Examination: Healthy-appearing elderly female in no obvious distress.   Right knee: Her incision is well-healed with no evidence of infection.  Palpable beneath the skin and subcutaneous tissues are the PDS knots used to close the arthrotomy.  She is concerned about those lumps but I reassured her that they will disappear as the sutures resorb.  Left knee: No effusion.  Tender to palpation along the medial joint line.  Range of motion 0-120 .  Stable to ligamentous stressing.  Extension strength is 4+.  The neurovascular status of both legs is intact.    Impression:  #1.  DJD left knee, failure of NSAID and steroid injection  #2.  Satisfactory ten-week right TKA outcome    Plan:  #1.  I suggested she consider Visco supplementation  of the left knee, and go back to using a cane in the right hand.  She was given an educational brochure on Synvisc.  She wants to read it and think about it and will return in a week to tell me whether she wants to proceed with the injection.

## 2017-06-13 NOTE — NURSING NOTE
"Chief Complaint   Patient presents with     Musculoskeletal Problem     right knee pain       Initial /68 (BP Location: Left arm, Patient Position: Chair, Cuff Size: Adult Large)  Pulse 67  Temp 98  F (36.7  C) (Tympanic)  Resp 18  Ht 4' 10\" (1.473 m)  Wt 162 lb (73.5 kg)  SpO2 94%  BMI 33.86 kg/m2 Estimated body mass index is 33.86 kg/(m^2) as calculated from the following:    Height as of this encounter: 4' 10\" (1.473 m).    Weight as of this encounter: 162 lb (73.5 kg).  Medication Reconciliation: unable or not appropriate to perform   Sandy Salazar LPN      "

## 2017-06-13 NOTE — MR AVS SNAPSHOT
"              After Visit Summary   6/13/2017    Sydni Hardin    MRN: [id number]           Patient Information     Date Of Birth          1950        Visit Information        Provider Department      6/13/2017 3:00 PM Moisés Olson MD  [facility]TH[facility]ED[facility]        Today's Diagnoses     Status post total right knee replacement    -  1    Primary osteoarthritis of left knee           Follow-ups after your visit        Your next 10 appointments already scheduled     Jun 21, 2017  2:00 PM CDT   Treatment with Ankita Ozuna PT   [facility] ([facility] )    [address] [address]   [phone]            Jun 22, 2017  2:40 PM CDT   (Arrive by 2:20 PM)   Return Visit with Moisés Olson MD    [facility] ([address] [id number] [phone]              Who to contact     If you have questions or need follow up information about today's clinic visit or your schedule please contact  ORTH[facility]EDICS directly at [phone].  Normal or non-critical lab and imaging results will be communicated to you by [employer], letter or phone within 4 business days after the clinic has received the results. If you do not hear from us within 7 days, please contact the clinic through [employer] or phone. If you have a critical or abnormal lab result, we will notify you by phone as soon as possible.  Submit refill requests through [employer] or call your pharmacy and they will forward the refill request to us. Please allow 3 business days for your refill to be completed.          Additional Information About Your Visit        MyChart Information     [employer] lets you send messages to your doctor, view your test results, renew your prescriptions, schedule appointments and more. To sign up, go to [email or web address] . Click on \"Log in\" on the left side of the screen, which will take you to the Welcome page. Then click on \"Sign up Now\" " "on the right side of the page.     You will be asked to enter the access code listed below, as well as some personal information. Please follow the directions to create your username and password.     Your access code is: [email or web address]  Expires: 2017  3:44 PM     Your access code will  in 90 days. If you need help or a new code, please call your [facility] or [phone].        Care EveryWhere ID     This is your Care EveryWhere ID. This could be used by other organizations to access your [place] medical records  [id number]        Your Vitals Were     Pulse Temperature Respirations Height Pulse Oximetry BMI (Body Mass Index)    67 98  F (36.7  C) (Tympanic) 18 4' 10\" (1.473 m) 94% 33.86 kg/m2       Blood Pressure from Last 3 Encounters:   17 100/68   17 120/70   17 110/70    Weight from Last 3 Encounters:   17 162 lb (73.5 kg)   17 152 lb (68.9 kg)   17 156 lb (70.8 kg)              Today, you had the following     No orders found for display       Primary Care Provider Office Phone # Fax #    Gibran Alarcon [phone]       [address] [address]        Thank you!     Thank you for choosing  [facility]  for your care. Our goal is always to provide you with excellent care. Hearing back from our patients is one way we can continue to improve our services. Please take a few minutes to complete the written survey that you may receive in the mail after your visit with us. Thank you!             Your Updated Medication List - Protect others around you: Learn how to safely use, store and throw away your medicines at www.disposemymeds.org.          This list is accurate as of: 17  3:44 PM.  Always use your most recent med list.                   Brand Name Dispense Instructions for use    ALPRAZolam 1 MG tablet    XANAX    30 tablet    Take 1 tablet (1 mg) by mouth 3 times daily as needed for anxiety       " aspirin 81 MG EC tablet      Take 1 tablet (81 mg) by mouth 2 times daily       atorvastatin 40 MG tablet    LIPITOR    90 tablet    TAKE 1 TABLET BY MOUTH DAILY       benazepril 10 MG tablet    LOTENSIN    90 tablet    TAKE 1 TABLET BY MOUTH DAILY       benzonatate 200 MG capsule    TESSALON    60 capsule    TAKE 1 CAPSULE BY MOUTH 3 TIMES A DAY AS NEEDED FOR COUGH       budesonide-formoterol 160-4.5 MCG/ACT Inhaler    SYMBICORT    1 Inhaler    Inhale 2 puffs into the lungs 2 times daily. As needed       butalbital-acetaminophen-caffeine -40 MG per tablet    FIORICET/ESGIC    5 tablet    Take 1 tablet by mouth every 6 hours as needed       cimetidine 800 MG tablet    TAGAMET    90 tablet    Take 1 tablet (800 mg) by mouth At Bedtime As needed       dexamethasone 4 MG/ML injection    DECADRON    1 mL    Inject 1 mL (4 mg) as directed once for 1 dose Use 4 mg or dose determined by provider for iontophoresis.       doxepin 10 MG capsule    SINEquan    90 capsule    Take 1 capsule (10 mg) by mouth At Bedtime       EPIPEN 2-JAIRO 0.3 MG/0.3ML injection   Generic drug:  EPINEPHrine     2 each    INJECT 1 SYRINGE INTRAMUSCULARLY ONCE AS NEEDED FOR ANAPHYLAXIS       escitalopram 20 MG tablet    LEXAPRO    180 tablet    TAKE 1 TABLET BY MOUTH 2 TIMES A DAY       hydrochlorothiazide 25 MG tablet    HYDRODIURIL    90 tablet    TAKE 1 TABLET BY MOUTH DAILY       ipratropium - albuterol 0.5 mg/2.5 mg/3 mL 0.5-2.5 (3) MG/3ML neb solution    DUONEB    360 mL    Take 1 vial (3 mLs) by nebulization every 6 hours as needed       meloxicam 15 MG tablet    MOBIC    90 tablet    TAKE 1 TABLET BY MOUTH DAILY       montelukast 10 MG tablet    SINGULAIR    90 tablet    TAKE 1 TABLET BY MOUTH DAILY       nystatin [id number] UNIT/ML suspension    MYCOSTATIN    60 mL    TAKE 1 TEASPOON (5MLS) BY MOUTH 4 TIMES DAILY       * order for DME     2 each    Equipment being ordered: Nebulizer supplies       * order for DME     1 each    Equipment  being ordered: mcgee walker       * order for DME     1 each    Equipment being ordered: toilet seat riser       * order for DME     1 Units    Equipment being ordered: Please install safety bar in bathroom to assist patient in getting up from toilet.       oxyCODONE-acetaminophen 5-325 MG per tablet    PERCOCET    60 tablet    TAKE 1 TO 2 TABLETS BY MOUTH EVERY 4 HOURS AS NEEDED FOR PAIN       potassium chloride 10 MEQ tablet    K-TAB,KLOR-CON    180 tablet    TAKE 1 TABLET BY MOUTH 2 TIMES DAILY WITH FOOD       temazepam 30 MG capsule    RESTORIL    10 capsule    TAKE 1 CAPSULE BY MOUTH NIGHTLY AS NEEDED FOR SLEEP       VENTOLIN  (90 BASE) MCG/ACT Inhaler   Generic drug:  albuterol     18 g    USE 1 PUFF FOUR TIMES A DAY AS NEEDED       * Notice:  This list has 4 medication(s) that are the same as other medications prescribed for you. Read the directions carefully, and ask your doctor or other care provider to review them with you.

## 2017-06-20 ENCOUNTER — TELEPHONE (OUTPATIENT)
Dept: ORTHOPEDICS | Facility: OTHER | Age: 67
End: 2017-06-20

## 2017-06-20 NOTE — TELEPHONE ENCOUNTER
Called patients daughter back and she stated I have been trying to get a hold of you and the switch board would not let me talk to you, patients daughter did get to Ramona Serrano  2 she states but no call back,  Patients daughter some how reached out to Dr. Brown wife Natalie, Patient wants to get in for synvisc injection her daughter stating she can barely walk and knee is swollen and warm daughter states [place] are [place] and she has done a lot of stairs patients daughter advised that we will do the injection and was given appointment of 3:05 on 6/21/2017. Patients daughter advised me that she will not go into UC and wants to see the Dr. Olson.  Sandy Salazar LPN

## 2017-06-21 ENCOUNTER — OFFICE VISIT (OUTPATIENT)
Dept: ORTHOPEDICS | Facility: OTHER | Age: 67
End: 2017-06-21
Attending: ORTHOPAEDIC SURGERY
Payer: MEDICARE

## 2017-06-21 VITALS
HEIGHT: 58 IN | BODY MASS INDEX: 33.58 KG/M2 | HEART RATE: 73 BPM | DIASTOLIC BLOOD PRESSURE: 60 MMHG | SYSTOLIC BLOOD PRESSURE: 108 MMHG | TEMPERATURE: 98.7 F | OXYGEN SATURATION: 94 % | WEIGHT: 160 LBS

## 2017-06-21 DIAGNOSIS — M17.12 PRIMARY OSTEOARTHRITIS OF LEFT KNEE: Primary | ICD-10-CM

## 2017-06-21 DIAGNOSIS — G89.29 CHRONIC PAIN OF RIGHT KNEE: ICD-10-CM

## 2017-06-21 DIAGNOSIS — M25.561 CHRONIC PAIN OF RIGHT KNEE: ICD-10-CM

## 2017-06-21 PROCEDURE — 20610 DRAIN/INJ JOINT/BURSA W/O US: CPT | Performed by: ORTHOPAEDIC SURGERY

## 2017-06-21 PROCEDURE — 99212 OFFICE O/P EST SF 10 MIN: CPT | Mod: 25 | Performed by: ORTHOPAEDIC SURGERY

## 2017-06-21 PROCEDURE — 99212 OFFICE O/P EST SF 10 MIN: CPT | Mod: 25

## 2017-06-21 RX ORDER — EPINEPHRINE 0.3 MG/.3ML
0.3 INJECTION SUBCUTANEOUS
COMMUNITY
End: 2018-07-10

## 2017-06-21 ASSESSMENT — PAIN SCALES - GENERAL: PAINLEVEL: SEVERE PAIN (7)

## 2017-06-21 NOTE — PROGRESS NOTES
"Chief complaint:  #1.  DJD left knee, failure of steroid injection, and oral NSAID.  #2.  Oral NSAID contraindication: Gastric ulcer disease and sulfa allergy  #3.  Status post right TKA 3/21/17    Subjective: This 67-year-old female presented on 5/18/17 with left knee pain.  X-ray showed near complete loss of medial joint space, tricompartmental osteophyte formation, and a varus deformity.  Despite a history of gastric ulcer disease she had already been tried on meloxicam and failed.  I gave her an intra-articular steroid injection and that failed as well.  At her last visit I suggested she consider Visco supplementation.  She was given a brochure on the topic and she wanted time to read it over.  She returns today saying she wants to try it.    She is status post right TKA with a good outcome, however, lately the right knee has been increasingly \"stiff\".    Examination: Healthy-appearing female with appropriate mood and affect.  Vital signs stable and afebrile.  The left knee had a small effusion.  The right knee had none. Over both knees the skin is intact and free of erythema or warmth.    Procedure: After obtaining written informed consent the right knee was sterilely prepped.  A timeout was held.  The skin over that joint was anesthetized with topical ethyl chloride spray.  Sterile technique was employed as the subcutaneous tissues were injected with 1% Xylocaine without epinephrine.  That joint was then aspirated of 9 mL of normal appearing joint fluid.  It was then injected with 6 mL of Synvisc 1 and 4 mg of dexamethasone through a superolateral approach.  The needle was removed and a Band-Aid was applied.  The patient did not find the aspiration painful but did find the injection painful. There were no complications.  The dexamethasone was to prevent a Synvisc synovitis which can occur after 15-20% of Synvisc 1 injections.     Impression:  #1.  Severe DJD left knee, failure of oral NSAID and steroid " injection  #2.  Oral NSAID contraindication  #3.  Right TKA 3/21/17    Plan:  #1.  Synvisc injection as above  #2.  I will prescribe topical Voltaren for both knees  #3.  She will return in 3 months for recheck.

## 2017-06-21 NOTE — MR AVS SNAPSHOT
"              After Visit Summary   6/21/2017    Sydni Hardin    MRN: [id number]           Patient Information     Date Of Birth          1950        Visit Information        Provider Department      6/21/2017 3:20 PM Moisés Olson MD  ORTHOPEDICS        Today's Diagnoses     Primary osteoarthritis of left knee    -  1    Chronic pain of right knee           Follow-ups after your visit        Follow-up notes from your care team     Return in about 3 months (around 9/21/2017).      Your next 10 appointments already scheduled     Sep 14, 2017  2:20 PM CDT   (Arrive by 2:05 PM)   Return Visit with Moisés Olson MD    [facility] ([facility] )    [address] [facility] [address] [phone]              Who to contact     If you have questions or need follow up information about today's clinic visit or your schedule please contact  ORTHOPEDICS directly at [phone].  Normal or non-critical lab and imaging results will be communicated to you by [employer], letter or phone within 4 business days after the clinic has received the results. If you do not hear from us within 7 days, please contact the clinic through [employer] or phone. If you have a critical or abnormal lab result, we will notify you by phone as soon as possible.  Submit refill requests through [employer] or call your pharmacy and they will forward the refill request to us. Please allow 3 business days for your refill to be completed.          Additional Information About Your Visit        [employer] Information     [employer] lets you send messages to your doctor, view your test results, renew your prescriptions, schedule appointments and more. To sign up, go to [email or web address] . Click on \"Log in\" on the left side of the screen, which will take you to the Welcome page. Then click on \"Sign up Now\" on the right side of the page.     You will be asked to enter the access code listed below, as well as some " "personal information. Please follow the directions to create your username and password.     Your access code is: [email or web address]  Expires: 2017  3:44 PM     Your access code will  in 90 days. If you need help or a new code, please call your [place] clinic or [phone].        Care EveryWhere ID     This is your Care EveryWhere ID. This could be used by other organizations to access your [place] medical records  [id number]        Your Vitals Were     Pulse Temperature Height Pulse Oximetry BMI (Body Mass Index)       73 98.7  F (37.1  C) (Tympanic) 4' 10\" (1.473 m) 94% 33.44 kg/m2        Blood Pressure from Last 3 Encounters:   17 108/60   17 100/68   17 120/70    Weight from Last 3 Encounters:   17 160 lb (72.6 kg)   17 162 lb (73.5 kg)   17 152 lb (68.9 kg)              We Performed the Following     Large Joint/Bursa injection and/or drainage (Shoulder, Knee)          Today's Medication Changes          These changes are accurate as of: 17  4:28 PM.  If you have any questions, ask your nurse or doctor.               Start taking these medicines.        Dose/Directions    diclofenac 1 % Gel topical gel   Commonly known as:  VOLTAREN   Used for:  Primary osteoarthritis of left knee, Chronic pain of right knee   Started by:  Moisés Olson MD        Apply 4 grams to knees  four times daily using enclosed dosing card.   Quantity:  100 g   Refills:  3       Hylan 48 MG/6ML Sosy injection   Commonly known as:  SYNVISC ONE   Used for:  Primary osteoarthritis of left knee   Started by:  Moisés Olson MD        Dose:  16 mg   16 mg by INTRA-ARTICULAR route once for 1 dose   Quantity:  2 mL   Refills:  0            Where to get your medicines      These medications were sent to [facility] [place] - [address]  [address] [address]     Phone:  [phone]     diclofenac 1 % Gel topical gel         Some of these will " need a paper prescription and others can be bought over the counter.  Ask your nurse if you have questions.     You don't need a prescription for these medications     Hylan 48 MG/6ML Sosy injection                Primary Care Provider Office Phone # Fax #    Gibran Alarcon [phone]       [address] [facility] [address] [place] [address]        Equal Access to Services     [facility] : Hadii aad ku hadasho Soomaali, waaxda luqadaha, qaybta kaalmada adeegyada, waxay idiin hayaan adeeg kharash la'aan ah. So [facility] [phone].    ATENCIÓN: Si habla espfunmi, tiene a guaman disposición servicios gratuitos de asistencia lingüística. Maryrylee al [phone].    We comply with applicable federal civil rights laws and [place] laws. We do not discriminate on the basis of race, color, national origin, age, disability sex, sexual orientation or gender identity.            Thank you!     Thank you for choosing  ORTHOPEDICS  for your care. Our goal is always to provide you with excellent care. Hearing back from our patients is one way we can continue to improve our services. Please take a few minutes to complete the written survey that you may receive in the mail after your visit with us. Thank you!             Your Updated Medication List - Protect others around you: Learn how to safely use, store and throw away your medicines at www.disposemymeds.org.          This list is accurate as of: 6/21/17  4:28 PM.  Always use your most recent med list.                   Brand Name Dispense Instructions for use Diagnosis    ALPRAZolam 1 MG tablet    XANAX    30 tablet    Take 1 tablet (1 mg) by mouth 3 times daily as needed for anxiety    Anxiety       aspirin 81 MG EC tablet      Take 1 tablet (81 mg) by mouth 2 times daily    S/P total knee replacement using cement, right       atorvastatin 40 MG tablet    LIPITOR    90 tablet    TAKE 1 TABLET BY MOUTH DAILY    Mixed hyperlipidemia       benazepril 10 MG  tablet    LOTENSIN    90 tablet    TAKE 1 TABLET BY MOUTH DAILY    HTN (hypertension)       benzonatate 200 MG capsule    TESSALON    60 capsule    TAKE 1 CAPSULE BY MOUTH 3 TIMES A DAY AS NEEDED FOR COUGH    Preop general physical exam, Tobacco abuse       budesonide-formoterol 160-4.5 MCG/ACT Inhaler    SYMBICORT    1 Inhaler    Inhale 2 puffs into the lungs 2 times daily. As needed    Asthma       butalbital-acetaminophen-caffeine -40 MG per tablet    FIORICET/ESGIC    5 tablet    Take 1 tablet by mouth every 6 hours as needed    Scalp hematoma, sequela       cimetidine 800 MG tablet    TAGAMET    90 tablet    Take 1 tablet (800 mg) by mouth At Bedtime As needed    Esophageal reflux       dexamethasone 4 MG/ML injection    DECADRON    1 mL    Inject 1 mL (4 mg) as directed once for 1 dose Use 4 mg or dose determined by provider for iontophoresis.    Primary osteoarthritis of left knee       diclofenac 1 % Gel topical gel    VOLTAREN    100 g    Apply 4 grams to knees  four times daily using enclosed dosing card.    Primary osteoarthritis of left knee, Chronic pain of right knee       doxepin 10 MG capsule    SINEquan    90 capsule    Take 1 capsule (10 mg) by mouth At Bedtime    Primary osteoarthritis of right knee       * EPINEPHrine 0.3 MG/0.3ML injection      Inject 0.3 mg into the muscle        * EPIPEN 2-JAIRO 0.3 MG/0.3ML injection   Generic drug:  EPINEPHrine     2 each    INJECT 1 SYRINGE INTRAMUSCULARLY ONCE AS NEEDED FOR ANAPHYLAXIS    Anaphylactic reaction       escitalopram 20 MG tablet    LEXAPRO    180 tablet    TAKE 1 TABLET BY MOUTH 2 TIMES A DAY    Dysthymia       hydrochlorothiazide 25 MG tablet    HYDRODIURIL    90 tablet    TAKE 1 TABLET BY MOUTH DAILY    Benign essential hypertension       Hylan 48 MG/6ML Sosy injection    SYNVISC ONE    2 mL    16 mg by INTRA-ARTICULAR route once for 1 dose    Primary osteoarthritis of left knee       ipratropium - albuterol 0.5 mg/2.5 mg/3 mL 0.5-2.5  (3) MG/3ML neb solution    DUONEB    360 mL    Take 1 vial (3 mLs) by nebulization every 6 hours as needed    Acute bronchospasm       meloxicam 15 MG tablet    MOBIC    90 tablet    TAKE 1 TABLET BY MOUTH DAILY    Pain       montelukast 10 MG tablet    SINGULAIR    90 tablet    TAKE 1 TABLET BY MOUTH DAILY    Mixed hyperlipidemia       nystatin [id number] UNIT/ML suspension    MYCOSTATIN    60 mL    TAKE 1 TEASPOON (5MLS) BY MOUTH 4 TIMES DAILY    Thrush       * order for DME     2 each    Equipment being ordered: Nebulizer supplies    Primary osteoarthritis of right knee       * order for DME     1 each    Equipment being ordered: mcgee walker    Primary osteoarthritis of right knee       * order for DME     1 each    Equipment being ordered: toilet seat riser    Postoperative state       * order for DME     1 Units    Equipment being ordered: Please install safety bar in bathroom to assist patient in getting up from toilet.    Status post total right knee replacement       oxyCODONE-acetaminophen 5-325 MG per tablet    PERCOCET    60 tablet    TAKE 1 TO 2 TABLETS BY MOUTH EVERY 4 HOURS AS NEEDED FOR PAIN    Primary osteoarthritis of both knees       potassium chloride 10 MEQ tablet    K-TAB,KLOR-CON    180 tablet    TAKE 1 TABLET BY MOUTH 2 TIMES DAILY WITH FOOD    Benign essential hypertension       temazepam 30 MG capsule    RESTORIL    10 capsule    TAKE 1 CAPSULE BY MOUTH NIGHTLY AS NEEDED FOR SLEEP    Persistent insomnia       VENTOLIN  (90 BASE) MCG/ACT Inhaler   Generic drug:  albuterol     18 g    USE 1 PUFF FOUR TIMES A DAY AS NEEDED    Tobacco abuse       * Notice:  This list has 6 medication(s) that are the same as other medications prescribed for you. Read the directions carefully, and ask your doctor or other care provider to review them with you.

## 2017-06-21 NOTE — PROGRESS NOTES
"Outpatient Physical Therapy Discharge Note     Patient: Sydni Hardin  : 1950    Beginning/End Dates of Reporting Period:  2017 to 2017    Referring Provider: Dr Olson    Therapy Diagnosis: s/p R TKA     Client Self Report: Per patient's report on last attended appointment on 2017: Patient was seen from 2:30-3pm.  States that she has had some cramping her leg muscles and that she did a lot of stairs over the weekend. States she is doing her HEP.  Stated later on in the session that she thought \"when you come to PT you should feel good and not be pushed\".  This was after doing the leg extension machine.  Going forward in the treatment session she continued to question PT about treatment (knee patellar mobilizations, exercise progression, etc).  Patient was somewhat defiant towards the end of the session for unknown reasons.    Objective Measurements:  Objective Measure: Quad strength, knee ROM  Details: Still deficits noted with eccentric quad control most noted with going down stairs and gait - still noted limp. Knee ROM: 0-122.  Patient became somewhat defiant at the end of the session when I was mobilizing her knee cap as it was hypomobile superiorly and her quad was tight - she asked \"is you moving my knee cap going to wreck it?\"  I objectively educated the patient that the knee cap needs to move well in order to perform flexion and extension and that mobilizing it will not harm her knee.  She seemed okay with this answer. Ending the session, she seemed in better spirits, however her presentation today seemed odd and out of character compared to my previous interactions with her       Goals:  Goal Identifier STG 1   Goal Description Patient will demonstrate increased R knee FLEX PROM to 120 in order to improve LE dressing abilities   Target Date 17   Date Met  17   Progress:     Goal Identifier LTG 1   Goal Description Patient will demonstrate normal, reciprocal gait " pattern without use of AD for 150 ft in order to ambulate safely in the community   Target Date 06/06/17   Date Met  05/30/17   Progress:     Goal Identifier STG 2   Goal Description Patient will report decreased worst PL to 4/10 or less in R knee in order to improve her walking ability   Target Date 05/09/17   Date Met  05/05/17   Progress:     Goal Identifier LTG 2   Goal Description Patient will report overall 80%+ improvement in her R knee function in order to perform all ADL, home and community activities safely and with good mechanics   Target Date 06/06/17   Date Met  05/30/17   Progress:       Progress Toward Goals:   Progress this reporting period: Patient had met goals as of 5/30/2017, but current status is unknown due to lack of attending final visits.      Plan:  Discharge from therapy.    Discharge:    Reason for Discharge: Patient has met all goals.    Equipment Issued: None    Discharge Plan: Patient to continue home program.

## 2017-06-21 NOTE — NURSING NOTE
"Chief Complaint   Patient presents with     RECHECK     Follow up left Total Knee Replacement. Would like synvisc injection.       Initial /60 (BP Location: Left arm, Patient Position: Sitting, Cuff Size: Adult Regular)  Pulse 73  Temp 98.7  F (37.1  C) (Tympanic)  Ht 4' 10\" (1.473 m)  Wt 160 lb (72.6 kg)  SpO2 94%  BMI 33.44 kg/m2 Estimated body mass index is 33.44 kg/(m^2) as calculated from the following:    Height as of this encounter: 4' 10\" (1.473 m).    Weight as of this encounter: 160 lb (72.6 kg).  Medication Reconciliation: complete   Josephine Kennedy LPN      "

## 2017-06-26 DIAGNOSIS — M17.0 PRIMARY OSTEOARTHRITIS OF BOTH KNEES: ICD-10-CM

## 2017-06-28 ENCOUNTER — TELEPHONE (OUTPATIENT)
Dept: FAMILY MEDICINE | Facility: OTHER | Age: 67
End: 2017-06-28

## 2017-06-28 RX ORDER — OXYCODONE AND ACETAMINOPHEN 5; 325 MG/1; MG/1
TABLET ORAL
Qty: 60 TABLET | Refills: 0 | Status: SHIPPED | OUTPATIENT
Start: 2017-06-28 | End: 2017-09-28
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
## 2017-06-28 NOTE — TELEPHONE ENCOUNTER
1:47 PM    Reason for Call: Phone Call    Description: Sydni would like the nurse to call her . Personal.  Please call Sydni    Was an appointment offered for this call  No    Preferred method for responding to this message:  [phone]    If we cannot reach you directly, may we leave a detailed response at the number you provided?  yes      Queta Smith

## 2017-06-29 DIAGNOSIS — Z12.31 VISIT FOR SCREENING MAMMOGRAM: Primary | ICD-10-CM

## 2017-07-06 DIAGNOSIS — F34.1 DYSTHYMIA: ICD-10-CM

## 2017-07-06 DIAGNOSIS — E78.2 MIXED HYPERLIPIDEMIA: ICD-10-CM

## 2017-07-06 DIAGNOSIS — F41.9 ANXIETY: ICD-10-CM

## 2017-07-06 RX ORDER — ESCITALOPRAM OXALATE 20 MG/1
TABLET ORAL
Qty: 180 TABLET | Refills: 0 | Status: SHIPPED | OUTPATIENT
Start: 2017-07-06 | End: 2017-10-25

## 2017-07-06 RX ORDER — ATORVASTATIN CALCIUM 40 MG/1
40 TABLET, FILM COATED ORAL DAILY
Qty: 90 TABLET | Refills: 0 | Status: SHIPPED | OUTPATIENT
Start: 2017-07-06 | End: 2017-10-25

## 2017-07-06 RX ORDER — ALPRAZOLAM 1 MG
1 TABLET ORAL 3 TIMES DAILY PRN
Qty: 30 TABLET | Refills: 0 | Status: SHIPPED | OUTPATIENT
Start: 2017-07-06 | End: 2017-07-27

## 2017-07-27 DIAGNOSIS — F41.9 ANXIETY: ICD-10-CM

## 2017-07-27 DIAGNOSIS — I10 BENIGN ESSENTIAL HYPERTENSION: ICD-10-CM

## 2017-07-27 RX ORDER — POTASSIUM CHLORIDE 750 MG/1
TABLET, EXTENDED RELEASE ORAL
Qty: 180 TABLET | Refills: 0 | Status: SHIPPED | OUTPATIENT
Start: 2017-07-27 | End: 2018-01-29

## 2017-07-27 RX ORDER — ALPRAZOLAM 1 MG
1 TABLET ORAL 3 TIMES DAILY PRN
Qty: 30 TABLET | Refills: 0 | Status: SHIPPED | OUTPATIENT
Start: 2017-07-27 | End: 2017-08-21

## 2017-07-27 NOTE — TELEPHONE ENCOUNTER
Xanax- patient requesting 90, last fill received 30      Last Written Prescription Date: 7/6/17  Last Fill Quantity: 30,  # refills: 0   Last Office Visit with G, P or [facility] prescribing provider: 4/18/17                                         Next 5 appointments (look out 90 days)     Sep 13, 2017  2:20 PM CDT   (Arrive by 2:00 PM)   Return Visit with Moisés Olson MD    ORTHOPEDICS ([facility] )    [address] [facility] [id number] [phone]

## 2017-07-28 DIAGNOSIS — Z12.31 VISIT FOR SCREENING MAMMOGRAM: Primary | ICD-10-CM

## 2017-07-28 PROCEDURE — 77063 BREAST TOMOSYNTHESIS BI: CPT | Mod: TC

## 2017-07-28 PROCEDURE — G0202 SCR MAMMO BI INCL CAD: HCPCS | Mod: TC

## 2017-08-16 ENCOUNTER — TRANSFERRED RECORDS (OUTPATIENT)
Dept: HEALTH INFORMATION MANAGEMENT | Facility: HOSPITAL | Age: 67
End: 2017-08-16

## 2017-08-21 ENCOUNTER — OFFICE VISIT (OUTPATIENT)
Dept: FAMILY MEDICINE | Facility: OTHER | Age: 67
End: 2017-08-21
Attending: FAMILY MEDICINE
Payer: COMMERCIAL

## 2017-08-21 VITALS
WEIGHT: 155 LBS | OXYGEN SATURATION: 94 % | BODY MASS INDEX: 32.54 KG/M2 | HEART RATE: 68 BPM | HEIGHT: 58 IN | SYSTOLIC BLOOD PRESSURE: 102 MMHG | DIASTOLIC BLOOD PRESSURE: 62 MMHG | TEMPERATURE: 98.4 F | RESPIRATION RATE: 16 BRPM

## 2017-08-21 DIAGNOSIS — F41.9 ANXIETY: ICD-10-CM

## 2017-08-21 DIAGNOSIS — S00.03XS SCALP HEMATOMA, SEQUELA: ICD-10-CM

## 2017-08-21 DIAGNOSIS — Z72.0 TOBACCO ABUSE: ICD-10-CM

## 2017-08-21 DIAGNOSIS — R25.2 CRAMP OF LIMB: ICD-10-CM

## 2017-08-21 DIAGNOSIS — B37.31 CANDIDIASIS OF VULVA AND VAGINA: Primary | ICD-10-CM

## 2017-08-21 PROCEDURE — 99213 OFFICE O/P EST LOW 20 MIN: CPT

## 2017-08-21 PROCEDURE — 99214 OFFICE O/P EST MOD 30 MIN: CPT | Performed by: FAMILY MEDICINE

## 2017-08-21 RX ORDER — ALBUTEROL SULFATE 90 UG/1
AEROSOL, METERED RESPIRATORY (INHALATION)
Qty: 2 INHALER | Refills: 3 | Status: SHIPPED | OUTPATIENT
Start: 2017-08-21 | End: 2018-10-10

## 2017-08-21 RX ORDER — FLUCONAZOLE 150 MG/1
150 TABLET ORAL
Qty: 4 TABLET | Refills: 0 | Status: SHIPPED | OUTPATIENT
Start: 2017-08-21 | End: 2017-09-20

## 2017-08-21 RX ORDER — ALPRAZOLAM 1 MG
1 TABLET ORAL 3 TIMES DAILY PRN
Qty: 90 TABLET | Refills: 5 | Status: SHIPPED | OUTPATIENT
Start: 2017-08-21 | End: 2018-02-26

## 2017-08-21 RX ORDER — BUTALBITAL, ACETAMINOPHEN AND CAFFEINE 50; 325; 40 MG/1; MG/1; MG/1
1 TABLET ORAL EVERY 6 HOURS PRN
Qty: 30 TABLET | Refills: 1 | Status: SHIPPED | OUTPATIENT
Start: 2017-08-21 | End: 2017-09-29

## 2017-08-21 ASSESSMENT — ANXIETY QUESTIONNAIRES
7. FEELING AFRAID AS IF SOMETHING AWFUL MIGHT HAPPEN: NEARLY EVERY DAY
2. NOT BEING ABLE TO STOP OR CONTROL WORRYING: NEARLY EVERY DAY
3. WORRYING TOO MUCH ABOUT DIFFERENT THINGS: NEARLY EVERY DAY
5. BEING SO RESTLESS THAT IT IS HARD TO SIT STILL: NOT AT ALL
6. BECOMING EASILY ANNOYED OR IRRITABLE: NEARLY EVERY DAY
IF YOU CHECKED OFF ANY PROBLEMS ON THIS QUESTIONNAIRE, HOW DIFFICULT HAVE THESE PROBLEMS MADE IT FOR YOU TO DO YOUR WORK, TAKE CARE OF THINGS AT HOME, OR GET ALONG WITH OTHER PEOPLE: SOMEWHAT DIFFICULT
GAD7 TOTAL SCORE: 16
1. FEELING NERVOUS, ANXIOUS, OR ON EDGE: MORE THAN HALF THE DAYS

## 2017-08-21 ASSESSMENT — PAIN SCALES - GENERAL: PAINLEVEL: SEVERE PAIN (6)

## 2017-08-21 ASSESSMENT — PATIENT HEALTH QUESTIONNAIRE - PHQ9
SUM OF ALL RESPONSES TO PHQ QUESTIONS 1-9: 20
5. POOR APPETITE OR OVEREATING: MORE THAN HALF THE DAYS

## 2017-08-21 NOTE — MR AVS SNAPSHOT
"              After Visit Summary   8/21/2017    Sydni Hardin    MRN: [id number]           Patient Information     Date Of Birth          1950        Visit Information        Provider Department      8/21/2017 3:30 PM Gibran Alarcon MD [facility]        Today's Diagnoses     Candidiasis of vulva and vagina    -  1    Tobacco abuse        Cramp of limb        Scalp hematoma, sequela        Anxiety          Care Instructions    F/u with ongoing concerns.           Follow-ups after your visit        Your next 10 appointments already scheduled     Sep 13, 2017  2:20 PM CDT   (Arrive by 2:00 PM)   Return Visit with Moisés Olson MD    ORTHOPEDICS ([facility] )    [address] [facility] [address] [phone]              Who to contact     If you have questions or need follow up information about today's clinic visit or your schedule please contact [facility] directly at [phone].  Normal or non-critical lab and imaging results will be communicated to you by MyChart, letter or phone within 4 business days after the clinic has received the results. If you do not hear from us within 7 days, please contact the clinic through [employer] or phone. If you have a critical or abnormal lab result, we will notify you by phone as soon as possible.  Submit refill requests through [facility] or call your pharmacy and they will forward the refill request to us. Please allow 3 business days for your refill to be completed.          Additional Information About Your Visit        [employer] Information     [facility] lets you send messages to your doctor, view your test results, renew your prescriptions, schedule appointments and more. To sign up, go to [email or web address] . Click on \"Log in\" on the left side of the screen, which will take you to the Welcome page. Then click on \"Sign up Now\" on the right side of the page.     You will be asked to enter the " "access code listed below, as well as some personal information. Please follow the directions to create your username and password.     Your access code is: [email or web address]  Expires: 2017  3:44 PM     Your access code will  in 90 days. If you need help or a new code, please call your [facility] or [phone].        Care EveryWhere ID     This is your Care EveryWhere ID. This could be used by other organizations to access your [facility] medical records  [id number]        Your Vitals Were     Pulse Temperature Respirations Height Pulse Oximetry BMI (Body Mass Index)    68 98.4  F (36.9  C) (Tympanic) 16 4' 10\" (1.473 m) 94% 32.4 kg/m2       Blood Pressure from Last 3 Encounters:   17 102/62   17 108/60   17 100/68    Weight from Last 3 Encounters:   17 155 lb (70.3 kg)   17 160 lb (72.6 kg)   17 162 lb (73.5 kg)              Today, you had the following     No orders found for display         Today's Medication Changes          These changes are accurate as of: 17  4:50 PM.  If you have any questions, ask your nurse or doctor.               Start taking these medicines.        Dose/Directions    fluconazole 150 MG tablet   Commonly known as:  DIFLUCAN   Used for:  Candidiasis of vulva and vagina   Started by:  Gibran Alarcon MD        Dose:  150 mg   Take 1 tablet (150 mg) by mouth every 3 days   Quantity:  4 tablet   Refills:  0         These medicines have changed or have updated prescriptions.        Dose/Directions    albuterol 108 (90 BASE) MCG/ACT Inhaler   Commonly known as:  VENTOLIN HFA   This may have changed:  See the new instructions.   Used for:  Tobacco abuse   Changed by:  Gibran Alarcon MD        USE 1 PUFF FOUR TIMES A DAY AS NEEDED   Quantity:  2 Inhaler   Refills:  3            Where to get your medicines      These medications were sent to [id number] - [place], [place] - [address]  [id number] , [place] [address]     Phone:  " [phone]     albuterol 108 (90 BASE) MCG/ACT Inhaler    fluconazole 150 MG tablet         Some of these will need a paper prescription and others can be bought over the counter.  Ask your nurse if you have questions.     Bring a paper prescription for each of these medications     ALPRAZolam 1 MG tablet    butalbital-acetaminophen-caffeine -40 MG per tablet                Primary Care Provider Office Phone # Fax #    Gibran Aalrcon [phone]       [address]  [facility] [address]        Equal Access to Services     : Hadii aad ku hadasho Soomaali, waaxda luqadaha, qaybta kaalmada adeegyada, waxay lucho hayaan parth claudio . So [facility] [phone].    ATENCIÓN: Si habla español, tiene a guaman disposición servicios gratuitos de asistencia lingüística. [facility] [phone].    We comply with applicable federal civil rights laws and [place] laws. We do not discriminate on the basis of race, color, national origin, age, disability sex, sexual orientation or gender identity.            Thank you!     Thank you for choosing [facility]  for your care. Our goal is always to provide you with excellent care. Hearing back from our patients is one way we can continue to improve our services. Please take a few minutes to complete the written survey that you may receive in the mail after your visit with us. Thank you!             Your Updated Medication List - Protect others around you: Learn how to safely use, store and throw away your medicines at www.disposemymeds.org.          This list is accurate as of: 8/21/17  4:50 PM.  Always use your most recent med list.                   Brand Name Dispense Instructions for use Diagnosis    albuterol 108 (90 BASE) MCG/ACT Inhaler    VENTOLIN HFA    2 Inhaler    USE 1 PUFF FOUR TIMES A DAY AS NEEDED    Tobacco abuse       ALPRAZolam 1 MG tablet    XANAX    90 tablet    Take 1 tablet (1 mg) by  mouth 3 times daily as needed for anxiety    Anxiety       aspirin 81 MG EC tablet      Take 1 tablet (81 mg) by mouth 2 times daily    S/P total knee replacement using cement, right       atorvastatin 40 MG tablet    LIPITOR    90 tablet    Take 1 tablet (40 mg) by mouth daily    Mixed hyperlipidemia       benazepril 10 MG tablet    LOTENSIN    90 tablet    TAKE 1 TABLET BY MOUTH DAILY    HTN (hypertension)       benzonatate 200 MG capsule    TESSALON    60 capsule    TAKE 1 CAPSULE BY MOUTH 3 TIMES A DAY AS NEEDED FOR COUGH    Preop general physical exam, Tobacco abuse       budesonide-formoterol 160-4.5 MCG/ACT Inhaler    SYMBICORT    1 Inhaler    Inhale 2 puffs into the lungs 2 times daily. As needed    Asthma       butalbital-acetaminophen-caffeine -40 MG per tablet    FIORICET/ESGIC    30 tablet    Take 1 tablet by mouth every 6 hours as needed    Scalp hematoma, sequela       cimetidine 800 MG tablet    TAGAMET    90 tablet    Take 1 tablet (800 mg) by mouth At Bedtime As needed    Esophageal reflux       dexamethasone 4 MG/ML injection    DECADRON    1 mL    Inject 1 mL (4 mg) as directed once for 1 dose Use 4 mg or dose determined by provider for iontophoresis.    Primary osteoarthritis of left knee       diclofenac 1 % Gel topical gel    VOLTAREN    100 g    Apply 4 grams to knees  four times daily using enclosed dosing card.    Primary osteoarthritis of left knee, Chronic pain of right knee       doxepin 10 MG capsule    SINEquan    90 capsule    Take 1 capsule (10 mg) by mouth At Bedtime    Primary osteoarthritis of right knee       * EPINEPHrine 0.3 MG/0.3ML injection 2-pack    EPIPEN/ADRENACLICK/or ANY BX GENERIC EQUIV     Inject 0.3 mg into the muscle        * EPIPEN 2-JAIRO 0.3 MG/0.3ML injection 2-pack   Generic drug:  EPINEPHrine     2 each    INJECT 1 SYRINGE INTRAMUSCULARLY ONCE AS NEEDED FOR ANAPHYLAXIS    Anaphylactic reaction       escitalopram 20 MG tablet    LEXAPRO    180 tablet    TAKE  1 TABLET BY MOUTH 2 TIMES A DAY    Dysthymia       fluconazole 150 MG tablet    DIFLUCAN    4 tablet    Take 1 tablet (150 mg) by mouth every 3 days    Candidiasis of vulva and vagina       hydrochlorothiazide 25 MG tablet    HYDRODIURIL    90 tablet    TAKE 1 TABLET BY MOUTH DAILY    Benign essential hypertension       ipratropium - albuterol 0.5 mg/2.5 mg/3 mL 0.5-2.5 (3) MG/3ML neb solution    DUONEB    360 mL    Take 1 vial (3 mLs) by nebulization every 6 hours as needed    Acute bronchospasm       meloxicam 15 MG tablet    MOBIC    90 tablet    TAKE 1 TABLET BY MOUTH DAILY    Pain       montelukast 10 MG tablet    SINGULAIR    90 tablet    TAKE 1 TABLET BY MOUTH DAILY    Mixed hyperlipidemia       nystatin 560402 UNIT/ML suspension    MYCOSTATIN    60 mL    TAKE 1 TEASPOON (5MLS) BY MOUTH 4 TIMES DAILY    Thrush       * order for DME     2 each    Equipment being ordered: Nebulizer supplies    Primary osteoarthritis of right knee       * order for DME     1 each    Equipment being ordered: mcgee walker    Primary osteoarthritis of right knee       * order for DME     1 each    Equipment being ordered: toilet seat riser    Postoperative state       * order for DME     1 Units    Equipment being ordered: Please install safety bar in bathroom to assist patient in getting up from toilet.    Status post total right knee replacement       oxyCODONE-acetaminophen 5-325 MG per tablet    PERCOCET    60 tablet    TAKE 1 TO 2 TABLETS BY MOUTH EVERY 4 HOURS AS NEEDED FOR PAIN    Primary osteoarthritis of both knees       potassium chloride 10 MEQ tablet    K-TAB,KLOR-CON    180 tablet    TAKE 1 TABLET BY MOUTH 2 TIMES DAILY WITH FOOD    Benign essential hypertension       temazepam 30 MG capsule    RESTORIL    10 capsule    TAKE 1 CAPSULE BY MOUTH NIGHTLY AS NEEDED FOR SLEEP    Persistent insomnia       * Notice:  This list has 6 medication(s) that are the same as other medications prescribed for you. Read the directions  carefully, and ask your doctor or other care provider to review them with you.

## 2017-08-21 NOTE — NURSING NOTE
"Chief Complaint   Patient presents with     Breast Problem     right side under armpit     Musculoskeletal Problem     hands cramping and legs     Vaginal Problem     having vaginal discharge     Anxiety     needs refill on xanax and would like 2 albuteral inhalers     Fatigue     spots in eyes and dizzy a few days       Initial /62  Pulse 68  Temp 98.4  F (36.9  C) (Tympanic)  Resp 16  Ht 4' 10\" (1.473 m)  Wt 155 lb (70.3 kg)  SpO2 94%  BMI 32.4 kg/m2 Estimated body mass index is 32.4 kg/(m^2) as calculated from the following:    Height as of this encounter: 4' 10\" (1.473 m).    Weight as of this encounter: 155 lb (70.3 kg).  Medication Reconciliation: complete     JEAN-CLAUDE NAJERA      "

## 2017-08-21 NOTE — PROGRESS NOTES
Sydni SKINNER Wilfred    2017    Chief Complaint   Patient presents with     Breast Problem     right side under armpit     Musculoskeletal Problem     hands cramping and legs     Vaginal Problem     having vaginal discharge     Anxiety     needs refill on xanax and would like 2 albuteral inhalers     Fatigue     spots in eyes and dizzy a few days       SUBJECTIVE:  Here for a host of concerns.  Having a yeast infection she would like treated.  Getting some hand and foot pain and we discussed.  Not a cramp but more of a tenderness.  Noting a small abnormality under right armpit.  Not sure how long.  We discussed vision, meds, and hx of CVA as she is wondering if she had a TIA last week when she got dizzy.      Past Medical History:   Diagnosis Date     Abnormal arterial blood gases 2006     Acute upper respiratory infections of unspecified site 2001     Anxiety 10/24/2011     Calculus of gallbladder without mention of cholecystitis or obstruction 2006     Depression 2012     Follow-up examination, following other surgery 2006     Headache(784.0) 12/10/2003     History of CVA (cerebrovascular accident) 2011     HTN (hypertension) 2012     Insomnia 3/13/2012     Lipoma of other skin and subcutaneous tissue 2006     Nonallopathic lesion of cervical region, not elsewhere classified 2003     Osteoarthrosis, unspecified whether generalized or localized, lower leg 10/4/2001     Special screening for malignant neoplasms, colon 2006     Urinary tract infection, site not specified 2007       Past Surgical History:   Procedure Laterality Date     ARTHROPLASTY KNEE Right 3/21/2017    Procedure: ARTHROPLASTY KNEE;  Surgeon: Moisés Olson MD;  Location: [facility]     ARTHROSCOPY KNEE       BIOPSY BREAST  2000    left - free of disease      SECTION        SECTION       CHOLECYSTECTOMY  2006     COLONOSCOPY  2008    repeat in 10 years     HEAD &  NECK SURGERY  2014    hematoma on head drained     HYSTERECTOMY       ORTHOPEDIC SURGERY  02/04/2014    right shoulder replacement     shoulder x2  2009    left       Current Outpatient Prescriptions   Medication Sig Dispense Refill     fluconazole (DIFLUCAN) 150 MG tablet Take 1 tablet (150 mg) by mouth every 3 days 4 tablet 0     albuterol (VENTOLIN HFA) 108 (90 BASE) MCG/ACT Inhaler USE 1 PUFF FOUR TIMES A DAY AS NEEDED 2 Inhaler 3     butalbital-acetaminophen-caffeine (FIORICET/ESGIC) -40 MG per tablet Take 1 tablet by mouth every 6 hours as needed 30 tablet 1     ALPRAZolam (XANAX) 1 MG tablet Take 1 tablet (1 mg) by mouth 3 times daily as needed for anxiety 90 tablet 5     potassium chloride (K-TAB,KLOR-CON) 10 MEQ tablet TAKE 1 TABLET BY MOUTH 2 TIMES DAILY WITH FOOD 180 tablet 0     atorvastatin (LIPITOR) 40 MG tablet Take 1 tablet (40 mg) by mouth daily 90 tablet 0     escitalopram (LEXAPRO) 20 MG tablet TAKE 1 TABLET BY MOUTH 2 TIMES A  tablet 0     oxyCODONE-acetaminophen (PERCOCET) 5-325 MG per tablet TAKE 1 TO 2 TABLETS BY MOUTH EVERY 4 HOURS AS NEEDED FOR PAIN 60 tablet 0     diclofenac (VOLTAREN) 1 % GEL topical gel Apply 4 grams to knees  four times daily using enclosed dosing card. 100 g 3     doxepin (SINEQUAN) 10 MG capsule Take 1 capsule (10 mg) by mouth At Bedtime 90 capsule 3     benazepril (LOTENSIN) 10 MG tablet TAKE 1 TABLET BY MOUTH DAILY 90 tablet 2     nystatin (MYCOSTATIN) 277257 UNIT/ML suspension TAKE 1 TEASPOON (5MLS) BY MOUTH 4 TIMES DAILY 60 mL 0     order for DME Equipment being ordered: Please install safety bar in bathroom to assist patient in getting up from toilet. 1 Units 0     aspirin EC 81 MG EC tablet Take 1 tablet (81 mg) by mouth 2 times daily       temazepam (RESTORIL) 30 MG capsule TAKE 1 CAPSULE BY MOUTH NIGHTLY AS NEEDED FOR SLEEP 10 capsule 0     order for DME Equipment being ordered: toilet seat riser 1 each 0     benzonatate (TESSALON) 200 MG capsule  "TAKE 1 CAPSULE BY MOUTH 3 TIMES A DAY AS NEEDED FOR COUGH 60 capsule 3     hydrochlorothiazide (HYDRODIURIL) 25 MG tablet TAKE 1 TABLET BY MOUTH DAILY 90 tablet 3     meloxicam (MOBIC) 15 MG tablet TAKE 1 TABLET BY MOUTH DAILY 90 tablet 1     montelukast (SINGULAIR) 10 MG tablet TAKE 1 TABLET BY MOUTH DAILY 90 tablet 3     order for DME Equipment being ordered: Nebulizer supplies 2 each 11     order for DME Equipment being ordered: mcgee walker 1 each 0     EPIPEN 2-JAIRO 0.3 MG/0.3ML injection INJECT 1 SYRINGE INTRAMUSCULARLY ONCE AS NEEDED FOR ANAPHYLAXIS 2 each 0     ipratropium - albuterol 0.5 mg/2.5 mg/3 mL (DUONEB) 0.5-2.5 (3) MG/3ML nebulization Take 1 vial (3 mLs) by nebulization every 6 hours as needed 360 mL 2     cimetidine (TAGAMET) 800 MG tablet Take 1 tablet (800 mg) by mouth At Bedtime As needed 90 tablet 3     budesonide-formoterol (SYMBICORT) 160-4.5 MCG/ACT inhaler Inhale 2 puffs into the lungs 2 times daily. As needed 1 Inhaler 11     EPINEPHrine 0.3 MG/0.3ML injection Inject 0.3 mg into the muscle       dexamethasone (DECADRON) 4 MG/ML injection Inject 1 mL (4 mg) as directed once for 1 dose Use 4 mg or dose determined by provider for iontophoresis. 1 mL 0     [DISCONTINUED] VENTOLIN  (90 BASE) MCG/ACT inhaler USE 1 PUFF FOUR TIMES A DAY AS NEEDED 18 g 3       Allergies   Allergen Reactions     Bee Pollen Other (See Comments)     Throat and eyes close up     Codeine Difficulty breathing     \"My throat closes shut.\"     Takes Percocet at home     Keflex [Cephalexin Hcl] Hives     Sulfonamide Derivatives Other (See Comments)     Throat closes     Ciprofloxacin Rash     Rash, bumps on lips     Bupropion Hcl Other (See Comments)     Made her very irritable     Celecoxib Other (See Comments) and Cough     Celebrex - wheeze  Takes ASA at home     Clonazepam Hives     Takes Xanax at home     Erythromycin Other (See Comments)     Erythromycin base - abdominal pain     Lisinopril Other (See " Comments)     Zestril - headaches     Nifedipine Hives     Procardia     Strawberries [Strawberry]      Tramadol Hcl      Takes percocet at home       Family History   Problem Relation Age of Onset     CANCER Mother      skin     Breast Cancer Mother      CEREBROVASCULAR DISEASE Mother      Alzheimer Disease Mother      Myocardial Infarction Father 76     myocardial infarction - cause of death     CANCER Daughter      cervical      DIABETES Maternal Grandfather      HEART DISEASE Maternal Grandfather      MI     DIABETES Maternal Grandmother      DIABETES Paternal Grandmother      DIABETES Paternal Grandfather        Social History     Social History     Marital status:      Spouse name: N/A     Number of children: N/A     Years of education: N/A     Occupational History     Not on file.     Social History Main Topics     Smoking status: Current Every Day Smoker     Packs/day: 1.00     Years: 46.00     Types: Cigarettes     Smokeless tobacco: Never Used      Comment: tried to quit, relapse due to craving     Alcohol use 0.0 oz/week     0 Standard drinks or equivalent per week      Comment: rarely     Drug use: No     Sexual activity: No     Other Topics Concern      Service No     Blood Transfusions Yes     Permits if needed     Caffeine Concern Yes     2 cups     Occupational Exposure No     Hobby Hazards No     Sleep Concern Yes     doxipen and xanax at bedtime     Stress Concern No     Weight Concern No     Special Diet No     Back Care No     Exercise No     Seat Belt No     encouraged to wear seat belt for safety     Self-Exams Yes     Parent/Sibling W/ Cabg, Mi Or Angioplasty Before 65f 55m? Yes     father     Social History Narrative       5 point ROS negative except as noted above in HPI, including Gen., Resp., CV, GI &  system review.     OBJECTIVE:  B/P: 102/62, T: 98.4, P: 68, R: 16    GENERAL APPEARANCE: Alert, no acute distress  CV: regular rate and rhythm, no murmur, rub or  gallop  RESP: lungs clear to auscultation bilaterally  ABDOMEN: normal bowel sounds, soft, nontender, no hepatosplenomegaly or other masses  SKIN: no suspicious lesions or rashes to visualized skin  NEURO: Alert, oriented x 3, speech and mentation normal    ASSESSMENT and PLAN:  (B37.3) Candidiasis of vulva and vagina  (primary encounter diagnosis)  Comment: by history.   Plan: fluconazole (DIFLUCAN) 150 MG tablet        Treat and follow.     (Z72.0) Tobacco abuse  Comment: ongoing  Plan: albuterol (VENTOLIN HFA) 108 (90 BASE) MCG/ACT         Inhaler        Advise cessation.     (R25.2) Cramp of limb  Comment: reviewed.    Plan: she will work some gentle stretching.      (F41.9) Anxiety  Comment: ongoing  Plan: ALPRAZolam (XANAX) 1 MG tablet        Ok for xanax to continue.  She uses minimally.

## 2017-08-22 ENCOUNTER — DOCUMENTATION ONLY (OUTPATIENT)
Dept: OTHER | Facility: CLINIC | Age: 67
End: 2017-08-22

## 2017-08-22 DIAGNOSIS — Z71.89 ACP (ADVANCE CARE PLANNING): Chronic | ICD-10-CM

## 2017-08-22 DIAGNOSIS — Z71.89 ADVANCED CARE PLANNING/COUNSELING DISCUSSION: Chronic | ICD-10-CM

## 2017-08-22 ASSESSMENT — ANXIETY QUESTIONNAIRES: GAD7 TOTAL SCORE: 16

## 2017-08-29 ENCOUNTER — TELEPHONE (OUTPATIENT)
Dept: FAMILY MEDICINE | Facility: OTHER | Age: 67
End: 2017-08-29

## 2017-08-29 DIAGNOSIS — R35.0 URINARY FREQUENCY: Primary | ICD-10-CM

## 2017-08-29 NOTE — TELEPHONE ENCOUNTER
1:26 PM    Reason for Call: Phone Call    Description: Sydni would like the nurse to call her. She has a urine sample she wants to drop off in [place]. Please call Sydni to advise.    Was an appointment offered for this call?  No      Preferred method for responding to this message: Phone  What is your phone number  [phone]    If we cannot reach you directly, may we leave a detailed response at the number you provided?  Yes      Queta Smith

## 2017-08-30 DIAGNOSIS — R82.90 ABNORMAL URINE FINDINGS: Primary | ICD-10-CM

## 2017-08-30 DIAGNOSIS — R35.0 URINARY FREQUENCY: ICD-10-CM

## 2017-08-30 LAB
ALBUMIN UR-MCNC: NEGATIVE MG/DL
APPEARANCE UR: CLEAR
BACTERIA #/AREA URNS HPF: ABNORMAL /HPF
BILIRUB UR QL STRIP: NEGATIVE
COLOR UR AUTO: YELLOW
GLUCOSE UR STRIP-MCNC: NEGATIVE MG/DL
HGB UR QL STRIP: NEGATIVE
KETONES UR STRIP-MCNC: NEGATIVE MG/DL
LEUKOCYTE ESTERASE UR QL STRIP: ABNORMAL
MUCOUS THREADS #/AREA URNS LPF: PRESENT /LPF
NITRATE UR QL: NEGATIVE
PH UR STRIP: 6 PH (ref 4.7–8)
RBC #/AREA URNS AUTO: 1 /HPF (ref 0–2)
SOURCE: ABNORMAL
SP GR UR STRIP: 1.01 (ref 1–1.03)
UROBILINOGEN UR STRIP-MCNC: NORMAL MG/DL (ref 0–2)
WBC #/AREA URNS AUTO: 14 /HPF (ref 0–2)

## 2017-08-30 PROCEDURE — 81001 URINALYSIS AUTO W/SCOPE: CPT | Mod: ZL | Performed by: FAMILY MEDICINE

## 2017-08-30 PROCEDURE — 87086 URINE CULTURE/COLONY COUNT: CPT | Mod: ZL | Performed by: FAMILY MEDICINE

## 2017-08-30 RX ORDER — NITROFURANTOIN 25; 75 MG/1; MG/1
100 CAPSULE ORAL 2 TIMES DAILY
Qty: 14 CAPSULE | Refills: 0 | Status: SHIPPED | OUTPATIENT
Start: 2017-08-30 | End: 2017-09-20

## 2017-08-31 LAB
BACTERIA SPEC CULT: ABNORMAL
BACTERIA SPEC CULT: NORMAL
BACTERIA SPEC CULT: NORMAL
SPECIMEN SOURCE: ABNORMAL
SPECIMEN SOURCE: NORMAL

## 2017-09-08 ENCOUNTER — TELEPHONE (OUTPATIENT)
Dept: ORTHOPEDICS | Facility: OTHER | Age: 67
End: 2017-09-08

## 2017-09-08 NOTE — TELEPHONE ENCOUNTER
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 Olson she is certainly welcome to ask his opinion. Patient agreed to plan.  Sandy Salazar LPN

## 2017-09-13 ENCOUNTER — OFFICE VISIT (OUTPATIENT)
Dept: ORTHOPEDICS | Facility: OTHER | Age: 67
End: 2017-09-13
Attending: ORTHOPAEDIC SURGERY
Payer: MEDICARE

## 2017-09-13 VITALS
TEMPERATURE: 98.9 F | RESPIRATION RATE: 18 BRPM | HEART RATE: 76 BPM | WEIGHT: 155 LBS | DIASTOLIC BLOOD PRESSURE: 65 MMHG | HEIGHT: 58 IN | SYSTOLIC BLOOD PRESSURE: 109 MMHG | BODY MASS INDEX: 32.54 KG/M2

## 2017-09-13 DIAGNOSIS — M17.12 PRIMARY OSTEOARTHRITIS OF LEFT KNEE: Primary | ICD-10-CM

## 2017-09-13 DIAGNOSIS — Z72.0 TOBACCO ABUSE: ICD-10-CM

## 2017-09-13 PROCEDURE — 99213 OFFICE O/P EST LOW 20 MIN: CPT

## 2017-09-13 PROCEDURE — 20610 DRAIN/INJ JOINT/BURSA W/O US: CPT | Performed by: ORTHOPAEDIC SURGERY

## 2017-09-13 RX ORDER — DEXAMETHASONE SODIUM PHOSPHATE 4 MG/ML
4 INJECTION, SOLUTION INTRA-ARTICULAR; INTRALESIONAL; INTRAMUSCULAR; INTRAVENOUS; SOFT TISSUE ONCE
Qty: 1 ML | Refills: 0 | OUTPATIENT
Start: 2017-09-13 | End: 2018-01-25

## 2017-09-13 ASSESSMENT — PAIN SCALES - GENERAL: PAINLEVEL: SEVERE PAIN (7)

## 2017-09-13 NOTE — MR AVS SNAPSHOT
"              After Visit Summary   9/13/2017    Sydni Hardin    MRN: [id number]           Patient Information     Date Of Birth          1950        Visit Information        Provider Department      9/13/2017 2:20 PM Moisés Olson MD  [facility]        Today's Diagnoses     Primary osteoarthritis of left knee    -  1    Tobacco abuse           Follow-ups after your visit        Follow-up notes from your care team     Return in about 3 months (around 12/27/2017).      Your next 10 appointments already scheduled     Jan 03, 2018  1:00 PM CST   (Arrive by 12:45 PM)   Return Visit with Moisés Olson MD    ORTH[facility]ED[facility] ([facility] )    [address] [facility] [address] [phone]              Who to contact     If you have questions or need follow up information about today's clinic visit or your schedule please contact  [facility]TH[facility] directly at [phone].  Normal or non-critical lab and imaging results will be communicated to you by MyChart, letter or phone within 4 business days after the clinic has received the results. If you do not hear from us within 7 days, please contact the clinic through [employer] or phone. If you have a critical or abnormal lab result, we will notify you by phone as soon as possible.  Submit refill requests through [employer] or call your pharmacy and they will forward the refill request to us. Please allow 3 business days for your refill to be completed.          Additional Information About Your Visit        [employer] Information     [employer] lets you send messages to your doctor, view your test results, renew your prescriptions, schedule appointments and more. To sign up, go to [email or web address] . Click on \"Log in\" on the left side of the screen, which will take you to the Welcome page. Then click on \"Sign up Now\" on the right side of the page.     You will be asked to enter the access code listed below, as well as some personal " "information. Please follow the directions to create your username and password.     Your access code is: [email or web address]  Expires: 2017  2:48 PM     Your access code will  in 90 days. If you need help or a new code, please call your [facility] or [phone].        Care EveryWhere ID     This is your Care EveryWhere ID. This could be used by other organizations to access your [place] medical records  [id number]        Your Vitals Were     Pulse Temperature Respirations Height BMI (Body Mass Index)       76 98.9  F (37.2  C) (Tympanic) 18 4' 10\" (1.473 m) 32.4 kg/m2        Blood Pressure from Last 3 Encounters:   17 109/65   17 102/62   17 108/60    Weight from Last 3 Encounters:   17 155 lb (70.3 kg)   17 155 lb (70.3 kg)   17 160 lb (72.6 kg)              Today, you had the following     No orders found for display       Primary Care Provider Office Phone # Fax #    Gibran Alarcon [phone]       [address]  [facility] [address]        Equal Access to Services     [facility] AH: Hadii aad ku hadasho Soomaali, waaxda luqadaha, qaybta kaalmada adeegyada, waxay idiin hayaniceton parth kharafrancesca la'ellie . So [facility] [phone].    ATENCIÓN: Si habla español, tiene a guaman disposición servicios gratuitos de asistencia lingüística. Llame al [phone].    We comply with applicable federal civil rights laws and [place] laws. We do not discriminate on the basis of race, color, national origin, age, disability sex, sexual orientation or gender identity.            Thank you!     Thank you for choosing  [facility]  for your care. Our goal is always to provide you with excellent care. Hearing back from our patients is one way we can continue to improve our services. Please take a few minutes to complete the written survey that you may receive in the mail after your visit with us. Thank you!             Your Updated Medication List " - Protect others around you: Learn how to safely use, store and throw away your medicines at www.disposemymeds.org.          This list is accurate as of: 9/13/17  2:48 PM.  Always use your most recent med list.                   Brand Name Dispense Instructions for use Diagnosis    albuterol 108 (90 BASE) MCG/ACT Inhaler    VENTOLIN HFA    2 Inhaler    USE 1 PUFF FOUR TIMES A DAY AS NEEDED    Tobacco abuse       ALPRAZolam 1 MG tablet    XANAX    90 tablet    Take 1 tablet (1 mg) by mouth 3 times daily as needed for anxiety    Anxiety       aspirin 81 MG EC tablet      Take 1 tablet (81 mg) by mouth 2 times daily    S/P total knee replacement using cement, right       atorvastatin 40 MG tablet    LIPITOR    90 tablet    Take 1 tablet (40 mg) by mouth daily    Mixed hyperlipidemia       benazepril 10 MG tablet    LOTENSIN    90 tablet    TAKE 1 TABLET BY MOUTH DAILY    HTN (hypertension)       benzonatate 200 MG capsule    TESSALON    60 capsule    TAKE 1 CAPSULE BY MOUTH 3 TIMES A DAY AS NEEDED FOR COUGH    Preop general physical exam, Tobacco abuse       budesonide-formoterol 160-4.5 MCG/ACT Inhaler    SYMBICORT    1 Inhaler    Inhale 2 puffs into the lungs 2 times daily. As needed    Asthma       butalbital-acetaminophen-caffeine -40 MG per tablet    FIORICET/ESGIC    30 tablet    Take 1 tablet by mouth every 6 hours as needed    Scalp hematoma, sequela       cimetidine 800 MG tablet    TAGAMET    90 tablet    Take 1 tablet (800 mg) by mouth At Bedtime As needed    Esophageal reflux       dexamethasone 4 MG/ML injection    DECADRON    1 mL    Inject 1 mL (4 mg) as directed once for 1 dose Use 4 mg or dose determined by provider for iontophoresis.    Primary osteoarthritis of left knee       diclofenac 1 % Gel topical gel    VOLTAREN    100 g    Apply 4 grams to knees  four times daily using enclosed dosing card.    Primary osteoarthritis of left knee, Chronic pain of right knee       doxepin 10 MG capsule     SINEquan    90 capsule    Take 1 capsule (10 mg) by mouth At Bedtime    Primary osteoarthritis of right knee       * EPINEPHrine 0.3 MG/0.3ML injection 2-pack    EPIPEN/ADRENACLICK/or ANY BX GENERIC EQUIV     Inject 0.3 mg into the muscle        * EPIPEN 2-JAIRO 0.3 MG/0.3ML injection 2-pack   Generic drug:  EPINEPHrine     2 each    INJECT 1 SYRINGE INTRAMUSCULARLY ONCE AS NEEDED FOR ANAPHYLAXIS    Anaphylactic reaction       escitalopram 20 MG tablet    LEXAPRO    180 tablet    TAKE 1 TABLET BY MOUTH 2 TIMES A DAY    Dysthymia       fluconazole 150 MG tablet    DIFLUCAN    4 tablet    Take 1 tablet (150 mg) by mouth every 3 days    Candidiasis of vulva and vagina       hydrochlorothiazide 25 MG tablet    HYDRODIURIL    90 tablet    TAKE 1 TABLET BY MOUTH DAILY    Benign essential hypertension       ipratropium - albuterol 0.5 mg/2.5 mg/3 mL 0.5-2.5 (3) MG/3ML neb solution    DUONEB    360 mL    Take 1 vial (3 mLs) by nebulization every 6 hours as needed    Acute bronchospasm       meloxicam 15 MG tablet    MOBIC    90 tablet    TAKE 1 TABLET BY MOUTH DAILY    Pain       montelukast 10 MG tablet    SINGULAIR    90 tablet    TAKE 1 TABLET BY MOUTH DAILY    Mixed hyperlipidemia       nitroFURantoin (macrocrystal-monohydrate) 100 MG capsule    MACROBID    14 capsule    Take 1 capsule (100 mg) by mouth 2 times daily    Urinary frequency       nystatin 328285 UNIT/ML suspension    MYCOSTATIN    60 mL    TAKE 1 TEASPOON (5MLS) BY MOUTH 4 TIMES DAILY    Thrush       * order for DME     2 each    Equipment being ordered: Nebulizer supplies    Primary osteoarthritis of right knee       * order for DME     1 each    Equipment being ordered: mcgee walker    Primary osteoarthritis of right knee       * order for DME     1 each    Equipment being ordered: toilet seat riser    Postoperative state       * order for DME     1 Units    Equipment being ordered: Please install safety bar in bathroom to assist patient in getting up  from toilet.    Status post total right knee replacement       oxyCODONE-acetaminophen 5-325 MG per tablet    PERCOCET    60 tablet    TAKE 1 TO 2 TABLETS BY MOUTH EVERY 4 HOURS AS NEEDED FOR PAIN    Primary osteoarthritis of both knees       potassium chloride 10 MEQ tablet    K-TAB,KLOR-CON    180 tablet    TAKE 1 TABLET BY MOUTH 2 TIMES DAILY WITH FOOD    Benign essential hypertension       temazepam 30 MG capsule    RESTORIL    10 capsule    TAKE 1 CAPSULE BY MOUTH NIGHTLY AS NEEDED FOR SLEEP    Persistent insomnia       * Notice:  This list has 6 medication(s) that are the same as other medications prescribed for you. Read the directions carefully, and ask your doctor or other care provider to review them with you.

## 2017-09-13 NOTE — PROGRESS NOTES
Chief complaint:  #1.  DJD left knee  #2.  Oral NSAID contraindication: Gastric ulcer disease and sulfa allergy    Subjective: This 67-year-old  NSAID intolerant  female received a Synvisc injection into her left knee for DJD on 6/21/17.  She reports it has helped significantly but she still has sufficient pain and need to interfere with activities of daily living.  She would like to repeat the Synvisc injection when insurance allows in 3 months.  To get her by until then she requests another steroid injection.    In the meantime she continues to use topical Voltaren gel on the left knee at the rate of twice a day.    She smokes but is not interested in  quitting.    Examination: Healthy-appearing elderly female in no obvious distress.  The skin around the left knee is intact and free of erythema.  The knee has no effusion.  Range of motion is 0-120 .  It is stable to ligamentous stressing.  The neurovascular status of the limb is intact.    Impression:   #1.  DJD left knee  #2.  NSAID contraindication  #3.  Tobacco abuse    Plan: We will inject the left knee with steroid and she will return at the end of December for another Synvisc injection. Meanwhile I told her she can increase the frequency of the Voltaren gel applications to 4 times per day if needed.    Procedure: After obtaining written informed consent and sterilely prepping the site a timeout was held.  Sterile technique was employed as the left knee was then injected with 4 mg of dexamethasone mixed with 3mL of Carbocaine.  Topical ethyl chloride spray was used as a local anesthetic.  The patient tolerated the procedure well and there were no complications.

## 2017-09-13 NOTE — NURSING NOTE
"Chief Complaint   Patient presents with     Musculoskeletal Problem     3 month follow up/ pain in both knees       Initial /65 (BP Location: Left arm, Patient Position: Chair, Cuff Size: Adult Regular)  Pulse 76  Temp 98.9  F (37.2  C) (Tympanic)  Resp 18  Ht 4' 10\" (1.473 m)  Wt 155 lb (70.3 kg)  BMI 32.4 kg/m2 Estimated body mass index is 32.4 kg/(m^2) as calculated from the following:    Height as of this encounter: 4' 10\" (1.473 m).    Weight as of this encounter: 155 lb (70.3 kg).  Medication Reconciliation: complete   Maggie Ta    "

## 2017-09-20 ENCOUNTER — OFFICE VISIT (OUTPATIENT)
Dept: FAMILY MEDICINE | Facility: OTHER | Age: 67
End: 2017-09-20
Attending: FAMILY MEDICINE
Payer: MEDICARE

## 2017-09-20 VITALS
WEIGHT: 155 LBS | TEMPERATURE: 98.4 F | DIASTOLIC BLOOD PRESSURE: 62 MMHG | BODY MASS INDEX: 32.4 KG/M2 | OXYGEN SATURATION: 94 % | RESPIRATION RATE: 16 BRPM | SYSTOLIC BLOOD PRESSURE: 110 MMHG | HEART RATE: 74 BPM

## 2017-09-20 DIAGNOSIS — Z72.0 TOBACCO ABUSE: ICD-10-CM

## 2017-09-20 DIAGNOSIS — J22 LRTI (LOWER RESPIRATORY TRACT INFECTION): Primary | ICD-10-CM

## 2017-09-20 DIAGNOSIS — B37.31 CANDIDIASIS OF VULVA AND VAGINA: ICD-10-CM

## 2017-09-20 LAB
BASOPHILS # BLD AUTO: 0.1 10E9/L (ref 0–0.2)
BASOPHILS NFR BLD AUTO: 0.8 %
DIFFERENTIAL METHOD BLD: NORMAL
EOSINOPHIL # BLD AUTO: 0.3 10E9/L (ref 0–0.7)
EOSINOPHIL NFR BLD AUTO: 4.4 %
ERYTHROCYTE [DISTWIDTH] IN BLOOD BY AUTOMATED COUNT: 14.3 % (ref 10–15)
HCT VFR BLD AUTO: 41.9 % (ref 35–47)
HGB BLD-MCNC: 14.4 G/DL (ref 11.7–15.7)
LYMPHOCYTES # BLD AUTO: 2.3 10E9/L (ref 0.8–5.3)
LYMPHOCYTES NFR BLD AUTO: 30.1 %
MCH RBC QN AUTO: 30.7 PG (ref 26.5–33)
MCHC RBC AUTO-ENTMCNC: 34.4 G/DL (ref 31.5–36.5)
MCV RBC AUTO: 89 FL (ref 78–100)
MONOCYTES # BLD AUTO: 0.8 10E9/L (ref 0–1.3)
MONOCYTES NFR BLD AUTO: 10.2 %
NEUTROPHILS # BLD AUTO: 4.2 10E9/L (ref 1.6–8.3)
NEUTROPHILS NFR BLD AUTO: 54.5 %
PLATELET # BLD AUTO: 304 10E9/L (ref 150–450)
RBC # BLD AUTO: 4.69 10E12/L (ref 3.8–5.2)
WBC # BLD AUTO: 7.7 10E9/L (ref 4–11)

## 2017-09-20 PROCEDURE — 71020 ZZHC CHEST TWO VIEWS, FRONT/LAT: CPT | Mod: TC

## 2017-09-20 PROCEDURE — 96372 THER/PROPH/DIAG INJ SC/IM: CPT | Performed by: FAMILY MEDICINE

## 2017-09-20 PROCEDURE — 99212 OFFICE O/P EST SF 10 MIN: CPT

## 2017-09-20 PROCEDURE — 85025 COMPLETE CBC W/AUTO DIFF WBC: CPT | Mod: ZL | Performed by: FAMILY MEDICINE

## 2017-09-20 PROCEDURE — 99214 OFFICE O/P EST MOD 30 MIN: CPT | Performed by: FAMILY MEDICINE

## 2017-09-20 PROCEDURE — 36415 COLL VENOUS BLD VENIPUNCTURE: CPT | Mod: ZL | Performed by: FAMILY MEDICINE

## 2017-09-20 RX ORDER — AZITHROMYCIN 250 MG/1
TABLET, FILM COATED ORAL
Qty: 6 TABLET | Refills: 0 | Status: SHIPPED | OUTPATIENT
Start: 2017-09-20 | End: 2017-09-26

## 2017-09-20 RX ORDER — FLUCONAZOLE 150 MG/1
150 TABLET ORAL
Qty: 4 TABLET | Refills: 0 | Status: SHIPPED | OUTPATIENT
Start: 2017-09-20 | End: 2018-01-25

## 2017-09-20 RX ORDER — PREDNISONE 20 MG/1
20 TABLET ORAL 2 TIMES DAILY
Qty: 10 TABLET | Refills: 0 | Status: SHIPPED | OUTPATIENT
Start: 2017-09-20 | End: 2017-12-14

## 2017-09-20 RX ORDER — METHYLPREDNISOLONE SODIUM SUCCINATE 125 MG/2ML
125 INJECTION, POWDER, LYOPHILIZED, FOR SOLUTION INTRAMUSCULAR; INTRAVENOUS ONCE
Qty: 2 ML | Refills: 0 | OUTPATIENT
Start: 2017-09-20 | End: 2017-09-20

## 2017-09-20 ASSESSMENT — ANXIETY QUESTIONNAIRES
5. BEING SO RESTLESS THAT IT IS HARD TO SIT STILL: MORE THAN HALF THE DAYS
1. FEELING NERVOUS, ANXIOUS, OR ON EDGE: SEVERAL DAYS
IF YOU CHECKED OFF ANY PROBLEMS ON THIS QUESTIONNAIRE, HOW DIFFICULT HAVE THESE PROBLEMS MADE IT FOR YOU TO DO YOUR WORK, TAKE CARE OF THINGS AT HOME, OR GET ALONG WITH OTHER PEOPLE: NOT DIFFICULT AT ALL
7. FEELING AFRAID AS IF SOMETHING AWFUL MIGHT HAPPEN: NEARLY EVERY DAY
2. NOT BEING ABLE TO STOP OR CONTROL WORRYING: NEARLY EVERY DAY
GAD7 TOTAL SCORE: 18
3. WORRYING TOO MUCH ABOUT DIFFERENT THINGS: NEARLY EVERY DAY
6. BECOMING EASILY ANNOYED OR IRRITABLE: NEARLY EVERY DAY

## 2017-09-20 ASSESSMENT — PAIN SCALES - GENERAL: PAINLEVEL: NO PAIN (0)

## 2017-09-20 ASSESSMENT — PATIENT HEALTH QUESTIONNAIRE - PHQ9
5. POOR APPETITE OR OVEREATING: NEARLY EVERY DAY
SUM OF ALL RESPONSES TO PHQ QUESTIONS 1-9: 14

## 2017-09-20 NOTE — MR AVS SNAPSHOT
"              After Visit Summary   9/20/2017    Sydni Hardin    MRN: [id number]           Patient Information     Date Of Birth          1950        Visit Information        Provider Department      9/20/2017 2:30 PM Gibran Alarcon MD [facility]        Today's Diagnoses     LRTI (lower respiratory tract infection)    -  1    Tobacco abuse        Candidiasis of vulva and vagina          Care Instructions    F/u with ongoing concerns.           Follow-ups after your visit        Your next 10 appointments already scheduled     Jan 03, 2018  1:00 PM CST   (Arrive by 12:45 PM)   Return Visit with Moisés Olson MD    ORTHOPEDICS ([facility] )    [address] [facility] [address] [phone]              Who to contact     If you have questions or need follow up information about today's clinic visit or your schedule please contact [facility] directly at [phone].  Normal or non-critical lab and imaging results will be communicated to you by MyChart, letter or phone within 4 business days after the clinic has received the results. If you do not hear from us within 7 days, please contact the clinic through mGeneratorhart or phone. If you have a critical or abnormal lab result, we will notify you by phone as soon as possible.  Submit refill requests through Soysuper or call your pharmacy and they will forward the refill request to us. Please allow 3 business days for your refill to be completed.          Additional Information About Your Visit        mGeneratorhart Information     Soysuper lets you send messages to your doctor, view your test results, renew your prescriptions, schedule appointments and more. To sign up, go to [email or web address] . Click on \"Log in\" on the left side of the screen, which will take you to the Welcome page. Then click on \"Sign up Now\" on the right side of the page.     You will be asked to enter the access code listed " below, as well as some personal information. Please follow the directions to create your username and password.     Your access code is: [email or web address]  Expires: 2017  2:48 PM     Your access code will  in 90 days. If you need help or a new code, please call your [place] clinic or [phone].        Care EveryWhere ID     This is your Care EveryWhere ID. This could be used by other organizations to access your [place] medical records  [id number]        Your Vitals Were     Pulse Temperature Respirations Pulse Oximetry BMI (Body Mass Index)       74 98.4  F (36.9  C) (Tympanic) 16 94% 32.4 kg/m2        Blood Pressure from Last 3 Encounters:   17 110/62   17 109/65   17 102/62    Weight from Last 3 Encounters:   17 155 lb (70.3 kg)   17 155 lb (70.3 kg)   17 155 lb (70.3 kg)              We Performed the Following     CBC with platelets and differential     INJECTION INTRAMUSCULAR OR SUB-Q     METHYLPRED 125 MG SOD INJ     TOBACCO CESSATION - FOR HEALTH MAINTENANCE     XR CHEST 2 VW (Clinic Performed)          Today's Medication Changes          These changes are accurate as of: 17  3:40 PM.  If you have any questions, ask your nurse or doctor.               Start taking these medicines.        Dose/Directions    azithromycin 250 MG tablet   Commonly known as:  ZITHROMAX   Used for:  LRTI (lower respiratory tract infection)   Started by:  Gibran Alarcon MD        Two tablets first day, then one tablet daily for four days.   Quantity:  6 tablet   Refills:  0       methylPREDNISolone sodium succinate 125 mg/2 mL injection   Commonly known as:  solu-MEDROL   Used for:  LRTI (lower respiratory tract infection)   Started by:  Gibran Alarcon MD        Dose:  125 mg   Inject 2 mLs (125 mg) into the muscle once for 1 dose   Quantity:  2 mL   Refills:  0       predniSONE 20 MG tablet   Commonly known as:  DELTASONE   Used for:  LRTI (lower respiratory tract  infection), Tobacco abuse   Started by:  Gibran Alarcon MD        Dose:  20 mg   Take 1 tablet (20 mg) by mouth 2 times daily   Quantity:  10 tablet   Refills:  0            Where to get your medicines      These medications were sent to [id number] - [place] - [address]   [id number] , [place] [address]     Phone:  [phone]     azithromycin 250 MG tablet    fluconazole 150 MG tablet    predniSONE 20 MG tablet         Some of these will need a paper prescription and others can be bought over the counter.  Ask your nurse if you have questions.     You don't need a prescription for these medications     methylPREDNISolone sodium succinate 125 mg/2 mL injection                Primary Care Provider Office Phone # Fax #    Gibran Alarcon [phone]       [facility] [address]  [facility] [address]        Equal Access to Services     [facility] : Hadii dixon ku hadasho Soomaali, waaxda luqadaha, qaybta kaalmada adeegyada, cierra claudio . So [facility] [phone].    ATENCIÓN: Si habla español, tiene a guaman disposición servicios gratuitos de asistencia lingüística. Gerardo al [phone].    We comply with applicable federal civil rights laws and [place] laws. We do not discriminate on the basis of race, color, national origin, age, disability sex, sexual orientation or gender identity.            Thank you!     Thank you for choosing [facility]  for your care. Our goal is always to provide you with excellent care. Hearing back from our patients is one way we can continue to improve our services. Please take a few minutes to complete the written survey that you may receive in the mail after your visit with us. Thank you!             Your Updated Medication List - Protect others around you: Learn how to safely use, store and throw away your medicines at www.disposemymeds.org.          This list is accurate as of: 9/20/17  3:40  PM.  Always use your most recent med list.                   Brand Name Dispense Instructions for use Diagnosis    albuterol 108 (90 BASE) MCG/ACT Inhaler    VENTOLIN HFA    2 Inhaler    USE 1 PUFF FOUR TIMES A DAY AS NEEDED    Tobacco abuse       ALPRAZolam 1 MG tablet    XANAX    90 tablet    Take 1 tablet (1 mg) by mouth 3 times daily as needed for anxiety    Anxiety       aspirin 81 MG EC tablet      Take 1 tablet (81 mg) by mouth 2 times daily    S/P total knee replacement using cement, right       atorvastatin 40 MG tablet    LIPITOR    90 tablet    Take 1 tablet (40 mg) by mouth daily    Mixed hyperlipidemia       azithromycin 250 MG tablet    ZITHROMAX    6 tablet    Two tablets first day, then one tablet daily for four days.    LRTI (lower respiratory tract infection)       benazepril 10 MG tablet    LOTENSIN    90 tablet    TAKE 1 TABLET BY MOUTH DAILY    HTN (hypertension)       benzonatate 200 MG capsule    TESSALON    60 capsule    TAKE 1 CAPSULE BY MOUTH 3 TIMES A DAY AS NEEDED FOR COUGH    Preop general physical exam, Tobacco abuse       budesonide-formoterol 160-4.5 MCG/ACT Inhaler    SYMBICORT    1 Inhaler    Inhale 2 puffs into the lungs 2 times daily. As needed    Asthma       butalbital-acetaminophen-caffeine -40 MG per tablet    FIORICET/ESGIC    30 tablet    Take 1 tablet by mouth every 6 hours as needed    Scalp hematoma, sequela       cimetidine 800 MG tablet    TAGAMET    90 tablet    Take 1 tablet (800 mg) by mouth At Bedtime As needed    Esophageal reflux       dexamethasone 4 MG/ML injection    DECADRON    1 mL    Inject 1 mL (4 mg) as directed once for 1 dose Use 4 mg or dose determined by provider for iontophoresis.    Primary osteoarthritis of left knee       diclofenac 1 % Gel topical gel    VOLTAREN    100 g    Apply 4 grams to knees  four times daily using enclosed dosing card.    Primary osteoarthritis of left knee, Chronic pain of right knee       doxepin 10 MG capsule     SINEquan    90 capsule    Take 1 capsule (10 mg) by mouth At Bedtime    Primary osteoarthritis of right knee       * EPINEPHrine 0.3 MG/0.3ML injection 2-pack    EPIPEN/ADRENACLICK/or ANY BX GENERIC EQUIV     Inject 0.3 mg into the muscle        * EPIPEN 2-JAIRO 0.3 MG/0.3ML injection 2-pack   Generic drug:  EPINEPHrine     2 each    INJECT 1 SYRINGE INTRAMUSCULARLY ONCE AS NEEDED FOR ANAPHYLAXIS    Anaphylactic reaction       escitalopram 20 MG tablet    LEXAPRO    180 tablet    TAKE 1 TABLET BY MOUTH 2 TIMES A DAY    Dysthymia       fluconazole 150 MG tablet    DIFLUCAN    4 tablet    Take 1 tablet (150 mg) by mouth every 3 days    Candidiasis of vulva and vagina       hydrochlorothiazide 25 MG tablet    HYDRODIURIL    90 tablet    TAKE 1 TABLET BY MOUTH DAILY    Benign essential hypertension       ipratropium - albuterol 0.5 mg/2.5 mg/3 mL 0.5-2.5 (3) MG/3ML neb solution    DUONEB    360 mL    Take 1 vial (3 mLs) by nebulization every 6 hours as needed    Acute bronchospasm       meloxicam 15 MG tablet    MOBIC    90 tablet    TAKE 1 TABLET BY MOUTH DAILY    Pain       methylPREDNISolone sodium succinate 125 mg/2 mL injection    solu-MEDROL    2 mL    Inject 2 mLs (125 mg) into the muscle once for 1 dose    LRTI (lower respiratory tract infection)       montelukast 10 MG tablet    SINGULAIR    90 tablet    TAKE 1 TABLET BY MOUTH DAILY    Mixed hyperlipidemia       nystatin 766359 UNIT/ML suspension    MYCOSTATIN    60 mL    TAKE 1 TEASPOON (5MLS) BY MOUTH 4 TIMES DAILY    Thrush       * order for DME     2 each    Equipment being ordered: Nebulizer supplies    Primary osteoarthritis of right knee       * order for DME     1 each    Equipment being ordered: mcgee walker    Primary osteoarthritis of right knee       * order for DME     1 each    Equipment being ordered: toilet seat riser    Postoperative state       * order for DME     1 Units    Equipment being ordered: Please install safety bar in bathroom to  assist patient in getting up from toilet.    Status post total right knee replacement       oxyCODONE-acetaminophen 5-325 MG per tablet    PERCOCET    60 tablet    TAKE 1 TO 2 TABLETS BY MOUTH EVERY 4 HOURS AS NEEDED FOR PAIN    Primary osteoarthritis of both knees       potassium chloride 10 MEQ tablet    K-TAB,KLOR-CON    180 tablet    TAKE 1 TABLET BY MOUTH 2 TIMES DAILY WITH FOOD    Benign essential hypertension       predniSONE 20 MG tablet    DELTASONE    10 tablet    Take 1 tablet (20 mg) by mouth 2 times daily    LRTI (lower respiratory tract infection), Tobacco abuse       temazepam 30 MG capsule    RESTORIL    10 capsule    TAKE 1 CAPSULE BY MOUTH NIGHTLY AS NEEDED FOR SLEEP    Persistent insomnia       * Notice:  This list has 6 medication(s) that are the same as other medications prescribed for you. Read the directions carefully, and ask your doctor or other care provider to review them with you.

## 2017-09-20 NOTE — NURSING NOTE
"Chief Complaint   Patient presents with     URI     C/o coughing started  Saturday.  Has been in bed since Saturday.     Breathing Problem     C/o difficulty breathing also since Saturday.  Using inhaler and nebulization       Initial /62 (BP Location: Right arm, Patient Position: Sitting, Cuff Size: Adult Regular)  Pulse 74  Temp 98.4  F (36.9  C) (Tympanic)  Resp 16  Wt 155 lb (70.3 kg)  SpO2 94%  BMI 32.4 kg/m2 Estimated body mass index is 32.4 kg/(m^2) as calculated from the following:    Height as of 9/13/17: 4' 10\" (1.473 m).    Weight as of this encounter: 155 lb (70.3 kg).  Medication Reconciliation: michela NGUYEN      "

## 2017-09-20 NOTE — PROGRESS NOTES
Sydni Hardin    2017    Chief Complaint   Patient presents with     URI     C/o coughing started  Saturday.  Has been in bed since Saturday.     Breathing Problem     C/o difficulty breathing also since Saturday.  Using inhaler and nebulization       SUBJECTIVE:  Here for URI sx.  She is very sob. Continues to smoke.  Got a cold 4 days ago and has gotten worse and worse.  Now sob.  No fever.  We reviewed this at some length along with the tobacco.  See below.     Past Medical History:   Diagnosis Date     Abnormal arterial blood gases 2006     Acute upper respiratory infections of unspecified site 2001     Anxiety 10/24/2011     Calculus of gallbladder without mention of cholecystitis or obstruction 2006     Depression 2012     Follow-up examination, following other surgery 2006     Headache(784.0) 12/10/2003     History of CVA (cerebrovascular accident) 2011     HTN (hypertension) 2012     Insomnia 3/13/2012     Lipoma of other skin and subcutaneous tissue 2006     Nonallopathic lesion of cervical region, not elsewhere classified 2003     Osteoarthrosis, unspecified whether generalized or localized, lower leg 10/4/2001     Special screening for malignant neoplasms, colon 2006     Urinary tract infection, site not specified 2007       Past Surgical History:   Procedure Laterality Date     ARTHROPLASTY KNEE Right 3/21/2017    Procedure: ARTHROPLASTY KNEE;  Surgeon: Moisés Olson MD;  Location: [facility]     ARTHROSCOPY KNEE       BIOPSY BREAST  2000    left - free of disease      SECTION        SECTION       CHOLECYSTECTOMY       COLONOSCOPY  2008    repeat in 10 years     HEAD & NECK SURGERY  2014    hematoma on head drained     HYSTERECTOMY       ORTHOPEDIC SURGERY  2014    right shoulder replacement     shoulder x2  2009    left       Current Outpatient Prescriptions   Medication Sig Dispense Refill      fluconazole (DIFLUCAN) 150 MG tablet Take 1 tablet (150 mg) by mouth every 3 days 4 tablet 0     albuterol (VENTOLIN HFA) 108 (90 BASE) MCG/ACT Inhaler USE 1 PUFF FOUR TIMES A DAY AS NEEDED 2 Inhaler 3     butalbital-acetaminophen-caffeine (FIORICET/ESGIC) -40 MG per tablet Take 1 tablet by mouth every 6 hours as needed 30 tablet 1     ALPRAZolam (XANAX) 1 MG tablet Take 1 tablet (1 mg) by mouth 3 times daily as needed for anxiety 90 tablet 5     potassium chloride (K-TAB,KLOR-CON) 10 MEQ tablet TAKE 1 TABLET BY MOUTH 2 TIMES DAILY WITH FOOD 180 tablet 0     atorvastatin (LIPITOR) 40 MG tablet Take 1 tablet (40 mg) by mouth daily 90 tablet 0     escitalopram (LEXAPRO) 20 MG tablet TAKE 1 TABLET BY MOUTH 2 TIMES A  tablet 0     oxyCODONE-acetaminophen (PERCOCET) 5-325 MG per tablet TAKE 1 TO 2 TABLETS BY MOUTH EVERY 4 HOURS AS NEEDED FOR PAIN 60 tablet 0     EPINEPHrine 0.3 MG/0.3ML injection Inject 0.3 mg into the muscle       diclofenac (VOLTAREN) 1 % GEL topical gel Apply 4 grams to knees  four times daily using enclosed dosing card. 100 g 3     doxepin (SINEQUAN) 10 MG capsule Take 1 capsule (10 mg) by mouth At Bedtime 90 capsule 3     benazepril (LOTENSIN) 10 MG tablet TAKE 1 TABLET BY MOUTH DAILY 90 tablet 2     nystatin (MYCOSTATIN) 884370 UNIT/ML suspension TAKE 1 TEASPOON (5MLS) BY MOUTH 4 TIMES DAILY 60 mL 0     order for DME Equipment being ordered: Please install safety bar in bathroom to assist patient in getting up from toilet. 1 Units 0     aspirin EC 81 MG EC tablet Take 1 tablet (81 mg) by mouth 2 times daily       temazepam (RESTORIL) 30 MG capsule TAKE 1 CAPSULE BY MOUTH NIGHTLY AS NEEDED FOR SLEEP 10 capsule 0     order for DME Equipment being ordered: toilet seat riser 1 each 0     benzonatate (TESSALON) 200 MG capsule TAKE 1 CAPSULE BY MOUTH 3 TIMES A DAY AS NEEDED FOR COUGH 60 capsule 3     hydrochlorothiazide (HYDRODIURIL) 25 MG tablet TAKE 1 TABLET BY MOUTH DAILY 90 tablet 3      "meloxicam (MOBIC) 15 MG tablet TAKE 1 TABLET BY MOUTH DAILY 90 tablet 1     montelukast (SINGULAIR) 10 MG tablet TAKE 1 TABLET BY MOUTH DAILY 90 tablet 3     order for DME Equipment being ordered: Nebulizer supplies 2 each 11     order for DME Equipment being ordered: mcgee walker 1 each 0     ipratropium - albuterol 0.5 mg/2.5 mg/3 mL (DUONEB) 0.5-2.5 (3) MG/3ML nebulization Take 1 vial (3 mLs) by nebulization every 6 hours as needed 360 mL 2     cimetidine (TAGAMET) 800 MG tablet Take 1 tablet (800 mg) by mouth At Bedtime As needed 90 tablet 3     dexamethasone (DECADRON) 4 MG/ML injection Inject 1 mL (4 mg) as directed once for 1 dose Use 4 mg or dose determined by provider for iontophoresis. 1 mL 0     EPIPEN 2-JAIRO 0.3 MG/0.3ML injection INJECT 1 SYRINGE INTRAMUSCULARLY ONCE AS NEEDED FOR ANAPHYLAXIS 2 each 0     budesonide-formoterol (SYMBICORT) 160-4.5 MCG/ACT inhaler Inhale 2 puffs into the lungs 2 times daily. As needed (Patient not taking: Reported on 9/20/2017) 1 Inhaler 11       Allergies   Allergen Reactions     Bee Pollen Other (See Comments)     Throat and eyes close up     Codeine Difficulty breathing     \"My throat closes shut.\"     Takes Percocet at home     Keflex [Cephalexin Hcl] Hives     Sulfonamide Derivatives Other (See Comments)     Throat closes     Ciprofloxacin Rash     Rash, bumps on lips     Bupropion Hcl Other (See Comments)     Made her very irritable     Celecoxib Other (See Comments) and Cough     Celebrex - wheeze  Takes ASA at home     Clonazepam Hives     Takes Xanax at home     Erythromycin Other (See Comments)     Erythromycin base - abdominal pain     Lisinopril Other (See Comments)     Zestril - headaches     Nifedipine Hives     Procardia     Strawberries [Strawberry]      Tramadol Hcl      Takes percocet at home       Family History   Problem Relation Age of Onset     CANCER Mother      skin     Breast Cancer Mother      CEREBROVASCULAR DISEASE Mother      Alzheimer " Disease Mother      Myocardial Infarction Father 76     myocardial infarction - cause of death     CANCER Daughter      cervical      DIABETES Maternal Grandfather      HEART DISEASE Maternal Grandfather      MI     DIABETES Maternal Grandmother      DIABETES Paternal Grandmother      DIABETES Paternal Grandfather        Social History     Social History     Marital status:      Spouse name: N/A     Number of children: N/A     Years of education: N/A     Occupational History     Not on file.     Social History Main Topics     Smoking status: Current Every Day Smoker     Packs/day: 1.00     Years: 46.00     Types: Cigarettes     Smokeless tobacco: Never Used      Comment: tried to quit, relapse due to craving.  Patient states that she will be quitting smoking shortly (9-20-17)     Alcohol use 0.0 oz/week     0 Standard drinks or equivalent per week      Comment: rarely     Drug use: No     Sexual activity: No     Other Topics Concern      Service No     Blood Transfusions Yes     Permits if needed     Caffeine Concern Yes     2 cups     Occupational Exposure No     Hobby Hazards No     Sleep Concern Yes     doxipen and xanax at bedtime     Stress Concern No     Weight Concern No     Special Diet No     Back Care No     Exercise No     Seat Belt No     encouraged to wear seat belt for safety     Self-Exams Yes     Parent/Sibling W/ Cabg, Mi Or Angioplasty Before 65f 55m? Yes     father     Social History Narrative       5 point ROS negative except as noted above in HPI, including Gen., Resp., CV, GI &  system review.     OBJECTIVE:  B/P: 110/62, T: 98.4, P: 74, R: 16    GENERAL APPEARANCE: Alert, no acute distress  HEENT:  Entirely normal  CV: regular rate and rhythm, no murmur, rub or gallop  RESP: lungs clear to auscultation bilaterally with diffuse inspiratory and expiratory wheeze.    ABDOMEN: normal bowel sounds, soft, nontender, no hepatosplenomegaly or other masses  SKIN: no suspicious lesions  or rashes to visualized skin  NEURO: Alert, oriented x 3, speech and mentation normal    ASSESSMENT and PLAN:  (J22) LRTI (lower respiratory tract infection)  (primary encounter diagnosis)  Comment: with a flare of tobacco related bronchospasm.    Plan: XR CHEST 2 VW (Clinic Performed), CBC with         platelets and differential        Solumedrol given IM.  Prednisone for 5 days.  Azithromycin.  Continue nebulizers.  F/u with any ongoing concerns.  Her cbc is stable and I don't see an infiltrate on the CXR.  Lengthy review of this today as well as tobacco.      (Z72.0) Tobacco abuse  Comment: ongoing  Plan: I advise cessation.  I don't think she is ready yet.     Sent the diflucan that she always needs when she takes abx.

## 2017-09-20 NOTE — PROGRESS NOTES
Prior to injection verified patient identity using patient's name and date of birth.  Per orders of Dr. Alarcon, injection of Solu-Medrol given by JEANNA NGUYEN. Patient instructed to remain in clinic for 15 minutes afterwards, and to report any adverse reaction to me immediately.    JEANNA NGUYEN

## 2017-09-21 ASSESSMENT — ANXIETY QUESTIONNAIRES: GAD7 TOTAL SCORE: 18

## 2017-09-25 ENCOUNTER — TELEPHONE (OUTPATIENT)
Dept: FAMILY MEDICINE | Facility: OTHER | Age: 67
End: 2017-09-25

## 2017-09-25 NOTE — TELEPHONE ENCOUNTER
Please call the pt he would like to see her again. We can see her tomorrow at 4:15pm, arriving at 4 pm.

## 2017-09-25 NOTE — TELEPHONE ENCOUNTER
8:56 AM    Reason for Call: Phone Call    Description: Sydni states that she is not feeling any better. The z-michael did not do anything. She is wondering if she needs to be on something stronger. Please call Sydni to advise.     Was an appointment offered for this call?  No      Preferred method for responding to this message: [phone]    If we cannot reach you directly, may we leave a detailed response at the number you provided?  Yes        Queta Smith

## 2017-09-26 ENCOUNTER — OFFICE VISIT (OUTPATIENT)
Dept: FAMILY MEDICINE | Facility: OTHER | Age: 67
End: 2017-09-26
Attending: FAMILY MEDICINE
Payer: COMMERCIAL

## 2017-09-26 ENCOUNTER — TELEPHONE (OUTPATIENT)
Dept: FAMILY MEDICINE | Facility: OTHER | Age: 67
End: 2017-09-26

## 2017-09-26 VITALS
HEIGHT: 58 IN | WEIGHT: 156 LBS | DIASTOLIC BLOOD PRESSURE: 68 MMHG | HEART RATE: 86 BPM | OXYGEN SATURATION: 91 % | TEMPERATURE: 98.2 F | SYSTOLIC BLOOD PRESSURE: 118 MMHG | BODY MASS INDEX: 32.75 KG/M2

## 2017-09-26 DIAGNOSIS — J98.01 ACUTE BRONCHOSPASM: ICD-10-CM

## 2017-09-26 DIAGNOSIS — J22 LRTI (LOWER RESPIRATORY TRACT INFECTION): Primary | ICD-10-CM

## 2017-09-26 DIAGNOSIS — Z71.6 TOBACCO ABUSE COUNSELING: ICD-10-CM

## 2017-09-26 DIAGNOSIS — R06.02 SOB (SHORTNESS OF BREATH): ICD-10-CM

## 2017-09-26 DIAGNOSIS — Z72.0 TOBACCO ABUSE: ICD-10-CM

## 2017-09-26 DIAGNOSIS — J20.9 ACUTE BRONCHITIS, UNSPECIFIED ORGANISM: Primary | ICD-10-CM

## 2017-09-26 PROCEDURE — 99213 OFFICE O/P EST LOW 20 MIN: CPT | Performed by: FAMILY MEDICINE

## 2017-09-26 PROCEDURE — 99212 OFFICE O/P EST SF 10 MIN: CPT

## 2017-09-26 RX ORDER — IPRATROPIUM BROMIDE AND ALBUTEROL SULFATE 2.5; .5 MG/3ML; MG/3ML
1 SOLUTION RESPIRATORY (INHALATION) EVERY 6 HOURS PRN
Qty: 360 ML | Refills: 2 | Status: SHIPPED | OUTPATIENT
Start: 2017-09-26 | End: 2017-09-27

## 2017-09-26 ASSESSMENT — PAIN SCALES - GENERAL: PAINLEVEL: NO PAIN (0)

## 2017-09-26 NOTE — PROGRESS NOTES
Sydni SKINNER Wilfred    2017    Chief Complaint   Patient presents with     URI     Pt is not feeling any better.       SUBJECTIVE:  Here for ongoing URI sx.  Now having purulence in the mucous.  Cough is terrible especially when lying down.  No fever.  We reviewed options going forward.      Past Medical History:   Diagnosis Date     Abnormal arterial blood gases 2006     Acute upper respiratory infections of unspecified site 2001     Anxiety 10/24/2011     Calculus of gallbladder without mention of cholecystitis or obstruction 2006     Depression 2012     Follow-up examination, following other surgery 2006     Headache(784.0) 12/10/2003     History of CVA (cerebrovascular accident) 2011     HTN (hypertension) 2012     Insomnia 3/13/2012     Lipoma of other skin and subcutaneous tissue 2006     Nonallopathic lesion of cervical region, not elsewhere classified 2003     Osteoarthrosis, unspecified whether generalized or localized, lower leg 10/4/2001     Special screening for malignant neoplasms, colon 2006     Urinary tract infection, site not specified 2007       Past Surgical History:   Procedure Laterality Date     ARTHROPLASTY KNEE Right 3/21/2017    Procedure: ARTHROPLASTY KNEE;  Surgeon: Moisés Olson MD;  Location: [facility]     ARTHROSCOPY KNEE       BIOPSY BREAST  2000    left - free of disease      SECTION        SECTION       CHOLECYSTECTOMY       COLONOSCOPY  2008    repeat in 10 years     HEAD & NECK SURGERY  2014    hematoma on head drained     HYSTERECTOMY       ORTHOPEDIC SURGERY  2014    right shoulder replacement     shoulder x2  2009    left       Current Outpatient Prescriptions   Medication Sig Dispense Refill     ipratropium - albuterol 0.5 mg/2.5 mg/3 mL (DUONEB) 0.5-2.5 (3) MG/3ML neb solution Take 1 vial (3 mLs) by nebulization every 6 hours as needed 360 mL 2     amoxicillin-clavulanate  (AUGMENTIN) 875-125 MG per tablet Take 1 tablet by mouth 2 times daily 20 tablet 0     predniSONE (DELTASONE) 20 MG tablet Take 1 tablet (20 mg) by mouth 2 times daily 10 tablet 0     fluconazole (DIFLUCAN) 150 MG tablet Take 1 tablet (150 mg) by mouth every 3 days 4 tablet 0     albuterol (VENTOLIN HFA) 108 (90 BASE) MCG/ACT Inhaler USE 1 PUFF FOUR TIMES A DAY AS NEEDED 2 Inhaler 3     butalbital-acetaminophen-caffeine (FIORICET/ESGIC) -40 MG per tablet Take 1 tablet by mouth every 6 hours as needed 30 tablet 1     ALPRAZolam (XANAX) 1 MG tablet Take 1 tablet (1 mg) by mouth 3 times daily as needed for anxiety 90 tablet 5     potassium chloride (K-TAB,KLOR-CON) 10 MEQ tablet TAKE 1 TABLET BY MOUTH 2 TIMES DAILY WITH FOOD 180 tablet 0     atorvastatin (LIPITOR) 40 MG tablet Take 1 tablet (40 mg) by mouth daily 90 tablet 0     escitalopram (LEXAPRO) 20 MG tablet TAKE 1 TABLET BY MOUTH 2 TIMES A  tablet 0     oxyCODONE-acetaminophen (PERCOCET) 5-325 MG per tablet TAKE 1 TO 2 TABLETS BY MOUTH EVERY 4 HOURS AS NEEDED FOR PAIN 60 tablet 0     EPINEPHrine 0.3 MG/0.3ML injection Inject 0.3 mg into the muscle       diclofenac (VOLTAREN) 1 % GEL topical gel Apply 4 grams to knees  four times daily using enclosed dosing card. 100 g 3     doxepin (SINEQUAN) 10 MG capsule Take 1 capsule (10 mg) by mouth At Bedtime 90 capsule 3     benazepril (LOTENSIN) 10 MG tablet TAKE 1 TABLET BY MOUTH DAILY 90 tablet 2     nystatin (MYCOSTATIN) 882620 UNIT/ML suspension TAKE 1 TEASPOON (5MLS) BY MOUTH 4 TIMES DAILY 60 mL 0     order for DME Equipment being ordered: Please install safety bar in bathroom to assist patient in getting up from toilet. 1 Units 0     aspirin EC 81 MG EC tablet Take 1 tablet (81 mg) by mouth 2 times daily       temazepam (RESTORIL) 30 MG capsule TAKE 1 CAPSULE BY MOUTH NIGHTLY AS NEEDED FOR SLEEP 10 capsule 0     order for DME Equipment being ordered: toilet seat riser 1 each 0     benzonatate  "(TESSALON) 200 MG capsule TAKE 1 CAPSULE BY MOUTH 3 TIMES A DAY AS NEEDED FOR COUGH 60 capsule 3     hydrochlorothiazide (HYDRODIURIL) 25 MG tablet TAKE 1 TABLET BY MOUTH DAILY 90 tablet 3     meloxicam (MOBIC) 15 MG tablet TAKE 1 TABLET BY MOUTH DAILY 90 tablet 1     montelukast (SINGULAIR) 10 MG tablet TAKE 1 TABLET BY MOUTH DAILY 90 tablet 3     order for DME Equipment being ordered: Nebulizer supplies 2 each 11     order for DME Equipment being ordered: mcgee walker 1 each 0     EPIPEN 2-JAIRO 0.3 MG/0.3ML injection INJECT 1 SYRINGE INTRAMUSCULARLY ONCE AS NEEDED FOR ANAPHYLAXIS 2 each 0     cimetidine (TAGAMET) 800 MG tablet Take 1 tablet (800 mg) by mouth At Bedtime As needed 90 tablet 3     budesonide-formoterol (SYMBICORT) 160-4.5 MCG/ACT inhaler Inhale 2 puffs into the lungs 2 times daily. As needed 1 Inhaler 11     dexamethasone (DECADRON) 4 MG/ML injection Inject 1 mL (4 mg) as directed once for 1 dose Use 4 mg or dose determined by provider for iontophoresis. 1 mL 0     [DISCONTINUED] ipratropium - albuterol 0.5 mg/2.5 mg/3 mL (DUONEB) 0.5-2.5 (3) MG/3ML nebulization Take 1 vial (3 mLs) by nebulization every 6 hours as needed 360 mL 2       Allergies   Allergen Reactions     Bee Pollen Other (See Comments)     Throat and eyes close up     Codeine Difficulty breathing     \"My throat closes shut.\"     Takes Percocet at home     Keflex [Cephalexin Hcl] Hives     Sulfonamide Derivatives Other (See Comments)     Throat closes     Ciprofloxacin Rash     Rash, bumps on lips     Bupropion Hcl Other (See Comments)     Made her very irritable     Celecoxib Other (See Comments) and Cough     Celebrex - wheeze  Takes ASA at home     Clonazepam Hives     Takes Xanax at home     Erythromycin Other (See Comments)     Erythromycin base - abdominal pain     Lisinopril Other (See Comments)     Zestril - headaches     Nifedipine Hives     Procardia     Strawberries [Strawberry]      Tramadol Hcl      Takes percocet at home "       Family History   Problem Relation Age of Onset     CANCER Mother      skin     Breast Cancer Mother      CEREBROVASCULAR DISEASE Mother      Alzheimer Disease Mother      Myocardial Infarction Father 76     myocardial infarction - cause of death     CANCER Daughter      cervical      DIABETES Maternal Grandfather      HEART DISEASE Maternal Grandfather      MI     DIABETES Maternal Grandmother      DIABETES Paternal Grandmother      DIABETES Paternal Grandfather        Social History     Social History     Marital status:      Spouse name: N/A     Number of children: N/A     Years of education: N/A     Occupational History     Not on file.     Social History Main Topics     Smoking status: Current Every Day Smoker     Packs/day: 1.00     Years: 46.00     Types: Cigarettes     Smokeless tobacco: Never Used      Comment: tried to quit, relapse due to craving.  Patient states that she will be quitting smoking shortly (9-20-17)     Alcohol use 0.0 oz/week     0 Standard drinks or equivalent per week      Comment: rarely     Drug use: No     Sexual activity: No     Other Topics Concern      Service No     Blood Transfusions Yes     Permits if needed     Caffeine Concern Yes     2 cups     Occupational Exposure No     Hobby Hazards No     Sleep Concern Yes     doxipen and xanax at bedtime     Stress Concern No     Weight Concern No     Special Diet No     Back Care No     Exercise No     Seat Belt No     encouraged to wear seat belt for safety     Self-Exams Yes     Parent/Sibling W/ Cabg, Mi Or Angioplasty Before 65f 55m? Yes     father     Social History Narrative       5 point ROS negative except as noted above in HPI, including Gen., Resp., CV, GI &  system review.     OBJECTIVE:  B/P: 118/68, T: 98.2, P: 86, R: Data Unavailable    GENERAL APPEARANCE: Alert, no acute distress  HEENT: normal.   CV: regular rate and rhythm, no murmur, rub or gallop  RESP: lungs clear to auscultation  bilaterally  ABDOMEN: normal bowel sounds, soft, nontender, no hepatosplenomegaly or other masses  SKIN: no suspicious lesions or rashes to visualized skin  NEURO: Alert, oriented x 3, speech and mentation normal    ASSESSMENT and PLAN:  (J20.9) Acute bronchitis, unspecified organism  (primary encounter diagnosis)  Comment: ongoing  Plan: amoxicillin-clavulanate (AUGMENTIN) 875-125 MG         per tablet        Add augmentin.  Restart nebs.  Improved lung sounds, but ongoing relative hypoxia.  She is going to quit smoking and try hard at that.  It is her new year's resolution.     (J98.01) Acute bronchospasm  Comment: as above.   Plan: ipratropium - albuterol 0.5 mg/2.5 mg/3 mL         (DUONEB) 0.5-2.5 (3) MG/3ML neb solution        As above.     (Z72.0) Tobacco abuse  Comment: as above.   Plan: Tobacco Cessation - for Health Maintenance        As above.     (Z71.6) Tobacco abuse counseling  Comment: as above.   Plan: as above.

## 2017-09-26 NOTE — NURSING NOTE
"Chief Complaint   Patient presents with     URI     Pt is not feeling any better.       Initial /68  Pulse 86  Temp 98.2  F (36.8  C)  Ht 4' 10\" (1.473 m)  Wt 156 lb (70.8 kg)  SpO2 91%  BMI 32.6 kg/m2 Estimated body mass index is 32.6 kg/(m^2) as calculated from the following:    Height as of this encounter: 4' 10\" (1.473 m).    Weight as of this encounter: 156 lb (70.8 kg).  Medication Reconciliation: complete   Jo Ayala    Pt refuses to fill out PHQ/JG forms.  Jo Ayala    "

## 2017-09-26 NOTE — MR AVS SNAPSHOT
After Visit Summary   9/26/2017    Sydni Hardin    MRN: [id number]           Patient Information     Date Of Birth          1950        Visit Information        Provider Department      9/26/2017 4:15 PM Gibran Alarcon MD [facility]        Today's Diagnoses     Acute bronchitis, unspecified organism    -  1    Acute bronchospasm        Tobacco abuse        Tobacco abuse counseling          Care Instructions      HOW TO QUIT SMOKING  Smoking is one of the hardest habits to break. About half of all those who have ever smoked have been able to quit, and most of those (about 70%) who still smoke want to quit. Here are some of the best ways to stop smoking.     KEEP TRYING:  It takes most smokers about 8 tries before they are finally able to fully quit. So, the more often you try and fail, the better your chance of quitting the next time! So, don't give up!    GO COLD TURKEY:  Most ex-smokers quit cold turkey. Trying to cut back gradually doesn't seem to work as well, perhaps because it continues the smoking habit. Also, it is possible to fool yourself by inhaling more while smoking fewer cigarettes. This results in the same amount of nicotine in your body!    GET SUPPORT:  Support programs can make an important difference, especially for the heavy smoker. These groups offer lectures, methods to change your behavior and peer support. Call the free national Quitline for more information. 800-QUIT-NOW (582-482-5839). Low-cost or free programs are offered by many hospitals, local chapters of the American Lung Association (792-531-9177) and the American Cancer Society (053-392-7997). Support at home is important too. Non-smokers can help by offering praise and encouragement. If the smoker fails to quit, encourage them to try again!    OVER-THE-COUNTER MEDICINES:  For those who can't quit on their own, Nicotine Replacement Therapy (NRT) may make quitting much easier. Certain aids  such as the nicotine patch, gum and lozenge are available without a prescription. However, it is best to use these under the guidance of your doctor. The skin patch provides a steady supply of nicotine to the body. Nicotine gum and lozenge gives temporary bursts of low levels of nicotine. Both methods take the edge off the craving for cigarettes. WARNING: If you feel symptoms of nicotine overdose, such as nausea, vomiting, dizziness, weakness, or fast heartbeat, stop using these and see your doctor.    PRESCRIPTION MEDICINES:  After evaluating your smoking patterns and prior attempts at quitting, your doctor may offer a prescription medicine such as bupropion (Zyban, Wellbutrin), varenicline (Chantix, Champix), a niocotine inhaler or nasal spray. Each has its unique advantage and side effects which your doctor can review with you.    HEALTH BENEFITS OF QUITTING:  The benefits of quitting start right away and keep improving the longer you go without smokin minutes: blood pressure and pulse return to normal  8 hours: oxygen levels return to normal  2 days: ability to smell and taste begins to improve as damaged nerves start to regrow  2-3 weeks: circulation and lung function improves  1-9 months: decreased cough, congestion and shortness of breath; less tired  1 year: risk of heart attack decreases by half  5 years: risk of lung cancer decreases by half; risk of stroke becomes the same as a non-smoker  For information about how to quit smoking, visit the following links:  National [employer] ,   Clearing the Air, Quit Smoking Today   - an online booklet. http://www.smokefree.gov/pubs/clearing_the_air.pdf  Smokefree.gov http://smokefree.gov/  QuitNet http://www.quitnet.com/    1409-7749 Pedro See, [address] [place] [address], [place] [address]. All rights reserved. This information is not intended as a substitute for professional medical care. Always follow your healthcare professional's  instructions.    The Benefits of Living Smoke Free  What do you want to gain from quitting? Check off some reasons to quit.  Health Benefits  ___ Reduce my risk of lung cancer, heart disease, chronic lung disease  ___ Have fewer wrinkles and softer skin  ___ Improve my sense of taste and smell  ___ For pregnant women--reduce the risk of having a miscarriage, stillbirth, premature birth, or low-birth-weight baby  Personal Benefits  ___ Feel more in control of my life  ___ Have better-smelling hair, breath, clothes, home, and car  ___ Save time by not having to take smoke breaks, buy cigarettes, or hunt for a light  ___ Have whiter teeth  Family Benefits  ___ Reduce my children s respiratory tract infections  ___ Set a good example for my children  ___ Reduce my family s cancer risk  Financial Benefits  ___ Save hundreds of dollars each year that would be spent on cigarettes  ___ Save money on medical bills  ___ Save on life, health, and car insurance premiums    Those Dollars Add Up!  Cigarettes are expensive, and getting more expensive all the time. Do you realize how much money you are spending on cigarettes per year? What is the average amount you spend on a pack of cigarettes? What is the average number of packs that you smoke per day? Using your answers to these questions, fill in this formula to help you find out:  ($ _____ per pack) ×  ( _____ number of packs per day) × (365 days) =  $ _____ yearly cost of smoking  Besides tobacco, there are other costs, including extra cleaning bills and replacement costs for clothing and furniture; medical expenses for smoking-related illnesses; and higher health, life, and car insurance premiums.    Cigars and Pipes Count Too!  Cigars and pipes are also dangerous. So are smokeless (chewing) tobacco and snuff. All of these products contain nicotine, a highly addictive substance that has harmful effects on your body. Quitting smoking means giving up all tobacco products.       "5075-6798 [facility], [address] [place] [address], [place] [address]. All rights reserved. This information is not intended as a substitute for professional medical care. Always follow your healthcare professional's instructions.          Follow-ups after your visit        Your next 10 appointments already scheduled     2018  1:00 PM CST   (Arrive by 12:45 PM)   Return Visit with Moisés Olson MD    ORTHOPEDICS ([facility] [place] [facility] )    [address] [facility] [address] [phone]              Who to contact     If you have questions or need follow up information about today's clinic visit or your schedule please contact [facility] directly at [phone].  Normal or non-critical lab and imaging results will be communicated to you by MyChart, letter or phone within 4 business days after the clinic has received the results. If you do not hear from us within 7 days, please contact the clinic through MyChart or phone. If you have a critical or abnormal lab result, we will notify you by phone as soon as possible.  Submit refill requests through [employer] or call your pharmacy and they will forward the refill request to us. Please allow 3 business days for your refill to be completed.          Additional Information About Your Visit        [employer] Information     [employer] lets you send messages to your doctor, view your test results, renew your prescriptions, schedule appointments and more. To sign up, go to [email or web address] . Click on \"Log in\" on the left side of the screen, which will take you to the Welcome page. Then click on \"Sign up Now\" on the right side of the page.     You will be asked to enter the access code listed below, as well as some personal information. Please follow the directions to create your username and password.     Your access code is: [email or web address]  Expires: 2017  2:48 PM     Your access code will  in 90 days. If you need help " "or a new code, please call your [facility] or [phone].        Care EveryWhere ID     This is your Care EveryWhere ID. This could be used by other organizations to access your [place] medical records  [id number]        Your Vitals Were     Pulse Temperature Height Pulse Oximetry BMI (Body Mass Index)       86 98.2  F (36.8  C) 4' 10\" (1.473 m) 91% 32.6 kg/m2        Blood Pressure from Last 3 Encounters:   09/26/17 118/68   09/20/17 110/62   09/13/17 109/65    Weight from Last 3 Encounters:   09/26/17 156 lb (70.8 kg)   09/20/17 155 lb (70.3 kg)   09/13/17 155 lb (70.3 kg)              We Performed the Following     Tobacco Cessation - for Health Maintenance          Today's Medication Changes          These changes are accurate as of: 9/26/17  4:29 PM.  If you have any questions, ask your nurse or doctor.               Start taking these medicines.        Dose/Directions    amoxicillin-clavulanate 875-125 MG per tablet   Commonly known as:  AUGMENTIN   Used for:  Acute bronchitis, unspecified organism   Started by:  Gibran Alarcon MD        Dose:  1 tablet   Take 1 tablet by mouth 2 times daily   Quantity:  20 tablet   Refills:  0            Where to get your medicines      These medications were sent to [id number] - [place] - [address]   [address], [place] [address]     Phone:  [phone]     amoxicillin-clavulanate 875-125 MG per tablet    ipratropium - albuterol 0.5 mg/2.5 mg/3 mL 0.5-2.5 (3) MG/3ML neb solution                Primary Care Provider Office Phone # Fax #    Gibran Alarcon [phone]       [address]  [facility] [address]        Equal Access to Services     KAHLIL KENNEDY AH: Daisy pascualo Soifeanyi, waaxda luqadaha, qaybta kaalmada adeegyada, cierra arteaga. So [facility] [phone].    ATENCIÓN: Si habla español, tiene a guaman disposición servicios gratuitos de asistencia lingüística. Llame " al [phone].    We comply with applicable federal civil rights laws and [place] laws. We do not discriminate on the basis of race, color, national origin, age, disability sex, sexual orientation or gender identity.            Thank you!     Thank you for choosing [facility]  for your care. Our goal is always to provide you with excellent care. Hearing back from our patients is one way we can continue to improve our services. Please take a few minutes to complete the written survey that you may receive in the mail after your visit with us. Thank you!             Your Updated Medication List - Protect others around you: Learn how to safely use, store and throw away your medicines at www.disposemymeds.org.          This list is accurate as of: 9/26/17  4:29 PM.  Always use your most recent med list.                   Brand Name Dispense Instructions for use Diagnosis    albuterol 108 (90 BASE) MCG/ACT Inhaler    VENTOLIN HFA    2 Inhaler    USE 1 PUFF FOUR TIMES A DAY AS NEEDED    Tobacco abuse       ALPRAZolam 1 MG tablet    XANAX    90 tablet    Take 1 tablet (1 mg) by mouth 3 times daily as needed for anxiety    Anxiety       amoxicillin-clavulanate 875-125 MG per tablet    AUGMENTIN    20 tablet    Take 1 tablet by mouth 2 times daily    Acute bronchitis, unspecified organism       aspirin 81 MG EC tablet      Take 1 tablet (81 mg) by mouth 2 times daily    S/P total knee replacement using cement, right       atorvastatin 40 MG tablet    LIPITOR    90 tablet    Take 1 tablet (40 mg) by mouth daily    Mixed hyperlipidemia       benazepril 10 MG tablet    LOTENSIN    90 tablet    TAKE 1 TABLET BY MOUTH DAILY    HTN (hypertension)       benzonatate 200 MG capsule    TESSALON    60 capsule    TAKE 1 CAPSULE BY MOUTH 3 TIMES A DAY AS NEEDED FOR COUGH    Preop general physical exam, Tobacco abuse       budesonide-formoterol 160-4.5 MCG/ACT Inhaler    SYMBICORT    1 Inhaler    Inhale 2 puffs into the  lungs 2 times daily. As needed    Asthma       butalbital-acetaminophen-caffeine -40 MG per tablet    FIORICET/ESGIC    30 tablet    Take 1 tablet by mouth every 6 hours as needed    Scalp hematoma, sequela       cimetidine 800 MG tablet    TAGAMET    90 tablet    Take 1 tablet (800 mg) by mouth At Bedtime As needed    Esophageal reflux       dexamethasone 4 MG/ML injection    DECADRON    1 mL    Inject 1 mL (4 mg) as directed once for 1 dose Use 4 mg or dose determined by provider for iontophoresis.    Primary osteoarthritis of left knee       diclofenac 1 % Gel topical gel    VOLTAREN    100 g    Apply 4 grams to knees  four times daily using enclosed dosing card.    Primary osteoarthritis of left knee, Chronic pain of right knee       doxepin 10 MG capsule    SINEquan    90 capsule    Take 1 capsule (10 mg) by mouth At Bedtime    Primary osteoarthritis of right knee       * EPINEPHrine 0.3 MG/0.3ML injection 2-pack    EPIPEN/ADRENACLICK/or ANY BX GENERIC EQUIV     Inject 0.3 mg into the muscle        * EPIPEN 2-JAIRO 0.3 MG/0.3ML injection 2-pack   Generic drug:  EPINEPHrine     2 each    INJECT 1 SYRINGE INTRAMUSCULARLY ONCE AS NEEDED FOR ANAPHYLAXIS    Anaphylactic reaction       escitalopram 20 MG tablet    LEXAPRO    180 tablet    TAKE 1 TABLET BY MOUTH 2 TIMES A DAY    Dysthymia       fluconazole 150 MG tablet    DIFLUCAN    4 tablet    Take 1 tablet (150 mg) by mouth every 3 days    Candidiasis of vulva and vagina       hydrochlorothiazide 25 MG tablet    HYDRODIURIL    90 tablet    TAKE 1 TABLET BY MOUTH DAILY    Benign essential hypertension       ipratropium - albuterol 0.5 mg/2.5 mg/3 mL 0.5-2.5 (3) MG/3ML neb solution    DUONEB    360 mL    Take 1 vial (3 mLs) by nebulization every 6 hours as needed    Acute bronchospasm       meloxicam 15 MG tablet    MOBIC    90 tablet    TAKE 1 TABLET BY MOUTH DAILY    Pain       montelukast 10 MG tablet    SINGULAIR    90 tablet    TAKE 1 TABLET BY MOUTH DAILY     Mixed hyperlipidemia       nystatin 878130 UNIT/ML suspension    MYCOSTATIN    60 mL    TAKE 1 TEASPOON (5MLS) BY MOUTH 4 TIMES DAILY    Thrush       * order for DME     2 each    Equipment being ordered: Nebulizer supplies    Primary osteoarthritis of right knee       * order for DME     1 each    Equipment being ordered: mcgee walker    Primary osteoarthritis of right knee       * order for DME     1 each    Equipment being ordered: toilet seat riser    Postoperative state       * order for DME     1 Units    Equipment being ordered: Please install safety bar in bathroom to assist patient in getting up from toilet.    Status post total right knee replacement       oxyCODONE-acetaminophen 5-325 MG per tablet    PERCOCET    60 tablet    TAKE 1 TO 2 TABLETS BY MOUTH EVERY 4 HOURS AS NEEDED FOR PAIN    Primary osteoarthritis of both knees       potassium chloride 10 MEQ tablet    K-TAB,KLOR-CON    180 tablet    TAKE 1 TABLET BY MOUTH 2 TIMES DAILY WITH FOOD    Benign essential hypertension       predniSONE 20 MG tablet    DELTASONE    10 tablet    Take 1 tablet (20 mg) by mouth 2 times daily    LRTI (lower respiratory tract infection), Tobacco abuse       temazepam 30 MG capsule    RESTORIL    10 capsule    TAKE 1 CAPSULE BY MOUTH NIGHTLY AS NEEDED FOR SLEEP    Persistent insomnia       * Notice:  This list has 6 medication(s) that are the same as other medications prescribed for you. Read the directions carefully, and ask your doctor or other care provider to review them with you.

## 2017-09-26 NOTE — PATIENT INSTRUCTIONS

## 2017-09-27 DIAGNOSIS — Z72.0 TOBACCO ABUSE: ICD-10-CM

## 2017-09-27 DIAGNOSIS — R06.02 SOB (SHORTNESS OF BREATH): ICD-10-CM

## 2017-09-27 DIAGNOSIS — J98.01 ACUTE BRONCHOSPASM: ICD-10-CM

## 2017-09-27 RX ORDER — IPRATROPIUM BROMIDE AND ALBUTEROL SULFATE 2.5; .5 MG/3ML; MG/3ML
1 SOLUTION RESPIRATORY (INHALATION) EVERY 4 HOURS PRN
Qty: 90 ML | Refills: 3 | Status: ON HOLD | OUTPATIENT
Start: 2017-09-27 | End: 2018-09-27

## 2017-09-27 RX ORDER — IPRATROPIUM BROMIDE AND ALBUTEROL SULFATE 2.5; .5 MG/3ML; MG/3ML
1 SOLUTION RESPIRATORY (INHALATION) EVERY 6 HOURS PRN
Qty: 360 ML | Refills: 2 | Status: SHIPPED | OUTPATIENT
Start: 2017-09-27 | End: 2017-09-27

## 2017-09-27 NOTE — TELEPHONE ENCOUNTER
Dr. Alarcon, refill received fax from [facility], requesting duoneb every 4-6 hours as needed. Previously prescribed every 6 hours.

## 2017-09-28 DIAGNOSIS — M17.0 PRIMARY OSTEOARTHRITIS OF BOTH KNEES: ICD-10-CM

## 2017-09-28 RX ORDER — OXYCODONE AND ACETAMINOPHEN 5; 325 MG/1; MG/1
TABLET ORAL
Qty: 60 TABLET | Refills: 0 | Status: SHIPPED | OUTPATIENT
Start: 2017-09-28 | End: 2017-11-29

## 2017-09-28 NOTE — TELEPHONE ENCOUNTER
percocet      Last Written Prescription Date: 6/28/17  Last Fill Quantity: 60,  # refills: 0   Last Office Visit with [facility], [facility] or [facility] prescribing provider: 9/26/17

## 2017-09-29 DIAGNOSIS — S00.03XS SCALP HEMATOMA, SEQUELA: ICD-10-CM

## 2017-09-29 RX ORDER — BUTALBITAL, ACETAMINOPHEN AND CAFFEINE 50; 325; 40 MG/1; MG/1; MG/1
1 TABLET ORAL EVERY 6 HOURS PRN
Qty: 30 TABLET | Refills: 0 | Status: SHIPPED | OUTPATIENT
Start: 2017-09-29 | End: 2018-12-12

## 2017-09-29 NOTE — TELEPHONE ENCOUNTER
fioricet      Last Written Prescription Date: 8/21/17  Last Fill Quantity: 30,  # refills: 1   Last Office Visit with [facility], [facility] or [facility] prescribing provider: 9/26/17

## 2017-10-05 DIAGNOSIS — B37.0 THRUSH: ICD-10-CM

## 2017-10-05 RX ORDER — NYSTATIN 100000/ML
SUSPENSION, ORAL (FINAL DOSE FORM) ORAL
Qty: 60 ML | Refills: 0 | Status: SHIPPED | OUTPATIENT
Start: 2017-10-05 | End: 2017-10-06

## 2017-10-06 DIAGNOSIS — B37.0 THRUSH: ICD-10-CM

## 2017-10-06 NOTE — TELEPHONE ENCOUNTER
Per Pt request the RX is being sent to [facility] via  and will sent 10/09/17.

## 2017-10-09 RX ORDER — NYSTATIN 100000/ML
SUSPENSION, ORAL (FINAL DOSE FORM) ORAL
Qty: 60 ML | Refills: 0 | Status: SHIPPED | OUTPATIENT
Start: 2017-10-09 | End: 2018-07-10

## 2017-10-23 DIAGNOSIS — F34.1 DYSTHYMIA: ICD-10-CM

## 2017-10-23 DIAGNOSIS — R52 PAIN: ICD-10-CM

## 2017-10-23 DIAGNOSIS — G47.00 PERSISTENT INSOMNIA: ICD-10-CM

## 2017-10-23 DIAGNOSIS — E78.2 MIXED HYPERLIPIDEMIA: ICD-10-CM

## 2017-10-23 RX ORDER — ESCITALOPRAM OXALATE 20 MG/1
TABLET ORAL
Qty: 180 TABLET | Refills: 0 | Status: CANCELLED | OUTPATIENT
Start: 2017-10-23

## 2017-10-23 RX ORDER — ATORVASTATIN CALCIUM 40 MG/1
TABLET, FILM COATED ORAL
Qty: 90 TABLET | Refills: 0 | Status: CANCELLED | OUTPATIENT
Start: 2017-10-23

## 2017-10-23 RX ORDER — MELOXICAM 15 MG/1
TABLET ORAL
Qty: 90 TABLET | Refills: 0 | Status: CANCELLED | OUTPATIENT
Start: 2017-10-23

## 2017-10-24 ENCOUNTER — TELEPHONE (OUTPATIENT)
Dept: FAMILY MEDICINE | Facility: OTHER | Age: 67
End: 2017-10-24

## 2017-10-24 NOTE — TELEPHONE ENCOUNTER
1:37 PM    Reason for Call: Phone Call    Description: Sydni states that her daughter has MERSA and she is wondering if she needs to be tested and if so, can she just go to lab in [place]. Please call Sydni    Was an appointment offered for this call?  No    Preferred method for responding to this message: [phone]    If we cannot reach you directly, may we leave a detailed response at the number you provided?  Yes        Queta Smith

## 2017-10-24 NOTE — TELEPHONE ENCOUNTER
It is not that simple at all.  I can see her and discuss if she would like.  Thanks. Gibran Alarcon

## 2017-10-25 ENCOUNTER — OFFICE VISIT (OUTPATIENT)
Dept: FAMILY MEDICINE | Facility: OTHER | Age: 67
End: 2017-10-25
Attending: FAMILY MEDICINE
Payer: COMMERCIAL

## 2017-10-25 VITALS
TEMPERATURE: 98.5 F | SYSTOLIC BLOOD PRESSURE: 122 MMHG | WEIGHT: 165 LBS | HEART RATE: 73 BPM | DIASTOLIC BLOOD PRESSURE: 74 MMHG | OXYGEN SATURATION: 96 % | BODY MASS INDEX: 34.49 KG/M2

## 2017-10-25 DIAGNOSIS — R52 PAIN: ICD-10-CM

## 2017-10-25 DIAGNOSIS — E78.2 MIXED HYPERLIPIDEMIA: ICD-10-CM

## 2017-10-25 DIAGNOSIS — I10 ESSENTIAL HYPERTENSION: ICD-10-CM

## 2017-10-25 DIAGNOSIS — Z20.818 EXPOSURE TO MRSA: Primary | ICD-10-CM

## 2017-10-25 DIAGNOSIS — Z23 NEED FOR PROPHYLACTIC VACCINATION AND INOCULATION AGAINST INFLUENZA: ICD-10-CM

## 2017-10-25 DIAGNOSIS — F34.1 DYSTHYMIA: ICD-10-CM

## 2017-10-25 PROCEDURE — 90662 IIV NO PRSV INCREASED AG IM: CPT | Performed by: FAMILY MEDICINE

## 2017-10-25 PROCEDURE — 99214 OFFICE O/P EST MOD 30 MIN: CPT | Performed by: FAMILY MEDICINE

## 2017-10-25 PROCEDURE — 90471 IMMUNIZATION ADMIN: CPT | Performed by: FAMILY MEDICINE

## 2017-10-25 PROCEDURE — 99212 OFFICE O/P EST SF 10 MIN: CPT

## 2017-10-25 PROCEDURE — 87081 CULTURE SCREEN ONLY: CPT | Mod: ZL | Performed by: FAMILY MEDICINE

## 2017-10-25 RX ORDER — ATORVASTATIN CALCIUM 40 MG/1
40 TABLET, FILM COATED ORAL DAILY
Qty: 90 TABLET | Refills: 3 | Status: SHIPPED | OUTPATIENT
Start: 2017-10-25 | End: 2018-08-03

## 2017-10-25 RX ORDER — MELOXICAM 15 MG/1
15 TABLET ORAL DAILY
Qty: 90 TABLET | Refills: 3 | Status: SHIPPED | OUTPATIENT
Start: 2017-10-25 | End: 2018-10-30

## 2017-10-25 RX ORDER — ESCITALOPRAM OXALATE 20 MG/1
TABLET ORAL
Qty: 180 TABLET | Refills: 3 | Status: SHIPPED | OUTPATIENT
Start: 2017-10-25 | End: 2018-12-27

## 2017-10-25 ASSESSMENT — ANXIETY QUESTIONNAIRES
6. BECOMING EASILY ANNOYED OR IRRITABLE: SEVERAL DAYS
GAD7 TOTAL SCORE: 11
3. WORRYING TOO MUCH ABOUT DIFFERENT THINGS: MORE THAN HALF THE DAYS
5. BEING SO RESTLESS THAT IT IS HARD TO SIT STILL: NOT AT ALL
1. FEELING NERVOUS, ANXIOUS, OR ON EDGE: MORE THAN HALF THE DAYS
7. FEELING AFRAID AS IF SOMETHING AWFUL MIGHT HAPPEN: SEVERAL DAYS
IF YOU CHECKED OFF ANY PROBLEMS ON THIS QUESTIONNAIRE, HOW DIFFICULT HAVE THESE PROBLEMS MADE IT FOR YOU TO DO YOUR WORK, TAKE CARE OF THINGS AT HOME, OR GET ALONG WITH OTHER PEOPLE: NOT DIFFICULT AT ALL
2. NOT BEING ABLE TO STOP OR CONTROL WORRYING: MORE THAN HALF THE DAYS

## 2017-10-25 ASSESSMENT — PATIENT HEALTH QUESTIONNAIRE - PHQ9
5. POOR APPETITE OR OVEREATING: NEARLY EVERY DAY
SUM OF ALL RESPONSES TO PHQ QUESTIONS 1-9: 13

## 2017-10-25 ASSESSMENT — PAIN SCALES - GENERAL: PAINLEVEL: SEVERE PAIN (6)

## 2017-10-25 NOTE — MR AVS SNAPSHOT
"              After Visit Summary   10/25/2017    Sydni Hardin    MRN: [id number]           Patient Information     Date Of Birth          1950        Visit Information        Provider Department      10/25/2017 2:00 PM Gibran Alarcon MD [facility]        Today's Diagnoses     Exposure to MRSA    -  1    Need for prophylactic vaccination and inoculation against influenza        Mixed hyperlipidemia        Essential hypertension        Pain        Dysthymia          Care Instructions    F/u with ongoing concerns.           Follow-ups after your visit        Your next 10 appointments already scheduled     Jan 03, 2018  1:00 PM CST   (Arrive by 12:45 PM)   Return Visit with Moisés Olson MD    ORTHOPEDICS ([facility] )    [address] [facility] [address] [phone]              Who to contact     If you have questions or need follow up information about today's clinic visit or your schedule please contact [facility] directly at [phone].  Normal or non-critical lab and imaging results will be communicated to you by MyChart, letter or phone within 4 business days after the clinic has received the results. If you do not hear from us within 7 days, please contact the clinic through [employer] or phone. If you have a critical or abnormal lab result, we will notify you by phone as soon as possible.  Submit refill requests through [employer] or call your pharmacy and they will forward the refill request to us. Please allow 3 business days for your refill to be completed.          Additional Information About Your Visit        [employer] Information     [employer] lets you send messages to your doctor, view your test results, renew your prescriptions, schedule appointments and more. To sign up, go to [email or web address] . Click on \"Log in\" on the left side of the screen, which will take you to the Welcome page. Then click on \"Sign up Now\" on the " right side of the page.     You will be asked to enter the access code listed below, as well as some personal information. Please follow the directions to create your username and password.     Your access code is: [email or web address]  Expires: 2017  2:48 PM     Your access code will  in 90 days. If you need help or a new code, please call your [place] clinic or [phone].        Care EveryWhere ID     This is your Care EveryWhere ID. This could be used by other organizations to access your [place] medical records  [id number]        Your Vitals Were     Pulse Temperature Pulse Oximetry BMI (Body Mass Index)          73 98.5  F (36.9  C) 96% 34.49 kg/m2         Blood Pressure from Last 3 Encounters:   10/25/17 122/74   17 118/68   17 110/62    Weight from Last 3 Encounters:   10/25/17 165 lb (74.8 kg)   17 156 lb (70.8 kg)   17 155 lb (70.3 kg)              We Performed the Following     FLU VACCINE, INCREASED ANTIGEN, PRESV FREE, AGE 65+ [01025]     Methicillin resistant staph aureus cult     Vaccine Administration, Initial [65932]          Today's Medication Changes          These changes are accurate as of: 10/25/17  2:32 PM.  If you have any questions, ask your nurse or doctor.               These medicines have changed or have updated prescriptions.        Dose/Directions    meloxicam 15 MG tablet   Commonly known as:  MOBIC   This may have changed:  See the new instructions.   Used for:  Pain   Changed by:  Gibran Alarcon MD        Dose:  15 mg   Take 1 tablet (15 mg) by mouth daily   Quantity:  90 tablet   Refills:  3            Where to get your medicines      These medications were sent to [facility] - [address]  [address] [address]     Phone:  [phone]     atorvastatin 40 MG tablet    escitalopram 20 MG tablet    meloxicam 15 MG tablet                Primary Care Provider Office Phone # Fax #    Gibran Alarcon MD  [phone]       [facility] [address]  [facility] [address]        Equal Access to Services     TREVIN KENNEDY : Hadii aad ku hadjosephinemaninder Soifeanyi, wamayurida luqadaha, qamojganta kaalmada rick, cierra herediayuniel arteaga. So [facility] [phone].    ATENCIÓN: Si habla español, tiene a guaman disposición servicios gratuitos de asistencia lingüística. Llame al [phone].    We comply with applicable federal civil rights laws and [place] laws. We do not discriminate on the basis of race, color, national origin, age, disability, sex, sexual orientation, or gender identity.            Thank you!     Thank you for choosing [facility]  for your care. Our goal is always to provide you with excellent care. Hearing back from our patients is one way we can continue to improve our services. Please take a few minutes to complete the written survey that you may receive in the mail after your visit with us. Thank you!             Your Updated Medication List - Protect others around you: Learn how to safely use, store and throw away your medicines at www.disposemymeds.org.          This list is accurate as of: 10/25/17  2:32 PM.  Always use your most recent med list.                   Brand Name Dispense Instructions for use Diagnosis    albuterol 108 (90 BASE) MCG/ACT Inhaler    VENTOLIN HFA    2 Inhaler    USE 1 PUFF FOUR TIMES A DAY AS NEEDED    Tobacco abuse       ALPRAZolam 1 MG tablet    XANAX    90 tablet    Take 1 tablet (1 mg) by mouth 3 times daily as needed for anxiety    Anxiety       amoxicillin-clavulanate 875-125 MG per tablet    AUGMENTIN    20 tablet    Take 1 tablet by mouth 2 times daily    LRTI (lower respiratory tract infection)       aspirin 81 MG EC tablet      Take 1 tablet (81 mg) by mouth 2 times daily    S/P total knee replacement using cement, right       atorvastatin 40 MG tablet    LIPITOR    90 tablet    Take 1 tablet (40 mg) by mouth daily    Mixed  hyperlipidemia       benazepril 10 MG tablet    LOTENSIN    90 tablet    TAKE 1 TABLET BY MOUTH DAILY    HTN (hypertension)       benzonatate 200 MG capsule    TESSALON    60 capsule    TAKE 1 CAPSULE BY MOUTH 3 TIMES A DAY AS NEEDED FOR COUGH    Preop general physical exam, Tobacco abuse       budesonide-formoterol 160-4.5 MCG/ACT Inhaler    SYMBICORT    1 Inhaler    Inhale 2 puffs into the lungs 2 times daily. As needed    Asthma       butalbital-acetaminophen-caffeine -40 MG per tablet    FIORICET/ESGIC    30 tablet    Take 1 tablet by mouth every 6 hours as needed    Scalp hematoma, sequela       cimetidine 800 MG tablet    TAGAMET    90 tablet    Take 1 tablet (800 mg) by mouth At Bedtime As needed    Esophageal reflux       dexamethasone 4 MG/ML injection    DECADRON    1 mL    Inject 1 mL (4 mg) as directed once for 1 dose Use 4 mg or dose determined by provider for iontophoresis.    Primary osteoarthritis of left knee       diclofenac 1 % Gel topical gel    VOLTAREN    100 g    Apply 4 grams to knees  four times daily using enclosed dosing card.    Primary osteoarthritis of left knee, Chronic pain of right knee       doxepin 10 MG capsule    SINEquan    90 capsule    Take 1 capsule (10 mg) by mouth At Bedtime    Primary osteoarthritis of right knee       * EPINEPHrine 0.3 MG/0.3ML injection 2-pack    EPIPEN/ADRENACLICK/or ANY BX GENERIC EQUIV     Inject 0.3 mg into the muscle        * EPIPEN 2-JAIRO 0.3 MG/0.3ML injection 2-pack   Generic drug:  EPINEPHrine     2 each    INJECT 1 SYRINGE INTRAMUSCULARLY ONCE AS NEEDED FOR ANAPHYLAXIS    Anaphylactic reaction       escitalopram 20 MG tablet    LEXAPRO    180 tablet    TAKE 1 TABLET BY MOUTH 2 TIMES A DAY    Dysthymia       fluconazole 150 MG tablet    DIFLUCAN    4 tablet    Take 1 tablet (150 mg) by mouth every 3 days    Candidiasis of vulva and vagina       hydrochlorothiazide 25 MG tablet    HYDRODIURIL    90 tablet    TAKE 1 TABLET BY MOUTH DAILY     Benign essential hypertension       ipratropium - albuterol 0.5 mg/2.5 mg/3 mL 0.5-2.5 (3) MG/3ML neb solution    DUONEB    90 mL    Take 1 vial (3 mLs) by nebulization every 4 hours as needed    Acute bronchospasm, SOB (shortness of breath)       meloxicam 15 MG tablet    MOBIC    90 tablet    Take 1 tablet (15 mg) by mouth daily    Pain       montelukast 10 MG tablet    SINGULAIR    90 tablet    TAKE 1 TABLET BY MOUTH DAILY    Mixed hyperlipidemia       nystatin 438535 UNIT/ML suspension    MYCOSTATIN    60 mL    TAKE 1 TEASPOON (5MLS) BY MOUTH 4 TIMES DAILY    Thrush       * order for DME     2 each    Equipment being ordered: Nebulizer supplies    Primary osteoarthritis of right knee       * order for DME     1 each    Equipment being ordered: mcgee walker    Primary osteoarthritis of right knee       * order for DME     1 each    Equipment being ordered: toilet seat riser    Postoperative state       * order for DME     1 Units    Equipment being ordered: Please install safety bar in bathroom to assist patient in getting up from toilet.    Status post total right knee replacement       oxyCODONE-acetaminophen 5-325 MG per tablet    PERCOCET    60 tablet    TAKE 1 TO 2 TABLETS BY MOUTH EVERY 4 HOURS AS NEEDED FOR PAIN    Primary osteoarthritis of both knees       potassium chloride 10 MEQ tablet    K-TAB,KLOR-CON    180 tablet    TAKE 1 TABLET BY MOUTH 2 TIMES DAILY WITH FOOD    Benign essential hypertension       predniSONE 20 MG tablet    DELTASONE    10 tablet    Take 1 tablet (20 mg) by mouth 2 times daily    LRTI (lower respiratory tract infection), Tobacco abuse       temazepam 30 MG capsule    RESTORIL    10 capsule    TAKE 1 CAPSULE BY MOUTH NIGHTLY AS NEEDED FOR SLEEP    Persistent insomnia       * Notice:  This list has 6 medication(s) that are the same as other medications prescribed for you. Read the directions carefully, and ask your doctor or other care provider to review them with you.

## 2017-10-25 NOTE — TELEPHONE ENCOUNTER
Lexapro      Last Written Prescription Date: 7/06/2017  Last Fill Quantity: 180,  # refills: 0   Last Office Visit with [facility], [facility] or [facility] prescribing provider: 9/26/2017             Restoril      Last Written Prescription Date: 3/24/2017  Last Fill Quantity: 10,  # refills: 0   Last Office Visit with [facility], [facility] or [facility] prescribing provider: 9/26/2017             lipitor      Last Written Prescription Date: 7/06/2017  Last Fill Quantity: 90,  # refills: 0   Last Office Visit with [facility], [facility] or [facility] prescribing provider: 9/26/2017             Meloxicam      Last Written Prescription Date: 12/16/2016  Last Fill Quantity: 90,  # refills: 1   Last Office Visit with [facility], [facility] or [facility] prescribing provider: 9/26/2017                                           Next 5 appointments (look out 90 days)     Oct 25, 2017  2:00 PM CDT   (Arrive by 1:45 PM)   SHORT with Gibran Alarcon MD   [facility] ([facility] )    [address] [facility] [address]   [phone]            Jan 03, 2018  1:00 PM CST   (Arrive by 12:45 PM)   Return Visit with Moisés Olson MD    ORTHOPEDICS ([facility] )    [address] [facility] [id number] [phone]

## 2017-10-25 NOTE — PROGRESS NOTES
Sydni SKINNER Wilfred    2017    Chief Complaint   Patient presents with     RECHECK     Testing for MERSA- Daughter has.      Flu Shot       SUBJECTIVE:  Here for mrsa questions.  Daughter has it who lives with her.  She just had surgery and Sydni is her caregiver.  It was advised she be tested.  We discussed this at some length.  She also has some maintenance to discuss.  Her mood is stable but she certainly has more stress.  Pain issues stable and she tolerates the mobic.  She has a lot of oa issues.  bp and lipids discussed.      Past Medical History:   Diagnosis Date     Abnormal arterial blood gases 2006     Acute upper respiratory infections of unspecified site 2001     Anxiety 10/24/2011     Calculus of gallbladder without mention of cholecystitis or obstruction 2006     Depression 2012     Follow-up examination, following other surgery 2006     Headache(784.0) 12/10/2003     History of CVA (cerebrovascular accident) 2011     HTN (hypertension) 2012     Insomnia 3/13/2012     Lipoma of other skin and subcutaneous tissue 2006     Nonallopathic lesion of cervical region, not elsewhere classified 2003     Osteoarthrosis, unspecified whether generalized or localized, lower leg 10/4/2001     Special screening for malignant neoplasms, colon 2006     Urinary tract infection, site not specified 2007       Past Surgical History:   Procedure Laterality Date     ARTHROPLASTY KNEE Right 3/21/2017    Procedure: ARTHROPLASTY KNEE;  Surgeon: Moisés Olson MD;  Location: [facility]     ARTHROSCOPY KNEE       BIOPSY BREAST  2000    left - free of disease      SECTION        SECTION       CHOLECYSTECTOMY  2006     COLONOSCOPY  2008    repeat in 10 years     HEAD & NECK SURGERY  2014    hematoma on head drained     HYSTERECTOMY       ORTHOPEDIC SURGERY  2014    right shoulder replacement     shoulder x2  2009    left       Current  Outpatient Prescriptions   Medication Sig Dispense Refill     atorvastatin (LIPITOR) 40 MG tablet Take 1 tablet (40 mg) by mouth daily 90 tablet 3     meloxicam (MOBIC) 15 MG tablet Take 1 tablet (15 mg) by mouth daily 90 tablet 3     escitalopram (LEXAPRO) 20 MG tablet TAKE 1 TABLET BY MOUTH 2 TIMES A  tablet 3     nystatin (MYCOSTATIN) 304001 UNIT/ML suspension TAKE 1 TEASPOON (5MLS) BY MOUTH 4 TIMES DAILY 60 mL 0     butalbital-acetaminophen-caffeine (FIORICET/ESGIC) -40 MG per tablet Take 1 tablet by mouth every 6 hours as needed 30 tablet 0     oxyCODONE-acetaminophen (PERCOCET) 5-325 MG per tablet TAKE 1 TO 2 TABLETS BY MOUTH EVERY 4 HOURS AS NEEDED FOR PAIN 60 tablet 0     amoxicillin-clavulanate (AUGMENTIN) 875-125 MG per tablet Take 1 tablet by mouth 2 times daily 20 tablet 0     ipratropium - albuterol 0.5 mg/2.5 mg/3 mL (DUONEB) 0.5-2.5 (3) MG/3ML neb solution Take 1 vial (3 mLs) by nebulization every 4 hours as needed 90 mL 3     predniSONE (DELTASONE) 20 MG tablet Take 1 tablet (20 mg) by mouth 2 times daily 10 tablet 0     fluconazole (DIFLUCAN) 150 MG tablet Take 1 tablet (150 mg) by mouth every 3 days 4 tablet 0     albuterol (VENTOLIN HFA) 108 (90 BASE) MCG/ACT Inhaler USE 1 PUFF FOUR TIMES A DAY AS NEEDED 2 Inhaler 3     ALPRAZolam (XANAX) 1 MG tablet Take 1 tablet (1 mg) by mouth 3 times daily as needed for anxiety 90 tablet 5     potassium chloride (K-TAB,KLOR-CON) 10 MEQ tablet TAKE 1 TABLET BY MOUTH 2 TIMES DAILY WITH FOOD 180 tablet 0     EPINEPHrine 0.3 MG/0.3ML injection Inject 0.3 mg into the muscle       diclofenac (VOLTAREN) 1 % GEL topical gel Apply 4 grams to knees  four times daily using enclosed dosing card. 100 g 3     doxepin (SINEQUAN) 10 MG capsule Take 1 capsule (10 mg) by mouth At Bedtime 90 capsule 3     benazepril (LOTENSIN) 10 MG tablet TAKE 1 TABLET BY MOUTH DAILY 90 tablet 2     order for DME Equipment being ordered: Please install safety bar in bathroom to  "assist patient in getting up from toilet. 1 Units 0     aspirin EC 81 MG EC tablet Take 1 tablet (81 mg) by mouth 2 times daily       temazepam (RESTORIL) 30 MG capsule TAKE 1 CAPSULE BY MOUTH NIGHTLY AS NEEDED FOR SLEEP 10 capsule 0     order for DME Equipment being ordered: toilet seat riser 1 each 0     benzonatate (TESSALON) 200 MG capsule TAKE 1 CAPSULE BY MOUTH 3 TIMES A DAY AS NEEDED FOR COUGH 60 capsule 3     hydrochlorothiazide (HYDRODIURIL) 25 MG tablet TAKE 1 TABLET BY MOUTH DAILY 90 tablet 3     montelukast (SINGULAIR) 10 MG tablet TAKE 1 TABLET BY MOUTH DAILY 90 tablet 3     order for DME Equipment being ordered: Nebulizer supplies 2 each 11     order for DME Equipment being ordered: mcgee walker 1 each 0     EPIPEN 2-JAIRO 0.3 MG/0.3ML injection INJECT 1 SYRINGE INTRAMUSCULARLY ONCE AS NEEDED FOR ANAPHYLAXIS 2 each 0     cimetidine (TAGAMET) 800 MG tablet Take 1 tablet (800 mg) by mouth At Bedtime As needed 90 tablet 3     budesonide-formoterol (SYMBICORT) 160-4.5 MCG/ACT inhaler Inhale 2 puffs into the lungs 2 times daily. As needed 1 Inhaler 11     dexamethasone (DECADRON) 4 MG/ML injection Inject 1 mL (4 mg) as directed once for 1 dose Use 4 mg or dose determined by provider for iontophoresis. 1 mL 0     [DISCONTINUED] atorvastatin (LIPITOR) 40 MG tablet Take 1 tablet (40 mg) by mouth daily 90 tablet 0     [DISCONTINUED] escitalopram (LEXAPRO) 20 MG tablet TAKE 1 TABLET BY MOUTH 2 TIMES A  tablet 0     [DISCONTINUED] meloxicam (MOBIC) 15 MG tablet TAKE 1 TABLET BY MOUTH DAILY 90 tablet 1       Allergies   Allergen Reactions     Bee Pollen Other (See Comments)     Throat and eyes close up     Codeine Difficulty breathing     \"My throat closes shut.\"     Takes Percocet at home     Keflex [Cephalexin Hcl] Hives     Sulfonamide Derivatives Other (See Comments)     Throat closes     Ciprofloxacin Rash     Rash, bumps on lips     Bupropion Hcl Other (See Comments)     Made her very irritable     " Celecoxib Other (See Comments) and Cough     Celebrex - wheeze  Takes ASA at home     Clonazepam Hives     Takes Xanax at home     Erythromycin Other (See Comments)     Erythromycin base - abdominal pain     Lisinopril Other (See Comments)     Zestril - headaches     Nifedipine Hives     Procardia     Strawberries [Strawberry]      Tramadol Hcl      Takes percocet at home       Family History   Problem Relation Age of Onset     CANCER Mother      skin     Breast Cancer Mother      CEREBROVASCULAR DISEASE Mother      Alzheimer Disease Mother      Myocardial Infarction Father 76     myocardial infarction - cause of death     CANCER Daughter      cervical      DIABETES Maternal Grandfather      HEART DISEASE Maternal Grandfather      MI     DIABETES Maternal Grandmother      DIABETES Paternal Grandmother      DIABETES Paternal Grandfather        Social History     Social History     Marital status:      Spouse name: N/A     Number of children: N/A     Years of education: N/A     Occupational History     Not on file.     Social History Main Topics     Smoking status: Current Every Day Smoker     Packs/day: 1.00     Years: 46.00     Types: Cigarettes     Smokeless tobacco: Never Used      Comment: tried to quit, relapse due to craving.  Patient states that she will be quitting smoking shortly (9-20-17)     Alcohol use 0.0 oz/week     0 Standard drinks or equivalent per week      Comment: rarely     Drug use: No     Sexual activity: No     Other Topics Concern      Service No     Blood Transfusions Yes     Permits if needed     Caffeine Concern Yes     2 cups     Occupational Exposure No     Hobby Hazards No     Sleep Concern Yes     doxipen and xanax at bedtime     Stress Concern No     Weight Concern No     Special Diet No     Back Care No     Exercise No     Seat Belt No     encouraged to wear seat belt for safety     Self-Exams Yes     Parent/Sibling W/ Cabg, Mi Or Angioplasty Before 65f 55m? Yes      father     Social History Narrative       5 point ROS negative except as noted above in HPI, including Gen., Resp., CV, GI &  system review.     OBJECTIVE:  B/P: 122/74, T: 98.5, P: 73, R: Data Unavailable    GENERAL APPEARANCE: Alert, no acute distress  Nose:  mrsa swab done.    CV: regular rate and rhythm, no murmur, rub or gallop  RESP: lungs clear to auscultation bilaterally  ABDOMEN: normal bowel sounds, soft, nontender, no hepatosplenomegaly or other masses  SKIN: no suspicious lesions or rashes to visualized skin  NEURO: Alert, oriented x 3, speech and mentation normal    ASSESSMENT and PLAN:  (Z20.818) Exposure to MRSA  (primary encounter diagnosis)  Comment: daughter who lives with her.   Plan: Methicillin resistant staph aureus cult        We discussed this at some length.  I reassured.  We are going to test and try to eradicate with bactroban if positive.      (Z23) Need for prophylactic vaccination and inoculation against influenza  Comment: given.   Plan: FLU VACCINE, INCREASED ANTIGEN, PRESV FREE, AGE        65+ [79607], Vaccine Administration, Initial         [73404]        Given.     (E78.2) Mixed hyperlipidemia  Comment: ongoing, stable.   Plan: atorvastatin (LIPITOR) 40 MG tablet        Update med.     (I10) Essential hypertension  Comment: stable.   Plan: no change.     (R52) Pain  Comment: reviewed.   Plan: meloxicam (MOBIC) 15 MG tablet        Her bp is stable despite the mobic.  Continue at this time.      (F34.1) Dysthymia  Comment: ongiong, stable.  Plan: escitalopram (LEXAPRO) 20 MG tablet        Update the lexapro.  Her mood is stable, despite increase in stressors.        Injectable Influenza Immunization Documentation    1.  Is the person to be vaccinated sick today?   No    2. Does the person to be vaccinated have an allergy to a component   of the vaccine?   No  Egg Allergy Algorithm Link    3. Has the person to be vaccinated ever had a serious reaction   to influenza vaccine in the  past?   No    4. Has the person to be vaccinated ever had Guillain-Barré syndrome?   No    Form completed by JOB MANUEL

## 2017-10-26 RX ORDER — TEMAZEPAM 30 MG
CAPSULE ORAL
Qty: 90 CAPSULE | Refills: 0 | Status: SHIPPED | OUTPATIENT
Start: 2017-10-26 | End: 2018-01-05

## 2017-10-26 ASSESSMENT — ANXIETY QUESTIONNAIRES: GAD7 TOTAL SCORE: 11

## 2017-10-27 LAB
BACTERIA SPEC CULT: NORMAL
SPECIMEN SOURCE: NORMAL

## 2017-11-09 DIAGNOSIS — B37.31 CANDIDIASIS OF VULVA AND VAGINA: ICD-10-CM

## 2017-11-10 ENCOUNTER — TELEPHONE (OUTPATIENT)
Dept: FAMILY MEDICINE | Facility: OTHER | Age: 67
End: 2017-11-10

## 2017-11-10 RX ORDER — FLUCONAZOLE 150 MG/1
TABLET ORAL
Qty: 2 TABLET | Refills: 0 | Status: SHIPPED | OUTPATIENT
Start: 2017-11-10 | End: 2018-01-25

## 2017-11-10 NOTE — TELEPHONE ENCOUNTER
1:15 PM    Reason for Call: Phone Call    Description: Sydni is waiting for her prescription for her yeast infection. It looks like it is pending. She was hoping to have this before the weekend.     Was an appointment offered for this call?   No    Preferred method for responding to this message: [phone]    If we cannot reach you directly, may we leave a detailed response at the number you provided? Yes      Queta Smith

## 2017-11-10 NOTE — TELEPHONE ENCOUNTER
diflucan      Last Written Prescription Date: 9/20/17  Last Fill Quantity: 4,  # refills: 0   Last Office Visit with G, P or [facility] prescribing provider: 10/25/17                                         Next 5 appointments (look out 90 days)     Jan 03, 2018  1:00 PM CST   (Arrive by 12:45 PM)   Return Visit with Moisés Olson MD    ORTHOPEDICS ([facility] )    [address] [facility] [address] [phone]

## 2017-11-27 ENCOUNTER — TELEPHONE (OUTPATIENT)
Dept: FAMILY MEDICINE | Facility: OTHER | Age: 67
End: 2017-11-27

## 2017-11-27 DIAGNOSIS — R19.7 DIARRHEA, UNSPECIFIED TYPE: Primary | ICD-10-CM

## 2017-11-27 NOTE — TELEPHONE ENCOUNTER
9:58 AM    Reason for Call: Phone Call    Description: Pt calling stating her daughter's dr want the pt to have testing to [facility]     Was an appointment offered for this call? No  If yes : Appointment type              Date    Preferred method for responding to this message: Telephone Call  What is your phone number ?    If we cannot reach you directly, may we leave a detailed response at the number you provided? Yes    Can this message wait until your PCP/provider returns, if available today?     Rose Marie

## 2017-11-27 NOTE — TELEPHONE ENCOUNTER
I called the pt she is having diarrhea and stool looks like her daughter. Am stool is solid and then has liquid stools.

## 2017-11-29 ENCOUNTER — TELEPHONE (OUTPATIENT)
Dept: FAMILY MEDICINE | Facility: OTHER | Age: 67
End: 2017-11-29

## 2017-11-29 DIAGNOSIS — M54.16 LUMBAR RADICULOPATHY: Primary | ICD-10-CM

## 2017-11-29 DIAGNOSIS — M17.0 PRIMARY OSTEOARTHRITIS OF BOTH KNEES: ICD-10-CM

## 2017-11-29 RX ORDER — OXYCODONE AND ACETAMINOPHEN 5; 325 MG/1; MG/1
TABLET ORAL
Qty: 60 TABLET | Refills: 0 | Status: CANCELLED | OUTPATIENT
Start: 2017-11-29

## 2017-11-29 RX ORDER — OXYCODONE AND ACETAMINOPHEN 5; 325 MG/1; MG/1
TABLET ORAL
Qty: 60 TABLET | Refills: 0 | Status: SHIPPED | OUTPATIENT
Start: 2017-11-29 | End: 2019-02-19

## 2017-11-29 NOTE — TELEPHONE ENCOUNTER
10:42 AM    Reason for Call: Phone Call    Description: Sydni is requesting her prescription and would like it sent by  by the weekend. She also would like to talk to you about a test that she has not been able to do yet. Please call Sydni.    Was an appointment offered for this call?   No    Preferred method for responding to this message: [phone]    If we cannot reach you directly, may we leave a detailed response at the number you provided? Yes      Queta Smith

## 2017-11-29 NOTE — TELEPHONE ENCOUNTER
I called the pt she has not been able to get a sample for her test yet and would like to know how long her order is good for. I advised her this will last for awhile. Pt has requested a refill on her Percocet. Pt is having bilateral knee pain. She is having difficulty walking and will need to use her walker. Pt has an appt to discuss next week. Pt would like to get to get her Rx sent to 's at the [facility] when this come through. Pt would like to get an order for a back injection.

## 2017-11-29 NOTE — TELEPHONE ENCOUNTER
Called informed written RX is ready to  at  N  Patient request  to [facility]

## 2017-12-01 ENCOUNTER — TELEPHONE (OUTPATIENT)
Dept: MRI IMAGING | Facility: HOSPITAL | Age: 67
End: 2017-12-01

## 2017-12-01 NOTE — TELEPHONE ENCOUNTER
Called Sydni to schedule her injection and she said she has not had an MRI in quite awhile. Can you please put in an order for a lumbar MRI. Thank you.

## 2017-12-04 NOTE — TELEPHONE ENCOUNTER
Recommend she proceed with the shot, and we can get updated MRI with lack of clinical response to the injection if it doesn't work.  I would want to see her to reassess sx.  Thanks. Gibran Alarcon

## 2017-12-06 ENCOUNTER — TELEPHONE (OUTPATIENT)
Dept: FAMILY MEDICINE | Facility: OTHER | Age: 67
End: 2017-12-06

## 2017-12-06 ENCOUNTER — OFFICE VISIT (OUTPATIENT)
Dept: ORTHOPEDICS | Facility: OTHER | Age: 67
End: 2017-12-06
Attending: ORTHOPAEDIC SURGERY
Payer: COMMERCIAL

## 2017-12-06 VITALS
OXYGEN SATURATION: 96 % | BODY MASS INDEX: 34.63 KG/M2 | SYSTOLIC BLOOD PRESSURE: 102 MMHG | HEIGHT: 58 IN | HEART RATE: 75 BPM | TEMPERATURE: 97.8 F | DIASTOLIC BLOOD PRESSURE: 70 MMHG | WEIGHT: 165 LBS

## 2017-12-06 DIAGNOSIS — M17.12 PRIMARY OSTEOARTHRITIS OF LEFT KNEE: Primary | ICD-10-CM

## 2017-12-06 DIAGNOSIS — M54.16 LUMBAR RADICULOPATHY: Primary | ICD-10-CM

## 2017-12-06 DIAGNOSIS — Z96.651 STATUS POST TOTAL RIGHT KNEE REPLACEMENT: ICD-10-CM

## 2017-12-06 PROCEDURE — 99212 OFFICE O/P EST SF 10 MIN: CPT

## 2017-12-06 PROCEDURE — 99213 OFFICE O/P EST LOW 20 MIN: CPT | Performed by: ORTHOPAEDIC SURGERY

## 2017-12-06 ASSESSMENT — PAIN SCALES - GENERAL: PAINLEVEL: EXTREME PAIN (8)

## 2017-12-06 NOTE — PROGRESS NOTES
"Chief complaint:  #1.  DJD left knee SP Synvisc injection 6/21/17  #2.  Status post right TKA 3/21/17  #3.  Oral NSAID contraindication: Gastric ulcer    Patient profile: 67-year-old nonsmoking retired female patient of Dr. Alarcon    HPI: This patient is been treated in this office for bilateral DJD of the knees.  After conservative treatment failed she had a right TKA performed on 3/21/17.  Although the knee has frequently felt \"stiff\" to her since surgery, only recently she has insidiously developed diffuse global mild to moderate pain in the joint.  She denies an injury to it.      She also has severe DJD with varus deformity of the left knee.  She is reluctant to get it replaced.  She therefore has been treating it with topical Voltaren gel, one Percocet per day, use of a walker when out of doors, and on 6/21/17 she had her 1st Synvisc injection which helped for a few months.  The benefits of that and injection have worn off.  She is not due for another injection until the very end of this month.    On further questioning she has only been using the Voltaren gel twice a day on both knees.    Nothing has changed with respect to her musculoskeletal or neurologic review of systems since seen last.    Examination: Elderly female in no obvious distress who came to the office without an ambulatory appliance.  Temperature 97.8.  The skin around each knee is intact and free of erythema or warmth.  Neither knee has an effusion.  The right has no deformity, the left has a mild varus deformity.  Both extend fully.  The right flexes to 110  and the left 114 .  Both are stable to ligamentous stressing.  The right is nontender to palpation.  Left was tender only along the posterior medial joint line.    X-rays: Plain films of her left knee were last performed on 5/18/17 which showed complete loss of medial joint space, varus deformity, and tricompartmental osteophyte formation.    Bilateral posterior tibial pulses are " present but diminished.  The toes in both feet have a dusky appearance, mild on the left but moderate on the right.  Sensation is intact in both feet.    Impression:   #1.  Severe DJD left knee  #2.  Painful right total knee secondary to favoring arthritic left knee  #3.  Oral NSAID contraindication  #4.  Poor circulation both feet    Plan: I recommend she try a hinged knee brace on the left knee.  She should keep her appointment for a left knee Visco supplementation  injection in early January.  She wants to switch to Gel 1 in case it does not work she can then proceed to a TKA. If she finds the brace helpful on the left we can fit her with one on the right at her next visit.  She required a size XL. I also recommended she increase the use of the Voltaren gel to 4 times per day.    She should get new x-rays of both knees at her next visit.

## 2017-12-06 NOTE — TELEPHONE ENCOUNTER
Patient states that she was suppose to have injections done in her back but the [place] nurse called and said she needs an xray done before the injections but there aren't any orders in. Please advise me on what to do. Thanks. Taina Stoner LPN

## 2017-12-06 NOTE — TELEPHONE ENCOUNTER
8:08 AM    Reason for Call: Phone Call    Description: Pt called and stated there was an issue with the MRI order not being put in. She would like to discuss this with nurse ASAP. Please call her back at [phone]    Was an appointment offered for this call? No  If yes : Appointment type              Date    Preferred method for responding to this message: Telephone Call  What is your phone number ?    If we cannot reach you directly, may we leave a detailed response at the number you provided? Yes    Can this message wait until your PCP/provider returns, if available today? Not applicable    Loly Garcia

## 2017-12-06 NOTE — MR AVS SNAPSHOT
"              After Visit Summary   12/6/2017    Sydni Hardin    MRN: [id number]           Patient Information     Date Of Birth          1950        Visit Information        Provider Department      12/6/2017 1:40 PM Moisés Olson MD  ORTHOPEDICS        Today's Diagnoses     Primary osteoarthritis of left knee    -  1    Status post total right knee replacement           Follow-ups after your visit        Your next 10 appointments already scheduled     Jan 03, 2018  1:00 PM CST   (Arrive by 12:45 PM)   Return Visit with Moisés Olson MD    ORTHOPEDICS ([facility] )    [address] [facility] [address] [phone]              Future tests that were ordered for you today     Open Future Orders        Priority Expected Expires Ordered    MR Lumbar Spine w/o Contrast Routine  12/6/2018 12/6/2017            Who to contact     If you have questions or need follow up information about today's clinic visit or your schedule please contact  ORTHOPEDICS directly at [phone].  Normal or non-critical lab and imaging results will be communicated to you by Ballparchart, letter or phone within 4 business days after the clinic has received the results. If you do not hear from us within 7 days, please contact the clinic through Ballparchart or phone. If you have a critical or abnormal lab result, we will notify you by phone as soon as possible.  Submit refill requests through Isentropic or call your pharmacy and they will forward the refill request to us. Please allow 3 business days for your refill to be completed.          Additional Information About Your Visit        Ballparchart Information     Isentropic lets you send messages to your doctor, view your test results, renew your prescriptions, schedule appointments and more. To sign up, go to [email or web address] . Click on \"Log in\" on the left side of the screen, which will take you to the Welcome page. Then click on \"Sign up Now\" on " "the right side of the page.     You will be asked to enter the access code listed below, as well as some personal information. Please follow the directions to create your username and password.     Your access code is: [email or web address]  Expires: 2017  1:48 PM     Your access code will  in 90 days. If you need help or a new code, please call your [facility] or [phone].        Care EveryWhere ID     This is your Care EveryWhere ID. This could be used by other organizations to access your [place] medical records  [id number]        Your Vitals Were     Pulse Temperature Height Pulse Oximetry BMI (Body Mass Index)       75 97.8  F (36.6  C) (Tympanic) 4' 10\" (1.473 m) 96% 34.49 kg/m2        Blood Pressure from Last 3 Encounters:   17 102/70   10/25/17 122/74   17 118/68    Weight from Last 3 Encounters:   17 165 lb (74.8 kg)   10/25/17 165 lb (74.8 kg)   17 156 lb (70.8 kg)              Today, you had the following     No orders found for display       Primary Care Provider Office Phone # Fax #    Gibran Alarcon [phone]       [address] [address]        Equal Access to Services     [facility] : Hadii aad ku hadasho Soomaali, waaxda luqadaha, qaybta kaalmada adeegyada, cierra claudio . So [facility] [phone].    ATENCIÓN: Si habla español, tiene a guaman disposición servicios gratuitos de asistencia lingüística. Gerardo al [phone].    We comply with applicable federal civil rights laws and [place] laws. We do not discriminate on the basis of race, color, national origin, age, disability, sex, sexual orientation, or gender identity.            Thank you!     Thank you for choosing  ORTHOPEDICS  for your care. Our goal is always to provide you with excellent care. Hearing back from our patients is one way we can continue to improve our services. Please take a few minutes to complete the " written survey that you may receive in the mail after your visit with us. Thank you!             Your Updated Medication List - Protect others around you: Learn how to safely use, store and throw away your medicines at www.disposemymeds.org.          This list is accurate as of: 12/6/17  2:41 PM.  Always use your most recent med list.                   Brand Name Dispense Instructions for use Diagnosis    albuterol 108 (90 BASE) MCG/ACT Inhaler    VENTOLIN HFA    2 Inhaler    USE 1 PUFF FOUR TIMES A DAY AS NEEDED    Tobacco abuse       ALPRAZolam 1 MG tablet    XANAX    90 tablet    Take 1 tablet (1 mg) by mouth 3 times daily as needed for anxiety    Anxiety       amoxicillin-clavulanate 875-125 MG per tablet    AUGMENTIN    20 tablet    Take 1 tablet by mouth 2 times daily    LRTI (lower respiratory tract infection)       aspirin 81 MG EC tablet      Take 1 tablet (81 mg) by mouth 2 times daily    S/P total knee replacement using cement, right       atorvastatin 40 MG tablet    LIPITOR    90 tablet    Take 1 tablet (40 mg) by mouth daily    Mixed hyperlipidemia       benazepril 10 MG tablet    LOTENSIN    90 tablet    TAKE 1 TABLET BY MOUTH DAILY    HTN (hypertension)       benzonatate 200 MG capsule    TESSALON    60 capsule    TAKE 1 CAPSULE BY MOUTH 3 TIMES A DAY AS NEEDED FOR COUGH    Preop general physical exam, Tobacco abuse       budesonide-formoterol 160-4.5 MCG/ACT Inhaler    SYMBICORT    1 Inhaler    Inhale 2 puffs into the lungs 2 times daily. As needed    Asthma       butalbital-acetaminophen-caffeine -40 MG per tablet    FIORICET/ESGIC    30 tablet    Take 1 tablet by mouth every 6 hours as needed    Scalp hematoma, sequela       cimetidine 800 MG tablet    TAGAMET    90 tablet    Take 1 tablet (800 mg) by mouth At Bedtime As needed    Esophageal reflux       dexamethasone 4 MG/ML injection    DECADRON    1 mL    Inject 1 mL (4 mg) as directed once for 1 dose Use 4 mg or dose determined by  provider for iontophoresis.    Primary osteoarthritis of left knee       diclofenac 1 % Gel topical gel    VOLTAREN    100 g    Apply 4 grams to knees  four times daily using enclosed dosing card.    Primary osteoarthritis of left knee, Chronic pain of right knee       doxepin 10 MG capsule    SINEquan    90 capsule    Take 1 capsule (10 mg) by mouth At Bedtime    Primary osteoarthritis of right knee       * EPINEPHrine 0.3 MG/0.3ML injection 2-pack    EPIPEN/ADRENACLICK/or ANY BX GENERIC EQUIV     Inject 0.3 mg into the muscle        * EPIPEN 2-JAIRO 0.3 MG/0.3ML injection 2-pack   Generic drug:  EPINEPHrine     2 each    INJECT 1 SYRINGE INTRAMUSCULARLY ONCE AS NEEDED FOR ANAPHYLAXIS    Anaphylactic reaction       escitalopram 20 MG tablet    LEXAPRO    180 tablet    TAKE 1 TABLET BY MOUTH 2 TIMES A DAY    Dysthymia       * fluconazole 150 MG tablet    DIFLUCAN    4 tablet    Take 1 tablet (150 mg) by mouth every 3 days    Candidiasis of vulva and vagina       * fluconazole 150 MG tablet    DIFLUCAN    2 tablet    TAKE 1 TABLET BY MOUTH EVERY 72 HOURS    Candidiasis of vulva and vagina       hydrochlorothiazide 25 MG tablet    HYDRODIURIL    90 tablet    TAKE 1 TABLET BY MOUTH DAILY    Benign essential hypertension       ipratropium - albuterol 0.5 mg/2.5 mg/3 mL 0.5-2.5 (3) MG/3ML neb solution    DUONEB    90 mL    Take 1 vial (3 mLs) by nebulization every 4 hours as needed    Acute bronchospasm, SOB (shortness of breath)       meloxicam 15 MG tablet    MOBIC    90 tablet    Take 1 tablet (15 mg) by mouth daily    Pain       montelukast 10 MG tablet    SINGULAIR    90 tablet    TAKE 1 TABLET BY MOUTH DAILY    Mixed hyperlipidemia       nystatin 355590 UNIT/ML suspension    MYCOSTATIN    60 mL    TAKE 1 TEASPOON (5MLS) BY MOUTH 4 TIMES DAILY    Thrush       * order for DME     2 each    Equipment being ordered: Nebulizer supplies    Primary osteoarthritis of right knee       * order for DME     1 each    Equipment  being ordered: mcgee walker    Primary osteoarthritis of right knee       * order for DME     1 each    Equipment being ordered: toilet seat riser    Postoperative state       * order for DME     1 Units    Equipment being ordered: Please install safety bar in bathroom to assist patient in getting up from toilet.    Status post total right knee replacement       oxyCODONE-acetaminophen 5-325 MG per tablet    PERCOCET    60 tablet    TAKE 1 TO 2 TABLETS BY MOUTH EVERY 4 HOURS AS NEEDED FOR PAIN    Primary osteoarthritis of both knees       potassium chloride 10 MEQ tablet    K-TAB,KLOR-CON    180 tablet    TAKE 1 TABLET BY MOUTH 2 TIMES DAILY WITH FOOD    Benign essential hypertension       predniSONE 20 MG tablet    DELTASONE    10 tablet    Take 1 tablet (20 mg) by mouth 2 times daily    LRTI (lower respiratory tract infection), Tobacco abuse       temazepam 30 MG capsule    RESTORIL    90 capsule    TAKE 1 CAPSULE BY MOUTH NIGHTLY AS NEEDED FOR SLEEP    Persistent insomnia       * Notice:  This list has 8 medication(s) that are the same as other medications prescribed for you. Read the directions carefully, and ask your doctor or other care provider to review them with you.

## 2017-12-06 NOTE — NURSING NOTE
"Chief Complaint   Patient presents with     Surgical Followup     s/p right total knee 3/21/17 / would like left knee checked       Initial /70  Pulse 75  Temp 97.8  F (36.6  C) (Tympanic)  Ht 4' 10\" (1.473 m)  Wt 165 lb (74.8 kg)  SpO2 96%  BMI 34.49 kg/m2 Estimated body mass index is 34.49 kg/(m^2) as calculated from the following:    Height as of this encounter: 4' 10\" (1.473 m).    Weight as of this encounter: 165 lb (74.8 kg).  Medication Reconciliation: complete     Al[place]ia Carvajal      "

## 2017-12-13 ENCOUNTER — TELEPHONE (OUTPATIENT)
Dept: FAMILY MEDICINE | Facility: OTHER | Age: 67
End: 2017-12-13

## 2017-12-13 NOTE — TELEPHONE ENCOUNTER
8:44 AM    Reason for Call: OVERBOOK    Patient is having the following symptoms: lower extremities are cold, circulation?? for 3 days.    The patient is requesting an appointment for 12-13- or 12-14-17 with Dr Alarcon.    Was an appointment offered for this call? Yes  If yes : Appointment type Patient would like to be seen sooner than next available              Date    Preferred method for responding to this message: Telephone Call   What is your phone number ?  [phone]    If we cannot reach you directly, may we leave a detailed response at the number you provided? Yes    Can this message wait until your PCP/provider returns, if unavailable today? YES    Jada Gee

## 2017-12-14 ENCOUNTER — HOSPITAL ENCOUNTER (OUTPATIENT)
Dept: ULTRASOUND IMAGING | Facility: HOSPITAL | Age: 67
Discharge: HOME | End: 2017-12-14
Attending: FAMILY MEDICINE | Admitting: FAMILY MEDICINE
Payer: MEDICARE

## 2017-12-14 ENCOUNTER — OFFICE VISIT (OUTPATIENT)
Dept: FAMILY MEDICINE | Facility: OTHER | Age: 67
End: 2017-12-14
Attending: FAMILY MEDICINE
Payer: MEDICARE

## 2017-12-14 VITALS
BODY MASS INDEX: 32.75 KG/M2 | HEIGHT: 58 IN | SYSTOLIC BLOOD PRESSURE: 118 MMHG | WEIGHT: 156 LBS | RESPIRATION RATE: 16 BRPM | TEMPERATURE: 98.2 F | HEART RATE: 74 BPM | OXYGEN SATURATION: 94 % | DIASTOLIC BLOOD PRESSURE: 72 MMHG

## 2017-12-14 DIAGNOSIS — R09.89 OTHER SPECIFIED SYMPTOMS AND SIGNS INVOLVING THE CIRCULATORY AND RESPIRATORY SYSTEMS: ICD-10-CM

## 2017-12-14 DIAGNOSIS — Z72.0 TOBACCO ABUSE: ICD-10-CM

## 2017-12-14 DIAGNOSIS — R20.9 BILATERAL COLD FEET: Primary | ICD-10-CM

## 2017-12-14 DIAGNOSIS — R20.9 BILATERAL COLD FEET: ICD-10-CM

## 2017-12-14 PROCEDURE — 99212 OFFICE O/P EST SF 10 MIN: CPT | Mod: 25

## 2017-12-14 PROCEDURE — 99214 OFFICE O/P EST MOD 30 MIN: CPT | Performed by: FAMILY MEDICINE

## 2017-12-14 PROCEDURE — 93922 UPR/L XTREMITY ART 2 LEVELS: CPT | Mod: TC

## 2017-12-14 RX ORDER — NICOTINE 21 MG/24HR
1 PATCH, TRANSDERMAL 24 HOURS TRANSDERMAL EVERY 24 HOURS
Qty: 30 PATCH | Refills: 1 | Status: SHIPPED | OUTPATIENT
Start: 2017-12-14 | End: 2018-04-03

## 2017-12-14 ASSESSMENT — ANXIETY QUESTIONNAIRES
7. FEELING AFRAID AS IF SOMETHING AWFUL MIGHT HAPPEN: NEARLY EVERY DAY
1. FEELING NERVOUS, ANXIOUS, OR ON EDGE: MORE THAN HALF THE DAYS
2. NOT BEING ABLE TO STOP OR CONTROL WORRYING: NEARLY EVERY DAY
3. WORRYING TOO MUCH ABOUT DIFFERENT THINGS: NEARLY EVERY DAY
5. BEING SO RESTLESS THAT IT IS HARD TO SIT STILL: MORE THAN HALF THE DAYS
GAD7 TOTAL SCORE: 19
IF YOU CHECKED OFF ANY PROBLEMS ON THIS QUESTIONNAIRE, HOW DIFFICULT HAVE THESE PROBLEMS MADE IT FOR YOU TO DO YOUR WORK, TAKE CARE OF THINGS AT HOME, OR GET ALONG WITH OTHER PEOPLE: NOT DIFFICULT AT ALL
6. BECOMING EASILY ANNOYED OR IRRITABLE: NEARLY EVERY DAY

## 2017-12-14 ASSESSMENT — PATIENT HEALTH QUESTIONNAIRE - PHQ9
SUM OF ALL RESPONSES TO PHQ QUESTIONS 1-9: 15
5. POOR APPETITE OR OVEREATING: NEARLY EVERY DAY

## 2017-12-14 ASSESSMENT — PAIN SCALES - GENERAL: PAINLEVEL: NO PAIN (0)

## 2017-12-14 NOTE — PROGRESS NOTES
Sydni SKINNER Wilfred    2017    Chief Complaint   Patient presents with     Circulation Problems     Pt has been having problems with her legs and feet for a few weeks. Pt notes bluish discoloration when cold.        SUBJECTIVE:  Here for having cold feet this winter.  Wondering about blood flow.  No issues with the hands being cold.  We checked and discussed tobacco.        Past Medical History:   Diagnosis Date     Abnormal arterial blood gases 2006     Acute upper respiratory infections of unspecified site 2001     Anxiety 10/24/2011     Calculus of gallbladder without mention of cholecystitis or obstruction 2006     Depression 2012     Follow-up examination, following other surgery 2006     Headache(784.0) 12/10/2003     History of CVA (cerebrovascular accident) 2011     HTN (hypertension) 2012     Insomnia 3/13/2012     Lipoma of other skin and subcutaneous tissue 2006     Nonallopathic lesion of cervical region, not elsewhere classified 2003     Osteoarthrosis, unspecified whether generalized or localized, lower leg 10/4/2001     Special screening for malignant neoplasms, colon 2006     Urinary tract infection, site not specified 2007       Past Surgical History:   Procedure Laterality Date     ARTHROPLASTY KNEE Right 3/21/2017    Procedure: ARTHROPLASTY KNEE;  Surgeon: Moisés Olson MD;  Location: [facility]     ARTHROSCOPY KNEE       BIOPSY BREAST  2000    left - free of disease      SECTION        SECTION       CHOLECYSTECTOMY  2006     COLONOSCOPY  2008    repeat in 10 years     HEAD & NECK SURGERY  2014    hematoma on head drained     HYSTERECTOMY       ORTHOPEDIC SURGERY  2014    right shoulder replacement     shoulder x2  2009    left       Current Outpatient Prescriptions   Medication Sig Dispense Refill     oxyCODONE-acetaminophen (PERCOCET) 5-325 MG per tablet TAKE 1 TO 2 TABLETS BY MOUTH EVERY 4 HOURS AS  NEEDED FOR PAIN 60 tablet 0     fluconazole (DIFLUCAN) 150 MG tablet TAKE 1 TABLET BY MOUTH EVERY 72 HOURS 2 tablet 0     temazepam (RESTORIL) 30 MG capsule TAKE 1 CAPSULE BY MOUTH NIGHTLY AS NEEDED FOR SLEEP 90 capsule 0     atorvastatin (LIPITOR) 40 MG tablet Take 1 tablet (40 mg) by mouth daily 90 tablet 3     meloxicam (MOBIC) 15 MG tablet Take 1 tablet (15 mg) by mouth daily 90 tablet 3     escitalopram (LEXAPRO) 20 MG tablet TAKE 1 TABLET BY MOUTH 2 TIMES A  tablet 3     nystatin (MYCOSTATIN) 733380 UNIT/ML suspension TAKE 1 TEASPOON (5MLS) BY MOUTH 4 TIMES DAILY 60 mL 0     butalbital-acetaminophen-caffeine (FIORICET/ESGIC) -40 MG per tablet Take 1 tablet by mouth every 6 hours as needed 30 tablet 0     ipratropium - albuterol 0.5 mg/2.5 mg/3 mL (DUONEB) 0.5-2.5 (3) MG/3ML neb solution Take 1 vial (3 mLs) by nebulization every 4 hours as needed 90 mL 3     fluconazole (DIFLUCAN) 150 MG tablet Take 1 tablet (150 mg) by mouth every 3 days 4 tablet 0     dexamethasone (DECADRON) 4 MG/ML injection Inject 1 mL (4 mg) as directed once for 1 dose Use 4 mg or dose determined by provider for iontophoresis. 1 mL 0     albuterol (VENTOLIN HFA) 108 (90 BASE) MCG/ACT Inhaler USE 1 PUFF FOUR TIMES A DAY AS NEEDED 2 Inhaler 3     ALPRAZolam (XANAX) 1 MG tablet Take 1 tablet (1 mg) by mouth 3 times daily as needed for anxiety 90 tablet 5     potassium chloride (K-TAB,KLOR-CON) 10 MEQ tablet TAKE 1 TABLET BY MOUTH 2 TIMES DAILY WITH FOOD 180 tablet 0     EPINEPHrine 0.3 MG/0.3ML injection Inject 0.3 mg into the muscle       diclofenac (VOLTAREN) 1 % GEL topical gel Apply 4 grams to knees  four times daily using enclosed dosing card. 100 g 3     doxepin (SINEQUAN) 10 MG capsule Take 1 capsule (10 mg) by mouth At Bedtime 90 capsule 3     benazepril (LOTENSIN) 10 MG tablet TAKE 1 TABLET BY MOUTH DAILY 90 tablet 2     order for DME Equipment being ordered: Please install safety bar in bathroom to assist patient in  "getting up from toilet. 1 Units 0     aspirin EC 81 MG EC tablet Take 1 tablet (81 mg) by mouth 2 times daily       order for DME Equipment being ordered: toilet seat riser 1 each 0     benzonatate (TESSALON) 200 MG capsule TAKE 1 CAPSULE BY MOUTH 3 TIMES A DAY AS NEEDED FOR COUGH 60 capsule 3     hydrochlorothiazide (HYDRODIURIL) 25 MG tablet TAKE 1 TABLET BY MOUTH DAILY 90 tablet 3     montelukast (SINGULAIR) 10 MG tablet TAKE 1 TABLET BY MOUTH DAILY 90 tablet 3     order for DME Equipment being ordered: Nebulizer supplies 2 each 11     order for DME Equipment being ordered: mcgee walker 1 each 0     EPIPEN 2-JAIRO 0.3 MG/0.3ML injection INJECT 1 SYRINGE INTRAMUSCULARLY ONCE AS NEEDED FOR ANAPHYLAXIS 2 each 0     cimetidine (TAGAMET) 800 MG tablet Take 1 tablet (800 mg) by mouth At Bedtime As needed 90 tablet 3     budesonide-formoterol (SYMBICORT) 160-4.5 MCG/ACT inhaler Inhale 2 puffs into the lungs 2 times daily. As needed 1 Inhaler 11       Allergies   Allergen Reactions     Bee Pollen Other (See Comments)     Throat and eyes close up     Codeine Difficulty breathing     \"My throat closes shut.\"     Takes Percocet at home     Keflex [Cephalexin Hcl] Hives     Sulfonamide Derivatives Other (See Comments)     Throat closes     Ciprofloxacin Rash     Rash, bumps on lips     Bupropion Hcl Other (See Comments)     Made her very irritable     Celecoxib Other (See Comments) and Cough     Celebrex - wheeze  Takes ASA at home     Clonazepam Hives     Takes Xanax at home     Erythromycin Other (See Comments)     Erythromycin base - abdominal pain     Lisinopril Other (See Comments)     Zestril - headaches     Nifedipine Hives     Procardia     Strawberries [Strawberry]      Tramadol Hcl      Takes percocet at home       Family History   Problem Relation Age of Onset     CANCER Mother      skin     Breast Cancer Mother      CEREBROVASCULAR DISEASE Mother      Alzheimer Disease Mother      Myocardial Infarction Father 76 "     myocardial infarction - cause of death     CANCER Daughter      cervical      DIABETES Maternal Grandfather      HEART DISEASE Maternal Grandfather      MI     DIABETES Maternal Grandmother      DIABETES Paternal Grandmother      DIABETES Paternal Grandfather        Social History     Social History     Marital status:      Spouse name: N/A     Number of children: N/A     Years of education: N/A     Occupational History     Not on file.     Social History Main Topics     Smoking status: Current Every Day Smoker     Packs/day: 1.00     Years: 46.00     Types: Cigarettes     Smokeless tobacco: Never Used      Comment: tried to quit, relapse due to craving.  Patient states that she will be quitting smoking shortly (9-20-17)     Alcohol use 0.0 oz/week     0 Standard drinks or equivalent per week      Comment: rarely     Drug use: No     Sexual activity: No     Other Topics Concern      Service No     Blood Transfusions Yes     Permits if needed     Caffeine Concern Yes     2 cups     Occupational Exposure No     Hobby Hazards No     Sleep Concern Yes     doxipen and xanax at bedtime     Stress Concern No     Weight Concern No     Special Diet No     Back Care No     Exercise No     Seat Belt No     encouraged to wear seat belt for safety     Self-Exams Yes     Parent/Sibling W/ Cabg, Mi Or Angioplasty Before 65f 55m? Yes     father     Social History Narrative       5 point ROS negative except as noted above in HPI, including Gen., Resp., CV, GI &  system review.     OBJECTIVE:  B/P: 118/72, Temperature: 98.2, Pulse: 74, Respirations: 16    GENERAL APPEARANCE: Alert, no acute distress  CV: regular rate and rhythm, no murmur, rub or gallop  RESP: lungs clear to auscultation bilaterally  ABDOMEN: normal bowel sounds, soft, nontender, no hepatosplenomegaly or other masses  Feet:  Warm, good cap refill.  Pulse better on the left than the right I think.   SKIN: no suspicious lesions or rashes to  visualized skin  NEURO: Alert, oriented x 3, speech and mentation normal    ASSESSMENT and PLAN:  (R20.9) Bilateral cold feet  (primary encounter diagnosis)  Comment: reviewed.   Plan: US ELIANA Doppler No Exercise        Confirm with ELIANA.  I think it should be ok.  If not we will take action.     (R09.89) Other specified symptoms and signs involving the circulatory and respiratory systems   Comment: reviewed.   Plan: US ELIANA Doppler No Exercise        As above.        (Z72.0) Tobacco abuse  Comment: reviewed.   Plan: nicotine (NICODERM CQ) 21 MG/24HR 24 hr patch,         nicotine (NICODERM CQ) 7 MG/24HR 24 hr patch,         nicotine (NICODERM CQ) 14 MG/24HR 24 hr patch        Sent the patches.  She is ready now.

## 2017-12-14 NOTE — NURSING NOTE
"Chief Complaint   Patient presents with     Circulation Problems     Pt has been having problems with her legs and feet for a few weeks. Pt notes bluish discoloration when cold.        Initial /72 (BP Location: Left arm, Patient Position: Chair, Cuff Size: Adult Regular)  Pulse 74  Temp 98.2  F (36.8  C) (Tympanic)  Resp 16  Ht 4' 10\" (1.473 m)  Wt 156 lb (70.8 kg)  SpO2 94%  BMI 32.6 kg/m2 Estimated body mass index is 32.6 kg/(m^2) as calculated from the following:    Height as of this encounter: 4' 10\" (1.473 m).    Weight as of this encounter: 156 lb (70.8 kg).  Medication Reconciliation: complete   Rose Chaparro    "

## 2017-12-14 NOTE — MR AVS SNAPSHOT
After Visit Summary   12/14/2017    Sydni Hardin    MRN: [id number]           Patient Information     Date Of Birth          1950        Visit Information        Provider Department      12/14/2017 8:30 AM Gibran Alarcon MD [facility]        Today's Diagnoses     Bilateral cold feet    -  1    Other specified symptoms and signs involving the circulatory and respiratory systems         Tobacco abuse          Care Instructions    F/u with ongoing concerns.           Follow-ups after your visit        Your next 10 appointments already scheduled     Dec 15, 2017  1:00 PM CST   (Arrive by 12:45 PM)   MR LUMBAR SPINE W/O CONTRAST with HIMR1   HI MRI ([facility] )    [address] [address] [phone]           Take your medicines as usual, unless your doctor tells you not to. Bring a list of your current medicines to your exam (including vitamins, minerals and over-the-counter drugs). Also bring the results of similar scans you may have had.  Please remove any body piercings and hair extensions before you arrive.  Follow your doctor s orders. If you do not, we may have to postpone your exam.  You will not have contrast for this exam. You do not need to do anything special to prepare.  The MRI machine uses a strong magnet. Please wear clothes without metal (snaps, zippers). A sweatsuit works well, or we may give you a hospital gown.   **IMPORTANT** THE INSTRUCTIONS BELOW ARE ONLY FOR THOSE PATIENTS WHO HAVE BEEN TOLD THEY WILL RECEIVE SEDATION OR GENERAL ANESTHESIA DURING THEIR MRI PROCEDURE:  IF YOU WILL RECEIVE SEDATION (take medicine to help you relax during your exam):   You must get the medicine from your doctor before you arrive. Bring the medicine to the exam. Do not take it at home.   Arrive one hour early. Bring someone who can take you home after the test. Your medicine will make you sleepy. After the exam, you may not drive, take a  bus or take a taxi by yourself.   No eating 8 hours before your exam. You may have clear liquids up until 4 hours before your exam. (Clear liquids include water, clear tea, black coffee and fruit juice without pulp.)  IF YOU WILL RECEIVE ANESTHESIA (be asleep for your exam):   Arrive 1 1/2 hours early. Bring someone who can take you home after the test. You may not drive, take a bus or take a taxi by yourself.   No eating 8 hours before your exam. You may have clear liquids up until 4 hours before your exam. (Clear liquids include water, clear tea, black coffee and fruit juice without pulp.)   You will spend four to five hours in the recovery room.  Please call the Imaging Department at your exam site with any questions.            Dec 15, 2017  1:30 PM CST   XR LUMBAR EPIDURAL INJECTION with HIIR1, HIIRRAD   HI INTERVENTIONAL RAD ([facility] )    [address] [place] [id number] [phone]           For nerve root injection, please send or bring copies of any MRIs or other scans you have had.  Bring a list of your current medicines to your exam. (Include vitamins, minerals and over-the-counter medicines.) Leave your valuables at home.  Plan to have someone drive you home afterward.  Stop taking the following medicines (but talk to your doctor first):   If you take blood thinners, you may need to stop taking them a few days before treatment. Talk to your doctor before stopping these medicines.Stop taking Coumadin (warfarin) 3 days before treatment. Restart the day after treatment.   If you take Plavix, Ticlid, Pletal or Persantine, please ask your doctor if you should stop these medicines. You may need extra tests on the morning of your scan.   If you take Xarelto (Rivaroxaban), you may need to stop taking it 24 hours before treatment. Talk to your doctor before stopping this medicine. Restart the day after treatment.  You may take your other medicines as normal.  Stop all food and drink  "(including water) 3 hours before your test or treatment.  Please tell the doctor:   If you are allergic to X-ray dye (contrast fluid).   If you may be pregnant.  Injections take about 30 to 45 minutes. Most people spend up to 2 hours in the clinic or hospital.  Please call the Imaging Department at your exam site with any questions.            Jan 03, 2018  1:00 PM CST   (Arrive by 12:45 PM)   Return Visit with Moisés Olson MD    ORTHOPEDICS ([facility] )    [address] [facility] [address] [phone]              Future tests that were ordered for you today     Open Future Orders        Priority Expected Expires Ordered    US ELIANA Doppler No Exercise Routine  12/14/2018 12/14/2017            Who to contact     If you have questions or need follow up information about today's clinic visit or your schedule please contact [facility] directly at [phone].  Normal or non-critical lab and imaging results will be communicated to you by MyChart, letter or phone within 4 business days after the clinic has received the results. If you do not hear from us within 7 days, please contact the clinic through MyChart or phone. If you have a critical or abnormal lab result, we will notify you by phone as soon as possible.  Submit refill requests through GELI or call your pharmacy and they will forward the refill request to us. Please allow 3 business days for your refill to be completed.          Additional Information About Your Visit        Care EveryWhere ID     This is your Care EveryWhere ID. This could be used by other organizations to access your [facility] medical records  [id number]        Your Vitals Were     Pulse Temperature Respirations Height Pulse Oximetry BMI (Body Mass Index)    74 98.2  F (36.8  C) (Tympanic) 16 4' 10\" (1.473 m) 94% 32.6 kg/m2       Blood Pressure from Last 3 Encounters:   12/14/17 118/72   12/06/17 102/70   10/25/17 122/74    Weight from " Last 3 Encounters:   12/14/17 156 lb (70.8 kg)   12/06/17 165 lb (74.8 kg)   10/25/17 165 lb (74.8 kg)                 Today's Medication Changes          These changes are accurate as of: 12/14/17  8:53 AM.  If you have any questions, ask your nurse or doctor.               Start taking these medicines.        Dose/Directions    * nicotine 21 MG/24HR 24 hr patch   Commonly known as:  NICODERM CQ   Used for:  Tobacco abuse        Dose:  1 patch   Place 1 patch onto the skin every 24 hours   Quantity:  30 patch   Refills:  1       * nicotine 7 MG/24HR 24 hr patch   Commonly known as:  NICODERM CQ   Used for:  Tobacco abuse        Dose:  1 patch   Place 1 patch onto the skin every 24 hours   Quantity:  30 patch   Refills:  1       * nicotine 14 MG/24HR 24 hr patch   Commonly known as:  NICODERM CQ   Used for:  Tobacco abuse        Dose:  1 patch   Place 1 patch onto the skin every 24 hours   Quantity:  30 patch   Refills:  1       * Notice:  This list has 3 medication(s) that are the same as other medications prescribed for you. Read the directions carefully, and ask your doctor or other care provider to review them with you.         Where to get your medicines      These medications were sent to [facility] [place] - [address]  [address] [place] [address]     Phone:  [phone]     nicotine 14 MG/24HR 24 hr patch    nicotine 21 MG/24HR 24 hr patch    nicotine 7 MG/24HR 24 hr patch                Primary Care Provider Office Phone # Fax #    Gibran Alarcon [phone]       [facility] [address]  [facility] [address]        Equal Access to Services     [facility]: Hadii aad ku hadasho Soomaali, waaxda luqadaha, qaybta kaalmada adeegyada, cierra yepez hayaniceton parth arteaga. So [facility] [phone].    ATENCIÓN: Si habla español, tiene a guaman disposición servicios gratuitos de asistencia lingüística. Llame al [phone].    We comply with  applicable federal civil rights laws and [place] laws. We do not discriminate on the basis of race, color, national origin, age, disability, sex, sexual orientation, or gender identity.            Thank you!     Thank you for choosing [facility]  for your care. Our goal is always to provide you with excellent care. Hearing back from our patients is one way we can continue to improve our services. Please take a few minutes to complete the written survey that you may receive in the mail after your visit with us. Thank you!             Your Updated Medication List - Protect others around you: Learn how to safely use, store and throw away your medicines at www.disposemymeds.org.          This list is accurate as of: 12/14/17  8:53 AM.  Always use your most recent med list.                   Brand Name Dispense Instructions for use Diagnosis    albuterol 108 (90 BASE) MCG/ACT Inhaler    VENTOLIN HFA    2 Inhaler    USE 1 PUFF FOUR TIMES A DAY AS NEEDED    Tobacco abuse       ALPRAZolam 1 MG tablet    XANAX    90 tablet    Take 1 tablet (1 mg) by mouth 3 times daily as needed for anxiety    Anxiety       aspirin 81 MG EC tablet      Take 1 tablet (81 mg) by mouth 2 times daily    S/P total knee replacement using cement, right       atorvastatin 40 MG tablet    LIPITOR    90 tablet    Take 1 tablet (40 mg) by mouth daily    Mixed hyperlipidemia       benazepril 10 MG tablet    LOTENSIN    90 tablet    TAKE 1 TABLET BY MOUTH DAILY    HTN (hypertension)       benzonatate 200 MG capsule    TESSALON    60 capsule    TAKE 1 CAPSULE BY MOUTH 3 TIMES A DAY AS NEEDED FOR COUGH    Preop general physical exam, Tobacco abuse       budesonide-formoterol 160-4.5 MCG/ACT Inhaler    SYMBICORT    1 Inhaler    Inhale 2 puffs into the lungs 2 times daily. As needed    Asthma       butalbital-acetaminophen-caffeine -40 MG per tablet    FIORICET/ESGIC    30 tablet    Take 1 tablet by mouth every 6 hours as needed     Scalp hematoma, sequela       cimetidine 800 MG tablet    TAGAMET    90 tablet    Take 1 tablet (800 mg) by mouth At Bedtime As needed    Esophageal reflux       dexamethasone 4 MG/ML injection    DECADRON    1 mL    Inject 1 mL (4 mg) as directed once for 1 dose Use 4 mg or dose determined by provider for iontophoresis.    Primary osteoarthritis of left knee       diclofenac 1 % Gel topical gel    VOLTAREN    100 g    Apply 4 grams to knees  four times daily using enclosed dosing card.    Primary osteoarthritis of left knee, Chronic pain of right knee       doxepin 10 MG capsule    SINEquan    90 capsule    Take 1 capsule (10 mg) by mouth At Bedtime    Primary osteoarthritis of right knee       * EPINEPHrine 0.3 MG/0.3ML injection 2-pack    EPIPEN/ADRENACLICK/or ANY BX GENERIC EQUIV     Inject 0.3 mg into the muscle        * EPIPEN 2-JAIRO 0.3 MG/0.3ML injection 2-pack   Generic drug:  EPINEPHrine     2 each    INJECT 1 SYRINGE INTRAMUSCULARLY ONCE AS NEEDED FOR ANAPHYLAXIS    Anaphylactic reaction       escitalopram 20 MG tablet    LEXAPRO    180 tablet    TAKE 1 TABLET BY MOUTH 2 TIMES A DAY    Dysthymia       * fluconazole 150 MG tablet    DIFLUCAN    4 tablet    Take 1 tablet (150 mg) by mouth every 3 days    Candidiasis of vulva and vagina       * fluconazole 150 MG tablet    DIFLUCAN    2 tablet    TAKE 1 TABLET BY MOUTH EVERY 72 HOURS    Candidiasis of vulva and vagina       hydrochlorothiazide 25 MG tablet    HYDRODIURIL    90 tablet    TAKE 1 TABLET BY MOUTH DAILY    Benign essential hypertension       ipratropium - albuterol 0.5 mg/2.5 mg/3 mL 0.5-2.5 (3) MG/3ML neb solution    DUONEB    90 mL    Take 1 vial (3 mLs) by nebulization every 4 hours as needed    Acute bronchospasm, SOB (shortness of breath)       meloxicam 15 MG tablet    MOBIC    90 tablet    Take 1 tablet (15 mg) by mouth daily    Pain       montelukast 10 MG tablet    SINGULAIR    90 tablet    TAKE 1 TABLET BY MOUTH DAILY    Mixed  hyperlipidemia       * nicotine 21 MG/24HR 24 hr patch    NICODERM CQ    30 patch    Place 1 patch onto the skin every 24 hours    Tobacco abuse       * nicotine 7 MG/24HR 24 hr patch    NICODERM CQ    30 patch    Place 1 patch onto the skin every 24 hours    Tobacco abuse       * nicotine 14 MG/24HR 24 hr patch    NICODERM CQ    30 patch    Place 1 patch onto the skin every 24 hours    Tobacco abuse       nystatin 470720 UNIT/ML suspension    MYCOSTATIN    60 mL    TAKE 1 TEASPOON (5MLS) BY MOUTH 4 TIMES DAILY    Thrush       * order for DME     2 each    Equipment being ordered: Nebulizer supplies    Primary osteoarthritis of right knee       * order for DME     1 each    Equipment being ordered: mcgee walker    Primary osteoarthritis of right knee       * order for DME     1 each    Equipment being ordered: toilet seat riser    Postoperative state       * order for DME     1 Units    Equipment being ordered: Please install safety bar in bathroom to assist patient in getting up from toilet.    Status post total right knee replacement       oxyCODONE-acetaminophen 5-325 MG per tablet    PERCOCET    60 tablet    TAKE 1 TO 2 TABLETS BY MOUTH EVERY 4 HOURS AS NEEDED FOR PAIN    Primary osteoarthritis of both knees       potassium chloride 10 MEQ tablet    K-TAB,KLOR-CON    180 tablet    TAKE 1 TABLET BY MOUTH 2 TIMES DAILY WITH FOOD    Benign essential hypertension       temazepam 30 MG capsule    RESTORIL    90 capsule    TAKE 1 CAPSULE BY MOUTH NIGHTLY AS NEEDED FOR SLEEP    Persistent insomnia       * Notice:  This list has 11 medication(s) that are the same as other medications prescribed for you. Read the directions carefully, and ask your doctor or other care provider to review them with you.

## 2017-12-15 ENCOUNTER — HOSPITAL ENCOUNTER (OUTPATIENT)
Dept: MRI IMAGING | Facility: HOSPITAL | Age: 67
Discharge: HOME | End: 2017-12-15
Attending: FAMILY MEDICINE | Admitting: FAMILY MEDICINE
Payer: MEDICARE

## 2017-12-15 ENCOUNTER — HOSPITAL ENCOUNTER (OUTPATIENT)
Dept: INTERVENTIONAL RADIOLOGY/VASCULAR | Facility: HOSPITAL | Age: 67
End: 2017-12-15
Attending: FAMILY MEDICINE
Payer: MEDICARE

## 2017-12-15 DIAGNOSIS — M54.16 LUMBAR RADICULOPATHY: ICD-10-CM

## 2017-12-15 PROCEDURE — 25000128 H RX IP 250 OP 636: Performed by: RADIOLOGY

## 2017-12-15 PROCEDURE — 72148 MRI LUMBAR SPINE W/O DYE: CPT | Mod: TC

## 2017-12-15 PROCEDURE — 64483 NJX AA&/STRD TFRM EPI L/S 1: CPT | Mod: TC,LT

## 2017-12-15 RX ORDER — DEXAMETHASONE SODIUM PHOSPHATE 10 MG/ML
INJECTION, SOLUTION INTRAMUSCULAR; INTRAVENOUS
Status: DISCONTINUED
Start: 2017-12-15 | End: 2017-12-16 | Stop reason: HOSPADM

## 2017-12-15 RX ORDER — DEXAMETHASONE SODIUM PHOSPHATE 10 MG/ML
10 INJECTION, SOLUTION INTRAMUSCULAR; INTRAVENOUS ONCE
Status: COMPLETED | OUTPATIENT
Start: 2017-12-15 | End: 2017-12-15

## 2017-12-15 RX ORDER — IOPAMIDOL 612 MG/ML
15 INJECTION, SOLUTION INTRATHECAL ONCE
Status: COMPLETED | OUTPATIENT
Start: 2017-12-15 | End: 2017-12-15

## 2017-12-15 RX ORDER — METHYLPREDNISOLONE ACETATE 80 MG/ML
80 INJECTION, SUSPENSION INTRA-ARTICULAR; INTRALESIONAL; INTRAMUSCULAR; SOFT TISSUE ONCE
Status: DISCONTINUED | OUTPATIENT
Start: 2017-12-15 | End: 2017-12-16 | Stop reason: HOSPADM

## 2017-12-15 RX ADMIN — IOPAMIDOL 3 ML: 612 INJECTION, SOLUTION INTRATHECAL at 13:44

## 2017-12-15 RX ADMIN — DEXAMETHASONE SODIUM PHOSPHATE 10 MG: 10 INJECTION, SOLUTION INTRAMUSCULAR; INTRAVENOUS at 13:46

## 2017-12-15 ASSESSMENT — ANXIETY QUESTIONNAIRES: GAD7 TOTAL SCORE: 19

## 2017-12-15 NOTE — DISCHARGE INSTRUCTIONS
Home number on file [phone] (home)  Is it ok to leave a message at this number(s)? Yes    Dr Sanches completed your procedure on 12/15/2017.    Current Pain Level (0-10 Scale): 7/10  Post Pain Level (0-10):  0/10    Radiology Discharge instructions for Steroid Injection    Activity Level:     Do not do any heavy activity or exercise for 24 hours.   Do not drive for 4 hours after your injection.  Diet:   Return to your normal diet.  Medications:   If you have stopped taking your Aspirin, Coumadin/Warfarin, Ibuprofen, or any   other blood thinner for this procedure you may resume in the morning unless   your primary care provider has given you other instructions.    Diabetics may see an increase in blood sugar after steroid injections. If you are concerned about your blood sugar, please contact your family doctor.    Site Care:  Remove the bandage and bathe or shower the morning after the procedure.      Please allow two weeks to experience improvement in your pain.  If you have any further issues, please contact your provider.    Call your Primary Care Provider if you have the following (if your primary care provider is not available please seek emergency care):   Nausea with vomiting   Severe headache   Drowsiness or confusion   Redness or drainage at the injection or puncture site   Temperature over 101 degrees F   Other concerns   Worsening back pain   Stiff neck

## 2017-12-15 NOTE — IP AVS SNAPSHOT
MRN:[id number]                      After Visit Summary   12/15/2017    Sydni Hardin    MRN: [id number]           Visit Information        Provider Department      12/15/2017  1:30 PM [facility]; [facility]           Review of your medicines      UNREVIEWED medicines. Ask your doctor about these medicines        Dose / Directions    albuterol 108 (90 BASE) MCG/ACT Inhaler   Commonly known as:  VENTOLIN HFA   Used for:  Tobacco abuse        USE 1 PUFF FOUR TIMES A DAY AS NEEDED   Quantity:  2 Inhaler   Refills:  3       ALPRAZolam 1 MG tablet   Commonly known as:  XANAX   Used for:  Anxiety        Dose:  1 mg   Take 1 tablet (1 mg) by mouth 3 times daily as needed for anxiety   Quantity:  90 tablet   Refills:  5       aspirin 81 MG EC tablet   Used for:  S/P total knee replacement using cement, right        Dose:  81 mg   Take 1 tablet (81 mg) by mouth 2 times daily   Refills:  0       atorvastatin 40 MG tablet   Commonly known as:  LIPITOR   Used for:  Mixed hyperlipidemia        Dose:  40 mg   Take 1 tablet (40 mg) by mouth daily   Quantity:  90 tablet   Refills:  3       benazepril 10 MG tablet   Commonly known as:  LOTENSIN   Used for:  HTN (hypertension)        TAKE 1 TABLET BY MOUTH DAILY   Quantity:  90 tablet   Refills:  2       benzonatate 200 MG capsule   Commonly known as:  TESSALON   Used for:  Preop general physical exam, Tobacco abuse        TAKE 1 CAPSULE BY MOUTH 3 TIMES A DAY AS NEEDED FOR COUGH   Quantity:  60 capsule   Refills:  3       budesonide-formoterol 160-4.5 MCG/ACT Inhaler   Commonly known as:  SYMBICORT   Used for:  Asthma        Dose:  2 puff   Inhale 2 puffs into the lungs 2 times daily. As needed   Quantity:  1 Inhaler   Refills:  11       butalbital-acetaminophen-caffeine -40 MG per tablet   Commonly known as:  FIORICET/ESGIC   Used for:  Scalp hematoma, sequela        Dose:  1 tablet   Take 1 tablet by mouth every 6 hours as needed    Quantity:  30 tablet   Refills:  0       cimetidine 800 MG tablet   Commonly known as:  TAGAMET   Used for:  Esophageal reflux        Dose:  800 mg   Take 1 tablet (800 mg) by mouth At Bedtime As needed   Quantity:  90 tablet   Refills:  3       dexamethasone 4 MG/ML injection   Commonly known as:  DECADRON   Used for:  Primary osteoarthritis of left knee        Dose:  4 mg   Inject 1 mL (4 mg) as directed once for 1 dose Use 4 mg or dose determined by provider for iontophoresis.   Quantity:  1 mL   Refills:  0       diclofenac 1 % Gel topical gel   Commonly known as:  VOLTAREN   Used for:  Primary osteoarthritis of left knee, Chronic pain of right knee        Apply 4 grams to knees  four times daily using enclosed dosing card.   Quantity:  100 g   Refills:  3       doxepin 10 MG capsule   Commonly known as:  SINEquan   Used for:  Primary osteoarthritis of right knee        Dose:  10 mg   Take 1 capsule (10 mg) by mouth At Bedtime   Quantity:  90 capsule   Refills:  3       * EPINEPHrine 0.3 MG/0.3ML injection 2-pack   Commonly known as:  EPIPEN/ADRENACLICK/or ANY BX GENERIC EQUIV        Dose:  0.3 mg   Inject 0.3 mg into the muscle   Refills:  0       * EPIPEN 2-JAIRO 0.3 MG/0.3ML injection 2-pack   Used for:  Anaphylactic reaction   Generic drug:  EPINEPHrine        INJECT 1 SYRINGE INTRAMUSCULARLY ONCE AS NEEDED FOR ANAPHYLAXIS   Quantity:  2 each   Refills:  0       escitalopram 20 MG tablet   Commonly known as:  LEXAPRO   Used for:  Dysthymia        TAKE 1 TABLET BY MOUTH 2 TIMES A DAY   Quantity:  180 tablet   Refills:  3       * fluconazole 150 MG tablet   Commonly known as:  DIFLUCAN   Used for:  Candidiasis of vulva and vagina        Dose:  150 mg   Take 1 tablet (150 mg) by mouth every 3 days   Quantity:  4 tablet   Refills:  0       * fluconazole 150 MG tablet   Commonly known as:  DIFLUCAN   Used for:  Candidiasis of vulva and vagina        TAKE 1 TABLET BY MOUTH EVERY 72 HOURS   Quantity:  2 tablet    Refills:  0       hydrochlorothiazide 25 MG tablet   Commonly known as:  HYDRODIURIL   Used for:  Benign essential hypertension        TAKE 1 TABLET BY MOUTH DAILY   Quantity:  90 tablet   Refills:  3       ipratropium - albuterol 0.5 mg/2.5 mg/3 mL 0.5-2.5 (3) MG/3ML neb solution   Commonly known as:  DUONEB   Used for:  Acute bronchospasm, SOB (shortness of breath)        Dose:  1 vial   Take 1 vial (3 mLs) by nebulization every 4 hours as needed   Quantity:  90 mL   Refills:  3       meloxicam 15 MG tablet   Commonly known as:  MOBIC   Used for:  Pain        Dose:  15 mg   Take 1 tablet (15 mg) by mouth daily   Quantity:  90 tablet   Refills:  3       montelukast 10 MG tablet   Commonly known as:  SINGULAIR   Used for:  Mixed hyperlipidemia        TAKE 1 TABLET BY MOUTH DAILY   Quantity:  90 tablet   Refills:  3       * nicotine 21 MG/24HR 24 hr patch   Commonly known as:  NICODERM CQ   Used for:  Tobacco abuse        Dose:  1 patch   Place 1 patch onto the skin every 24 hours   Quantity:  30 patch   Refills:  1       * nicotine 7 MG/24HR 24 hr patch   Commonly known as:  NICODERM CQ   Used for:  Tobacco abuse        Dose:  1 patch   Place 1 patch onto the skin every 24 hours   Quantity:  30 patch   Refills:  1       * nicotine 14 MG/24HR 24 hr patch   Commonly known as:  NICODERM CQ   Used for:  Tobacco abuse        Dose:  1 patch   Place 1 patch onto the skin every 24 hours   Quantity:  30 patch   Refills:  1       nystatin 922231 UNIT/ML suspension   Commonly known as:  MYCOSTATIN   Used for:  Thrush        TAKE 1 TEASPOON (5MLS) BY MOUTH 4 TIMES DAILY   Quantity:  60 mL   Refills:  0       oxyCODONE-acetaminophen 5-325 MG per tablet   Commonly known as:  PERCOCET   Used for:  Primary osteoarthritis of both knees        TAKE 1 TO 2 TABLETS BY MOUTH EVERY 4 HOURS AS NEEDED FOR PAIN   Quantity:  60 tablet   Refills:  0       potassium chloride 10 MEQ tablet   Commonly known as:  K-TAB,KLOR-CON   Used for:   Benign essential hypertension        TAKE 1 TABLET BY MOUTH 2 TIMES DAILY WITH FOOD   Quantity:  180 tablet   Refills:  0       temazepam 30 MG capsule   Commonly known as:  RESTORIL   Used for:  Persistent insomnia        TAKE 1 CAPSULE BY MOUTH NIGHTLY AS NEEDED FOR SLEEP   Quantity:  90 capsule   Refills:  0       * Notice:  This list has 7 medication(s) that are the same as other medications prescribed for you. Read the directions carefully, and ask your doctor or other care provider to review them with you.      CONTINUE these medicines which have NOT CHANGED        Dose / Directions    * order for DME   Used for:  Primary osteoarthritis of right knee        Equipment being ordered: Nebulizer supplies   Quantity:  2 each   Refills:  11       * order for DME   Used for:  Primary osteoarthritis of right knee        Equipment being ordered: mcgee walker   Quantity:  1 each   Refills:  0       * order for DME   Used for:  Postoperative state        Equipment being ordered: toilet seat riser   Quantity:  1 each   Refills:  0       * order for DME   Used for:  Status post total right knee replacement        Equipment being ordered: Please install safety bar in bathroom to assist patient in getting up from toilet.   Quantity:  1 Units   Refills:  0       * Notice:  This list has 4 medication(s) that are the same as other medications prescribed for you. Read the directions carefully, and ask your doctor or other care provider to review them with you.             Protect others around you: Learn how to safely use, store and throw away your medicines at www.disposemymeds.org.         Follow-ups after your visit        Your next 10 appointments already scheduled     Jan 03, 2018  1:00 PM CST   (Arrive by 12:45 PM)   Return Visit with Moisés Olson MD    ORTHOPEDICS ([facility] )    [address] [facility] [address]   [phone]            Jan 25, 2018  1:15 PM CST   (Arrive by 1:00 PM)    SHORT with Gibran Alarcon MD   [facility]    [address]  [facility] [address]   [phone]               Care Instructions        Further instructions from your care team       Home number on file [phone] (home)  Is it ok to leave a message at this number(s)? Yes    Dr Sanches completed your procedure on 12/15/2017.    Current Pain Level (0-10 Scale): 7/10  Post Pain Level (0-10):  0/10    Radiology Discharge instructions for Steroid Injection    Activity Level:     Do not do any heavy activity or exercise for 24 hours.   Do not drive for 4 hours after your injection.  Diet:   Return to your normal diet.  Medications:   If you have stopped taking your Aspirin, Coumadin/Warfarin, Ibuprofen, or any   other blood thinner for this procedure you may resume in the morning unless   your primary care provider has given you other instructions.    Diabetics may see an increase in blood sugar after steroid injections. If you are concerned about your blood sugar, please contact your family doctor.    Site Care:  Remove the bandage and bathe or shower the morning after the procedure.      Please allow two weeks to experience improvement in your pain.  If you have any further issues, please contact your provider.    Call your Primary Care Provider if you have the following (if your primary care provider is not available please seek emergency care):   Nausea with vomiting   Severe headache   Drowsiness or confusion   Redness or drainage at the injection or puncture site   Temperature over 101 degrees F   Other concerns   Worsening back pain   Stiff neck       Additional Information About Your Visit        Care EveryWhere ID     This is your Care EveryWhere ID. This could be used by other organizations to access your [facility] medical records  [id number]         Primary Care Provider Office Phone # Fax #    Gibran Alarcon [phone]       Equal Access to Services     [facility]: Hadii aad ku hadjosephinemaninder Lucíaifeanyi, waaxda luqadaha, qaybta kaalmacierra kohli. So [facility] [phone].    ATENCIÓN: Si habla español, tiene a guaman disposición servicios gratuitos de asistencia lingüística. Maryame al [phone].    We comply with applicable federal civil rights laws and [place] laws. We do not discriminate on the basis of race, color, national origin, age, disability, sex, sexual orientation, or gender identity.            Thank you!     Thank you for choosing [facility] for your care. Our goal is always to provide you with excellent care. Hearing back from our patients is one way we can continue to improve our services. Please take a few minutes to complete the written survey that you may receive in the mail after you visit with us. Thank you!             Medication List: This is a list of all your medications and when to take them. Check marks below indicate your daily home schedule. Keep this list as a reference.      Medications           Morning Afternoon Evening Bedtime As Needed    albuterol 108 (90 BASE) MCG/ACT Inhaler   Commonly known as:  VENTOLIN HFA   USE 1 PUFF FOUR TIMES A DAY AS NEEDED                                ALPRAZolam 1 MG tablet   Commonly known as:  XANAX   Take 1 tablet (1 mg) by mouth 3 times daily as needed for anxiety                                aspirin 81 MG EC tablet   Take 1 tablet (81 mg) by mouth 2 times daily                                atorvastatin 40 MG tablet   Commonly known as:  LIPITOR   Take 1 tablet (40 mg) by mouth daily                                benazepril 10 MG tablet   Commonly known as:  LOTENSIN   TAKE 1 TABLET BY MOUTH DAILY                                benzonatate 200 MG capsule   Commonly known as:  TESSALON   TAKE 1 CAPSULE BY MOUTH 3 TIMES A DAY AS NEEDED FOR COUGH                                budesonide-formoterol 160-4.5 MCG/ACT Inhaler   Commonly  known as:  SYMBICORT   Inhale 2 puffs into the lungs 2 times daily. As needed                                butalbital-acetaminophen-caffeine -40 MG per tablet   Commonly known as:  FIORICET/ESGIC   Take 1 tablet by mouth every 6 hours as needed                                cimetidine 800 MG tablet   Commonly known as:  TAGAMET   Take 1 tablet (800 mg) by mouth At Bedtime As needed                                dexamethasone 4 MG/ML injection   Commonly known as:  DECADRON   Inject 1 mL (4 mg) as directed once for 1 dose Use 4 mg or dose determined by provider for iontophoresis.                                diclofenac 1 % Gel topical gel   Commonly known as:  VOLTAREN   Apply 4 grams to knees  four times daily using enclosed dosing card.                                doxepin 10 MG capsule   Commonly known as:  SINEquan   Take 1 capsule (10 mg) by mouth At Bedtime                                * EPINEPHrine 0.3 MG/0.3ML injection 2-pack   Commonly known as:  EPIPEN/ADRENACLICK/or ANY BX GENERIC EQUIV   Inject 0.3 mg into the muscle                                * EPIPEN 2-JAIRO 0.3 MG/0.3ML injection 2-pack   INJECT 1 SYRINGE INTRAMUSCULARLY ONCE AS NEEDED FOR ANAPHYLAXIS   Generic drug:  EPINEPHrine                                escitalopram 20 MG tablet   Commonly known as:  LEXAPRO   TAKE 1 TABLET BY MOUTH 2 TIMES A DAY                                * fluconazole 150 MG tablet   Commonly known as:  DIFLUCAN   Take 1 tablet (150 mg) by mouth every 3 days                                * fluconazole 150 MG tablet   Commonly known as:  DIFLUCAN   TAKE 1 TABLET BY MOUTH EVERY 72 HOURS                                hydrochlorothiazide 25 MG tablet   Commonly known as:  HYDRODIURIL   TAKE 1 TABLET BY MOUTH DAILY                                ipratropium - albuterol 0.5 mg/2.5 mg/3 mL 0.5-2.5 (3) MG/3ML neb solution   Commonly known as:  DUONEB   Take 1 vial (3 mLs) by nebulization every 4 hours as  needed                                meloxicam 15 MG tablet   Commonly known as:  MOBIC   Take 1 tablet (15 mg) by mouth daily                                montelukast 10 MG tablet   Commonly known as:  SINGULAIR   TAKE 1 TABLET BY MOUTH DAILY                                * nicotine 21 MG/24HR 24 hr patch   Commonly known as:  NICODERM CQ   Place 1 patch onto the skin every 24 hours                                * nicotine 7 MG/24HR 24 hr patch   Commonly known as:  NICODERM CQ   Place 1 patch onto the skin every 24 hours                                * nicotine 14 MG/24HR 24 hr patch   Commonly known as:  NICODERM CQ   Place 1 patch onto the skin every 24 hours                                nystatin [id number] UNIT/ML suspension   Commonly known as:  MYCOSTATIN   TAKE 1 TEASPOON (5MLS) BY MOUTH 4 TIMES DAILY                                * order for DME   Equipment being ordered: Nebulizer supplies                                * order for DME   Equipment being ordered: mcgee walker                                * order for DME   Equipment being ordered: toilet seat riser                                * order for DME   Equipment being ordered: Please install safety bar in bathroom to assist patient in getting up from toilet.                                oxyCODONE-acetaminophen 5-325 MG per tablet   Commonly known as:  PERCOCET   TAKE 1 TO 2 TABLETS BY MOUTH EVERY 4 HOURS AS NEEDED FOR PAIN                                potassium chloride 10 MEQ tablet   Commonly known as:  K-TAB,KLOR-CON   TAKE 1 TABLET BY MOUTH 2 TIMES DAILY WITH FOOD                                temazepam 30 MG capsule   Commonly known as:  RESTORIL   TAKE 1 CAPSULE BY MOUTH NIGHTLY AS NEEDED FOR SLEEP                                * Notice:  This list has 11 medication(s) that are the same as other medications prescribed for you. Read the directions carefully, and ask your doctor or other care provider to review them with you.

## 2017-12-15 NOTE — IP AVS SNAPSHOT
[facility]    [address] [address]    Phone:  [phone]    Fax:  [phone]                                       After Visit Summary   12/15/2017    Sydni Hardin    MRN: [id number]           After Visit Summary Signature Page     I have received my discharge instructions, and my questions have been answered. I have discussed any challenges I see with this plan with the nurse or doctor.    ..........................................................................................................................................  Patient/Patient Representative Signature      ..........................................................................................................................................  Patient Representative Print Name and Relationship to Patient    ..................................................               ................................................  Date                                            Time    ..........................................................................................................................................  Reviewed by Signature/Title    ...................................................              ..............................................  Date                                                            Time

## 2017-12-28 ENCOUNTER — TELEPHONE (OUTPATIENT)
Dept: ORTHOPEDICS | Facility: OTHER | Age: 67
End: 2017-12-28

## 2017-12-28 DIAGNOSIS — M25.562 ARTHRALGIA OF BOTH LOWER LEGS: Primary | ICD-10-CM

## 2017-12-28 DIAGNOSIS — M25.561 ARTHRALGIA OF BOTH LOWER LEGS: Primary | ICD-10-CM

## 2017-12-29 ENCOUNTER — TELEPHONE (OUTPATIENT)
Dept: INTERVENTIONAL RADIOLOGY/VASCULAR | Facility: HOSPITAL | Age: 67
End: 2017-12-29

## 2017-12-29 NOTE — TELEPHONE ENCOUNTER
POST CALL    Procedure: BING lumbar  Radiologist(s): Dr. Henry Sanches  Date of Procedure: 12/15/17    I responded to the patient's questions/concerns.    Pre-procedure pain score was: 7 (See pre-procedure score)  Post-procedure pain score as of today is: 6  Percentage of pain reduction: 14%  Where is the pain? In her back, if she does to much she gets really sore and stiff.  Is the pain radiating? No  Is this new pain? No    Patient would like to pursue another injection. She would like to want until she sees Dr Alarcon in January. Please advise what would be recommended? Patient will contact provider, Gibran Alarcon if there are any issues and for the recommendations.   CECE Sewell

## 2018-01-03 ENCOUNTER — RADIANT APPOINTMENT (OUTPATIENT)
Dept: GENERAL RADIOLOGY | Facility: OTHER | Age: 68
End: 2018-01-03
Attending: ORTHOPAEDIC SURGERY
Payer: MEDICARE

## 2018-01-03 ENCOUNTER — OFFICE VISIT (OUTPATIENT)
Dept: ORTHOPEDICS | Facility: OTHER | Age: 68
End: 2018-01-03
Attending: ORTHOPAEDIC SURGERY
Payer: MEDICARE

## 2018-01-03 VITALS
BODY MASS INDEX: 33.58 KG/M2 | TEMPERATURE: 98.1 F | WEIGHT: 160 LBS | HEART RATE: 77 BPM | SYSTOLIC BLOOD PRESSURE: 110 MMHG | DIASTOLIC BLOOD PRESSURE: 70 MMHG | HEIGHT: 58 IN | OXYGEN SATURATION: 98 %

## 2018-01-03 DIAGNOSIS — Z72.0 TOBACCO ABUSE: ICD-10-CM

## 2018-01-03 DIAGNOSIS — M25.561 ARTHRALGIA OF BOTH LOWER LEGS: ICD-10-CM

## 2018-01-03 DIAGNOSIS — M25.562 ARTHRALGIA OF BOTH LOWER LEGS: ICD-10-CM

## 2018-01-03 DIAGNOSIS — M25.561 CHRONIC PAIN OF RIGHT KNEE: ICD-10-CM

## 2018-01-03 DIAGNOSIS — G89.29 CHRONIC PAIN OF RIGHT KNEE: ICD-10-CM

## 2018-01-03 DIAGNOSIS — M17.12 PRIMARY OSTEOARTHRITIS OF LEFT KNEE: Primary | ICD-10-CM

## 2018-01-03 PROCEDURE — G0463 HOSPITAL OUTPT CLINIC VISIT: HCPCS | Mod: 25

## 2018-01-03 PROCEDURE — 20610 DRAIN/INJ JOINT/BURSA W/O US: CPT | Mod: LT | Performed by: ORTHOPAEDIC SURGERY

## 2018-01-03 PROCEDURE — 73564 X-RAY EXAM KNEE 4 OR MORE: CPT | Mod: TC,50

## 2018-01-03 PROCEDURE — 99213 OFFICE O/P EST LOW 20 MIN: CPT | Mod: 25 | Performed by: ORTHOPAEDIC SURGERY

## 2018-01-03 ASSESSMENT — PAIN SCALES - GENERAL: PAINLEVEL: MODERATE PAIN (4)

## 2018-01-03 NOTE — NURSING NOTE
The following medication was given:     MEDICATION: Gel One  ROUTE:   SITE: Left Knee  DOSE:   LOT #: [id number]  :  Moisés  EXPIRATION DATE:  06/20/18  NDC#:

## 2018-01-03 NOTE — PROGRESS NOTES
"Chief complaint:  #1.  DJD left knee SP Synvisc injection 6/21/17  #2.  Status post right TKA 3/21/17  #3.  Oral NSAID contraindication: Gastric ulcer     Patient profile: 67-year-old nonsmoking retired female patient of Dr. Alarcon     HPI: This patient has been treated in this office for bilateral DJD of the knees.  After conservative treatment failed she had a right TKA performed on 3/21/17.  Although the knee has frequently felt \"stiff\" to her since surgery, in the Fall of 2017 she insidiously developed diffuse global mild to moderate pain in the joint.  She denies an injury to it.       She also has severe DJD with varus deformity of the left knee.  She is reluctant to get it replaced.  She therefore has been treating it with topical Voltaren gel, one Percocet per day, use of a walker when out of doors, and on 6/21/17 she had her 1st Synvisc injection which helped for a few months, but also gave her several days of mild inflammation  after the injection. At her last visit she was placed in a hinged knee brace.    Subjective: Today her left knee symptoms have returned to the severity at which they were before her last Synvisc injection.  She requests another Visco supplementation injection but would like to try a different product. She has changed her mind about a left TKA and now wants to consider doing it in September.    She reports no change in her right knee symptoms.  The knee brace helped the left knee so much she requests a similar brace for the right knee.    Nothing else has changed with respect to her musculoskeletal or neurologic review of systems since seen last.  She smokes and is starting is in a \"Quit Plan\" with a quit day of 1/13/18.    Examination: Elderly female in no obvious distress.  Neither knee has an effusion.    X-ray; 4 views of each knee were taken today.  The right knee has a well aligned cemented TKA with no complicating features.  The left knee has severe DJD with complete loss of " medial joint space, varus deformity, and osteophyte formation.    Impression:  #1.  Painful TKA right knee  #2.  Severe DJD left knee  #3.  NSAID contraindication: Gastric ulcer disease  #4.  Tobacco abuse    Plan: We'll inject the left knee with Gel 1 and apply a hinged knee brace to the right knee.  She will continue to use the Voltaren gel. She will return in 3 months     Procedure: After obtaining informed written consent and conducting a timeout, sterile technique was employed as the left knee was injected with 2 mL of Gel 1 through an anterolateral approach.  Topical ethyl chloride spray was used as a local anesthetic.  Following removal of the needle a Band-Aid was applied.  The patient was very anxious during the procedure but there were no complications.

## 2018-01-03 NOTE — NURSING NOTE
"Chief Complaint   Patient presents with     RECHECK     Left knee, possible injection       Initial /70 (BP Location: Right arm, Patient Position: Sitting, Cuff Size: Adult Large)  Pulse 77  Temp 98.1  F (36.7  C) (Tympanic)  Ht 4' 10\" (1.473 m)  Wt 160 lb (72.6 kg)  SpO2 98%  BMI 33.44 kg/m2 Estimated body mass index is 33.44 kg/(m^2) as calculated from the following:    Height as of this encounter: 4' 10\" (1.473 m).    Weight as of this encounter: 160 lb (72.6 kg).  Medication Reconciliation: complete   Marleni Johnson      "

## 2018-01-03 NOTE — MR AVS SNAPSHOT
After Visit Summary   1/3/2018    Sydni Hardin    MRN: [id number]           Patient Information     Date Of Birth          1950        Visit Information        Provider Department      1/3/2018 1:00 PM Moisés Olson MD  ORTHOPEDICS        Today's Diagnoses     Primary osteoarthritis of left knee    -  1    Chronic pain of right knee        Tobacco abuse           Follow-ups after your visit        Your next 10 appointments already scheduled     Jan 25, 2018  1:15 PM CST   (Arrive by 1:00 PM)   SHORT with Gibran Alarcon MD   [facility] ([facility] )    [address]  [facility] [address]   [phone]            Apr 04, 2018  1:00 PM CDT   (Arrive by 12:45 PM)   Return Visit with Moisés Olson MD    ORTHOPEDICS ([facility] )    [address] [facility] [id number] [phone]              Who to contact     If you have questions or need follow up information about today's clinic visit or your schedule please contact  ORTHOPEDICS directly at [phone].  Normal or non-critical lab and imaging results will be communicated to you by MyChart, letter or phone within 4 business days after the clinic has received the results. If you do not hear from us within 7 days, please contact the clinic through MyChart or phone. If you have a critical or abnormal lab result, we will notify you by phone as soon as possible.  Submit refill requests through [employer] or call your pharmacy and they will forward the refill request to us. Please allow 3 business days for your refill to be completed.          Additional Information About Your Visit        Care EveryWhere ID     This is your Care EveryWhere ID. This could be used by other organizations to access your [place] medical records  [id number]        Your Vitals Were     Pulse Temperature Height Pulse Oximetry BMI (Body Mass Index)       77 98.1  F (36.7  C) (Tympanic) 4'  "10\" (1.473 m) 98% 33.44 kg/m2        Blood Pressure from Last 3 Encounters:   01/03/18 110/70   12/14/17 118/72   12/06/17 102/70    Weight from Last 3 Encounters:   01/03/18 160 lb (72.6 kg)   12/14/17 156 lb (70.8 kg)   12/06/17 165 lb (74.8 kg)              We Performed the Following     Large Joint/Bursa injection and/or drainage (Shoulder, Knee)          Today's Medication Changes          These changes are accurate as of: 1/3/18  2:01 PM.  If you have any questions, ask your nurse or doctor.               Start taking these medicines.        Dose/Directions    hyaluronate 30 MG/3ML injection   Commonly known as:  GEL-ONE   Used for:  Primary osteoarthritis of left knee   Started by:  Moisés Olson MD        Dose:  30 mg   3 mLs (30 mg) by INTRA-ARTICULAR route once for 1 dose   Quantity:  3 mL   Refills:  0            Where to get your medicines      Some of these will need a paper prescription and others can be bought over the counter.  Ask your nurse if you have questions.     You don't need a prescription for these medications     hyaluronate 30 MG/3ML injection                Primary Care Provider Office Phone # Fax #    Gibran Alarcon [phone]       [address] [address]        Equal Access to Services     TREVIN KENNEDY AH: Hadii aad ku hadasho Soomaali, waaxda luqadaha, qaybta kaalmada adeegyada, cierra arteaga. So [facility] [phone].    ATENCIÓN: Si habla español, tiene a guaman disposición servicios gratuitos de asistencia lingüística. Llame al [phone].    We comply with applicable federal civil rights laws and [place] laws. We do not discriminate on the basis of race, color, national origin, age, disability, sex, sexual orientation, or gender identity.            Thank you!     Thank you for choosing  ORTHOPEDICS  for your care. Our goal is always to provide you with excellent care. Hearing back from our " patients is one way we can continue to improve our services. Please take a few minutes to complete the written survey that you may receive in the mail after your visit with us. Thank you!             Your Updated Medication List - Protect others around you: Learn how to safely use, store and throw away your medicines at www.disposemymeds.org.          This list is accurate as of: 1/3/18  2:01 PM.  Always use your most recent med list.                   Brand Name Dispense Instructions for use Diagnosis    albuterol 108 (90 BASE) MCG/ACT Inhaler    VENTOLIN HFA    2 Inhaler    USE 1 PUFF FOUR TIMES A DAY AS NEEDED    Tobacco abuse       ALPRAZolam 1 MG tablet    XANAX    90 tablet    Take 1 tablet (1 mg) by mouth 3 times daily as needed for anxiety    Anxiety       aspirin 81 MG EC tablet      Take 1 tablet (81 mg) by mouth 2 times daily    S/P total knee replacement using cement, right       atorvastatin 40 MG tablet    LIPITOR    90 tablet    Take 1 tablet (40 mg) by mouth daily    Mixed hyperlipidemia       benazepril 10 MG tablet    LOTENSIN    90 tablet    TAKE 1 TABLET BY MOUTH DAILY    HTN (hypertension)       benzonatate 200 MG capsule    TESSALON    60 capsule    TAKE 1 CAPSULE BY MOUTH 3 TIMES A DAY AS NEEDED FOR COUGH    Preop general physical exam, Tobacco abuse       budesonide-formoterol 160-4.5 MCG/ACT Inhaler    SYMBICORT    1 Inhaler    Inhale 2 puffs into the lungs 2 times daily. As needed    Asthma       butalbital-acetaminophen-caffeine -40 MG per tablet    FIORICET/ESGIC    30 tablet    Take 1 tablet by mouth every 6 hours as needed    Scalp hematoma, sequela       cimetidine 800 MG tablet    TAGAMET    90 tablet    Take 1 tablet (800 mg) by mouth At Bedtime As needed    Esophageal reflux       dexamethasone 4 MG/ML injection    DECADRON    1 mL    Inject 1 mL (4 mg) as directed once for 1 dose Use 4 mg or dose determined by provider for iontophoresis.    Primary osteoarthritis of left knee        diclofenac 1 % Gel topical gel    VOLTAREN    100 g    Apply 4 grams to knees  four times daily using enclosed dosing card.    Primary osteoarthritis of left knee, Chronic pain of right knee       doxepin 10 MG capsule    SINEquan    90 capsule    Take 1 capsule (10 mg) by mouth At Bedtime    Primary osteoarthritis of right knee       * EPINEPHrine 0.3 MG/0.3ML injection 2-pack    EPIPEN/ADRENACLICK/or ANY BX GENERIC EQUIV     Inject 0.3 mg into the muscle        * EPIPEN 2-JAIRO 0.3 MG/0.3ML injection 2-pack   Generic drug:  EPINEPHrine     2 each    INJECT 1 SYRINGE INTRAMUSCULARLY ONCE AS NEEDED FOR ANAPHYLAXIS    Anaphylactic reaction       escitalopram 20 MG tablet    LEXAPRO    180 tablet    TAKE 1 TABLET BY MOUTH 2 TIMES A DAY    Dysthymia       * fluconazole 150 MG tablet    DIFLUCAN    4 tablet    Take 1 tablet (150 mg) by mouth every 3 days    Candidiasis of vulva and vagina       * fluconazole 150 MG tablet    DIFLUCAN    2 tablet    TAKE 1 TABLET BY MOUTH EVERY 72 HOURS    Candidiasis of vulva and vagina       hyaluronate 30 MG/3ML injection    GEL-ONE    3 mL    3 mLs (30 mg) by INTRA-ARTICULAR route once for 1 dose    Primary osteoarthritis of left knee       hydrochlorothiazide 25 MG tablet    HYDRODIURIL    90 tablet    TAKE 1 TABLET BY MOUTH DAILY    Benign essential hypertension       ipratropium - albuterol 0.5 mg/2.5 mg/3 mL 0.5-2.5 (3) MG/3ML neb solution    DUONEB    90 mL    Take 1 vial (3 mLs) by nebulization every 4 hours as needed    Acute bronchospasm, SOB (shortness of breath)       meloxicam 15 MG tablet    MOBIC    90 tablet    Take 1 tablet (15 mg) by mouth daily    Pain       montelukast 10 MG tablet    SINGULAIR    90 tablet    TAKE 1 TABLET BY MOUTH DAILY    Mixed hyperlipidemia       * nicotine 21 MG/24HR 24 hr patch    NICODERM CQ    30 patch    Place 1 patch onto the skin every 24 hours    Tobacco abuse       * nicotine 7 MG/24HR 24 hr patch    NICODERM CQ    30 patch     Place 1 patch onto the skin every 24 hours    Tobacco abuse       * nicotine 14 MG/24HR 24 hr patch    NICODERM CQ    30 patch    Place 1 patch onto the skin every 24 hours    Tobacco abuse       nystatin 366977 UNIT/ML suspension    MYCOSTATIN    60 mL    TAKE 1 TEASPOON (5MLS) BY MOUTH 4 TIMES DAILY    Thrush       * order for DME     2 each    Equipment being ordered: Nebulizer supplies    Primary osteoarthritis of right knee       * order for DME     1 each    Equipment being ordered: mcgee walker    Primary osteoarthritis of right knee       * order for DME     1 each    Equipment being ordered: toilet seat riser    Postoperative state       * order for DME     1 Units    Equipment being ordered: Please install safety bar in bathroom to assist patient in getting up from toilet.    Status post total right knee replacement       oxyCODONE-acetaminophen 5-325 MG per tablet    PERCOCET    60 tablet    TAKE 1 TO 2 TABLETS BY MOUTH EVERY 4 HOURS AS NEEDED FOR PAIN    Primary osteoarthritis of both knees       potassium chloride 10 MEQ tablet    K-TAB,KLOR-CON    180 tablet    TAKE 1 TABLET BY MOUTH 2 TIMES DAILY WITH FOOD    Benign essential hypertension       temazepam 30 MG capsule    RESTORIL    90 capsule    TAKE 1 CAPSULE BY MOUTH NIGHTLY AS NEEDED FOR SLEEP    Persistent insomnia       * Notice:  This list has 11 medication(s) that are the same as other medications prescribed for you. Read the directions carefully, and ask your doctor or other care provider to review them with you.

## 2018-01-05 DIAGNOSIS — I10 BENIGN ESSENTIAL HYPERTENSION: ICD-10-CM

## 2018-01-05 DIAGNOSIS — G47.00 PERSISTENT INSOMNIA: ICD-10-CM

## 2018-01-05 DIAGNOSIS — E78.2 MIXED HYPERLIPIDEMIA: ICD-10-CM

## 2018-01-05 DIAGNOSIS — I10 HTN (HYPERTENSION): ICD-10-CM

## 2018-01-08 ENCOUNTER — TELEPHONE (OUTPATIENT)
Dept: INTERVENTIONAL RADIOLOGY/VASCULAR | Facility: HOSPITAL | Age: 68
End: 2018-01-08

## 2018-01-08 NOTE — TELEPHONE ENCOUNTER
Called the patient to inform them that the Radiologist recommended a repeat injection. I told the patient to contact her PCP to get a new order. Patient will call if she has an questions and/or concerns.

## 2018-01-08 NOTE — TELEPHONE ENCOUNTER
benazepril (LOTENSIN) 10 MG tablet       Last Written Prescription Date:  4/13/17  Last Fill Quantity: 90,   # refills: 2  Last Office Visit: 12/14/17  Future Office visit:    Next 5 appointments (look out 90 days)     Jan 25, 2018  1:15 PM CST   (Arrive by 1:00 PM)   SHORT with Gibran Alarcon MD   [facility] ([facility] )    [address]  [facility] [address]   [phone]            Apr 04, 2018  1:00 PM CDT   (Arrive by 12:45 PM)   Return Visit with Moisés Olson MD    ORTHOPEDICS ([facility] )    [address]  [place] [address] [phone]                     montelukast (SINGULAIR) 10 MG tablet    Last Written Prescription Date:  12/16/16  Last Fill Quantity: 90,   # refills: 3  Last Office Visit: 12/14/17  Future Office visit:    Next 5 appointments (look out 90 days)     Jan 25, 2018  1:15 PM CST   (Arrive by 1:00 PM)   SHORT with Gibran Alarcon MD   [facility] ([facility] )    [address]  [facility] [address]   [phone]            Apr 04, 2018  1:00 PM CDT   (Arrive by 12:45 PM)   Return Visit with Moisés Olson MD    ORTHOPEDICS ([facility] )    [address]  [place] [address] [phone]                    hydrochlorothiazide (HYDRODIURIL) 25 MG tablet     Last Written Prescription Date:  12/16/16  Last Fill Quantity: 90,   # refills: 3  Last Office Visit: 12/14/17  Future Office visit:    Next 5 appointments (look out 90 days)     Jan 25, 2018  1:15 PM CST   (Arrive by 1:00 PM)   SHORT with Gibran Alarcon MD   [facility] ([facility] )    [address]  [facility] [address]   [phone]            Apr 04, 2018  1:00 PM CDT   (Arrive by 12:45 PM)   Return Visit with Moisés Olson MD    ORTHOPEDICS ([facility] [place] )    [address]  [facility] [address] [phone]

## 2018-01-08 NOTE — TELEPHONE ENCOUNTER
temazepam (RESTORIL) 30 MG capsule  Last Written Prescription Date:  10/26/17  Last Fill Quantity: 90,   # refills: 0  Last Office Visit: 12/14/17  Future Office visit:    Next 5 appointments (look out 90 days)     Jan 25, 2018  1:15 PM CST   (Arrive by 1:00 PM)   SHORT with Gibran Alarcon MD   [facility] ([facility] )    [address] [facility] [address]   [phone]            Apr 04, 2018  1:00 PM CDT   (Arrive by 12:45 PM)   Return Visit with Moisés Olson MD    ORTHOPEDICS ([facility] )    [address] [facility] [address] [phone]

## 2018-01-09 RX ORDER — TEMAZEPAM 30 MG
CAPSULE ORAL
Qty: 90 CAPSULE | Refills: 0 | Status: SHIPPED | OUTPATIENT
Start: 2018-01-09 | End: 2018-04-02

## 2018-01-09 RX ORDER — HYDROCHLOROTHIAZIDE 25 MG/1
TABLET ORAL
Qty: 90 TABLET | Refills: 1 | Status: SHIPPED | OUTPATIENT
Start: 2018-01-09 | End: 2018-07-05

## 2018-01-09 RX ORDER — BENAZEPRIL HYDROCHLORIDE 10 MG/1
TABLET ORAL
Qty: 90 TABLET | Refills: 1 | Status: SHIPPED | OUTPATIENT
Start: 2018-01-09 | End: 2018-04-02

## 2018-01-09 RX ORDER — MONTELUKAST SODIUM 10 MG/1
TABLET ORAL
Qty: 90 TABLET | Refills: 1 | Status: SHIPPED | OUTPATIENT
Start: 2018-01-09 | End: 2018-07-05

## 2018-01-25 ENCOUNTER — OFFICE VISIT (OUTPATIENT)
Dept: FAMILY MEDICINE | Facility: OTHER | Age: 68
End: 2018-01-25
Attending: FAMILY MEDICINE
Payer: COMMERCIAL

## 2018-01-25 VITALS
DIASTOLIC BLOOD PRESSURE: 70 MMHG | HEIGHT: 58 IN | WEIGHT: 168 LBS | OXYGEN SATURATION: 97 % | HEART RATE: 68 BPM | SYSTOLIC BLOOD PRESSURE: 136 MMHG | TEMPERATURE: 97.8 F | BODY MASS INDEX: 35.26 KG/M2

## 2018-01-25 DIAGNOSIS — R53.83 OTHER FATIGUE: Primary | ICD-10-CM

## 2018-01-25 DIAGNOSIS — B37.31 CANDIDIASIS OF VULVA AND VAGINA: ICD-10-CM

## 2018-01-25 DIAGNOSIS — Z01.419 ENCOUNTER FOR GYNECOLOGICAL EXAMINATION WITHOUT ABNORMAL FINDING: ICD-10-CM

## 2018-01-25 DIAGNOSIS — Z12.11 SPECIAL SCREENING FOR MALIGNANT NEOPLASMS, COLON: ICD-10-CM

## 2018-01-25 DIAGNOSIS — Z11.59 NEED FOR HEPATITIS C SCREENING TEST: ICD-10-CM

## 2018-01-25 DIAGNOSIS — I10 ESSENTIAL HYPERTENSION: ICD-10-CM

## 2018-01-25 DIAGNOSIS — M17.11 PRIMARY OSTEOARTHRITIS OF RIGHT KNEE: ICD-10-CM

## 2018-01-25 PROCEDURE — G0476 HPV COMBO ASSAY CA SCREEN: HCPCS | Mod: ZL | Performed by: FAMILY MEDICINE

## 2018-01-25 PROCEDURE — G0463 HOSPITAL OUTPT CLINIC VISIT: HCPCS

## 2018-01-25 PROCEDURE — 99000 SPECIMEN HANDLING OFFICE-LAB: CPT | Performed by: FAMILY MEDICINE

## 2018-01-25 PROCEDURE — G0123 SCREEN CERV/VAG THIN LAYER: HCPCS | Mod: ZL | Performed by: FAMILY MEDICINE

## 2018-01-25 PROCEDURE — 99214 OFFICE O/P EST MOD 30 MIN: CPT | Performed by: FAMILY MEDICINE

## 2018-01-25 RX ORDER — FLUCONAZOLE 150 MG/1
TABLET ORAL
Qty: 2 TABLET | Refills: 0 | Status: SHIPPED | OUTPATIENT
Start: 2018-01-25 | End: 2018-07-10

## 2018-01-25 RX ORDER — DOXEPIN HYDROCHLORIDE 10 MG/1
10 CAPSULE ORAL AT BEDTIME
Qty: 90 CAPSULE | Refills: 3 | Status: SHIPPED | OUTPATIENT
Start: 2018-01-25 | End: 2018-10-02

## 2018-01-25 ASSESSMENT — ANXIETY QUESTIONNAIRES
2. NOT BEING ABLE TO STOP OR CONTROL WORRYING: NEARLY EVERY DAY
5. BEING SO RESTLESS THAT IT IS HARD TO SIT STILL: MORE THAN HALF THE DAYS
7. FEELING AFRAID AS IF SOMETHING AWFUL MIGHT HAPPEN: NEARLY EVERY DAY
3. WORRYING TOO MUCH ABOUT DIFFERENT THINGS: NEARLY EVERY DAY
4. TROUBLE RELAXING: NEARLY EVERY DAY
GAD7 TOTAL SCORE: 19
6. BECOMING EASILY ANNOYED OR IRRITABLE: MORE THAN HALF THE DAYS
1. FEELING NERVOUS, ANXIOUS, OR ON EDGE: NEARLY EVERY DAY
IF YOU CHECKED OFF ANY PROBLEMS ON THIS QUESTIONNAIRE, HOW DIFFICULT HAVE THESE PROBLEMS MADE IT FOR YOU TO DO YOUR WORK, TAKE CARE OF THINGS AT HOME, OR GET ALONG WITH OTHER PEOPLE: NOT DIFFICULT AT ALL

## 2018-01-25 ASSESSMENT — PAIN SCALES - GENERAL: PAINLEVEL: SEVERE PAIN (6)

## 2018-01-25 NOTE — NURSING NOTE
"Chief Complaint   Patient presents with     Gyn Exam     Last pap was about 10 years ago, pt uses baby powder     Fatigue     Pt would like to have labs done for fatigue.       Initial /70  Pulse 68  Temp 97.8  F (36.6  C)  Ht 4' 10\" (1.473 m)  Wt 168 lb (76.2 kg)  SpO2 97%  BMI 35.11 kg/m2 Estimated body mass index is 35.11 kg/(m^2) as calculated from the following:    Height as of this encounter: 4' 10\" (1.473 m).    Weight as of this encounter: 168 lb (76.2 kg).  Medication Reconciliation: complete   Jo Ayala    "

## 2018-01-25 NOTE — PROGRESS NOTES
Sydni SKINNER Adelalazach    2018    Chief Complaint   Patient presents with     Gyn Exam     Last pap was about 10 years ago, pt uses baby powder     Fatigue     Pt would like to have labs done for fatigue.       SUBJECTIVE:  Here for f/u.  She is fatigued all the time.  Lots of stress.  Having fatigue which is daily and profound.  Would like gyn exam.  Lengthy review of meds and the like.  See below.       Past Medical History:   Diagnosis Date     Abnormal arterial blood gases 2006     Acute upper respiratory infections of unspecified site 2001     Anxiety 10/24/2011     Calculus of gallbladder without mention of cholecystitis or obstruction 2006     Depression 2012     Follow-up examination, following other surgery 2006     Headache(784.0) 12/10/2003     History of CVA (cerebrovascular accident) 2011     HTN (hypertension) 2012     Insomnia 3/13/2012     Lipoma of other skin and subcutaneous tissue 2006     Nonallopathic lesion of cervical region, not elsewhere classified 2003     Osteoarthrosis, unspecified whether generalized or localized, lower leg 10/4/2001     Special screening for malignant neoplasms, colon 2006     Urinary tract infection, site not specified 2007       Past Surgical History:   Procedure Laterality Date     ARTHROPLASTY KNEE Right 3/21/2017    Procedure: ARTHROPLASTY KNEE;  Surgeon: Moisés Olson MD;  Location: [facility]     ARTHROSCOPY KNEE       BIOPSY BREAST  2000    left - free of disease      SECTION        SECTION       CHOLECYSTECTOMY       COLONOSCOPY  2008    repeat in 10 years     HEAD & NECK SURGERY  2014    hematoma on head drained     HYSTERECTOMY       ORTHOPEDIC SURGERY  2014    right shoulder replacement     shoulder x2  2009    left       Current Outpatient Prescriptions   Medication Sig Dispense Refill     fluconazole (DIFLUCAN) 150 MG tablet TAKE 1 TABLET BY MOUTH EVERY 72 HOURS 2  tablet 0     benazepril (LOTENSIN) 10 MG tablet TAKE 1 TABLET BY MOUTH DAILY 90 tablet 1     montelukast (SINGULAIR) 10 MG tablet TAKE 1 TABLET BY MOUTH DAILY 90 tablet 1     hydrochlorothiazide (HYDRODIURIL) 25 MG tablet TAKE 1 TABLET BY MOUTH DAILY 90 tablet 1     temazepam (RESTORIL) 30 MG capsule TAKE 1 CAPSULE BY MOUTH NIGHTLY AS NEEDED FOR SLEEP 90 capsule 0     nicotine (NICODERM CQ) 21 MG/24HR 24 hr patch Place 1 patch onto the skin every 24 hours 30 patch 1     nicotine (NICODERM CQ) 7 MG/24HR 24 hr patch Place 1 patch onto the skin every 24 hours 30 patch 1     nicotine (NICODERM CQ) 14 MG/24HR 24 hr patch Place 1 patch onto the skin every 24 hours 30 patch 1     oxyCODONE-acetaminophen (PERCOCET) 5-325 MG per tablet TAKE 1 TO 2 TABLETS BY MOUTH EVERY 4 HOURS AS NEEDED FOR PAIN 60 tablet 0     atorvastatin (LIPITOR) 40 MG tablet Take 1 tablet (40 mg) by mouth daily 90 tablet 3     meloxicam (MOBIC) 15 MG tablet Take 1 tablet (15 mg) by mouth daily 90 tablet 3     escitalopram (LEXAPRO) 20 MG tablet TAKE 1 TABLET BY MOUTH 2 TIMES A  tablet 3     nystatin (MYCOSTATIN) 905229 UNIT/ML suspension TAKE 1 TEASPOON (5MLS) BY MOUTH 4 TIMES DAILY 60 mL 0     butalbital-acetaminophen-caffeine (FIORICET/ESGIC) -40 MG per tablet Take 1 tablet by mouth every 6 hours as needed 30 tablet 0     ipratropium - albuterol 0.5 mg/2.5 mg/3 mL (DUONEB) 0.5-2.5 (3) MG/3ML neb solution Take 1 vial (3 mLs) by nebulization every 4 hours as needed 90 mL 3     albuterol (VENTOLIN HFA) 108 (90 BASE) MCG/ACT Inhaler USE 1 PUFF FOUR TIMES A DAY AS NEEDED 2 Inhaler 3     ALPRAZolam (XANAX) 1 MG tablet Take 1 tablet (1 mg) by mouth 3 times daily as needed for anxiety 90 tablet 5     potassium chloride (K-TAB,KLOR-CON) 10 MEQ tablet TAKE 1 TABLET BY MOUTH 2 TIMES DAILY WITH FOOD 180 tablet 0     EPINEPHrine 0.3 MG/0.3ML injection Inject 0.3 mg into the muscle       diclofenac (VOLTAREN) 1 % GEL topical gel Apply 4 grams to knees  " four times daily using enclosed dosing card. 100 g 3     doxepin (SINEQUAN) 10 MG capsule Take 1 capsule (10 mg) by mouth At Bedtime 90 capsule 3     order for DME Equipment being ordered: Please install safety bar in bathroom to assist patient in getting up from toilet. 1 Units 0     aspirin EC 81 MG EC tablet Take 1 tablet (81 mg) by mouth 2 times daily       order for DME Equipment being ordered: toilet seat riser 1 each 0     benzonatate (TESSALON) 200 MG capsule TAKE 1 CAPSULE BY MOUTH 3 TIMES A DAY AS NEEDED FOR COUGH 60 capsule 3     order for DME Equipment being ordered: Nebulizer supplies 2 each 11     order for DME Equipment being ordered: mcgee walker 1 each 0     EPIPEN 2-JAIRO 0.3 MG/0.3ML injection INJECT 1 SYRINGE INTRAMUSCULARLY ONCE AS NEEDED FOR ANAPHYLAXIS 2 each 0     cimetidine (TAGAMET) 800 MG tablet Take 1 tablet (800 mg) by mouth At Bedtime As needed 90 tablet 3     budesonide-formoterol (SYMBICORT) 160-4.5 MCG/ACT inhaler Inhale 2 puffs into the lungs 2 times daily. As needed 1 Inhaler 11       Allergies   Allergen Reactions     Bee Pollen Other (See Comments)     Throat and eyes close up     Codeine Difficulty breathing     \"My throat closes shut.\"     Takes Percocet at home     Keflex [Cephalexin Hcl] Hives     Sulfonamide Derivatives Other (See Comments)     Throat closes     Ciprofloxacin Rash     Rash, bumps on lips     Bupropion Hcl Other (See Comments)     Made her very irritable     Celecoxib Other (See Comments) and Cough     Celebrex - wheeze  Takes ASA at home     Clonazepam Hives     Takes Xanax at home     Erythromycin Other (See Comments)     Erythromycin base - abdominal pain     Lisinopril Other (See Comments)     Zestril - headaches     Nifedipine Hives     Procardia     Strawberries [Strawberry]      Tramadol Hcl      Takes percocet at home       Family History   Problem Relation Age of Onset     CANCER Mother      skin     Breast Cancer Mother      CEREBROVASCULAR DISEASE " Mother      Alzheimer Disease Mother      Myocardial Infarction Father 76     myocardial infarction - cause of death     CANCER Daughter      cervical      DIABETES Maternal Grandfather      HEART DISEASE Maternal Grandfather      MI     DIABETES Maternal Grandmother      DIABETES Paternal Grandmother      DIABETES Paternal Grandfather        Social History     Social History     Marital status:      Spouse name: N/A     Number of children: N/A     Years of education: N/A     Occupational History     Not on file.     Social History Main Topics     Smoking status: Current Every Day Smoker     Packs/day: 1.00     Years: 46.00     Types: Cigarettes     Smokeless tobacco: Never Used      Comment: Pt will start patches on 2/16/18     Alcohol use 0.0 oz/week     0 Standard drinks or equivalent per week      Comment: rarely     Drug use: No     Sexual activity: No     Other Topics Concern      Service No     Blood Transfusions Yes     Permits if needed     Caffeine Concern Yes     2 cups     Occupational Exposure No     Hobby Hazards No     Sleep Concern Yes     doxipen and xanax at bedtime     Stress Concern No     Weight Concern No     Special Diet No     Back Care No     Exercise No     Seat Belt No     encouraged to wear seat belt for safety     Self-Exams Yes     Parent/Sibling W/ Cabg, Mi Or Angioplasty Before 65f 55m? Yes     father     Social History Narrative       5 point ROS negative except as noted above in HPI, including Gen., Resp., CV, GI &  system review.     OBJECTIVE:  B/P: 136/70, Temperature: 97.8, Pulse: 68, Respirations: Data Unavailable    GENERAL APPEARANCE: Alert, no acute distress  CV: regular rate and rhythm, no murmur, rub or gallop  RESP: lungs clear to auscultation bilaterally  ABDOMEN: normal bowel sounds, soft, nontender, no hepatosplenomegaly or other masses  Pelvic:  Normal external.  Normal bimanual.  Pap obtained at the posterior vagina.    SKIN: no suspicious lesions or  rashes to visualized skin  NEURO: Alert, oriented x 3, speech and mentation normal    ASSESSMENT and PLAN:  (R53.83) Other fatigue  (primary encounter diagnosis)  Comment: fairly profound.   Plan: Comprehensive metabolic panel (BMP + Alb, Alk         Phos, ALT, AST, Total. Bili, TP), TSH with free        T4 reflex, CBC with platelets and differential        Reviewed at length.  Full exam today unrevealing.      (Z11.59) Need for hepatitis C screening test  Comment: done   Plan: Hepatitis C Screen Reflex to HCV RNA Quant and         Genotype        Done.     (B37.3) Candidiasis of vulva and vagina  Comment: history of.   Plan: fluconazole (DIFLUCAN) 150 MG tablet        Sent for on hand.      (Z12.11) Special screening for malignant neoplasms, colon  Comment: reviewed.   Plan: Immunos occult blood        Update the test.     (I10) Essential hypertension  Comment: stable.   Plan: Lipid Profile (Chol, Trig, HDL, LDL calc)        Update full labs.

## 2018-01-25 NOTE — LETTER
My Depression Action Plan  Name: Sydni Hardin   Date of Birth 1950  Date: 1/25/2018    My doctor: Gibran Alarcon   My clinic: [address] [place] [facility] [address]  [phone]          GREEN    ZONE   Good Control    What it looks like:     Things are going generally well. You have normal up s and down s. You may even feel depressed from time to time, but bad moods usually last less than a day.   What you need to do:  1. Continue to care for yourself (see self care plan)  2. Check your depression survival kit and update it as needed  3. Follow your physician s recommendations including any medication.  4. Do not stop taking medication unless you consult with your physician first.           YELLOW         ZONE Getting Worse    What it looks like:     Depression is starting to interfere with your life.     It may be hard to get out of bed; you may be starting to isolate yourself from others.    Symptoms of depression are starting to last most all day and this has happened for several days.     You may have suicidal thoughts but they are not constant.   What you need to do:     1. Call your care team, your response to treatment will improve if you keep your care team informed of your progress. Yellow periods are signs an adjustment may need to be made.     2. Continue your self-care, even if you have to fake it!    3. Talk to someone in your support network    4. Open up your depression survival kit           RED    ZONE Medical Alert - Get Help    What it looks like:     Depression is seriously interfering with your life.     You may experience these or other symptoms: You can t get out of bed most days, can t work or engage in other necessary activities, you have trouble taking care of basic hygiene, or basic responsibilities, thoughts of suicide or death that will not go away, self-injurious behavior.     What you need to do:  1. Call your care team and request a  same-day appointment. If they are not available (weekends or after hours) call your local crisis line, emergency room or 911.      Electronically signed by: Jo Ayala, January 25, 2018    Depression Self Care Plan / Survival Kit    Self-Care for Depression  Here s the deal. Your body and mind are really not as separate as most people think.  What you do and think affects how you feel and how you feel influences what you do and think. This means if you do things that people who feel good do, it will help you feel better.  Sometimes this is all it takes.  There is also a place for medication and therapy depending on how severe your depression is, so be sure to consult with your medical provider and/ or Behavioral Health Consultant if your symptoms are worsening or not improving.     In order to better manage my stress, I will:    Exercise  Get some form of exercise, every day. This will help reduce pain and release endorphins, the  feel good  chemicals in your brain. This is almost as good as taking antidepressants!  This is not the same as joining a gym and then never going! (they count on that by the way ) It can be as simple as just going for a walk or doing some gardening, anything that will get you moving.      Hygiene   Maintain good hygiene (Get out of bed in the morning, Make your bed, Brush your teeth, Take a shower, and Get dressed like you were going to work, even if you are unemployed).  If your clothes don't fit try to get ones that do.    Diet  I will strive to eat foods that are good for me, drink plenty of water, and avoid excessive sugar, caffeine, alcohol, and other mood-altering substances.  Some foods that are helpful in depression are: complex carbohydrates, B vitamins, flaxseed, fish or fish oil, fresh fruits and vegetables.    Psychotherapy  I agree to participate in Individual Therapy (if recommended).    Medication  If prescribed medications, I agree to take them.  Missing doses can  result in serious side effects.  I understand that drinking alcohol, or other illicit drug use, may cause potential side effects.  I will not stop my medication abruptly without first discussing it with my provider.    Staying Connected With Others  I will stay in touch with my friends, family members, and my primary care provider/team.    Use your imagination  Be creative.  We all have a creative side; it doesn t matter if it s oil painting, sand castles, or mud pies! This will also kick up the endorphins.    Witness Beauty  (AKA stop and smell the roses) Take a look outside, even in mid-winter. Notice colors, textures. Watch the squirrels and birds.     Service to others  Be of service to others.  There is always someone else in need.  By helping others we can  get out of ourselves  and remember the really important things.  This also provides opportunities for practicing all the other parts of the program.    Humor  Laugh and be silly!  Adjust your TV habits for less news and crime-drama and more comedy.    Control your stress  Try breathing deep, massage therapy, biofeedback, and meditation. Find time to relax each day.     My support system    Clinic Contact:  Phone number:    Contact 1:  Phone number:    Contact 2:  Phone number:    Voodoo/:  Phone number:    Therapist:  Phone number:    Local crisis center:    Phone number:    Other community support:  Phone number:

## 2018-01-25 NOTE — MR AVS SNAPSHOT
After Visit Summary   1/25/2018    Sydni Hardin    MRN: [id number]           Patient Information     Date Of Birth          1950        Visit Information        Provider Department      1/25/2018 1:15 PM iGbran Alarcon MD [facility]        Today's Diagnoses     Other fatigue    -  1    Need for hepatitis C screening test        Candidiasis of vulva and vagina        Special screening for malignant neoplasms, colon        Essential hypertension        Primary osteoarthritis of right knee        Encounter for gynecological examination without abnormal finding           Care Instructions    F/u with ongoing concerns.           Follow-ups after your visit        Your next 10 appointments already scheduled     Apr 04, 2018  1:00 PM CDT   (Arrive by 12:45 PM)   Return Visit with Moisés Olson MD    ORTHOPEDICS ([facility] )    [address] [facility] [address] [phone]              Future tests that were ordered for you today     Open Future Orders        Priority Expected Expires Ordered    Lipid Profile (Chol, Trig, HDL, LDL calc) Routine  1/25/2019 1/25/2018    Comprehensive metabolic panel (BMP + Alb, Alk Phos, ALT, AST, Total. Bili, TP) Routine  1/25/2019 1/25/2018    Immunos occult blood Routine  1/25/2019 1/25/2018            Who to contact     If you have questions or need follow up information about today's clinic visit or your schedule please contact [facility] directly at [phone].  Normal or non-critical lab and imaging results will be communicated to you by MyChart, letter or phone within 4 business days after the clinic has received the results. If you do not hear from us within 7 days, please contact the clinic through MyChart or phone. If you have a critical or abnormal lab result, we will notify you by phone as soon as possible.  Submit refill requests through [employer] or call your pharmacy and they will  "forward the refill request to us. Please allow 3 business days for your refill to be completed.          Additional Information About Your Visit        Care EveryWhere ID     This is your Care EveryWhere ID. This could be used by other organizations to access your [facility] medical records  [id number]        Your Vitals Were     Pulse Temperature Height Pulse Oximetry BMI (Body Mass Index)       68 97.8  F (36.6  C) 4' 10\" (1.473 m) 97% 35.11 kg/m2        Blood Pressure from Last 3 Encounters:   01/25/18 136/70   01/03/18 110/70   12/14/17 118/72    Weight from Last 3 Encounters:   01/25/18 168 lb (76.2 kg)   01/03/18 160 lb (72.6 kg)   12/14/17 156 lb (70.8 kg)              We Performed the Following     A pap thin layer screen with  HPV - recommended age 30 - 65 years (select HPV order below)     CBC with platelets and differential     Hepatitis C Screen Reflex to HCV RNA Quant and Genotype     TSH with free T4 reflex          Today's Medication Changes          These changes are accurate as of 1/25/18  1:52 PM.  If you have any questions, ask your nurse or doctor.               These medicines have changed or have updated prescriptions.        Dose/Directions    fluconazole 150 MG tablet   Commonly known as:  DIFLUCAN   This may have changed:  See the new instructions.   Used for:  Candidiasis of vulva and vagina   Changed by:  Gibran Alarcon MD        TAKE 1 TABLET BY MOUTH EVERY 72 HOURS   Quantity:  2 tablet   Refills:  0         Stop taking these medicines if you haven't already. Please contact your care team if you have questions.     dexamethasone 4 MG/ML injection   Commonly known as:  DECADRON   Stopped by:  Gibran Alarcon MD                Where to get your medicines      These medications were sent to [facility] - [address]  [address] [address]     Phone:  [phone]     doxepin 10 MG capsule    fluconazole 150 MG tablet                Primary " Care Provider Office Phone # Fax #    Gibran Alarcon [phone]        [facility] [address]  [facility] [address]        Equal Access to Services     TREVIN KENNEDY : Hadii aad ku hadjosephineo Soomaali, waaxda luqadaha, qaybta kaalmada adeegyada, cierra langleyn parth hernandez laBonellie arteaga. So [facility] [phone].    ATENCIÓN: Si habla español, tiene a guaman disposición servicios gratuitos de asistencia lingüística. Llame al [phone].    We comply with applicable federal civil rights laws and [place] laws. We do not discriminate on the basis of race, color, national origin, age, disability, sex, sexual orientation, or gender identity.            Thank you!     Thank you for choosing [facility]  for your care. Our goal is always to provide you with excellent care. Hearing back from our patients is one way we can continue to improve our services. Please take a few minutes to complete the written survey that you may receive in the mail after your visit with us. Thank you!             Your Updated Medication List - Protect others around you: Learn how to safely use, store and throw away your medicines at www.disposemymeds.org.          This list is accurate as of 1/25/18  1:52 PM.  Always use your most recent med list.                   Brand Name Dispense Instructions for use Diagnosis    albuterol 108 (90 BASE) MCG/ACT Inhaler    VENTOLIN HFA    2 Inhaler    USE 1 PUFF FOUR TIMES A DAY AS NEEDED    Tobacco abuse       ALPRAZolam 1 MG tablet    XANAX    90 tablet    Take 1 tablet (1 mg) by mouth 3 times daily as needed for anxiety    Anxiety       aspirin 81 MG EC tablet      Take 1 tablet (81 mg) by mouth 2 times daily    S/P total knee replacement using cement, right       atorvastatin 40 MG tablet    LIPITOR    90 tablet    Take 1 tablet (40 mg) by mouth daily    Mixed hyperlipidemia       benazepril 10 MG tablet    LOTENSIN    90 tablet    TAKE 1 TABLET BY MOUTH DAILY     HTN (hypertension)       benzonatate 200 MG capsule    TESSALON    60 capsule    TAKE 1 CAPSULE BY MOUTH 3 TIMES A DAY AS NEEDED FOR COUGH    Preop general physical exam, Tobacco abuse       budesonide-formoterol 160-4.5 MCG/ACT Inhaler    SYMBICORT    1 Inhaler    Inhale 2 puffs into the lungs 2 times daily. As needed    Asthma       butalbital-acetaminophen-caffeine -40 MG per tablet    FIORICET/ESGIC    30 tablet    Take 1 tablet by mouth every 6 hours as needed    Scalp hematoma, sequela       cimetidine 800 MG tablet    TAGAMET    90 tablet    Take 1 tablet (800 mg) by mouth At Bedtime As needed    Esophageal reflux       diclofenac 1 % Gel topical gel    VOLTAREN    100 g    Apply 4 grams to knees  four times daily using enclosed dosing card.    Primary osteoarthritis of left knee, Chronic pain of right knee       doxepin 10 MG capsule    SINEquan    90 capsule    Take 1 capsule (10 mg) by mouth At Bedtime    Primary osteoarthritis of right knee       * EPINEPHrine 0.3 MG/0.3ML injection 2-pack    EPIPEN/ADRENACLICK/or ANY BX GENERIC EQUIV     Inject 0.3 mg into the muscle        * EPIPEN 2-JAIRO 0.3 MG/0.3ML injection 2-pack   Generic drug:  EPINEPHrine     2 each    INJECT 1 SYRINGE INTRAMUSCULARLY ONCE AS NEEDED FOR ANAPHYLAXIS    Anaphylactic reaction       escitalopram 20 MG tablet    LEXAPRO    180 tablet    TAKE 1 TABLET BY MOUTH 2 TIMES A DAY    Dysthymia       fluconazole 150 MG tablet    DIFLUCAN    2 tablet    TAKE 1 TABLET BY MOUTH EVERY 72 HOURS    Candidiasis of vulva and vagina       hydrochlorothiazide 25 MG tablet    HYDRODIURIL    90 tablet    TAKE 1 TABLET BY MOUTH DAILY    Benign essential hypertension       ipratropium - albuterol 0.5 mg/2.5 mg/3 mL 0.5-2.5 (3) MG/3ML neb solution    DUONEB    90 mL    Take 1 vial (3 mLs) by nebulization every 4 hours as needed    Acute bronchospasm, SOB (shortness of breath)       meloxicam 15 MG tablet    MOBIC    90 tablet    Take 1 tablet (15 mg)  by mouth daily    Pain       montelukast 10 MG tablet    SINGULAIR    90 tablet    TAKE 1 TABLET BY MOUTH DAILY    Mixed hyperlipidemia       * nicotine 21 MG/24HR 24 hr patch    NICODERM CQ    30 patch    Place 1 patch onto the skin every 24 hours    Tobacco abuse       * nicotine 7 MG/24HR 24 hr patch    NICODERM CQ    30 patch    Place 1 patch onto the skin every 24 hours    Tobacco abuse       * nicotine 14 MG/24HR 24 hr patch    NICODERM CQ    30 patch    Place 1 patch onto the skin every 24 hours    Tobacco abuse       nystatin 848664 UNIT/ML suspension    MYCOSTATIN    60 mL    TAKE 1 TEASPOON (5MLS) BY MOUTH 4 TIMES DAILY    Thrush       * order for DME     2 each    Equipment being ordered: Nebulizer supplies    Primary osteoarthritis of right knee       * order for DME     1 each    Equipment being ordered: mcgee walker    Primary osteoarthritis of right knee       * order for DME     1 each    Equipment being ordered: toilet seat riser    Postoperative state       * order for DME     1 Units    Equipment being ordered: Please install safety bar in bathroom to assist patient in getting up from toilet.    Status post total right knee replacement       oxyCODONE-acetaminophen 5-325 MG per tablet    PERCOCET    60 tablet    TAKE 1 TO 2 TABLETS BY MOUTH EVERY 4 HOURS AS NEEDED FOR PAIN    Primary osteoarthritis of both knees       potassium chloride 10 MEQ tablet    K-TAB,KLOR-CON    180 tablet    TAKE 1 TABLET BY MOUTH 2 TIMES DAILY WITH FOOD    Benign essential hypertension       temazepam 30 MG capsule    RESTORIL    90 capsule    TAKE 1 CAPSULE BY MOUTH NIGHTLY AS NEEDED FOR SLEEP    Persistent insomnia       * Notice:  This list has 9 medication(s) that are the same as other medications prescribed for you. Read the directions carefully, and ask your doctor or other care provider to review them with you.

## 2018-01-26 ASSESSMENT — PATIENT HEALTH QUESTIONNAIRE - PHQ9: SUM OF ALL RESPONSES TO PHQ QUESTIONS 1-9: 22

## 2018-01-26 ASSESSMENT — ANXIETY QUESTIONNAIRES: GAD7 TOTAL SCORE: 19

## 2018-01-29 DIAGNOSIS — M17.12 PRIMARY OSTEOARTHRITIS OF LEFT KNEE: ICD-10-CM

## 2018-01-29 DIAGNOSIS — G89.29 CHRONIC PAIN OF RIGHT KNEE: ICD-10-CM

## 2018-01-29 DIAGNOSIS — I10 BENIGN ESSENTIAL HYPERTENSION: ICD-10-CM

## 2018-01-29 DIAGNOSIS — M25.561 CHRONIC PAIN OF RIGHT KNEE: ICD-10-CM

## 2018-01-29 RX ORDER — POTASSIUM CHLORIDE 750 MG/1
TABLET, EXTENDED RELEASE ORAL
Qty: 60 TABLET | Refills: 0 | Status: SHIPPED | OUTPATIENT
Start: 2018-01-29 | End: 2018-03-08

## 2018-01-29 NOTE — TELEPHONE ENCOUNTER
Patient left message on phone. States she needs refill of voltaren gel. Called pharmacy and they stated that patient needs a new prescription. Forwarded refill to Dr Peck as he is her primary.

## 2018-01-29 NOTE — TELEPHONE ENCOUNTER
Voltaren gel request from pharmacy. Not on current Epic medication list.Last office visit with Dr. Olson on 1.3.018. Medication pended.Thank you

## 2018-01-30 NOTE — TELEPHONE ENCOUNTER
Contacted patient and she stated that she wanted refill to go to Dr Alarcon. Contacted Primary's nurse and explained refill request. Left message for her to call me back if has any questions.

## 2018-02-01 DIAGNOSIS — R53.83 OTHER FATIGUE: ICD-10-CM

## 2018-02-01 DIAGNOSIS — Z12.11 SPECIAL SCREENING FOR MALIGNANT NEOPLASMS, COLON: ICD-10-CM

## 2018-02-01 DIAGNOSIS — I10 ESSENTIAL HYPERTENSION: ICD-10-CM

## 2018-02-01 DIAGNOSIS — Z11.59 NEED FOR HEPATITIS C SCREENING TEST: ICD-10-CM

## 2018-02-01 LAB
ALBUMIN SERPL-MCNC: 3.8 G/DL (ref 3.4–5)
ALP SERPL-CCNC: 117 U/L (ref 40–150)
ALT SERPL W P-5'-P-CCNC: 33 U/L (ref 0–50)
ANION GAP SERPL CALCULATED.3IONS-SCNC: 5 MMOL/L (ref 3–14)
AST SERPL W P-5'-P-CCNC: 21 U/L (ref 0–45)
BASOPHILS # BLD AUTO: 0.1 10E9/L (ref 0–0.2)
BASOPHILS NFR BLD AUTO: 0.5 %
BILIRUB SERPL-MCNC: 0.4 MG/DL (ref 0.2–1.3)
BUN SERPL-MCNC: 19 MG/DL (ref 7–30)
CALCIUM SERPL-MCNC: 9 MG/DL (ref 8.5–10.1)
CHLORIDE SERPL-SCNC: 96 MMOL/L (ref 94–109)
CHOLEST SERPL-MCNC: 144 MG/DL
CO2 SERPL-SCNC: 31 MMOL/L (ref 20–32)
COPATH REPORT: NORMAL
CREAT SERPL-MCNC: 0.92 MG/DL (ref 0.52–1.04)
DIFFERENTIAL METHOD BLD: NORMAL
EOSINOPHIL # BLD AUTO: 0.2 10E9/L (ref 0–0.7)
EOSINOPHIL NFR BLD AUTO: 1.9 %
ERYTHROCYTE [DISTWIDTH] IN BLOOD BY AUTOMATED COUNT: 12.8 % (ref 10–15)
GFR SERPL CREATININE-BSD FRML MDRD: 60 ML/MIN/1.7M2
GLUCOSE SERPL-MCNC: 95 MG/DL (ref 70–99)
HCT VFR BLD AUTO: 45.1 % (ref 35–47)
HDLC SERPL-MCNC: 35 MG/DL
HGB BLD-MCNC: 15.5 G/DL (ref 11.7–15.7)
IMM GRANULOCYTES # BLD: 0 10E9/L (ref 0–0.4)
IMM GRANULOCYTES NFR BLD: 0.4 %
LDLC SERPL CALC-MCNC: 71 MG/DL
LYMPHOCYTES # BLD AUTO: 3.6 10E9/L (ref 0.8–5.3)
LYMPHOCYTES NFR BLD AUTO: 36.3 %
MCH RBC QN AUTO: 31.1 PG (ref 26.5–33)
MCHC RBC AUTO-ENTMCNC: 34.4 G/DL (ref 31.5–36.5)
MCV RBC AUTO: 90 FL (ref 78–100)
MONOCYTES # BLD AUTO: 0.6 10E9/L (ref 0–1.3)
MONOCYTES NFR BLD AUTO: 6.4 %
NEUTROPHILS # BLD AUTO: 5.3 10E9/L (ref 1.6–8.3)
NEUTROPHILS NFR BLD AUTO: 54.5 %
NONHDLC SERPL-MCNC: 109 MG/DL
NRBC # BLD AUTO: 0 10*3/UL
NRBC BLD AUTO-RTO: 0 /100
PAP: NORMAL
PLATELET # BLD AUTO: 324 10E9/L (ref 150–450)
POTASSIUM SERPL-SCNC: 4.1 MMOL/L (ref 3.4–5.3)
PROT SERPL-MCNC: 7.3 G/DL (ref 6.8–8.8)
RBC # BLD AUTO: 4.99 10E12/L (ref 3.8–5.2)
SODIUM SERPL-SCNC: 132 MMOL/L (ref 133–144)
TRIGL SERPL-MCNC: 191 MG/DL
TSH SERPL DL<=0.005 MIU/L-ACNC: 1.07 MU/L (ref 0.4–4)
WBC # BLD AUTO: 9.8 10E9/L (ref 4–11)

## 2018-02-01 PROCEDURE — 80053 COMPREHEN METABOLIC PANEL: CPT | Mod: ZL | Performed by: FAMILY MEDICINE

## 2018-02-01 PROCEDURE — 86803 HEPATITIS C AB TEST: CPT | Mod: ZL | Performed by: FAMILY MEDICINE

## 2018-02-01 PROCEDURE — 84443 ASSAY THYROID STIM HORMONE: CPT | Mod: ZL | Performed by: FAMILY MEDICINE

## 2018-02-01 PROCEDURE — 36415 COLL VENOUS BLD VENIPUNCTURE: CPT | Mod: ZL | Performed by: FAMILY MEDICINE

## 2018-02-01 PROCEDURE — 85025 COMPLETE CBC W/AUTO DIFF WBC: CPT | Mod: ZL | Performed by: FAMILY MEDICINE

## 2018-02-01 PROCEDURE — 99000 SPECIMEN HANDLING OFFICE-LAB: CPT | Performed by: FAMILY MEDICINE

## 2018-02-01 PROCEDURE — 80061 LIPID PANEL: CPT | Mod: ZL | Performed by: FAMILY MEDICINE

## 2018-02-02 LAB
FINAL DIAGNOSIS: NORMAL
HPV HR 12 DNA CVX QL NAA+PROBE: NEGATIVE
HPV16 DNA SPEC QL NAA+PROBE: NEGATIVE
HPV18 DNA SPEC QL NAA+PROBE: NEGATIVE
SPECIMEN DESCRIPTION: NORMAL
SPECIMEN SOURCE CVX/VAG CYTO: NORMAL

## 2018-02-03 LAB — HCV AB SERPL QL IA: NONREACTIVE

## 2018-02-07 ENCOUNTER — TELEPHONE (OUTPATIENT)
Dept: ORTHOPEDICS | Facility: OTHER | Age: 68
End: 2018-02-07

## 2018-02-07 NOTE — TELEPHONE ENCOUNTER
Patient daughter called and stating the the injection to right knee did not work and she is in horrible pain and is falling, patients daughter and patient was advised as patient is in back ground that she needs to give it more time and to be using the Voltaren cream and the knee brace should be worn as during the day during activity and she should be using walker when feeling unstable. Resting and elevation also advised, Patient states she will take writers advise and will try and writer advised daughter she should make appointment to see Dr. Olson if she is ready to start the process on TKA. Patient and daughter agreed to plan.  Sandy Salazar LPN

## 2018-02-23 ENCOUNTER — TELEPHONE (OUTPATIENT)
Dept: ORTHOPEDICS | Facility: OTHER | Age: 68
End: 2018-02-23

## 2018-02-23 NOTE — TELEPHONE ENCOUNTER
Patient was given [facility] knee and promis-10 over the phone done and filed into flow sheet 1yr review.  Sandy Salazar LPN

## 2018-02-26 DIAGNOSIS — F41.9 ANXIETY: ICD-10-CM

## 2018-02-27 ENCOUNTER — TELEPHONE (OUTPATIENT)
Dept: FAMILY MEDICINE | Facility: OTHER | Age: 68
End: 2018-02-27

## 2018-02-27 RX ORDER — ALPRAZOLAM 1 MG
TABLET ORAL
Qty: 90 TABLET | Refills: 0 | Status: SHIPPED | OUTPATIENT
Start: 2018-02-27 | End: 2018-04-02

## 2018-02-27 NOTE — TELEPHONE ENCOUNTER
[facility] called the pt to notify her Rx is ready for  and pt would like the nurse to call her tomorrow because she never heard anything about her breast exam results. Last mammogram was 06/29/17.

## 2018-02-27 NOTE — TELEPHONE ENCOUNTER
xanax      Last Written Prescription Date: 8/21/17  Last Fill Quantity: 90,   # refills: 5  Last Office Visit: 1/25/18  Future Office visit:    Next 5 appointments (look out 90 days)     Apr 04, 2018  1:00 PM CDT   (Arrive by 12:45 PM)   Return Visit with Moisés Olson MD    ORTHOPEDICS ([facility] )    [address] [facility] [address]   [phone]                   Routing refill request to provider for review/approval because:  Drug not on the [facility], [facility] or [facility] refill protocol or controlled substance

## 2018-02-28 NOTE — TELEPHONE ENCOUNTER
I called the pt she would like her pap results. I called her and notified. I updated her address as well.

## 2018-04-02 DIAGNOSIS — G47.00 PERSISTENT INSOMNIA: ICD-10-CM

## 2018-04-02 DIAGNOSIS — F41.9 ANXIETY: ICD-10-CM

## 2018-04-02 DIAGNOSIS — I10 HTN (HYPERTENSION): ICD-10-CM

## 2018-04-03 ENCOUNTER — TELEPHONE (OUTPATIENT)
Dept: FAMILY MEDICINE | Facility: OTHER | Age: 68
End: 2018-04-03

## 2018-04-03 DIAGNOSIS — Z72.0 TOBACCO ABUSE: ICD-10-CM

## 2018-04-03 RX ORDER — TEMAZEPAM 30 MG
CAPSULE ORAL
Qty: 90 CAPSULE | Refills: 0 | Status: SHIPPED | OUTPATIENT
Start: 2018-04-03 | End: 2018-06-26

## 2018-04-03 RX ORDER — BENAZEPRIL HYDROCHLORIDE 10 MG/1
TABLET ORAL
Qty: 90 TABLET | Refills: 1 | Status: SHIPPED | OUTPATIENT
Start: 2018-04-03 | End: 2018-10-10

## 2018-04-03 RX ORDER — ALPRAZOLAM 1 MG
TABLET ORAL
Qty: 90 TABLET | Refills: 0 | Status: SHIPPED | OUTPATIENT
Start: 2018-04-03 | End: 2018-05-24

## 2018-04-03 NOTE — TELEPHONE ENCOUNTER
4:26 PM    Reason for Call: Phone Call    Description: Pt calling stating that she needs the nicotine patch 1, 2, and 3 called in to [facility]. The pharmacy told her they never received the orders and she thought they went in after her last appointment.    Was an appointment offered for this call? No  If yes : Appointment type              Date    Preferred method for responding to this message: Telephone Call  What is your phone number ?    If we cannot reach you directly, may we leave a detailed response at the number you provided? Yes    Can this message wait until your PCP/provider returns, if available today?     Rose Marie

## 2018-04-03 NOTE — TELEPHONE ENCOUNTER
Xanax  Last office visit: 1/25/18  Last refill: 2/27/18 #90, 0 R  Thank you.  Restoril  Last refill: 1/9/18 #90, 0 R  Thank you.

## 2018-04-04 ENCOUNTER — TELEPHONE (OUTPATIENT)
Dept: FAMILY MEDICINE | Facility: OTHER | Age: 68
End: 2018-04-04

## 2018-04-04 RX ORDER — NICOTINE 21 MG/24HR
1 PATCH, TRANSDERMAL 24 HOURS TRANSDERMAL EVERY 24 HOURS
Qty: 30 PATCH | Refills: 1 | Status: ON HOLD | OUTPATIENT
Start: 2018-04-04 | End: 2019-09-17

## 2018-04-05 ASSESSMENT — PATIENT HEALTH QUESTIONNAIRE - PHQ9: SUM OF ALL RESPONSES TO PHQ QUESTIONS 1-9: 13

## 2018-04-09 ENCOUNTER — TELEPHONE (OUTPATIENT)
Dept: FAMILY MEDICINE | Facility: OTHER | Age: 68
End: 2018-04-09

## 2018-04-09 NOTE — TELEPHONE ENCOUNTER
11:26 AM    Reason for Call: OVERBOOK    Patient is having the following symptoms: emotional/depression for 1 months.    The patient is requesting an appointment for sooner than 04/21/18 with Dr Alarcon.    Was an appointment offered for this call? Yes  If yes : Appointment type              Date    Preferred method for responding to this message: Telephone Call  What is your phone number ?    If we cannot reach you directly, may we leave a detailed response at the number you provided? Yes    Can this message wait until your PCP/provider returns, if unavailable today? YES    Rose Marie

## 2018-04-09 NOTE — TELEPHONE ENCOUNTER
We can see her 04/16/18 at 8:30am, arriving at 8:15am. Please put her on the cancellation list as well.

## 2018-04-10 NOTE — TELEPHONE ENCOUNTER
Called LM about available appt with Dr Dorian Bhakta Monday 4/16/18 8:30am arrive 8:15am and can be placed on a Wait List for sooner if available  Michelle Yarbrough

## 2018-04-18 ENCOUNTER — TELEPHONE (OUTPATIENT)
Dept: FAMILY MEDICINE | Facility: OTHER | Age: 68
End: 2018-04-18

## 2018-04-18 NOTE — TELEPHONE ENCOUNTER
10:42 AM    Reason for Call: OVERBOOK    Patient is having the following symptoms: dizzy spells for 2 weeks.    The patient is requesting an appointment for 04/18/18 with .    Was an appointment offered for this call? No  If yes : Appointment type              Date    Preferred method for responding to this message: Telephone Call  What is your phone number ?[phone]    If we cannot reach you directly, may we leave a detailed response at the number you provided? Yes    Can this message wait until your PCP/provider returns, if unavailable today? Not applicable, [facility] is in     [facility]

## 2018-04-19 DIAGNOSIS — Z12.11 SPECIAL SCREENING FOR MALIGNANT NEOPLASMS, COLON: ICD-10-CM

## 2018-04-19 LAB — HEMOCCULT SP1 STL QL: NEGATIVE

## 2018-04-19 PROCEDURE — 82274 ASSAY TEST FOR BLOOD FECAL: CPT | Mod: ZL | Performed by: FAMILY MEDICINE

## 2018-05-24 ENCOUNTER — OFFICE VISIT (OUTPATIENT)
Dept: FAMILY MEDICINE | Facility: OTHER | Age: 68
End: 2018-05-24
Attending: FAMILY MEDICINE
Payer: COMMERCIAL

## 2018-05-24 ENCOUNTER — RADIANT APPOINTMENT (OUTPATIENT)
Dept: GENERAL RADIOLOGY | Facility: OTHER | Age: 68
End: 2018-05-24
Attending: FAMILY MEDICINE
Payer: MEDICARE

## 2018-05-24 VITALS
SYSTOLIC BLOOD PRESSURE: 120 MMHG | DIASTOLIC BLOOD PRESSURE: 80 MMHG | TEMPERATURE: 97.9 F | OXYGEN SATURATION: 95 % | WEIGHT: 170 LBS | HEART RATE: 74 BPM | BODY MASS INDEX: 35.68 KG/M2 | HEIGHT: 58 IN

## 2018-05-24 DIAGNOSIS — B37.31 CANDIDIASIS OF VULVA AND VAGINA: ICD-10-CM

## 2018-05-24 DIAGNOSIS — R25.2 HAND CRAMPS: Primary | ICD-10-CM

## 2018-05-24 DIAGNOSIS — F41.9 ANXIETY: ICD-10-CM

## 2018-05-24 DIAGNOSIS — J98.01 ACUTE BRONCHOSPASM: ICD-10-CM

## 2018-05-24 DIAGNOSIS — J01.01 ACUTE RECURRENT MAXILLARY SINUSITIS: ICD-10-CM

## 2018-05-24 LAB
ALBUMIN SERPL-MCNC: 3.8 G/DL (ref 3.4–5)
ALP SERPL-CCNC: 114 U/L (ref 40–150)
ALT SERPL W P-5'-P-CCNC: 36 U/L (ref 0–50)
ANION GAP SERPL CALCULATED.3IONS-SCNC: 10 MMOL/L (ref 3–14)
AST SERPL W P-5'-P-CCNC: 30 U/L (ref 0–45)
BILIRUB SERPL-MCNC: 0.4 MG/DL (ref 0.2–1.3)
BUN SERPL-MCNC: 15 MG/DL (ref 7–30)
CALCIUM SERPL-MCNC: 9.1 MG/DL (ref 8.5–10.1)
CHLORIDE SERPL-SCNC: 99 MMOL/L (ref 94–109)
CO2 SERPL-SCNC: 26 MMOL/L (ref 20–32)
CREAT SERPL-MCNC: 0.86 MG/DL (ref 0.52–1.04)
GFR SERPL CREATININE-BSD FRML MDRD: 66 ML/MIN/1.7M2
GLUCOSE SERPL-MCNC: 98 MG/DL (ref 70–99)
MAGNESIUM SERPL-MCNC: 2.1 MG/DL (ref 1.6–2.3)
POTASSIUM SERPL-SCNC: 4.1 MMOL/L (ref 3.4–5.3)
PROT SERPL-MCNC: 7.3 G/DL (ref 6.8–8.8)
SODIUM SERPL-SCNC: 135 MMOL/L (ref 133–144)

## 2018-05-24 PROCEDURE — 71046 X-RAY EXAM CHEST 2 VIEWS: CPT | Mod: TC,FY

## 2018-05-24 PROCEDURE — 83735 ASSAY OF MAGNESIUM: CPT | Mod: ZL | Performed by: FAMILY MEDICINE

## 2018-05-24 PROCEDURE — 80053 COMPREHEN METABOLIC PANEL: CPT | Mod: ZL | Performed by: FAMILY MEDICINE

## 2018-05-24 PROCEDURE — G0463 HOSPITAL OUTPT CLINIC VISIT: HCPCS | Mod: 25

## 2018-05-24 PROCEDURE — 99214 OFFICE O/P EST MOD 30 MIN: CPT | Performed by: FAMILY MEDICINE

## 2018-05-24 PROCEDURE — 36415 COLL VENOUS BLD VENIPUNCTURE: CPT | Mod: ZL | Performed by: FAMILY MEDICINE

## 2018-05-24 RX ORDER — FLUCONAZOLE 150 MG/1
150 TABLET ORAL
Qty: 4 TABLET | Refills: 0 | Status: SHIPPED | OUTPATIENT
Start: 2018-05-24 | End: 2018-07-10

## 2018-05-24 RX ORDER — ALPRAZOLAM 1 MG
TABLET ORAL
Qty: 90 TABLET | Refills: 0 | Status: SHIPPED | OUTPATIENT
Start: 2018-05-24 | End: 2018-07-10

## 2018-05-24 ASSESSMENT — ANXIETY QUESTIONNAIRES
2. NOT BEING ABLE TO STOP OR CONTROL WORRYING: NEARLY EVERY DAY
6. BECOMING EASILY ANNOYED OR IRRITABLE: NEARLY EVERY DAY
7. FEELING AFRAID AS IF SOMETHING AWFUL MIGHT HAPPEN: NEARLY EVERY DAY
5. BEING SO RESTLESS THAT IT IS HARD TO SIT STILL: NOT AT ALL
GAD7 TOTAL SCORE: 18
3. WORRYING TOO MUCH ABOUT DIFFERENT THINGS: NEARLY EVERY DAY
1. FEELING NERVOUS, ANXIOUS, OR ON EDGE: NEARLY EVERY DAY
IF YOU CHECKED OFF ANY PROBLEMS ON THIS QUESTIONNAIRE, HOW DIFFICULT HAVE THESE PROBLEMS MADE IT FOR YOU TO DO YOUR WORK, TAKE CARE OF THINGS AT HOME, OR GET ALONG WITH OTHER PEOPLE: SOMEWHAT DIFFICULT

## 2018-05-24 ASSESSMENT — PAIN SCALES - GENERAL: PAINLEVEL: MODERATE PAIN (5)

## 2018-05-24 ASSESSMENT — PATIENT HEALTH QUESTIONNAIRE - PHQ9: 5. POOR APPETITE OR OVEREATING: NEARLY EVERY DAY

## 2018-05-24 NOTE — NURSING NOTE
"Chief Complaint   Patient presents with     RECHECK       Initial /80 (BP Location: Right arm, Patient Position: Chair, Cuff Size: Adult Regular)  Pulse 74  Temp 97.9  F (36.6  C) (Tympanic)  Ht 4' 10\" (1.473 m)  Wt 170 lb (77.1 kg)  SpO2 95%  BMI 35.53 kg/m2 Estimated body mass index is 35.53 kg/(m^2) as calculated from the following:    Height as of this encounter: 4' 10\" (1.473 m).    Weight as of this encounter: 170 lb (77.1 kg).  Medication Reconciliation: complete    SLADE PRATT LPN    "

## 2018-05-24 NOTE — MR AVS SNAPSHOT
"              After Visit Summary   5/24/2018    Sydni Hardin    MRN: [id number]           Patient Information     Date Of Birth          1950        Visit Information        Provider Department      5/24/2018 10:15 AM Gibran Alarcon MD [facility]        Today's Diagnoses     Hand cramps    -  1    Acute recurrent maxillary sinusitis        Candidiasis of vulva and vagina        Anxiety        Acute bronchospasm          Care Instructions    F/u with ongoing concerns.           Follow-ups after your visit        Your next 10 appointments already scheduled     Jul 10, 2018  1:30 PM CDT   (Arrive by 1:15 PM)   SHORT with Gibran Alarcon MD   [facility] ([facility] )    [address] [facility] [address]   [phone]              Who to contact     If you have questions or need follow up information about today's clinic visit or your schedule please contact [facility] directly at [phone].  Normal or non-critical lab and imaging results will be communicated to you by MyChart, letter or phone within 4 business days after the clinic has received the results. If you do not hear from us within 7 days, please contact the clinic through MyChart or phone. If you have a critical or abnormal lab result, we will notify you by phone as soon as possible.  Submit refill requests through [facility]X[employer] Room or call your pharmacy and they will forward the refill request to us. Please allow 3 business days for your refill to be completed.          Additional Information About Your Visit        Care EveryWhere ID     This is your Care EveryWhere ID. This could be used by other organizations to access your [place] medical records  [id number]        Your Vitals Were     Pulse Temperature Height Pulse Oximetry BMI (Body Mass Index)       74 97.9  F (36.6  C) (Tympanic) 4' 10\" (1.473 m) 95% 35.53 kg/m2        Blood Pressure from Last 3 Encounters:   05/24/18 " 120/80   01/25/18 136/70   01/03/18 110/70    Weight from Last 3 Encounters:   05/24/18 170 lb (77.1 kg)   01/25/18 168 lb (76.2 kg)   01/03/18 160 lb (72.6 kg)              We Performed the Following     Comprehensive metabolic panel (BMP + Alb, Alk Phos, ALT, AST, Total. Bili, TP)     Magnesium          Today's Medication Changes          These changes are accurate as of 5/24/18 12:37 PM.  If you have any questions, ask your nurse or doctor.               Start taking these medicines.        Dose/Directions    amoxicillin-clavulanate 875-125 MG per tablet   Commonly known as:  AUGMENTIN   Used for:  Acute recurrent maxillary sinusitis   Started by:  Gibran Alarcon MD        Dose:  1 tablet   Take 1 tablet by mouth 2 times daily   Quantity:  20 tablet   Refills:  0         These medicines have changed or have updated prescriptions.        Dose/Directions    * fluconazole 150 MG tablet   Commonly known as:  DIFLUCAN   This may have changed:  Another medication with the same name was added. Make sure you understand how and when to take each.   Used for:  Candidiasis of vulva and vagina   Changed by:  Gibran Alarcon MD        TAKE 1 TABLET BY MOUTH EVERY 72 HOURS   Quantity:  2 tablet   Refills:  0       * fluconazole 150 MG tablet   Commonly known as:  DIFLUCAN   This may have changed:  You were already taking a medication with the same name, and this prescription was added. Make sure you understand how and when to take each.   Used for:  Candidiasis of vulva and vagina   Changed by:  Gibran Alarcon MD        Dose:  150 mg   Take 1 tablet (150 mg) by mouth every 3 days   Quantity:  4 tablet   Refills:  0       * Notice:  This list has 2 medication(s) that are the same as other medications prescribed for you. Read the directions carefully, and ask your doctor or other care provider to review them with you.         Where to get your medicines      These medications were sent to [facility] -  [facility] - [address]  [address] [place] [address]     Phone:  [phone]     amoxicillin-clavulanate 875-125 MG per tablet    fluconazole 150 MG tablet         Some of these will need a paper prescription and others can be bought over the counter.  Ask your nurse if you have questions.     Bring a paper prescription for each of these medications     ALPRAZolam 1 MG tablet                Primary Care Provider Office Phone # Fax #    Gibran Alarcon [phone]       [address] [place] [address]        Equal Access to Services     [facility]: Hadii aad ku hadasho Soomaali, waaxda luqadaha, qaybta kaalmada adeegyada, cierra yepez hayellie adeoneil claudio . So [facility] [phone].    ATENCIÓN: Si habla español, tiene a guaman disposición servicios gratuitos de asistencia lingüística. [facility] [phone].    We comply with applicable federal civil rights laws and [place] laws. We do not discriminate on the basis of race, color, national origin, age, disability, sex, sexual orientation, or gender identity.            Thank you!     Thank you for choosing [facility]  for your care. Our goal is always to provide you with excellent care. Hearing back from our patients is one way we can continue to improve our services. Please take a few minutes to complete the written survey that you may receive in the mail after your visit with us. Thank you!             Your Updated Medication List - Protect others around you: Learn how to safely use, store and throw away your medicines at www.disposemymeds.org.          This list is accurate as of 5/24/18 12:37 PM.  Always use your most recent med list.                   Brand Name Dispense Instructions for use Diagnosis    albuterol 108 (90 Base) MCG/ACT Inhaler    VENTOLIN HFA    2 Inhaler    USE 1 PUFF FOUR TIMES A DAY AS NEEDED    Tobacco abuse       ALPRAZolam 1 MG tablet    XANAX    90 tablet    TAKE 1 TABLET BY MOUTH  3 TIMES DAILY AS NEEDED FOR ANXIETY    Anxiety       amoxicillin-clavulanate 875-125 MG per tablet    AUGMENTIN    20 tablet    Take 1 tablet by mouth 2 times daily    Acute recurrent maxillary sinusitis       aspirin 81 MG EC tablet      Take 1 tablet (81 mg) by mouth 2 times daily    S/P total knee replacement using cement, right       atorvastatin 40 MG tablet    LIPITOR    90 tablet    Take 1 tablet (40 mg) by mouth daily    Mixed hyperlipidemia       benazepril 10 MG tablet    LOTENSIN    90 tablet    TAKE 1 TABLET BY MOUTH DAILY    HTN (hypertension)       benzonatate 200 MG capsule    TESSALON    60 capsule    TAKE 1 CAPSULE BY MOUTH 3 TIMES A DAY AS NEEDED FOR COUGH    Preop general physical exam, Tobacco abuse       budesonide-formoterol 160-4.5 MCG/ACT Inhaler    SYMBICORT    1 Inhaler    Inhale 2 puffs into the lungs 2 times daily. As needed    Asthma       butalbital-acetaminophen-caffeine -40 MG per tablet    FIORICET/ESGIC    30 tablet    Take 1 tablet by mouth every 6 hours as needed    Scalp hematoma, sequela       cimetidine 800 MG tablet    TAGAMET    90 tablet    Take 1 tablet (800 mg) by mouth At Bedtime As needed    Esophageal reflux       doxepin 10 MG capsule    SINEquan    90 capsule    Take 1 capsule (10 mg) by mouth At Bedtime    Primary osteoarthritis of right knee       * EPINEPHrine 0.3 MG/0.3ML injection 2-pack    EPIPEN/ADRENACLICK/or ANY BX GENERIC EQUIV     Inject 0.3 mg into the muscle        * EPIPEN 2-JAIRO 0.3 MG/0.3ML injection 2-pack   Generic drug:  EPINEPHrine     2 each    INJECT 1 SYRINGE INTRAMUSCULARLY ONCE AS NEEDED FOR ANAPHYLAXIS    Anaphylactic reaction       escitalopram 20 MG tablet    LEXAPRO    180 tablet    TAKE 1 TABLET BY MOUTH 2 TIMES A DAY    Dysthymia       * fluconazole 150 MG tablet    DIFLUCAN    2 tablet    TAKE 1 TABLET BY MOUTH EVERY 72 HOURS    Candidiasis of vulva and vagina       * fluconazole 150 MG tablet    DIFLUCAN    4 tablet    Take 1  tablet (150 mg) by mouth every 3 days    Candidiasis of vulva and vagina       hydrochlorothiazide 25 MG tablet    HYDRODIURIL    90 tablet    TAKE 1 TABLET BY MOUTH DAILY    Benign essential hypertension       ipratropium - albuterol 0.5 mg/2.5 mg/3 mL 0.5-2.5 (3) MG/3ML neb solution    DUONEB    90 mL    Take 1 vial (3 mLs) by nebulization every 4 hours as needed    Acute bronchospasm, SOB (shortness of breath)       meloxicam 15 MG tablet    MOBIC    90 tablet    Take 1 tablet (15 mg) by mouth daily    Pain       montelukast 10 MG tablet    SINGULAIR    90 tablet    TAKE 1 TABLET BY MOUTH DAILY    Mixed hyperlipidemia       * nicotine 14 MG/24HR 24 hr patch    NICODERM CQ    30 patch    Place 1 patch onto the skin every 24 hours    Tobacco abuse       * nicotine 7 MG/24HR 24 hr patch    NICODERM CQ    30 patch    Place 1 patch onto the skin every 24 hours    Tobacco abuse       * nicotine 21 MG/24HR 24 hr patch    NICODERM CQ    30 patch    Place 1 patch onto the skin every 24 hours    Tobacco abuse       nystatin 500606 UNIT/ML suspension    MYCOSTATIN    60 mL    TAKE 1 TEASPOON (5MLS) BY MOUTH 4 TIMES DAILY    Thrush       * order for DME     2 each    Equipment being ordered: Nebulizer supplies    Primary osteoarthritis of right knee       * order for DME     1 each    Equipment being ordered: mcgee walker    Primary osteoarthritis of right knee       * order for DME     1 each    Equipment being ordered: toilet seat riser    Postoperative state       * order for DME     1 Units    Equipment being ordered: Please install safety bar in bathroom to assist patient in getting up from toilet.    Status post total right knee replacement       oxyCODONE-acetaminophen 5-325 MG per tablet    PERCOCET    60 tablet    TAKE 1 TO 2 TABLETS BY MOUTH EVERY 4 HOURS AS NEEDED FOR PAIN    Primary osteoarthritis of both knees       potassium chloride SA 10 MEQ CR tablet    K-DUR/KLOR-CON M    180 tablet    TAKE 1 TABLET BY  MOUTH 2 TIMES DAILY WITH FOOD    Benign essential hypertension       temazepam 30 MG capsule    RESTORIL    90 capsule    TAKE 1 CAPSULE BY MOUTH NIGHTLY AS NEEDED FOR SLEEP    Persistent insomnia       VOLTAREN 1 % Gel topical gel   Generic drug:  diclofenac     100 g    APPLY 4 GRAMS TO KNEES FOUR TIMES A DAY USING ENCLOSED DOSING CARD    Primary osteoarthritis of left knee, Chronic pain of right knee       * Notice:  This list has 11 medication(s) that are the same as other medications prescribed for you. Read the directions carefully, and ask your doctor or other care provider to review them with you.

## 2018-05-24 NOTE — PROGRESS NOTES
SUBJECTIVE:   Sydni Hardin is a 68 year old female who presents to clinic today for the following health issues:      Musculoskeletal problem/pain      Duration: 1 year    Description  Location: Bilateral hand spasms    Intensity:  moderate    Accompanying signs and symptoms: numbness    History  Previous similar problem: no   Previous evaluation:  none    Precipitating or alleviating factors:  Trauma or overuse: no   Aggravating factors include: none    Therapies tried and outcome: nothing      Cough      Duration: 3 months    Description (location/character/radiation): Productive and dry cough    Intensity:  moderate    Accompanying signs and symptoms: Sinus congestion/allergies    History (similar episodes/previous evaluation): None    Precipitating or alleviating factors: None    Therapies tried and outcome: None       Problem list and histories reviewed & adjusted, as indicated.  Additional history: having sinus pain and pressure.  Has letter from Isaias Kilgore reporting he believes she needs her xanax.  She gets bronchospasm and is worried about lung cancer.      Patient Active Problem List   Diagnosis     Essential hypertension     Calculus of gallbladder     Headache     Osteoarthrosis, unspecified whether generalized or localized, involving lower leg     Advance Care Planning     Dysthymia     Lung nodules     Tobacco abuse     SCC (squamous cell carcinoma), face     IC (interstitial cystitis)     Primary osteoarthritis of both knees     S/P total knee replacement using cement, right     Asthma     Leukocytosis     Dehydration     Knee pain     Past Surgical History:   Procedure Laterality Date     ARTHROPLASTY KNEE Right 3/21/2017    Procedure: ARTHROPLASTY KNEE;  Surgeon: Moisés Olson MD;  Location: [facility]     ARTHROSCOPY KNEE       BIOPSY BREAST  2000    left - free of disease      SECTION        SECTION       CHOLECYSTECTOMY  2006     COLONOSCOPY  2008    repeat in 10  years     HEAD & NECK SURGERY  2014    hematoma on head drained     HYSTERECTOMY       ORTHOPEDIC SURGERY  02/04/2014    right shoulder replacement     shoulder x2  2009    left       Social History   Substance Use Topics     Smoking status: Current Every Day Smoker     Packs/day: 1.00     Years: 46.00     Types: Cigarettes     Smokeless tobacco: Never Used      Comment: Pt will start patches on 2/16/18     Alcohol use 0.0 oz/week     0 Standard drinks or equivalent per week      Comment: rarely     Family History   Problem Relation Age of Onset     CANCER Mother      skin     Breast Cancer Mother      CEREBROVASCULAR DISEASE Mother      Alzheimer Disease Mother      Myocardial Infarction Father 76     myocardial infarction - cause of death     CANCER Daughter      cervical      DIABETES Maternal Grandfather      HEART DISEASE Maternal Grandfather      MI     DIABETES Maternal Grandmother      DIABETES Paternal Grandmother      DIABETES Paternal Grandfather          Current Outpatient Prescriptions   Medication Sig Dispense Refill     albuterol (VENTOLIN HFA) 108 (90 BASE) MCG/ACT Inhaler USE 1 PUFF FOUR TIMES A DAY AS NEEDED 2 Inhaler 3     ALPRAZolam (XANAX) 1 MG tablet TAKE 1 TABLET BY MOUTH 3 TIMES DAILY AS NEEDED FOR ANXIETY 90 tablet 0     amoxicillin-clavulanate (AUGMENTIN) 875-125 MG per tablet Take 1 tablet by mouth 2 times daily 20 tablet 0     aspirin EC 81 MG EC tablet Take 1 tablet (81 mg) by mouth 2 times daily       atorvastatin (LIPITOR) 40 MG tablet Take 1 tablet (40 mg) by mouth daily 90 tablet 3     benazepril (LOTENSIN) 10 MG tablet TAKE 1 TABLET BY MOUTH DAILY 90 tablet 1     benzonatate (TESSALON) 200 MG capsule TAKE 1 CAPSULE BY MOUTH 3 TIMES A DAY AS NEEDED FOR COUGH 60 capsule 3     budesonide-formoterol (SYMBICORT) 160-4.5 MCG/ACT inhaler Inhale 2 puffs into the lungs 2 times daily. As needed 1 Inhaler 11     butalbital-acetaminophen-caffeine (FIORICET/ESGIC) -40 MG per tablet Take 1  tablet by mouth every 6 hours as needed 30 tablet 0     cimetidine (TAGAMET) 800 MG tablet Take 1 tablet (800 mg) by mouth At Bedtime As needed 90 tablet 3     doxepin (SINEQUAN) 10 MG capsule Take 1 capsule (10 mg) by mouth At Bedtime 90 capsule 3     EPINEPHrine 0.3 MG/0.3ML injection Inject 0.3 mg into the muscle       EPIPEN 2-JAIRO 0.3 MG/0.3ML injection INJECT 1 SYRINGE INTRAMUSCULARLY ONCE AS NEEDED FOR ANAPHYLAXIS 2 each 0     escitalopram (LEXAPRO) 20 MG tablet TAKE 1 TABLET BY MOUTH 2 TIMES A  tablet 3     fluconazole (DIFLUCAN) 150 MG tablet Take 1 tablet (150 mg) by mouth every 3 days 4 tablet 0     fluconazole (DIFLUCAN) 150 MG tablet TAKE 1 TABLET BY MOUTH EVERY 72 HOURS 2 tablet 0     hydrochlorothiazide (HYDRODIURIL) 25 MG tablet TAKE 1 TABLET BY MOUTH DAILY 90 tablet 1     ipratropium - albuterol 0.5 mg/2.5 mg/3 mL (DUONEB) 0.5-2.5 (3) MG/3ML neb solution Take 1 vial (3 mLs) by nebulization every 4 hours as needed 90 mL 3     meloxicam (MOBIC) 15 MG tablet Take 1 tablet (15 mg) by mouth daily 90 tablet 3     montelukast (SINGULAIR) 10 MG tablet TAKE 1 TABLET BY MOUTH DAILY 90 tablet 1     nicotine (NICODERM CQ) 14 MG/24HR 24 hr patch Place 1 patch onto the skin every 24 hours 30 patch 1     nicotine (NICODERM CQ) 21 MG/24HR 24 hr patch Place 1 patch onto the skin every 24 hours 30 patch 1     nicotine (NICODERM CQ) 7 MG/24HR 24 hr patch Place 1 patch onto the skin every 24 hours 30 patch 1     nystatin (MYCOSTATIN) 775099 UNIT/ML suspension TAKE 1 TEASPOON (5MLS) BY MOUTH 4 TIMES DAILY 60 mL 0     order for DME Equipment being ordered: Please install safety bar in bathroom to assist patient in getting up from toilet. 1 Units 0     order for DME Equipment being ordered: toilet seat riser 1 each 0     order for DME Equipment being ordered: Nebulizer supplies 2 each 11     order for DME Equipment being ordered: mcgee walker 1 each 0     oxyCODONE-acetaminophen (PERCOCET) 5-325 MG per tablet TAKE  "1 TO 2 TABLETS BY MOUTH EVERY 4 HOURS AS NEEDED FOR PAIN 60 tablet 0     potassium chloride SA (K-DUR/KLOR-CON M) 10 MEQ CR tablet TAKE 1 TABLET BY MOUTH 2 TIMES DAILY WITH FOOD 180 tablet 0     temazepam (RESTORIL) 30 MG capsule TAKE 1 CAPSULE BY MOUTH NIGHTLY AS NEEDED FOR SLEEP 90 capsule 0     VOLTAREN 1 % GEL topical gel APPLY 4 GRAMS TO KNEES FOUR TIMES A DAY USING ENCLOSED DOSING CARD 100 g 0     Allergies   Allergen Reactions     Bee Pollen Other (See Comments)     Throat and eyes close up     Codeine Difficulty breathing     \"My throat closes shut.\"     Takes Percocet at home     Keflex [Cephalexin Hcl] Hives     Sulfonamide Derivatives Other (See Comments)     Throat closes     Ciprofloxacin Rash     Rash, bumps on lips     Bupropion Hcl Other (See Comments)     Made her very irritable     Celecoxib Other (See Comments) and Cough     Celebrex - wheeze  Takes ASA at home     Clonazepam Hives     Takes Xanax at home     Erythromycin Other (See Comments)     Erythromycin base - abdominal pain     Lisinopril Other (See Comments)     Zestril - headaches     Nifedipine Hives     Procardia     Strawberries [Strawberry]      Tramadol Hcl      Takes percocet at home       Reviewed and updated as needed this visit by clinical staff  Tobacco  Allergies  Meds  Med Hx  Surg Hx  Fam Hx  Soc Hx      Reviewed and updated as needed this visit by Provider         ROS:  Constitutional, HEENT, cardiovascular, pulmonary, gi and gu systems are negative, except as otherwise noted.    OBJECTIVE:                                                    /80 (BP Location: Right arm, Patient Position: Chair, Cuff Size: Adult Regular)  Pulse 74  Temp 97.9  F (36.6  C) (Tympanic)  Ht 4' 10\" (1.473 m)  Wt 170 lb (77.1 kg)  SpO2 95%  BMI 35.53 kg/m2  Body mass index is 35.53 kg/(m^2).  GENERAL APPEARANCE: Alert, no acute distress  CV: regular rate and rhythm, no murmur, rub or gallop  RESP: lungs clear to auscultation " bilaterally with diffuse exp wheeze.    ABDOMEN: normal bowel sounds, soft, nontender, no hepatosplenomegaly or other masses  SKIN: no suspicious lesions or rashes to visualized skin  NEURO: Alert, oriented x 3, speech and mentation normal      cxr clear.       ASSESSMENT/PLAN:                                                    1. Hand cramps  Reviewed.  Update labs.  Recommend tonic.  No change.    - Comprehensive metabolic panel (BMP + Alb, Alk Phos, ALT, AST, Total. Bili, TP)  - Magnesium    2. Acute recurrent maxillary sinusitis  Reviewed.  Fairly convincing history.  Augmentin.  Diflucan to keep on hand as well.  F/u with ongoing concerns.    - amoxicillin-clavulanate (AUGMENTIN) 875-125 MG per tablet; Take 1 tablet by mouth 2 times daily  Dispense: 20 tablet; Refill: 0    3. Candidiasis of vulva and vagina  As above.   - fluconazole (DIFLUCAN) 150 MG tablet; Take 1 tablet (150 mg) by mouth every 3 days  Dispense: 4 tablet; Refill: 0    4. Anxiety  I told her I'm not taking away her xanax.    - ALPRAZolam (XANAX) 1 MG tablet; TAKE 1 TABLET BY MOUTH 3 TIMES DAILY AS NEEDED FOR ANXIETY  Dispense: 90 tablet; Refill: 0    5. Acute bronchospasm  Quit smoking.  Xray normal.   Continue albuterol.    - XR CHEST 2 VW (Clinic Performed); Future          Gibran Alarcon MD  [facility]

## 2018-05-25 ASSESSMENT — ANXIETY QUESTIONNAIRES: GAD7 TOTAL SCORE: 18

## 2018-05-25 ASSESSMENT — PATIENT HEALTH QUESTIONNAIRE - PHQ9: SUM OF ALL RESPONSES TO PHQ QUESTIONS 1-9: 20

## 2018-06-26 DIAGNOSIS — G47.00 PERSISTENT INSOMNIA: ICD-10-CM

## 2018-06-26 NOTE — TELEPHONE ENCOUNTER
Restoril       Last Written Prescription Date:  4/03/201/8  Last Fill Quantity: 90,   # refills: 0  Last Office Visit: 5/24/2018  Future Office visit:    Next 5 appointments (look out 90 days)     Jul 10, 2018  1:30 PM CDT   (Arrive by 1:15 PM)   SHORT with Gibran Alarcon MD   [facility] ([facility] )    [address] [facility] [address]   [phone]            Jul 25, 2018 10:40 AM CDT   (Arrive by 10:25 AM)   Return Visit with Moisés Olson MD    [facility] ([facility] )    [address] [facility] [id number] [phone]

## 2018-06-27 RX ORDER — TEMAZEPAM 30 MG
CAPSULE ORAL
Qty: 90 CAPSULE | Refills: 0 | Status: SHIPPED | OUTPATIENT
Start: 2018-06-27 | End: 2018-08-27

## 2018-07-05 DIAGNOSIS — I10 BENIGN ESSENTIAL HYPERTENSION: ICD-10-CM

## 2018-07-05 DIAGNOSIS — E78.2 MIXED HYPERLIPIDEMIA: ICD-10-CM

## 2018-07-06 RX ORDER — HYDROCHLOROTHIAZIDE 25 MG/1
TABLET ORAL
Qty: 90 TABLET | Refills: 3 | Status: SHIPPED | OUTPATIENT
Start: 2018-07-06 | End: 2019-07-23

## 2018-07-06 RX ORDER — MONTELUKAST SODIUM 10 MG/1
TABLET ORAL
Qty: 90 TABLET | Refills: 1 | Status: SHIPPED | OUTPATIENT
Start: 2018-07-06 | End: 2019-01-25

## 2018-07-06 NOTE — PROGRESS NOTES
SUBJECTIVE:                                                    Sydni Hardin is a 68 year old female who presents to clinic today for the following health issues:    RESPIRATORY SYMPTOMS      Duration: yesterday    Description  nasal congestion, cough, wheezing and fatigue/malaise    Severity: moderate    Accompanying signs and symptoms: SOB    History (predisposing factors):  tobacco abuse    Precipitating or alleviating factors: None    Therapies tried and outcome:  none      PROBLEMS TO ADD ON...    Problem list and histories reviewed & adjusted, as indicated.  Additional history: gets vaginitis with this as well.  Lots of stress and we discussed.     Patient Active Problem List   Diagnosis     Essential hypertension     Calculus of gallbladder     Headache     Osteoarthrosis, unspecified whether generalized or localized, involving lower leg     Advance Care Planning     Dysthymia     Lung nodules     Tobacco abuse     SCC (squamous cell carcinoma), face     IC (interstitial cystitis)     Primary osteoarthritis of both knees     S/P total knee replacement using cement, right     Asthma     Leukocytosis     Dehydration     Knee pain     Past Surgical History:   Procedure Laterality Date     ARTHROPLASTY KNEE Right 3/21/2017    Procedure: ARTHROPLASTY KNEE;  Surgeon: Moisés Olson MD;  Location: [facility]     ARTHROSCOPY KNEE       BIOPSY BREAST  2000    left - free of disease      SECTION        SECTION       CHOLECYSTECTOMY  2006     COLONOSCOPY  2008    repeat in 10 years     HEAD & NECK SURGERY  2014    hematoma on head drained     HYSTERECTOMY       ORTHOPEDIC SURGERY  2014    right shoulder replacement     shoulder x2  2009    left       Social History   Substance Use Topics     Smoking status: Current Every Day Smoker     Packs/day: 1.00     Years: 53.00     Types: Cigarettes     Start date: 1965     Smokeless tobacco: Never Used     Alcohol use 0.0 oz/week     0  Standard drinks or equivalent per week      Comment: rarely     Family History   Problem Relation Age of Onset     Cancer Mother      skin     Breast Cancer Mother      Cerebrovascular Disease Mother      Alzheimer Disease Mother      Myocardial Infarction Father 76     myocardial infarction - cause of death     Cancer Daughter      cervical      Diabetes Maternal Grandfather      HEART DISEASE Maternal Grandfather      MI     Diabetes Maternal Grandmother      Diabetes Paternal Grandmother      Diabetes Paternal Grandfather          Current Outpatient Prescriptions   Medication Sig Dispense Refill     albuterol (VENTOLIN HFA) 108 (90 BASE) MCG/ACT Inhaler USE 1 PUFF FOUR TIMES A DAY AS NEEDED 2 Inhaler 3     ALPRAZolam (XANAX) 1 MG tablet TAKE 1 TABLET BY MOUTH 3 TIMES DAILY AS NEEDED FOR ANXIETY 90 tablet 0     aspirin EC 81 MG EC tablet Take 1 tablet (81 mg) by mouth 2 times daily       atorvastatin (LIPITOR) 40 MG tablet Take 1 tablet (40 mg) by mouth daily 90 tablet 3     azithromycin (ZITHROMAX) 250 MG tablet Two tablets first day, then one tablet daily for four days. 6 tablet 0     benazepril (LOTENSIN) 10 MG tablet TAKE 1 TABLET BY MOUTH DAILY 90 tablet 1     benzonatate (TESSALON) 200 MG capsule TAKE 1 CAPSULE BY MOUTH 3 TIMES A DAY AS NEEDED FOR COUGH 60 capsule 3     budesonide-formoterol (SYMBICORT) 160-4.5 MCG/ACT inhaler Inhale 2 puffs into the lungs 2 times daily. As needed 1 Inhaler 11     butalbital-acetaminophen-caffeine (FIORICET/ESGIC) -40 MG per tablet Take 1 tablet by mouth every 6 hours as needed 30 tablet 0     cimetidine (TAGAMET) 800 MG tablet Take 1 tablet (800 mg) by mouth At Bedtime As needed 90 tablet 3     doxepin (SINEQUAN) 10 MG capsule Take 1 capsule (10 mg) by mouth At Bedtime 90 capsule 3     EPIPEN 2-JAIRO 0.3 MG/0.3ML injection INJECT 1 SYRINGE INTRAMUSCULARLY ONCE AS NEEDED FOR ANAPHYLAXIS 2 each 0     escitalopram (LEXAPRO) 20 MG tablet TAKE 1 TABLET BY MOUTH 2 TIMES A DAY  180 tablet 3     fluconazole (DIFLUCAN) 150 MG tablet Take 1 tablet (150 mg) by mouth every 3 days 4 tablet 0     hydrochlorothiazide (HYDRODIURIL) 25 MG tablet TAKE 1 TABLET BY MOUTH DAILY 90 tablet 3     ipratropium - albuterol 0.5 mg/2.5 mg/3 mL (DUONEB) 0.5-2.5 (3) MG/3ML neb solution Take 1 vial (3 mLs) by nebulization every 4 hours as needed 90 mL 3     meloxicam (MOBIC) 15 MG tablet Take 1 tablet (15 mg) by mouth daily 90 tablet 3     montelukast (SINGULAIR) 10 MG tablet TAKE 1 TABLET BY MOUTH DAILY 90 tablet 1     nystatin (MYCOSTATIN) 740181 UNIT/ML suspension TAKE 1 TEASPOON (5MLS) BY MOUTH 4 TIMES DAILY 60 mL 0     order for DME Equipment being ordered: Please install safety bar in bathroom to assist patient in getting up from toilet. 1 Units 0     order for DME Equipment being ordered: toilet seat riser 1 each 0     order for DME Equipment being ordered: Nebulizer supplies 2 each 11     order for DME Equipment being ordered: arely walker 1 each 0     oxyCODONE-acetaminophen (PERCOCET) 5-325 MG per tablet TAKE 1 TO 2 TABLETS BY MOUTH EVERY 4 HOURS AS NEEDED FOR PAIN 60 tablet 0     potassium chloride SA (K-DUR/KLOR-CON M) 10 MEQ CR tablet TAKE 1 TABLET BY MOUTH 2 TIMES DAILY WITH FOOD 180 tablet 0     temazepam (RESTORIL) 30 MG capsule TAKE 1 CAPSULE BY MOUTH NIGHTLY AS NEEDED FOR SLEEP 90 capsule 0     VOLTAREN 1 % GEL topical gel APPLY 4 GRAMS TO KNEES FOUR TIMES A DAY USING ENCLOSED DOSING CARD 100 g 0     nicotine (NICODERM CQ) 14 MG/24HR 24 hr patch Place 1 patch onto the skin every 24 hours (Patient not taking: Reported on 7/10/2018) 30 patch 1     nicotine (NICODERM CQ) 21 MG/24HR 24 hr patch Place 1 patch onto the skin every 24 hours (Patient not taking: Reported on 7/10/2018) 30 patch 1     nicotine (NICODERM CQ) 7 MG/24HR 24 hr patch Place 1 patch onto the skin every 24 hours (Patient not taking: Reported on 7/10/2018) 30 patch 1     Allergies   Allergen Reactions     Bee Pollen Other (See  "Comments)     Throat and eyes close up     Codeine Difficulty breathing     \"My throat closes shut.\"     Takes Percocet at home     Keflex [Cephalexin Hcl] Hives     Sulfonamide Derivatives Other (See Comments)     Throat closes     Ciprofloxacin Rash     Rash, bumps on lips     Bupropion Hcl Other (See Comments)     Made her very irritable     Celecoxib Other (See Comments) and Cough     Celebrex - wheeze  Takes ASA at home     Clonazepam Hives     Takes Xanax at home     Erythromycin Other (See Comments)     Erythromycin base - abdominal pain     Lisinopril Other (See Comments)     Zestril - headaches     Nifedipine Hives     Procardia     Strawberries [Strawberry]      Tramadol Hcl      Takes percocet at home       ROS:  Constitutional, HEENT, cardiovascular, pulmonary, gi and gu systems are negative, except as otherwise noted.    OBJECTIVE:                                                    BP 94/60  Pulse 86  Temp 99  F (37.2  C)  Ht 4' 10\" (1.473 m)  Wt 173 lb (78.5 kg)  SpO2 92%  BMI 36.16 kg/m2  Body mass index is 36.16 kg/(m^2).  GENERAL APPEARANCE: Alert, no acute distress  HEENT:  normal  CV: regular rate and rhythm, no murmur, rub or gallop  RESP: lungs clear to auscultation bilaterally with some wheeze as well bilaterally  ABDOMEN: normal bowel sounds, soft, nontender, no hepatosplenomegaly or other masses  SKIN: no suspicious lesions or rashes to visualized skin  NEURO: Alert, oriented x 3, speech and mentation normal      Ct for tobacco pending.      ASSESSMENT/PLAN:                                                    1. Personal history of tobacco use  Recommend CT.     2. Tobacco abuse  As above.   - Prof fee: Shared Decisionmaking for Lung Cancer Screening  - CT Chest Lung Cancer Scrn Low Dose wo; Future  - Okay for Smoking Cessation Study (PLUTO) to Contact Patient  - Tobacco Cessation - Order to Satisfy Health Maintenance    3. Tobacco abuse counseling  As above.  Ordered.    - Prof fee: " Shared Decisionmaking for Lung Cancer Screening  - CT Chest Lung Cancer Scrn Low Dose wo; Future  - Okay for Smoking Cessation Study (PLUTO) to Contact Patient    4. Acute bronchitis with symptoms > 10 days  Prominent.  augmentin and prednisone and f/u with ongoing concerns.   - azithromycin (ZITHROMAX) 250 MG tablet; Two tablets first day, then one tablet daily for four days.  Dispense: 6 tablet; Refill: 0    5. Personal history of nicotine dependence   As above.    - CT Chest Lung Cancer Scrn Low Dose wo; Future    6. Candidiasis of vulva and vagina  By history.  Sent the Diflucan and the nystatin for anticipated.   - fluconazole (DIFLUCAN) 150 MG tablet; Take 1 tablet (150 mg) by mouth every 3 days  Dispense: 4 tablet; Refill: 0          Gibran Alarcon MD  [facility]        Lung Cancer Screening Shared Decision Making Visit     Sydni Hardin is eligible for lung cancer screening on the basis of the information provided in my signed lung cancer screening order.     I have discussed with patient the risks and benefits of screening for lung cancer with low-dose CT.     The risks include:   radiation exposure: one low dose chest CT has as much ionizing radiation as  about 15 chest x-rays or 6 months of background radiation living in [place]     false positives: 96% of positive findings/nodules are NOT cancer, but some  might still require additional diagnostic evaluation, including biopsy   over-diagnosis: some slow growing cancers that might never have been clinically  significant will be detected and treated unnecessarily     The benefit of early detection of lung cancer is contingent upon adherence to annual screening or more frequent follow up if indicated.     Furthermore, reaping the benefits of screening requires Sydni Hardin to be willing and physically able to undergo diagnostic procedures, if indicated. Although no specific guide is available for determining severity of  comorbidities, it is reasonable to withhold screening in patients who have greater mortality risk from other diseases.     We did discuss that the only way to prevent lung cancer is to not smoke. Smoking cessation assistance was offered.    I did not offer risk estimation using a calculator such as this one:    ShouldIScreen

## 2018-07-10 ENCOUNTER — OFFICE VISIT (OUTPATIENT)
Dept: FAMILY MEDICINE | Facility: OTHER | Age: 68
End: 2018-07-10
Attending: FAMILY MEDICINE
Payer: COMMERCIAL

## 2018-07-10 ENCOUNTER — TELEPHONE (OUTPATIENT)
Dept: FAMILY MEDICINE | Facility: OTHER | Age: 68
End: 2018-07-10

## 2018-07-10 VITALS
BODY MASS INDEX: 36.31 KG/M2 | SYSTOLIC BLOOD PRESSURE: 94 MMHG | TEMPERATURE: 99 F | HEIGHT: 58 IN | OXYGEN SATURATION: 92 % | DIASTOLIC BLOOD PRESSURE: 60 MMHG | WEIGHT: 173 LBS | HEART RATE: 86 BPM

## 2018-07-10 DIAGNOSIS — Z87.891 PERSONAL HISTORY OF TOBACCO USE: Primary | ICD-10-CM

## 2018-07-10 DIAGNOSIS — Z71.6 TOBACCO ABUSE COUNSELING: ICD-10-CM

## 2018-07-10 DIAGNOSIS — J01.01 ACUTE RECURRENT MAXILLARY SINUSITIS: ICD-10-CM

## 2018-07-10 DIAGNOSIS — B37.31 CANDIDIASIS OF VULVA AND VAGINA: ICD-10-CM

## 2018-07-10 DIAGNOSIS — Z87.891 PERSONAL HISTORY OF NICOTINE DEPENDENCE: ICD-10-CM

## 2018-07-10 DIAGNOSIS — Z72.0 TOBACCO ABUSE: ICD-10-CM

## 2018-07-10 DIAGNOSIS — F41.9 ANXIETY: ICD-10-CM

## 2018-07-10 DIAGNOSIS — J20.9 ACUTE BRONCHITIS WITH SYMPTOMS > 10 DAYS: ICD-10-CM

## 2018-07-10 DIAGNOSIS — B37.0 THRUSH: ICD-10-CM

## 2018-07-10 PROCEDURE — G0463 HOSPITAL OUTPT CLINIC VISIT: HCPCS

## 2018-07-10 PROCEDURE — G0296 VISIT TO DETERM LDCT ELIG: HCPCS | Performed by: FAMILY MEDICINE

## 2018-07-10 PROCEDURE — 99214 OFFICE O/P EST MOD 30 MIN: CPT | Performed by: FAMILY MEDICINE

## 2018-07-10 RX ORDER — AZITHROMYCIN 250 MG/1
TABLET, FILM COATED ORAL
Qty: 6 TABLET | Refills: 0 | Status: SHIPPED | OUTPATIENT
Start: 2018-07-10 | End: 2018-07-10

## 2018-07-10 RX ORDER — NYSTATIN 100000/ML
SUSPENSION, ORAL (FINAL DOSE FORM) ORAL
Qty: 473 ML | Refills: 1 | Status: SHIPPED | OUTPATIENT
Start: 2018-07-10 | End: 2019-09-14

## 2018-07-10 RX ORDER — PREDNISONE 20 MG/1
TABLET ORAL
Qty: 20 TABLET | Refills: 0 | Status: SHIPPED | OUTPATIENT
Start: 2018-07-10 | End: 2018-08-03

## 2018-07-10 RX ORDER — FLUCONAZOLE 150 MG/1
150 TABLET ORAL
Qty: 4 TABLET | Refills: 0 | Status: SHIPPED | OUTPATIENT
Start: 2018-07-10 | End: 2019-01-14

## 2018-07-10 RX ORDER — ALPRAZOLAM 1 MG
TABLET ORAL
Qty: 90 TABLET | Refills: 0 | Status: SHIPPED | OUTPATIENT
Start: 2018-07-10 | End: 2018-08-27

## 2018-07-10 RX ORDER — PREDNISONE 20 MG/1
20 TABLET ORAL 2 TIMES DAILY
Qty: 10 TABLET | Refills: 0 | Status: SHIPPED | OUTPATIENT
Start: 2018-07-10 | End: 2018-07-10

## 2018-07-10 ASSESSMENT — ANXIETY QUESTIONNAIRES
2. NOT BEING ABLE TO STOP OR CONTROL WORRYING: NEARLY EVERY DAY
IF YOU CHECKED OFF ANY PROBLEMS ON THIS QUESTIONNAIRE, HOW DIFFICULT HAVE THESE PROBLEMS MADE IT FOR YOU TO DO YOUR WORK, TAKE CARE OF THINGS AT HOME, OR GET ALONG WITH OTHER PEOPLE: SOMEWHAT DIFFICULT
5. BEING SO RESTLESS THAT IT IS HARD TO SIT STILL: NEARLY EVERY DAY
7. FEELING AFRAID AS IF SOMETHING AWFUL MIGHT HAPPEN: NEARLY EVERY DAY
1. FEELING NERVOUS, ANXIOUS, OR ON EDGE: NEARLY EVERY DAY
GAD7 TOTAL SCORE: 21
6. BECOMING EASILY ANNOYED OR IRRITABLE: NEARLY EVERY DAY
3. WORRYING TOO MUCH ABOUT DIFFERENT THINGS: NEARLY EVERY DAY

## 2018-07-10 ASSESSMENT — PATIENT HEALTH QUESTIONNAIRE - PHQ9: 5. POOR APPETITE OR OVEREATING: NEARLY EVERY DAY

## 2018-07-10 ASSESSMENT — PAIN SCALES - GENERAL: PAINLEVEL: NO PAIN (0)

## 2018-07-10 NOTE — TELEPHONE ENCOUNTER
To: Dr. Alarcon nurse  Please call her regarding the medications request you just sent.  Thank you

## 2018-07-10 NOTE — MR AVS SNAPSHOT
After Visit Summary   7/10/2018    Sydni Hardin    MRN: [id number]           Patient Information     Date Of Birth          1950        Visit Information        Provider Department      7/10/2018 1:30 PM Gibran Alarcon MD [facility]        Today's Diagnoses     Personal history of tobacco use    -  1    Tobacco abuse        Tobacco abuse counseling        Acute bronchitis with symptoms > 10 days        Personal history of nicotine dependence         Candidiasis of vulva and vagina        Anxiety        Acute recurrent maxillary sinusitis        Thrush          Care Instructions      Lung Cancer Screening   Frequently Asked Questions  If you are at high-risk for lung cancer, getting screened with low-dose computed tomography (LDCT) every year can help save your life. This handout offers answers to some of the most common questions about lung cancer screening. If you have other questions, please call [phone] ([phone]).     What is it?  Lung cancer screening uses special X-ray technology to create an image of your lung tissue. The exam is quick and easy and takes less than 10 seconds. We don t give you any medicine or use any needles. You can eat before and after the exam. You don t need to change your clothes as long as the clothing on your chest doesn t contain metal. But, you do need to be able to hold your breath for at least 6 seconds during the exam.    What is the goal of lung cancer screening?  The goal of lung cancer screening is to save lives. Many times, lung cancer is not found until a person starts having physical symptoms. Lung cancer screening can help detect lung cancer in the earliest stages when it may be easier to treat.    Who should be screened for lung cancer?  We suggest lung cancer screening for anyone who is at high-risk for lung cancer. You are in the high-risk group if you:      are between the ages of 55 and 79, and    have smoked  at least 1 pack of cigarettes a day for 30 or more years, and    still smoke or have quit within the past 15 years.    However, if you have a new cough or shortness of breath, you should talk to your doctor before being screened.    Some national lung health advocacy groups also recommend screening for people ages 50 to 79 who have smoked an average of 1 pack of cigarettes a day for 20 years. They must also have at least 1 other risk factor for lung cancer, not including exposure to secondhand smoke. Other risk factors are having had cancer in the past, emphysema, pulmonary fibrosis, COPD, a family history of lung cancer, or exposure to certain materials such as arsenic, asbestos, beryllium, cadmium, chromium, diesel fumes, nickel, radon or silica. Your care team can help you know if you have one of these risk factors.     Why does it matter if I have symptoms?  Certain symptoms can be a sign that you have a condition in your lungs that should be checked and treated by your doctor. These symptoms include fever, chest pain, a new or changing cough, shortness of breath that you have never felt before, coughing up blood or unexplained weight loss. Having any of these symptoms can greatly affect the results of lung cancer screening.       Should all smokers get an LDCT lung cancer screening exam?  It depends. Lung cancer screening is for a very specific group of men and women who have a history of heavy smoking over a long period of time (see  Who should be screened for lung cancer  above).  I am in the high-risk group, but have been diagnosed with cancer in the past. Is LDCT lung cancer screening right for me?  In some cases, you should not have LDCT lung screening, such as when your doctor is already following your cancer with CT scan studies. Your doctor will help you decide if LDCT lung screening is right for you.  Do I need to have a screening exam every year?  Yes. If you are in the high-risk group described  earlier, you should get an LDCT lung cancer screening exam every year until you are 79, or are no longer willing or able to undergo screening and possible procedures to diagnose and treat lung cancer.  How effective is LDCT at preventing death from lung cancer?  Studies have shown that LDCT lung cancer screening can lower the risk of death from lung cancer by 20 percent in people who are at high-risk.  What are the risks?  There are some risks and limitations of LDCT lung cancer screening. We want to make sure you understand the risks and benefits, so please let us know if you have any questions. Your doctor may want to talk with you more about these risks.    Radiation exposure: As with any exam that uses radiation, there is a very small increased risk of cancer. The amount of radiation in LDCT is small--about the same amount a person would get from a mammogram. Your doctor orders the exam when he or she feels the potential benefits outweigh the risks.    False negatives: No test is perfect, including LDCT. It is possible that you may have a medical condition, including lung cancer, that is not found during your exam. This is called a false negative result.    False positives and more testing: LDCT very often finds something in the lung that could be cancer, but in fact is not. This is called a false positive result. False positive tests often cause anxiety. To make sure these findings are not cancer, you may need to have more tests. These tests will be done only if you give us permission. Sometimes patients need a treatment that can have side effects, such as a biopsy. For more information on false positives, see  What can I expect from the results?     Findings not related to lung cancer: Your LDCT exam also takes pictures of areas of your body next to your lungs. In a very small number of cases, the CT scan will show an abnormal finding in one of these areas, such as your kidneys, adrenal glands, liver or  thyroid. This finding may not be serious, but you may need more tests. Your doctor can help you decide what other tests you may need, if any.  What can I expect from the results?  About 1 out of 4 LDCT exams will find something that may need more tests. Most of the time, these findings are lung nodules. Lung nodules are very small collections of tissue in the lung. These nodules are very common, and the vast majority--more than 97 percent--are not cancer (benign). Most are normal lymph nodes or small areas of scarring from past infections.  But, if a small lung nodule is found to be cancer, the cancer can be cured more than 90 percent of the time. To know if the nodule is cancer, we may need to get more images before your next yearly screening exam. If the nodule has suspicious features (for example, it is large, has an odd shape or grows over time), we will refer you to a specialist for further testing.  Will my doctor also get the results?  Yes. Your doctor will get a copy of your results.  Is it okay to keep smoking now that there s a cancer screening exam?  No. Tobacco is one of the strongest cancer-causing agents. It causes not only lung cancer, but other cancers and cardiovascular (heart) diseases as well. The damage caused by smoking builds over time. This means that the longer you smoke, the higher your risk of disease. While it is never too late to quit, the sooner you quit, the better.  Where can I find help to quit smoking?  The best way to prevent lung cancer is to stop smoking. If you have already quit smoking, congratulations and keep it up! For help on quitting smoking, please call [employer] at 9-927-650-SYBP ([phone]) or the American Cancer Society at 1-814.954.5758 to find local resources near you.  One-on-one health coaching:  If you d prefer to work individually with a health care provider on tobacco cessation, we offer:      Medication Therapy Management:  Our specially trained pharmacists work  closely with you and your doctor to help you quit smoking.  Call [phone] or [phone] (toll free).     Can Do: Health coaching offered by [facility].  [email or web address]      HOW TO QUIT SMOKING  Smoking is one of the hardest habits to break. About half of all those who have ever smoked have been able to quit, and most of those (about 70%) who still smoke want to quit. Here are some of the best ways to stop smoking.     KEEP TRYING:  It takes most smokers about 8 tries before they are finally able to fully quit. So, the more often you try and fail, the better your chance of quitting the next time! So, don't give up!    GO COLD TURKEY:  Most ex-smokers quit cold turkey. Trying to cut back gradually doesn't seem to work as well, perhaps because it continues the smoking habit. Also, it is possible to fool yourself by inhaling more while smoking fewer cigarettes. This results in the same amount of nicotine in your body!    GET SUPPORT:  Support programs can make an important difference, especially for the heavy smoker. These groups offer lectures, methods to change your behavior and peer support. Call the free national Quitline for more information. 800-QUIT-NOW (869-066-8860). Low-cost or free programs are offered by many hospitals, local chapters of the American Lung Association (681-737-7812) and the American Cancer Society (864-776-7344). Support at home is important too. Non-smokers can help by offering praise and encouragement. If the smoker fails to quit, encourage them to try again!    OVER-THE-COUNTER MEDICINES:  For those who can't quit on their own, Nicotine Replacement Therapy (NRT) may make quitting much easier. Certain aids such as the nicotine patch, gum and lozenge are available without a prescription. However, it is best to use these under the guidance of your doctor. The skin patch provides a steady supply of nicotine to the body. Nicotine gum and lozenge gives temporary  bursts of low levels of nicotine. Both methods take the edge off the craving for cigarettes. WARNING: If you feel symptoms of nicotine overdose, such as nausea, vomiting, dizziness, weakness, or fast heartbeat, stop using these and see your doctor.    PRESCRIPTION MEDICINES:  After evaluating your smoking patterns and prior attempts at quitting, your doctor may offer a prescription medicine such as bupropion (Zyban, Wellbutrin), varenicline (Chantix, Champix), a niocotine inhaler or nasal spray. Each has its unique advantage and side effects which your doctor can review with you.    HEALTH BENEFITS OF QUITTING:  The benefits of quitting start right away and keep improving the longer you go without smokin minutes: blood pressure and pulse return to normal  8 hours: oxygen levels return to normal  2 days: ability to smell and taste begins to improve as damaged nerves start to regrow  2-3 weeks: circulation and lung function improves  1-9 months: decreased cough, congestion and shortness of breath; less tired  1 year: risk of heart attack decreases by half  5 years: risk of lung cancer decreases by half; risk of stroke becomes the same as a non-smoker  For information about how to quit smoking, visit the following links:  National Cancer [place] ,   Clearing the Air, Quit Smoking Today   - an online booklet. http://www.smokefree.gov/pubs/clearing_the_air.pdf  Smokefree.gov http://smokefree.gov/  QuitNet http://www.quitnet.com/    7255-4316 [facility], [address] [place] [address]. All rights reserved. This information is not intended as a substitute for professional medical care. Always follow your healthcare professional's instructions.    The Benefits of Living Smoke Free  What do you want to gain from quitting? Check off some reasons to quit.  Health Benefits  ___ Reduce my risk of lung cancer, heart disease, chronic lung disease  ___ Have fewer wrinkles and softer skin  ___ Improve my  sense of taste and smell  ___ For pregnant women--reduce the risk of having a miscarriage, stillbirth, premature birth, or low-birth-weight baby  Personal Benefits  ___ Feel more in control of my life  ___ Have better-smelling hair, breath, clothes, home, and car  ___ Save time by not having to take smoke breaks, buy cigarettes, or hunt for a light  ___ Have whiter teeth  Family Benefits  ___ Reduce my children s respiratory tract infections  ___ Set a good example for my children  ___ Reduce my family s cancer risk  Financial Benefits  ___ Save hundreds of dollars each year that would be spent on cigarettes  ___ Save money on medical bills  ___ Save on life, health, and car insurance premiums    Those Dollars Add Up!  Cigarettes are expensive, and getting more expensive all the time. Do you realize how much money you are spending on cigarettes per year? What is the average amount you spend on a pack of cigarettes? What is the average number of packs that you smoke per day? Using your answers to these questions, fill in this formula to help you find out:  ($ _____ per pack) ×  ( _____ number of packs per day) × (365 days) =  $ _____ yearly cost of smoking  Besides tobacco, there are other costs, including extra cleaning bills and replacement costs for clothing and furniture; medical expenses for smoking-related illnesses; and higher health, life, and car insurance premiums.    Cigars and Pipes Count Too!  Cigars and pipes are also dangerous. So are smokeless (chewing) tobacco and snuff. All of these products contain nicotine, a highly addictive substance that has harmful effects on your body. Quitting smoking means giving up all tobacco products.      1925-3726 [employer], [address] [place] [address], [place] [address]. All rights reserved. This information is not intended as a substitute for professional medical care. Always follow your healthcare professional's instructions.          Follow-ups after your visit    "     Your next 10 appointments already scheduled     Jul 25, 2018 10:40 AM CDT   (Arrive by 10:25 AM)   Return Visit with Moisés Olson MD    ORTHOPEDICS ([facility] )    [address]  [place] [id number] [phone]            Aug 09, 2018  1:30 PM CDT   (Arrive by 1:15 PM)   SHORT with Gibran Alarcon MD   [facility] ([facility] )    [address]  [facility] [address]   [phone]              Future tests that were ordered for you today     Open Future Orders        Priority Expected Expires Ordered    CT Chest Lung Cancer Scrn Low Dose wo Routine  7/10/2019 7/10/2018            Who to contact     If you have questions or need follow up information about today's clinic visit or your schedule please contact [facility] directly at [phone].  Normal or non-critical lab and imaging results will be communicated to you by MyChart, letter or phone within 4 business days after the clinic has received the results. If you do not hear from us within 7 days, please contact the clinic through [employer] or phone. If you have a critical or abnormal lab result, we will notify you by phone as soon as possible.  Submit refill requests through [employer] or call your pharmacy and they will forward the refill request to us. Please allow 3 business days for your refill to be completed.          Additional Information About Your Visit        Care EveryWhere ID     This is your Care EveryWhere ID. This could be used by other organizations to access your [facility] medical records  [id number]        Your Vitals Were     Pulse Temperature Height Pulse Oximetry BMI (Body Mass Index)       86 99  F (37.2  C) 4' 10\" (1.473 m) 92% 36.16 kg/m2        Blood Pressure from Last 3 Encounters:   07/10/18 94/60   05/24/18 120/80   01/25/18 136/70    Weight from Last 3 Encounters:   07/10/18 173 lb (78.5 kg)   05/24/18 170 lb (77.1 kg)   01/25/18 168 lb " (76.2 kg)              We Performed the Following     Okay for Smoking Cessation Study (PLUTO) to Contact Patient     Prof fee: Shared Decisionmaking for Lung Cancer Screening     Tobacco Cessation - Order to Satisfy Health Maintenance          Today's Medication Changes          These changes are accurate as of 7/10/18  4:53 PM.  If you have any questions, ask your nurse or doctor.               Start taking these medicines.        Dose/Directions    predniSONE 20 MG tablet   Commonly known as:  DELTASONE   Used for:  Acute bronchitis with symptoms > 10 days   Started by:  Gibran Alarcon MD        Take 3 tabs (60 mg) by mouth daily x 3 days, 2 tabs (40 mg) daily x 3 days, 1 tab (20 mg) daily x 3 days, then 1/2 tab (10 mg) x 3 days.   Quantity:  20 tablet   Refills:  0         These medicines have changed or have updated prescriptions.        Dose/Directions    EPIPEN 2-JAIRO 0.3 MG/0.3ML injection 2-pack   This may have changed:  Another medication with the same name was removed. Continue taking this medication, and follow the directions you see here.   Used for:  Anaphylactic reaction   Generic drug:  EPINEPHrine   Changed by:  Gibran Alarcon MD        INJECT 1 SYRINGE INTRAMUSCULARLY ONCE AS NEEDED FOR ANAPHYLAXIS   Quantity:  2 each   Refills:  0       fluconazole 150 MG tablet   Commonly known as:  DIFLUCAN   This may have changed:  Another medication with the same name was removed. Continue taking this medication, and follow the directions you see here.   Used for:  Candidiasis of vulva and vagina   Changed by:  Gibran Alarcon MD        Dose:  150 mg   Take 1 tablet (150 mg) by mouth every 3 days   Quantity:  4 tablet   Refills:  0            Where to get your medicines      These medications were sent to [facility] - [address]  [address] [address]     Phone:  [phone]     amoxicillin-clavulanate 875-125 MG per tablet    fluconazole 150 MG tablet     nystatin 902522 UNIT/ML suspension    predniSONE 20 MG tablet         Some of these will need a paper prescription and others can be bought over the counter.  Ask your nurse if you have questions.     Bring a paper prescription for each of these medications     ALPRAZolam 1 MG tablet                Primary Care Provider Office Phone # Fax #    Gibran Alarcon [phone]       [address] [place] [address]        Equal Access to Services     TREVIN KENNEDY : Hadii aad ku hadasho Soomaali, waaxda luqadaha, qaybta kaalmada adeegyada, waxay idiin hayaan adeeg khswethash laphi . So [facility] [phone].    ATENCIÓN: Si buffyla espfunmi, tiene a guaman disposición servicios gratuitos de asistencia lingüística. Llame al [phone].    We comply with applicable federal civil rights laws and [place] laws. We do not discriminate on the basis of race, color, national origin, age, disability, sex, sexual orientation, or gender identity.            Thank you!     Thank you for choosing [facility]  for your care. Our goal is always to provide you with excellent care. Hearing back from our patients is one way we can continue to improve our services. Please take a few minutes to complete the written survey that you may receive in the mail after your visit with us. Thank you!             Your Updated Medication List - Protect others around you: Learn how to safely use, store and throw away your medicines at www.disposemymeds.org.          This list is accurate as of 7/10/18  4:53 PM.  Always use your most recent med list.                   Brand Name Dispense Instructions for use Diagnosis    albuterol 108 (90 Base) MCG/ACT Inhaler    VENTOLIN HFA    2 Inhaler    USE 1 PUFF FOUR TIMES A DAY AS NEEDED    Tobacco abuse       ALPRAZolam 1 MG tablet    XANAX    90 tablet    TAKE 1 TABLET BY MOUTH 3 TIMES DAILY AS NEEDED FOR ANXIETY    Anxiety       amoxicillin-clavulanate 875-125 MG per tablet     AUGMENTIN    20 tablet    Take 1 tablet by mouth 2 times daily    Acute recurrent maxillary sinusitis       aspirin 81 MG EC tablet      Take 1 tablet (81 mg) by mouth 2 times daily    S/P total knee replacement using cement, right       atorvastatin 40 MG tablet    LIPITOR    90 tablet    Take 1 tablet (40 mg) by mouth daily    Mixed hyperlipidemia       benazepril 10 MG tablet    LOTENSIN    90 tablet    TAKE 1 TABLET BY MOUTH DAILY    HTN (hypertension)       benzonatate 200 MG capsule    TESSALON    60 capsule    TAKE 1 CAPSULE BY MOUTH 3 TIMES A DAY AS NEEDED FOR COUGH    Preop general physical exam, Tobacco abuse       budesonide-formoterol 160-4.5 MCG/ACT Inhaler    SYMBICORT    1 Inhaler    Inhale 2 puffs into the lungs 2 times daily. As needed    Asthma       butalbital-acetaminophen-caffeine -40 MG per tablet    FIORICET/ESGIC    30 tablet    Take 1 tablet by mouth every 6 hours as needed    Scalp hematoma, sequela       cimetidine 800 MG tablet    TAGAMET    90 tablet    Take 1 tablet (800 mg) by mouth At Bedtime As needed    Esophageal reflux       doxepin 10 MG capsule    SINEquan    90 capsule    Take 1 capsule (10 mg) by mouth At Bedtime    Primary osteoarthritis of right knee       EPIPEN 2-JAIRO 0.3 MG/0.3ML injection 2-pack   Generic drug:  EPINEPHrine     2 each    INJECT 1 SYRINGE INTRAMUSCULARLY ONCE AS NEEDED FOR ANAPHYLAXIS    Anaphylactic reaction       escitalopram 20 MG tablet    LEXAPRO    180 tablet    TAKE 1 TABLET BY MOUTH 2 TIMES A DAY    Dysthymia       fluconazole 150 MG tablet    DIFLUCAN    4 tablet    Take 1 tablet (150 mg) by mouth every 3 days    Candidiasis of vulva and vagina       hydrochlorothiazide 25 MG tablet    HYDRODIURIL    90 tablet    TAKE 1 TABLET BY MOUTH DAILY    Benign essential hypertension       ipratropium - albuterol 0.5 mg/2.5 mg/3 mL 0.5-2.5 (3) MG/3ML neb solution    DUONEB    90 mL    Take 1 vial (3 mLs) by nebulization every 4 hours as needed     Acute bronchospasm, SOB (shortness of breath)       meloxicam 15 MG tablet    MOBIC    90 tablet    Take 1 tablet (15 mg) by mouth daily    Pain       montelukast 10 MG tablet    SINGULAIR    90 tablet    TAKE 1 TABLET BY MOUTH DAILY    Mixed hyperlipidemia       * nicotine 14 MG/24HR 24 hr patch    NICODERM CQ    30 patch    Place 1 patch onto the skin every 24 hours    Tobacco abuse       * nicotine 7 MG/24HR 24 hr patch    NICODERM CQ    30 patch    Place 1 patch onto the skin every 24 hours    Tobacco abuse       * nicotine 21 MG/24HR 24 hr patch    NICODERM CQ    30 patch    Place 1 patch onto the skin every 24 hours    Tobacco abuse       nystatin [id number] UNIT/ML suspension    MYCOSTATIN    473 mL    TAKE 1 TEASPOON (5MLS) BY MOUTH 4 TIMES DAILY    Thrush       * order for DME     2 each    Equipment being ordered: Nebulizer supplies    Primary osteoarthritis of right knee       * order for DME     1 each    Equipment being ordered: mcgee walker    Primary osteoarthritis of right knee       * order for DME     1 each    Equipment being ordered: toilet seat riser    Postoperative state       * order for DME     1 Units    Equipment being ordered: Please install safety bar in bathroom to assist patient in getting up from toilet.    Status post total right knee replacement       oxyCODONE-acetaminophen 5-325 MG per tablet    PERCOCET    60 tablet    TAKE 1 TO 2 TABLETS BY MOUTH EVERY 4 HOURS AS NEEDED FOR PAIN    Primary osteoarthritis of both knees       potassium chloride SA 10 MEQ CR tablet    K-DUR/KLOR-CON M    180 tablet    TAKE 1 TABLET BY MOUTH 2 TIMES DAILY WITH FOOD    Benign essential hypertension       predniSONE 20 MG tablet    DELTASONE    20 tablet    Take 3 tabs (60 mg) by mouth daily x 3 days, 2 tabs (40 mg) daily x 3 days, 1 tab (20 mg) daily x 3 days, then 1/2 tab (10 mg) x 3 days.    Acute bronchitis with symptoms > 10 days       temazepam 30 MG capsule    RESTORIL    90 capsule    TAKE 1  CAPSULE BY MOUTH NIGHTLY AS NEEDED FOR SLEEP    Persistent insomnia       VOLTAREN 1 % Gel topical gel   Generic drug:  diclofenac     100 g    APPLY 4 GRAMS TO KNEES FOUR TIMES A DAY USING ENCLOSED DOSING CARD    Primary osteoarthritis of left knee, Chronic pain of right knee       * Notice:  This list has 7 medication(s) that are the same as other medications prescribed for you. Read the directions carefully, and ask your doctor or other care provider to review them with you.

## 2018-07-10 NOTE — NURSING NOTE
"Chief Complaint   Patient presents with     URI       Initial BP 94/60  Pulse 86  Temp 99  F (37.2  C)  Ht 4' 10\" (1.473 m)  Wt 173 lb (78.5 kg)  SpO2 92%  BMI 36.16 kg/m2 Estimated body mass index is 36.16 kg/(m^2) as calculated from the following:    Height as of this encounter: 4' 10\" (1.473 m).    Weight as of this encounter: 173 lb (78.5 kg).  Medication Reconciliation: complete    Jo Ayala, LPN  "

## 2018-07-10 NOTE — PATIENT INSTRUCTIONS
Lung Cancer Screening   Frequently Asked Questions  If you are at high-risk for lung cancer, getting screened with low-dose computed tomography (LDCT) every year can help save your life. This handout offers answers to some of the most common questions about lung cancer screening. If you have other questions, please call [phone] ([phone]).     What is it?  Lung cancer screening uses special X-ray technology to create an image of your lung tissue. The exam is quick and easy and takes less than 10 seconds. We don t give you any medicine or use any needles. You can eat before and after the exam. You don t need to change your clothes as long as the clothing on your chest doesn t contain metal. But, you do need to be able to hold your breath for at least 6 seconds during the exam.    What is the goal of lung cancer screening?  The goal of lung cancer screening is to save lives. Many times, lung cancer is not found until a person starts having physical symptoms. Lung cancer screening can help detect lung cancer in the earliest stages when it may be easier to treat.    Who should be screened for lung cancer?  We suggest lung cancer screening for anyone who is at high-risk for lung cancer. You are in the high-risk group if you:      are between the ages of 55 and 79, and    have smoked at least 1 pack of cigarettes a day for 30 or more years, and    still smoke or have quit within the past 15 years.    However, if you have a new cough or shortness of breath, you should talk to your doctor before being screened.    Some national lung health advocacy groups also recommend screening for people ages 50 to 79 who have smoked an average of 1 pack of cigarettes a day for 20 years. They must also have at least 1 other risk factor for lung cancer, not including exposure to secondhand smoke. Other risk factors are having had cancer in the past, emphysema, pulmonary fibrosis, COPD, a family history of lung cancer, or  exposure to certain materials such as arsenic, asbestos, beryllium, cadmium, chromium, diesel fumes, nickel, radon or silica. Your care team can help you know if you have one of these risk factors.     Why does it matter if I have symptoms?  Certain symptoms can be a sign that you have a condition in your lungs that should be checked and treated by your doctor. These symptoms include fever, chest pain, a new or changing cough, shortness of breath that you have never felt before, coughing up blood or unexplained weight loss. Having any of these symptoms can greatly affect the results of lung cancer screening.       Should all smokers get an LDCT lung cancer screening exam?  It depends. Lung cancer screening is for a very specific group of men and women who have a history of heavy smoking over a long period of time (see  Who should be screened for lung cancer  above).  I am in the high-risk group, but have been diagnosed with cancer in the past. Is LDCT lung cancer screening right for me?  In some cases, you should not have LDCT lung screening, such as when your doctor is already following your cancer with CT scan studies. Your doctor will help you decide if LDCT lung screening is right for you.  Do I need to have a screening exam every year?  Yes. If you are in the high-risk group described earlier, you should get an LDCT lung cancer screening exam every year until you are 79, or are no longer willing or able to undergo screening and possible procedures to diagnose and treat lung cancer.  How effective is LDCT at preventing death from lung cancer?  Studies have shown that LDCT lung cancer screening can lower the risk of death from lung cancer by 20 percent in people who are at high-risk.  What are the risks?  There are some risks and limitations of LDCT lung cancer screening. We want to make sure you understand the risks and benefits, so please let us know if you have any questions. Your doctor may want to talk with  you more about these risks.    Radiation exposure: As with any exam that uses radiation, there is a very small increased risk of cancer. The amount of radiation in LDCT is small about the same amount a person would get from a mammogram. Your doctor orders the exam when he or she feels the potential benefits outweigh the risks.    False negatives: No test is perfect, including LDCT. It is possible that you may have a medical condition, including lung cancer, that is not found during your exam. This is called a false negative result.    False positives and more testing: LDCT very often finds something in the lung that could be cancer, but in fact is not. This is called a false positive result. False positive tests often cause anxiety. To make sure these findings are not cancer, you may need to have more tests. These tests will be done only if you give us permission. Sometimes patients need a treatment that can have side effects, such as a biopsy. For more information on false positives, see  What can I expect from the results?     Findings not related to lung cancer: Your LDCT exam also takes pictures of areas of your body next to your lungs. In a very small number of cases, the CT scan will show an abnormal finding in one of these areas, such as your kidneys, adrenal glands, liver or thyroid. This finding may not be serious, but you may need more tests. Your doctor can help you decide what other tests you may need, if any.  What can I expect from the results?  About 1 out of 4 LDCT exams will find something that may need more tests. Most of the time, these findings are lung nodules. Lung nodules are very small collections of tissue in the lung. These nodules are very common, and the vast majority more than 97 percent are not cancer (benign). Most are normal lymph nodes or small areas of scarring from past infections.  But, if a small lung nodule is found to be cancer, the cancer can be cured more than 90 percent of  the time. To know if the nodule is cancer, we may need to get more images before your next yearly screening exam. If the nodule has suspicious features (for example, it is large, has an odd shape or grows over time), we will refer you to a specialist for further testing.  Will my doctor also get the results?  Yes. Your doctor will get a copy of your results.  Is it okay to keep smoking now that there s a cancer screening exam?  No. Tobacco is one of the strongest cancer-causing agents. It causes not only lung cancer, but other cancers and cardiovascular (heart) diseases as well. The damage caused by smoking builds over time. This means that the longer you smoke, the higher your risk of disease. While it is never too late to quit, the sooner you quit, the better.  Where can I find help to quit smoking?  The best way to prevent lung cancer is to stop smoking. If you have already quit smoking, congratulations and keep it up! For help on quitting smoking, please call [facility] at 5-753-500-VSLI ([phone]) or the American Cancer Society at 1-718.328.3188 to find local resources near you.  One-on-one health coaching:  If you d prefer to work individually with a health care provider on tobacco cessation, we offer:      Medication Therapy Management:  Our specially trained pharmacists work closely with you and your doctor to help you quit smoking.  Call [phone] or [phone] (toll free).     Can Do: Health coaching offered by [facility].  [email or web address]      HOW TO QUIT SMOKING  Smoking is one of the hardest habits to break. About half of all those who have ever smoked have been able to quit, and most of those (about 70%) who still smoke want to quit. Here are some of the best ways to stop smoking.     KEEP TRYING:  It takes most smokers about 8 tries before they are finally able to fully quit. So, the more often you try and fail, the better your chance of quitting the next time! So, don't give  up!    GO COLD TURKEY:  Most ex-smokers quit cold turkey. Trying to cut back gradually doesn't seem to work as well, perhaps because it continues the smoking habit. Also, it is possible to fool yourself by inhaling more while smoking fewer cigarettes. This results in the same amount of nicotine in your body!    GET SUPPORT:  Support programs can make an important difference, especially for the heavy smoker. These groups offer lectures, methods to change your behavior and peer support. Call the free national Quitline for more information. 800-QUIT-NOW (198-795-4672). Low-cost or free programs are offered by many hospitals, local chapters of the American Lung Association (907-393-9947) and the American Cancer Society (470-618-6658). Support at home is important too. Non-smokers can help by offering praise and encouragement. If the smoker fails to quit, encourage them to try again!    OVER-THE-COUNTER MEDICINES:  For those who can't quit on their own, Nicotine Replacement Therapy (NRT) may make quitting much easier. Certain aids such as the nicotine patch, gum and lozenge are available without a prescription. However, it is best to use these under the guidance of your doctor. The skin patch provides a steady supply of nicotine to the body. Nicotine gum and lozenge gives temporary bursts of low levels of nicotine. Both methods take the edge off the craving for cigarettes. WARNING: If you feel symptoms of nicotine overdose, such as nausea, vomiting, dizziness, weakness, or fast heartbeat, stop using these and see your doctor.    PRESCRIPTION MEDICINES:  After evaluating your smoking patterns and prior attempts at quitting, your doctor may offer a prescription medicine such as bupropion (Zyban, Wellbutrin), varenicline (Chantix, Champix), a niocotine inhaler or nasal spray. Each has its unique advantage and side effects which your doctor can review with you.    HEALTH BENEFITS OF QUITTING:  The benefits of quitting start  right away and keep improving the longer you go without smokin minutes: blood pressure and pulse return to normal  8 hours: oxygen levels return to normal  2 days: ability to smell and taste begins to improve as damaged nerves start to regrow  2-3 weeks: circulation and lung function improves  1-9 months: decreased cough, congestion and shortness of breath; less tired  1 year: risk of heart attack decreases by half  5 years: risk of lung cancer decreases by half; risk of stroke becomes the same as a non-smoker  For information about how to quit smoking, visit the following links:  National Cancer [place] ,   Clearing the Air, Quit Smoking Today   - an online booklet. http://www.smokefree.gov/pubs/clearing_the_air.pdf  Smokefree.gov http://smokefree.gov/  QuitNet http://www.quitnet.com/    9780-6321 [employer], [address] [place] [address]. All rights reserved. This information is not intended as a substitute for professional medical care. Always follow your healthcare professional's instructions.    The Benefits of Living Smoke Free  What do you want to gain from quitting? Check off some reasons to quit.  Health Benefits  ___ Reduce my risk of lung cancer, heart disease, chronic lung disease  ___ Have fewer wrinkles and softer skin  ___ Improve my sense of taste and smell  ___ For pregnant women reduce the risk of having a miscarriage, stillbirth, premature birth, or low-birth-weight baby  Personal Benefits  ___ Feel more in control of my life  ___ Have better-smelling hair, breath, clothes, home, and car  ___ Save time by not having to take smoke breaks, buy cigarettes, or hunt for a light  ___ Have whiter teeth  Family Benefits  ___ Reduce my children s respiratory tract infections  ___ Set a good example for my children  ___ Reduce my family s cancer risk  Financial Benefits  ___ Save hundreds of dollars each year that would be spent on cigarettes  ___ Save money on medical bills  ___  Save on life, health, and car insurance premiums    Those Dollars Add Up!  Cigarettes are expensive, and getting more expensive all the time. Do you realize how much money you are spending on cigarettes per year? What is the average amount you spend on a pack of cigarettes? What is the average number of packs that you smoke per day? Using your answers to these questions, fill in this formula to help you find out:  ($ _____ per pack) ×  ( _____ number of packs per day) × (365 days) =  $ _____ yearly cost of smoking  Besides tobacco, there are other costs, including extra cleaning bills and replacement costs for clothing and furniture; medical expenses for smoking-related illnesses; and higher health, life, and car insurance premiums.    Cigars and Pipes Count Too!  Cigars and pipes are also dangerous. So are smokeless (chewing) tobacco and snuff. All of these products contain nicotine, a highly addictive substance that has harmful effects on your body. Quitting smoking means giving up all tobacco products.      2726-9467 [facility], [address] [place] [address], [place] [address]. All rights reserved. This information is not intended as a substitute for professional medical care. Always follow your healthcare professional's instructions.

## 2018-07-10 NOTE — TELEPHONE ENCOUNTER
Please cancel the zpac and the prednisone 20 bid x 5 days.  I wanted the other ones.  Thanks. Gibran Alarcon

## 2018-07-11 ASSESSMENT — PATIENT HEALTH QUESTIONNAIRE - PHQ9: SUM OF ALL RESPONSES TO PHQ QUESTIONS 1-9: 20

## 2018-07-11 ASSESSMENT — ANXIETY QUESTIONNAIRES: GAD7 TOTAL SCORE: 21

## 2018-07-18 ENCOUNTER — TELEPHONE (OUTPATIENT)
Dept: FAMILY MEDICINE | Facility: OTHER | Age: 68
End: 2018-07-18

## 2018-07-18 NOTE — TELEPHONE ENCOUNTER
I called the pt and notified. I offered her an appt tomorrow at 2:15 pm, arriving at 2pm. Pt aware. Please schedule.

## 2018-07-19 ENCOUNTER — OFFICE VISIT (OUTPATIENT)
Dept: FAMILY MEDICINE | Facility: OTHER | Age: 68
End: 2018-07-19
Attending: FAMILY MEDICINE
Payer: COMMERCIAL

## 2018-07-19 ENCOUNTER — RADIANT APPOINTMENT (OUTPATIENT)
Dept: GENERAL RADIOLOGY | Facility: OTHER | Age: 68
End: 2018-07-19
Attending: FAMILY MEDICINE
Payer: MEDICARE

## 2018-07-19 VITALS
BODY MASS INDEX: 36.31 KG/M2 | OXYGEN SATURATION: 91 % | TEMPERATURE: 98.4 F | RESPIRATION RATE: 20 BRPM | HEART RATE: 74 BPM | DIASTOLIC BLOOD PRESSURE: 64 MMHG | WEIGHT: 173 LBS | SYSTOLIC BLOOD PRESSURE: 124 MMHG | HEIGHT: 58 IN

## 2018-07-19 DIAGNOSIS — Z01.818 PREOP GENERAL PHYSICAL EXAM: ICD-10-CM

## 2018-07-19 DIAGNOSIS — Z72.0 TOBACCO ABUSE: ICD-10-CM

## 2018-07-19 DIAGNOSIS — J22 LRTI (LOWER RESPIRATORY TRACT INFECTION): ICD-10-CM

## 2018-07-19 DIAGNOSIS — Z71.6 TOBACCO ABUSE COUNSELING: ICD-10-CM

## 2018-07-19 DIAGNOSIS — J22 LRTI (LOWER RESPIRATORY TRACT INFECTION): Primary | ICD-10-CM

## 2018-07-19 LAB
DIFFERENTIAL METHOD BLD: ABNORMAL
ERYTHROCYTE [DISTWIDTH] IN BLOOD BY AUTOMATED COUNT: 13.4 % (ref 10–15)
HCT VFR BLD AUTO: 45.7 % (ref 35–47)
HGB BLD-MCNC: 16.3 G/DL (ref 11.7–15.7)
LYMPHOCYTES # BLD AUTO: 2.2 10E9/L (ref 0.8–5.3)
LYMPHOCYTES NFR BLD AUTO: 12 %
MCH RBC QN AUTO: 31.8 PG (ref 26.5–33)
MCHC RBC AUTO-ENTMCNC: 35.7 G/DL (ref 31.5–36.5)
MCV RBC AUTO: 89 FL (ref 78–100)
NEUTROPHILS # BLD AUTO: 16 10E9/L (ref 1.6–8.3)
NEUTROPHILS NFR BLD AUTO: 88 %
PLATELET # BLD AUTO: 336 10E9/L (ref 150–450)
PLATELET # BLD EST: ABNORMAL 10*3/UL
RBC # BLD AUTO: 5.13 10E12/L (ref 3.8–5.2)
RBC MORPH BLD: NORMAL
WBC # BLD AUTO: 18.2 10E9/L (ref 4–11)

## 2018-07-19 PROCEDURE — G0463 HOSPITAL OUTPT CLINIC VISIT: HCPCS

## 2018-07-19 PROCEDURE — 71046 X-RAY EXAM CHEST 2 VIEWS: CPT | Mod: TC,FY

## 2018-07-19 PROCEDURE — 99214 OFFICE O/P EST MOD 30 MIN: CPT | Performed by: FAMILY MEDICINE

## 2018-07-19 PROCEDURE — 36415 COLL VENOUS BLD VENIPUNCTURE: CPT | Mod: ZL | Performed by: FAMILY MEDICINE

## 2018-07-19 PROCEDURE — 85025 COMPLETE CBC W/AUTO DIFF WBC: CPT | Mod: ZL | Performed by: FAMILY MEDICINE

## 2018-07-19 RX ORDER — BENZONATATE 200 MG/1
CAPSULE ORAL
Qty: 60 CAPSULE | Refills: 3 | Status: SHIPPED | OUTPATIENT
Start: 2018-07-19 | End: 2019-01-14

## 2018-07-19 RX ORDER — PREDNISONE 20 MG/1
TABLET ORAL
Qty: 20 TABLET | Refills: 0 | Status: SHIPPED | OUTPATIENT
Start: 2018-07-19 | End: 2018-08-03

## 2018-07-19 ASSESSMENT — PAIN SCALES - GENERAL: PAINLEVEL: NO PAIN (0)

## 2018-07-19 NOTE — MR AVS SNAPSHOT
After Visit Summary   7/19/2018    Sydni Hardin    MRN: [id number]           Patient Information     Date Of Birth          1950        Visit Information        Provider Department      7/19/2018 2:15 PM Gibran Alarcon MD [facility]        Today's Diagnoses     LRTI (lower respiratory tract infection)    -  1    Tobacco abuse        Tobacco abuse counseling        Preop general physical exam          Care Instructions      HOW TO QUIT SMOKING  Smoking is one of the hardest habits to break. About half of all those who have ever smoked have been able to quit, and most of those (about 70%) who still smoke want to quit. Here are some of the best ways to stop smoking.     KEEP TRYING:  It takes most smokers about 8 tries before they are finally able to fully quit. So, the more often you try and fail, the better your chance of quitting the next time! So, don't give up!    GO COLD TURKEY:  Most ex-smokers quit cold turkey. Trying to cut back gradually doesn't seem to work as well, perhaps because it continues the smoking habit. Also, it is possible to fool yourself by inhaling more while smoking fewer cigarettes. This results in the same amount of nicotine in your body!    GET SUPPORT:  Support programs can make an important difference, especially for the heavy smoker. These groups offer lectures, methods to change your behavior and peer support. Call the free national Quitline for more information. 800-QUIT-NOW (345-082-2695). Low-cost or free programs are offered by many hospitals, local chapters of the American Lung Association (941-831-3131) and the American Cancer Society (507-575-1718). Support at home is important too. Non-smokers can help by offering praise and encouragement. If the smoker fails to quit, encourage them to try again!    OVER-THE-COUNTER MEDICINES:  For those who can't quit on their own, Nicotine Replacement Therapy (NRT) may make quitting much easier.  Certain aids such as the nicotine patch, gum and lozenge are available without a prescription. However, it is best to use these under the guidance of your doctor. The skin patch provides a steady supply of nicotine to the body. Nicotine gum and lozenge gives temporary bursts of low levels of nicotine. Both methods take the edge off the craving for cigarettes. WARNING: If you feel symptoms of nicotine overdose, such as nausea, vomiting, dizziness, weakness, or fast heartbeat, stop using these and see your doctor.    PRESCRIPTION MEDICINES:  After evaluating your smoking patterns and prior attempts at quitting, your doctor may offer a prescription medicine such as bupropion (Zyban, Wellbutrin), varenicline (Chantix, Champix), a niocotine inhaler or nasal spray. Each has its unique advantage and side effects which your doctor can review with you.    HEALTH BENEFITS OF QUITTING:  The benefits of quitting start right away and keep improving the longer you go without smokin minutes: blood pressure and pulse return to normal  8 hours: oxygen levels return to normal  2 days: ability to smell and taste begins to improve as damaged nerves start to regrow  2-3 weeks: circulation and lung function improves  1-9 months: decreased cough, congestion and shortness of breath; less tired  1 year: risk of heart attack decreases by half  5 years: risk of lung cancer decreases by half; risk of stroke becomes the same as a non-smoker  For information about how to quit smoking, visit the following links:  National Cancer [place] ,   Clearing the Air, Quit Smoking Today   - an online booklet. http://www.smokefree.gov/pubs/clearing_the_air.pdf  Smokefree.gov http://smokefree.gov/  QuitNet http://www.quitnet.com/    0375-9904 [employer], [address] [place] [address], [place] [address]. All rights reserved. This information is not intended as a substitute for professional medical care. Always follow your healthcare professional's  instructions.    The Benefits of Living Smoke Free  What do you want to gain from quitting? Check off some reasons to quit.  Health Benefits  ___ Reduce my risk of lung cancer, heart disease, chronic lung disease  ___ Have fewer wrinkles and softer skin  ___ Improve my sense of taste and smell  ___ For pregnant women--reduce the risk of having a miscarriage, stillbirth, premature birth, or low-birth-weight baby  Personal Benefits  ___ Feel more in control of my life  ___ Have better-smelling hair, breath, clothes, home, and car  ___ Save time by not having to take smoke breaks, buy cigarettes, or hunt for a light  ___ Have whiter teeth  Family Benefits  ___ Reduce my children s respiratory tract infections  ___ Set a good example for my children  ___ Reduce my family s cancer risk  Financial Benefits  ___ Save hundreds of dollars each year that would be spent on cigarettes  ___ Save money on medical bills  ___ Save on life, health, and car insurance premiums    Those Dollars Add Up!  Cigarettes are expensive, and getting more expensive all the time. Do you realize how much money you are spending on cigarettes per year? What is the average amount you spend on a pack of cigarettes? What is the average number of packs that you smoke per day? Using your answers to these questions, fill in this formula to help you find out:  ($ _____ per pack) ×  ( _____ number of packs per day) × (365 days) =  $ _____ yearly cost of smoking  Besides tobacco, there are other costs, including extra cleaning bills and replacement costs for clothing and furniture; medical expenses for smoking-related illnesses; and higher health, life, and car insurance premiums.    Cigars and Pipes Count Too!  Cigars and pipes are also dangerous. So are smokeless (chewing) tobacco and snuff. All of these products contain nicotine, a highly addictive substance that has harmful effects on your body. Quitting smoking means giving up all tobacco products.       9993-5106 [facility], [address] [place] [address], [place] [address]. All rights reserved. This information is not intended as a substitute for professional medical care. Always follow your healthcare professional's instructions.          Follow-ups after your visit        Your next 10 appointments already scheduled     Jul 25, 2018 10:40 AM CDT   (Arrive by 10:25 AM)   Return Visit with Moisés Olson MD    ORTHOPEDICS ([facility] )    [address] [id number] [phone]            Aug 03, 2018 11:00 AM CDT   (Arrive by 10:45 AM)   SHORT with Gibran Alarcon MD   [facility] ([facility] )    [address] [facility] [address]   [phone]            Aug 09, 2018  1:30 PM CDT   (Arrive by 1:15 PM)   SHORT with Gibran Alarcon MD   [facility] ([facility] )    [address] [facility] [address]   [phone]            Aug 14, 2018  3:30 PM CDT   (Arrive by 3:15 PM)   CT LUNG RESEARCH [place] with HICT1, HIXRRN   HI CT SCAN ([facility] )    [address] [place] [id number] [phone]           Please bring any scans or X-rays taken at other hospitals, if similar tests were done. Also bring a list of your medicines, including vitamins, minerals and over-the-counter drugs. It is safest to leave personal items at home.  Be sure to tell your doctor:   If you have any allergies.   If there s any chance you are pregnant.   If you are breastfeeding.  You do not need to do anything special to prepare for this exam.  Please wear loose clothing, such as a sweat suit or jogging clothes. Avoid snaps, zippers and other metal. We may ask you to undress and put on a hospital gown.              Who to contact     If you have questions or need follow up information about today's clinic visit or your schedule please contact [facility] directly at [phone].  Normal or  "non-critical lab and imaging results will be communicated to you by MyChart, letter or phone within 4 business days after the clinic has received the results. If you do not hear from us within 7 days, please contact the clinic through MyChart or phone. If you have a critical or abnormal lab result, we will notify you by phone as soon as possible.  Submit refill requests through [employer] or call your pharmacy and they will forward the refill request to us. Please allow 3 business days for your refill to be completed.          Additional Information About Your Visit        Care EveryWhere ID     This is your Care EveryWhere ID. This could be used by other organizations to access your [facility] medical records  [id number]        Your Vitals Were     Pulse Temperature Respirations Height Pulse Oximetry BMI (Body Mass Index)    74 98.4  F (36.9  C) (Tympanic) 20 4' 10\" (1.473 m) 91% 36.16 kg/m2       Blood Pressure from Last 3 Encounters:   07/19/18 124/64   07/10/18 94/60   05/24/18 120/80    Weight from Last 3 Encounters:   07/19/18 173 lb (78.5 kg)   07/10/18 173 lb (78.5 kg)   05/24/18 170 lb (77.1 kg)              We Performed the Following     CBC with platelets and differential     Tobacco Cessation - Order to Satisfy Health Maintenance          Today's Medication Changes          These changes are accurate as of 7/19/18  5:11 PM.  If you have any questions, ask your nurse or doctor.               Start taking these medicines.        Dose/Directions    nicotine polacrilex 4 MG lozenge   Commonly known as:  EQL NICOTINE   Used for:  Tobacco abuse   Started by:  Gibran Alarcon MD        Dose:  4 mg   Place 1 lozenge (4 mg) inside cheek as needed for smoking cessation   Quantity:  360 tablet   Refills:  1         These medicines have changed or have updated prescriptions.        Dose/Directions    * predniSONE 20 MG tablet   Commonly known as:  DELTASONE   This may have changed:  Another medication with the same " name was added. Make sure you understand how and when to take each.   Used for:  Acute bronchitis with symptoms > 10 days   Changed by:  Gibran Alarcon MD        Take 3 tabs (60 mg) by mouth daily x 3 days, 2 tabs (40 mg) daily x 3 days, 1 tab (20 mg) daily x 3 days, then 1/2 tab (10 mg) x 3 days.   Quantity:  20 tablet   Refills:  0       * predniSONE 20 MG tablet   Commonly known as:  DELTASONE   This may have changed:  You were already taking a medication with the same name, and this prescription was added. Make sure you understand how and when to take each.   Used for:  LRTI (lower respiratory tract infection)   Changed by:  Gibran Alarcon MD        Take 3 tabs (60 mg) by mouth daily x 3 days, 2 tabs (40 mg) daily x 3 days, 1 tab (20 mg) daily x 3 days, then 1/2 tab (10 mg) x 3 days.   Quantity:  20 tablet   Refills:  0       * Notice:  This list has 2 medication(s) that are the same as other medications prescribed for you. Read the directions carefully, and ask your doctor or other care provider to review them with you.         Where to get your medicines      These medications were sent to [facility] [place] - [address]  [address] [place] [address]     Phone:  [phone]     benzonatate 200 MG capsule    nicotine polacrilex 4 MG lozenge    predniSONE 20 MG tablet                Primary Care Provider Office Phone # Fax #    Gibran Alarcon [phone]       [address] [place] [id number]        Equal Access to Services     [facility] : Hadii dixon ku hadasho Soomaali, waaxda luqadaha, qaybta kaalmada adeegyada, cierra arteaga. So [facility] [phone].    ATENCIÓN: Si habla español, tiene a guaman disposición servicios gratuitos de asistencia lingüística. Llame al [phone].    We comply with applicable federal civil rights laws and [place] laws. We do not discriminate on the basis of race, color, national origin, age,  disability, sex, sexual orientation, or gender identity.            Thank you!     Thank you for choosing [facility]  for your care. Our goal is always to provide you with excellent care. Hearing back from our patients is one way we can continue to improve our services. Please take a few minutes to complete the written survey that you may receive in the mail after your visit with us. Thank you!             Your Updated Medication List - Protect others around you: Learn how to safely use, store and throw away your medicines at www.ProxToMeemymeds.org.          This list is accurate as of 7/19/18  5:11 PM.  Always use your most recent med list.                   Brand Name Dispense Instructions for use Diagnosis    albuterol 108 (90 Base) MCG/ACT Inhaler    VENTOLIN HFA    2 Inhaler    USE 1 PUFF FOUR TIMES A DAY AS NEEDED    Tobacco abuse       ALPRAZolam 1 MG tablet    XANAX    90 tablet    TAKE 1 TABLET BY MOUTH 3 TIMES DAILY AS NEEDED FOR ANXIETY    Anxiety       amoxicillin-clavulanate 875-125 MG per tablet    AUGMENTIN    20 tablet    Take 1 tablet by mouth 2 times daily    Acute recurrent maxillary sinusitis       aspirin 81 MG EC tablet      Take 1 tablet (81 mg) by mouth 2 times daily    S/P total knee replacement using cement, right       atorvastatin 40 MG tablet    LIPITOR    90 tablet    Take 1 tablet (40 mg) by mouth daily    Mixed hyperlipidemia       benazepril 10 MG tablet    LOTENSIN    90 tablet    TAKE 1 TABLET BY MOUTH DAILY    HTN (hypertension)       benzonatate 200 MG capsule    TESSALON    60 capsule    TAKE 1 CAPSULE BY MOUTH 3 TIMES A DAY AS NEEDED FOR COUGH    Preop general physical exam, Tobacco abuse       budesonide-formoterol 160-4.5 MCG/ACT Inhaler    SYMBICORT    1 Inhaler    Inhale 2 puffs into the lungs 2 times daily. As needed    Asthma       butalbital-acetaminophen-caffeine -40 MG per tablet    FIORICET/ESGIC    30 tablet    Take 1 tablet by mouth every 6 hours  as needed    Scalp hematoma, sequela       cimetidine 800 MG tablet    TAGAMET    90 tablet    Take 1 tablet (800 mg) by mouth At Bedtime As needed    Esophageal reflux       doxepin 10 MG capsule    SINEquan    90 capsule    Take 1 capsule (10 mg) by mouth At Bedtime    Primary osteoarthritis of right knee       EPIPEN 2-JAIRO 0.3 MG/0.3ML injection 2-pack   Generic drug:  EPINEPHrine     2 each    INJECT 1 SYRINGE INTRAMUSCULARLY ONCE AS NEEDED FOR ANAPHYLAXIS    Anaphylactic reaction       escitalopram 20 MG tablet    LEXAPRO    180 tablet    TAKE 1 TABLET BY MOUTH 2 TIMES A DAY    Dysthymia       fluconazole 150 MG tablet    DIFLUCAN    4 tablet    Take 1 tablet (150 mg) by mouth every 3 days    Candidiasis of vulva and vagina       hydrochlorothiazide 25 MG tablet    HYDRODIURIL    90 tablet    TAKE 1 TABLET BY MOUTH DAILY    Benign essential hypertension       ipratropium - albuterol 0.5 mg/2.5 mg/3 mL 0.5-2.5 (3) MG/3ML neb solution    DUONEB    90 mL    Take 1 vial (3 mLs) by nebulization every 4 hours as needed    Acute bronchospasm, SOB (shortness of breath)       meloxicam 15 MG tablet    MOBIC    90 tablet    Take 1 tablet (15 mg) by mouth daily    Pain       montelukast 10 MG tablet    SINGULAIR    90 tablet    TAKE 1 TABLET BY MOUTH DAILY    Mixed hyperlipidemia       * nicotine 14 MG/24HR 24 hr patch    NICODERM CQ    30 patch    Place 1 patch onto the skin every 24 hours    Tobacco abuse       * nicotine 7 MG/24HR 24 hr patch    NICODERM CQ    30 patch    Place 1 patch onto the skin every 24 hours    Tobacco abuse       * nicotine 21 MG/24HR 24 hr patch    NICODERM CQ    30 patch    Place 1 patch onto the skin every 24 hours    Tobacco abuse       nicotine polacrilex 4 MG lozenge    EQL NICOTINE    360 tablet    Place 1 lozenge (4 mg) inside cheek as needed for smoking cessation    Tobacco abuse       nystatin 210021 UNIT/ML suspension    MYCOSTATIN    473 mL    TAKE 1 TEASPOON (5MLS) BY MOUTH 4 TIMES  DAILY    Thrush       * order for DME     2 each    Equipment being ordered: Nebulizer supplies    Primary osteoarthritis of right knee       * order for DME     1 each    Equipment being ordered: mcgee walker    Primary osteoarthritis of right knee       * order for DME     1 each    Equipment being ordered: toilet seat riser    Postoperative state       * order for DME     1 Units    Equipment being ordered: Please install safety bar in bathroom to assist patient in getting up from toilet.    Status post total right knee replacement       oxyCODONE-acetaminophen 5-325 MG per tablet    PERCOCET    60 tablet    TAKE 1 TO 2 TABLETS BY MOUTH EVERY 4 HOURS AS NEEDED FOR PAIN    Primary osteoarthritis of both knees       potassium chloride SA 10 MEQ CR tablet    K-DUR/KLOR-CON M    180 tablet    TAKE 1 TABLET BY MOUTH 2 TIMES DAILY WITH FOOD    Benign essential hypertension       * predniSONE 20 MG tablet    DELTASONE    20 tablet    Take 3 tabs (60 mg) by mouth daily x 3 days, 2 tabs (40 mg) daily x 3 days, 1 tab (20 mg) daily x 3 days, then 1/2 tab (10 mg) x 3 days.    Acute bronchitis with symptoms > 10 days       * predniSONE 20 MG tablet    DELTASONE    20 tablet    Take 3 tabs (60 mg) by mouth daily x 3 days, 2 tabs (40 mg) daily x 3 days, 1 tab (20 mg) daily x 3 days, then 1/2 tab (10 mg) x 3 days.    LRTI (lower respiratory tract infection)       temazepam 30 MG capsule    RESTORIL    90 capsule    TAKE 1 CAPSULE BY MOUTH NIGHTLY AS NEEDED FOR SLEEP    Persistent insomnia       VOLTAREN 1 % Gel topical gel   Generic drug:  diclofenac     100 g    APPLY 4 GRAMS TO KNEES FOUR TIMES A DAY USING ENCLOSED DOSING CARD    Primary osteoarthritis of left knee, Chronic pain of right knee       * Notice:  This list has 9 medication(s) that are the same as other medications prescribed for you. Read the directions carefully, and ask your doctor or other care provider to review them with you.

## 2018-07-19 NOTE — PATIENT INSTRUCTIONS

## 2018-07-19 NOTE — PROGRESS NOTES
SUBJECTIVE:   Sydni Hardin is a 68 year old female who presents to clinic today for the following health issues:      RESPIRATORY SYMPTOMS      Duration: 10 days    Description  Cough, SOB, nasal and chest congestion    Severity: severe    Accompanying signs and symptoms: dizziness    History (predisposing factors):  tobacco abuse, asthma    Precipitating or alleviating factors: unable to rest while ill    Therapies tried and outcome:  Augmentin, Prednisone, Duonebs, Tessalon perles, Symbicort, albuterol HFA. Pt has 1 more day of Prednisone and 2 more days of Augmentin      Hives      Duration: started nicotine patches yesterday at 2pm    Description (location/character/radiation): tobacco cessation    Intensity:  mild    Accompanying signs and symptoms: hives on neck and hands, dizziness worsened    History (similar episodes/previous evaluation): using nicotine patches    Precipitating or alleviating factors: None    Therapies tried and outcome: removed patch       Problem list and histories reviewed & adjusted, as indicated.  Additional history: finishing augmentin.  Failed patch with skin reaction.  Still coughing.  Very stressed.  Can't rest.      Patient Active Problem List   Diagnosis     Essential hypertension     Calculus of gallbladder     Headache     Osteoarthrosis, unspecified whether generalized or localized, involving lower leg     Advance Care Planning     Dysthymia     Lung nodules     Tobacco abuse     SCC (squamous cell carcinoma), face     IC (interstitial cystitis)     Primary osteoarthritis of both knees     S/P total knee replacement using cement, right     Asthma     Leukocytosis     Dehydration     Knee pain     Past Surgical History:   Procedure Laterality Date     ARTHROPLASTY KNEE Right 3/21/2017    Procedure: ARTHROPLASTY KNEE;  Surgeon: Moisés Olson MD;  Location: [facility]     ARTHROSCOPY KNEE       BIOPSY BREAST  2000    left - free of disease      SECTION         SECTION       CHOLECYSTECTOMY  2006     COLONOSCOPY  2008    repeat in 10 years     HEAD & NECK SURGERY  2014    hematoma on head drained     HYSTERECTOMY       ORTHOPEDIC SURGERY  2014    right shoulder replacement     shoulder x2  2009    left       Social History   Substance Use Topics     Smoking status: Current Every Day Smoker     Packs/day: 1.00     Years: 53.00     Types: Cigarettes     Start date: 1965     Smokeless tobacco: Never Used     Alcohol use 0.0 oz/week     0 Standard drinks or equivalent per week      Comment: rarely     Family History   Problem Relation Age of Onset     Cancer Mother      skin     Breast Cancer Mother      Cerebrovascular Disease Mother      Alzheimer Disease Mother      Myocardial Infarction Father 76     myocardial infarction - cause of death     Cancer Daughter      cervical      Diabetes Maternal Grandfather      HEART DISEASE Maternal Grandfather      MI     Diabetes Maternal Grandmother      Diabetes Paternal Grandmother      Diabetes Paternal Grandfather          Current Outpatient Prescriptions   Medication Sig Dispense Refill     albuterol (VENTOLIN HFA) 108 (90 BASE) MCG/ACT Inhaler USE 1 PUFF FOUR TIMES A DAY AS NEEDED 2 Inhaler 3     ALPRAZolam (XANAX) 1 MG tablet TAKE 1 TABLET BY MOUTH 3 TIMES DAILY AS NEEDED FOR ANXIETY 90 tablet 0     amoxicillin-clavulanate (AUGMENTIN) 875-125 MG per tablet Take 1 tablet by mouth 2 times daily 20 tablet 0     aspirin EC 81 MG EC tablet Take 1 tablet (81 mg) by mouth 2 times daily       atorvastatin (LIPITOR) 40 MG tablet Take 1 tablet (40 mg) by mouth daily 90 tablet 3     benazepril (LOTENSIN) 10 MG tablet TAKE 1 TABLET BY MOUTH DAILY 90 tablet 1     benzonatate (TESSALON) 200 MG capsule TAKE 1 CAPSULE BY MOUTH 3 TIMES A DAY AS NEEDED FOR COUGH 60 capsule 3     budesonide-formoterol (SYMBICORT) 160-4.5 MCG/ACT inhaler Inhale 2 puffs into the lungs 2 times daily. As needed 1 Inhaler 11      butalbital-acetaminophen-caffeine (FIORICET/ESGIC) -40 MG per tablet Take 1 tablet by mouth every 6 hours as needed 30 tablet 0     cimetidine (TAGAMET) 800 MG tablet Take 1 tablet (800 mg) by mouth At Bedtime As needed 90 tablet 3     doxepin (SINEQUAN) 10 MG capsule Take 1 capsule (10 mg) by mouth At Bedtime 90 capsule 3     EPIPEN 2-JAIRO 0.3 MG/0.3ML injection INJECT 1 SYRINGE INTRAMUSCULARLY ONCE AS NEEDED FOR ANAPHYLAXIS 2 each 0     escitalopram (LEXAPRO) 20 MG tablet TAKE 1 TABLET BY MOUTH 2 TIMES A  tablet 3     fluconazole (DIFLUCAN) 150 MG tablet Take 1 tablet (150 mg) by mouth every 3 days 4 tablet 0     hydrochlorothiazide (HYDRODIURIL) 25 MG tablet TAKE 1 TABLET BY MOUTH DAILY 90 tablet 3     ipratropium - albuterol 0.5 mg/2.5 mg/3 mL (DUONEB) 0.5-2.5 (3) MG/3ML neb solution Take 1 vial (3 mLs) by nebulization every 4 hours as needed 90 mL 3     meloxicam (MOBIC) 15 MG tablet Take 1 tablet (15 mg) by mouth daily 90 tablet 3     montelukast (SINGULAIR) 10 MG tablet TAKE 1 TABLET BY MOUTH DAILY 90 tablet 1     nicotine (NICODERM CQ) 7 MG/24HR 24 hr patch Place 1 patch onto the skin every 24 hours 30 patch 1     nicotine polacrilex (EQL NICOTINE) 4 MG lozenge Place 1 lozenge (4 mg) inside cheek as needed for smoking cessation 360 tablet 1     nystatin (MYCOSTATIN) 544323 UNIT/ML suspension TAKE 1 TEASPOON (5MLS) BY MOUTH 4 TIMES DAILY 473 mL 1     order for DME Equipment being ordered: Please install safety bar in bathroom to assist patient in getting up from toilet. 1 Units 0     order for DME Equipment being ordered: toilet seat riser 1 each 0     order for DME Equipment being ordered: Nebulizer supplies 2 each 11     order for DME Equipment being ordered: mcgee walker 1 each 0     oxyCODONE-acetaminophen (PERCOCET) 5-325 MG per tablet TAKE 1 TO 2 TABLETS BY MOUTH EVERY 4 HOURS AS NEEDED FOR PAIN 60 tablet 0     potassium chloride SA (K-DUR/KLOR-CON M) 10 MEQ CR tablet TAKE 1 TABLET BY  "MOUTH 2 TIMES DAILY WITH FOOD 180 tablet 0     predniSONE (DELTASONE) 20 MG tablet Take 3 tabs (60 mg) by mouth daily x 3 days, 2 tabs (40 mg) daily x 3 days, 1 tab (20 mg) daily x 3 days, then 1/2 tab (10 mg) x 3 days. 20 tablet 0     temazepam (RESTORIL) 30 MG capsule TAKE 1 CAPSULE BY MOUTH NIGHTLY AS NEEDED FOR SLEEP 90 capsule 0     VOLTAREN 1 % GEL topical gel APPLY 4 GRAMS TO KNEES FOUR TIMES A DAY USING ENCLOSED DOSING CARD 100 g 0     nicotine (NICODERM CQ) 14 MG/24HR 24 hr patch Place 1 patch onto the skin every 24 hours (Patient not taking: Reported on 7/19/2018) 30 patch 1     nicotine (NICODERM CQ) 21 MG/24HR 24 hr patch Place 1 patch onto the skin every 24 hours (Patient not taking: Reported on 7/19/2018) 30 patch 1     Allergies   Allergen Reactions     Bee Pollen Other (See Comments)     Throat and eyes close up     Codeine Difficulty breathing     \"My throat closes shut.\"     Takes Percocet at home     Keflex [Cephalexin Hcl] Hives     Sulfonamide Derivatives Other (See Comments)     Throat closes     Ciprofloxacin Rash     Rash, bumps on lips     Bupropion Hcl Other (See Comments)     Made her very irritable     Celecoxib Other (See Comments) and Cough     Celebrex - wheeze  Takes ASA at home     Clonazepam Hives     Takes Xanax at home     Erythromycin Other (See Comments)     Erythromycin base - abdominal pain     Lisinopril Other (See Comments)     Zestril - headaches     Nifedipine Hives     Procardia     Strawberries [Strawberry]      Tramadol Hcl      Takes percocet at home       Reviewed and updated as needed this visit by clinical staff       Reviewed and updated as needed this visit by Provider         ROS:  Constitutional, HEENT, cardiovascular, pulmonary, gi and gu systems are negative, except as otherwise noted.    OBJECTIVE:                                                    /64  Pulse 74  Temp 98.4  F (36.9  C) (Tympanic)  Resp 20  Ht 4' 10\" (1.473 m)  Wt 173 lb (78.5 kg)  " SpO2 91%  BMI 36.16 kg/m2  Body mass index is 36.16 kg/(m^2).  GENERAL APPEARANCE: Alert, no acute distress  CV: regular rate and rhythm, no murmur, rub or gallop  RESP: diffuse exp wheeze bilaterally.   ABDOMEN: normal bowel sounds, soft, nontender, no hepatosplenomegaly or other masses  SKIN: no suspicious lesions or rashes to visualized skin  NEURO: Alert, oriented x 3, speech and mentation normal      cxr shows no infiltrate. .  Cbc shows elevated wbc c/w steroid use.  .      ASSESSMENT/PLAN:                                                    1. Tobacco abuse  Ongoing, she understands all of this.  Sent some lozenges to help and she is planning to try the patch again when she is better as well.    - Tobacco Cessation - Order to Satisfy Health Maintenance  - benzonatate (TESSALON) 200 MG capsule; TAKE 1 CAPSULE BY MOUTH 3 TIMES A DAY AS NEEDED FOR COUGH  Dispense: 60 capsule; Refill: 3  - nicotine polacrilex (EQL NICOTINE) 4 MG lozenge; Place 1 lozenge (4 mg) inside cheek as needed for smoking cessation  Dispense: 360 tablet; Refill: 1    2. Tobacco abuse counseling  As above.      4. LRTI (lower respiratory tract infection)  With prominent bronchospasm in a smoker.  cxr shows no infiltrate.  WBC up and on steroids ok.  Plan to increase steroids back up again and taper.  Continue nebulizer.  Sent some nicotine lozenges.    - XR CHEST 2 VW (Clinic Performed); Future  - CBC with platelets and differential      Follow up with Provider -      Gibran Alarcon MD  [facility]

## 2018-07-19 NOTE — NURSING NOTE
"Chief Complaint   Patient presents with     URI       Initial /64  Pulse 74  Temp 98.4  F (36.9  C) (Tympanic)  Resp 20  Ht 4' 10\" (1.473 m)  Wt 173 lb (78.5 kg)  SpO2 91%  BMI 36.16 kg/m2 Estimated body mass index is 36.16 kg/(m^2) as calculated from the following:    Height as of this encounter: 4' 10\" (1.473 m).    Weight as of this encounter: 173 lb (78.5 kg).  Medication Reconciliation: complete    Rose Chaparro LPN    "

## 2018-07-23 ENCOUNTER — TELEPHONE (OUTPATIENT)
Dept: ORTHOPEDICS | Facility: OTHER | Age: 68
End: 2018-07-23

## 2018-07-23 NOTE — TELEPHONE ENCOUNTER
Patient wants to know if she has upper respritory infection and is on prednisone should she wait to have this injection ?? She will be in on Wednesday for Gel one injection to left knee.  Sandy Salazar LPN

## 2018-07-25 ENCOUNTER — OFFICE VISIT (OUTPATIENT)
Dept: ORTHOPEDICS | Facility: OTHER | Age: 68
End: 2018-07-25
Attending: ORTHOPAEDIC SURGERY
Payer: COMMERCIAL

## 2018-07-25 VITALS
DIASTOLIC BLOOD PRESSURE: 70 MMHG | HEIGHT: 58 IN | WEIGHT: 173 LBS | HEART RATE: 68 BPM | TEMPERATURE: 97.9 F | OXYGEN SATURATION: 90 % | SYSTOLIC BLOOD PRESSURE: 108 MMHG | BODY MASS INDEX: 36.31 KG/M2

## 2018-07-25 DIAGNOSIS — M17.12 PRIMARY OSTEOARTHRITIS OF LEFT KNEE: Primary | ICD-10-CM

## 2018-07-25 DIAGNOSIS — Z72.0 TOBACCO ABUSE: ICD-10-CM

## 2018-07-25 DIAGNOSIS — Z71.6 TOBACCO ABUSE COUNSELING: ICD-10-CM

## 2018-07-25 PROCEDURE — 99213 OFFICE O/P EST LOW 20 MIN: CPT | Performed by: ORTHOPAEDIC SURGERY

## 2018-07-25 PROCEDURE — G0463 HOSPITAL OUTPT CLINIC VISIT: HCPCS

## 2018-07-25 ASSESSMENT — PAIN SCALES - GENERAL: PAINLEVEL: MODERATE PAIN (5)

## 2018-07-25 NOTE — MR AVS SNAPSHOT
After Visit Summary   7/25/2018    Sydni Hardin    MRN: [id number]           Patient Information     Date Of Birth          1950        Visit Information        Provider Department      7/25/2018 10:40 AM Moisés Olson MD  ORTHOPEDICS        Today's Diagnoses     Primary osteoarthritis of left knee    -  1    Tobacco abuse        Tobacco abuse counseling          Care Instructions      HOW TO QUIT SMOKING  Smoking is one of the hardest habits to break. About half of all those who have ever smoked have been able to quit, and most of those (about 70%) who still smoke want to quit. Here are some of the best ways to stop smoking.     KEEP TRYING:  It takes most smokers about 8 tries before they are finally able to fully quit. So, the more often you try and fail, the better your chance of quitting the next time! So, don't give up!    GO COLD TURKEY:  Most ex-smokers quit cold turkey. Trying to cut back gradually doesn't seem to work as well, perhaps because it continues the smoking habit. Also, it is possible to fool yourself by inhaling more while smoking fewer cigarettes. This results in the same amount of nicotine in your body!    GET SUPPORT:  Support programs can make an important difference, especially for the heavy smoker. These groups offer lectures, methods to change your behavior and peer support. Call the free national Quitline for more information. 800-QUIT-NOW (832-693-5454). Low-cost or free programs are offered by many hospitals, local chapters of the American Lung Association (953-423-1735) and the American Cancer Society (833-381-6855). Support at home is important too. Non-smokers can help by offering praise and encouragement. If the smoker fails to quit, encourage them to try again!    OVER-THE-COUNTER MEDICINES:  For those who can't quit on their own, Nicotine Replacement Therapy (NRT) may make quitting much easier. Certain aids such as the nicotine patch, gum and  lozenge are available without a prescription. However, it is best to use these under the guidance of your doctor. The skin patch provides a steady supply of nicotine to the body. Nicotine gum and lozenge gives temporary bursts of low levels of nicotine. Both methods take the edge off the craving for cigarettes. WARNING: If you feel symptoms of nicotine overdose, such as nausea, vomiting, dizziness, weakness, or fast heartbeat, stop using these and see your doctor.    PRESCRIPTION MEDICINES:  After evaluating your smoking patterns and prior attempts at quitting, your doctor may offer a prescription medicine such as bupropion (Zyban, Wellbutrin), varenicline (Chantix, Champix), a niocotine inhaler or nasal spray. Each has its unique advantage and side effects which your doctor can review with you.    HEALTH BENEFITS OF QUITTING:  The benefits of quitting start right away and keep improving the longer you go without smokin minutes: blood pressure and pulse return to normal  8 hours: oxygen levels return to normal  2 days: ability to smell and taste begins to improve as damaged nerves start to regrow  2-3 weeks: circulation and lung function improves  1-9 months: decreased cough, congestion and shortness of breath; less tired  1 year: risk of heart attack decreases by half  5 years: risk of lung cancer decreases by half; risk of stroke becomes the same as a non-smoker  For information about how to quit smoking, visit the following links:  [employer] ,   Clearing the Air, Quit Smoking Today   - an online booklet. http://www.smokefree.gov/pubs/clearing_the_air.pdf  Smokefree.gov http://smokefree.gov/  QuitNet http://www.quitnet.com/    0174-9378 Pedro See, [address] [place] [address], [place] [address]. All rights reserved. This information is not intended as a substitute for professional medical care. Always follow your healthcare professional's instructions.    The Benefits of Living Smoke  Free  What do you want to gain from quitting? Check off some reasons to quit.  Health Benefits  ___ Reduce my risk of lung cancer, heart disease, chronic lung disease  ___ Have fewer wrinkles and softer skin  ___ Improve my sense of taste and smell  ___ For pregnant women--reduce the risk of having a miscarriage, stillbirth, premature birth, or low-birth-weight baby  Personal Benefits  ___ Feel more in control of my life  ___ Have better-smelling hair, breath, clothes, home, and car  ___ Save time by not having to take smoke breaks, buy cigarettes, or hunt for a light  ___ Have whiter teeth  Family Benefits  ___ Reduce my children s respiratory tract infections  ___ Set a good example for my children  ___ Reduce my family s cancer risk  Financial Benefits  ___ Save hundreds of dollars each year that would be spent on cigarettes  ___ Save money on medical bills  ___ Save on life, health, and car insurance premiums    Those Dollars Add Up!  Cigarettes are expensive, and getting more expensive all the time. Do you realize how much money you are spending on cigarettes per year? What is the average amount you spend on a pack of cigarettes? What is the average number of packs that you smoke per day? Using your answers to these questions, fill in this formula to help you find out:  ($ _____ per pack) ×  ( _____ number of packs per day) × (365 days) =  $ _____ yearly cost of smoking  Besides tobacco, there are other costs, including extra cleaning bills and replacement costs for clothing and furniture; medical expenses for smoking-related illnesses; and higher health, life, and car insurance premiums.    Cigars and Pipes Count Too!  Cigars and pipes are also dangerous. So are smokeless (chewing) tobacco and snuff. All of these products contain nicotine, a highly addictive substance that has harmful effects on your body. Quitting smoking means giving up all tobacco products.      1714-4625 Pedro See, [address] [place] [address]  Road, [place] [address]. All rights reserved. This information is not intended as a substitute for professional medical care. Always follow your healthcare professional's instructions.          Follow-ups after your visit        Additional Services     QUITPLAN  Referral       [place] [employer]AC[employer] QUITLINES FAX FORM  Fax form to: [phone]    The clinic will facilitate the referral to the quitline.    Provider Information:  ===============================================================  Moisés Olson MD  ID#: [id number] - [facility] [place] [phone] Fax: [phone]   [email or web address]  Payor: BCBS / Plan: BCBS PLATINUM BLUE / Product Type: PPO /   ===============================================================    The Public Health Service Guideline does not recommend providing over-the-counter nicotine replacement therapy products without physician authorization to patients with the following conditions: pregnancy, uncontrolled high blood pressure, or cardiovascular diseases.     I authorize the [place] Tobacco Quitlines to provide over-the-counter nicotine replacement products for the patient listed below if the patient's health plan benefits cover NRT or if the patient is eligible for QUITPLAN services.    Patient Consented to:  ===============================================================  - YES - I am ready to quit tobacco and request the above information be given to the quitline so they may contact me.  I understand that one of [place]'s Tobacco Quitlines will inform my provider about my participation.  ===============================================================  Please check the BEST 3-hour call window for them to reach you: 5pm - 8pm  May we leave a message?  YES  Language Preference:  English  Phone Number: Home Phone      [phone]  Mobile          [phone]     E-mail Address: No e-mail address on  record    ========================================================================  FOR QUITLINE USE ONLY:  THIS INFORMATION WILL BE PROVIDED BACK TO THE PROVIDER  Contact date: __/ __/__ or ____ Did not reach after three attempts.    Outcome:  __ Enrolled in telephone counseling program  __ Declined  __ Not Reached    Stage of readiness: _______________________  Planned Quit Date: ___/ ___/ ___  Comments:      2011 [facility]   This message funded by [employer] [place], an independent licensee of the Blue Cross and Blue [employer] Association. Rev. 11/1/12                  Your next 10 appointments already scheduled     Aug 03, 2018 11:00 AM CDT   (Arrive by 10:45 AM)   SHORT with Gibran Alarcon MD   [facility] ([facility] )    [address]  [facility] [address]   [phone]            Aug 09, 2018  1:30 PM CDT   (Arrive by 1:15 PM)   SHORT with Gibran Alarcon MD   [facility] ([facility] )    [address]  [facility] [address]   [phone]            Aug 14, 2018  3:30 PM CDT   (Arrive by 3:15 PM)   CT LUNG RESEARCH [place] with HICT1, HIXRRN   HI CT SCAN ([facility] )    [address] [place] [id number] [phone]           Please bring any scans or X-rays taken at other hospitals, if similar tests were done. Also bring a list of your medicines, including vitamins, minerals and over-the-counter drugs. It is safest to leave personal items at home.  Be sure to tell your doctor:   If you have any allergies.   If there s any chance you are pregnant.   If you are breastfeeding.  You do not need to do anything special to prepare for this exam.  Please wear loose clothing, such as a sweat suit or jogging clothes. Avoid snaps, zippers and other metal. We may ask you to undress and put on a hospital gown.            Aug 28, 2018  2:00 PM CDT   (Arrive by 1:45 PM)   SHORT with Gibran  "ELLIS Alarcon MD   [facility] ([facility] )    [address]  [facility] [address]   [phone]            Aug 29, 2018  1:00 PM CDT   (Arrive by 12:45 PM)   Return Visit with Moisés Olson MD    [facility] ([facility] )    [address]  [place] [phone]            Oct 10, 2018  1:00 PM CDT   (Arrive by 12:45 PM)   Post Op with Moisés Olson MD    ORTHOPEDICS ([facility] )    [address]  [place] [phone]              Future tests that were ordered for you today     Open Future Orders        Priority Expected Expires Ordered    XR KNEE LEFT G/E 4 VIEWS (Clinic Performed) Routine 7/25/2018 7/25/2019 7/25/2018    QUITPLAN  Referral Routine  9/23/2018 7/25/2018            Who to contact     If you have questions or need follow up information about today's clinic visit or your schedule please contact  ORTHOPEDICS directly at [phone].  Normal or non-critical lab and imaging results will be communicated to you by MyChart, letter or phone within 4 business days after the clinic has received the results. If you do not hear from us within 7 days, please contact the clinic through MyChart or phone. If you have a critical or abnormal lab result, we will notify you by phone as soon as possible.  Submit refill requests through [employer] or call your pharmacy and they will forward the refill request to us. Please allow 3 business days for your refill to be completed.          Additional Information About Your Visit        Care EveryWhere ID     This is your Care EveryWhere ID. This could be used by other organizations to access your [facility] medical records  [id number]        Your Vitals Were     Pulse Temperature Height Pulse Oximetry BMI (Body Mass Index)       68 97.9  F (36.6  C) (Tympanic) 4' 10\" (1.473 m) 90% 36.16 kg/m2        Blood Pressure from Last 3 Encounters:   07/25/18 " 108/70   07/19/18 124/64   07/10/18 94/60    Weight from Last 3 Encounters:   07/25/18 173 lb (78.5 kg)   07/19/18 173 lb (78.5 kg)   07/10/18 173 lb (78.5 kg)              We Performed the Following     Tobacco Cessation - Order to Satisfy Health Maintenance        Primary Care Provider Office Phone # Fax #    Gibran Alarcon [phone]       [address] [place] [address]        Equal Access to Services     TREVIN KENNEDY : Hadii aad ku hadasho Soomaali, waaxda luqadaha, qaybta kaalmada adeegyada, waxay idiin hayaan adeeg khtristen claudio . So [facility] [phone].    ATENCIÓN: Si habla español, tiene a guaman disposición servicios gratuitos de asistencia lingüística. [facility] [phone].    We comply with applicable federal civil rights laws and [place] laws. We do not discriminate on the basis of race, color, national origin, age, disability, sex, sexual orientation, or gender identity.            Thank you!     Thank you for choosing  ORTHOPEDICS  for your care. Our goal is always to provide you with excellent care. Hearing back from our patients is one way we can continue to improve our services. Please take a few minutes to complete the written survey that you may receive in the mail after your visit with us. Thank you!             Your Updated Medication List - Protect others around you: Learn how to safely use, store and throw away your medicines at www.disposemymeds.org.          This list is accurate as of 7/25/18 11:55 AM.  Always use your most recent med list.                   Brand Name Dispense Instructions for use Diagnosis    albuterol 108 (90 Base) MCG/ACT Inhaler    VENTOLIN HFA    2 Inhaler    USE 1 PUFF FOUR TIMES A DAY AS NEEDED    Tobacco abuse       ALPRAZolam 1 MG tablet    XANAX    90 tablet    TAKE 1 TABLET BY MOUTH 3 TIMES DAILY AS NEEDED FOR ANXIETY    Anxiety       amoxicillin-clavulanate 875-125 MG per tablet    AUGMENTIN    20 tablet    Take 1 tablet by mouth 2  times daily    Acute recurrent maxillary sinusitis       aspirin 81 MG EC tablet      Take 1 tablet (81 mg) by mouth 2 times daily    S/P total knee replacement using cement, right       atorvastatin 40 MG tablet    LIPITOR    90 tablet    Take 1 tablet (40 mg) by mouth daily    Mixed hyperlipidemia       benazepril 10 MG tablet    LOTENSIN    90 tablet    TAKE 1 TABLET BY MOUTH DAILY    HTN (hypertension)       benzonatate 200 MG capsule    TESSALON    60 capsule    TAKE 1 CAPSULE BY MOUTH 3 TIMES A DAY AS NEEDED FOR COUGH    Preop general physical exam, Tobacco abuse       budesonide-formoterol 160-4.5 MCG/ACT Inhaler    SYMBICORT    1 Inhaler    Inhale 2 puffs into the lungs 2 times daily. As needed    Asthma       butalbital-acetaminophen-caffeine -40 MG per tablet    FIORICET/ESGIC    30 tablet    Take 1 tablet by mouth every 6 hours as needed    Scalp hematoma, sequela       cimetidine 800 MG tablet    TAGAMET    90 tablet    Take 1 tablet (800 mg) by mouth At Bedtime As needed    Esophageal reflux       doxepin 10 MG capsule    SINEquan    90 capsule    Take 1 capsule (10 mg) by mouth At Bedtime    Primary osteoarthritis of right knee       EPIPEN 2-JAIRO 0.3 MG/0.3ML injection 2-pack   Generic drug:  EPINEPHrine     2 each    INJECT 1 SYRINGE INTRAMUSCULARLY ONCE AS NEEDED FOR ANAPHYLAXIS    Anaphylactic reaction       escitalopram 20 MG tablet    LEXAPRO    180 tablet    TAKE 1 TABLET BY MOUTH 2 TIMES A DAY    Dysthymia       fluconazole 150 MG tablet    DIFLUCAN    4 tablet    Take 1 tablet (150 mg) by mouth every 3 days    Candidiasis of vulva and vagina       hydrochlorothiazide 25 MG tablet    HYDRODIURIL    90 tablet    TAKE 1 TABLET BY MOUTH DAILY    Benign essential hypertension       ipratropium - albuterol 0.5 mg/2.5 mg/3 mL 0.5-2.5 (3) MG/3ML neb solution    DUONEB    90 mL    Take 1 vial (3 mLs) by nebulization every 4 hours as needed    Acute bronchospasm, SOB (shortness of breath)        meloxicam 15 MG tablet    MOBIC    90 tablet    Take 1 tablet (15 mg) by mouth daily    Pain       montelukast 10 MG tablet    SINGULAIR    90 tablet    TAKE 1 TABLET BY MOUTH DAILY    Mixed hyperlipidemia       * nicotine 14 MG/24HR 24 hr patch    NICODERM CQ    30 patch    Place 1 patch onto the skin every 24 hours    Tobacco abuse       * nicotine 7 MG/24HR 24 hr patch    NICODERM CQ    30 patch    Place 1 patch onto the skin every 24 hours    Tobacco abuse       * nicotine 21 MG/24HR 24 hr patch    NICODERM CQ    30 patch    Place 1 patch onto the skin every 24 hours    Tobacco abuse       nicotine polacrilex 4 MG lozenge    EQL NICOTINE    360 tablet    Place 1 lozenge (4 mg) inside cheek as needed for smoking cessation    Tobacco abuse       nystatin 101889 UNIT/ML suspension    MYCOSTATIN    473 mL    TAKE 1 TEASPOON (5MLS) BY MOUTH 4 TIMES DAILY    Thrush       * order for DME     2 each    Equipment being ordered: Nebulizer supplies    Primary osteoarthritis of right knee       * order for DME     1 each    Equipment being ordered: mcgee walker    Primary osteoarthritis of right knee       * order for DME     1 each    Equipment being ordered: toilet seat riser    Postoperative state       * order for DME     1 Units    Equipment being ordered: Please install safety bar in bathroom to assist patient in getting up from toilet.    Status post total right knee replacement       oxyCODONE-acetaminophen 5-325 MG per tablet    PERCOCET    60 tablet    TAKE 1 TO 2 TABLETS BY MOUTH EVERY 4 HOURS AS NEEDED FOR PAIN    Primary osteoarthritis of both knees       potassium chloride SA 10 MEQ CR tablet    K-DUR/KLOR-CON M    180 tablet    TAKE 1 TABLET BY MOUTH 2 TIMES DAILY WITH FOOD    Benign essential hypertension       * predniSONE 20 MG tablet    DELTASONE    20 tablet    Take 3 tabs (60 mg) by mouth daily x 3 days, 2 tabs (40 mg) daily x 3 days, 1 tab (20 mg) daily x 3 days, then 1/2 tab (10 mg) x 3 days.     Acute bronchitis with symptoms > 10 days       * predniSONE 20 MG tablet    DELTASONE    20 tablet    Take 3 tabs (60 mg) by mouth daily x 3 days, 2 tabs (40 mg) daily x 3 days, 1 tab (20 mg) daily x 3 days, then 1/2 tab (10 mg) x 3 days.    LRTI (lower respiratory tract infection)       temazepam 30 MG capsule    RESTORIL    90 capsule    TAKE 1 CAPSULE BY MOUTH NIGHTLY AS NEEDED FOR SLEEP    Persistent insomnia       VOLTAREN 1 % Gel topical gel   Generic drug:  diclofenac     100 g    APPLY 4 GRAMS TO KNEES FOUR TIMES A DAY USING ENCLOSED DOSING CARD    Primary osteoarthritis of left knee, Chronic pain of right knee       * Notice:  This list has 9 medication(s) that are the same as other medications prescribed for you. Read the directions carefully, and ask your doctor or other care provider to review them with you.

## 2018-07-25 NOTE — PROGRESS NOTES
"Chief complaint:  #1.  Right TKA 3/21/2017  #2.  DJD left knee   #3.  NSAID contraindication: Gastric ulcer    Patient Profile: 68-year-old non-smoking retired female patient of Dr. Alarcon    HPI: The patient has been treated in this office for bilateral DJD of the knees for a few years.  After conservative treatment failed she had a right TKA performed on 3/21/2017.  For quite a while afterwards the knee felt \"stiff\" and weak but those symptoms resolved with time, exercises, and a hinged knee brace.  The left knee has thus far been treated conservatively with topical Voltaren gel, a hinged knee brace, and Visco supplementation injections-- the last on 1/3/2018.    Subjective:  Right knee: Although she wears the hinged knee brace on it from time to time, she reports that the right knee is \"perfect\" and that she is very happy with her outcome.  Left knee: She benefited from the Visco supplementation injection 6 months ago.  The benefits are starting to wear off.  The knee pain interferes with activities of daily living such as the distance she can walk in the number of stairs she can climb.  She has decided it is time to have the right knee replaced.    She is not using a a cane or walker at this time.    Nothing else has changed with respect to her musculoskeletal or neurologic review of systems since seen last.  She is presently on a short course of oral prednisone for a respiratory condition.  She smokes and is in the \"quit plan\" at this time.    Examination: Elderly female no obvious distress.  Her right knee has a well-healed anterior surgical scar.  The knee is no effusion.  Its range of motion is 0-120 .  The knee is stable to ligamentous stressing.  Extension strength is normal to GMT.  The neurovascular status of the limb is intact.    The skin around the left knee is intact, free of erythema.  The knee is no effusion.  There is a mild varus deformity.  Its range of motion is 0-120 .  It is stable to " ligamentous stressing.  Extension strength is 5-.  The neurovascular status of the limb is intact.    Labs: On 7/19/2018 her hemoglobin was 16.3.  On 5/24/2015 her BUN, creatinine, ALT, AST, albumin, and total protein were all within normal limits.    Impression:  #1.  Successful outcome right TKA at 16 months  #2.  DJD left knee, failure of conservative treatment  #3.  NSAID contraindication: Gastric  #4.  Tobacco abuse    Plan: We will begin the TKA planning process.  She needs to see her PCP for preoperative medical clearance.  She will return afterwards to discuss the results.  Preoperative templating x-rays should be taken on arrival.

## 2018-07-25 NOTE — NURSING NOTE
"Chief Complaint   Patient presents with     RECHECK     Left Knee       Initial /70  Pulse 68  Temp 97.9  F (36.6  C) (Tympanic)  Ht 4' 10\" (1.473 m)  Wt 173 lb (78.5 kg)  SpO2 90%  BMI 36.16 kg/m2 Estimated body mass index is 36.16 kg/(m^2) as calculated from the following:    Height as of this encounter: 4' 10\" (1.473 m).    Weight as of this encounter: 173 lb (78.5 kg).  Medication Reconciliation: complete    Marleni Johnson LPN    "

## 2018-07-25 NOTE — PATIENT INSTRUCTIONS

## 2018-08-02 NOTE — PROGRESS NOTES
SUBJECTIVE:   Sydni Hardin is a 68 year old female who presents to clinic today for the following health issues:      RESPIRATORY SYMPTOMS      Duration: over 2 weeks    Description:   FU LRTI, sx have improved  Productive cough    Severity: mild    Accompanying signs and symptoms: None    History (predisposing factors):  smoker    Precipitating or alleviating factors: None    Therapies tried and outcome:  Was Rx'd with Tesslon, Nicotine lozenges, and Prednisone on 18, and Augmentin, and Prednisone on 07/10/18 for a sinus infection. Pt also uses Duonebs, Albuterol and Symbicort. Sx have improved. Pt is using Nebs, inhaler and Tessalon still.      PROBLEMS TO ADD ON...    Problem list and histories reviewed & adjusted, as indicated.  Additional history: doing better finally.  Just finishing prednisone.  Cough ongoing slightly.  She got set up with quitplan and looking at quitting for sure.     Patient Active Problem List   Diagnosis     Essential hypertension     Calculus of gallbladder     Headache     Osteoarthrosis, unspecified whether generalized or localized, involving lower leg     Advance Care Planning     Dysthymia     Lung nodules     Tobacco abuse     SCC (squamous cell carcinoma), face     IC (interstitial cystitis)     Primary osteoarthritis of both knees     S/P total knee replacement using cement, right     Asthma     Leukocytosis     Dehydration     Knee pain     Past Surgical History:   Procedure Laterality Date     ARTHROPLASTY KNEE Right 3/21/2017    Procedure: ARTHROPLASTY KNEE;  Surgeon: Moisés Olson MD;  Location: [facility]     ARTHROSCOPY KNEE       BIOPSY BREAST  2000    left - free of disease      SECTION        SECTION       CHOLECYSTECTOMY       COLONOSCOPY  2008    repeat in 10 years     HEAD & NECK SURGERY  2014    hematoma on head drained     HYSTERECTOMY       ORTHOPEDIC SURGERY  2014    right shoulder replacement     shoulder x2       left       Social History   Substance Use Topics     Smoking status: Current Every Day Smoker     Packs/day: 1.00     Years: 53.00     Types: Cigarettes     Start date: 1/1/1965     Smokeless tobacco: Never Used      Comment: pt enrolled in quit plan 7/25/18     Alcohol use 0.0 oz/week     0 Standard drinks or equivalent per week      Comment: rarely     Family History   Problem Relation Age of Onset     Cancer Mother      skin     Breast Cancer Mother      Cerebrovascular Disease Mother      Alzheimer Disease Mother      Myocardial Infarction Father 76     myocardial infarction - cause of death     Cancer Daughter      cervical      Diabetes Maternal Grandfather      HEART DISEASE Maternal Grandfather      MI     Diabetes Maternal Grandmother      Diabetes Paternal Grandmother      Diabetes Paternal Grandfather          Current Outpatient Prescriptions   Medication Sig Dispense Refill     albuterol (VENTOLIN HFA) 108 (90 BASE) MCG/ACT Inhaler USE 1 PUFF FOUR TIMES A DAY AS NEEDED 2 Inhaler 3     ALPRAZolam (XANAX) 1 MG tablet TAKE 1 TABLET BY MOUTH 3 TIMES DAILY AS NEEDED FOR ANXIETY 90 tablet 0     aspirin EC 81 MG EC tablet Take 1 tablet (81 mg) by mouth 2 times daily       atorvastatin (LIPITOR) 40 MG tablet Take 1 tablet (40 mg) by mouth daily 90 tablet 3     benazepril (LOTENSIN) 10 MG tablet TAKE 1 TABLET BY MOUTH DAILY 90 tablet 1     benzonatate (TESSALON) 200 MG capsule TAKE 1 CAPSULE BY MOUTH 3 TIMES A DAY AS NEEDED FOR COUGH 60 capsule 3     budesonide-formoterol (SYMBICORT) 160-4.5 MCG/ACT inhaler Inhale 2 puffs into the lungs 2 times daily. As needed 1 Inhaler 11     butalbital-acetaminophen-caffeine (FIORICET/ESGIC) -40 MG per tablet Take 1 tablet by mouth every 6 hours as needed 30 tablet 0     cimetidine (TAGAMET) 800 MG tablet Take 1 tablet (800 mg) by mouth At Bedtime As needed 90 tablet 3     doxepin (SINEQUAN) 10 MG capsule Take 1 capsule (10 mg) by mouth At Bedtime 90 capsule 3     EPIPEN  2-JAIRO 0.3 MG/0.3ML injection INJECT 1 SYRINGE INTRAMUSCULARLY ONCE AS NEEDED FOR ANAPHYLAXIS 2 each 0     escitalopram (LEXAPRO) 20 MG tablet TAKE 1 TABLET BY MOUTH 2 TIMES A  tablet 3     fluconazole (DIFLUCAN) 150 MG tablet Take 1 tablet (150 mg) by mouth every 3 days 4 tablet 0     hydrochlorothiazide (HYDRODIURIL) 25 MG tablet TAKE 1 TABLET BY MOUTH DAILY 90 tablet 3     ipratropium - albuterol 0.5 mg/2.5 mg/3 mL (DUONEB) 0.5-2.5 (3) MG/3ML neb solution Take 1 vial (3 mLs) by nebulization every 4 hours as needed 90 mL 3     meloxicam (MOBIC) 15 MG tablet Take 1 tablet (15 mg) by mouth daily 90 tablet 3     montelukast (SINGULAIR) 10 MG tablet TAKE 1 TABLET BY MOUTH DAILY 90 tablet 1     nicotine (NICODERM CQ) 14 MG/24HR 24 hr patch Place 1 patch onto the skin every 24 hours 30 patch 1     nicotine (NICODERM CQ) 21 MG/24HR 24 hr patch Place 1 patch onto the skin every 24 hours 30 patch 1     nicotine (NICODERM CQ) 7 MG/24HR 24 hr patch Place 1 patch onto the skin every 24 hours 30 patch 1     nicotine polacrilex (EQL NICOTINE) 4 MG lozenge Place 1 lozenge (4 mg) inside cheek as needed for smoking cessation 360 tablet 1     nystatin (MYCOSTATIN) 606862 UNIT/ML suspension TAKE 1 TEASPOON (5MLS) BY MOUTH 4 TIMES DAILY 473 mL 1     order for DME Equipment being ordered: Please install safety bar in bathroom to assist patient in getting up from toilet. 1 Units 0     order for DME Equipment being ordered: toilet seat riser 1 each 0     order for DME Equipment being ordered: Nebulizer supplies 2 each 11     order for DME Equipment being ordered: mcgee walker 1 each 0     oxyCODONE-acetaminophen (PERCOCET) 5-325 MG per tablet TAKE 1 TO 2 TABLETS BY MOUTH EVERY 4 HOURS AS NEEDED FOR PAIN 60 tablet 0     potassium chloride SA (K-DUR/KLOR-CON M) 10 MEQ CR tablet TAKE 1 TABLET BY MOUTH 2 TIMES DAILY WITH FOOD 180 tablet 0     temazepam (RESTORIL) 30 MG capsule TAKE 1 CAPSULE BY MOUTH NIGHTLY AS NEEDED FOR SLEEP 90  "capsule 0     VOLTAREN 1 % GEL topical gel APPLY 4 GRAMS TO KNEES FOUR TIMES A DAY USING ENCLOSED DOSING CARD 100 g 0     Allergies   Allergen Reactions     Bee Pollen Other (See Comments)     Throat and eyes close up     Codeine Difficulty breathing     \"My throat closes shut.\"     Takes Percocet at home     Keflex [Cephalexin Hcl] Hives     Sulfonamide Derivatives Other (See Comments)     Throat closes     Ciprofloxacin Rash     Rash, bumps on lips     Bupropion Hcl Other (See Comments)     Made her very irritable     Celecoxib Other (See Comments) and Cough     Celebrex - wheeze  Takes ASA at home     Clonazepam Hives     Takes Xanax at home     Erythromycin Other (See Comments)     Erythromycin base - abdominal pain     Lisinopril Other (See Comments)     Zestril - headaches     Nifedipine Hives     Procardia     Strawberries [Strawberry]      Tramadol Hcl      Takes percocet at home       Reviewed and updated as needed this visit by clinical staff       Reviewed and updated as needed this visit by Provider         ROS:  Constitutional, HEENT, cardiovascular, pulmonary, gi and gu systems are negative, except as otherwise noted.    OBJECTIVE:                                                    BP 94/71  Pulse 76  Temp 97.7  F (36.5  C) (Tympanic)  Resp 16  Ht 4' 10\" (1.473 m)  Wt 173 lb 12.8 oz (78.8 kg)  SpO2 95%  BMI 36.32 kg/m2  Body mass index is 36.32 kg/(m^2).  GENERAL APPEARANCE: Alert, no acute distress  CV: regular rate and rhythm, no murmur, rub or gallop  RESP: lungs clear to auscultation bilaterally with very slight end exp wheeze superior lobes bilaterally.   ABDOMEN: normal bowel sounds, soft, nontender, no hepatosplenomegaly or other masses  SKIN: no suspicious lesions or rashes to visualized skin  NEURO: Alert, oriented x 3, speech and mentation normal           ASSESSMENT/PLAN:                                                    1. LRTI (lower respiratory tract infection)  Improved.  " Continue neb and tessalon as needed.  F/u with any change for the worse.  Stressed the quitting smoking.     2. Tobacco abuse  As above.           Gibran Alarcon MD  [facility] (formerly [facility])

## 2018-08-03 ENCOUNTER — OFFICE VISIT (OUTPATIENT)
Dept: FAMILY MEDICINE | Facility: OTHER | Age: 68
End: 2018-08-03
Attending: FAMILY MEDICINE
Payer: COMMERCIAL

## 2018-08-03 ENCOUNTER — TELEPHONE (OUTPATIENT)
Dept: ORTHOPEDICS | Facility: OTHER | Age: 68
End: 2018-08-03

## 2018-08-03 VITALS
BODY MASS INDEX: 36.48 KG/M2 | RESPIRATION RATE: 16 BRPM | DIASTOLIC BLOOD PRESSURE: 71 MMHG | HEART RATE: 76 BPM | TEMPERATURE: 97.7 F | OXYGEN SATURATION: 95 % | WEIGHT: 173.8 LBS | SYSTOLIC BLOOD PRESSURE: 94 MMHG | HEIGHT: 58 IN

## 2018-08-03 DIAGNOSIS — E78.2 MIXED HYPERLIPIDEMIA: ICD-10-CM

## 2018-08-03 DIAGNOSIS — J22 LRTI (LOWER RESPIRATORY TRACT INFECTION): Primary | ICD-10-CM

## 2018-08-03 DIAGNOSIS — Z72.0 TOBACCO ABUSE: ICD-10-CM

## 2018-08-03 PROCEDURE — 99213 OFFICE O/P EST LOW 20 MIN: CPT | Performed by: FAMILY MEDICINE

## 2018-08-03 RX ORDER — ATORVASTATIN CALCIUM 40 MG/1
TABLET, FILM COATED ORAL
Qty: 90 TABLET | Refills: 1 | Status: SHIPPED | OUTPATIENT
Start: 2018-08-03 | End: 2019-05-15

## 2018-08-03 ASSESSMENT — PAIN SCALES - GENERAL: PAINLEVEL: NO PAIN (0)

## 2018-08-03 ASSESSMENT — ANXIETY QUESTIONNAIRES
1. FEELING NERVOUS, ANXIOUS, OR ON EDGE: NEARLY EVERY DAY
GAD7 TOTAL SCORE: 20
2. NOT BEING ABLE TO STOP OR CONTROL WORRYING: NEARLY EVERY DAY
5. BEING SO RESTLESS THAT IT IS HARD TO SIT STILL: MORE THAN HALF THE DAYS
7. FEELING AFRAID AS IF SOMETHING AWFUL MIGHT HAPPEN: NEARLY EVERY DAY
3. WORRYING TOO MUCH ABOUT DIFFERENT THINGS: NEARLY EVERY DAY
IF YOU CHECKED OFF ANY PROBLEMS ON THIS QUESTIONNAIRE, HOW DIFFICULT HAVE THESE PROBLEMS MADE IT FOR YOU TO DO YOUR WORK, TAKE CARE OF THINGS AT HOME, OR GET ALONG WITH OTHER PEOPLE: NOT DIFFICULT AT ALL
6. BECOMING EASILY ANNOYED OR IRRITABLE: NEARLY EVERY DAY

## 2018-08-03 ASSESSMENT — PATIENT HEALTH QUESTIONNAIRE - PHQ9: 5. POOR APPETITE OR OVEREATING: NEARLY EVERY DAY

## 2018-08-03 NOTE — TELEPHONE ENCOUNTER
Patient at this time would like to postpone surgery and keep appointment for 8/29/2018 to talk about another surgical date and or injection. Patient will be seeing Dr. Alarcon today and will cancel pre-op, post op cancelled for knee surgery that patient cancelled in September. Will discuss further dates with Dr. Olson on next visit.  Sandy Salazar LPN

## 2018-08-03 NOTE — MR AVS SNAPSHOT
After Visit Summary   8/3/2018    Sydni Hardin    MRN: [id number]           Patient Information     Date Of Birth          1950        Visit Information        Provider Department      8/3/2018 11:00 AM Gibran Alarcon MD [facility]        Today's Diagnoses     LRTI (lower respiratory tract infection)    -  1    Tobacco abuse          Care Instructions    F/u with ongoing concerns.             Follow-ups after your visit        Your next 10 appointments already scheduled     Aug 09, 2018  1:30 PM CDT   (Arrive by 1:15 PM)   SHORT with Gibran Alarcon MD   [facility] ([facility] )    [address] [facility] [address]   [phone]            Aug 14, 2018  3:30 PM CDT   (Arrive by 3:15 PM)   CT LUNG RESEARCH MONROY with HICT1, HIXRRN   [facility] ([facility] )    [address] [address] [phone]           Please bring any scans or X-rays taken at other hospitals, if similar tests were done. Also bring a list of your medicines, including vitamins, minerals and over-the-counter drugs. It is safest to leave personal items at home.  Be sure to tell your doctor:   If you have any allergies.   If there s any chance you are pregnant.   If you are breastfeeding.  You do not need to do anything special to prepare for this exam.  Please wear loose clothing, such as a sweat suit or jogging clothes. Avoid snaps, zippers and other metal. We may ask you to undress and put on a hospital gown.            Aug 29, 2018  1:00 PM CDT   (Arrive by 12:45 PM)   Return Visit with Moisés Olson MD    ORTH[facility]EDICS ([facility] )    [address] [place] [address] [phone]              Who to contact     If you have questions or need follow up information about today's clinic visit or your schedule please contact [facility] directly at [phone].  Normal or  "non-critical lab and imaging results will be communicated to you by MyChart, letter or phone within 4 business days after the clinic has received the results. If you do not hear from us within 7 days, please contact the clinic through MyChart or phone. If you have a critical or abnormal lab result, we will notify you by phone as soon as possible.  Submit refill requests through [employer] or call your pharmacy and they will forward the refill request to us. Please allow 3 business days for your refill to be completed.          Additional Information About Your Visit        Care EveryWhere ID     This is your Care EveryWhere ID. This could be used by other organizations to access your [facility] medical records  [id number]        Your Vitals Were     Pulse Temperature Respirations Height Pulse Oximetry BMI (Body Mass Index)    76 97.7  F (36.5  C) (Tympanic) 16 4' 10\" (1.473 m) 95% 36.32 kg/m2       Blood Pressure from Last 3 Encounters:   08/03/18 94/71   07/25/18 108/70   07/19/18 124/64    Weight from Last 3 Encounters:   08/03/18 173 lb 12.8 oz (78.8 kg)   07/25/18 173 lb (78.5 kg)   07/19/18 173 lb (78.5 kg)              Today, you had the following     No orders found for display       Primary Care Provider Office Phone # Fax #    Gibran Alarcon [phone]       [address] [place] [address]        Equal Access to Services     KAHLIL KENNEDY AH: Hadii dixon ku hadasho Soifeanyi, waaxda luqadaha, qaybta kaalmada parthyada, cierra arteaga. So [facility] [phone].    ATENCIÓN: Si habla español, tiene a guaman disposición servicios gratuitos de asistencia lingüística. Llame al [phone].    We comply with applicable federal civil rights laws and [place] laws. We do not discriminate on the basis of race, color, national origin, age, disability, sex, sexual orientation, or gender identity.            Thank you!     Thank you for choosing [facility]  for your " care. Our goal is always to provide you with excellent care. Hearing back from our patients is one way we can continue to improve our services. Please take a few minutes to complete the written survey that you may receive in the mail after your visit with us. Thank you!             Your Updated Medication List - Protect others around you: Learn how to safely use, store and throw away your medicines at www.disposemymeds.org.          This list is accurate as of 8/3/18 11:35 AM.  Always use your most recent med list.                   Brand Name Dispense Instructions for use Diagnosis    albuterol 108 (90 Base) MCG/ACT Inhaler    VENTOLIN HFA    2 Inhaler    USE 1 PUFF FOUR TIMES A DAY AS NEEDED    Tobacco abuse       ALPRAZolam 1 MG tablet    XANAX    90 tablet    TAKE 1 TABLET BY MOUTH 3 TIMES DAILY AS NEEDED FOR ANXIETY    Anxiety       aspirin 81 MG EC tablet      Take 1 tablet (81 mg) by mouth 2 times daily    S/P total knee replacement using cement, right       atorvastatin 40 MG tablet    LIPITOR    90 tablet    Take 1 tablet (40 mg) by mouth daily    Mixed hyperlipidemia       benazepril 10 MG tablet    LOTENSIN    90 tablet    TAKE 1 TABLET BY MOUTH DAILY    HTN (hypertension)       benzonatate 200 MG capsule    TESSALON    60 capsule    TAKE 1 CAPSULE BY MOUTH 3 TIMES A DAY AS NEEDED FOR COUGH    Preop general physical exam, Tobacco abuse       budesonide-formoterol 160-4.5 MCG/ACT Inhaler    SYMBICORT    1 Inhaler    Inhale 2 puffs into the lungs 2 times daily. As needed    Asthma       butalbital-acetaminophen-caffeine -40 MG per tablet    FIORICET/ESGIC    30 tablet    Take 1 tablet by mouth every 6 hours as needed    Scalp hematoma, sequela       cimetidine 800 MG tablet    TAGAMET    90 tablet    Take 1 tablet (800 mg) by mouth At Bedtime As needed    Esophageal reflux       doxepin 10 MG capsule    SINEquan    90 capsule    Take 1 capsule (10 mg) by mouth At Bedtime    Primary osteoarthritis of  right knee       EPIPEN 2-JAIRO 0.3 MG/0.3ML injection 2-pack   Generic drug:  EPINEPHrine     2 each    INJECT 1 SYRINGE INTRAMUSCULARLY ONCE AS NEEDED FOR ANAPHYLAXIS    Anaphylactic reaction       escitalopram 20 MG tablet    LEXAPRO    180 tablet    TAKE 1 TABLET BY MOUTH 2 TIMES A DAY    Dysthymia       fluconazole 150 MG tablet    DIFLUCAN    4 tablet    Take 1 tablet (150 mg) by mouth every 3 days    Candidiasis of vulva and vagina       hydrochlorothiazide 25 MG tablet    HYDRODIURIL    90 tablet    TAKE 1 TABLET BY MOUTH DAILY    Benign essential hypertension       ipratropium - albuterol 0.5 mg/2.5 mg/3 mL 0.5-2.5 (3) MG/3ML neb solution    DUONEB    90 mL    Take 1 vial (3 mLs) by nebulization every 4 hours as needed    Acute bronchospasm, SOB (shortness of breath)       meloxicam 15 MG tablet    MOBIC    90 tablet    Take 1 tablet (15 mg) by mouth daily    Pain       montelukast 10 MG tablet    SINGULAIR    90 tablet    TAKE 1 TABLET BY MOUTH DAILY    Mixed hyperlipidemia       * nicotine 14 MG/24HR 24 hr patch    NICODERM CQ    30 patch    Place 1 patch onto the skin every 24 hours    Tobacco abuse       * nicotine 7 MG/24HR 24 hr patch    NICODERM CQ    30 patch    Place 1 patch onto the skin every 24 hours    Tobacco abuse       * nicotine 21 MG/24HR 24 hr patch    NICODERM CQ    30 patch    Place 1 patch onto the skin every 24 hours    Tobacco abuse       nicotine polacrilex 4 MG lozenge    EQL NICOTINE    360 tablet    Place 1 lozenge (4 mg) inside cheek as needed for smoking cessation    Tobacco abuse       nystatin 398561 UNIT/ML suspension    MYCOSTATIN    473 mL    TAKE 1 TEASPOON (5MLS) BY MOUTH 4 TIMES DAILY    Thrush       * order for DME     2 each    Equipment being ordered: Nebulizer supplies    Primary osteoarthritis of right knee       * order for DME     1 each    Equipment being ordered: mcgee walker    Primary osteoarthritis of right knee       * order for DME     1 each    Equipment  being ordered: toilet seat riser    Postoperative state       * order for DME     1 Units    Equipment being ordered: Please install safety bar in bathroom to assist patient in getting up from toilet.    Status post total right knee replacement       oxyCODONE-acetaminophen 5-325 MG per tablet    PERCOCET    60 tablet    TAKE 1 TO 2 TABLETS BY MOUTH EVERY 4 HOURS AS NEEDED FOR PAIN    Primary osteoarthritis of both knees       potassium chloride SA 10 MEQ CR tablet    K-DUR/KLOR-CON M    180 tablet    TAKE 1 TABLET BY MOUTH 2 TIMES DAILY WITH FOOD    Benign essential hypertension       temazepam 30 MG capsule    RESTORIL    90 capsule    TAKE 1 CAPSULE BY MOUTH NIGHTLY AS NEEDED FOR SLEEP    Persistent insomnia       VOLTAREN 1 % Gel topical gel   Generic drug:  diclofenac     100 g    APPLY 4 GRAMS TO KNEES FOUR TIMES A DAY USING ENCLOSED DOSING CARD    Primary osteoarthritis of left knee, Chronic pain of right knee       * Notice:  This list has 7 medication(s) that are the same as other medications prescribed for you. Read the directions carefully, and ask your doctor or other care provider to review them with you.

## 2018-08-03 NOTE — NURSING NOTE
"Chief Complaint   Patient presents with     Cough       Initial BP 94/71  Pulse 76  Temp 97.7  F (36.5  C) (Tympanic)  Resp 16  Ht 4' 10\" (1.473 m)  Wt 173 lb 12.8 oz (78.8 kg)  SpO2 95%  BMI 36.32 kg/m2 Estimated body mass index is 36.32 kg/(m^2) as calculated from the following:    Height as of this encounter: 4' 10\" (1.473 m).    Weight as of this encounter: 173 lb 12.8 oz (78.8 kg).  Medication Reconciliation: complete    Rose Chaparro LPN  "

## 2018-08-03 NOTE — TELEPHONE ENCOUNTER
atorvastatin      Last Written Prescription Date:  10/25/18  Last Fill Quantity: 90,   # refills: 3  Last Office Visit: 8/3/18  Future Office visit:    Next 5 appointments (look out 90 days)     Aug 09, 2018  1:30 PM CDT   (Arrive by 1:15 PM)   SHORT with Gibran Alarcon MD   [facility] ([facility] )    [address] [facility] [address]   [phone]            Aug 29, 2018  1:00 PM CDT   (Arrive by 12:45 PM)   Return Visit with Moisés Olson MD    ORTH[facility]EDICS ([facility] )    [address] [facility] [id number] [phone]

## 2018-08-04 ASSESSMENT — PATIENT HEALTH QUESTIONNAIRE - PHQ9: SUM OF ALL RESPONSES TO PHQ QUESTIONS 1-9: 18

## 2018-08-04 ASSESSMENT — ANXIETY QUESTIONNAIRES: GAD7 TOTAL SCORE: 20

## 2018-08-14 ENCOUNTER — HOSPITAL ENCOUNTER (OUTPATIENT)
Dept: CT IMAGING | Facility: HOSPITAL | Age: 68
Discharge: HOME | End: 2018-08-14
Attending: FAMILY MEDICINE | Admitting: FAMILY MEDICINE
Payer: MEDICARE

## 2018-08-14 DIAGNOSIS — Z71.6 TOBACCO ABUSE COUNSELING: ICD-10-CM

## 2018-08-14 DIAGNOSIS — Z72.0 TOBACCO ABUSE: ICD-10-CM

## 2018-08-14 DIAGNOSIS — Z87.891 PERSONAL HISTORY OF NICOTINE DEPENDENCE: ICD-10-CM

## 2018-08-14 PROCEDURE — G0297 LDCT FOR LUNG CA SCREEN: HCPCS | Mod: TC

## 2018-08-27 DIAGNOSIS — G47.00 PERSISTENT INSOMNIA: ICD-10-CM

## 2018-08-27 DIAGNOSIS — F41.9 ANXIETY: ICD-10-CM

## 2018-08-28 NOTE — TELEPHONE ENCOUNTER
xanax      /Last Written Prescription Date:  7/10/18  Last Fill Quantity: 90,   # refills: 0  Last Office Visit: 8/3/18  Future Office visit:    Next 5 appointments (look out 90 days)     Aug 29, 2018  1:00 PM CDT   (Arrive by 12:45 PM)   Return Visit with Moisés Olson MD    ORTHOPEDICS ([facility] )    [address]  [place] [address]   [phone]            Sep 10, 2018  1:15 PM CDT   (Arrive by 1:00 PM)   SHORT with Gibran Alarcon MD   [facility] ([facility] )    [address]  [facility] [address]   [phone]                     restoril      Last Written Prescription Date:  6/27/18  Last Fill Quantity: 90,   # refills: 0  Last Office Visit: 8/3/18  Future Office visit:    Next 5 appointments (look out 90 days)     Aug 29, 2018  1:00 PM CDT   (Arrive by 12:45 PM)   Return Visit with Moisés Olson MD    ORTHOPEDICS ([facility] )    [address]  [place] [address]   [phone]            Sep 10, 2018  1:15 PM CDT   (Arrive by 1:00 PM)   SHORT with Gibran Alarcon MD   [facility] ([facility] )    [address]  [facility] [address]   [phone]

## 2018-08-29 ENCOUNTER — HOSPITAL ENCOUNTER (EMERGENCY)
Facility: HOSPITAL | Age: 68
Discharge: HOME | End: 2018-08-29
Attending: PHYSICIAN ASSISTANT | Admitting: PHYSICIAN ASSISTANT
Payer: MEDICARE

## 2018-08-29 ENCOUNTER — OFFICE VISIT (OUTPATIENT)
Dept: ORTHOPEDICS | Facility: OTHER | Age: 68
End: 2018-08-29
Attending: ORTHOPAEDIC SURGERY
Payer: MEDICARE

## 2018-08-29 ENCOUNTER — APPOINTMENT (OUTPATIENT)
Dept: GENERAL RADIOLOGY | Facility: HOSPITAL | Age: 68
End: 2018-08-29
Attending: PHYSICIAN ASSISTANT
Payer: MEDICARE

## 2018-08-29 VITALS
HEART RATE: 103 BPM | SYSTOLIC BLOOD PRESSURE: 110 MMHG | DIASTOLIC BLOOD PRESSURE: 60 MMHG | WEIGHT: 173 LBS | HEIGHT: 58 IN | TEMPERATURE: 98.7 F | BODY MASS INDEX: 36.31 KG/M2 | OXYGEN SATURATION: 91 %

## 2018-08-29 VITALS
SYSTOLIC BLOOD PRESSURE: 143 MMHG | OXYGEN SATURATION: 93 % | RESPIRATION RATE: 16 BRPM | HEART RATE: 74 BPM | TEMPERATURE: 97 F | DIASTOLIC BLOOD PRESSURE: 86 MMHG

## 2018-08-29 DIAGNOSIS — M17.12 PRIMARY OSTEOARTHRITIS OF LEFT KNEE: Primary | ICD-10-CM

## 2018-08-29 DIAGNOSIS — J44.1 ACUTE EXACERBATION OF CHRONIC OBSTRUCTIVE PULMONARY DISEASE (H): ICD-10-CM

## 2018-08-29 DIAGNOSIS — Z72.0 TOBACCO ABUSE: ICD-10-CM

## 2018-08-29 LAB
ALBUMIN SERPL-MCNC: 3.4 G/DL (ref 3.4–5)
ALP SERPL-CCNC: 108 U/L (ref 40–150)
ALT SERPL W P-5'-P-CCNC: 31 U/L (ref 0–50)
ANION GAP SERPL CALCULATED.3IONS-SCNC: 6 MMOL/L (ref 3–14)
AST SERPL W P-5'-P-CCNC: 23 U/L (ref 0–45)
BASOPHILS # BLD AUTO: 0.1 10E9/L (ref 0–0.2)
BASOPHILS NFR BLD AUTO: 0.6 %
BILIRUB SERPL-MCNC: 0.5 MG/DL (ref 0.2–1.3)
BUN SERPL-MCNC: 12 MG/DL (ref 7–30)
CALCIUM SERPL-MCNC: 8.6 MG/DL (ref 8.5–10.1)
CHLORIDE SERPL-SCNC: 97 MMOL/L (ref 94–109)
CO2 SERPL-SCNC: 29 MMOL/L (ref 20–32)
CREAT SERPL-MCNC: 0.84 MG/DL (ref 0.52–1.04)
DIFFERENTIAL METHOD BLD: NORMAL
EOSINOPHIL # BLD AUTO: 0.1 10E9/L (ref 0–0.7)
EOSINOPHIL NFR BLD AUTO: 0.8 %
ERYTHROCYTE [DISTWIDTH] IN BLOOD BY AUTOMATED COUNT: 12.9 % (ref 10–15)
GFR SERPL CREATININE-BSD FRML MDRD: 67 ML/MIN/1.7M2
GLUCOSE SERPL-MCNC: 88 MG/DL (ref 70–99)
HCT VFR BLD AUTO: 45.1 % (ref 35–47)
HGB BLD-MCNC: 15.6 G/DL (ref 11.7–15.7)
IMM GRANULOCYTES # BLD: 0 10E9/L (ref 0–0.4)
IMM GRANULOCYTES NFR BLD: 0.3 %
LACTATE SERPL-SCNC: 1 MMOL/L (ref 0.4–2)
LYMPHOCYTES # BLD AUTO: 2.9 10E9/L (ref 0.8–5.3)
LYMPHOCYTES NFR BLD AUTO: 28.2 %
MCH RBC QN AUTO: 30.7 PG (ref 26.5–33)
MCHC RBC AUTO-ENTMCNC: 34.6 G/DL (ref 31.5–36.5)
MCV RBC AUTO: 89 FL (ref 78–100)
MONOCYTES # BLD AUTO: 0.9 10E9/L (ref 0–1.3)
MONOCYTES NFR BLD AUTO: 8.7 %
NEUTROPHILS # BLD AUTO: 6.3 10E9/L (ref 1.6–8.3)
NEUTROPHILS NFR BLD AUTO: 61.4 %
NRBC # BLD AUTO: 0 10*3/UL
NRBC BLD AUTO-RTO: 0 /100
NT-PROBNP SERPL-MCNC: 70 PG/ML (ref 0–900)
PLATELET # BLD AUTO: 283 10E9/L (ref 150–450)
POTASSIUM SERPL-SCNC: 3.7 MMOL/L (ref 3.4–5.3)
PROT SERPL-MCNC: 6.4 G/DL (ref 6.8–8.8)
RBC # BLD AUTO: 5.08 10E12/L (ref 3.8–5.2)
SODIUM SERPL-SCNC: 132 MMOL/L (ref 133–144)
TROPONIN I SERPL-MCNC: <0.015 UG/L (ref 0–0.04)
WBC # BLD AUTO: 10.2 10E9/L (ref 4–11)

## 2018-08-29 PROCEDURE — 36415 COLL VENOUS BLD VENIPUNCTURE: CPT | Performed by: PHYSICIAN ASSISTANT

## 2018-08-29 PROCEDURE — 99284 EMERGENCY DEPT VISIT MOD MDM: CPT | Performed by: PHYSICIAN ASSISTANT

## 2018-08-29 PROCEDURE — 84484 ASSAY OF TROPONIN QUANT: CPT | Performed by: PHYSICIAN ASSISTANT

## 2018-08-29 PROCEDURE — G0463 HOSPITAL OUTPT CLINIC VISIT: HCPCS | Mod: 25

## 2018-08-29 PROCEDURE — 99284 EMERGENCY DEPT VISIT MOD MDM: CPT | Mod: 25,27

## 2018-08-29 PROCEDURE — 83880 ASSAY OF NATRIURETIC PEPTIDE: CPT | Performed by: PHYSICIAN ASSISTANT

## 2018-08-29 PROCEDURE — 85025 COMPLETE CBC W/AUTO DIFF WBC: CPT | Performed by: PHYSICIAN ASSISTANT

## 2018-08-29 PROCEDURE — 20610 DRAIN/INJ JOINT/BURSA W/O US: CPT | Performed by: ORTHOPAEDIC SURGERY

## 2018-08-29 PROCEDURE — 99212 OFFICE O/P EST SF 10 MIN: CPT | Mod: 25 | Performed by: ORTHOPAEDIC SURGERY

## 2018-08-29 PROCEDURE — 80053 COMPREHEN METABOLIC PANEL: CPT | Performed by: PHYSICIAN ASSISTANT

## 2018-08-29 PROCEDURE — 83605 ASSAY OF LACTIC ACID: CPT | Performed by: PHYSICIAN ASSISTANT

## 2018-08-29 PROCEDURE — 71046 X-RAY EXAM CHEST 2 VIEWS: CPT | Mod: TC

## 2018-08-29 PROCEDURE — G0463 HOSPITAL OUTPT CLINIC VISIT: HCPCS

## 2018-08-29 RX ORDER — DOXYCYCLINE 100 MG/1
100 CAPSULE ORAL 2 TIMES DAILY
Qty: 20 CAPSULE | Refills: 0 | Status: SHIPPED | OUTPATIENT
Start: 2018-08-29 | End: 2018-09-10

## 2018-08-29 RX ORDER — TEMAZEPAM 30 MG
CAPSULE ORAL
Qty: 90 CAPSULE | Refills: 0 | Status: SHIPPED | OUTPATIENT
Start: 2018-08-29 | End: 2018-09-10

## 2018-08-29 RX ORDER — ALPRAZOLAM 1 MG
TABLET ORAL
Qty: 90 TABLET | Refills: 0 | Status: SHIPPED | OUTPATIENT
Start: 2018-08-29 | End: 2018-10-02

## 2018-08-29 RX ORDER — PREDNISONE 10 MG/1
TABLET ORAL
Qty: 32 TABLET | Refills: 0 | Status: SHIPPED | OUTPATIENT
Start: 2018-08-29 | End: 2018-09-08

## 2018-08-29 ASSESSMENT — ENCOUNTER SYMPTOMS
PALPITATIONS: 0
ABDOMINAL PAIN: 0
FATIGUE: 1
CHEST TIGHTNESS: 0
FEVER: 0
WEAKNESS: 1
LIGHT-HEADEDNESS: 0
NAUSEA: 0
VOMITING: 0
APPETITE CHANGE: 0
ACTIVITY CHANGE: 1
BACK PAIN: 0
BRUISES/BLEEDS EASILY: 0
CHILLS: 0
DIZZINESS: 0
SHORTNESS OF BREATH: 1
WHEEZING: 1
NERVOUS/ANXIOUS: 0

## 2018-08-29 ASSESSMENT — PAIN SCALES - GENERAL: PAINLEVEL: SEVERE PAIN (7)

## 2018-08-29 NOTE — PROGRESS NOTES
"Chief complaint:  #1.  DJD left knee   #2.  NSAID contraindication: Gastric ulcer  #3.  New complaint: \"I think I have sepsis\".     Patient Profile: 68-year-old non-smoking retired female patient of Dr. Alarcon.  She is status post right TKA on 3/21/2017.    HPI: She is been treated in this office for DJD of the left knee using topical Voltaren gel, a hinged knee brace, and Visco supplementation injections-the last on 1/3/2018.  As of her last visit on 7/25/2018 she felt she was ready for a TKA so we begin the planning process for early September.    Subjective: Today she reports that she has changed her mind about the timing of surgery and wants to put it off until after the new year.  She therefore wishes to be reinjected with Gel 1 today.  Her last such injection was on 1/3/2018.    In the meantime she also states she is feeling poorly today.  She complains of being tired, weak, having generalized malaise, a cough, and ankle edema.  In addition she cannot sleep.  She said this feels like how she felt when she \"had sepsis\"last year.    She smokes and is not interested in quitting at this time.    Examination:/60  Pulse 103  Temp 98.7  F (37.1  C) (Tympanic)  Ht 4' 10\" (1.473 m)  Wt 173 lb (78.5 kg)  SpO2 91%  BMI 36.16 kg/m2  Overweight elderly female no obvious distress.  Her left knee has no effusion.  It Has a mild varus deformity.  Its range of motion 0-120 .  It is stable to ligamentous stressing.  The neurovascular status of that limb is intact.    Impression:  #1.  DJD left knee  #2.  NSAID contraindication: Gastric  #3.  Tobacco abuse  #4.  Constitutional symptoms    Plan: We will inject her left knee with Gel 1 as she requests.  She will return in 2 months to discuss TKA after the new year.  I called her PCP about her sepsis concern.  He instructed her to go to the Urgent Care Center directly from here for an evaluation and for a sepsis workup if appropriate.    Procedure: After obtaining " informed written consent and conducting a timeout, sterile technique was employed as the left knee was injected with 2 mL of Gel 1 through an anterolateral approach.  Topical ethyl chloride spray was used as a local anesthetic.  Following removal of the needle a Band-Aid was applied.  The patient tolerated the procedure well and there were no complications.  This was her third Visco supplementation injection in this knee.

## 2018-08-29 NOTE — ED NOTES
"Ambulated to room 3, gown placed, call light in reach.  Feet swelling for the past week.  URI for a month.  Tired for over the past month.  \"I'm susceptible to sepsis.\"  Reports bilateral knee pain at 7, describes pain as constant aching, occ stabbing. Pain is goal is 3.    "

## 2018-08-29 NOTE — MR AVS SNAPSHOT
After Visit Summary   8/29/2018    Sydni Hardin    MRN: [id number]           Patient Information     Date Of Birth          1950        Visit Information        Provider Department      8/29/2018 1:00 PM Moisés Olson MD  [facility]        Today's Diagnoses     Primary osteoarthritis of left knee    -  1    Tobacco abuse          Care Instructions    Go to Urgent Care from here          Follow-ups after your visit        Follow-up notes from your care team     Return in about 2 months (around 10/29/2018).      Your next 10 appointments already scheduled     Sep 10, 2018  1:15 PM CDT   (Arrive by 1:00 PM)   SHORT with Gibran Alarcon MD   [facility] ([facility] )    [address]  [facility] [address]   [phone]            Nov 09, 2018  1:00 PM CST   (Arrive by 12:45 PM)   Return Visit with Moisés Olson MD    ORTH[facility]EDICS ([facility] )    [address] [facility] [address] [phone]              Who to contact     If you have questions or need follow up information about today's clinic visit or your schedule please contact  ORTHOPEDICS directly at [phone].  Normal or non-critical lab and imaging results will be communicated to you by MyChart, letter or phone within 4 business days after the clinic has received the results. If you do not hear from us within 7 days, please contact the clinic through MyChart or phone. If you have a critical or abnormal lab result, we will notify you by phone as soon as possible.  Submit refill requests through [employer] or call your pharmacy and they will forward the refill request to us. Please allow 3 business days for your refill to be completed.          Additional Information About Your Visit        Care EveryWhere ID     This is your Care EveryWhere ID. This could be used by other organizations to access your [facility] medical records  [id number]        Your  "Vitals Were     Pulse Temperature Height Pulse Oximetry BMI (Body Mass Index)       103 98.7  F (37.1  C) (Tympanic) 4' 10\" (1.473 m) 91% 36.16 kg/m2        Blood Pressure from Last 3 Encounters:   08/29/18 151/79   08/29/18 110/60   08/03/18 94/71    Weight from Last 3 Encounters:   08/29/18 173 lb (78.5 kg)   08/03/18 173 lb 12.8 oz (78.8 kg)   07/25/18 173 lb (78.5 kg)              We Performed the Following     HC Hyaloronan (Gel-One), Intra-Artic INJ, PER DOSE [.001]     Large Joint/Bursa injection and/or drainage (Shoulder, Knee)          Today's Medication Changes          These changes are accurate as of 8/29/18  2:37 PM.  If you have any questions, ask your nurse or doctor.               Start taking these medicines.        Dose/Directions    hyaluronate 30 MG/3ML injection   Commonly known as:  GEL-ONE   Used for:  Primary osteoarthritis of left knee   Started by:  Moisés Olsno MD        Dose:  30 mg   3 mLs (30 mg) by INTRA-ARTICULAR route once for 1 dose   Quantity:  3 mL   Refills:  0            Where to get your medicines      Some of these will need a paper prescription and others can be bought over the counter.  Ask your nurse if you have questions.     You don't need a prescription for these medications     hyaluronate 30 MG/3ML injection                Primary Care Provider Office Phone # Fax #    Gibran Alarcon [phone]       [address] [place] [id number]        Equal Access to Services     KAHLIL KENNEDY AH: Hadii aad ku hadasho Soifeanyi, waaxda luqadaha, qaybta kaalmada adeegdomenic, waxay idirubin arteaga. So [facility] [phone].    ATENCIÓN: Si habla español, tiene a guaman disposición servicios gratuitos de asistencia lingüística. Llame al [phone].    We comply with applicable federal civil rights laws and [place] laws. We do not discriminate on the basis of race, color, national origin, age, disability, sex, sexual orientation, or " gender identity.            Thank you!     Thank you for choosing  ORTHOPEDICS  for your care. Our goal is always to provide you with excellent care. Hearing back from our patients is one way we can continue to improve our services. Please take a few minutes to complete the written survey that you may receive in the mail after your visit with us. Thank you!             Your Updated Medication List - Protect others around you: Learn how to safely use, store and throw away your medicines at www.disposemymeds.org.          This list is accurate as of 8/29/18  2:37 PM.  Always use your most recent med list.                   Brand Name Dispense Instructions for use Diagnosis    albuterol 108 (90 Base) MCG/ACT inhaler    VENTOLIN HFA    2 Inhaler    USE 1 PUFF FOUR TIMES A DAY AS NEEDED    Tobacco abuse       ALPRAZolam 1 MG tablet    XANAX    90 tablet    TAKE 1 TABLET BY MOUTH 3 TIMES DAILY AS NEEDED FOR ANXIETY    Anxiety       aspirin 81 MG EC tablet      Take 1 tablet (81 mg) by mouth 2 times daily    S/P total knee replacement using cement, right       atorvastatin 40 MG tablet    LIPITOR    90 tablet    TAKE 1 TABLET BY MOUTH DAILY    Mixed hyperlipidemia       benazepril 10 MG tablet    LOTENSIN    90 tablet    TAKE 1 TABLET BY MOUTH DAILY    HTN (hypertension)       benzonatate 200 MG capsule    TESSALON    60 capsule    TAKE 1 CAPSULE BY MOUTH 3 TIMES A DAY AS NEEDED FOR COUGH    Preop general physical exam, Tobacco abuse       budesonide-formoterol 160-4.5 MCG/ACT Inhaler    SYMBICORT    1 Inhaler    Inhale 2 puffs into the lungs 2 times daily. As needed    Asthma       butalbital-acetaminophen-caffeine -40 MG per tablet    FIORICET/ESGIC    30 tablet    Take 1 tablet by mouth every 6 hours as needed    Scalp hematoma, sequela       cimetidine 800 MG tablet    TAGAMET    90 tablet    Take 1 tablet (800 mg) by mouth At Bedtime As needed    Esophageal reflux       doxepin 10 MG capsule    SINEquan    90  capsule    Take 1 capsule (10 mg) by mouth At Bedtime    Primary osteoarthritis of right knee       EPIPEN 2-JAIRO 0.3 MG/0.3ML injection 2-pack   Generic drug:  EPINEPHrine     2 each    INJECT 1 SYRINGE INTRAMUSCULARLY ONCE AS NEEDED FOR ANAPHYLAXIS    Anaphylactic reaction       escitalopram 20 MG tablet    LEXAPRO    180 tablet    TAKE 1 TABLET BY MOUTH 2 TIMES A DAY    Dysthymia       fluconazole 150 MG tablet    DIFLUCAN    4 tablet    Take 1 tablet (150 mg) by mouth every 3 days    Candidiasis of vulva and vagina       hyaluronate 30 MG/3ML injection    GEL-ONE    3 mL    3 mLs (30 mg) by INTRA-ARTICULAR route once for 1 dose    Primary osteoarthritis of left knee       hydrochlorothiazide 25 MG tablet    HYDRODIURIL    90 tablet    TAKE 1 TABLET BY MOUTH DAILY    Benign essential hypertension       ipratropium - albuterol 0.5 mg/2.5 mg/3 mL 0.5-2.5 (3) MG/3ML neb solution    DUONEB    90 mL    Take 1 vial (3 mLs) by nebulization every 4 hours as needed    Acute bronchospasm, SOB (shortness of breath)       meloxicam 15 MG tablet    MOBIC    90 tablet    Take 1 tablet (15 mg) by mouth daily    Pain       montelukast 10 MG tablet    SINGULAIR    90 tablet    TAKE 1 TABLET BY MOUTH DAILY    Mixed hyperlipidemia       * nicotine 14 MG/24HR 24 hr patch    NICODERM CQ    30 patch    Place 1 patch onto the skin every 24 hours    Tobacco abuse       * nicotine 7 MG/24HR 24 hr patch    NICODERM CQ    30 patch    Place 1 patch onto the skin every 24 hours    Tobacco abuse       * nicotine 21 MG/24HR 24 hr patch    NICODERM CQ    30 patch    Place 1 patch onto the skin every 24 hours    Tobacco abuse       nicotine polacrilex 4 MG lozenge    EQL NICOTINE    360 tablet    Place 1 lozenge (4 mg) inside cheek as needed for smoking cessation    Tobacco abuse       nystatin 809843 UNIT/ML suspension    MYCOSTATIN    473 mL    TAKE 1 TEASPOON (5MLS) BY MOUTH 4 TIMES DAILY    Thrush       * order for DME     2 each    Equipment  being ordered: Nebulizer supplies    Primary osteoarthritis of right knee       * order for DME     1 each    Equipment being ordered: mcgee walker    Primary osteoarthritis of right knee       * order for DME     1 each    Equipment being ordered: toilet seat riser    Postoperative state       * order for DME     1 Units    Equipment being ordered: Please install safety bar in bathroom to assist patient in getting up from toilet.    Status post total right knee replacement       oxyCODONE-acetaminophen 5-325 MG per tablet    PERCOCET    60 tablet    TAKE 1 TO 2 TABLETS BY MOUTH EVERY 4 HOURS AS NEEDED FOR PAIN    Primary osteoarthritis of both knees       potassium chloride SA 10 MEQ CR tablet    K-DUR/KLOR-CON M    180 tablet    TAKE 1 TABLET BY MOUTH 2 TIMES DAILY WITH FOOD    Benign essential hypertension       temazepam 30 MG capsule    RESTORIL    90 capsule    TAKE 1 CAPSULE BY MOUTH NIGHTLY AS NEEDED FOR SLEEP    Persistent insomnia       VOLTAREN 1 % Gel topical gel   Generic drug:  diclofenac     100 g    APPLY 4 GRAMS TO KNEES FOUR TIMES A DAY USING ENCLOSED DOSING CARD    Primary osteoarthritis of left knee, Chronic pain of right knee       * Notice:  This list has 7 medication(s) that are the same as other medications prescribed for you. Read the directions carefully, and ask your doctor or other care provider to review them with you.

## 2018-08-29 NOTE — ED AVS SNAPSHOT
[facility]    [address] [address]    Phone:  [phone]                                       Sydni Hardin   MRN: [id number]    Department:  [facility]   Date of Visit:  8/29/2018           After Visit Summary Signature Page     I have received my discharge instructions, and my questions have been answered. I have discussed any challenges I see with this plan with the nurse or doctor.    ..........................................................................................................................................  Patient/Patient Representative Signature      ..........................................................................................................................................  Patient Representative Print Name and Relationship to Patient    ..................................................               ................................................  Date                                            Time    ..........................................................................................................................................  Reviewed by Signature/Title    ...................................................              ..............................................  Date                                                            Time          22EPIC Rev 08/18

## 2018-08-29 NOTE — NURSING NOTE
"Chief Complaint   Patient presents with     RECHECK     Left Knee Pain/ Possible injection       Initial /60  Pulse 103  Temp 98.7  F (37.1  C) (Tympanic)  Ht 4' 10\" (1.473 m)  Wt 173 lb (78.5 kg)  SpO2 91%  BMI 36.16 kg/m2 Estimated body mass index is 36.16 kg/(m^2) as calculated from the following:    Height as of this encounter: 4' 10\" (1.473 m).    Weight as of this encounter: 173 lb (78.5 kg).  Medication Reconciliation: complete    Marleni Johnson LPN    "

## 2018-08-29 NOTE — DISCHARGE INSTRUCTIONS
Prednisone as prescribed.     Start doxycycline.     Stop smoking.     Follow-up in the clinic on Friday.     Return here for ANY worsening symptoms or other concerns whatsoever.

## 2018-08-29 NOTE — ED NOTES
Discharge instructions reviewed with patient, and patient verbalized understanding. Rx x2 sent to [facility]. Pt ambulatory to exit with daughter.

## 2018-08-29 NOTE — ED PROVIDER NOTES
"  History     Chief Complaint   Patient presents with     Shortness of Breath     states that she has had a cough for 1 month and states she feels like she has sepsis again. pt states \"I get sesis every time i have surgery.\" last surgery 1 year ago . pt smokes 1 pack/day. using nebs and tesslon without relief      The history is provided by the patient.     Sydni Hardin is a 68 year old female who presented to the emergency department ambulatory for evaluation of a one-month history of cough and wheezing.  Patient has been treated at the end of July for acute bronchitis with steroids and antibiotics.  She did have some relief with those medications.  She continues to smoke.  Almost certainly has chronic bronchitis.  Now has increased wheezing, mild dyspnea worsening, productive cough with clear sputum.  Denies fevers or chills.  Denies chest pains or pressures.  Denies hemoptysis.  Denies any abdominal discomfort, melena, hematemesis, or hematochezia.  Has some moderate fatigue and weakness.    Problem List:    Patient Active Problem List    Diagnosis Date Noted     Leukocytosis 03/25/2017     Priority: Medium     Dehydration 03/25/2017     Priority: Medium     Knee pain 03/25/2017     Priority: Medium     S/P total knee replacement using cement, right 03/21/2017     Priority: Medium     Primary osteoarthritis of both knees 10/05/2016     Priority: Medium     IC (interstitial cystitis) 10/28/2015     Priority: Medium     SCC (squamous cell carcinoma), face 08/05/2015     Priority: Medium     Lung nodules 06/19/2015     Priority: Medium     Tobacco abuse 06/19/2015     Priority: Medium     Dysthymia 12/31/2014     Priority: Medium     Advance Care Planning 11/28/2012     Priority: Medium     Advance Care Planning 10/5/2016: ACP Review of Chart / Resources Provided:  Reviewed chart for advance care plan.  Sydni Hardin has no plan or code status on file however states presence of ACP document. Copy " requested. Confirmed code status reflects current choices pending receipt of document/advance care plan review.  Confirmed/documented legally designated decision makers.  Added by Janie Robledo       Essential hypertension 01/05/2012     Priority: Medium     Problem list name updated by automated process. Provider to review       Asthma 03/20/2009     Priority: Medium     Calculus of gallbladder 02/02/2006     Priority: Medium     Problem list name updated by automated process. Provider to review       Headache 12/10/2003     Priority: Medium     Problem list name updated by automated process. Provider to review       Osteoarthrosis, unspecified whether generalized or localized, involving lower leg 10/04/2001     Priority: Medium     Problem list name updated by automated process. Provider to review          Past Medical History:    Past Medical History:   Diagnosis Date     Abnormal arterial blood gases 4/26/2006     Acute upper respiratory infections of unspecified site 7/2/2001     Anxiety 10/24/2011     Calculus of gallbladder without mention of cholecystitis or obstruction 2/2/2006     Depression 1/5/2012     Follow-up examination, following other surgery 2/28/2006     Headache(784.0) 12/10/2003     History of CVA (cerebrovascular accident) 9/28/2011     HTN (hypertension) 1/5/2012     Insomnia 3/13/2012     Lipoma of other skin and subcutaneous tissue 1/18/2006     Nonallopathic lesion of cervical region, not elsewhere classified 12/18/2003     Osteoarthrosis, unspecified whether generalized or localized, lower leg 10/4/2001     Special screening for malignant neoplasms, colon 12/7/2006     Urinary tract infection, site not specified 4/30/2007       Past Surgical History:    Past Surgical History:   Procedure Laterality Date     ARTHROPLASTY KNEE Right 3/21/2017    Procedure: ARTHROPLASTY KNEE;  Surgeon: Moisés Olson MD;  Location: [facility] OR     ARTHROSCOPY KNEE       BIOPSY BREAST  2000    left - free  of disease      SECTION        SECTION       CHOLECYSTECTOMY  2006     COLONOSCOPY  2008    repeat in 10 years     HEAD & NECK SURGERY  2014    hematoma on head drained     HYSTERECTOMY       ORTHOPEDIC SURGERY  2014    right shoulder replacement     shoulder x2  2009    left       Family History:    Family History   Problem Relation Age of Onset     Cancer Mother      skin     Breast Cancer Mother      Cerebrovascular Disease Mother      Alzheimer Disease Mother      Myocardial Infarction Father 76     myocardial infarction - cause of death     Cancer Daughter      cervical      Diabetes Maternal Grandfather      HEART DISEASE Maternal Grandfather      MI     Diabetes Maternal Grandmother      Diabetes Paternal Grandmother      Diabetes Paternal Grandfather        Social History:  Marital Status:   [5]  Social History   Substance Use Topics     Smoking status: Current Every Day Smoker     Packs/day: 1.00     Years: 53.00     Types: Cigarettes     Start date: 1966     Smokeless tobacco: Never Used      Comment: pt enrolled in quit plan 18     Alcohol use 0.0 oz/week     0 Standard drinks or equivalent per week      Comment: rarely        Medications:      albuterol (VENTOLIN HFA) 108 (90 BASE) MCG/ACT Inhaler   aspirin EC 81 MG EC tablet   atorvastatin (LIPITOR) 40 MG tablet   benazepril (LOTENSIN) 10 MG tablet   benzonatate (TESSALON) 200 MG capsule   budesonide-formoterol (SYMBICORT) 160-4.5 MCG/ACT inhaler   butalbital-acetaminophen-caffeine (FIORICET/ESGIC) -40 MG per tablet   cimetidine (TAGAMET) 800 MG tablet   doxepin (SINEQUAN) 10 MG capsule   doxycycline (VIBRAMYCIN) 100 MG capsule   EPIPEN 2-JAIRO 0.3 MG/0.3ML injection   escitalopram (LEXAPRO) 20 MG tablet   fluconazole (DIFLUCAN) 150 MG tablet   hyaluronate (GEL-ONE) 30 MG/3ML injection   hydrochlorothiazide (HYDRODIURIL) 25 MG tablet   ipratropium - albuterol 0.5 mg/2.5 mg/3 mL (DUONEB) 0.5-2.5 (3) MG/3ML  neb solution   meloxicam (MOBIC) 15 MG tablet   montelukast (SINGULAIR) 10 MG tablet   nicotine (NICODERM CQ) 14 MG/24HR 24 hr patch   nicotine (NICODERM CQ) 21 MG/24HR 24 hr patch   nicotine (NICODERM CQ) 7 MG/24HR 24 hr patch   nicotine polacrilex (EQL NICOTINE) 4 MG lozenge   nystatin (MYCOSTATIN) 345427 UNIT/ML suspension   order for DME   order for DME   order for DME   order for DME   potassium chloride SA (K-DUR/KLOR-CON M) 10 MEQ CR tablet   predniSONE (DELTASONE) 10 MG tablet   VOLTAREN 1 % GEL topical gel   ALPRAZolam (XANAX) 1 MG tablet   oxyCODONE-acetaminophen (PERCOCET) 5-325 MG per tablet   temazepam (RESTORIL) 30 MG capsule         Review of Systems   Constitutional: Positive for activity change and fatigue. Negative for appetite change, chills and fever.   HENT: Negative.    Respiratory: Positive for shortness of breath and wheezing. Negative for chest tightness.    Cardiovascular: Negative for chest pain and palpitations.   Gastrointestinal: Negative for abdominal pain, nausea and vomiting.   Genitourinary: Negative.    Musculoskeletal: Negative for back pain.   Skin: Negative.    Neurological: Positive for weakness. Negative for dizziness and light-headedness.   Hematological: Does not bruise/bleed easily.   Psychiatric/Behavioral: The patient is not nervous/anxious.        Physical Exam   BP: 151/79  Heart Rate: 90  Temp: 97.4  F (36.3  C)  Resp: 24  SpO2: 92 %      Physical Exam   Constitutional: She is oriented to person, place, and time. She appears well-developed and well-nourished. No distress.   Pleasant and talkative   Cardiovascular: Normal rate and regular rhythm.    Pulmonary/Chest: Effort normal.   Expiratory wheezes with mixed rhonchi bilateral.  No respiratory distress, increased work of breathing, or retractions.   Abdominal: Soft. There is no tenderness. There is no guarding.   Musculoskeletal: She exhibits no edema.   Neurological: She is alert and oriented to person, place, and  time.   Skin: Skin is warm and dry.   Psychiatric: She has a normal mood and affect.   Nursing note and vitals reviewed.      ED Course     ED Course     Procedures          Chest x-ray shows no focal infiltrate, or pneumothorax.    Critical Care time:  none               Results for orders placed or performed during the hospital encounter of 08/29/18 (from the past 24 hour(s))   CBC with platelets differential   Result Value Ref Range    WBC 10.2 4.0 - 11.0 10e9/L    RBC Count 5.08 3.8 - 5.2 10e12/L    Hemoglobin 15.6 11.7 - 15.7 g/dL    Hematocrit 45.1 35.0 - 47.0 %    MCV 89 78 - 100 fl    MCH 30.7 26.5 - 33.0 pg    MCHC 34.6 31.5 - 36.5 g/dL    RDW 12.9 10.0 - 15.0 %    Platelet Count 283 150 - 450 10e9/L    Diff Method Automated Method     % Neutrophils 61.4 %    % Lymphocytes 28.2 %    % Monocytes 8.7 %    % Eosinophils 0.8 %    % Basophils 0.6 %    % Immature Granulocytes 0.3 %    Nucleated RBCs 0 0 /100    Absolute Neutrophil 6.3 1.6 - 8.3 10e9/L    Absolute Lymphocytes 2.9 0.8 - 5.3 10e9/L    Absolute Monocytes 0.9 0.0 - 1.3 10e9/L    Absolute Eosinophils 0.1 0.0 - 0.7 10e9/L    Absolute Basophils 0.1 0.0 - 0.2 10e9/L    Abs Immature Granulocytes 0.0 0 - 0.4 10e9/L    Absolute Nucleated RBC 0.0    Comprehensive metabolic panel   Result Value Ref Range    Sodium 132 (L) 133 - 144 mmol/L    Potassium 3.7 3.4 - 5.3 mmol/L    Chloride 97 94 - 109 mmol/L    Carbon Dioxide 29 20 - 32 mmol/L    Anion Gap 6 3 - 14 mmol/L    Glucose 88 70 - 99 mg/dL    Urea Nitrogen 12 7 - 30 mg/dL    Creatinine 0.84 0.52 - 1.04 mg/dL    GFR Estimate 67 >60 mL/min/1.7m2    GFR Estimate If Black 81 >60 mL/min/1.7m2    Calcium 8.6 8.5 - 10.1 mg/dL    Bilirubin Total 0.5 0.2 - 1.3 mg/dL    Albumin 3.4 3.4 - 5.0 g/dL    Protein Total 6.4 (L) 6.8 - 8.8 g/dL    Alkaline Phosphatase 108 40 - 150 U/L    ALT 31 0 - 50 U/L    AST 23 0 - 45 U/L   Lactic acid   Result Value Ref Range    Lactic Acid 1.0 0.4 - 2.0 mmol/L   Nt probnp inpatient  (BNP)   Result Value Ref Range    N-Terminal Pro BNP Inpatient 70 0 - 900 pg/mL   Troponin I   Result Value Ref Range    Troponin I ES <0.015 0.000 - 0.045 ug/L   XR Chest 2 Views    Narrative    PROCEDURE:  XR CHEST 2 VW    HISTORY:  cough and dyspnea; .     COMPARISON:  CT chest 8/14/2018, chest x-ray 7/19/2018    FINDINGS:   The cardiac silhouette is normal in size. The pulmonary vasculature is  normal.  The lungs are hyperinflated but clear. No pleural effusion or  pneumothorax.      Impression    IMPRESSION:  No acute cardiopulmonary disease.      JOYCE BRUCE MD       Medications - No data to display    Assessments & Plan (with Medical Decision Making)   Chest x-ray is negative for focal infiltrate.  History of present illness, symptoms, and exam are consistent with acute exacerbation of underlying COPD.  Workup was essentially unrevealing for an emergent cause.  I do not believe that her history of present illness, exam, her findings are consistent with acute pulmonary embolism requiring CT of the chest.  She has diffuse mild wheezing with increased sputum production.  She would fit criteria for treatment.  We will place her on tapering prednisone and doxycycline.  Stressed importance of smoking cessation.  Return to the emergency department for any worsening symptoms whatsoever.  Follow-up in the clinic on Friday for recheck.  Sydni voiced complete understanding and was happy and agreeable.    (Please note that this note was completed with a voice recognition program.  Every attempt was made to edit the dictations, but inevitably there remain words that are mis-transcribed.)    I have reviewed the nursing notes.    I have reviewed the findings, diagnosis, plan and need for follow up with the patient.       New Prescriptions    DOXYCYCLINE (VIBRAMYCIN) 100 MG CAPSULE    Take 1 capsule (100 mg) by mouth 2 times daily    PREDNISONE (DELTASONE) 10 MG TABLET    Take 4 tablets daily for 5 days,  take 2 tablets  daily for 3 days, take 1 tablet daily for 3 days, take half a tablet for 3 days.       Final diagnoses:   Acute exacerbation of chronic obstructive pulmonary disease (H)       8/29/2018   [facility]     Savi Mahmood PA-C  08/29/18 [id number]

## 2018-08-29 NOTE — ED AVS SNAPSHOT
[facility]    [address]    [facility] [address]    Phone:  [phone]                                       Sydni Hardin   MRN: [id number]    Department:  [facility]   Date of Visit:  8/29/2018           Patient Information     Date Of Birth          1950        Your diagnoses for this visit were:     Acute exacerbation of chronic obstructive pulmonary disease (H)        You were seen by Savi Mahmood PA-C.      Follow-up Information     Follow up with Gibran Alarcon MD In 2 days.    Specialty:  Family Practice    Contact information:    [address]  [facility] [address] [phone]          Follow up with [facility].    Specialty:  EMERGENCY MEDICINE    Why:  If symptoms worsen    Contact information:    [address]  [facility] [address] [phone]    Additional information:    From [place]: Take [address]. Turn left at [address]. Turn left at the first stoplight on [address]. At the first stop sign, take a right onto [address]. Take a left into the parking lot and continue through until you reach the North enterance of the building.       From [place]: Take [address]. Take the [address] ramp towards [place]. Turn left onto [address]. Take a slight right onto [address]. Turn left at the first stoplight on [address]. At the first stop sign, take a right onto [address]. Take a left into the parking lot and continue through until you reach the North enterance of the building.       From [place]: Take [address]. Take a right at [address]. At the first stop sign, take a right onto [address]. Take a left into the parking lot and continue through until you reach the North enterance of the building.         Discharge Instructions       Prednisone as prescribed.     Start doxycycline.     Stop smoking.     Follow-up in the  clinic on Friday.     Return here for ANY worsening symptoms or other concerns whatsoever.     Discharge References/Attachments     COPD FLARE (ENGLISH)      Your next 10 appointments already scheduled     Sep 10, 2018  1:15 PM CDT   (Arrive by 1:00 PM)   SHORT with Gibran Alarcon MD   [facility] ([facility] ([facility] )    [address]  [facility] [address]   [phone]            Nov 09, 2018  1:00 PM CST   (Arrive by 12:45 PM)   Return Visit with Moisés Olson MD    ORTHOPEDICS ([facility] )    [address] [facility] [id number] [phone]                 Review of your medicines      START taking        Dose / Directions Last dose taken    doxycycline 100 MG capsule   Commonly known as:  VIBRAMYCIN   Dose:  100 mg   Quantity:  20 capsule        Take 1 capsule (100 mg) by mouth 2 times daily   Refills:  0        predniSONE 10 MG tablet   Commonly known as:  DELTASONE   Quantity:  32 tablet        Take 4 tablets daily for 5 days,  take 2 tablets daily for 3 days, take 1 tablet daily for 3 days, take half a tablet for 3 days.   Refills:  0          Our records show that you are taking the medicines listed below. If these are incorrect, please call your family doctor or clinic.        Dose / Directions Last dose taken    albuterol 108 (90 Base) MCG/ACT inhaler   Commonly known as:  VENTOLIN HFA   Quantity:  2 Inhaler        USE 1 PUFF FOUR TIMES A DAY AS NEEDED   Refills:  3        ALPRAZolam 1 MG tablet   Commonly known as:  XANAX   Quantity:  90 tablet        TAKE 1 TABLET BY MOUTH 3 TIMES DAILY AS NEEDED FOR ANXIETY   Refills:  0        aspirin 81 MG EC tablet   Dose:  81 mg        Take 1 tablet (81 mg) by mouth 2 times daily   Refills:  0        atorvastatin 40 MG tablet   Commonly known as:  LIPITOR   Quantity:  90 tablet        TAKE 1 TABLET BY MOUTH DAILY   Refills:  1        benazepril 10 MG tablet   Commonly known as:  LOTENSIN   Quantity:   90 tablet        TAKE 1 TABLET BY MOUTH DAILY   Refills:  1        benzonatate 200 MG capsule   Commonly known as:  TESSALON   Quantity:  60 capsule        TAKE 1 CAPSULE BY MOUTH 3 TIMES A DAY AS NEEDED FOR COUGH   Refills:  3        budesonide-formoterol 160-4.5 MCG/ACT Inhaler   Commonly known as:  SYMBICORT   Dose:  2 puff   Quantity:  1 Inhaler        Inhale 2 puffs into the lungs 2 times daily. As needed   Refills:  11        butalbital-acetaminophen-caffeine -40 MG per tablet   Commonly known as:  FIORICET/ESGIC   Dose:  1 tablet   Quantity:  30 tablet        Take 1 tablet by mouth every 6 hours as needed   Refills:  0        cimetidine 800 MG tablet   Commonly known as:  TAGAMET   Dose:  800 mg   Quantity:  90 tablet        Take 1 tablet (800 mg) by mouth At Bedtime As needed   Refills:  3        doxepin 10 MG capsule   Commonly known as:  SINEquan   Dose:  10 mg   Quantity:  90 capsule        Take 1 capsule (10 mg) by mouth At Bedtime   Refills:  3        EPIPEN 2-JAIRO 0.3 MG/0.3ML injection 2-pack   Quantity:  2 each   Generic drug:  EPINEPHrine        INJECT 1 SYRINGE INTRAMUSCULARLY ONCE AS NEEDED FOR ANAPHYLAXIS   Refills:  0        escitalopram 20 MG tablet   Commonly known as:  LEXAPRO   Quantity:  180 tablet        TAKE 1 TABLET BY MOUTH 2 TIMES A DAY   Refills:  3        fluconazole 150 MG tablet   Commonly known as:  DIFLUCAN   Dose:  150 mg   Quantity:  4 tablet        Take 1 tablet (150 mg) by mouth every 3 days   Refills:  0        hyaluronate 30 MG/3ML injection   Commonly known as:  GEL-ONE   Dose:  30 mg   Quantity:  3 mL        3 mLs (30 mg) by INTRA-ARTICULAR route once for 1 dose   Refills:  0        hydrochlorothiazide 25 MG tablet   Commonly known as:  HYDRODIURIL   Quantity:  90 tablet        TAKE 1 TABLET BY MOUTH DAILY   Refills:  3        ipratropium - albuterol 0.5 mg/2.5 mg/3 mL 0.5-2.5 (3) MG/3ML neb solution   Commonly known as:  DUONEB   Dose:  1 vial   Quantity:  90 mL         Take 1 vial (3 mLs) by nebulization every 4 hours as needed   Refills:  3        meloxicam 15 MG tablet   Commonly known as:  MOBIC   Dose:  15 mg   Quantity:  90 tablet        Take 1 tablet (15 mg) by mouth daily   Refills:  3        montelukast 10 MG tablet   Commonly known as:  SINGULAIR   Quantity:  90 tablet        TAKE 1 TABLET BY MOUTH DAILY   Refills:  1        * nicotine 14 MG/24HR 24 hr patch   Commonly known as:  NICODERM CQ   Dose:  1 patch   Quantity:  30 patch        Place 1 patch onto the skin every 24 hours   Refills:  1        * nicotine 7 MG/24HR 24 hr patch   Commonly known as:  NICODERM CQ   Dose:  1 patch   Quantity:  30 patch        Place 1 patch onto the skin every 24 hours   Refills:  1        * nicotine 21 MG/24HR 24 hr patch   Commonly known as:  NICODERM CQ   Dose:  1 patch   Quantity:  30 patch        Place 1 patch onto the skin every 24 hours   Refills:  1        nicotine polacrilex 4 MG lozenge   Commonly known as:  EQL NICOTINE   Dose:  4 mg   Quantity:  360 tablet        Place 1 lozenge (4 mg) inside cheek as needed for smoking cessation   Refills:  1        nystatin 015223 UNIT/ML suspension   Commonly known as:  MYCOSTATIN   Quantity:  473 mL        TAKE 1 TEASPOON (5MLS) BY MOUTH 4 TIMES DAILY   Refills:  1        * order for DME   Quantity:  2 each        Equipment being ordered: Nebulizer supplies   Refills:  11        * order for DME   Quantity:  1 each        Equipment being ordered: mcgee walker   Refills:  0        * order for DME   Quantity:  1 each        Equipment being ordered: toilet seat riser   Refills:  0        * order for DME   Quantity:  1 Units        Equipment being ordered: Please install safety bar in bathroom to assist patient in getting up from toilet.   Refills:  0        oxyCODONE-acetaminophen 5-325 MG per tablet   Commonly known as:  PERCOCET   Quantity:  60 tablet        TAKE 1 TO 2 TABLETS BY MOUTH EVERY 4 HOURS AS NEEDED FOR PAIN   Refills:  0         potassium chloride SA 10 MEQ CR tablet   Commonly known as:  K-DUR/KLOR-CON M   Quantity:  180 tablet        TAKE 1 TABLET BY MOUTH 2 TIMES DAILY WITH FOOD   Refills:  0        temazepam 30 MG capsule   Commonly known as:  RESTORIL   Quantity:  90 capsule        TAKE 1 CAPSULE BY MOUTH NIGHTLY AS NEEDED FOR SLEEP   Refills:  0        VOLTAREN 1 % Gel topical gel   Quantity:  100 g   Generic drug:  diclofenac        APPLY 4 GRAMS TO KNEES FOUR TIMES A DAY USING ENCLOSED DOSING CARD   Refills:  0        * Notice:  This list has 7 medication(s) that are the same as other medications prescribed for you. Read the directions carefully, and ask your doctor or other care provider to review them with you.            Prescriptions were sent or printed at these locations (2 Prescriptions)                   [facility] [place] - [address]   [address] [address]    Telephone:  [phone]   Fax:  [phone]   Hours:                  E-Prescribed (2 of 2)         doxycycline (VIBRAMYCIN) 100 MG capsule               predniSONE (DELTASONE) 10 MG tablet                Procedures and tests performed during your visit     CBC with platelets differential    Comprehensive metabolic panel    Lactic acid    Nt probnp inpatient (BNP)    Peripheral IV catheter    Troponin I    XR Chest 2 Views      Orders Needing Specimen Collection     None      Pending Results     No orders found from 8/27/2018 to 8/30/2018.            Pending Culture Results     No orders found from 8/27/2018 to 8/30/2018.            Thank you for choosing [facility]       Thank you for choosing [facility] for your care. Our goal is always to provide you with excellent care. Hearing back from our patients is one way we can continue to improve our services. Please take a few minutes to complete the written survey that you may receive in the mail after you visit with us. Thank you!        Care EveryWhere ID     This is your Care  EveryWhere ID. This could be used by other organizations to access your [facility] medical records  [id number]        Equal Access to Services     TREVIN KENNEDY : Daisy Mnculty, lizett walker, araseli cabrera, cierra arteaga. So [facility] [phone].    ATENCIÓN: Si habla español, tiene a guaman disposición servicios gratuitos de asistencia lingüística. Llame al [phone].    We comply with applicable federal civil rights laws and [place] laws. We do not discriminate on the basis of race, color, national origin, age, disability, sex, sexual orientation, or gender identity.            After Visit Summary       This is your record. Keep this with you and show to your community pharmacist(s) and doctor(s) at your next visit.

## 2018-09-07 NOTE — PROGRESS NOTES
SUBJECTIVE:                                                    Sydni Hardin is a 68 year old female who presents to clinic today for the following health issues:    Hypertension Follow-up      Outpatient blood pressures are not being checked.    Low Salt Diet: not monitoring salt    Depression and Anxiety Follow-Up    Status since last visit: Worsened - 7 deaths    Other associated symptoms:None    Complicating factors:     Significant life event: Yes-  7 deaths      Current substance abuse: None    PHQ-9 2018 7/10/2018 8/3/2018   Total Score 20 20 18   Q9: Suicide Ideation Not at all Not at all Not at all     JG-7 SCORE 2018 7/10/2018 8/3/2018   Total Score 18 21 20       PHQ-9  English  PHQ-9   Any Language  JG-7  Suicide Assessment Five-step Evaluation and Treatment (SAFE-T)    Amount of exercise or physical activity: None    Problems taking medications regularly: No    Medication side effects: none    Diet: regular (no restrictions)        PROBLEMS TO ADD ON...    Problem list and histories reviewed & adjusted, as indicated.  Additional history:     Patient Active Problem List   Diagnosis     Essential hypertension     Calculus of gallbladder     Headache     Osteoarthrosis, unspecified whether generalized or localized, involving lower leg     Advance Care Planning     Dysthymia     Lung nodules     Tobacco abuse     SCC (squamous cell carcinoma), face     IC (interstitial cystitis)     Primary osteoarthritis of both knees     S/P total knee replacement using cement, right     Asthma     Leukocytosis     Dehydration     Knee pain     Past Surgical History:   Procedure Laterality Date     ARTHROPLASTY KNEE Right 3/21/2017    Procedure: ARTHROPLASTY KNEE;  Surgeon: Moisés Olson MD;  Location: [facility]     ARTHROSCOPY KNEE       BIOPSY BREAST  2000    left - free of disease      SECTION        SECTION       CHOLECYSTECTOMY       COLONOSCOPY  2008    repeat in 10  years     HEAD & NECK SURGERY  2014    hematoma on head drained     HYSTERECTOMY       ORTHOPEDIC SURGERY  02/04/2014    right shoulder replacement     shoulder x2  2009    left       Social History   Substance Use Topics     Smoking status: Current Every Day Smoker     Packs/day: 1.00     Years: 53.00     Types: Cigarettes     Start date: 1/1/1965     Smokeless tobacco: Never Used      Comment: pt enrolled in quit plan 8/29/18     Alcohol use 0.0 oz/week     0 Standard drinks or equivalent per week      Comment: rarely     Family History   Problem Relation Age of Onset     Cancer Mother      skin     Breast Cancer Mother      Cerebrovascular Disease Mother      Alzheimer Disease Mother      Myocardial Infarction Father 76     myocardial infarction - cause of death     Cancer Daughter      cervical      Diabetes Maternal Grandfather      HEART DISEASE Maternal Grandfather      MI     Diabetes Maternal Grandmother      Diabetes Paternal Grandmother      Diabetes Paternal Grandfather          Current Outpatient Prescriptions   Medication Sig Dispense Refill     albuterol (VENTOLIN HFA) 108 (90 BASE) MCG/ACT Inhaler USE 1 PUFF FOUR TIMES A DAY AS NEEDED 2 Inhaler 3     ALPRAZolam (XANAX) 1 MG tablet TAKE 1 TABLET BY MOUTH 3 TIMES DAILY AS NEEDED FOR ANXIETY 90 tablet 0     aspirin EC 81 MG EC tablet Take 1 tablet (81 mg) by mouth 2 times daily       atorvastatin (LIPITOR) 40 MG tablet TAKE 1 TABLET BY MOUTH DAILY 90 tablet 1     benazepril (LOTENSIN) 10 MG tablet TAKE 1 TABLET BY MOUTH DAILY 90 tablet 1     benzonatate (TESSALON) 200 MG capsule TAKE 1 CAPSULE BY MOUTH 3 TIMES A DAY AS NEEDED FOR COUGH 60 capsule 3     budesonide-formoterol (SYMBICORT) 160-4.5 MCG/ACT inhaler Inhale 2 puffs into the lungs 2 times daily. As needed 1 Inhaler 11     butalbital-acetaminophen-caffeine (FIORICET/ESGIC) -40 MG per tablet Take 1 tablet by mouth every 6 hours as needed 30 tablet 0     cimetidine (TAGAMET) 800 MG tablet  Take 1 tablet (800 mg) by mouth At Bedtime As needed 90 tablet 3     doxepin (SINEQUAN) 10 MG capsule Take 1 capsule (10 mg) by mouth At Bedtime 90 capsule 3     EPIPEN 2-JAIRO 0.3 MG/0.3ML injection INJECT 1 SYRINGE INTRAMUSCULARLY ONCE AS NEEDED FOR ANAPHYLAXIS 2 each 0     escitalopram (LEXAPRO) 20 MG tablet TAKE 1 TABLET BY MOUTH 2 TIMES A  tablet 3     fluconazole (DIFLUCAN) 150 MG tablet Take 1 tablet (150 mg) by mouth every 3 days 4 tablet 0     hydrochlorothiazide (HYDRODIURIL) 25 MG tablet TAKE 1 TABLET BY MOUTH DAILY 90 tablet 3     ipratropium - albuterol 0.5 mg/2.5 mg/3 mL (DUONEB) 0.5-2.5 (3) MG/3ML neb solution Take 1 vial (3 mLs) by nebulization every 4 hours as needed 90 mL 3     meloxicam (MOBIC) 15 MG tablet Take 1 tablet (15 mg) by mouth daily 90 tablet 3     montelukast (SINGULAIR) 10 MG tablet TAKE 1 TABLET BY MOUTH DAILY 90 tablet 1     nicotine (NICODERM CQ) 14 MG/24HR 24 hr patch Place 1 patch onto the skin every 24 hours 30 patch 1     nicotine (NICODERM CQ) 21 MG/24HR 24 hr patch Place 1 patch onto the skin every 24 hours 30 patch 1     nicotine (NICODERM CQ) 7 MG/24HR 24 hr patch Place 1 patch onto the skin every 24 hours 30 patch 1     nicotine polacrilex (EQL NICOTINE) 4 MG lozenge Place 1 lozenge (4 mg) inside cheek as needed for smoking cessation 360 tablet 1     nystatin (MYCOSTATIN) 349864 UNIT/ML suspension TAKE 1 TEASPOON (5MLS) BY MOUTH 4 TIMES DAILY 473 mL 1     order for DME Equipment being ordered: Please install safety bar in bathroom to assist patient in getting up from toilet. 1 Units 0     order for DME Equipment being ordered: toilet seat riser 1 each 0     order for DME Equipment being ordered: Nebulizer supplies 2 each 11     order for DME Equipment being ordered: mcgee walker 1 each 0     oxyCODONE-acetaminophen (PERCOCET) 5-325 MG per tablet TAKE 1 TO 2 TABLETS BY MOUTH EVERY 4 HOURS AS NEEDED FOR PAIN 60 tablet 0     potassium chloride SA (K-DUR/KLOR-CON M) 10  "MEQ CR tablet TAKE 1 TABLET BY MOUTH 2 TIMES DAILY WITH FOOD 180 tablet 0     temazepam (RESTORIL) 30 MG capsule TAKE 1 CAPSULE BY MOUTH NIGHTLY AS NEEDED FOR SLEEP 90 capsule 0     VOLTAREN 1 % GEL topical gel APPLY 4 GRAMS TO KNEES FOUR TIMES A DAY USING ENCLOSED DOSING CARD 100 g 0     Allergies   Allergen Reactions     Bee Pollen Other (See Comments)     Throat and eyes close up     Codeine Difficulty breathing     \"My throat closes shut.\"     Takes Percocet at home     Keflex [Cephalexin Hcl] Hives     Sulfonamide Derivatives Other (See Comments)     Throat closes     Ciprofloxacin Rash     Rash, bumps on lips     Bupropion Hcl Other (See Comments)     Made her very irritable     Celecoxib Other (See Comments) and Cough     Celebrex - wheeze  Takes ASA at home     Clonazepam Hives     Takes Xanax at home     Erythromycin Other (See Comments)     Erythromycin base - abdominal pain     Lisinopril Other (See Comments)     Zestril - headaches     Nifedipine Hives     Procardia     Strawberries [Strawberry]      Tramadol Hcl      Takes percocet at home       ROS:  Constitutional, HEENT, cardiovascular, pulmonary, gi and gu systems are negative, except as otherwise noted.    OBJECTIVE:                                                    BP 90/67  Pulse 79  Temp 98.3  F (36.8  C) (Tympanic)  Wt 177 lb (80.3 kg)  SpO2 93%  BMI 36.99 kg/m2  Body mass index is 36.99 kg/(m^2).  GENERAL APPEARANCE: Alert, no acute distress  CV: regular rate and rhythm, no murmur, rub or gallop  RESP: lungs clear to auscultation bilaterally  ABDOMEN: normal bowel sounds, soft, nontender, no hepatosplenomegaly or other masses  SKIN: no suspicious lesions or rashes to visualized skin  NEURO: Alert, oriented x 3, speech and mentation normal           ASSESSMENT/PLAN:                                                    1. Essential hypertension  Stable.  No change.     2. Dysthymia  With acute grief.  Seeing counseling weekly and it is " helpful.  She wants to keep doing that.  I talked about meds not really being the answer.  Grief with daughter in protective custody, and 7 deaths of friends recently.  Very much grief and worsening depression.  I told her all we can really do is the counseling as meds have limited role in grief, and she understands.  She is going to continue the counseling weekly.      3. COPD exacerbation (H)  Stable.  Resolved.  Done with the steroids.      4. Grief reaction  As above.  I strongly advise weekly counseling at this difficult time.  She is going to continue.            Gibran Alarcon MD  [facility]

## 2018-09-10 ENCOUNTER — OFFICE VISIT (OUTPATIENT)
Dept: FAMILY MEDICINE | Facility: OTHER | Age: 68
End: 2018-09-10
Attending: FAMILY MEDICINE
Payer: COMMERCIAL

## 2018-09-10 VITALS
DIASTOLIC BLOOD PRESSURE: 67 MMHG | SYSTOLIC BLOOD PRESSURE: 90 MMHG | TEMPERATURE: 98.3 F | BODY MASS INDEX: 36.99 KG/M2 | OXYGEN SATURATION: 93 % | HEART RATE: 79 BPM | WEIGHT: 177 LBS

## 2018-09-10 DIAGNOSIS — F43.21 GRIEF REACTION: ICD-10-CM

## 2018-09-10 DIAGNOSIS — I10 ESSENTIAL HYPERTENSION: Primary | ICD-10-CM

## 2018-09-10 DIAGNOSIS — F34.1 DYSTHYMIA: ICD-10-CM

## 2018-09-10 DIAGNOSIS — J44.1 COPD EXACERBATION (H): ICD-10-CM

## 2018-09-10 DIAGNOSIS — G47.00 PERSISTENT INSOMNIA: ICD-10-CM

## 2018-09-10 PROCEDURE — G0463 HOSPITAL OUTPT CLINIC VISIT: HCPCS

## 2018-09-10 PROCEDURE — 99214 OFFICE O/P EST MOD 30 MIN: CPT | Performed by: FAMILY MEDICINE

## 2018-09-10 RX ORDER — TEMAZEPAM 30 MG
CAPSULE ORAL
Qty: 90 CAPSULE | Refills: 3 | Status: SHIPPED | OUTPATIENT
Start: 2018-09-10 | End: 2019-02-04

## 2018-09-10 ASSESSMENT — ANXIETY QUESTIONNAIRES
3. WORRYING TOO MUCH ABOUT DIFFERENT THINGS: NEARLY EVERY DAY
1. FEELING NERVOUS, ANXIOUS, OR ON EDGE: NEARLY EVERY DAY
7. FEELING AFRAID AS IF SOMETHING AWFUL MIGHT HAPPEN: NEARLY EVERY DAY
5. BEING SO RESTLESS THAT IT IS HARD TO SIT STILL: NEARLY EVERY DAY
2. NOT BEING ABLE TO STOP OR CONTROL WORRYING: NEARLY EVERY DAY
4. TROUBLE RELAXING: NEARLY EVERY DAY
GAD7 TOTAL SCORE: 21
6. BECOMING EASILY ANNOYED OR IRRITABLE: NEARLY EVERY DAY

## 2018-09-10 ASSESSMENT — PAIN SCALES - GENERAL: PAINLEVEL: NO PAIN (0)

## 2018-09-10 NOTE — MR AVS SNAPSHOT
After Visit Summary   9/10/2018    Sydni Hardin    MRN: [id number]           Patient Information     Date Of Birth          1950        Visit Information        Provider Department      9/10/2018 1:15 PM Gibran Alarcon MD [facility]        Today's Diagnoses     Essential hypertension    -  1    Dysthymia        COPD exacerbation (H)        Grief reaction        Persistent insomnia          Care Instructions    F/u with ongoing concerns.             Follow-ups after your visit        Your next 10 appointments already scheduled     Nov 09, 2018  1:00 PM CST   (Arrive by 12:45 PM)   Return Visit with Moisés Olson MD    ORTHOPEDICS ([facility] )    [address] [facility] [address] [phone]              Who to contact     If you have questions or need follow up information about today's clinic visit or your schedule please contact [facility] directly at [phone].  Normal or non-critical lab and imaging results will be communicated to you by MyChart, letter or phone within 4 business days after the clinic has received the results. If you do not hear from us within 7 days, please contact the clinic through MyChart or phone. If you have a critical or abnormal lab result, we will notify you by phone as soon as possible.  Submit refill requests through [employer] or call your pharmacy and they will forward the refill request to us. Please allow 3 business days for your refill to be completed.          Additional Information About Your Visit        Care EveryWhere ID     This is your Care EveryWhere ID. This could be used by other organizations to access your [facility] medical records  [id number]        Your Vitals Were     Pulse Temperature Pulse Oximetry BMI (Body Mass Index)          79 98.3  F (36.8  C) (Tympanic) 93% 36.99 kg/m2         Blood Pressure from Last 3 Encounters:   09/10/18 90/67   08/29/18 143/86   08/29/18  110/60    Weight from Last 3 Encounters:   09/10/18 177 lb (80.3 kg)   08/29/18 173 lb (78.5 kg)   08/03/18 173 lb 12.8 oz (78.8 kg)              Today, you had the following     No orders found for display         Today's Medication Changes          These changes are accurate as of 9/10/18  1:40 PM.  If you have any questions, ask your nurse or doctor.               Stop taking these medicines if you haven't already. Please contact your care team if you have questions.     doxycycline 100 MG capsule   Commonly known as:  VIBRAMYCIN   Stopped by:  Gibran Alarcon MD                Where to get your medicines      Some of these will need a paper prescription and others can be bought over the counter.  Ask your nurse if you have questions.     Bring a paper prescription for each of these medications     temazepam 30 MG capsule                Primary Care Provider Office Phone # Fax #    Gibran Alarcon [phone]       [address] [place] [id number]        Equal Access to Services     [facility]: Hadii aad ku hadasho Soomaali, waaxda luqadaha, qaybta kaalmada adeegyada, waxay idiin hayaan parth claudio . So [facility] [phone].    ATENCIÓN: Si habla español, tiene a guaman disposición servicios gratuitos de asistencia lingüística. Llame al [phone].    We comply with applicable federal civil rights laws and [place] laws. We do not discriminate on the basis of race, color, national origin, age, disability, sex, sexual orientation, or gender identity.            Thank you!     Thank you for choosing [facility]  for your care. Our goal is always to provide you with excellent care. Hearing back from our patients is one way we can continue to improve our services. Please take a few minutes to complete the written survey that you may receive in the mail after your visit with us. Thank you!             Your Updated Medication List - Protect others around you: Learn how  to safely use, store and throw away your medicines at www.disposemymeds.org.          This list is accurate as of 9/10/18  1:40 PM.  Always use your most recent med list.                   Brand Name Dispense Instructions for use Diagnosis    albuterol 108 (90 Base) MCG/ACT inhaler    VENTOLIN HFA    2 Inhaler    USE 1 PUFF FOUR TIMES A DAY AS NEEDED    Tobacco abuse       ALPRAZolam 1 MG tablet    XANAX    90 tablet    TAKE 1 TABLET BY MOUTH 3 TIMES DAILY AS NEEDED FOR ANXIETY    Anxiety       aspirin 81 MG EC tablet      Take 1 tablet (81 mg) by mouth 2 times daily    S/P total knee replacement using cement, right       atorvastatin 40 MG tablet    LIPITOR    90 tablet    TAKE 1 TABLET BY MOUTH DAILY    Mixed hyperlipidemia       benazepril 10 MG tablet    LOTENSIN    90 tablet    TAKE 1 TABLET BY MOUTH DAILY    HTN (hypertension)       benzonatate 200 MG capsule    TESSALON    60 capsule    TAKE 1 CAPSULE BY MOUTH 3 TIMES A DAY AS NEEDED FOR COUGH    Preop general physical exam, Tobacco abuse       budesonide-formoterol 160-4.5 MCG/ACT Inhaler    SYMBICORT    1 Inhaler    Inhale 2 puffs into the lungs 2 times daily. As needed    Asthma       butalbital-acetaminophen-caffeine -40 MG per tablet    FIORICET/ESGIC    30 tablet    Take 1 tablet by mouth every 6 hours as needed    Scalp hematoma, sequela       cimetidine 800 MG tablet    TAGAMET    90 tablet    Take 1 tablet (800 mg) by mouth At Bedtime As needed    Esophageal reflux       doxepin 10 MG capsule    SINEquan    90 capsule    Take 1 capsule (10 mg) by mouth At Bedtime    Primary osteoarthritis of right knee       EPIPEN 2-JAIRO 0.3 MG/0.3ML injection 2-pack   Generic drug:  EPINEPHrine     2 each    INJECT 1 SYRINGE INTRAMUSCULARLY ONCE AS NEEDED FOR ANAPHYLAXIS    Anaphylactic reaction       escitalopram 20 MG tablet    LEXAPRO    180 tablet    TAKE 1 TABLET BY MOUTH 2 TIMES A DAY    Dysthymia       fluconazole 150 MG tablet    DIFLUCAN    4 tablet     Take 1 tablet (150 mg) by mouth every 3 days    Candidiasis of vulva and vagina       hydrochlorothiazide 25 MG tablet    HYDRODIURIL    90 tablet    TAKE 1 TABLET BY MOUTH DAILY    Benign essential hypertension       ipratropium - albuterol 0.5 mg/2.5 mg/3 mL 0.5-2.5 (3) MG/3ML neb solution    DUONEB    90 mL    Take 1 vial (3 mLs) by nebulization every 4 hours as needed    Acute bronchospasm, SOB (shortness of breath)       meloxicam 15 MG tablet    MOBIC    90 tablet    Take 1 tablet (15 mg) by mouth daily    Pain       montelukast 10 MG tablet    SINGULAIR    90 tablet    TAKE 1 TABLET BY MOUTH DAILY    Mixed hyperlipidemia       * nicotine 14 MG/24HR 24 hr patch    NICODERM CQ    30 patch    Place 1 patch onto the skin every 24 hours    Tobacco abuse       * nicotine 7 MG/24HR 24 hr patch    NICODERM CQ    30 patch    Place 1 patch onto the skin every 24 hours    Tobacco abuse       * nicotine 21 MG/24HR 24 hr patch    NICODERM CQ    30 patch    Place 1 patch onto the skin every 24 hours    Tobacco abuse       nicotine polacrilex 4 MG lozenge    EQL NICOTINE    360 tablet    Place 1 lozenge (4 mg) inside cheek as needed for smoking cessation    Tobacco abuse       nystatin 055402 UNIT/ML suspension    MYCOSTATIN    473 mL    TAKE 1 TEASPOON (5MLS) BY MOUTH 4 TIMES DAILY    Thrush       * order for DME     2 each    Equipment being ordered: Nebulizer supplies    Primary osteoarthritis of right knee       * order for DME     1 each    Equipment being ordered: mcgee walker    Primary osteoarthritis of right knee       * order for DME     1 each    Equipment being ordered: toilet seat riser    Postoperative state       * order for DME     1 Units    Equipment being ordered: Please install safety bar in bathroom to assist patient in getting up from toilet.    Status post total right knee replacement       oxyCODONE-acetaminophen 5-325 MG per tablet    PERCOCET    60 tablet    TAKE 1 TO 2 TABLETS BY MOUTH EVERY  4 HOURS AS NEEDED FOR PAIN    Primary osteoarthritis of both knees       potassium chloride SA 10 MEQ CR tablet    K-DUR/KLOR-CON M    180 tablet    TAKE 1 TABLET BY MOUTH 2 TIMES DAILY WITH FOOD    Benign essential hypertension       temazepam 30 MG capsule    RESTORIL    90 capsule    TAKE 1 CAPSULE BY MOUTH NIGHTLY AS NEEDED FOR SLEEP    Persistent insomnia       VOLTAREN 1 % Gel topical gel   Generic drug:  diclofenac     100 g    APPLY 4 GRAMS TO KNEES FOUR TIMES A DAY USING ENCLOSED DOSING CARD    Primary osteoarthritis of left knee, Chronic pain of right knee       * Notice:  This list has 7 medication(s) that are the same as other medications prescribed for you. Read the directions carefully, and ask your doctor or other care provider to review them with you.

## 2018-09-11 ASSESSMENT — PATIENT HEALTH QUESTIONNAIRE - PHQ9: SUM OF ALL RESPONSES TO PHQ QUESTIONS 1-9: 23

## 2018-09-11 ASSESSMENT — ANXIETY QUESTIONNAIRES: GAD7 TOTAL SCORE: 21

## 2018-09-16 ENCOUNTER — HOSPITAL ENCOUNTER (EMERGENCY)
Facility: HOSPITAL | Age: 68
Discharge: HOME | End: 2018-09-16
Attending: FAMILY MEDICINE | Admitting: FAMILY MEDICINE
Payer: MEDICARE

## 2018-09-16 VITALS
RESPIRATION RATE: 20 BRPM | HEART RATE: 97 BPM | DIASTOLIC BLOOD PRESSURE: 82 MMHG | SYSTOLIC BLOOD PRESSURE: 143 MMHG | TEMPERATURE: 97.9 F | OXYGEN SATURATION: 94 %

## 2018-09-16 DIAGNOSIS — J44.1 COPD EXACERBATION (H): ICD-10-CM

## 2018-09-16 PROCEDURE — 25000125 ZZHC RX 250: Performed by: FAMILY MEDICINE

## 2018-09-16 PROCEDURE — 99283 EMERGENCY DEPT VISIT LOW MDM: CPT | Mod: Z6 | Performed by: FAMILY MEDICINE

## 2018-09-16 PROCEDURE — 94640 AIRWAY INHALATION TREATMENT: CPT

## 2018-09-16 PROCEDURE — 99283 EMERGENCY DEPT VISIT LOW MDM: CPT | Mod: 25

## 2018-09-16 RX ORDER — DOXYCYCLINE 100 MG/1
100 CAPSULE ORAL 2 TIMES DAILY
Qty: 28 CAPSULE | Refills: 0 | Status: ON HOLD | OUTPATIENT
Start: 2018-09-16 | End: 2018-09-27

## 2018-09-16 RX ORDER — PREDNISONE 10 MG/1
TABLET ORAL
Qty: 32 TABLET | Refills: 0 | Status: ON HOLD | OUTPATIENT
Start: 2018-09-16 | End: 2018-09-27

## 2018-09-16 RX ORDER — IPRATROPIUM BROMIDE AND ALBUTEROL SULFATE 2.5; .5 MG/3ML; MG/3ML
3 SOLUTION RESPIRATORY (INHALATION) ONCE
Status: COMPLETED | OUTPATIENT
Start: 2018-09-16 | End: 2018-09-16

## 2018-09-16 RX ADMIN — IPRATROPIUM BROMIDE AND ALBUTEROL SULFATE 3 ML: .5; 3 SOLUTION RESPIRATORY (INHALATION) at 15:55

## 2018-09-16 NOTE — ED NOTES
Ambulated to room 1 independently, gown placed, call light in reach.  Congested for the past month.  Saw Dr. Alarcon one week ago.   Used nebulizer x 2 today and x 3 yesterday, helping for a short period of time.  Denies pain at this time.

## 2018-09-16 NOTE — ED AVS SNAPSHOT
[facility]    [address] [address]    Phone:  [phone]                                       Sydni Hardin   MRN: [id number]    Department:  [facility]   Date of Visit:  9/16/2018           After Visit Summary Signature Page     I have received my discharge instructions, and my questions have been answered. I have discussed any challenges I see with this plan with the nurse or doctor.    ..........................................................................................................................................  Patient/Patient Representative Signature      ..........................................................................................................................................  Patient Representative Print Name and Relationship to Patient    ..................................................               ................................................  Date                                   Time    ..........................................................................................................................................  Reviewed by Signature/Title    ...................................................              ..............................................  Date                                               Time          22EPIC Rev 08/18

## 2018-09-16 NOTE — ED PROVIDER NOTES
"eMERGENCY dEPARTMENT eNCOUnter        CHIEF COMPLAINT    Chief Complaint   Patient presents with     URI     x1 month, saw primary 1 week ago       HPI    Sydni Hardin is a 68 year old female who presents with a one month history of shortness of breath.  She is a smoker.  She was seen in the ER and also by Dr. Alarcon.  She was in the ER this week with her daughter, who has some psychiatric issues.  She said she felt well that night but the following day she was short of breath again.    Last nebulizer was this morning.  She presents to the emergency department for evaluation.  She does continue to smoke.  She has multiple social stressors at this time she reports that she \"has company coming this weekend\" and feels that she has a lot of work to do.  No chest pain palpitations.    REVIEW OF SYSTEMS    Cardiac: No palpitations, No syncope  Respiratory: +SOB  Neurologic: No syncope or LOC  GI: No Vomiting, No Diarrhea  General: No Fever, No Chills  All other systems reviewed and are negative.    PAST MEDICAL & SURGICAL HISTORY    Past Medical History:   Diagnosis Date     Abnormal arterial blood gases 4/26/2006     Acute upper respiratory infections of unspecified site 7/2/2001     Anxiety 10/24/2011     Calculus of gallbladder without mention of cholecystitis or obstruction 2/2/2006     Depression 1/5/2012     Follow-up examination, following other surgery 2/28/2006     Headache(784.0) 12/10/2003     History of CVA (cerebrovascular accident) 9/28/2011     HTN (hypertension) 1/5/2012     Insomnia 3/13/2012     Lipoma of other skin and subcutaneous tissue 1/18/2006     Nonallopathic lesion of cervical region, not elsewhere classified 12/18/2003     Osteoarthrosis, unspecified whether generalized or localized, lower leg 10/4/2001     Special screening for malignant neoplasms, colon 12/7/2006     Urinary tract infection, site not specified 4/30/2007     Past Surgical History:   Procedure Laterality Date     " ARTHROPLASTY KNEE Right 3/21/2017    Procedure: ARTHROPLASTY KNEE;  Surgeon: Moisés Olson MD;  Location: [facility] OR     ARTHROSCOPY KNEE       BIOPSY BREAST  2000    left - free of disease      SECTION        SECTION       CHOLECYSTECTOMY       COLONOSCOPY  2008    repeat in 10 years     HEAD & NECK SURGERY  2014    hematoma on head drained     HYSTERECTOMY       ORTHOPEDIC SURGERY  2014    right shoulder replacement     shoulder x2  2009    left       CURRENT MEDICATIONS    Current Outpatient Rx   Medication Sig Dispense Refill     albuterol (VENTOLIN HFA) 108 (90 BASE) MCG/ACT Inhaler USE 1 PUFF FOUR TIMES A DAY AS NEEDED 2 Inhaler 3     ALPRAZolam (XANAX) 1 MG tablet TAKE 1 TABLET BY MOUTH 3 TIMES DAILY AS NEEDED FOR ANXIETY 90 tablet 0     aspirin EC 81 MG EC tablet Take 1 tablet (81 mg) by mouth 2 times daily       atorvastatin (LIPITOR) 40 MG tablet TAKE 1 TABLET BY MOUTH DAILY 90 tablet 1     benazepril (LOTENSIN) 10 MG tablet TAKE 1 TABLET BY MOUTH DAILY 90 tablet 1     benzonatate (TESSALON) 200 MG capsule TAKE 1 CAPSULE BY MOUTH 3 TIMES A DAY AS NEEDED FOR COUGH 60 capsule 3     butalbital-acetaminophen-caffeine (FIORICET/ESGIC) -40 MG per tablet Take 1 tablet by mouth every 6 hours as needed 30 tablet 0     cimetidine (TAGAMET) 800 MG tablet Take 1 tablet (800 mg) by mouth At Bedtime As needed 90 tablet 3     doxepin (SINEQUAN) 10 MG capsule Take 1 capsule (10 mg) by mouth At Bedtime 90 capsule 3     doxycycline (VIBRAMYCIN) 100 MG capsule Take 1 capsule (100 mg) by mouth 2 times daily for 14 days 28 capsule 0     EPIPEN 2-JAIRO 0.3 MG/0.3ML injection INJECT 1 SYRINGE INTRAMUSCULARLY ONCE AS NEEDED FOR ANAPHYLAXIS 2 each 0     escitalopram (LEXAPRO) 20 MG tablet TAKE 1 TABLET BY MOUTH 2 TIMES A  tablet 3     fluconazole (DIFLUCAN) 150 MG tablet Take 1 tablet (150 mg) by mouth every 3 days 4 tablet 0     hydrochlorothiazide (HYDRODIURIL) 25 MG tablet TAKE 1  TABLET BY MOUTH DAILY 90 tablet 3     ipratropium - albuterol 0.5 mg/2.5 mg/3 mL (DUONEB) 0.5-2.5 (3) MG/3ML neb solution Take 1 vial (3 mLs) by nebulization every 4 hours as needed 90 mL 3     meloxicam (MOBIC) 15 MG tablet Take 1 tablet (15 mg) by mouth daily 90 tablet 3     montelukast (SINGULAIR) 10 MG tablet TAKE 1 TABLET BY MOUTH DAILY 90 tablet 1     nicotine (NICODERM CQ) 14 MG/24HR 24 hr patch Place 1 patch onto the skin every 24 hours 30 patch 1     nicotine (NICODERM CQ) 21 MG/24HR 24 hr patch Place 1 patch onto the skin every 24 hours 30 patch 1     nicotine (NICODERM CQ) 7 MG/24HR 24 hr patch Place 1 patch onto the skin every 24 hours 30 patch 1     nicotine polacrilex (EQL NICOTINE) 4 MG lozenge Place 1 lozenge (4 mg) inside cheek as needed for smoking cessation 360 tablet 1     nystatin (MYCOSTATIN) 014384 UNIT/ML suspension TAKE 1 TEASPOON (5MLS) BY MOUTH 4 TIMES DAILY 473 mL 1     order for DME Equipment being ordered: Please install safety bar in bathroom to assist patient in getting up from toilet. 1 Units 0     order for DME Equipment being ordered: toilet seat riser 1 each 0     order for DME Equipment being ordered: Nebulizer supplies 2 each 11     order for DME Equipment being ordered: mcgee walker 1 each 0     oxyCODONE-acetaminophen (PERCOCET) 5-325 MG per tablet TAKE 1 TO 2 TABLETS BY MOUTH EVERY 4 HOURS AS NEEDED FOR PAIN 60 tablet 0     potassium chloride SA (K-DUR/KLOR-CON M) 10 MEQ CR tablet TAKE 1 TABLET BY MOUTH 2 TIMES DAILY WITH FOOD 180 tablet 0     predniSONE (DELTASONE) 10 MG tablet Take 4 tablets daily for 5 days,  take 2 tablets daily for 3 days, take 1 tablet daily for 3 days, take half a tablet for 3 days. 32 tablet 0     temazepam (RESTORIL) 30 MG capsule TAKE 1 CAPSULE BY MOUTH NIGHTLY AS NEEDED FOR SLEEP 90 capsule 3     VOLTAREN 1 % GEL topical gel APPLY 4 GRAMS TO KNEES FOUR TIMES A DAY USING ENCLOSED DOSING CARD 100 g 0     budesonide-formoterol (SYMBICORT) 160-4.5  "MCG/ACT inhaler Inhale 2 puffs into the lungs 2 times daily. As needed 1 Inhaler 11       ALLERGIES    Allergies   Allergen Reactions     Bee Pollen Other (See Comments)     Throat and eyes close up     Codeine Difficulty breathing     \"My throat closes shut.\"     Takes Percocet at home     Keflex [Cephalexin Hcl] Hives     Sulfonamide Derivatives Other (See Comments)     Throat closes     Ciprofloxacin Rash     Rash, bumps on lips     Bupropion Hcl Other (See Comments)     Made her very irritable     Celecoxib Other (See Comments) and Cough     Celebrex - wheeze  Takes ASA at home     Clonazepam Hives     Takes Xanax at home     Erythromycin Other (See Comments)     Erythromycin base - abdominal pain     Lisinopril Other (See Comments)     Zestril - headaches     Nifedipine Hives     Procardia     Strawberries [Strawberry]      Tramadol Hcl      Takes percocet at home       SOCIAL & FAMILY HISTORY    Social History     Social History     Marital status:      Spouse name: N/A     Number of children: N/A     Years of education: N/A     Social History Main Topics     Smoking status: Current Every Day Smoker     Packs/day: 1.00     Years: 53.00     Types: Cigarettes     Start date: 1/1/1965     Smokeless tobacco: Never Used      Comment: pt enrolled in quit plan 8/29/18     Alcohol use 0.0 oz/week     0 Standard drinks or equivalent per week      Comment: rarely     Drug use: No     Sexual activity: No     Other Topics Concern      Service No     Blood Transfusions Yes     Permits if needed     Caffeine Concern Yes     2 cups     Occupational Exposure No     Hobby Hazards No     Sleep Concern Yes     doxipen and xanax at bedtime     Stress Concern No     Weight Concern No     Special Diet No     Back Care No     Exercise No     Seat Belt No     encouraged to wear seat belt for safety     Self-Exams Yes     Parent/Sibling W/ Cabg, Mi Or Angioplasty Before 65f 55m? Yes     father     Social History " Narrative     Family History   Problem Relation Age of Onset     Cancer Mother      skin     Breast Cancer Mother      Cerebrovascular Disease Mother      Alzheimer Disease Mother      Myocardial Infarction Father 76     myocardial infarction - cause of death     Cancer Daughter      cervical      Diabetes Maternal Grandfather      HEART DISEASE Maternal Grandfather      MI     Diabetes Maternal Grandmother      Diabetes Paternal Grandmother      Diabetes Paternal Grandfather        PHYSICAL EXAM    VITAL SIGNS: /82  Pulse 97  Temp 97.9  F (36.6  C) (Oral)  Resp 20  SpO2 94%   Constitutional:  Well developed, well nourished, no acute distress   HENT:  Atraumatic, moist mucus membranes  Neck: supple, no JVD   Respiratory:  Lungs actually fairly clear with good air movement, no retractions   Cardiovascular:  regular rate, no murmurs  GI:  Soft, nontender, normal bowel sounds  Musculoskeletal:  No edema, no acute deformities  Integument:  Skin is warm and dry, no petechiae   Neurologic:  Alert & oriented, no slurred speech  Psych: Pleasant affect, no hallucinations        RADIOLOGY/PROCEDURES    She declines an XR, saying she's had too many    ED COURSE & MEDICAL DECISION MAKING    Pertinent Labs & Imaging studies reviewed and interpreted. (See chart for details)    See chart for details of medications given during the ED stay.    Vitals:    09/16/18 1408 09/16/18 1708   BP: 119/78 143/82   Pulse: 97    Resp: 20 20   Temp: 99.2  F (37.3  C) 97.9  F (36.6  C)   TempSrc: Tympanic Oral   SpO2: (!) 91% 94%         FINAL IMPRESSION    1. COPD exacerbation (H)        PLAN  I had a long talk with Sydni.  She is really in a terrible situation personally, Dr. Alarcon has talked to her about counseling and she does see a psychologist.  She is nontoxic here.  She responded well to doxycycline and a more extended prednisone course.  These are prescribed she has a normal blood pressure low-grade temp, will discharge to  home and follow-up if not improving.      (Please note that this note was completed with a voice recognition program.  Every attempt was made to edit the dictations, but inevitably there remain words that are mis-transcribed.)       Jigna Mccormick MD  09/16/18 [id number]

## 2018-09-16 NOTE — ED AVS SNAPSHOT
[facility]    [address] [address]    Phone:  [phone]                                       Sydni Hardin   MRN: [id number]    Department:  [facility]   Date of Visit:  9/16/2018           Patient Information     Date Of Birth          1950        Your diagnoses for this visit were:     COPD exacerbation (H)        You were seen by Jigna Mccormick MD.      Your next 10 appointments already scheduled     Nov 09, 2018  1:00 PM CST   (Arrive by 12:45 PM)   Return Visit with Moisés Olson MD    ORTHOPEDICS ([facility] )    [address] [place] [id number] [phone]                 Review of your medicines      START taking        Dose / Directions Last dose taken    doxycycline 100 MG capsule   Commonly known as:  VIBRAMYCIN   Dose:  100 mg   Quantity:  28 capsule        Take 1 capsule (100 mg) by mouth 2 times daily for 14 days   Refills:  0        predniSONE 10 MG tablet   Commonly known as:  DELTASONE   Quantity:  32 tablet        Take 4 tablets daily for 5 days,  take 2 tablets daily for 3 days, take 1 tablet daily for 3 days, take half a tablet for 3 days.   Refills:  0          Our records show that you are taking the medicines listed below. If these are incorrect, please call your family doctor or clinic.        Dose / Directions Last dose taken    albuterol 108 (90 Base) MCG/ACT inhaler   Commonly known as:  VENTOLIN HFA   Quantity:  2 Inhaler        USE 1 PUFF FOUR TIMES A DAY AS NEEDED   Refills:  3        ALPRAZolam 1 MG tablet   Commonly known as:  XANAX   Quantity:  90 tablet        TAKE 1 TABLET BY MOUTH 3 TIMES DAILY AS NEEDED FOR ANXIETY   Refills:  0        aspirin 81 MG EC tablet   Dose:  81 mg        Take 1 tablet (81 mg) by mouth 2 times daily   Refills:  0        atorvastatin 40 MG tablet   Commonly known as:  LIPITOR   Quantity:  90 tablet        TAKE 1 TABLET BY MOUTH DAILY   Refills:  1         benazepril 10 MG tablet   Commonly known as:  LOTENSIN   Quantity:  90 tablet        TAKE 1 TABLET BY MOUTH DAILY   Refills:  1        benzonatate 200 MG capsule   Commonly known as:  TESSALON   Quantity:  60 capsule        TAKE 1 CAPSULE BY MOUTH 3 TIMES A DAY AS NEEDED FOR COUGH   Refills:  3        budesonide-formoterol 160-4.5 MCG/ACT Inhaler   Commonly known as:  SYMBICORT   Dose:  2 puff   Quantity:  1 Inhaler        Inhale 2 puffs into the lungs 2 times daily. As needed   Refills:  11        butalbital-acetaminophen-caffeine -40 MG per tablet   Commonly known as:  FIORICET/ESGIC   Dose:  1 tablet   Quantity:  30 tablet        Take 1 tablet by mouth every 6 hours as needed   Refills:  0        cimetidine 800 MG tablet   Commonly known as:  TAGAMET   Dose:  800 mg   Quantity:  90 tablet        Take 1 tablet (800 mg) by mouth At Bedtime As needed   Refills:  3        doxepin 10 MG capsule   Commonly known as:  SINEquan   Dose:  10 mg   Quantity:  90 capsule        Take 1 capsule (10 mg) by mouth At Bedtime   Refills:  3        EPIPEN 2-JAIRO 0.3 MG/0.3ML injection 2-pack   Quantity:  2 each   Generic drug:  EPINEPHrine        INJECT 1 SYRINGE INTRAMUSCULARLY ONCE AS NEEDED FOR ANAPHYLAXIS   Refills:  0        escitalopram 20 MG tablet   Commonly known as:  LEXAPRO   Quantity:  180 tablet        TAKE 1 TABLET BY MOUTH 2 TIMES A DAY   Refills:  3        fluconazole 150 MG tablet   Commonly known as:  DIFLUCAN   Dose:  150 mg   Quantity:  4 tablet        Take 1 tablet (150 mg) by mouth every 3 days   Refills:  0        hydrochlorothiazide 25 MG tablet   Commonly known as:  HYDRODIURIL   Quantity:  90 tablet        TAKE 1 TABLET BY MOUTH DAILY   Refills:  3        ipratropium - albuterol 0.5 mg/2.5 mg/3 mL 0.5-2.5 (3) MG/3ML neb solution   Commonly known as:  DUONEB   Dose:  1 vial   Quantity:  90 mL        Take 1 vial (3 mLs) by nebulization every 4 hours as needed   Refills:  3        meloxicam 15 MG tablet    Commonly known as:  MOBIC   Dose:  15 mg   Quantity:  90 tablet        Take 1 tablet (15 mg) by mouth daily   Refills:  3        montelukast 10 MG tablet   Commonly known as:  SINGULAIR   Quantity:  90 tablet        TAKE 1 TABLET BY MOUTH DAILY   Refills:  1        * nicotine 14 MG/24HR 24 hr patch   Commonly known as:  NICODERM CQ   Dose:  1 patch   Quantity:  30 patch        Place 1 patch onto the skin every 24 hours   Refills:  1        * nicotine 7 MG/24HR 24 hr patch   Commonly known as:  NICODERM CQ   Dose:  1 patch   Quantity:  30 patch        Place 1 patch onto the skin every 24 hours   Refills:  1        * nicotine 21 MG/24HR 24 hr patch   Commonly known as:  NICODERM CQ   Dose:  1 patch   Quantity:  30 patch        Place 1 patch onto the skin every 24 hours   Refills:  1        nicotine polacrilex 4 MG lozenge   Commonly known as:  EQL NICOTINE   Dose:  4 mg   Quantity:  360 tablet        Place 1 lozenge (4 mg) inside cheek as needed for smoking cessation   Refills:  1        nystatin 514482 UNIT/ML suspension   Commonly known as:  MYCOSTATIN   Quantity:  473 mL        TAKE 1 TEASPOON (5MLS) BY MOUTH 4 TIMES DAILY   Refills:  1        * order for DME   Quantity:  2 each        Equipment being ordered: Nebulizer supplies   Refills:  11        * order for DME   Quantity:  1 each        Equipment being ordered: mcgee walker   Refills:  0        * order for DME   Quantity:  1 each        Equipment being ordered: toilet seat riser   Refills:  0        * order for DME   Quantity:  1 Units        Equipment being ordered: Please install safety bar in bathroom to assist patient in getting up from toilet.   Refills:  0        oxyCODONE-acetaminophen 5-325 MG per tablet   Commonly known as:  PERCOCET   Quantity:  60 tablet        TAKE 1 TO 2 TABLETS BY MOUTH EVERY 4 HOURS AS NEEDED FOR PAIN   Refills:  0        potassium chloride SA 10 MEQ CR tablet   Commonly known as:  K-DUR/KLOR-CON M   Quantity:  180 tablet         TAKE 1 TABLET BY MOUTH 2 TIMES DAILY WITH FOOD   Refills:  0        temazepam 30 MG capsule   Commonly known as:  RESTORIL   Quantity:  90 capsule        TAKE 1 CAPSULE BY MOUTH NIGHTLY AS NEEDED FOR SLEEP   Refills:  3        VOLTAREN 1 % Gel topical gel   Quantity:  100 g   Generic drug:  diclofenac        APPLY 4 GRAMS TO KNEES FOUR TIMES A DAY USING ENCLOSED DOSING CARD   Refills:  0        * Notice:  This list has 7 medication(s) that are the same as other medications prescribed for you. Read the directions carefully, and ask your doctor or other care provider to review them with you.            Prescriptions were sent or printed at these locations (2 Prescriptions)                   [id number] [place] [place] - [address] AT [facility] [address]   [address], [place] [address]    Telephone:  [phone]   Fax:  [phone]   Hours:                  E-Prescribed (2 of 2)         doxycycline (VIBRAMYCIN) 100 MG capsule               predniSONE (DELTASONE) 10 MG tablet                Orders Needing Specimen Collection     None      Pending Results     No orders found from 9/14/2018 to 9/17/2018.            Pending Culture Results     No orders found from 9/14/2018 to 9/17/2018.            Thank you for choosing [facility]       Thank you for choosing [facility] for your care. Our goal is always to provide you with excellent care. Hearing back from our patients is one way we can continue to improve our services. Please take a few minutes to complete the written survey that you may receive in the mail after you visit with us. Thank you!        Care EveryWhere ID     This is your Care EveryWhere ID. This could be used by other organizations to access your [facility] medical records  [id number]        Equal Access to Services     TREVIN KENNEDY AH: Daisy Mcnulty, lizett walker, cierra may idirubin arteaga. So [facility]  [phone].    ATENCIÓN: Si habla español, tiene a guaman disposición servicios gratuitos de asistencia lingüística. Llame al [phone].    We comply with applicable federal civil rights laws and [place] laws. We do not discriminate on the basis of race, color, national origin, age, disability, sex, sexual orientation, or gender identity.            After Visit Summary       This is your record. Keep this with you and show to your community pharmacist(s) and doctor(s) at your next visit.

## 2018-09-18 DIAGNOSIS — I10 BENIGN ESSENTIAL HYPERTENSION: ICD-10-CM

## 2018-09-18 RX ORDER — POTASSIUM CHLORIDE 750 MG/1
TABLET, EXTENDED RELEASE ORAL
Qty: 180 TABLET | Refills: 1 | Status: SHIPPED | OUTPATIENT
Start: 2018-09-18 | End: 2019-04-17

## 2018-09-24 ENCOUNTER — APPOINTMENT (OUTPATIENT)
Dept: GENERAL RADIOLOGY | Facility: HOSPITAL | Age: 68
DRG: 190 | End: 2018-09-24
Attending: PHYSICIAN ASSISTANT
Payer: MEDICARE

## 2018-09-24 ENCOUNTER — HOSPITAL ENCOUNTER (INPATIENT)
Facility: HOSPITAL | Age: 68
LOS: 3 days | Discharge: HOME | DRG: 190 | End: 2018-09-27
Attending: PHYSICIAN ASSISTANT | Admitting: INTERNAL MEDICINE
Payer: MEDICARE

## 2018-09-24 DIAGNOSIS — R06.02 SOB (SHORTNESS OF BREATH): ICD-10-CM

## 2018-09-24 DIAGNOSIS — J44.1 ACUTE EXACERBATION OF CHRONIC OBSTRUCTIVE PULMONARY DISEASE (H): ICD-10-CM

## 2018-09-24 DIAGNOSIS — Z72.0 TOBACCO ABUSE: ICD-10-CM

## 2018-09-24 DIAGNOSIS — J98.01 ACUTE BRONCHOSPASM: ICD-10-CM

## 2018-09-24 DIAGNOSIS — J96.01 ACUTE RESPIRATORY FAILURE WITH HYPOXIA (H): ICD-10-CM

## 2018-09-24 LAB
ALBUMIN SERPL-MCNC: 3.7 G/DL (ref 3.4–5)
ALP SERPL-CCNC: 87 U/L (ref 40–150)
ALT SERPL W P-5'-P-CCNC: 35 U/L (ref 0–50)
ANION GAP SERPL CALCULATED.3IONS-SCNC: 7 MMOL/L (ref 3–14)
AST SERPL W P-5'-P-CCNC: 17 U/L (ref 0–45)
BASOPHILS # BLD AUTO: 0 10E9/L (ref 0–0.2)
BASOPHILS NFR BLD AUTO: 0.2 %
BILIRUB SERPL-MCNC: 0.6 MG/DL (ref 0.2–1.3)
BUN SERPL-MCNC: 22 MG/DL (ref 7–30)
CALCIUM SERPL-MCNC: 8.8 MG/DL (ref 8.5–10.1)
CHLORIDE SERPL-SCNC: 98 MMOL/L (ref 94–109)
CO2 SERPL-SCNC: 30 MMOL/L (ref 20–32)
CREAT SERPL-MCNC: 0.97 MG/DL (ref 0.52–1.04)
DIFFERENTIAL METHOD BLD: ABNORMAL
EOSINOPHIL # BLD AUTO: 0.1 10E9/L (ref 0–0.7)
EOSINOPHIL NFR BLD AUTO: 1.1 %
ERYTHROCYTE [DISTWIDTH] IN BLOOD BY AUTOMATED COUNT: 13.2 % (ref 10–15)
GFR SERPL CREATININE-BSD FRML MDRD: 57 ML/MIN/1.7M2
GLUCOSE SERPL-MCNC: 97 MG/DL (ref 70–99)
HCT VFR BLD AUTO: 46.6 % (ref 35–47)
HGB BLD-MCNC: 15.8 G/DL (ref 11.7–15.7)
IMM GRANULOCYTES # BLD: 0.1 10E9/L (ref 0–0.4)
IMM GRANULOCYTES NFR BLD: 0.4 %
LYMPHOCYTES # BLD AUTO: 5.7 10E9/L (ref 0.8–5.3)
LYMPHOCYTES NFR BLD AUTO: 44.7 %
MCH RBC QN AUTO: 30.7 PG (ref 26.5–33)
MCHC RBC AUTO-ENTMCNC: 33.9 G/DL (ref 31.5–36.5)
MCV RBC AUTO: 91 FL (ref 78–100)
MONOCYTES # BLD AUTO: 0.8 10E9/L (ref 0–1.3)
MONOCYTES NFR BLD AUTO: 5.9 %
NEUTROPHILS # BLD AUTO: 6.1 10E9/L (ref 1.6–8.3)
NEUTROPHILS NFR BLD AUTO: 47.7 %
NRBC # BLD AUTO: 0 10*3/UL
NRBC BLD AUTO-RTO: 0 /100
NT-PROBNP SERPL-MCNC: 53 PG/ML (ref 0–125)
PLATELET # BLD AUTO: 296 10E9/L (ref 150–450)
POTASSIUM SERPL-SCNC: 3.5 MMOL/L (ref 3.4–5.3)
PROCALCITONIN SERPL-MCNC: <0.05 NG/ML
PROT SERPL-MCNC: 6.7 G/DL (ref 6.8–8.8)
RBC # BLD AUTO: 5.14 10E12/L (ref 3.8–5.2)
SODIUM SERPL-SCNC: 135 MMOL/L (ref 133–144)
WBC # BLD AUTO: 12.7 10E9/L (ref 4–11)

## 2018-09-24 PROCEDURE — 83880 ASSAY OF NATRIURETIC PEPTIDE: CPT | Performed by: PHYSICIAN ASSISTANT

## 2018-09-24 PROCEDURE — 84145 PROCALCITONIN (PCT): CPT | Performed by: INTERNAL MEDICINE

## 2018-09-24 PROCEDURE — 99284 EMERGENCY DEPT VISIT MOD MDM: CPT | Mod: Z6 | Performed by: PHYSICIAN ASSISTANT

## 2018-09-24 PROCEDURE — 25000132 ZZH RX MED GY IP 250 OP 250 PS 637: Mod: GY | Performed by: PHYSICIAN ASSISTANT

## 2018-09-24 PROCEDURE — 85025 COMPLETE CBC W/AUTO DIFF WBC: CPT | Performed by: PHYSICIAN ASSISTANT

## 2018-09-24 PROCEDURE — 99223 1ST HOSP IP/OBS HIGH 75: CPT | Mod: AI | Performed by: INTERNAL MEDICINE

## 2018-09-24 PROCEDURE — 94640 AIRWAY INHALATION TREATMENT: CPT

## 2018-09-24 PROCEDURE — 71046 X-RAY EXAM CHEST 2 VIEWS: CPT | Mod: TC

## 2018-09-24 PROCEDURE — 94640 AIRWAY INHALATION TREATMENT: CPT | Mod: 76

## 2018-09-24 PROCEDURE — 25000125 ZZHC RX 250: Performed by: PHYSICIAN ASSISTANT

## 2018-09-24 PROCEDURE — A9270 NON-COVERED ITEM OR SERVICE: HCPCS | Mod: GY | Performed by: INTERNAL MEDICINE

## 2018-09-24 PROCEDURE — 40000275 ZZH STATISTIC RCP TIME EA 10 MIN

## 2018-09-24 PROCEDURE — 12000000 ZZH R&B MED SURG/OB

## 2018-09-24 PROCEDURE — 25000128 H RX IP 250 OP 636: Performed by: INTERNAL MEDICINE

## 2018-09-24 PROCEDURE — 40000786 ZZHCL STATISTIC ACTIVE MRSA SURVEILLANCE CULTURE: Performed by: INTERNAL MEDICINE

## 2018-09-24 PROCEDURE — 80053 COMPREHEN METABOLIC PANEL: CPT | Performed by: PHYSICIAN ASSISTANT

## 2018-09-24 PROCEDURE — 25000132 ZZH RX MED GY IP 250 OP 250 PS 637: Mod: GY | Performed by: INTERNAL MEDICINE

## 2018-09-24 PROCEDURE — 25000128 H RX IP 250 OP 636: Performed by: PHYSICIAN ASSISTANT

## 2018-09-24 PROCEDURE — 25000125 ZZHC RX 250: Performed by: INTERNAL MEDICINE

## 2018-09-24 PROCEDURE — 99285 EMERGENCY DEPT VISIT HI MDM: CPT | Mod: 25

## 2018-09-24 PROCEDURE — A9270 NON-COVERED ITEM OR SERVICE: HCPCS | Mod: GY | Performed by: PHYSICIAN ASSISTANT

## 2018-09-24 PROCEDURE — 36415 COLL VENOUS BLD VENIPUNCTURE: CPT | Performed by: PHYSICIAN ASSISTANT

## 2018-09-24 PROCEDURE — 96374 THER/PROPH/DIAG INJ IV PUSH: CPT

## 2018-09-24 RX ORDER — BENAZEPRIL HYDROCHLORIDE 10 MG/1
10 TABLET ORAL DAILY
Status: DISCONTINUED | OUTPATIENT
Start: 2018-09-24 | End: 2018-09-27 | Stop reason: HOSPADM

## 2018-09-24 RX ORDER — HYDROCHLOROTHIAZIDE 25 MG/1
25 TABLET ORAL DAILY
Status: DISCONTINUED | OUTPATIENT
Start: 2018-09-24 | End: 2018-09-27 | Stop reason: HOSPADM

## 2018-09-24 RX ORDER — BUTALBITAL, ACETAMINOPHEN AND CAFFEINE 50; 325; 40 MG/1; MG/1; MG/1
1 TABLET ORAL EVERY 6 HOURS PRN
Status: DISCONTINUED | OUTPATIENT
Start: 2018-09-24 | End: 2018-09-27 | Stop reason: HOSPADM

## 2018-09-24 RX ORDER — ALPRAZOLAM 0.25 MG
1 TABLET ORAL ONCE
Status: COMPLETED | OUTPATIENT
Start: 2018-09-24 | End: 2018-09-24

## 2018-09-24 RX ORDER — SODIUM CHLORIDE AND POTASSIUM CHLORIDE 150; 900 MG/100ML; MG/100ML
INJECTION, SOLUTION INTRAVENOUS CONTINUOUS
Status: DISCONTINUED | OUTPATIENT
Start: 2018-09-24 | End: 2018-09-26

## 2018-09-24 RX ORDER — LIDOCAINE 40 MG/G
CREAM TOPICAL
Status: DISCONTINUED | OUTPATIENT
Start: 2018-09-24 | End: 2018-09-27 | Stop reason: HOSPADM

## 2018-09-24 RX ORDER — NICOTINE 21 MG/24HR
1 PATCH, TRANSDERMAL 24 HOURS TRANSDERMAL EVERY 24 HOURS
Status: DISCONTINUED | OUTPATIENT
Start: 2018-09-24 | End: 2018-09-27 | Stop reason: HOSPADM

## 2018-09-24 RX ORDER — ATORVASTATIN CALCIUM 40 MG/1
40 TABLET, FILM COATED ORAL DAILY
Status: DISCONTINUED | OUTPATIENT
Start: 2018-09-24 | End: 2018-09-27 | Stop reason: HOSPADM

## 2018-09-24 RX ORDER — METHYLPREDNISOLONE SODIUM SUCCINATE 125 MG/2ML
125 INJECTION, POWDER, LYOPHILIZED, FOR SOLUTION INTRAMUSCULAR; INTRAVENOUS ONCE
Status: COMPLETED | OUTPATIENT
Start: 2018-09-24 | End: 2018-09-24

## 2018-09-24 RX ORDER — METHYLPREDNISOLONE SODIUM SUCCINATE 125 MG/2ML
60 INJECTION, POWDER, LYOPHILIZED, FOR SOLUTION INTRAMUSCULAR; INTRAVENOUS EVERY 6 HOURS
Status: DISCONTINUED | OUTPATIENT
Start: 2018-09-24 | End: 2018-09-26

## 2018-09-24 RX ORDER — TEMAZEPAM 15 MG/1
30 CAPSULE ORAL
Status: DISCONTINUED | OUTPATIENT
Start: 2018-09-24 | End: 2018-09-27 | Stop reason: HOSPADM

## 2018-09-24 RX ORDER — BENZONATATE 100 MG/1
200 CAPSULE ORAL 3 TIMES DAILY PRN
Status: DISCONTINUED | OUTPATIENT
Start: 2018-09-24 | End: 2018-09-27 | Stop reason: HOSPADM

## 2018-09-24 RX ORDER — NALOXONE HYDROCHLORIDE 0.4 MG/ML
.1-.4 INJECTION, SOLUTION INTRAMUSCULAR; INTRAVENOUS; SUBCUTANEOUS
Status: DISCONTINUED | OUTPATIENT
Start: 2018-09-24 | End: 2018-09-27 | Stop reason: HOSPADM

## 2018-09-24 RX ORDER — MONTELUKAST SODIUM 10 MG/1
10 TABLET ORAL DAILY
Status: DISCONTINUED | OUTPATIENT
Start: 2018-09-24 | End: 2018-09-27 | Stop reason: HOSPADM

## 2018-09-24 RX ORDER — IPRATROPIUM BROMIDE AND ALBUTEROL SULFATE 2.5; .5 MG/3ML; MG/3ML
3 SOLUTION RESPIRATORY (INHALATION)
Status: DISCONTINUED | OUTPATIENT
Start: 2018-09-24 | End: 2018-09-27 | Stop reason: HOSPADM

## 2018-09-24 RX ORDER — ALPRAZOLAM 0.5 MG
1 TABLET ORAL 3 TIMES DAILY PRN
Status: DISCONTINUED | OUTPATIENT
Start: 2018-09-24 | End: 2018-09-27 | Stop reason: HOSPADM

## 2018-09-24 RX ORDER — CIMETIDINE 800 MG
800 TABLET ORAL AT BEDTIME
Status: DISCONTINUED | OUTPATIENT
Start: 2018-09-24 | End: 2018-09-27 | Stop reason: HOSPADM

## 2018-09-24 RX ORDER — ASPIRIN 81 MG/1
81 TABLET ORAL 2 TIMES DAILY
Status: DISCONTINUED | OUTPATIENT
Start: 2018-09-24 | End: 2018-09-27 | Stop reason: HOSPADM

## 2018-09-24 RX ORDER — ESCITALOPRAM OXALATE 10 MG/1
20 TABLET ORAL AT BEDTIME
Status: DISCONTINUED | OUTPATIENT
Start: 2018-09-24 | End: 2018-09-25

## 2018-09-24 RX ORDER — OXYCODONE AND ACETAMINOPHEN 5; 325 MG/1; MG/1
1 TABLET ORAL EVERY 6 HOURS PRN
Status: DISCONTINUED | OUTPATIENT
Start: 2018-09-24 | End: 2018-09-27 | Stop reason: HOSPADM

## 2018-09-24 RX ORDER — POTASSIUM CHLORIDE 750 MG/1
10 TABLET, EXTENDED RELEASE ORAL 2 TIMES DAILY WITH MEALS
Status: DISCONTINUED | OUTPATIENT
Start: 2018-09-24 | End: 2018-09-27 | Stop reason: HOSPADM

## 2018-09-24 RX ORDER — ACETAMINOPHEN 325 MG/1
650 TABLET ORAL EVERY 4 HOURS PRN
Status: DISCONTINUED | OUTPATIENT
Start: 2018-09-24 | End: 2018-09-27 | Stop reason: HOSPADM

## 2018-09-24 RX ORDER — DOXEPIN HYDROCHLORIDE 10 MG/1
10 CAPSULE ORAL AT BEDTIME
Status: DISCONTINUED | OUTPATIENT
Start: 2018-09-24 | End: 2018-09-27 | Stop reason: HOSPADM

## 2018-09-24 RX ORDER — ALBUTEROL SULFATE 0.83 MG/ML
2.5 SOLUTION RESPIRATORY (INHALATION)
Status: DISCONTINUED | OUTPATIENT
Start: 2018-09-24 | End: 2018-09-24

## 2018-09-24 RX ADMIN — POTASSIUM CHLORIDE 10 MEQ: 750 TABLET, FILM COATED, EXTENDED RELEASE ORAL at 17:54

## 2018-09-24 RX ADMIN — ALPRAZOLAM 1 MG: 0.5 TABLET ORAL at 23:49

## 2018-09-24 RX ADMIN — POTASSIUM CHLORIDE AND SODIUM CHLORIDE: 900; 150 INJECTION, SOLUTION INTRAVENOUS at 15:44

## 2018-09-24 RX ADMIN — METHYLPREDNISOLONE SODIUM SUCCINATE 62.5 MG: 125 INJECTION, POWDER, FOR SOLUTION INTRAMUSCULAR; INTRAVENOUS at 23:50

## 2018-09-24 RX ADMIN — ALPRAZOLAM 1 MG: 0.25 TABLET ORAL at 12:29

## 2018-09-24 RX ADMIN — IPRATROPIUM BROMIDE AND ALBUTEROL SULFATE 3 ML: .5; 3 SOLUTION RESPIRATORY (INHALATION) at 19:51

## 2018-09-24 RX ADMIN — ALPRAZOLAM 1 MG: 0.5 TABLET ORAL at 19:27

## 2018-09-24 RX ADMIN — IPRATROPIUM BROMIDE AND ALBUTEROL SULFATE 3 ML: .5; 3 SOLUTION RESPIRATORY (INHALATION) at 23:34

## 2018-09-24 RX ADMIN — ALBUTEROL SULFATE 2.5 MG: 2.5 SOLUTION RESPIRATORY (INHALATION) at 13:23

## 2018-09-24 RX ADMIN — HYDROCHLOROTHIAZIDE 25 MG: 25 TABLET ORAL at 15:45

## 2018-09-24 RX ADMIN — ATORVASTATIN CALCIUM 40 MG: 40 TABLET, FILM COATED ORAL at 15:44

## 2018-09-24 RX ADMIN — ENOXAPARIN SODIUM 40 MG: 40 INJECTION SUBCUTANEOUS at 15:47

## 2018-09-24 RX ADMIN — OXYCODONE AND ACETAMINOPHEN 1 TABLET: 5; 325 TABLET ORAL at 15:45

## 2018-09-24 RX ADMIN — NICOTINE 1 PATCH: 14 PATCH, EXTENDED RELEASE TRANSDERMAL at 15:46

## 2018-09-24 RX ADMIN — DOXEPIN HYDROCHLORIDE 10 MG: 10 CAPSULE ORAL at 23:48

## 2018-09-24 RX ADMIN — MONTELUKAST 10 MG: 10 TABLET, FILM COATED ORAL at 15:44

## 2018-09-24 RX ADMIN — AZITHROMYCIN MONOHYDRATE 500 MG: 500 INJECTION, POWDER, LYOPHILIZED, FOR SOLUTION INTRAVENOUS at 15:44

## 2018-09-24 RX ADMIN — ESCITALOPRAM OXALATE 20 MG: 10 TABLET ORAL at 23:47

## 2018-09-24 RX ADMIN — TEMAZEPAM 30 MG: 15 CAPSULE ORAL at 23:49

## 2018-09-24 RX ADMIN — IPRATROPIUM BROMIDE AND ALBUTEROL SULFATE 3 ML: .5; 3 SOLUTION RESPIRATORY (INHALATION) at 15:13

## 2018-09-24 RX ADMIN — METHYLPREDNISOLONE SODIUM SUCCINATE 62.5 MG: 125 INJECTION, POWDER, FOR SOLUTION INTRAMUSCULAR; INTRAVENOUS at 17:54

## 2018-09-24 RX ADMIN — BENZONATATE 200 MG: 100 CAPSULE, LIQUID FILLED ORAL at 23:54

## 2018-09-24 RX ADMIN — METHYLPREDNISOLONE SODIUM SUCCINATE 125 MG: 125 INJECTION, POWDER, FOR SOLUTION INTRAMUSCULAR; INTRAVENOUS at 12:15

## 2018-09-24 ASSESSMENT — ENCOUNTER SYMPTOMS
WHEEZING: 1
DIZZINESS: 0
VOMITING: 0
COUGH: 1
LIGHT-HEADEDNESS: 1
WEAKNESS: 1
SHORTNESS OF BREATH: 1
DIARRHEA: 0
CHEST TIGHTNESS: 1
FATIGUE: 1
FEVER: 0
CHILLS: 0
ABDOMINAL PAIN: 0
APPETITE CHANGE: 1
NAUSEA: 0
ACTIVITY CHANGE: 1
PALPITATIONS: 0

## 2018-09-24 ASSESSMENT — PAIN DESCRIPTION - DESCRIPTORS: DESCRIPTORS: ACHING

## 2018-09-24 ASSESSMENT — ACTIVITIES OF DAILY LIVING (ADL)
ADLS_ACUITY_SCORE: 15
ADLS_ACUITY_SCORE: 18

## 2018-09-24 NOTE — ED PROVIDER NOTES
History     Chief Complaint   Patient presents with     Shortness of Breath     The history is provided by the patient.     Sydni Hardin is a 68 year old female who presented to the emergency department via EMS for evaluation of shortness of breath and wheezing.  This is the patient's third visit in the last month, not including to outpatient clinic visits.  She continues to smoke.  She tells me that she has been using her home nebulizers as prescribed.  She is currently on doxycycline and prednisone.  Has had progressive and continuous shortness of breath, wheezing, nonproductive cough, and worsening fatigue.  Initial O2 saturation via EMS was 80%.  Improved nicely with nebulizers.  Denies any chest pains.  Denies any abdominal discomfort.    Problem List:    Patient Active Problem List    Diagnosis Date Noted     Leukocytosis 03/25/2017     Priority: Medium     Dehydration 03/25/2017     Priority: Medium     Knee pain 03/25/2017     Priority: Medium     S/P total knee replacement using cement, right 03/21/2017     Priority: Medium     Primary osteoarthritis of both knees 10/05/2016     Priority: Medium     IC (interstitial cystitis) 10/28/2015     Priority: Medium     SCC (squamous cell carcinoma), face 08/05/2015     Priority: Medium     Lung nodules 06/19/2015     Priority: Medium     Tobacco abuse 06/19/2015     Priority: Medium     Dysthymia 12/31/2014     Priority: Medium     Advance Care Planning 11/28/2012     Priority: Medium     Advance Care Planning 10/5/2016: ACP Review of Chart / Resources Provided:  Reviewed chart for advance care plan.  Sydni Hardin has no plan or code status on file however states presence of ACP document. Copy requested. Confirmed code status reflects current choices pending receipt of document/advance care plan review.  Confirmed/documented legally designated decision makers.  Added by Janie Robledo       Essential hypertension 01/05/2012     Priority: Medium      Problem list name updated by automated process. Provider to review       Asthma 2009     Priority: Medium     Calculus of gallbladder 2006     Priority: Medium     Problem list name updated by automated process. Provider to review       Headache 12/10/2003     Priority: Medium     Problem list name updated by automated process. Provider to review       Osteoarthrosis, unspecified whether generalized or localized, involving lower leg 10/04/2001     Priority: Medium     Problem list name updated by automated process. Provider to review          Past Medical History:    Past Medical History:   Diagnosis Date     Abnormal arterial blood gases 2006     Acute upper respiratory infections of unspecified site 2001     Anxiety 10/24/2011     Calculus of gallbladder without mention of cholecystitis or obstruction 2006     Depression 2012     Follow-up examination, following other surgery 2006     Headache(784.0) 12/10/2003     History of CVA (cerebrovascular accident) 2011     HTN (hypertension) 2012     Insomnia 3/13/2012     Lipoma of other skin and subcutaneous tissue 2006     Nonallopathic lesion of cervical region, not elsewhere classified 2003     Osteoarthrosis, unspecified whether generalized or localized, lower leg 10/4/2001     Special screening for malignant neoplasms, colon 2006     Urinary tract infection, site not specified 2007       Past Surgical History:    Past Surgical History:   Procedure Laterality Date     ARTHROPLASTY KNEE Right 3/21/2017    Procedure: ARTHROPLASTY KNEE;  Surgeon: Moisés Olson MD;  Location: [facility]     ARTHROSCOPY KNEE       BIOPSY BREAST  2000    left - free of disease      SECTION        SECTION       CHOLECYSTECTOMY       COLONOSCOPY  2008    repeat in 10 years     HEAD & NECK SURGERY  2014    hematoma on head drained     HYSTERECTOMY       ORTHOPEDIC SURGERY  2014    right shoulder  replacement     shoulder x2  2009    left       Family History:    Family History   Problem Relation Age of Onset     Cancer Mother      skin     Breast Cancer Mother      Cerebrovascular Disease Mother      Alzheimer Disease Mother      Myocardial Infarction Father 76     myocardial infarction - cause of death     Cancer Daughter      cervical      Diabetes Maternal Grandfather      HEART DISEASE Maternal Grandfather      MI     Diabetes Maternal Grandmother      Diabetes Paternal Grandmother      Diabetes Paternal Grandfather        Social History:  Marital Status:   [5]  Social History   Substance Use Topics     Smoking status: Current Every Day Smoker     Packs/day: 1.00     Years: 53.00     Types: Cigarettes     Start date: 1/1/1965     Smokeless tobacco: Never Used      Comment: pt enrolled in quit plan 8/29/18     Alcohol use 0.0 oz/week     0 Standard drinks or equivalent per week      Comment: rarely        Medications:      albuterol (VENTOLIN HFA) 108 (90 BASE) MCG/ACT Inhaler   ALPRAZolam (XANAX) 1 MG tablet   aspirin EC 81 MG EC tablet   atorvastatin (LIPITOR) 40 MG tablet   benazepril (LOTENSIN) 10 MG tablet   benzonatate (TESSALON) 200 MG capsule   butalbital-acetaminophen-caffeine (FIORICET/ESGIC) -40 MG per tablet   cimetidine (TAGAMET) 800 MG tablet   doxycycline (VIBRAMYCIN) 100 MG capsule   escitalopram (LEXAPRO) 20 MG tablet   fluconazole (DIFLUCAN) 150 MG tablet   hydrochlorothiazide (HYDRODIURIL) 25 MG tablet   ipratropium - albuterol 0.5 mg/2.5 mg/3 mL (DUONEB) 0.5-2.5 (3) MG/3ML neb solution   meloxicam (MOBIC) 15 MG tablet   montelukast (SINGULAIR) 10 MG tablet   nicotine (NICODERM CQ) 14 MG/24HR 24 hr patch   nicotine (NICODERM CQ) 21 MG/24HR 24 hr patch   nicotine (NICODERM CQ) 7 MG/24HR 24 hr patch   nicotine polacrilex (EQL NICOTINE) 4 MG lozenge   nystatin (MYCOSTATIN) 674031 UNIT/ML suspension   order for DME   order for DME   order for DME   order for DME    oxyCODONE-acetaminophen (PERCOCET) 5-325 MG per tablet   potassium chloride SA (K-DUR/KLOR-CON M) 10 MEQ CR tablet   predniSONE (DELTASONE) 10 MG tablet   temazepam (RESTORIL) 30 MG capsule   VOLTAREN 1 % GEL topical gel   budesonide-formoterol (SYMBICORT) 160-4.5 MCG/ACT inhaler   doxepin (SINEQUAN) 10 MG capsule   EPIPEN 2-JAIRO 0.3 MG/0.3ML injection         Review of Systems   Constitutional: Positive for activity change, appetite change and fatigue. Negative for chills and fever.   HENT: Negative.    Respiratory: Positive for cough, chest tightness, shortness of breath and wheezing.    Cardiovascular: Negative for chest pain and palpitations.   Gastrointestinal: Negative for abdominal pain, diarrhea, nausea and vomiting.   Skin: Negative.    Neurological: Positive for weakness and light-headedness. Negative for dizziness.       Physical Exam   BP: (!) 177/103  Heart Rate: 87  Temp: 98.1  F (36.7  C)  Resp: 20  SpO2: (!) 91 %      Physical Exam   Constitutional: She is oriented to person, place, and time. She appears well-developed and well-nourished. No distress.   Cardiovascular: Normal rate and regular rhythm.    Pulmonary/Chest: Effort normal.   She has increased work of breathing with some minimal distress.  Diffuse expiratory wheezes with scattered rhonchi.   Abdominal: Soft. There is no tenderness.   Neurological: She is alert and oriented to person, place, and time.   Skin: Skin is warm and dry.   Psychiatric: She has a normal mood and affect.   Nursing note and vitals reviewed.      ED Course     ED Course     Procedures               Critical Care time:  none               Results for orders placed or performed during the hospital encounter of 09/24/18 (from the past 24 hour(s))   XR Chest 2 Views    Narrative    PROCEDURE:  XR CHEST 2 VW    HISTORY:  shortness of breath; .     COMPARISON:  8/29/2018    FINDINGS:   The cardiac silhouette is normal in size. The pulmonary vasculature is  normal.  There  are increasing opacities in the left lung base. The  right lung is hyperinflated but otherwise clear. No pleural effusion  or pneumothorax.      Impression    IMPRESSION:  Left basilar atelectasis or infiltrate.      JOYCE BRUCE MD   CBC with platelets differential   Result Value Ref Range    WBC 12.7 (H) 4.0 - 11.0 10e9/L    RBC Count 5.14 3.8 - 5.2 10e12/L    Hemoglobin 15.8 (H) 11.7 - 15.7 g/dL    Hematocrit 46.6 35.0 - 47.0 %    MCV 91 78 - 100 fl    MCH 30.7 26.5 - 33.0 pg    MCHC 33.9 31.5 - 36.5 g/dL    RDW 13.2 10.0 - 15.0 %    Platelet Count 296 150 - 450 10e9/L    Diff Method Automated Method     % Neutrophils 47.7 %    % Lymphocytes 44.7 %    % Monocytes 5.9 %    % Eosinophils 1.1 %    % Basophils 0.2 %    % Immature Granulocytes 0.4 %    Nucleated RBCs 0 0 /100    Absolute Neutrophil 6.1 1.6 - 8.3 10e9/L    Absolute Lymphocytes 5.7 (H) 0.8 - 5.3 10e9/L    Absolute Monocytes 0.8 0.0 - 1.3 10e9/L    Absolute Eosinophils 0.1 0.0 - 0.7 10e9/L    Absolute Basophils 0.0 0.0 - 0.2 10e9/L    Abs Immature Granulocytes 0.1 0 - 0.4 10e9/L    Absolute Nucleated RBC 0.0    Comprehensive metabolic panel   Result Value Ref Range    Sodium 135 133 - 144 mmol/L    Potassium 3.5 3.4 - 5.3 mmol/L    Chloride 98 94 - 109 mmol/L    Carbon Dioxide 30 20 - 32 mmol/L    Anion Gap 7 3 - 14 mmol/L    Glucose 97 70 - 99 mg/dL    Urea Nitrogen 22 7 - 30 mg/dL    Creatinine 0.97 0.52 - 1.04 mg/dL    GFR Estimate 57 (L) >60 mL/min/1.7m2    GFR Estimate If Black 69 >60 mL/min/1.7m2    Calcium 8.8 8.5 - 10.1 mg/dL    Bilirubin Total 0.6 0.2 - 1.3 mg/dL    Albumin 3.7 3.4 - 5.0 g/dL    Protein Total 6.7 (L) 6.8 - 8.8 g/dL    Alkaline Phosphatase 87 40 - 150 U/L    ALT 35 0 - 50 U/L    AST 17 0 - 45 U/L   N terminal pro BNP outpatient   Result Value Ref Range    N-Terminal Pro Bnp 53 0 - 125 pg/mL   Procalcitonin   Result Value Ref Range    Procalcitonin <0.05 ng/ml       Medications   albuterol neb solution 2.5 mg (2.5 mg  Nebulization Given 9/24/18 1323)   methylPREDNISolone sodium succinate (solu-MEDROL) injection 125 mg (125 mg Intravenous Given 9/24/18 1215)   ALPRAZolam (XANAX) tablet 1 mg (1 mg Oral Given 9/24/18 1229)       Assessments & Plan (with Medical Decision Making)   Room air saturation in the mid 70s on any exertion.  Would fit any reasonable criteria for admission in the hospital.  Has failed outpatient doxycycline and prednisone. Case discussed with Dr. Herndon, who accepted care.     I have reviewed the nursing notes.    I have reviewed the findings, diagnosis, plan and need for follow up with the patient.       Current Discharge Medication List          Final diagnoses:   Acute exacerbation of chronic obstructive pulmonary disease (H)   Acute respiratory failure with hypoxia (H)       9/24/2018   [facility]     Savi Mahmood PA-C  09/24/18 [id number]

## 2018-09-24 NOTE — ED NOTES
Patient arrives via [facility] for evaluation of shortness of breath and cough. Patient reports history of COPD and has been seen several times in the ED in the past month. Reports an office visit with Dr. Alarcon this afternoon, but could not wait that long due to shortness of breath. Sats 80% on RA upon EMS arrival. Duoneb administered with sats up to 91% on RA. Denies chest pain. Expiratory wheezes noted with RR 20. IV placed by EMS. Call light within reach.

## 2018-09-24 NOTE — IP AVS SNAPSHOT
MRN:[id number]                      After Visit Summary   9/24/2018    Sydni Hardin    MRN: [id number]           Thank you!     Thank you for choosing [facility] for your care. Our goal is always to provide you with excellent care. Hearing back from our patients is one way we can continue to improve our services. Please take a few minutes to complete the written survey that you may receive in the mail after you visit with us. Thank you!        Patient Information     Date Of Birth          1950        Designated Caregiver       Most Recent Value    Caregiver    Will someone help with your care after discharge? yes    Name of designated caregiver palmira    Phone number of caregiver [phone]    Caregiver address same as pt      About your hospital stay     You were admitted on:  September 24, 2018 You last received care in the:  [facility]    You were discharged on:  September 27, 2018        Reason for your hospital stay       COPD exacerbation                  Who to Call     For medical emergencies, please call 911.  For non-urgent questions about your medical care, please call your primary care provider or clinic, [phone]          Attending Provider     Provider Specialty    Savi Mahmood PA-C Emergency Medicine    Arpit Herndon MD Internal Medicine    [facility]       Primary Care Provider Office Phone # Fax #    Gibran Alarcon [phone]       When to contact your care team       Call your primary doctor if you have any of the following:  increased shortness of breath.                  After Care Instructions     Activity       Your activity upon discharge: activity as tolerated            Diet       Follow this diet upon discharge: Orders Placed This Encounter      Combination Diet Regular Diet Adult            Oxygen Adult       [place] Oxygen Order 2 liter(s) by nasal cannula continuously with use of portable  hasmukh. Expected treatment length is indefinite (99 months).. Test on conserving device as applicable.    Patients who qualify for home O2 coverage under the CMS guidelines require ABG tests or O2 sat readings obtained closest to, but no earlier than 2 days prior to the discharge, as evidence of the need for home oxygen therapy. Testing must be performed while patient is in the chronic stable state. See notes for O2 sats.    I certify that this patient, Sydni Hardin has been under my care and that I, or a nurse practitioner or physician's assistant working with me, had a face-to-face encounter that meets the face-to-face encounter requirements with this patient on 9/27/2018. The patient, Sydni Hardin was evaluated or treated in whole, or in part, for the following medical condition, which necessitates the use of the ordered oxygen. Treatment Diagnosis: hypoxia    Attending Provider: Matias Iniguez MD  Physician signature: See electronic signature associated with these discharge orders  Date of Order: September 27, 2018                  Follow-up Appointments     Follow-up and recommended labs and tests        Follow up with primary care provider, Gibran Alarcon, within 7 days for hospital follow- up.  No follow up labs or test are needed.                  Your next 10 appointments already scheduled     Oct 04, 2018 11:00 AM CDT   (Arrive by 10:45 AM)   [facility]    [address]  [facility] [address]   [phone]            Nov 09, 2018  1:00 PM CST   (Arrive by 12:45 PM)   [facility]    [address]  [facility] [address]   [phone]              Pending Results     No orders found from 9/22/2018 to 9/25/2018.            Statement of Approval     Ordered          09/27/18 0904  I have reviewed and  "agree with all the recommendations and orders detailed in this document.  EFFECTIVE NOW     Approved and electronically signed by:  Matias Iniguez MD             Admission Information     Date & Time Provider Department Dept. Phone    9/24/2018 Matias Iniguez MD [facility] [phone]      Your Vitals Were     Blood Pressure Temperature Respirations Height Weight Pulse Oximetry    141/96 97.1  F (36.2  C) (Tympanic) 20 1.473 m (4' 10\") 79.8 kg (175 lb 14.8 oz) 85%    BMI (Body Mass Index)                   36.77 kg/m2           Care EveryWhere ID     This is your Care EveryWhere ID. This could be used by other organizations to access your [facility] medical records  [id number]        Equal Access to Services     TREVIN KENNEDY : Daisy Mcnulty, wameghna luqadaha, qaybta kaalmada rick, cierra arteaga. [facility] [phone].    ATENCIÓN: Si habla español, tiene a guaman disposición servicios gratuitos de asistencia lingüística. [facility] [phone].    We comply with applicable federal civil rights laws and [place] laws. We do not discriminate on the basis of race, color, national origin, age, disability, sex, sexual orientation, or gender identity.               Review of your medicines      START taking        Dose / Directions    azithromycin 250 MG tablet   Commonly known as:  ZITHROMAX   Indication:  uri        Dose:  250 mg   Take 1 tablet (250 mg) by mouth daily for 2 days   Quantity:  2 tablet   Refills:  0         CONTINUE these medicines which may have CHANGED, or have new prescriptions. If we are uncertain of the size of tablets/capsules you have at home, strength may be listed as something that might have changed.        Dose / Directions    aspirin 81 MG EC tablet   This may have changed:  when to take this   Used for:  S/P total knee replacement using cement, right        Dose:  81 mg   Take 1 tablet (81 mg) by mouth 2 times daily   Refills:  0       " ipratropium - albuterol 0.5 mg/2.5 mg/3 mL 0.5-2.5 (3) MG/3ML neb solution   Commonly known as:  DUONEB   This may have changed:  reasons to take this   Used for:  Acute bronchospasm, SOB (shortness of breath)        Dose:  1 vial   Take 1 vial (3 mLs) by nebulization every 4 hours as needed for shortness of breath / dyspnea or wheezing   Quantity:  90 mL   Refills:  3       predniSONE 10 MG tablet   Commonly known as:  DELTASONE   This may have changed:  additional instructions   Used for:  Acute exacerbation of chronic obstructive pulmonary disease (H)        Take 4 tablets daily for 3 days,  take 2 tablets daily for 3 days, take 1 tablet daily for 3 days, take half a tablet for 3 days.   Quantity:  24 tablet   Refills:  0         CONTINUE these medicines which have NOT CHANGED        Dose / Directions    albuterol 108 (90 Base) MCG/ACT inhaler   Commonly known as:  VENTOLIN HFA   Used for:  Tobacco abuse        USE 1 PUFF FOUR TIMES A DAY AS NEEDED   Quantity:  2 Inhaler   Refills:  3       ALPRAZolam 1 MG tablet   Commonly known as:  XANAX   Used for:  Anxiety        TAKE 1 TABLET BY MOUTH 3 TIMES DAILY AS NEEDED FOR ANXIETY   Quantity:  90 tablet   Refills:  0       atorvastatin 40 MG tablet   Commonly known as:  LIPITOR   Used for:  Mixed hyperlipidemia        TAKE 1 TABLET BY MOUTH DAILY   Quantity:  90 tablet   Refills:  1       benazepril 10 MG tablet   Commonly known as:  LOTENSIN   Used for:  HTN (hypertension)        TAKE 1 TABLET BY MOUTH DAILY   Quantity:  90 tablet   Refills:  1       benzonatate 200 MG capsule   Commonly known as:  TESSALON   Used for:  Preop general physical exam, Tobacco abuse        TAKE 1 CAPSULE BY MOUTH 3 TIMES A DAY AS NEEDED FOR COUGH   Quantity:  60 capsule   Refills:  3       budesonide-formoterol 160-4.5 MCG/ACT Inhaler   Commonly known as:  SYMBICORT   Used for:  Asthma        Dose:  2 puff   Inhale 2 puffs into the lungs 2 times daily. As needed   Quantity:  1 Inhaler    Refills:  11       butalbital-acetaminophen-caffeine -40 MG per tablet   Commonly known as:  FIORICET/ESGIC   Used for:  Scalp hematoma, sequela        Dose:  1 tablet   Take 1 tablet by mouth every 6 hours as needed   Quantity:  30 tablet   Refills:  0       cimetidine 800 MG tablet   Commonly known as:  TAGAMET   Used for:  Esophageal reflux        Dose:  800 mg   Take 1 tablet (800 mg) by mouth At Bedtime As needed   Quantity:  90 tablet   Refills:  3       doxepin 10 MG capsule   Commonly known as:  SINEquan   Used for:  Primary osteoarthritis of right knee        Dose:  10 mg   Take 1 capsule (10 mg) by mouth At Bedtime   Quantity:  90 capsule   Refills:  3       EPIPEN 2-JAIRO 0.3 MG/0.3ML injection 2-pack   Used for:  Anaphylactic reaction   Generic drug:  EPINEPHrine        INJECT 1 SYRINGE INTRAMUSCULARLY ONCE AS NEEDED FOR ANAPHYLAXIS   Quantity:  2 each   Refills:  0       escitalopram 20 MG tablet   Commonly known as:  LEXAPRO   Used for:  Dysthymia        TAKE 1 TABLET BY MOUTH 2 TIMES A DAY   Quantity:  180 tablet   Refills:  3       fluconazole 150 MG tablet   Commonly known as:  DIFLUCAN   Used for:  Candidiasis of vulva and vagina        Dose:  150 mg   Take 1 tablet (150 mg) by mouth every 3 days   Quantity:  4 tablet   Refills:  0       hydrochlorothiazide 25 MG tablet   Commonly known as:  HYDRODIURIL   Used for:  Benign essential hypertension        TAKE 1 TABLET BY MOUTH DAILY   Quantity:  90 tablet   Refills:  3       meloxicam 15 MG tablet   Commonly known as:  MOBIC   Used for:  Pain        Dose:  15 mg   Take 1 tablet (15 mg) by mouth daily   Quantity:  90 tablet   Refills:  3       montelukast 10 MG tablet   Commonly known as:  SINGULAIR   Used for:  Mixed hyperlipidemia        TAKE 1 TABLET BY MOUTH DAILY   Quantity:  90 tablet   Refills:  1       * nicotine 14 MG/24HR 24 hr patch   Commonly known as:  NICODERM CQ   Used for:  Tobacco abuse        Dose:  1 patch   Place 1 patch onto  the skin every 24 hours   Quantity:  30 patch   Refills:  1       * nicotine 7 MG/24HR 24 hr patch   Commonly known as:  NICODERM CQ   Used for:  Tobacco abuse        Dose:  1 patch   Place 1 patch onto the skin every 24 hours   Quantity:  30 patch   Refills:  1       * nicotine 21 MG/24HR 24 hr patch   Commonly known as:  NICODERM CQ   Used for:  Tobacco abuse        Dose:  1 patch   Place 1 patch onto the skin every 24 hours   Quantity:  30 patch   Refills:  1       nicotine polacrilex 4 MG lozenge   Commonly known as:  EQL NICOTINE   Used for:  Tobacco abuse        Dose:  4 mg   Place 1 lozenge (4 mg) inside cheek as needed for smoking cessation   Quantity:  360 tablet   Refills:  1       nystatin 718171 UNIT/ML suspension   Commonly known as:  MYCOSTATIN   Used for:  Thrush        TAKE 1 TEASPOON (5MLS) BY MOUTH 4 TIMES DAILY   Quantity:  473 mL   Refills:  1       * order for DME   Used for:  Primary osteoarthritis of right knee        Equipment being ordered: Nebulizer supplies   Quantity:  2 each   Refills:  11       * order for DME   Used for:  Primary osteoarthritis of right knee        Equipment being ordered: mcgee walker   Quantity:  1 each   Refills:  0       * order for DME   Used for:  Postoperative state        Equipment being ordered: toilet seat riser   Quantity:  1 each   Refills:  0       * order for DME   Used for:  Status post total right knee replacement        Equipment being ordered: Please install safety bar in bathroom to assist patient in getting up from toilet.   Quantity:  1 Units   Refills:  0       oxyCODONE-acetaminophen 5-325 MG per tablet   Commonly known as:  PERCOCET   Used for:  Primary osteoarthritis of both knees        TAKE 1 TO 2 TABLETS BY MOUTH EVERY 4 HOURS AS NEEDED FOR PAIN   Quantity:  60 tablet   Refills:  0       potassium chloride SA 10 MEQ CR tablet   Commonly known as:  K-DUR/KLOR-CON M   Used for:  Benign essential hypertension        TAKE 1 TABLET BY MOUTH 2  TIMES DAILY WITH FOOD   Quantity:  180 tablet   Refills:  1       temazepam 30 MG capsule   Commonly known as:  RESTORIL   Used for:  Persistent insomnia        TAKE 1 CAPSULE BY MOUTH NIGHTLY AS NEEDED FOR SLEEP   Quantity:  90 capsule   Refills:  3       VOLTAREN 1 % Gel topical gel   Used for:  Primary osteoarthritis of left knee, Chronic pain of right knee   Generic drug:  diclofenac        APPLY 4 GRAMS TO KNEES FOUR TIMES A DAY USING ENCLOSED DOSING CARD   Quantity:  100 g   Refills:  0       * Notice:  This list has 7 medication(s) that are the same as other medications prescribed for you. Read the directions carefully, and ask your doctor or other care provider to review them with you.      STOP taking     doxycycline 100 MG capsule   Commonly known as:  VIBRAMYCIN                Where to get your medicines      These medications were sent to [facility] - [address]  [address] [address]     Phone:  [phone]     azithromycin 250 MG tablet    ipratropium - albuterol 0.5 mg/2.5 mg/3 mL 0.5-2.5 (3) MG/3ML neb solution    nicotine polacrilex 4 MG lozenge    predniSONE 10 MG tablet                Protect others around you: Learn how to safely use, store and throw away your medicines at www.disposemymeds.org.        ANTIBIOTIC INSTRUCTION     You've Been Prescribed an Antibiotic - Now What?  Your healthcare team thinks that you or your loved one might have an infection. Some infections can be treated with antibiotics, which are powerful, life-saving drugs. Like all medications, antibiotics have side effects and should only be used when necessary. There are some important things you should know about your antibiotic treatment.      Your healthcare team may run tests before you start taking an antibiotic.    Your team may take samples (e.g., from your blood, urine or other areas) to run tests to look for bacteria. These test can be important to determine if you need  an antibiotic at all and, if you do, which antibiotic will work best.      Within a few days, your healthcare team might change or even stop your antibiotic.    Your team may start you on an antibiotic while they are working to find out what is making you sick.    Your team might change your antibiotic because test results show that a different antibiotic would be better to treat your infection.    In some cases, once your team has more information, they learn that you do not need an antibiotic at all. They may find out that you don't have an infection, or that the antibiotic you're taking won't work against your infection. For example, an infection caused by a virus can't be treated with antibiotics. Staying on an antibiotic when you don't need it is more likely to be harmful than helpful.      You may experience side effects from your antibiotic.    Like all medications, antibiotics have side effects. Some of these can be serious.    Let you healthcare team know if you have any known allergies when you are admitted to the hospital.    One significant side effect of nearly all antibiotics is the risk of severe and sometimes deadly diarrhea caused by Clostridium difficile (C. Difficile). This occurs when a person takes antibiotics because some good germs are destroyed. Antibiotic use allows C. diificile to take over, putting patients at high risk for this serious infection.    As a patient or caregiver, it is important to understand your or your loved one's antibiotic treatment. It is especially important for caregivers to speak up when patients can't speak for themselves. Here are some important questions to ask your healthcare team.    What infection is this antibiotic treating and how do you know I have that infection?    What side effects might occur from this antibiotic?    How long will I need to take this antibiotic?    Is it safe to take this antibiotic with other medications or supplements (e.g., vitamins)  that I am taking?     Are there any special directions I need to know about taking this antibiotic? For example, should I take it with food?    How will I be monitored to know whether my infection is responding to the antibiotic?    What tests may help to make sure the right antibiotic is prescribed for me?      Information provided by:  www.cdc.gov/getsmart  U.S. Department of Health and Human Services  Centers for disease Control and Prevention  National Center for Emerging and Zoonotic Infectious Diseases  Division of Healthcare Quality Promotion             Medication List: This is a list of all your medications and when to take them. Check marks below indicate your daily home schedule. Keep this list as a reference.      Medications           Morning Afternoon Evening Bedtime As Needed    albuterol 108 (90 Base) MCG/ACT inhaler   Commonly known as:  VENTOLIN HFA   USE 1 PUFF FOUR TIMES A DAY AS NEEDED                                ALPRAZolam 1 MG tablet   Commonly known as:  XANAX   TAKE 1 TABLET BY MOUTH 3 TIMES DAILY AS NEEDED FOR ANXIETY   Last time this was given:  1 mg on 9/26/2018  9:59 PM                                aspirin 81 MG EC tablet   Take 1 tablet (81 mg) by mouth 2 times daily   Last time this was given:  81 mg on 9/27/2018  8:58 AM                                atorvastatin 40 MG tablet   Commonly known as:  LIPITOR   TAKE 1 TABLET BY MOUTH DAILY   Last time this was given:  40 mg on 9/27/2018  8:58 AM                                azithromycin 250 MG tablet   Commonly known as:  ZITHROMAX   Take 1 tablet (250 mg) by mouth daily for 2 days   Last time this was given:  250 mg on 9/26/2018  2:45 PM                                benazepril 10 MG tablet   Commonly known as:  LOTENSIN   TAKE 1 TABLET BY MOUTH DAILY   Last time this was given:  10 mg on 9/27/2018  8:58 AM                                benzonatate 200 MG capsule   Commonly known as:  TESSALON   TAKE 1 CAPSULE BY MOUTH 3 TIMES  A DAY AS NEEDED FOR COUGH   Last time this was given:  200 mg on 9/26/2018  9:58 PM                                budesonide-formoterol 160-4.5 MCG/ACT Inhaler   Commonly known as:  SYMBICORT   Inhale 2 puffs into the lungs 2 times daily. As needed                                butalbital-acetaminophen-caffeine -40 MG per tablet   Commonly known as:  FIORICET/ESGIC   Take 1 tablet by mouth every 6 hours as needed   Last time this was given:  1 tablet on 9/26/2018 10:11 AM                                cimetidine 800 MG tablet   Commonly known as:  TAGAMET   Take 1 tablet (800 mg) by mouth At Bedtime As needed                                doxepin 10 MG capsule   Commonly known as:  SINEquan   Take 1 capsule (10 mg) by mouth At Bedtime   Last time this was given:  10 mg on 9/26/2018  9:58 PM                                EPIPEN 2-JAIRO 0.3 MG/0.3ML injection 2-pack   INJECT 1 SYRINGE INTRAMUSCULARLY ONCE AS NEEDED FOR ANAPHYLAXIS   Generic drug:  EPINEPHrine                                escitalopram 20 MG tablet   Commonly known as:  LEXAPRO   TAKE 1 TABLET BY MOUTH 2 TIMES A DAY   Last time this was given:  20 mg on 9/27/2018  8:58 AM                                fluconazole 150 MG tablet   Commonly known as:  DIFLUCAN   Take 1 tablet (150 mg) by mouth every 3 days   Last time this was given:  150 mg on 9/26/2018 10:11 AM                                hydrochlorothiazide 25 MG tablet   Commonly known as:  HYDRODIURIL   TAKE 1 TABLET BY MOUTH DAILY   Last time this was given:  25 mg on 9/27/2018  8:58 AM                                ipratropium - albuterol 0.5 mg/2.5 mg/3 mL 0.5-2.5 (3) MG/3ML neb solution   Commonly known as:  DUONEB   Take 1 vial (3 mLs) by nebulization every 4 hours as needed for shortness of breath / dyspnea or wheezing   Last time this was given:  3 mLs on 9/27/2018  8:07 AM                                meloxicam 15 MG tablet   Commonly known as:  MOBIC   Take 1 tablet (15 mg)  by mouth daily   Last time this was given:  15 mg on 9/27/2018  8:58 AM                                montelukast 10 MG tablet   Commonly known as:  SINGULAIR   TAKE 1 TABLET BY MOUTH DAILY   Last time this was given:  10 mg on 9/27/2018  8:58 AM                                * nicotine 14 MG/24HR 24 hr patch   Commonly known as:  NICODERM CQ   Place 1 patch onto the skin every 24 hours   Last time this was given:  1 patch on 9/26/2018  2:43 PM                                * nicotine 7 MG/24HR 24 hr patch   Commonly known as:  NICODERM CQ   Place 1 patch onto the skin every 24 hours                                * nicotine 21 MG/24HR 24 hr patch   Commonly known as:  NICODERM CQ   Place 1 patch onto the skin every 24 hours                                nicotine polacrilex 4 MG lozenge   Commonly known as:  EQL NICOTINE   Place 1 lozenge (4 mg) inside cheek as needed for smoking cessation                                nystatin [id number] UNIT/ML suspension   Commonly known as:  MYCOSTATIN   TAKE 1 TEASPOON (5MLS) BY MOUTH 4 TIMES DAILY                                * order for DME   Equipment being ordered: Nebulizer supplies                                * order for DME   Equipment being ordered: mcgee walker                                * order for DME   Equipment being ordered: toilet seat riser                                * order for DME   Equipment being ordered: Please install safety bar in bathroom to assist patient in getting up from toilet.                                oxyCODONE-acetaminophen 5-325 MG per tablet   Commonly known as:  PERCOCET   TAKE 1 TO 2 TABLETS BY MOUTH EVERY 4 HOURS AS NEEDED FOR PAIN   Last time this was given:  1 tablet on 9/26/2018  4:55 PM                                potassium chloride SA 10 MEQ CR tablet   Commonly known as:  K-DUR/KLOR-CON M   TAKE 1 TABLET BY MOUTH 2 TIMES DAILY WITH FOOD                                predniSONE 10 MG tablet   Commonly known as:   DELTASONE   Take 4 tablets daily for 3 days,  take 2 tablets daily for 3 days, take 1 tablet daily for 3 days, take half a tablet for 3 days.   Last time this was given:  40 mg on 9/26/2018 12:05 PM                                temazepam 30 MG capsule   Commonly known as:  RESTORIL   TAKE 1 CAPSULE BY MOUTH NIGHTLY AS NEEDED FOR SLEEP   Last time this was given:  30 mg on 9/26/2018  9:59 PM                                VOLTAREN 1 % Gel topical gel   APPLY 4 GRAMS TO KNEES FOUR TIMES A DAY USING ENCLOSED DOSING CARD   Generic drug:  diclofenac                                * Notice:  This list has 7 medication(s) that are the same as other medications prescribed for you. Read the directions carefully, and ask your doctor or other care provider to review them with you.              More Information        Discharge Instructions: COPD  You have been diagnosed with chronic obstructive pulmonary disease (COPD). This is a name given to a group of diseases that limit the flow of air in and out of your lungs. This makes it harder to breathe. With COPD, you are also more likely to get lung infections. COPD includes chronic bronchitis and emphysema. COPD is most often caused by heavy, long-term cigarette smoking.  Home care  Quit smoking    If you smoke, quit. It is the best thing you can do for your COPD and your overall health.    Join a stop-smoking program. There are even telephone, text message, and Internet programs to help you quit.    Ask your healthcare provider about medicines or other methods to help you quit.    Ask family members to quit smoking as well.    Don't allow people to smoke in your home, in your car, or when they are around you.  Protect yourself from infection    Wash your hands often. Do your best to keep your hands away from your face. Most germs are spread from your hands to your mouth.    Get a flu shot every year. Also ask your provider about pneumonia vaccines.    Avoid crowds. It's  especially important to do this in the winter when more people have colds and flu.    To stay healthy, get enough sleep, exercise regularly, and eat a balanced diet. You should:  ? Get about 8 hours of sleep every night.  ? Try to exercise for at least 30 minutes on most days.  ? Have healthy foods including fruits and vegetables, 100% whole grains, lean meats and fish, and low-fat dairy products. Try to stay away from foods high in fats and sugar.  Take your medicines  Take your medicines exactly as directed. Don't skip doses.  Manage your stress  Stress can make COPD worse. Use this stress management technique:    Find a quiet place and sit or lie in a comfortable position.    Close your eyes and perform breathing exercises for several minutes. Ask your provider about the best way to breathe.  Pulmonary rehabilitation    Pulmonary rehab can help you feel better. These programs include exercise, breathing techniques, information about COPD, counseling, and help for smokers.    Ask your provider or your local hospital about programs in your area.  When to call your healthcare provider  Call your provider immediately if you have any of the following:    Shortness of breath, wheezing, or coughing    Increased mucus    Yellow, green, bloody, or smelly mucus    Fever or chills    Tightness in your chest that does not go away with rest or medicine    An irregular heartbeat or a feeling that your heart is beating very fast    Swollen ankles   Date Last Reviewed: 5/1/2016 2000-2017 [employer]. [address] [place] [id number]. All rights reserved. This information is not intended as a substitute for professional medical care. Always follow your healthcare professional's instructions.                COPD Flare    You have had a flare-up of your COPD.  COPD, or chronic obstructive pulmonary disease, is a common lung disease. It causes your airways to become irritated and narrower. This makes it  harder for you to breathe. Emphysema and chronic bronchitis are both types of COPD. This is a chronic condition, which means you always have it. Sometimes it gets worse. When this happens, it is called a flare-up.  Symptoms of COPD  People with COPD may have symptoms most of the time. In a flare-up, your symptoms get worse. These symptoms may mean you are having a flare-up:    Shortness of breath, shallow or rapid breathing, or wheezing that gets worse    Lung infection    Cough that gets worse    More mucus, thicker mucus or mucus of a different color    Tiredness, decreased energy, or trouble doing your usual activities    Fever    Chest tightness    Your symptoms don t get better even when you use your usual medicines, inhalers, and nebulizer    Trouble talking    You feel confused  Causes of flare-ups  Unfortunately, a flare-up can happen even though you did everything right, and you followed your doctor s instructions. Some causes of flare-ups are:    Smoking or secondhand smoke    Colds, the flu, or respiratory infections    Air pollution    Sudden change in the weather    Dust, irritating chemicals, or strong fumes    Not taking your medicines as prescribed  Home care  Here are some things you can do at home to treat a flare-up:    Try not to panic. This makes it harder to breathe, and keeps you from doing the right things.    Don t smoke or be around others who are smoking.    Try to drink more fluids than usual during a flare-up, unless your doctor has told you not to because of heart and kidney problems. More fluids can help loosen the mucus.    Use your inhalers and nebulizer, if you have one, as you have been told to.    If you were given antibiotics, take them until they are used up or your doctor tells you to stop. It s important to finish the antibiotics, even though you feel better. This will make sure the infection has cleared.    If you were given prednisone or another steroid, finish it even if  you feel better.  Preventing a flare-up  Even though flare-ups happen, the best way to treat one is to prevent it before it starts. Here are some pointers:    Don t smoke or be around others who are smoking.    Take your medicines as you have been told.    Talk with your doctor about getting a flu shot every year. Also find out if you need a pneumonia shot.    If there is a weather advisory warning to stay indoors, try to stay inside when possible.    Try to eat healthy and get plenty of sleep.    Try to avoid things that usually set you off, like dust, chemical fumes, hairsprays, or strong perfumes.  Follow-up care  Follow up with your healthcare provider, or as advised.  If a culture was done, you will be told if your treatment needs to be changed. You can call as directed for the results.  If X-rays were done, you will be notified of any new findings that may affect your care.  Call 911  Call 911 if any of these occur:    You have trouble breathing    You feel confused or it s difficult to wake you up    You faint or lose consciousness    You have a rapid heart rate    You have new pain in your chest, arm, shoulder, neck or upper back  When to seek medical advice  Call your healthcare provider right away if any of these occur:    Wheezing or shortness of breath gets worse    You need to use your inhalers more often than usual without relief    Fever of 100.4 F (38 C) or higher, or as directed by your healthcare provider    Coughing up lots of dark-colored or bloody mucus (sputum)    Chest pain with each breath    You do not start to get better within 24 hours    Swelling of your ankles gets worse    Dizziness or weakness  Date Last Reviewed: 9/1/2016 2000-2017 [employer]. [address] [place] [id number]. All rights reserved. This information is not intended as a substitute for professional medical care. Always follow your healthcare professional's instructions.                Patient  Education    Azithromycin Ophthalmic drops, solution    Azithromycin Oral suspension    Azithromycin Oral suspension, extended release    Azithromycin Oral tablet    Azithromycin Solution for injection  Azithromycin Oral tablet  What is this medicine?  AZITHROMYCIN (az ith holger MYE sin) is a macrolide antibiotic. It is used to treat or prevent certain kinds of bacterial infections. It will not work for colds, flu, or other viral infections.  This medicine may be used for other purposes; ask your health care provider or pharmacist if you have questions.  What should I tell my health care provider before I take this medicine?  They need to know if you have any of these conditions:    kidney disease    liver disease    irregular heartbeat or heart disease    an unusual or allergic reaction to azithromycin, erythromycin, other macrolide antibiotics, foods, dyes, or preservatives    pregnant or trying to get pregnant    breast-feeding  How should I use this medicine?  Take this medicine by mouth with a full glass of water. Follow the directions on the prescription label. The tablets can be taken with food or on an empty stomach. If the medicine upsets your stomach, take it with food. Take your medicine at regular intervals. Do not take your medicine more often than directed. Take all of your medicine as directed even if you think your are better. Do not skip doses or stop your medicine early.  Talk to your pediatrician regarding the use of this medicine in children. Special care may be needed.  Overdosage: If you think you have taken too much of this medicine contact a poison control center or emergency room at once.  NOTE: This medicine is only for you. Do not share this medicine with others.  What if I miss a dose?  If you miss a dose, take it as soon as you can. If it is almost time for your next dose, take only that dose. Do not take double or extra doses.  What may interact with this medicine?  Do not take this  medicine with any of the following medications:    lincomycin  This medicine may also interact with the following medications:    amiodarone    antacids    birth control pills    cyclosporine    digoxin    magnesium    nelfinavir    phenytoin    warfarin  This list may not describe all possible interactions. Give your health care provider a list of all the medicines, herbs, non-prescription drugs, or dietary supplements you use. Also tell them if you smoke, drink alcohol, or use illegal drugs. Some items may interact with your medicine.  What should I watch for while using this medicine?  Tell your doctor or health care professional if your symptoms do not improve.  Do not treat diarrhea with over the counter products. Contact your doctor if you have diarrhea that lasts more than 2 days or if it is severe and watery.  This medicine can make you more sensitive to the sun. Keep out of the sun. If you cannot avoid being in the sun, wear protective clothing and use sunscreen. Do not use sun lamps or tanning beds/booths.  What side effects may I notice from receiving this medicine?  Side effects that you should report to your doctor or health care professional as soon as possible:    allergic reactions like skin rash, itching or hives, swelling of the face, lips, or tongue    confusion, nightmares or hallucinations    dark urine    difficulty breathing    hearing loss    irregular heartbeat or chest pain    pain or difficulty passing urine    redness, blistering, peeling or loosening of the skin, including inside the mouth    white patches or sores in the mouth    yellowing of the eyes or skin  Side effects that usually do not require medical attention (report to your doctor or health care professional if they continue or are bothersome):    diarrhea    dizziness, drowsiness    headache    stomach upset or vomiting    tooth discoloration    vaginal irritation  This list may not describe all possible side effects. Call  your doctor for medical advice about side effects. You may report side effects to FDA at 5-550-IWY-3432.  Where should I keep my medicine?  Keep out of the reach of children.  Store at room temperature between 15 and 30 degrees C (59 and 86 degrees F). Throw away any unused medicine after the expiration date.  NOTE:This sheet is a summary. It may not cover all possible information. If you have questions about this medicine, talk to your doctor, pharmacist, or health care provider. Copyright  2016 [employer]                Flu Vaccines for Adults   The flu (influenza) is caused by a virus that is easily spread. A flu vaccine protects you and others from the flu. It s best to get a flu shot every year in late summer or early fall, as soon as the vaccine is available in your area. You can get it at your healthcare provider s office or a health clinic. Pharmacies, senior centers, and workplaces often offer flu shots, too. If you want to know if your provider has the flu vaccine available, or if you have other questions, ask your healthcare provider.    Flu facts    The flu shot won t give you the flu. The virus that is in the flu shot has been killed (inactivated).    The flu can be dangerous--even life-threatening. Every year thousands of people die from complications from the flu.    The flu is caused by a virus. It can t be treated with antibiotics.    Influenza is not the same as stomach flu, the 24-hour virus that causes vomiting and diarrhea. The stomach flu most likely happens because of a GI (gastrointestinal) infection, not the flu.    You need to get a flu shot each year.   Flu symptoms  Flu symptoms tend to come on quickly. They include:    Fever    Headache    Tiredness (fatigue)    Cough    Sore throat    Runny nose    Muscle aches  Upset stomach and vomiting are not common for adults. Some symptoms such as tiredness and cough may last for many weeks.  How a flu vaccine protects you  There are many types  (strains) of the flu virus. Medical experts predict which strains are most likely to make people sick each year. Flu shots are made from these strains. When you get a flu vaccine, killed (inactivated) viruses are injected into your body. These can t give you the flu. But they do cause your body to make antibodies to fight these flu strains. If you are exposed to the same strains later in the flu season, the antibodies will fight off the germs.  Who should get the flu vaccine?  The CDC recommends that infants over the age of 6 months and all children and adults should get a flu shot every year.  Some people are at an increased risk of developing serious complications from the flu. It is extremely important that these people get the vaccine. They include those with:    Long-term heart and lung conditions    Other serious health conditions such as:  ? Endocrine disorders such as diabetes  ? Kidney or liver disorders  ? Weakened immune system from disease or medical treatment. For example, people with HIV or AIDS, or those taking long-term steroids or medicines to treat cancer.  ? Blood disorders such as sickle cell disease  It is also very important that others who have an increased risk of being exposed to the flu or are around people with increased risk for complications get the vaccine. This includes:    Healthcare providers and other staff who provide care in hospitals, nursing homes, home health, and other facilities    Household members, including children of people in high-risk groups  Types of flu vaccines  The flu vaccine is available as a regular and a high-strength shot. Your healthcare provider will recommend the vaccine that is best for you.  Flu shot  The flu shot is available in a few different forms. Your healthcare provider will determine which vaccine is right for you. There is a high-dose vaccine for those over age 65 and a vaccine for those with egg allergies. It is safe for most people. Talk with  your provider if you have had:    A severe allergic reaction to a previous flu vaccine    Guillain-Barré syndrome. This is a severe paralyzing condition.  Nasal spray  The nasal spray is not recommended for the 7181-8703 flu season. The CDC says the nasal spray did not seem to protect against the flu over the last several flu seasons.  Date Last Reviewed: 12/1/2016 2000-2017 [employer]. [address] [place] [address]. All rights reserved. This information is not intended as a substitute for professional medical care. Always follow your healthcare professional's instructions.

## 2018-09-24 NOTE — PLAN OF CARE
Pt admitted to room 3212 at 1436 via stretcher. Pt ambulated to bed via self. Rates pain 6/10 to left knee. Remains on 2L NC at 93%. MRSA sent. Flu shot ordered for AM. Spiritual consult ordered. Pt very emotional, tearful and anxious. Started on IV fluids. Given zithromax. Continues on IV solu-medrol.     Face to face report given with opportunity to observe patient.    Report given to Karlos OSPINA   9/24/2018  7:12 PM

## 2018-09-24 NOTE — H&P
Admitted:     09/24/2018      CHIEF COMPLAINT:  Shortness of breath.      HISTORY OF PRESENT ILLNESS:  Sydni is a 68-year-old female with a long-standing history of ongoing tobacco abuse, who also has believed to be underlying COPD.  Never had formal pulmonary function studies that I can see.  She has had numerous issues with shortness of breath and cough and fatigue over the last almost 4-5 weeks.  Initially, she presented back in July with some cough and congestion.  She was given some steroids, improved at followup on 08/03, did okay up until 08/29, where she presented once again with cough, congestion, shortness of breath, was never hypoxic at all.  Chest x-ray was normal.  She was given some doxycycline and a prednisone taper once again.  Two weeks later on 09/10, she followed up with Dr. Alarcon.  She was doing better and sats were 93%.  Then, 6 days later on 09/16, she presented to the ER once again with shortness of breath, wheezing.  X-ray fine, labs okay.  She was given a further prednisone taper along with a continuation of doxycycline.  Her O2 sats were once again 94%.  Then, she came to the ER today on the 09/24 with worsening shortness of breath that occurred over the weekend.  Just felt very fatigued, maybe felt febrile, did not check her temperature, some chills, a dry nonproductive cough, just some soreness from coughing, just very fatigued, short of breath.  Checked her own sats.  They were in the mid 70% range per her report.  She called the ambulance.  They said she had sats in the mid 70% range.  Came to our ER, put on 2 liters O2.  Her sats were up in the 90% range.  Chest x-ray showed just a questionable infiltrate in the left lower lobe.  Nothing of major change.  She was afebrile.  Got a neb dose of Solu-Medrol.  Sats have been fine on 2 liters of O2 within the mid 90% range.  Taking her off the oxygen, she did drop down in the mid 80% range  apparently.  So, she is being admitted for what  appears to be a COPD exacerbation.  Unclear if there is truly a bacterial infection here at all.      She has had no bleeding troubles at all.  No abdominal pain, no significant nausea or vomiting.  No peripheral edema.  No chest pains, palpitations or syncope at all.  Has some soreness under her left ribs.  Occasionally, gets a cramp.  Has been eating and drinking okay.  States she has a lot of stress at home.  She has a couple of daughters living with her with multiple issues.  Has not been sleeping well at all.  Unfortunately, she continues to smoke even during all of these issues with her lungs and COPD exacerbation.  States she does plan on having a trial of stopping smoking with Blue Cross Blue Shield program.  Has not started that yet.      So on speaking with her, she is alert, pleasant, and in no real distress, just acts quite fatigued in general.      PAST MEDICAL HISTORY:  Significant for the followin.  Remote history of hypertension.   2.  History of cholecystectomy.   3.  She is status post right shoulder replacement.   4.  She is status post total knee arthroplasty of her right knee.   5.  She has had head and neck surgery for a hematoma that was drained on her head.   6.  Status post hysterectomy.   7.  History of osteoarthritis.   8.  History of a headache.   9.  Questionable history of a CVA.  I do not have all the details of that.   10.  History of anxiety.   11.  History of chronic obstructive pulmonary disease.   12.  History of ongoing tobacco abuse.      MEDICATIONS:  At home:   1.  Alprazolam 1 mg t.i.d. p.r.n. anxiety.   2.  Albuterol metered dose inhaler 1 puff 3 times a day as needed.   3.  Aspirin 81 mg b.i.d.   4.  Lipitor 40 mg daily.   5.  Lotensin 10 mg daily.   6.  Tessalon Perles 200 mg 3 times a day p.r.n. cough.   7.  Symbicort 160/4.5 two puffs b.i.d.   8.  Fioricet 50/325/40 one tablet every 6 hours p.r.n. headaches.     9.  Cimetidine 800 mg at bedtime.   10.  Sinequan  10 mg at bedtime.     11.  She has been taking some doxycycline 100 mg b.i.d.   12.  Prednisone, she is on a taper currently.    13.  Lexapro 20 mg b.i.d.   14.  Hydrochlorothiazide 25 mg daily.   15.  DuoNeb every four hours p.r.n.   16.  Mobic 15 mg daily.   17.  Singular 10 mg daily.   18.  Temazepam 30 mg at bedtime p.r.n. insomnia.   19.  Potassium chloride 10 mEq b.i.d. with food.     20.  Oxycodone 5/325, 1-2 every 4 hours p.r.n. pain.      ALLERGIES/INTOLERANCES:  BEE POLLEN.  CODEINE. KEFLEX HAS HIVES.  SULFA HAD THROAT CLOSING.  CIPRO HAD A RASH.  BUPROPION, IRRITABLE.  CELEBREX, WHEEZE.  CLONAZEPAM, HIVES. ERYTHROMYCIN HAD SOME STOMACH DISCOMFORT. LISINOPRIL HAD HEADACHES.  NIFEDIPINE HAD HIVES.  TRAMADOL, INTOLERANCE.      SOCIAL HISTORY:  She is , lives at home, has a couple of daughters who live with her.  Ongoing smoker, 1 pack per day for 53 pack years.  Rare social drinker.      FAMILY HISTORY:  Mother had breast cancer and coronary artery disease.  Father had coronary artery disease.      REVIEW OF SYSTEMS:  Pertinent positives and negatives as noted above in the HPI.  As noted, the patient is able to ambulate, when she is not having pulmonary issues, relatively well.      PHYSICAL EXAMINATION:   GENERAL:  She is alert, pleasant, in no obvious distress.   VITAL SIGNS:  Temperature is 98.6, heart rates in the 80s, blood pressure 160-170/.  Sats are 92-95% on 2 liters.   HEENT:  Normocephalic, atraumatic.  Pupils equal, round, reactive.  Sclerae are clear.  Neck is supple, no obvious lymphadenopathy or thyromegaly.  Mucous membranes are moist.   LUNGS:  She has expiratory wheezes, mild to moderate amount bilaterally.  No obvious areas of consolidation.  There is occasional rhonchi noted.   CARDIAC:  Difficult to auscultate due to her wheeze anterior but appears to be regular rate and rhythm.  Normal S1, S2, no audible murmurs, rubs or gallops.  Neck veins appear to be flat.   ABDOMEN:   Soft, nontender, bowel sounds are positive.  No mass, organomegaly.   EXTREMITIES:  Without cyanosis, clubbing or edema.   NEUROLOGIC:  She is nonfocal.      IMAGING:  Chest x-ray shows plus/minus slight infiltrate/atelectasis at the left lower lobe.  Procalcitonin is less than 0.05.  Sodium 135, potassium 3.5, chloride 98, CO2 is 30, BUN is 22, creatinine is 0.97, glucose 97.  LFTs are normal.  Lactic acid was not done.  White count 12,700, hemoglobin is 15.8, platelet count is 296,000.      ASSESSMENT:   1.  Acute hypoxic respiratory failure.  The patient presents with significant hypoxia.  She has been ill for at least the last 5 weeks with recurrent visits to the ER and the clinic with complaints of shortness of breath.  At no point has she been really hypoxic at all.  She has been treated with varying courses of a taper of prednisone along with some doxycycline.  Comes in today after having been on doxycycline and a prednisone taper with worsening shortness of breath and now hypoxia.  Her chest x-ray was basically unremarkable.  Procalcitonin is normal.  Did not obtain a blood gas at this point as she seemingly has calmed down with nebulizer therapy and getting a dose of some steroids.  We will continue to monitor her closely.  I do not feel she requires arterial blood gas at this point.  We will treat her for a COPD exacerbation and monitor her closely.   2.  Chronic obstructive pulmonary disease exacerbation.  Longstanding history of tobacco abuse, ongoing abuse, even currently.  Comes in with 5 weeks of shortness of breath, cough, fatigue, weakness.  Certainly, she is hypoxic upon her admission.  I see no obvious signs of a bacterial infection.  Procalcitonin is negative.  X-ray is unremarkable for the most part.  Likely, this is a virus versus just a pure COPD exacerbation.  We will treat her with some steroids.  She got a dose of IV Solu-Medrol in the ER.  Will continue some 60 mg q. 6h. currently.   Frequent nebs with DuoNeb and albuterol as needed.  We will give her some azithromycin just empirically at this point.  Continue to monitor closely.  Hopefully, we can wean down her O2.  I did discuss with her that she may require home oxygen at discharge.  It is mandatory that she stop smoking otherwise, this is probably never going to significantly improve.  She should get some formal PFTs once this does resolve.   3.  Ongoing tobacco abuse.  Definitely addicted patient to this.  We will give her the patch while she is here.  Says she wants to quit and has signed up for the [employer] I Quit plan, but we will see if she does follow up.  If she does require home O2, this is going to be mandatory she stops.   1.  Hypertension.  Blood pressures are up currently.  We will see how she does when she calms down here.  She is already on some lisinopril and can certainly add other agents if necessary.  But we will just monitor for now.   2.  Anxiety.  The patient has a lot of anxiety issues in her personal life.  She is on multiple meds including Xanax, Sinequan, Celexa, Temazepam.  These are prescribed by Dr. Alarcon.  Some of the dosages flag up here, but I will leave that for him to address those as an outpatient as he is the prescribing physician.        Anticipated hospital stay is likely going to be greater than or equal to 2 midnights.      CODE STATUS:  SHE IS A FULL CODE.         SHANT ESTRELLA MD             D: 2018   T: 2018   MT: NTS      Name:     AGUSTINA BIRMINGHAM   MRN:      [id number]        Account:      [id number]   :      1950        Admitted:     2018                   Document: [id number]       cc: Gibran Alarcon MD

## 2018-09-24 NOTE — ED NOTES
Patient up to bathroom, sats down to 75%. Placed on 2L of O2 NC with sats up to 91%. Savi KHAN updated.

## 2018-09-24 NOTE — ED NOTES
Patient aware of plan of care with no questions or concerns. Report called to Solange. Manfreds with patient.

## 2018-09-24 NOTE — IP AVS SNAPSHOT
[facility]    [address] [place] [id number]    Phone:  [phone]    Fax:  [phone]                                       After Visit Summary   9/24/2018    Sydni Hardin    MRN: [id number]           After Visit Summary Signature Page     I have received my discharge instructions, and my questions have been answered. I have discussed any challenges I see with this plan with the nurse or doctor.    ..........................................................................................................................................  Patient/Patient Representative Signature      ..........................................................................................................................................  Patient Representative Print Name and Relationship to Patient    ..................................................               ................................................  Date                                   Time    ..........................................................................................................................................  Reviewed by Signature/Title    ...................................................              ..............................................  Date                                               Time          22EPIC Rev 08/18

## 2018-09-25 LAB
ALBUMIN SERPL-MCNC: 3 G/DL (ref 3.4–5)
ALP SERPL-CCNC: 69 U/L (ref 40–150)
ALT SERPL W P-5'-P-CCNC: 33 U/L (ref 0–50)
ANION GAP SERPL CALCULATED.3IONS-SCNC: 7 MMOL/L (ref 3–14)
AST SERPL W P-5'-P-CCNC: 29 U/L (ref 0–45)
BACTERIA SPEC CULT: NORMAL
BASOPHILS # BLD AUTO: 0 10E9/L (ref 0–0.2)
BASOPHILS NFR BLD AUTO: 0.1 %
BILIRUB SERPL-MCNC: 0.3 MG/DL (ref 0.2–1.3)
BUN SERPL-MCNC: 24 MG/DL (ref 7–30)
CALCIUM SERPL-MCNC: 8 MG/DL (ref 8.5–10.1)
CHLORIDE SERPL-SCNC: 98 MMOL/L (ref 94–109)
CO2 SERPL-SCNC: 26 MMOL/L (ref 20–32)
CREAT SERPL-MCNC: 0.78 MG/DL (ref 0.52–1.04)
DIFFERENTIAL METHOD BLD: ABNORMAL
EOSINOPHIL # BLD AUTO: 0 10E9/L (ref 0–0.7)
EOSINOPHIL NFR BLD AUTO: 0.1 %
ERYTHROCYTE [DISTWIDTH] IN BLOOD BY AUTOMATED COUNT: 12.8 % (ref 10–15)
GFR SERPL CREATININE-BSD FRML MDRD: 73 ML/MIN/1.7M2
GLUCOSE SERPL-MCNC: 179 MG/DL (ref 70–99)
HCT VFR BLD AUTO: 38.8 % (ref 35–47)
HGB BLD-MCNC: 13.3 G/DL (ref 11.7–15.7)
IMM GRANULOCYTES # BLD: 0.1 10E9/L (ref 0–0.4)
IMM GRANULOCYTES NFR BLD: 0.7 %
LYMPHOCYTES # BLD AUTO: 1.5 10E9/L (ref 0.8–5.3)
LYMPHOCYTES NFR BLD AUTO: 11.4 %
MCH RBC QN AUTO: 30.5 PG (ref 26.5–33)
MCHC RBC AUTO-ENTMCNC: 34.3 G/DL (ref 31.5–36.5)
MCV RBC AUTO: 89 FL (ref 78–100)
MONOCYTES # BLD AUTO: 0.1 10E9/L (ref 0–1.3)
MONOCYTES NFR BLD AUTO: 1 %
NEUTROPHILS # BLD AUTO: 11.6 10E9/L (ref 1.6–8.3)
NEUTROPHILS NFR BLD AUTO: 86.7 %
NRBC # BLD AUTO: 0 10*3/UL
NRBC BLD AUTO-RTO: 0 /100
PLATELET # BLD AUTO: 268 10E9/L (ref 150–450)
POTASSIUM SERPL-SCNC: 4 MMOL/L (ref 3.4–5.3)
PROT SERPL-MCNC: 5.6 G/DL (ref 6.8–8.8)
RBC # BLD AUTO: 4.36 10E12/L (ref 3.8–5.2)
SODIUM SERPL-SCNC: 131 MMOL/L (ref 133–144)
SPECIMEN SOURCE: NORMAL
WBC # BLD AUTO: 13.4 10E9/L (ref 4–11)

## 2018-09-25 PROCEDURE — 36415 COLL VENOUS BLD VENIPUNCTURE: CPT | Performed by: INTERNAL MEDICINE

## 2018-09-25 PROCEDURE — 12000000 ZZH R&B MED SURG/OB

## 2018-09-25 PROCEDURE — 94640 AIRWAY INHALATION TREATMENT: CPT | Mod: 76

## 2018-09-25 PROCEDURE — 80053 COMPREHEN METABOLIC PANEL: CPT | Performed by: INTERNAL MEDICINE

## 2018-09-25 PROCEDURE — 25000128 H RX IP 250 OP 636: Performed by: INTERNAL MEDICINE

## 2018-09-25 PROCEDURE — 40000275 ZZH STATISTIC RCP TIME EA 10 MIN

## 2018-09-25 PROCEDURE — 25000132 ZZH RX MED GY IP 250 OP 250 PS 637: Mod: GY | Performed by: INTERNAL MEDICINE

## 2018-09-25 PROCEDURE — A9270 NON-COVERED ITEM OR SERVICE: HCPCS | Mod: GY | Performed by: INTERNAL MEDICINE

## 2018-09-25 PROCEDURE — 99233 SBSQ HOSP IP/OBS HIGH 50: CPT | Performed by: INTERNAL MEDICINE

## 2018-09-25 PROCEDURE — 85025 COMPLETE CBC W/AUTO DIFF WBC: CPT | Performed by: INTERNAL MEDICINE

## 2018-09-25 PROCEDURE — 94640 AIRWAY INHALATION TREATMENT: CPT

## 2018-09-25 PROCEDURE — 25000125 ZZHC RX 250: Performed by: INTERNAL MEDICINE

## 2018-09-25 RX ORDER — ESCITALOPRAM OXALATE 10 MG/1
20 TABLET ORAL 2 TIMES DAILY
Status: DISCONTINUED | OUTPATIENT
Start: 2018-09-25 | End: 2018-09-27 | Stop reason: HOSPADM

## 2018-09-25 RX ORDER — MELOXICAM 7.5 MG/1
15 TABLET ORAL DAILY
Status: DISCONTINUED | OUTPATIENT
Start: 2018-09-25 | End: 2018-09-27 | Stop reason: HOSPADM

## 2018-09-25 RX ADMIN — MONTELUKAST 10 MG: 10 TABLET, FILM COATED ORAL at 09:02

## 2018-09-25 RX ADMIN — METHYLPREDNISOLONE SODIUM SUCCINATE 62.5 MG: 125 INJECTION, POWDER, FOR SOLUTION INTRAMUSCULAR; INTRAVENOUS at 17:25

## 2018-09-25 RX ADMIN — BUTALBITAL, ACETAMINOPHEN AND CAFFEINE 1 TABLET: 50; 325; 40 TABLET ORAL at 07:58

## 2018-09-25 RX ADMIN — BENZONATATE 200 MG: 100 CAPSULE, LIQUID FILLED ORAL at 21:08

## 2018-09-25 RX ADMIN — IPRATROPIUM BROMIDE AND ALBUTEROL SULFATE 3 ML: .5; 3 SOLUTION RESPIRATORY (INHALATION) at 19:17

## 2018-09-25 RX ADMIN — DOXEPIN HYDROCHLORIDE 10 MG: 10 CAPSULE ORAL at 21:04

## 2018-09-25 RX ADMIN — POTASSIUM CHLORIDE AND SODIUM CHLORIDE: 900; 150 INJECTION, SOLUTION INTRAVENOUS at 12:08

## 2018-09-25 RX ADMIN — METHYLPREDNISOLONE SODIUM SUCCINATE 62.5 MG: 125 INJECTION, POWDER, FOR SOLUTION INTRAMUSCULAR; INTRAVENOUS at 12:10

## 2018-09-25 RX ADMIN — ALPRAZOLAM 1 MG: 0.5 TABLET ORAL at 19:19

## 2018-09-25 RX ADMIN — AZITHROMYCIN MONOHYDRATE 500 MG: 500 INJECTION, POWDER, LYOPHILIZED, FOR SOLUTION INTRAVENOUS at 15:16

## 2018-09-25 RX ADMIN — IPRATROPIUM BROMIDE AND ALBUTEROL SULFATE 3 ML: .5; 3 SOLUTION RESPIRATORY (INHALATION) at 15:24

## 2018-09-25 RX ADMIN — POTASSIUM CHLORIDE AND SODIUM CHLORIDE: 900; 150 INJECTION, SOLUTION INTRAVENOUS at 22:54

## 2018-09-25 RX ADMIN — ASPIRIN 81 MG: 81 TABLET, COATED ORAL at 09:03

## 2018-09-25 RX ADMIN — HYDROCHLOROTHIAZIDE 25 MG: 25 TABLET ORAL at 09:02

## 2018-09-25 RX ADMIN — IPRATROPIUM BROMIDE AND ALBUTEROL SULFATE 3 ML: .5; 3 SOLUTION RESPIRATORY (INHALATION) at 11:20

## 2018-09-25 RX ADMIN — ASPIRIN 81 MG: 81 TABLET, COATED ORAL at 21:04

## 2018-09-25 RX ADMIN — ATORVASTATIN CALCIUM 40 MG: 40 TABLET, FILM COATED ORAL at 09:02

## 2018-09-25 RX ADMIN — IPRATROPIUM BROMIDE AND ALBUTEROL SULFATE 3 ML: .5; 3 SOLUTION RESPIRATORY (INHALATION) at 22:33

## 2018-09-25 RX ADMIN — BENAZEPRIL HYDROCHLORIDE 10 MG: 10 TABLET ORAL at 16:57

## 2018-09-25 RX ADMIN — POTASSIUM CHLORIDE 10 MEQ: 750 TABLET, FILM COATED, EXTENDED RELEASE ORAL at 09:02

## 2018-09-25 RX ADMIN — OXYCODONE AND ACETAMINOPHEN 1 TABLET: 5; 325 TABLET ORAL at 12:08

## 2018-09-25 RX ADMIN — NICOTINE 1 PATCH: 14 PATCH, EXTENDED RELEASE TRANSDERMAL at 15:17

## 2018-09-25 RX ADMIN — METHYLPREDNISOLONE SODIUM SUCCINATE 62.5 MG: 125 INJECTION, POWDER, FOR SOLUTION INTRAMUSCULAR; INTRAVENOUS at 23:42

## 2018-09-25 RX ADMIN — ALPRAZOLAM 1 MG: 0.5 TABLET ORAL at 09:13

## 2018-09-25 RX ADMIN — MELOXICAM 15 MG: 7.5 TABLET ORAL at 16:57

## 2018-09-25 RX ADMIN — POTASSIUM CHLORIDE AND SODIUM CHLORIDE: 900; 150 INJECTION, SOLUTION INTRAVENOUS at 02:36

## 2018-09-25 RX ADMIN — ESCITALOPRAM OXALATE 20 MG: 10 TABLET ORAL at 21:04

## 2018-09-25 RX ADMIN — ALPRAZOLAM 1 MG: 0.5 TABLET ORAL at 21:04

## 2018-09-25 RX ADMIN — IPRATROPIUM BROMIDE AND ALBUTEROL SULFATE 3 ML: .5; 3 SOLUTION RESPIRATORY (INHALATION) at 07:22

## 2018-09-25 RX ADMIN — METHYLPREDNISOLONE SODIUM SUCCINATE 62.5 MG: 125 INJECTION, POWDER, FOR SOLUTION INTRAMUSCULAR; INTRAVENOUS at 06:21

## 2018-09-25 RX ADMIN — TEMAZEPAM 30 MG: 15 CAPSULE ORAL at 21:05

## 2018-09-25 RX ADMIN — ENOXAPARIN SODIUM 40 MG: 40 INJECTION SUBCUTANEOUS at 15:19

## 2018-09-25 RX ADMIN — POTASSIUM CHLORIDE 10 MEQ: 750 TABLET, FILM COATED, EXTENDED RELEASE ORAL at 17:25

## 2018-09-25 ASSESSMENT — ACTIVITIES OF DAILY LIVING (ADL)
BATHING: 0-->INDEPENDENT
AMBULATION: 0-->INDEPENDENT
TOILETING: 0-->INDEPENDENT
FALL_HISTORY_WITHIN_LAST_SIX_MONTHS: NO
ADLS_ACUITY_SCORE: 10
ADLS_ACUITY_SCORE: 10
ADLS_ACUITY_SCORE: 15
ADLS_ACUITY_SCORE: 10
DRESS: 0-->INDEPENDENT
COGNITION: 0 - NO COGNITION ISSUES REPORTED
SWALLOWING: 0-->SWALLOWS FOODS/LIQUIDS WITHOUT DIFFICULTY
TRANSFERRING: 0-->INDEPENDENT
ADLS_ACUITY_SCORE: 10
RETIRED_COMMUNICATION: 0-->UNDERSTANDS/COMMUNICATES WITHOUT DIFFICULTY
RETIRED_EATING: 0-->INDEPENDENT
ADLS_ACUITY_SCORE: 10

## 2018-09-25 NOTE — PLAN OF CARE
Pt. Up and ambulated in the hallway, pushing wheelchair, with oxygen 2 LPM, made it a loop around, very BRAND. Her O2 sats were 90-91% on the 2 LPM and .

## 2018-09-25 NOTE — PLAN OF CARE
Problem: Chronic Obstructive Pulmonary Disease (Adult)  Goal: Signs and Symptoms of Listed Potential Problems Will be Absent, Minimized or Managed (Chronic Obstructive Pulmonary Disease)  Signs and symptoms of listed potential problems will be absent, minimized or managed by discharge/transition of care (reference Chronic Obstructive Pulmonary Disease (Adult) CPG).   Outcome: No Change  Pt. Has been afebrile through the day, VS as charted.  Her O2 sats are in the low 90's on 2 LPM via NC, lung sounds are diminished with expiratory wheezing, she does get very BRAND.  She does complain of a headache 8/10 and has received fioricet x 1 and percocet x 1 - stated helpful.  She did get up and ambulate in the hallway x 1, tolerated well - very BRAND, O2 sats remain in the low 90's on 2 LPM via NC.  She does have IV fluids running at 100 mL/hr, receiving IV solumedrol and IV antibiotics, good oral intake/output, new IV placed - tolerated well.  She has remained free of falls through the day, call light within reach-does make her needs known, frequent rounding done to assess needs.

## 2018-09-25 NOTE — PLAN OF CARE
Face to face report given with opportunity to observe patient.    Report given to Jessica Lala   9/25/2018  3:24 PM

## 2018-09-25 NOTE — PHARMACY
[facility]    Pharmacy      Antimicrobial Stewardship Note     Current antimicrobial therapy:  Anti-infectives (Future)    Start     Dose/Rate Route Frequency Ordered Stop    09/24/18 1500  azithromycin (ZITHROMAX) 500 mg in sodium chloride 0.9 % 250 mL intermittent infusion      500 mg  over 1 Hours Intravenous EVERY 24 HOURS 09/24/18 1458            Indication: abscess?? Looks like it is prescribed for COPD exacerbation empirically    Days of Therapy: 2     Pertinent labs:  Creatinine   Creatinine   Date Value Ref Range Status   09/25/2018 0.78 0.52 - 1.04 mg/dL Final   09/24/2018 0.97 0.52 - 1.04 mg/dL Final   08/29/2018 0.84 0.52 - 1.04 mg/dL Final     WBC   WBC   Date Value Ref Range Status   09/25/2018 13.4 (H) 4.0 - 11.0 10e9/L Final   09/24/2018 12.7 (H) 4.0 - 11.0 10e9/L Final   08/29/2018 10.2 4.0 - 11.0 10e9/L Final     Procalcitonin   Procalcitonin   Date Value Ref Range Status   09/24/2018 <0.05 ng/ml Final     Comment:     <0.05 ng/ml  Normal  Recommendation: Very low risk of bacterial infection.   Discourage antibiotics unless strong clinical suspicion for serious infection.       CRP   CRP Inflammation   Date Value Ref Range Status   03/25/2017 140.0 (H) 0.0 - 8.0 mg/L Final       Culture Results: none     Recommendations/Interventions:  1. Consider changing to oral azithromycin      Jigna Jacques RPH  September 25, 2018

## 2018-09-25 NOTE — PROGRESS NOTES
Assessment completed see flow sheet.      LOC: alert    Others present: Patient    Dx: COPD    Lives with: Lives With: child(whit), adult    Living at: Living Arrangements: apartment    Equipment used:  Nebulizer, shower bench, raised toilet. Sydni uses a walker if she needs to walk long distances.     Support System: Description of Support System: Supportive    Primary Care Provider: Gibran Alarcon, her dentist is Dr Gamboa, goes to the [facility] and has seen Dr Olson.     POA/Guardian: No     Health Care Directive: YES, on file     Pharmacy: [facility]    :  no    Homecare/County Services:   no    Adequate Resources for needs (housing, utilities, food/med): YES    Meds and appointments management: YES    Work: YES    Transportation: YES     ADLs: Independent    Falls: No    Able to Return to Prior Living Arrangements: YES    [place]: NO    Goals: to go home    Barriers: still smoking    Needs: Demonstrates Competency    MG: Average    Discharge Plan: home    Sydni lives at [facility] with her mentally ill adult daughter who causes much stress in her life, her other daughter is in a witness protection program and Sydni is unable to contact her. She has a diagnosis of both depression and anxiety and sees Isaias Kilgore on a weekly basis for the last 6-7 yrs. She smokes 1.5 pks of cigarettes daily and rarely consumes alcohol. She is trying to quit smoking. Her support system are her friends Yenifer and Jennifer. She states both of her daughters are stressors. She states she enjoys absolutely nothing. Sydni plans to go home when discharged. CM will remain available.

## 2018-09-25 NOTE — PROGRESS NOTES
[facility]    Hospitalist Progress Note      Assessment & Plan   Sydni Hardin is a 68 year old female who was admitted on 9/24/2018.      1.  Acute hypoxic respiratory failure: Patient remains hypoxic this morning.  When placed on room air she dropped down to 85%.  Is back on 2 L.  Her lung exam does sound better.  She is moving better air with less wheezing although still present.  Clinically she looks markedly less short of breath.    2.  COPD exacerbation: Patient has a long history of COPD.  I do not really see she has ever had any formal PFTs performed.  Nonetheless I do not feel that this is truly a bacterial infection causing exacerbation.  I think is multifactorial.  Ongoing tobacco abuse, the weather changes and likely underlying viral infection.  Currently she is on IV steroids.  Will need to switch over to oral prednisone probably tomorrow.  Empirically placed her on some azithromycin.  Is getting the bronchodilators.  She does look better.  I told her that she may very well need oxygen at discharge she adamantly refuses that at this point.  However we will see how she does tomorrow.  She is concerned with her ongoing tobacco use.  I told her that she does need to get up and ambulate at least 2 times today if not more.    3.  Tobacco abuse: Once again went over the multiple reasons she needs to quit and she fully understands this.  But all likelihood is not going to quit currently    4.  Hypertension: Currently on Lotensin 10 mg a day.  Blood pressures are adequate given her current clinical status    5.  Anxiety: Continues to complain about her anxiety is getting her usual medications.        Diet: Combination Diet Regular Diet Adult  Fluids: Normal saline    # Pain Assessment:  Current Pain Score 9/25/2018   Patient currently in pain? yes   Pain score (0-10) 8   Pain location Head   Pain descriptors Pounding   Sydni sierra pain level was assessed and she currently denies pain.        DVT  Prophylaxis: Enoxaparin (Lovenox) SQ  Code Status: Full Code    Disposition: Expected discharge in 1-2 days once on oral medications.    Arpit Herndon    Interval History   Patient still feels poorly.  Although she looks markedly better.  She is on less oxygen.  Much less wheezing on examination.  She has a lot of anxiety and other issues ongoing.  Still requiring oxygen.  Drops down to 85% on room air.  Will ambulate continue with our current regimen.  Hopefully we will get her off the oxygen otherwise she will require home O2 which she currently declines.    -Data reviewed today: I reviewed all new labs and imaging results over the last 24 hours. I personally reviewed no images or EKG's today.    Physical Exam   Temp: 97  F (36.1  C) Temp src: Temporal BP: 159/79   Heart Rate: 87 Resp: 20 SpO2: (!) 91 % O2 Device: Nasal cannula Oxygen Delivery: 2 LPM  Vitals:    09/24/18 1442   Weight: 79.8 kg (175 lb 14.8 oz)     Vital Signs with Ranges  Temp:  [97  F (36.1  C)-98.8  F (37.1  C)] 97  F (36.1  C)  Heart Rate:  [83-92] 87  Resp:  [18-20] 20  BP: (123-177)/() 159/79  SpO2:  [86 %-95 %] 91 %  I/O last 3 completed shifts:  In: 874 [P.O.:480; I.V.:394]  Out: 625 [Urine:625]    Peripheral IV 09/24/18 Right Upper forearm (Active)   Site Assessment WDL 9/25/2018  7:55 AM   Line Status Infusing;Checked every 1-2 hour 9/25/2018  7:55 AM   Phlebitis Scale 0-->no symptoms 9/25/2018  7:55 AM   Infiltration Scale 0 9/25/2018  7:55 AM   Number of days:1       Wound 10/16/13 Right Finger (Comment which one) (Active)   Number of days:1805       Wound 10/16/13 Lip (Active)   Number of days:1805       Wound 08/08/14 Right Arm Abrasion(s) (Active)   Number of days:1509       Incision/Surgical Site 03/21/17 Right Knee (Active)   Number of days:553     No line/device    Constitutional: Alert and oriented x3. No distress    HEENT: Normocephalic/atraumatic, PERRL, EOMI, mouth moist, neck supple, no LN.     Cardiovascular: RRR. no  Murmur, no  rubs, or gallops.  JVD no.  Bruits no.  Pulses 3+    Respiratory: Inspiratory air movement.  Does have some mild expiratory wheezes with prolonged expiratory phase     Abdomen: Soft, nontender, nondistended, no organomegaly. Bowel sounds present    Extremities: Warm/dry. noedema    Neuro:   Non focal, cranial nerves intact, Moves all extremities.  Medications     0.9% sodium chloride + KCl 20 mEq/L 100 mL/hr at 09/25/18 0755       aspirin  81 mg Oral BID     atorvastatin  40 mg Oral Daily     azithromycin  500 mg Intravenous Q24H     benazepril  10 mg Oral Daily     cimetidine  800 mg Oral At Bedtime     doxepin  10 mg Oral At Bedtime     enoxaparin  40 mg Subcutaneous Q24H     escitalopram  20 mg Oral BID     hydrochlorothiazide  25 mg Oral Daily     influenza Vac Split High-Dose  0.5 mL Intramuscular Prior to discharge     ipratropium - albuterol 0.5 mg/2.5 mg/3 mL  3 mL Nebulization Q4H While awake     meloxicam  15 mg Oral Daily     methylPREDNISolone  62.5 mg Intravenous Q6H     montelukast  10 mg Oral Daily     nicotine  1 patch Transdermal Q24H     nicotine   Transdermal Q8H     nicotine   Transdermal Daily     potassium chloride  10 mEq Oral BID w/meals     sodium chloride (PF)  3 mL Intracatheter Q8H       Data     Recent Labs  Lab 09/25/18  0531 09/24/18  1306   WBC 13.4* 12.7*   HGB 13.3 15.8*   MCV 89 91    296   * 135   POTASSIUM 4.0 3.5   CHLORIDE 98 98   CO2 26 30   BUN 24 22   CR 0.78 0.97   ANIONGAP 7 7   JOSÉ 8.0* 8.8   * 97   ALBUMIN 3.0* 3.7   PROTTOTAL 5.6* 6.7*   BILITOTAL 0.3 0.6   ALKPHOS 69 87   ALT 33 35   AST 29 17       Recent Results (from the past 24 hour(s))   XR Chest 2 Views    Narrative    PROCEDURE:  XR CHEST 2 VW    HISTORY:  shortness of breath; .     COMPARISON:  8/29/2018    FINDINGS:   The cardiac silhouette is normal in size. The pulmonary vasculature is  normal.  There are increasing opacities in the left lung base. The  right lung is  hyperinflated but otherwise clear. No pleural effusion  or pneumothorax.      Impression    IMPRESSION:  Left basilar atelectasis or infiltrate.      JOYCE BRUCE MD

## 2018-09-25 NOTE — PLAN OF CARE
Problem: Patient Care Overview  Goal: Plan of Care/Patient Progress Review  Outcome: No Change  Patient alert and oriented. Very anxious, tearful off and on, reports lots of stress from current home life. Xanax given x2 this shift with improvement. Restoril given at bedtime, pt slept all night. VSS. Afebrile. HR 80s. BP stable. RR 20 at rest, non labored at rest but dyspneic with minimal exertion. Lungs diminished at rest, audible wheezes with exertion. O2 sats 90% on 2L. Pt removed oxygen this morning, found to be 86% on RA. Receiving scheduled neb treatments and IV solumedrol. Up with stand by assist. Gait steady. Uses call light appropriately.      Face to face report given with opportunity to observe patient.    Report given to Rhonda R., RN Annelyse J. Stromberg   9/25/2018  7:37 AM

## 2018-09-26 LAB
ALBUMIN SERPL-MCNC: 2.7 G/DL (ref 3.4–5)
ALBUMIN UR-MCNC: NEGATIVE MG/DL
ALP SERPL-CCNC: 65 U/L (ref 40–150)
ALT SERPL W P-5'-P-CCNC: 32 U/L (ref 0–50)
ANION GAP SERPL CALCULATED.3IONS-SCNC: 7 MMOL/L (ref 3–14)
APPEARANCE UR: CLEAR
AST SERPL W P-5'-P-CCNC: 19 U/L (ref 0–45)
BACTERIA #/AREA URNS HPF: ABNORMAL /HPF
BASOPHILS # BLD AUTO: 0 10E9/L (ref 0–0.2)
BASOPHILS NFR BLD AUTO: 0.1 %
BILIRUB SERPL-MCNC: 0.3 MG/DL (ref 0.2–1.3)
BILIRUB UR QL STRIP: NEGATIVE
BUN SERPL-MCNC: 20 MG/DL (ref 7–30)
CALCIUM SERPL-MCNC: 7.8 MG/DL (ref 8.5–10.1)
CHLORIDE SERPL-SCNC: 98 MMOL/L (ref 94–109)
CO2 SERPL-SCNC: 24 MMOL/L (ref 20–32)
COLOR UR AUTO: ABNORMAL
CREAT SERPL-MCNC: 0.74 MG/DL (ref 0.52–1.04)
DIFFERENTIAL METHOD BLD: ABNORMAL
EOSINOPHIL # BLD AUTO: 0 10E9/L (ref 0–0.7)
EOSINOPHIL NFR BLD AUTO: 0 %
ERYTHROCYTE [DISTWIDTH] IN BLOOD BY AUTOMATED COUNT: 13 % (ref 10–15)
GFR SERPL CREATININE-BSD FRML MDRD: 78 ML/MIN/1.7M2
GLUCOSE SERPL-MCNC: 165 MG/DL (ref 70–99)
GLUCOSE UR STRIP-MCNC: NEGATIVE MG/DL
HCT VFR BLD AUTO: 38 % (ref 35–47)
HGB BLD-MCNC: 13 G/DL (ref 11.7–15.7)
HGB UR QL STRIP: NEGATIVE
IMM GRANULOCYTES # BLD: 0.2 10E9/L (ref 0–0.4)
IMM GRANULOCYTES NFR BLD: 0.9 %
KETONES UR STRIP-MCNC: NEGATIVE MG/DL
LEUKOCYTE ESTERASE UR QL STRIP: NEGATIVE
LYMPHOCYTES # BLD AUTO: 1.7 10E9/L (ref 0.8–5.3)
LYMPHOCYTES NFR BLD AUTO: 8.4 %
MCH RBC QN AUTO: 30.5 PG (ref 26.5–33)
MCHC RBC AUTO-ENTMCNC: 34.2 G/DL (ref 31.5–36.5)
MCV RBC AUTO: 89 FL (ref 78–100)
MONOCYTES # BLD AUTO: 0.4 10E9/L (ref 0–1.3)
MONOCYTES NFR BLD AUTO: 1.9 %
MUCOUS THREADS #/AREA URNS LPF: PRESENT /LPF
NEUTROPHILS # BLD AUTO: 17.5 10E9/L (ref 1.6–8.3)
NEUTROPHILS NFR BLD AUTO: 88.7 %
NITRATE UR QL: NEGATIVE
NRBC # BLD AUTO: 0 10*3/UL
NRBC BLD AUTO-RTO: 0 /100
PH UR STRIP: 5 PH (ref 4.7–8)
PLATELET # BLD AUTO: 259 10E9/L (ref 150–450)
POTASSIUM SERPL-SCNC: 4 MMOL/L (ref 3.4–5.3)
PROT SERPL-MCNC: 5.2 G/DL (ref 6.8–8.8)
RBC # BLD AUTO: 4.26 10E12/L (ref 3.8–5.2)
RBC #/AREA URNS AUTO: 1 /HPF (ref 0–2)
SODIUM SERPL-SCNC: 129 MMOL/L (ref 133–144)
SOURCE: ABNORMAL
SP GR UR STRIP: 1.01 (ref 1–1.03)
UROBILINOGEN UR STRIP-MCNC: NORMAL MG/DL (ref 0–2)
WBC # BLD AUTO: 19.7 10E9/L (ref 4–11)
WBC #/AREA URNS AUTO: 10 /HPF (ref 0–5)

## 2018-09-26 PROCEDURE — 40000275 ZZH STATISTIC RCP TIME EA 10 MIN

## 2018-09-26 PROCEDURE — 25000132 ZZH RX MED GY IP 250 OP 250 PS 637: Mod: GY | Performed by: INTERNAL MEDICINE

## 2018-09-26 PROCEDURE — A9270 NON-COVERED ITEM OR SERVICE: HCPCS | Mod: GY | Performed by: INTERNAL MEDICINE

## 2018-09-26 PROCEDURE — 25000128 H RX IP 250 OP 636: Performed by: INTERNAL MEDICINE

## 2018-09-26 PROCEDURE — 25000125 ZZHC RX 250: Performed by: INTERNAL MEDICINE

## 2018-09-26 PROCEDURE — 99233 SBSQ HOSP IP/OBS HIGH 50: CPT | Performed by: INTERNAL MEDICINE

## 2018-09-26 PROCEDURE — 94640 AIRWAY INHALATION TREATMENT: CPT

## 2018-09-26 PROCEDURE — 36415 COLL VENOUS BLD VENIPUNCTURE: CPT | Performed by: INTERNAL MEDICINE

## 2018-09-26 PROCEDURE — 94640 AIRWAY INHALATION TREATMENT: CPT | Mod: 76

## 2018-09-26 PROCEDURE — 12000000 ZZH R&B MED SURG/OB

## 2018-09-26 PROCEDURE — 80053 COMPREHEN METABOLIC PANEL: CPT | Performed by: INTERNAL MEDICINE

## 2018-09-26 PROCEDURE — 81001 URINALYSIS AUTO W/SCOPE: CPT | Performed by: INTERNAL MEDICINE

## 2018-09-26 PROCEDURE — 85025 COMPLETE CBC W/AUTO DIFF WBC: CPT | Performed by: INTERNAL MEDICINE

## 2018-09-26 RX ORDER — PREDNISONE 20 MG/1
40 TABLET ORAL DAILY
Status: DISCONTINUED | OUTPATIENT
Start: 2018-09-26 | End: 2018-09-27 | Stop reason: HOSPADM

## 2018-09-26 RX ORDER — FUROSEMIDE 40 MG
40 TABLET ORAL DAILY
Status: DISCONTINUED | OUTPATIENT
Start: 2018-09-26 | End: 2018-09-27 | Stop reason: HOSPADM

## 2018-09-26 RX ORDER — FLUCONAZOLE 150 MG/1
150 TABLET ORAL ONCE
Status: COMPLETED | OUTPATIENT
Start: 2018-09-26 | End: 2018-09-26

## 2018-09-26 RX ORDER — AZITHROMYCIN 250 MG/1
250 TABLET, FILM COATED ORAL DAILY
Status: DISCONTINUED | OUTPATIENT
Start: 2018-09-26 | End: 2018-09-27 | Stop reason: HOSPADM

## 2018-09-26 RX ADMIN — ASPIRIN 81 MG: 81 TABLET, COATED ORAL at 21:58

## 2018-09-26 RX ADMIN — AZITHROMYCIN 250 MG: 250 TABLET, FILM COATED ORAL at 14:45

## 2018-09-26 RX ADMIN — ESCITALOPRAM OXALATE 20 MG: 10 TABLET ORAL at 08:42

## 2018-09-26 RX ADMIN — ASPIRIN 81 MG: 81 TABLET, COATED ORAL at 08:42

## 2018-09-26 RX ADMIN — BENZONATATE 200 MG: 100 CAPSULE, LIQUID FILLED ORAL at 21:58

## 2018-09-26 RX ADMIN — FLUCONAZOLE 150 MG: 150 TABLET ORAL at 10:11

## 2018-09-26 RX ADMIN — NICOTINE 1 PATCH: 14 PATCH, EXTENDED RELEASE TRANSDERMAL at 14:43

## 2018-09-26 RX ADMIN — POTASSIUM CHLORIDE 10 MEQ: 750 TABLET, FILM COATED, EXTENDED RELEASE ORAL at 16:55

## 2018-09-26 RX ADMIN — MONTELUKAST 10 MG: 10 TABLET, FILM COATED ORAL at 08:42

## 2018-09-26 RX ADMIN — POTASSIUM CHLORIDE 10 MEQ: 750 TABLET, FILM COATED, EXTENDED RELEASE ORAL at 08:42

## 2018-09-26 RX ADMIN — IPRATROPIUM BROMIDE AND ALBUTEROL SULFATE 3 ML: .5; 3 SOLUTION RESPIRATORY (INHALATION) at 11:22

## 2018-09-26 RX ADMIN — ALPRAZOLAM 1 MG: 0.5 TABLET ORAL at 21:59

## 2018-09-26 RX ADMIN — BENZONATATE 200 MG: 100 CAPSULE, LIQUID FILLED ORAL at 14:48

## 2018-09-26 RX ADMIN — OXYCODONE AND ACETAMINOPHEN 1 TABLET: 5; 325 TABLET ORAL at 12:43

## 2018-09-26 RX ADMIN — ATORVASTATIN CALCIUM 40 MG: 40 TABLET, FILM COATED ORAL at 08:42

## 2018-09-26 RX ADMIN — IPRATROPIUM BROMIDE AND ALBUTEROL SULFATE 3 ML: .5; 3 SOLUTION RESPIRATORY (INHALATION) at 08:10

## 2018-09-26 RX ADMIN — METHYLPREDNISOLONE SODIUM SUCCINATE 62.5 MG: 125 INJECTION, POWDER, FOR SOLUTION INTRAMUSCULAR; INTRAVENOUS at 05:15

## 2018-09-26 RX ADMIN — IPRATROPIUM BROMIDE AND ALBUTEROL SULFATE 3 ML: .5; 3 SOLUTION RESPIRATORY (INHALATION) at 15:15

## 2018-09-26 RX ADMIN — ALPRAZOLAM 1 MG: 0.5 TABLET ORAL at 12:05

## 2018-09-26 RX ADMIN — HYDROCHLOROTHIAZIDE 25 MG: 25 TABLET ORAL at 08:42

## 2018-09-26 RX ADMIN — BENAZEPRIL HYDROCHLORIDE 10 MG: 10 TABLET ORAL at 08:43

## 2018-09-26 RX ADMIN — IPRATROPIUM BROMIDE AND ALBUTEROL SULFATE 3 ML: .5; 3 SOLUTION RESPIRATORY (INHALATION) at 22:27

## 2018-09-26 RX ADMIN — TEMAZEPAM 30 MG: 15 CAPSULE ORAL at 21:59

## 2018-09-26 RX ADMIN — IPRATROPIUM BROMIDE AND ALBUTEROL SULFATE 3 ML: .5; 3 SOLUTION RESPIRATORY (INHALATION) at 19:27

## 2018-09-26 RX ADMIN — FUROSEMIDE 40 MG: 40 TABLET ORAL at 10:11

## 2018-09-26 RX ADMIN — ENOXAPARIN SODIUM 40 MG: 40 INJECTION SUBCUTANEOUS at 14:42

## 2018-09-26 RX ADMIN — MELOXICAM 15 MG: 7.5 TABLET ORAL at 08:43

## 2018-09-26 RX ADMIN — OXYCODONE AND ACETAMINOPHEN 1 TABLET: 5; 325 TABLET ORAL at 16:55

## 2018-09-26 RX ADMIN — PREDNISONE 40 MG: 20 TABLET ORAL at 12:05

## 2018-09-26 RX ADMIN — BUTALBITAL, ACETAMINOPHEN AND CAFFEINE 1 TABLET: 50; 325; 40 TABLET ORAL at 10:11

## 2018-09-26 RX ADMIN — DOXEPIN HYDROCHLORIDE 10 MG: 10 CAPSULE ORAL at 21:58

## 2018-09-26 RX ADMIN — ESCITALOPRAM OXALATE 20 MG: 10 TABLET ORAL at 21:59

## 2018-09-26 ASSESSMENT — ACTIVITIES OF DAILY LIVING (ADL)
ADLS_ACUITY_SCORE: 10

## 2018-09-26 ASSESSMENT — PAIN DESCRIPTION - DESCRIPTORS
DESCRIPTORS: POUNDING
DESCRIPTORS: POUNDING
DESCRIPTORS: HEADACHE

## 2018-09-26 NOTE — PROGRESS NOTES
AGUSTINA B[place]ING[place]AM  Patient visit during  rounds. Patient had wanted to see her  but he has not come in so will call Brian Scott to come up.  No other spiritual care requests.

## 2018-09-26 NOTE — PLAN OF CARE
Problem: Patient Care Overview  Goal: Individualization & Mutuality  Outcome: No Change  Needs to wear oxygen at 1L to maintain sats greater than 90%. BBS diminished with inspiratory and expiratory wheezes this evening. IV lock patent and flushes easily . Ambulated in the oliva today on room air and sats went down to 84% . Oxygen applied upon return to room at 1L. Complains of headache today and obtains relief with percocet .

## 2018-09-26 NOTE — PROGRESS NOTES
Met with Sydni and daughter Devorah, if O2 is needed at home she would like to go through [place].

## 2018-09-26 NOTE — PROGRESS NOTES
[facility]    Hospitalist Progress Note      Assessment & Plan   Sydni Hardin is a 68 year old female who was admitted on 9/24/2018.      1.  Acute hypoxic respiratory failure: Patient still requires O2 supplementation.  She is currently on 1 L of oxygen.  Yesterday she dropped down to 85% on room air.  In speaking with her she does have an oximeter at home and frequently notes that her oxygen saturations are down in the high 70% range when she is ambulating or gets fatigued doing housework.  She still is adamantly refusing home oxygen at this time.  However I think I am starting to make her see the light regarding this.  We will try and ambulate her again today try to get rid of the oxygen.  If that is not attainable today then she will require home O2 set up for tomorrow as discharge.  She is on oral prednisone currently.  Will need to taper this as she has been on steroids now for the last month at least as an outpatient.  She is on oral azithromycin just empirically.  All blood cultures been negative no sputum cultures been obtained.  No signs of any obvious pneumonia at this point.  If anything is seem to do much more of a viral infection.    2.  COPD exacerbation: As above a long history of COPD with ongoing tobacco abuse towards mandatory she stops.  This is going to take her some time to recover from this.  Did not occur overnight and she may not ever get back to her relatively asymptomatic state.  Which is not anything she really wanted to hear from me today but unless she has any intention of stopping smoking her progression is going to be significantly worse.  Really should have some type of formal PFT done as an outpatient if she ever becomes a more stable state.  She is on bronchodilators.  Also on a long-acting bronchodilator will continue with ambulation and try to wean off the oxygen today.  If she is not on room air tomorrow then discharge with home O2 would be necessary.    3.   tobacco abuse: Unclear that she really is ever going to quit at this time.  She is been offered everything feels that nothing will work and that her anxiety and stretches at home will not allow her to quit.    4.  Hypertension: Blood pressure stable at this point on her Lotensin.    5.  Dysuria: Will obtain a urinalysis.  But likely this is developing some candidal infection.  She has taken Diflucan at home and will give her a dose today.    6.  Fluid retention: Definitely has a lot of excess fluid due to the steroids and fluid she got.  Those have been stopped we will give her a dose of Lasix today.  Unfortunately weights were not being obtained.  Will await her today.    7.  Hyponatremia.  Has had a mild drop in her sodium we will continue to monitor this closely.  Try and limit how much free water she is actually taking in.  Appears to be having multiple glasses of water daily.  Due to a very dry throat.      Diet: Combination Diet Regular Diet Adult  Fluids:  Iv lock    # Pain Assessment:  Current Pain Score 9/26/2018   Patient currently in pain? denies   Pain score (0-10) 0   Pain location -   Pain descriptors -   Sydni sierra pain level was assessed and she currently denies pain.        DVT Prophylaxis: Enoxaparin (Lovenox) SQ  Code Status: Full Code    Disposition: Expected discharge in 1-2 days once sort out oxygen issue.    Arpit Herndon    Interval History   This morning patient seems to look better but states she continues to feel rotten.  Remains on 1 L.  Is very puffy has a lot of fluid definitely noticeable.  Her IV fluids have been stopped.  Does complain of some dysuria little itching.  Feels she is getting some yeast in infection once again which she commonly gets.  No chest pains nausea vomiting.  Does have a headache she will be getting 1 of her Fioricets that she takes.  She is reluctant to leave the hospital on oxygen.  However had a long discussion with her that her current situation is not going  to quickly resolve.  That this is now going to be a very chronic issue that will take longer time.  For her to get better.    -Data reviewed today: I reviewed all new labs and imaging results over the last 24 hours. I personally reviewed no images or EKG's today.    Physical Exam   Temp: 97.7  F (36.5  C) Temp src: Tympanic BP: 150/80   Heart Rate: 82 Resp: 18 SpO2: (!) 91 % O2 Device: Nasal cannula Oxygen Delivery: 1 LPM  Vitals:    09/24/18 1442   Weight: 79.8 kg (175 lb 14.8 oz)     Vital Signs with Ranges  Temp:  [97.3  F (36.3  C)-98.8  F (37.1  C)] 97.7  F (36.5  C)  Heart Rate:  [] 82  Resp:  [18-22] 18  BP: (121-155)/(58-80) 150/80  SpO2:  [90 %-97 %] 91 %  I/O last 3 completed shifts:  In: 1679 [P.O.:960; I.V.:719]  Out: 2234 [Urine:2234]    Peripheral IV 09/25/18 Right Upper arm (Active)   Site Assessment WDL 9/26/2018  8:55 AM   Line Status [place]ine locked;Checked every 1-2 hour 9/26/2018  8:55 AM   Phlebitis Scale 0-->no symptoms 9/26/2018  8:55 AM   Infiltration Scale 0 9/26/2018  8:55 AM   Number of days:1       Wound 10/16/13 Right Finger (Comment which one) (Active)   Number of days:1806       Wound 10/16/13 Lip (Active)   Number of days:1806       Wound 08/08/14 Right Arm Abrasion(s) (Active)   Number of days:1510       Incision/Surgical Site 03/21/17 Right Knee (Active)   Number of days:554     No line/device    Constitutional: Alert and oriented x3. No distress    HEENT: Normocephalic/atraumatic, PERRL, EOMI, mouth moist, neck supple, no LN both eyes with fluid-filled lids.    Cardiovascular: RRR. no Murmur, no  rubs, or gallops.  JVD  na.  Bruits no.  Pulses 2+    Respiratory: Markedly improved with no significant expiratory wheezes.  There is occasional upper airway squeak.  Clear to auscultation bilaterally    Abdomen: Soft, nontender, nondistended, no organomegaly. Bowel sounds present    Extremities: Warm/dry. 1+edema    Neuro:   Non focal, cranial nerves intact, Moves all  extremities.  Medications       aspirin  81 mg Oral BID     atorvastatin  40 mg Oral Daily     azithromycin  250 mg Oral Daily     benazepril  10 mg Oral Daily     cimetidine  800 mg Oral At Bedtime     doxepin  10 mg Oral At Bedtime     enoxaparin  40 mg Subcutaneous Q24H     escitalopram  20 mg Oral BID     fluconazole  150 mg Oral Once     furosemide  40 mg Oral Daily     hydrochlorothiazide  25 mg Oral Daily     influenza Vac Split High-Dose  0.5 mL Intramuscular Prior to discharge     ipratropium - albuterol 0.5 mg/2.5 mg/3 mL  3 mL Nebulization Q4H While awake     meloxicam  15 mg Oral Daily     montelukast  10 mg Oral Daily     nicotine  1 patch Transdermal Q24H     nicotine   Transdermal Q8H     nicotine   Transdermal Daily     potassium chloride  10 mEq Oral BID w/meals     predniSONE  40 mg Oral Daily     sodium chloride (PF)  3 mL Intracatheter Q8H       Data     Recent Labs  Lab 09/26/18  0547 09/25/18  0531 09/24/18  1306   WBC 19.7* 13.4* 12.7*   HGB 13.0 13.3 15.8*   MCV 89 89 91    268 296   * 131* 135   POTASSIUM 4.0 4.0 3.5   CHLORIDE 98 98 98   CO2 24 26 30   BUN 20 24 22   CR 0.74 0.78 0.97   ANIONGAP 7 7 7   JOSÉ 7.8* 8.0* 8.8   * 179* 97   ALBUMIN 2.7* 3.0* 3.7   PROTTOTAL 5.2* 5.6* 6.7*   BILITOTAL 0.3 0.3 0.6   ALKPHOS 65 69 87   ALT 32 33 35   AST 19 29 17       No results found for this or any previous visit (from the past 24 hour(s)).

## 2018-09-26 NOTE — PLAN OF CARE
Face to face report given with opportunity to observe patient.    Report given to Rambo Gupta   9/25/2018  7:03 PM

## 2018-09-26 NOTE — PLAN OF CARE
Problem: Patient Care Overview  Goal: Plan of Care/Patient Progress Review  Outcome: Improving   09/26/18 0523   OTHER   Plan Of Care Reviewed With patient   Plan of Care Review   Progress improving     Unable to wean oxygen to off, pt still requiring 1 liter nasal cannula.  Other vitals stable.  Minimally assessed at 0200 because patient specifically told me to not wake her up.  Was very grumpy at beginning of my shift, threatening to call to police about the hospital telling her friends that she wasn't listed as being admitted here?  Seemed to be calmer when she finally fell asleep at 2330 or so.  Said no matter what, we do not wake her at 0600 for a weight.  Breathing easy when she is sleeping.

## 2018-09-26 NOTE — PLAN OF CARE
Face to face report given with opportunity to observe patient.    Report given to Jessica Juan   9/26/2018  7:23 AM

## 2018-09-26 NOTE — PLAN OF CARE
Face to face report given with opportunity to observe patient.    Report given to Rambo Gupta   9/26/2018  6:53 PM

## 2018-09-27 ENCOUNTER — TELEPHONE (OUTPATIENT)
Dept: FAMILY MEDICINE | Facility: OTHER | Age: 68
End: 2018-09-27

## 2018-09-27 VITALS
DIASTOLIC BLOOD PRESSURE: 96 MMHG | SYSTOLIC BLOOD PRESSURE: 141 MMHG | RESPIRATION RATE: 20 BRPM | WEIGHT: 175.93 LBS | OXYGEN SATURATION: 85 % | HEIGHT: 58 IN | TEMPERATURE: 97.1 F | BODY MASS INDEX: 36.93 KG/M2

## 2018-09-27 LAB
ALBUMIN SERPL-MCNC: 2.9 G/DL (ref 3.4–5)
ALP SERPL-CCNC: 58 U/L (ref 40–150)
ALT SERPL W P-5'-P-CCNC: 38 U/L (ref 0–50)
ANION GAP SERPL CALCULATED.3IONS-SCNC: 7 MMOL/L (ref 3–14)
AST SERPL W P-5'-P-CCNC: 30 U/L (ref 0–45)
BASOPHILS # BLD AUTO: 0 10E9/L (ref 0–0.2)
BASOPHILS NFR BLD AUTO: 0.1 %
BILIRUB SERPL-MCNC: 0.4 MG/DL (ref 0.2–1.3)
BUN SERPL-MCNC: 19 MG/DL (ref 7–30)
CALCIUM SERPL-MCNC: 8.2 MG/DL (ref 8.5–10.1)
CHLORIDE SERPL-SCNC: 93 MMOL/L (ref 94–109)
CO2 SERPL-SCNC: 27 MMOL/L (ref 20–32)
CREAT SERPL-MCNC: 0.93 MG/DL (ref 0.52–1.04)
DIFFERENTIAL METHOD BLD: ABNORMAL
EOSINOPHIL # BLD AUTO: 0 10E9/L (ref 0–0.7)
EOSINOPHIL NFR BLD AUTO: 0 %
ERYTHROCYTE [DISTWIDTH] IN BLOOD BY AUTOMATED COUNT: 12.8 % (ref 10–15)
GFR SERPL CREATININE-BSD FRML MDRD: 60 ML/MIN/1.7M2
GLUCOSE SERPL-MCNC: 115 MG/DL (ref 70–99)
HCT VFR BLD AUTO: 37.1 % (ref 35–47)
HGB BLD-MCNC: 13 G/DL (ref 11.7–15.7)
IMM GRANULOCYTES # BLD: 0.2 10E9/L (ref 0–0.4)
IMM GRANULOCYTES NFR BLD: 1.2 %
LYMPHOCYTES # BLD AUTO: 2.3 10E9/L (ref 0.8–5.3)
LYMPHOCYTES NFR BLD AUTO: 14.2 %
MCH RBC QN AUTO: 30.4 PG (ref 26.5–33)
MCHC RBC AUTO-ENTMCNC: 35 G/DL (ref 31.5–36.5)
MCV RBC AUTO: 87 FL (ref 78–100)
MONOCYTES # BLD AUTO: 0.9 10E9/L (ref 0–1.3)
MONOCYTES NFR BLD AUTO: 5.3 %
NEUTROPHILS # BLD AUTO: 13 10E9/L (ref 1.6–8.3)
NEUTROPHILS NFR BLD AUTO: 79.2 %
NRBC # BLD AUTO: 0 10*3/UL
NRBC BLD AUTO-RTO: 0 /100
PLATELET # BLD AUTO: 245 10E9/L (ref 150–450)
POTASSIUM SERPL-SCNC: 3.6 MMOL/L (ref 3.4–5.3)
PROT SERPL-MCNC: 5.6 G/DL (ref 6.8–8.8)
RBC # BLD AUTO: 4.28 10E12/L (ref 3.8–5.2)
SODIUM SERPL-SCNC: 127 MMOL/L (ref 133–144)
WBC # BLD AUTO: 16.4 10E9/L (ref 4–11)

## 2018-09-27 PROCEDURE — 80053 COMPREHEN METABOLIC PANEL: CPT | Performed by: INTERNAL MEDICINE

## 2018-09-27 PROCEDURE — 25000128 H RX IP 250 OP 636: Performed by: INTERNAL MEDICINE

## 2018-09-27 PROCEDURE — 94640 AIRWAY INHALATION TREATMENT: CPT

## 2018-09-27 PROCEDURE — 99239 HOSP IP/OBS DSCHRG MGMT >30: CPT | Performed by: INTERNAL MEDICINE

## 2018-09-27 PROCEDURE — 90662 IIV NO PRSV INCREASED AG IM: CPT | Performed by: INTERNAL MEDICINE

## 2018-09-27 PROCEDURE — 36415 COLL VENOUS BLD VENIPUNCTURE: CPT | Performed by: INTERNAL MEDICINE

## 2018-09-27 PROCEDURE — 85025 COMPLETE CBC W/AUTO DIFF WBC: CPT | Performed by: INTERNAL MEDICINE

## 2018-09-27 PROCEDURE — A9270 NON-COVERED ITEM OR SERVICE: HCPCS | Mod: GY | Performed by: INTERNAL MEDICINE

## 2018-09-27 PROCEDURE — 25000125 ZZHC RX 250: Performed by: INTERNAL MEDICINE

## 2018-09-27 PROCEDURE — 25000132 ZZH RX MED GY IP 250 OP 250 PS 637: Mod: GY | Performed by: INTERNAL MEDICINE

## 2018-09-27 RX ORDER — PREDNISONE 10 MG/1
TABLET ORAL
Qty: 24 TABLET | Refills: 0 | Status: SHIPPED | OUTPATIENT
Start: 2018-09-27 | End: 2018-12-12

## 2018-09-27 RX ORDER — IPRATROPIUM BROMIDE AND ALBUTEROL SULFATE 2.5; .5 MG/3ML; MG/3ML
1 SOLUTION RESPIRATORY (INHALATION) EVERY 4 HOURS PRN
Qty: 90 ML | Refills: 3 | Status: SHIPPED | OUTPATIENT
Start: 2018-09-27 | End: 2019-07-15

## 2018-09-27 RX ORDER — AZITHROMYCIN 250 MG/1
250 TABLET, FILM COATED ORAL DAILY
Qty: 2 TABLET | Refills: 0 | Status: SHIPPED | OUTPATIENT
Start: 2018-09-27 | End: 2019-02-04

## 2018-09-27 RX ADMIN — IPRATROPIUM BROMIDE AND ALBUTEROL SULFATE 3 ML: .5; 3 SOLUTION RESPIRATORY (INHALATION) at 08:07

## 2018-09-27 RX ADMIN — BENAZEPRIL HYDROCHLORIDE 10 MG: 10 TABLET ORAL at 08:58

## 2018-09-27 RX ADMIN — BENZONATATE 200 MG: 100 CAPSULE, LIQUID FILLED ORAL at 10:51

## 2018-09-27 RX ADMIN — PREDNISONE 40 MG: 20 TABLET ORAL at 12:06

## 2018-09-27 RX ADMIN — MONTELUKAST 10 MG: 10 TABLET, FILM COATED ORAL at 08:58

## 2018-09-27 RX ADMIN — ATORVASTATIN CALCIUM 40 MG: 40 TABLET, FILM COATED ORAL at 08:58

## 2018-09-27 RX ADMIN — ESCITALOPRAM OXALATE 20 MG: 10 TABLET ORAL at 08:58

## 2018-09-27 RX ADMIN — ASPIRIN 81 MG: 81 TABLET, COATED ORAL at 08:58

## 2018-09-27 RX ADMIN — FUROSEMIDE 40 MG: 40 TABLET ORAL at 08:58

## 2018-09-27 RX ADMIN — INFLUENZA A VIRUS A/MICHIGAN/45/2015 X-275 (H1N1) ANTIGEN (FORMALDEHYDE INACTIVATED), INFLUENZA A VIRUS A/SINGAPORE/INFIMH-16-0019/2016 IVR-186 (H3N2) ANTIGEN (FORMALDEHYDE INACTIVATED), AND INFLUENZA B VIRUS B/MARYLAND/15/2016 BX-69A (A B/COLORADO/6/2017-LIKE VIRUS) ANTIGEN (FORMALDEHYDE INACTIVATED) 0.5 ML: 60; 60; 60 INJECTION, SUSPENSION INTRAMUSCULAR at 10:30

## 2018-09-27 RX ADMIN — POTASSIUM CHLORIDE 10 MEQ: 750 TABLET, FILM COATED, EXTENDED RELEASE ORAL at 08:38

## 2018-09-27 RX ADMIN — HYDROCHLOROTHIAZIDE 25 MG: 25 TABLET ORAL at 08:58

## 2018-09-27 RX ADMIN — MELOXICAM 15 MG: 7.5 TABLET ORAL at 08:58

## 2018-09-27 RX ADMIN — ALPRAZOLAM 1 MG: 0.5 TABLET ORAL at 12:06

## 2018-09-27 ASSESSMENT — ACTIVITIES OF DAILY LIVING (ADL)
ADLS_ACUITY_SCORE: 10

## 2018-09-27 NOTE — PROGRESS NOTES
Name: Sydni Hardin    MRN#: [id number]    Reason for Hospitalization: Acute exacerbation of chronic obstructive pulmonary disease (H) [J44.1]  Acute respiratory failure with hypoxia (H) [J96.01]    Discharge Date: 9/27/2018    Patient / Family response to discharge plan: in agreement    Follow-Up Appt: Future Appointments  Date Time Provider Department Center   10/4/2018 11:00 AM Radha Escobar PA [facility]   11/9/2018 1:00 PM Moisés Olson MD [facility]       Other Providers (Care Coordinator, County Services, PCA services etc): No    Discharge Disposition: home, transported by a friend    Elsa Mariscal

## 2018-09-27 NOTE — TELEPHONE ENCOUNTER
Pt's daughter calls her mom was discharged from the hospital today and is very unhappy she does not feel she was ready to discharge. Her daughter states pt is very confused. She was sent home with O2. She was scheduled with estee Escobar on 10/04/18 and want to see Dr Alarcon instead. I called her back and advised no appt's available this week and with the sx Devorah describes pt should be seen at the ER/UC. I did reschedule her appt next week with Dr Alarcon instead.

## 2018-09-27 NOTE — PLAN OF CARE
Patient discharged at 12:17 PM via wheel chair accompanied by friend  and staff. Prescriptions sent to patients preferred pharmacy. All belongings sent with patient.     Discharge instructions reviewed with patient . Listed belongings gathered and returned to patient. done    Patient discharged to home.   Report called to NA    Core Measures and Vaccines  Core Measures applicable during stay: NA. If yes, state diagnosis: NA  Pneumonia and Influenza given prior to discharge, if indicated: Yes    Surgical Patient   Surgical Procedures during stay: NA  Did patient receive discharge instruction on wound care and recognition of infection symptoms? N/A    MISC  Follow up appointment made:  Yes  Home and hospital aquired medications returned to patient: Yes  Patient reports pain was well managed at discharge: Yes

## 2018-09-27 NOTE — PLAN OF CARE
Face to face report given with opportunity to observe patient.    Report given to Jessica Juan   9/27/2018  7:28 AM

## 2018-09-27 NOTE — DISCHARGE SUMMARY
[facility]    Discharge Summary  Hospitalist    Date of Admission:  9/24/2018  Date of Discharge:  9/27/2018  Discharging Provider: Matias Iniguez MD  Date of Service (when I saw the patient): 09/27/18    Discharge Diagnoses   Active Problems:    Benign essential hypertension (1/5/2012)    Acute respiratory failure with hypoxia (H) (9/24/2018)    COPD exacerbation (H) (9/24/2018)      History of Present Illness   Sydni Hardin is an 68 year old female who presented with shortness of breath.  Please see admission H+P for additional details.    Hospital Course   Sydni Hardin 68-year-old female was admitted on 9/24/2018 for shortness of breath.  The patient has long-standing history of COPD that thus far has been non-O2 dependent.  Her chest x-ray did show left basilar infiltrate versus atelectasis.  She was treated in usual fashion with submental oxygen as needed, IV steroids, and empiric antibiotics.  Her clinical status gradually improved, and she was able to be weaned down to 1-2 L of O2, however we cannot get her down to room air without dipping into the 80s.  She was converted to oral antibiotics as well as steroids.  She will need home O2, we will discharge her with 2 L to be worn continuously.  We will refill her nebulizers, she will continue her home inhalers.  She will follow-up with her primary care physician Dr. Alarcon in approximately 1 week.  She was counseled on smoking cessation, she has no interest in quitting at this time.  She cites lots of family stress, and she is a smoking as a coping mechanism.    Matias Iniguez MD    Significant Results and Procedures   See below    Pending Results   These results will be followed up by Gibran Alarcon    Unresulted Labs Ordered in the Past 30 Days of this Admission     No orders found from 7/26/2018 to 9/25/2018.          Code Status   Full Code       Primary Care Physician   Gibran Alarcon    Physical Exam   Temp: 97.1  F (36.2   C) Temp src: Tympanic BP: 141/96   Heart Rate: 88 Resp: 20 SpO2: (!) 85 % O2 Device: None (Room air) Oxygen Delivery: 2 LPM  Vitals:    09/24/18 1442   Weight: 79.8 kg (175 lb 14.8 oz)     Vital Signs with Ranges  Temp:  [97.1  F (36.2  C)-98.1  F (36.7  C)] 97.1  F (36.2  C)  Heart Rate:  [68-88] 88  Resp:  [18-20] 20  BP: (108-155)/(51-96) 141/96  SpO2:  [85 %-95 %] 85 %  I/O last 3 completed shifts:  In: 1631 [P.O.:200; I.V.:1431]  Out: 1700 [Urine:1700]    Constitutional: AA, NAD  Eyes: PERRLA, no injection, no icterus  HEENT: atraumatic, normocephalic  Respiratory: reduced breath sounds b/l  Cardiovascular: S1 S2 RRR  GI: soft, NT, ND, + bowel sounds  Lymph/Hematologic: no palpable lymphadenopathy  Skin: no rashes, no lesions  Musculoskeletal: No edema, good tone, no deformities  Neurologic: oriented x 3, no focal deficits  Psychiatric: appropriate affect      Discharge Disposition   Discharged to home  Condition at discharge: Stable    Consultations This Hospital Stay   SPIRITUAL HEALTH SERVICES IP CONSULT    Time Spent on this Encounter   IMatias MD, personally saw the patient today and spent greater than 30 minutes discharging this patient.    Discharge Orders     Reason for your hospital stay   COPD exacerbation     Follow-up and recommended labs and tests    Follow up with primary care provider, Gibran Alarcon, within 7 days for hospital follow- up.  No follow up labs or test are needed.     Activity   Your activity upon discharge: activity as tolerated     When to contact your care team   Call your primary doctor if you have any of the following:  increased shortness of breath.     Full Code     Oxygen Adult   [place] [facility] Order 2 liter(s) by nasal cannula continuously with use of portable tank. Expected treatment length is indefinite (99 months).. Test on conserving device as applicable.    Patients who qualify for home O2 coverage under the CMS guidelines require ABG tests or O2 sat  readings obtained closest to, but no earlier than 2 days prior to the discharge, as evidence of the need for home oxygen therapy. Testing must be performed while patient is in the chronic stable state. See notes for O2 sats.    I certify that this patient, Sydni Hardin has been under my care and that I, or a nurse practitioner or physician's assistant working with me, had a face-to-face encounter that meets the face-to-face encounter requirements with this patient on 9/27/2018. The patient, Sydni Hardin was evaluated or treated in whole, or in part, for the following medical condition, which necessitates the use of the ordered oxygen. Treatment Diagnosis: hypoxia    Attending Provider: Matias Iniguez MD  Physician signature: See electronic signature associated with these discharge orders  Date of Order: September 27, 2018     Diet   Follow this diet upon discharge: Orders Placed This Encounter     Combination Diet Regular Diet Adult       Discharge Medications   Current Discharge Medication List      START taking these medications    Details   azithromycin (ZITHROMAX) 250 MG tablet Take 1 tablet (250 mg) by mouth daily for 2 days  Qty: 2 tablet, Refills: 0    Associated Diagnoses: Acute respiratory failure with hypoxia (H)         CONTINUE these medications which have CHANGED    Details   ipratropium - albuterol 0.5 mg/2.5 mg/3 mL (DUONEB) 0.5-2.5 (3) MG/3ML neb solution Take 1 vial (3 mLs) by nebulization every 4 hours as needed for shortness of breath / dyspnea or wheezing  Qty: 90 mL, Refills: 3    Associated Diagnoses: Acute bronchospasm; SOB (shortness of breath)      nicotine polacrilex (EQL NICOTINE) 4 MG lozenge Place 1 lozenge (4 mg) inside cheek as needed for smoking cessation  Qty: 360 tablet, Refills: 1    Associated Diagnoses: Tobacco abuse      predniSONE (DELTASONE) 10 MG tablet Take 4 tablets daily for 3 days,  take 2 tablets daily for 3 days, take 1 tablet daily for 3 days, take half a  tablet for 3 days.  Qty: 24 tablet, Refills: 0    Associated Diagnoses: Acute exacerbation of chronic obstructive pulmonary disease (H)         CONTINUE these medications which have NOT CHANGED    Details   albuterol (VENTOLIN HFA) 108 (90 BASE) MCG/ACT Inhaler USE 1 PUFF FOUR TIMES A DAY AS NEEDED  Qty: 2 Inhaler, Refills: 3    Associated Diagnoses: Tobacco abuse      ALPRAZolam (XANAX) 1 MG tablet TAKE 1 TABLET BY MOUTH 3 TIMES DAILY AS NEEDED FOR ANXIETY  Qty: 90 tablet, Refills: 0    Associated Diagnoses: Anxiety      aspirin EC 81 MG EC tablet Take 1 tablet (81 mg) by mouth 2 times daily    Associated Diagnoses: S/P total knee replacement using cement, right      atorvastatin (LIPITOR) 40 MG tablet TAKE 1 TABLET BY MOUTH DAILY  Qty: 90 tablet, Refills: 1    Associated Diagnoses: Mixed hyperlipidemia      benazepril (LOTENSIN) 10 MG tablet TAKE 1 TABLET BY MOUTH DAILY  Qty: 90 tablet, Refills: 1    Associated Diagnoses: HTN (hypertension)      benzonatate (TESSALON) 200 MG capsule TAKE 1 CAPSULE BY MOUTH 3 TIMES A DAY AS NEEDED FOR COUGH  Qty: 60 capsule, Refills: 3    Associated Diagnoses: Preop general physical exam; Tobacco abuse      butalbital-acetaminophen-caffeine (FIORICET/ESGIC) -40 MG per tablet Take 1 tablet by mouth every 6 hours as needed  Qty: 30 tablet, Refills: 0    Associated Diagnoses: Scalp hematoma, sequela      cimetidine (TAGAMET) 800 MG tablet Take 1 tablet (800 mg) by mouth At Bedtime As needed  Qty: 90 tablet, Refills: 3    Associated Diagnoses: Esophageal reflux      doxepin (SINEQUAN) 10 MG capsule Take 1 capsule (10 mg) by mouth At Bedtime  Qty: 90 capsule, Refills: 3    Associated Diagnoses: Primary osteoarthritis of right knee      escitalopram (LEXAPRO) 20 MG tablet TAKE 1 TABLET BY MOUTH 2 TIMES A DAY  Qty: 180 tablet, Refills: 3    Associated Diagnoses: Dysthymia      hydrochlorothiazide (HYDRODIURIL) 25 MG tablet TAKE 1 TABLET BY MOUTH DAILY  Qty: 90 tablet, Refills: 3     Associated Diagnoses: Benign essential hypertension      meloxicam (MOBIC) 15 MG tablet Take 1 tablet (15 mg) by mouth daily  Qty: 90 tablet, Refills: 3    Associated Diagnoses: Pain      montelukast (SINGULAIR) 10 MG tablet TAKE 1 TABLET BY MOUTH DAILY  Qty: 90 tablet, Refills: 1    Associated Diagnoses: Mixed hyperlipidemia      nicotine (NICODERM CQ) 14 MG/24HR 24 hr patch Place 1 patch onto the skin every 24 hours  Qty: 30 patch, Refills: 1    Associated Diagnoses: Tobacco abuse      nicotine (NICODERM CQ) 21 MG/24HR 24 hr patch Place 1 patch onto the skin every 24 hours  Qty: 30 patch, Refills: 1    Associated Diagnoses: Tobacco abuse      nicotine (NICODERM CQ) 7 MG/24HR 24 hr patch Place 1 patch onto the skin every 24 hours  Qty: 30 patch, Refills: 1    Associated Diagnoses: Tobacco abuse      nystatin (MYCOSTATIN) 112467 UNIT/ML suspension TAKE 1 TEASPOON (5MLS) BY MOUTH 4 TIMES DAILY  Qty: 473 mL, Refills: 1    Associated Diagnoses: Thrush      !! order for DME Equipment being ordered: Please install safety bar in bathroom to assist patient in getting up from toilet.  Qty: 1 Units, Refills: 0    Associated Diagnoses: Status post total right knee replacement      !! order for DME Equipment being ordered: toilet seat riser  Qty: 1 each, Refills: 0    Associated Diagnoses: Postoperative state      !! order for DME Equipment being ordered: Nebulizer supplies  Qty: 2 each, Refills: 11    Associated Diagnoses: Primary osteoarthritis of right knee      !! order for DME Equipment being ordered: mcgee walker  Qty: 1 each, Refills: 0    Associated Diagnoses: Primary osteoarthritis of right knee      oxyCODONE-acetaminophen (PERCOCET) 5-325 MG per tablet TAKE 1 TO 2 TABLETS BY MOUTH EVERY 4 HOURS AS NEEDED FOR PAIN  Qty: 60 tablet, Refills: 0    Associated Diagnoses: Primary osteoarthritis of both knees      potassium chloride SA (K-DUR/KLOR-CON M) 10 MEQ CR tablet TAKE 1 TABLET BY MOUTH 2 TIMES DAILY WITH  "FOOD  Qty: 180 tablet, Refills: 1    Associated Diagnoses: Benign essential hypertension      temazepam (RESTORIL) 30 MG capsule TAKE 1 CAPSULE BY MOUTH NIGHTLY AS NEEDED FOR SLEEP  Qty: 90 capsule, Refills: 3    Associated Diagnoses: Persistent insomnia      VOLTAREN 1 % GEL topical gel APPLY 4 GRAMS TO KNEES FOUR TIMES A DAY USING ENCLOSED DOSING CARD  Qty: 100 g, Refills: 0    Associated Diagnoses: Primary osteoarthritis of left knee; Chronic pain of right knee      budesonide-formoterol (SYMBICORT) 160-4.5 MCG/ACT inhaler Inhale 2 puffs into the lungs 2 times daily. As needed  Qty: 1 Inhaler, Refills: 11    Associated Diagnoses: Asthma      EPIPEN 2-JAIRO 0.3 MG/0.3ML injection INJECT 1 SYRINGE INTRAMUSCULARLY ONCE AS NEEDED FOR ANAPHYLAXIS  Qty: 2 each, Refills: 0    Associated Diagnoses: Anaphylactic reaction      fluconazole (DIFLUCAN) 150 MG tablet Take 1 tablet (150 mg) by mouth every 3 days  Qty: 4 tablet, Refills: 0    Associated Diagnoses: Candidiasis of vulva and vagina       !! - Potential duplicate medications found. Please discuss with provider.      STOP taking these medications       doxycycline (VIBRAMYCIN) 100 MG capsule Comments:   Reason for Stopping:             Allergies   Allergies   Allergen Reactions     Bee Pollen Other (See Comments)     Throat and eyes close up     Codeine Difficulty breathing     \"My throat closes shut.\"     Takes Percocet at home     Keflex [Cephalexin Hcl] Hives     Sulfonamide Derivatives Other (See Comments)     Throat closes     Ciprofloxacin Rash     Rash, bumps on lips     Bupropion Hcl Other (See Comments)     Made her very irritable     Celecoxib Other (See Comments) and Cough     Celebrex - wheeze  Takes ASA at home     Cheese      swiss     Clonazepam Hives     Takes Xanax at home     Erythromycin Other (See Comments)     Erythromycin base - abdominal pain     Lisinopril Other (See Comments)     Zestril - headaches     Nifedipine Hives     Procardia     " Strawberries [Strawberry]      Tomato      Tramadol Hcl      Takes percocet at home     Data   Most Recent 3 CBC's:  Recent Labs   Lab Test  09/27/18   0532  09/26/18   0547  09/25/18   0531   WBC  16.4*  19.7*  13.4*   HGB  13.0  13.0  13.3   MCV  87  89  89   PLT  245  259  268      Most Recent 3 BMP's:  Recent Labs   Lab Test  09/27/18   0532  09/26/18   0547  09/25/18   0531   NA  127*  129*  131*   POTASSIUM  3.6  4.0  4.0   CHLORIDE  93*  98  98   CO2  27  24  26   BUN  19  20  24   CR  0.93  0.74  0.78   ANIONGAP  7  7  7   JOSÉ  8.2*  7.8*  8.0*   GLC  115*  165*  179*     Most Recent 2 LFT's:  Recent Labs   Lab Test  09/27/18   0532  09/26/18   0547   AST  30  19   ALT  38  32   ALKPHOS  58  65   BILITOTAL  0.4  0.3     Most Recent INR's and Anticoagulation Dosing History:  Anticoagulation Dose History     There is no flowsheet data to display.        Most Recent 3 Troponin's:  Recent Labs   Lab Test  08/29/18   1514   TROPI  <0.015     Most Recent Cholesterol Panel:  Recent Labs   Lab Test  02/01/18   1254   CHOL  144   LDL  71   HDL  35*   TRIG  191*     Most Recent 6 Bacteria Isolates From Any Culture (See EPIC Reports for Culture Details):  Recent Labs   Lab Test  09/24/18   1800  10/25/17   1425  08/30/17   1437  08/30/17   1350  03/25/17   1905  03/25/17   1630   CULT  No MRSA isolated  No MRSA isolated  Canceled, Test credited  Duplicate request  10,000 to 50,000 colonies/mL  Mixed bacterial brian  No further identification or sensitivity done  *  No MRSA isolated  No growth after 6 days     Most Recent TSH, T4 and A1c Labs:  Recent Labs   Lab Test  02/01/18   1254   TSH  1.07     Results for orders placed or performed during the hospital encounter of 09/24/18   XR Chest 2 Views    Narrative    PROCEDURE:  XR CHEST 2 VW    HISTORY:  shortness of breath; .     COMPARISON:  8/29/2018    FINDINGS:   The cardiac silhouette is normal in size. The pulmonary vasculature is  normal.  There are increasing  opacities in the left lung base. The  right lung is hyperinflated but otherwise clear. No pleural effusion  or pneumothorax.      Impression    IMPRESSION:  Left basilar atelectasis or infiltrate.      JOYCE BRUCE MD

## 2018-09-27 NOTE — PLAN OF CARE
Patient forgot phone  at discharge - left message on cell phone, whether if she wanted to stop back and   or have it mailed to her, waiting call back.

## 2018-09-27 NOTE — PLAN OF CARE
Problem: Patient Care Overview  Goal: Plan of Care/Patient Progress Review  Outcome: No Change   09/27/18 0454   OTHER   Plan Of Care Reviewed With patient   Plan of Care Review   Progress no change     Patient with stable vitals through night.  She had audible expiratory wheezes at bedtime and was quite anxious about this.  Oxygen saturations were 95% at this time.  She agreed to wear a pulse ox through the night just so I tell if she was ok.  At 1 liter nasal cannula she remains in the low 90's when asleep but she has a tendency to slip her oxygen off, her saturations drop to the mid 80's when this happens.  She is still sleeping, will continue to monitor.

## 2018-09-28 NOTE — PROGRESS NOTES
SUBJECTIVE:   Sydni Hardin is a 68 year old female who presents to clinic today for the following health issues:      [place]ospital [place]up:    [place]  [facility] [place]  Reason for visit: Acute respirtatory failure with hypoxia, COPD  Current Status: pt is still very tire. She is not on O2 and is done with ABX, but is still on Prednisone. O2 sat at home has not been below 93%. Pt is not SOB, but is wheezing. Pt has clear sputum.          PROBLEMS TO ADD ON...    Problem list and histories reviewed & adjusted, as indicated.  Additional history: due for angel emeds.  Anxiety is high.  Both daughter have issues going on right now.  Knee is painful.  Breathing slowly improving.  Tapering the prednisone. Cough is better.      Patient Active Problem List   Diagnosis     Benign essential hypertension     Calculus of gallbladder     Headache     Osteoarthrosis, unspecified whether generalized or localized, involving lower leg     Dysthymia     Lung nodules     Tobacco abuse     SCC (squamous cell carcinoma), face     IC (interstitial cystitis)     Primary osteoarthritis of both knees     S/P total knee replacement using cement, right     Asthma     Leukocytosis     Dehydration     Knee pain     Acute respiratory failure with hypoxia (H)     COPD exacerbation (H)     CKD (chronic kidney disease) stage 3, GFR 30-59 ml/min (H)     Past Surgical History:   Procedure Laterality Date     ARTHROPLASTY KNEE Right 3/21/2017    Procedure: ARTHROPLASTY KNEE;  Surgeon: Moisés Olson MD;  Location: [facility]     ARTHROSCOPY KNEE       BIOPSY BREAST  2000    left - free of disease      SECTION        SECTION       CHOLECYSTECTOMY  2006     COLONOSCOPY  2008    repeat in 10 years     HEAD & NECK SURGERY  2014    hematoma on head drained     HYSTERECTOMY       ORTHOPEDIC SURGERY  2014    right shoulder replacement     shoulder x2  2009    left       Social History   Substance Use Topics      Smoking status: Current Every Day Smoker     Packs/day: 1.00     Years: 53.00     Types: Cigarettes     Start date: 1/1/1965     Smokeless tobacco: Never Used      Comment: pt enrolled in quit plan 8/29/18     Alcohol use 0.0 oz/week     0 Standard drinks or equivalent per week      Comment: rarely     Family History   Problem Relation Age of Onset     Cancer Mother      skin     Breast Cancer Mother      Cerebrovascular Disease Mother      Alzheimer Disease Mother      Myocardial Infarction Father 76     myocardial infarction - cause of death     Cancer Daughter      cervical      Diabetes Maternal Grandfather      HEART DISEASE Maternal Grandfather      MI     Diabetes Maternal Grandmother      Diabetes Paternal Grandmother      Diabetes Paternal Grandfather          Current Outpatient Prescriptions   Medication Sig Dispense Refill     albuterol (VENTOLIN HFA) 108 (90 BASE) MCG/ACT Inhaler USE 1 PUFF FOUR TIMES A DAY AS NEEDED 2 Inhaler 3     ALPRAZolam (XANAX) 1 MG tablet TAKE 1 TABLET BY MOUTH 3 TIMES DAILY AS NEEDED FOR ANXIETY 90 tablet 3     aspirin EC 81 MG EC tablet Take 1 tablet (81 mg) by mouth 2 times daily (Patient taking differently: Take 81 mg by mouth daily )       atorvastatin (LIPITOR) 40 MG tablet TAKE 1 TABLET BY MOUTH DAILY 90 tablet 1     benazepril (LOTENSIN) 10 MG tablet TAKE 1 TABLET BY MOUTH DAILY 90 tablet 1     benzonatate (TESSALON) 200 MG capsule TAKE 1 CAPSULE BY MOUTH 3 TIMES A DAY AS NEEDED FOR COUGH 60 capsule 3     budesonide-formoterol (SYMBICORT) 160-4.5 MCG/ACT inhaler Inhale 2 puffs into the lungs 2 times daily. As needed 1 Inhaler 11     butalbital-acetaminophen-caffeine (FIORICET/ESGIC) -40 MG per tablet Take 1 tablet by mouth every 6 hours as needed 30 tablet 0     cimetidine (TAGAMET) 800 MG tablet Take 1 tablet (800 mg) by mouth At Bedtime As needed 90 tablet 3     doxepin (SINEQUAN) 10 MG capsule Take 1 capsule (10 mg) by mouth At Bedtime 90 capsule 3     EPIPEN  2-JAIRO 0.3 MG/0.3ML injection INJECT 1 SYRINGE INTRAMUSCULARLY ONCE AS NEEDED FOR ANAPHYLAXIS 2 each 0     escitalopram (LEXAPRO) 20 MG tablet TAKE 1 TABLET BY MOUTH 2 TIMES A  tablet 3     fluconazole (DIFLUCAN) 150 MG tablet Take 1 tablet (150 mg) by mouth every 3 days 4 tablet 0     hydrochlorothiazide (HYDRODIURIL) 25 MG tablet TAKE 1 TABLET BY MOUTH DAILY 90 tablet 3     ipratropium - albuterol 0.5 mg/2.5 mg/3 mL (DUONEB) 0.5-2.5 (3) MG/3ML neb solution Take 1 vial (3 mLs) by nebulization every 4 hours as needed for shortness of breath / dyspnea or wheezing 90 mL 3     meloxicam (MOBIC) 15 MG tablet Take 1 tablet (15 mg) by mouth daily 90 tablet 3     montelukast (SINGULAIR) 10 MG tablet TAKE 1 TABLET BY MOUTH DAILY 90 tablet 1     nicotine (NICODERM CQ) 14 MG/24HR 24 hr patch Place 1 patch onto the skin every 24 hours 30 patch 1     nicotine (NICODERM CQ) 21 MG/24HR 24 hr patch Place 1 patch onto the skin every 24 hours 30 patch 1     nicotine (NICODERM CQ) 7 MG/24HR 24 hr patch Place 1 patch onto the skin every 24 hours 30 patch 1     nicotine polacrilex (EQL NICOTINE) 4 MG lozenge Place 1 lozenge (4 mg) inside cheek as needed for smoking cessation 360 tablet 1     nystatin (MYCOSTATIN) 415171 UNIT/ML suspension TAKE 1 TEASPOON (5MLS) BY MOUTH 4 TIMES DAILY 473 mL 1     order for DME Equipment being ordered: Please install safety bar in bathroom to assist patient in getting up from toilet. 1 Units 0     order for DME Equipment being ordered: toilet seat riser 1 each 0     order for DME Equipment being ordered: Nebulizer supplies 2 each 11     order for DME Equipment being ordered: mcgee walker 1 each 0     oxyCODONE-acetaminophen (PERCOCET) 5-325 MG per tablet TAKE 1 TO 2 TABLETS BY MOUTH EVERY 4 HOURS AS NEEDED FOR PAIN 60 tablet 0     potassium chloride SA (K-DUR/KLOR-CON M) 10 MEQ CR tablet TAKE 1 TABLET BY MOUTH 2 TIMES DAILY WITH FOOD 180 tablet 1     predniSONE (DELTASONE) 10 MG tablet Take 4  "tablets daily for 3 days,  take 2 tablets daily for 3 days, take 1 tablet daily for 3 days, take half a tablet for 3 days. 24 tablet 0     temazepam (RESTORIL) 30 MG capsule TAKE 1 CAPSULE BY MOUTH NIGHTLY AS NEEDED FOR SLEEP 90 capsule 3     VOLTAREN 1 % GEL topical gel APPLY 4 GRAMS TO KNEES FOUR TIMES A DAY USING ENCLOSED DOSING CARD 100 g 0     [DISCONTINUED] doxepin (SINEQUAN) 10 MG capsule Take 1 capsule (10 mg) by mouth At Bedtime 90 capsule 3     Allergies   Allergen Reactions     Bee Pollen Other (See Comments)     Throat and eyes close up     Codeine Difficulty breathing     \"My throat closes shut.\"     Takes Percocet at home     Keflex [Cephalexin Hcl] Hives     Sulfonamide Derivatives Other (See Comments)     Throat closes     Ciprofloxacin Rash     Rash, bumps on lips     Bupropion Hcl Other (See Comments)     Made her very irritable     Celecoxib Other (See Comments) and Cough     Celebrex - wheeze  Takes ASA at home     Cheese      swiss     Clonazepam Hives     Takes Xanax at home     Erythromycin Other (See Comments)     Erythromycin base - abdominal pain     Lisinopril Other (See Comments)     Zestril - headaches     Nifedipine Hives     Procardia     Strawberries [Strawberry]      Tomato      Tramadol Hcl      Takes percocet at home       Reviewed and updated as needed this visit by clinical staff       Reviewed and updated as needed this visit by Provider         ROS:  Constitutional, HEENT, cardiovascular, pulmonary, gi and gu systems are negative, except as otherwise noted.    OBJECTIVE:                                                    /88  Pulse 83  Temp 98.8  F (37.1  C) (Tympanic)  Resp 16  Ht 4' 10\" (1.473 m)  Wt 178 lb (80.7 kg)  SpO2 91%  BMI 37.2 kg/m2  Body mass index is 37.2 kg/(m^2).  GENERAL APPEARANCE: Alert, no acute distress  CV: regular rate and rhythm, no murmur, rub or gallop  RESP: decreased breath sounds diffusely.  No wheeze.    ABDOMEN: normal bowel sounds, " soft, nontender, no hepatosplenomegaly or other masses  SKIN: no suspicious lesions or rashes to visualized skin  NEURO: Alert, oriented x 3, speech and mentation normal           ASSESSMENT/PLAN:                                                    1. Anxiety  Severe.  Lots of stress.  We talked about this a long time today.  No change in the cares.  Continue counseling and the xanax.   - ALPRAZolam (XANAX) 1 MG tablet; TAKE 1 TABLET BY MOUTH 3 TIMES DAILY AS NEEDED FOR ANXIETY  Dispense: 90 tablet; Refill: 3    2. Primary osteoarthritis of right knee  Stable.  No change.    - doxepin (SINEQUAN) 10 MG capsule; Take 1 capsule (10 mg) by mouth At Bedtime  Dispense: 90 capsule; Refill: 3    3. Tobacco abuse    - Tobacco Cessation - Order to Satisfy Health Maintenance    4. Tobacco abuse counseling      5. COPD exacerbation (H)  Improving.  Continue the steroid taper.  F/u with ongoing concerns.     6. Benign essential hypertension  Stable borderline numbers today.  No change.        25 minutes spent with patient.  Over 50% in counseling regarding mostly the anxiety and issues with her daughters.      Gibran Alarcon MD  [facility]

## 2018-10-02 ENCOUNTER — OFFICE VISIT (OUTPATIENT)
Dept: FAMILY MEDICINE | Facility: OTHER | Age: 68
End: 2018-10-02
Attending: FAMILY MEDICINE
Payer: COMMERCIAL

## 2018-10-02 VITALS
BODY MASS INDEX: 37.36 KG/M2 | SYSTOLIC BLOOD PRESSURE: 136 MMHG | OXYGEN SATURATION: 91 % | HEART RATE: 83 BPM | HEIGHT: 58 IN | DIASTOLIC BLOOD PRESSURE: 88 MMHG | TEMPERATURE: 98.8 F | RESPIRATION RATE: 16 BRPM | WEIGHT: 178 LBS

## 2018-10-02 DIAGNOSIS — F41.9 ANXIETY: ICD-10-CM

## 2018-10-02 DIAGNOSIS — M17.11 PRIMARY OSTEOARTHRITIS OF RIGHT KNEE: ICD-10-CM

## 2018-10-02 DIAGNOSIS — I10 BENIGN ESSENTIAL HYPERTENSION: ICD-10-CM

## 2018-10-02 DIAGNOSIS — Z71.6 TOBACCO ABUSE COUNSELING: ICD-10-CM

## 2018-10-02 DIAGNOSIS — J44.1 COPD EXACERBATION (H): Primary | ICD-10-CM

## 2018-10-02 DIAGNOSIS — Z72.0 TOBACCO ABUSE: ICD-10-CM

## 2018-10-02 PROCEDURE — 99214 OFFICE O/P EST MOD 30 MIN: CPT | Performed by: FAMILY MEDICINE

## 2018-10-02 PROCEDURE — G0463 HOSPITAL OUTPT CLINIC VISIT: HCPCS

## 2018-10-02 RX ORDER — DOXEPIN HYDROCHLORIDE 10 MG/1
10 CAPSULE ORAL AT BEDTIME
Qty: 90 CAPSULE | Refills: 3 | Status: SHIPPED | OUTPATIENT
Start: 2018-10-02 | End: 2019-09-25

## 2018-10-02 RX ORDER — ALPRAZOLAM 1 MG
TABLET ORAL
Qty: 90 TABLET | Refills: 3 | Status: SHIPPED | OUTPATIENT
Start: 2018-10-02 | End: 2019-03-07

## 2018-10-02 ASSESSMENT — PAIN SCALES - GENERAL: PAINLEVEL: NO PAIN (0)

## 2018-10-02 NOTE — NURSING NOTE
"Chief Complaint   Patient presents with     Hospital F/U     Smoking Cessation     Pt [place].     Forms     Pt refused PHQ9 and GAD7.       Initial /88  Pulse 83  Temp 98.8  F (37.1  C) (Tympanic)  Resp 16  Ht 4' 10\" (1.473 m)  Wt 178 lb (80.7 kg)  SpO2 91%  BMI 37.2 kg/m2 Estimated body mass index is 37.2 kg/(m^2) as calculated from the following:    Height as of this encounter: 4' 10\" (1.473 m).    Weight as of this encounter: 178 lb (80.7 kg).  Medication Reconciliation: complete    Rose Chaparro LPN    "

## 2018-10-02 NOTE — PATIENT INSTRUCTIONS

## 2018-10-03 ENCOUNTER — TELEPHONE (OUTPATIENT)
Dept: CASE MANAGEMENT | Facility: HOSPITAL | Age: 68
End: 2018-10-03

## 2018-10-08 ENCOUNTER — TELEPHONE (OUTPATIENT)
Dept: FAMILY MEDICINE | Facility: OTHER | Age: 68
End: 2018-10-08

## 2018-10-08 DIAGNOSIS — J44.9 CHRONIC OBSTRUCTIVE PULMONARY DISEASE, UNSPECIFIED COPD TYPE (H): Primary | ICD-10-CM

## 2018-10-08 NOTE — TELEPHONE ENCOUNTER
Mellissa from [facility] calls she needs an order to discharge pt's O2. Sydni has contacted them and notified Dr Alarcon advised this be D/C'd. Order is needed.

## 2018-10-10 ENCOUNTER — TELEPHONE (OUTPATIENT)
Dept: FAMILY MEDICINE | Facility: OTHER | Age: 68
End: 2018-10-10

## 2018-10-10 NOTE — TELEPHONE ENCOUNTER
Pt calls she finished her ABX and has 2 days left of her Prednisone 5 mg 1 daily. She was feeling better but sx are returning. Pt restarted some Doxycycline she had left over. She does not want anymore Prednisone due to weight gain. Please advise.

## 2018-10-10 NOTE — TELEPHONE ENCOUNTER
Recommend no meds.  Finish the prednisone.  Do not restart abx.  Stop smoking.  F/u if sx persist for a recheck and evaluation.  Thanks. Gibran Alarcon

## 2018-10-19 ENCOUNTER — TELEPHONE (OUTPATIENT)
Dept: CASE MANAGEMENT | Facility: HOSPITAL | Age: 68
End: 2018-10-19

## 2018-10-25 ENCOUNTER — OFFICE VISIT (OUTPATIENT)
Dept: CHIROPRACTIC MEDICINE | Facility: OTHER | Age: 68
End: 2018-10-25
Attending: CHIROPRACTOR
Payer: COMMERCIAL

## 2018-10-25 DIAGNOSIS — M99.01 SEGMENTAL AND SOMATIC DYSFUNCTION OF CERVICAL REGION: ICD-10-CM

## 2018-10-25 DIAGNOSIS — M99.02 SEGMENTAL AND SOMATIC DYSFUNCTION OF THORACIC REGION: ICD-10-CM

## 2018-10-25 DIAGNOSIS — M99.03 SEGMENTAL AND SOMATIC DYSFUNCTION OF LUMBAR REGION: Primary | ICD-10-CM

## 2018-10-25 DIAGNOSIS — M54.50 ACUTE BILATERAL LOW BACK PAIN WITHOUT SCIATICA: ICD-10-CM

## 2018-10-25 PROCEDURE — 98941 CHIROPRACT MANJ 3-4 REGIONS: CPT | Mod: AT | Performed by: CHIROPRACTOR

## 2018-10-30 DIAGNOSIS — R52 PAIN: ICD-10-CM

## 2018-10-30 RX ORDER — MELOXICAM 15 MG/1
TABLET ORAL
Qty: 90 TABLET | Refills: 1 | Status: SHIPPED | OUTPATIENT
Start: 2018-10-30 | End: 2019-05-08

## 2018-10-30 NOTE — TELEPHONE ENCOUNTER
[place]      Last Written Prescription Date:  10/25/17  Last Fill Quantity: 90,   # refills: 3  Last Office Visit: 10/2/18  Future Office visit:    Next 5 appointments (look out 90 days)     Nov 01, 2018  1:30 PM CDT   Return Visit with Maksim Moreno DC   [facility])    [address]  [facility] [address]   [phone]            Nov 05, 2018  1:15 PM CST   (Arrive by 1:00 PM)   SHORT with Gibran Alarcon MD   [facility] ([facility] )    [address] [facility] [address]   [phone]            Nov 09, 2018  1:00 PM CST   (Arrive by 12:45 PM)   Return Visit with Moisés Olson MD   [facility] ([facility] )    [address] [facility] [id number] [phone]

## 2018-10-30 NOTE — PROGRESS NOTES
Subjective Finding:    Chief compalint: Patient presents with:  Back Pain  Neck Pain  , Pain Scale: 2/10, Intensity: dull, Duration: 2 week, Change since last visit: , Radiating: no.    Date of injury:     Activities that the pain restricts:   Home/household activities: no.  Work duties: no.  Hobbies/social: yes.  Sleep: yes.  Makes symptoms better: rest and laying down.  Makes symptoms worse: activity, cervical extension and cervical flexion.  Have you seen anyone else for the symptoms? Yes: MD.  Work related: no.  Automobile related injury: no.    Objective and Assessment:    Posture Analysis:   High shoulder: right.  Head tilt: right.  High iliac crest: right.  Head carriage: forward.  Thoracic Kyphosis: neutral.  Lumbar Lordosis: forward.    Lumbar Range of Motion: extension decreased.  Cervical Range of Motion: extension decreased.  Thoracic Range of Motion: .  Extremity Range of Motion: right shoulder  decreased.    Palpation:   T paraspinals: sharp pain, no   subocc  Spasm    Segmental dysfunction pre-treatment: C2  C5-6  T5  L45  Assessment post-treatment:  Cervical:  Thoracic: .  Lumbar: ROM increased.    Comments: .      Complicating Factors: right shoulder surgery.    Plan / Procedure:    Expected release date: .  Treatment plan: PRN.  Instructed patient: ice 20 minutes every other hour as needed and rest.  Short term goals: reduce pain.  Long term goals: increase strength, reduce dependency on pain medications and restore normal function.  Prognosis: good.

## 2018-10-31 NOTE — PROGRESS NOTES
SUBJECTIVE:                                                    Sydni Hardin is a 68 year old female who presents to clinic today for the following health issues:        Hospital Follow-up Visit:    Hospital/Nursing Home/ Rehab Facility: [facility]  Date of Admission: 9/24/18  Date of Discharge: 9/27/18  Reason(s) for Admission: Upper Respiratory Infection            Problems taking medications regularly:  None       Medication changes since discharge: Prednisone, and Antibotic but now completed       Problems adhering to non-medication therapy:  None      Summary of hospitalization:  [facility] discharge summary reviewed  Diagnostic Tests/Treatments reviewed.  Follow up needed: none  Other Healthcare Providers Involved in Patient s Care:         None  Update since discharge: improved.     Post Discharge Medication Reconciliation: discharge medications reconciled, continue medications without change.  Plan of care communicated with patient     Coding guidelines for this visit:  Type of Medical   Decision Making Face-to-Face Visit       within 7 Days of discharge Face-to-Face Visit        within 14 days of discharge   Moderate Complexity [id number]   High Complexity [id number]          COPD Follow-Up    Symptoms are currently: stable    Current fatigue or dyspnea with ambulation: stable     Shortness of breath: stable    Increased or change in Cough/Sputum: No    Fever(s): No - chilled all the time, fatiuge    Any ER/UC or hospital admissions since your last visit? Yes - Hospital     History   Smoking Status     Current Every Day Smoker     Packs/day: 1.00     Years: 53.00     Types: Cigarettes     Start date: 1/1/1965   Smokeless Tobacco     Never Used     Comment: pt enrolled in quit plan 8/29/18     No results found for: FEV1, [id number]    Amount of exercise or physical activity: None    Problems taking medications regularly: No    Medication side effects: none    Diet: regular (no  restrictions)           Musculoskeletal problem/pain      Duration:  ongoing    Description  Location: left knee surgery    Intensity:  moderate    Accompanying signs and symptoms: none    History  Previous similar problem: YES  Previous evaluation:  x-ray    Precipitating or alleviating factors:  Trauma or overuse: no   Aggravating factors include: sitting, standing, walking and exercise    Therapies tried and outcome: injections, rest and brace      Problem list and histories reviewed & adjusted, as indicated.  Additional history: doing ok.  Having increased LUQ abd pain at times and would like this looked into.  Also with copd, stable.  Looking at knee surgery and looking for advice.      Patient Active Problem List   Diagnosis     Benign essential hypertension     Calculus of gallbladder     Headache     Osteoarthrosis, unspecified whether generalized or localized, involving lower leg     Dysthymia     Lung nodules     Tobacco abuse     SCC (squamous cell carcinoma), face     IC (interstitial cystitis)     Primary osteoarthritis of both knees     S/P total knee replacement using cement, right     Asthma     Leukocytosis     Dehydration     Knee pain     Acute respiratory failure with hypoxia (H)     COPD exacerbation (H)     CKD (chronic kidney disease) stage 3, GFR 30-59 ml/min (H)     Past Surgical History:   Procedure Laterality Date     ARTHROPLASTY KNEE Right 3/21/2017    Procedure: ARTHROPLASTY KNEE;  Surgeon: Moisés Olson MD;  Location: [facility]     ARTHROSCOPY KNEE       BIOPSY BREAST  2000    left - free of disease      SECTION        SECTION       CHOLECYSTECTOMY  2006     COLONOSCOPY  2008    repeat in 10 years     HEAD & NECK SURGERY  2014    hematoma on head drained     HYSTERECTOMY       ORTHOPEDIC SURGERY  2014    right shoulder replacement     shoulder x2  2009    left       Social History   Substance Use Topics     Smoking status: Current Every Day Smoker      Packs/day: 1.00     Years: 53.00     Types: Cigarettes     Start date: 1/1/1965     Smokeless tobacco: Never Used      Comment: pt enrolled in quit plan 8/29/18     Alcohol use 0.0 oz/week     0 Standard drinks or equivalent per week      Comment: rarely     Family History   Problem Relation Age of Onset     Cancer Mother      skin     Breast Cancer Mother      Cerebrovascular Disease Mother      Alzheimer Disease Mother      Myocardial Infarction Father 76     myocardial infarction - cause of death     Cancer Daughter      cervical      Diabetes Maternal Grandfather      HEART DISEASE Maternal Grandfather      MI     Diabetes Maternal Grandmother      Diabetes Paternal Grandmother      Diabetes Paternal Grandfather          Current Outpatient Prescriptions   Medication Sig Dispense Refill     ALPRAZolam (XANAX) 1 MG tablet TAKE 1 TABLET BY MOUTH 3 TIMES DAILY AS NEEDED FOR ANXIETY 90 tablet 3     aspirin EC 81 MG EC tablet Take 1 tablet (81 mg) by mouth 2 times daily (Patient taking differently: Take 81 mg by mouth daily )       atorvastatin (LIPITOR) 40 MG tablet TAKE 1 TABLET BY MOUTH DAILY 90 tablet 1     benazepril (LOTENSIN) 10 MG tablet TAKE 1 TABLET BY MOUTH DAILY 90 tablet 0     benzonatate (TESSALON) 200 MG capsule TAKE 1 CAPSULE BY MOUTH 3 TIMES A DAY AS NEEDED FOR COUGH 60 capsule 3     budesonide-formoterol (SYMBICORT) 160-4.5 MCG/ACT inhaler Inhale 2 puffs into the lungs 2 times daily. As needed 1 Inhaler 11     butalbital-acetaminophen-caffeine (FIORICET/ESGIC) -40 MG per tablet Take 1 tablet by mouth every 6 hours as needed 30 tablet 0     cimetidine (TAGAMET) 800 MG tablet Take 1 tablet (800 mg) by mouth At Bedtime As needed 90 tablet 3     doxepin (SINEQUAN) 10 MG capsule Take 1 capsule (10 mg) by mouth At Bedtime 90 capsule 3     EPIPEN 2-JAIRO 0.3 MG/0.3ML injection INJECT 1 SYRINGE INTRAMUSCULARLY ONCE AS NEEDED FOR ANAPHYLAXIS 2 each 0     escitalopram (LEXAPRO) 20 MG tablet TAKE 1 TABLET  BY MOUTH 2 TIMES A  tablet 3     fluconazole (DIFLUCAN) 150 MG tablet Take 1 tablet (150 mg) by mouth every 3 days 4 tablet 0     hydrochlorothiazide (HYDRODIURIL) 25 MG tablet TAKE 1 TABLET BY MOUTH DAILY 90 tablet 3     ipratropium - albuterol 0.5 mg/2.5 mg/3 mL (DUONEB) 0.5-2.5 (3) MG/3ML neb solution Take 1 vial (3 mLs) by nebulization every 4 hours as needed for shortness of breath / dyspnea or wheezing 90 mL 3     meloxicam (MOBIC) 15 MG tablet TAKE 1 TABLET BY MOUTH DAILY 90 tablet 1     montelukast (SINGULAIR) 10 MG tablet TAKE 1 TABLET BY MOUTH DAILY 90 tablet 1     nicotine (NICODERM CQ) 14 MG/24HR 24 hr patch Place 1 patch onto the skin every 24 hours 30 patch 1     nicotine (NICODERM CQ) 21 MG/24HR 24 hr patch Place 1 patch onto the skin every 24 hours 30 patch 1     nicotine (NICODERM CQ) 7 MG/24HR 24 hr patch Place 1 patch onto the skin every 24 hours 30 patch 1     nicotine polacrilex (EQL NICOTINE) 4 MG lozenge Place 1 lozenge (4 mg) inside cheek as needed for smoking cessation 360 tablet 1     nystatin (MYCOSTATIN) 978577 UNIT/ML suspension TAKE 1 TEASPOON (5MLS) BY MOUTH 4 TIMES DAILY 473 mL 1     order for DME Discontinue O2 services 1 each 0     order for DME Equipment being ordered: Please install safety bar in bathroom to assist patient in getting up from toilet. 1 Units 0     order for DME Equipment being ordered: toilet seat riser 1 each 0     order for DME Equipment being ordered: Nebulizer supplies 2 each 11     order for DME Equipment being ordered: arely walker 1 each 0     oxyCODONE-acetaminophen (PERCOCET) 5-325 MG per tablet TAKE 1 TO 2 TABLETS BY MOUTH EVERY 4 HOURS AS NEEDED FOR PAIN 60 tablet 0     potassium chloride SA (K-DUR/KLOR-CON M) 10 MEQ CR tablet TAKE 1 TABLET BY MOUTH 2 TIMES DAILY WITH FOOD 180 tablet 1     predniSONE (DELTASONE) 10 MG tablet Take 4 tablets daily for 3 days,  take 2 tablets daily for 3 days, take 1 tablet daily for 3 days, take half a tablet for  "3 days. 24 tablet 0     temazepam (RESTORIL) 30 MG capsule TAKE 1 CAPSULE BY MOUTH NIGHTLY AS NEEDED FOR SLEEP 90 capsule 3     VENTOLIN  (90 Base) MCG/ACT inhaler USE 1 PUFF FOUR TIMES A DAY AS NEEDED 18 g 1     VOLTAREN 1 % GEL topical gel APPLY 4 GRAMS TO KNEES FOUR TIMES A DAY USING ENCLOSED DOSING CARD 100 g 0     Allergies   Allergen Reactions     Bee Pollen Other (See Comments)     Throat and eyes close up     Codeine Difficulty breathing     \"My throat closes shut.\"     Takes Percocet at home     Keflex [Cephalexin Hcl] Hives     Sulfonamide Derivatives Other (See Comments)     Throat closes     Ciprofloxacin Rash     Rash, bumps on lips     Bupropion Hcl Other (See Comments)     Made her very irritable     Celecoxib Other (See Comments) and Cough     Celebrex - wheeze  Takes ASA at home     Cheese      swiss     Clonazepam Hives     Takes Xanax at home     Erythromycin Other (See Comments)     Erythromycin base - abdominal pain     Lisinopril Other (See Comments)     Zestril - headaches     Nifedipine Hives     Procardia     Strawberries [Strawberry]      Tomato      Tramadol Hcl      Takes percocet at home       ROS:  Constitutional, HEENT, cardiovascular, pulmonary, gi and gu systems are negative, except as otherwise noted.    OBJECTIVE:                                                    /80 (BP Location: Right arm, Patient Position: Sitting, Cuff Size: Adult Regular)  Pulse 73  Ht 4' 10\" (1.473 m)  Wt 180 lb (81.6 kg)  SpO2 93%  BMI 37.62 kg/m2  Body mass index is 37.62 kg/(m^2).  GENERAL APPEARANCE: Alert, no acute distress  CV: regular rate and rhythm, no murmur, rub or gallop  RESP: lungs clear to auscultation bilaterally with decreased breath sounds throughout.  no wheeze today.  ABDOMEN: normal bowel sounds, soft, nontender, no hepatosplenomegaly or other masses  SKIN: no suspicious lesions or rashes to visualized skin  NEURO: Alert, oriented x 3, speech and mentation " normal      LUQ US pending.      ASSESSMENT/PLAN:                                                    1. COPD exacerbation (H)  Improved.  Doing well right now.  Discussed tobacco and the like as well.     2. LUQ abdominal pain  Discussed.  Stable chest CT in August.  Update abd us to ensure nothing abnormal with that and the kidney.   - US Abdomen Complete; Future    3. Primary osteoarthritis of left knee  Looking at surgery.  I encouraged her to have it with Dr. Castro whom she trusts.  We talked a while about this.        We also talked about drama with her daughter and an internet boyfriend.  It was a good discussion.   25 minutes spent with patient over 50% in counseling mostly about daughter, the knee, and the LUQ pain.      Gibran Alarcon MD  [facility]

## 2018-11-05 ENCOUNTER — OFFICE VISIT (OUTPATIENT)
Dept: FAMILY MEDICINE | Facility: OTHER | Age: 68
End: 2018-11-05
Attending: FAMILY MEDICINE
Payer: COMMERCIAL

## 2018-11-05 VITALS
SYSTOLIC BLOOD PRESSURE: 128 MMHG | BODY MASS INDEX: 37.79 KG/M2 | HEART RATE: 73 BPM | DIASTOLIC BLOOD PRESSURE: 80 MMHG | WEIGHT: 180 LBS | HEIGHT: 58 IN | OXYGEN SATURATION: 93 %

## 2018-11-05 DIAGNOSIS — R10.12 LUQ ABDOMINAL PAIN: ICD-10-CM

## 2018-11-05 DIAGNOSIS — J44.1 COPD EXACERBATION (H): Primary | ICD-10-CM

## 2018-11-05 DIAGNOSIS — M17.12 PRIMARY OSTEOARTHRITIS OF LEFT KNEE: ICD-10-CM

## 2018-11-05 PROCEDURE — 99214 OFFICE O/P EST MOD 30 MIN: CPT | Performed by: FAMILY MEDICINE

## 2018-11-05 PROCEDURE — G0463 HOSPITAL OUTPT CLINIC VISIT: HCPCS

## 2018-11-05 ASSESSMENT — PATIENT HEALTH QUESTIONNAIRE - PHQ9: SUM OF ALL RESPONSES TO PHQ QUESTIONS 1-9: 22

## 2018-11-05 ASSESSMENT — PAIN SCALES - GENERAL: PAINLEVEL: MILD PAIN (3)

## 2018-11-05 ASSESSMENT — ANXIETY QUESTIONNAIRES
2. NOT BEING ABLE TO STOP OR CONTROL WORRYING: NEARLY EVERY DAY
1. FEELING NERVOUS, ANXIOUS, OR ON EDGE: NEARLY EVERY DAY
6. BECOMING EASILY ANNOYED OR IRRITABLE: NEARLY EVERY DAY
GAD7 TOTAL SCORE: 21
4. TROUBLE RELAXING: NEARLY EVERY DAY
3. WORRYING TOO MUCH ABOUT DIFFERENT THINGS: NEARLY EVERY DAY
5. BEING SO RESTLESS THAT IT IS HARD TO SIT STILL: NEARLY EVERY DAY
IF YOU CHECKED OFF ANY PROBLEMS ON THIS QUESTIONNAIRE, HOW DIFFICULT HAVE THESE PROBLEMS MADE IT FOR YOU TO DO YOUR WORK, TAKE CARE OF THINGS AT HOME, OR GET ALONG WITH OTHER PEOPLE: NOT DIFFICULT AT ALL
7. FEELING AFRAID AS IF SOMETHING AWFUL MIGHT HAPPEN: NEARLY EVERY DAY

## 2018-11-05 NOTE — NURSING NOTE
"Chief Complaint   Patient presents with     Hospital F/U     URI       Initial /80 (BP Location: Right arm, Patient Position: Sitting, Cuff Size: Adult Regular)  Pulse 73  Ht 4' 10\" (1.473 m)  Wt 180 lb (81.6 kg)  SpO2 93%  BMI 37.62 kg/m2 Estimated body mass index is 37.62 kg/(m^2) as calculated from the following:    Height as of this encounter: 4' 10\" (1.473 m).    Weight as of this encounter: 180 lb (81.6 kg).  Medication Reconciliation: complete    Patricia Ga LPN  "

## 2018-11-05 NOTE — LETTER
My COPD Action Plan     Name: Sydni Hardin    YOB: 1950   Date: 11/5/2018    My doctor: Gibran Alarcon MD   My clinic: [address] [address]  [phone]  My Controller Medicine: { :775623}   Dose: ***     My Rescue Medicine: { :649141}   Dose: ***     My Flare Up Medicine: { :187519}   Dose: ***     My COPD Severity: { :169601}      Use of Oxygen: { :094838}     Make sure you've had your pneumonia   vaccines.          GREEN ZONE       Doing well today      Usual level of activity and exercise    Usual amount of cough and mucus    No shortness of breath    Usual level of health (thinking clearly, sleeping well, feel like eating) Actions:      Take daily medicines    Use oxygen as prescribed    Follow regular exercise and diet plan    Avoid cigarette smoke and other irritants that harm the lungs           YELLOW ZONE          Having a bad day or flare up      Short of breath more than usual    A lot more sputum (mucus) than usual    Sputum looks yellow, green, tan, brown or bloody    More coughing or wheezing    Fever or chills    Less energy; trouble completing activities    Trouble thinking or focusing    Using quick relief inhaler or nebulizer more often    Poor sleep; symptoms wake me up    Do not feel like eating Actions:      Get plenty of rest    Take daily medicines    Use quick relief inhaler every *** hours    If you use oxygen, call you doctor to see if you should adjust your oxygen    Do breathing exercises or other things to help you relax    Let a loved one, friend or neighbor know you are feeling worse    Call your care team if you have 2 or more symptoms.  Start taking steroids or antibiotics if directed by your care team           RED ZONE       Need medical care now      Severe shortness of breath (feel you can't breathe)    Fever, chills    Not enough breath to do any activity    Trouble coughing up mucus, walking or  talking    Blood in mucus    Frequent coughing   Rescue medicines are not working    Not able to sleep because of breathing    Feel confused or drowsy    Chest pain    Actions:      Call your health care team.  If you cannot reach your care team, call 911 or go to the emergency room.        Annual Reminders:  Meet with Care Team, Flu Shot every Fall  Pharmacy:    [facility] - [place], [place] - [address] [id number] - [place], [place] - [address] AT [facility] OF  [address]

## 2018-11-06 ASSESSMENT — ANXIETY QUESTIONNAIRES: GAD7 TOTAL SCORE: 21

## 2018-11-28 ENCOUNTER — OFFICE VISIT (OUTPATIENT)
Dept: ORTHOPEDICS | Facility: OTHER | Age: 68
End: 2018-11-28
Attending: ORTHOPAEDIC SURGERY
Payer: MEDICARE

## 2018-11-28 VITALS
HEART RATE: 88 BPM | HEIGHT: 58 IN | SYSTOLIC BLOOD PRESSURE: 108 MMHG | DIASTOLIC BLOOD PRESSURE: 80 MMHG | BODY MASS INDEX: 37.79 KG/M2 | OXYGEN SATURATION: 94 % | WEIGHT: 180 LBS | TEMPERATURE: 97.4 F

## 2018-11-28 DIAGNOSIS — Z72.0 TOBACCO ABUSE: ICD-10-CM

## 2018-11-28 DIAGNOSIS — M17.12 PRIMARY OSTEOARTHRITIS OF LEFT KNEE: Primary | ICD-10-CM

## 2018-11-28 PROCEDURE — 99212 OFFICE O/P EST SF 10 MIN: CPT | Mod: 25 | Performed by: ORTHOPAEDIC SURGERY

## 2018-11-28 PROCEDURE — 20610 DRAIN/INJ JOINT/BURSA W/O US: CPT | Performed by: ORTHOPAEDIC SURGERY

## 2018-11-28 PROCEDURE — G0463 HOSPITAL OUTPT CLINIC VISIT: HCPCS | Mod: 25

## 2018-11-28 PROCEDURE — G0463 HOSPITAL OUTPT CLINIC VISIT: HCPCS

## 2018-11-28 RX ORDER — DEXAMETHASONE SODIUM PHOSPHATE 4 MG/ML
4 INJECTION, SOLUTION INTRA-ARTICULAR; INTRALESIONAL; INTRAMUSCULAR; INTRAVENOUS; SOFT TISSUE ONCE
Qty: 1 ML | Refills: 0 | OUTPATIENT
Start: 2018-11-28 | End: 2019-02-04

## 2018-11-28 ASSESSMENT — PAIN SCALES - GENERAL: PAINLEVEL: MODERATE PAIN (5)

## 2018-11-28 NOTE — PROGRESS NOTES
"Chief Complaint: DJD left knee    Patient Profile: 68-year-old non-smoking NSAID intolerant retired female patient of Dr. Alarcon.  She is status post right TKA on 3/21/2017.  She states she frequently gets \"sepsis\" after surgeries.     HPI: She is been treated in this office for DJD of the left knee using topical Voltaren gel, a hinged knee brace, steroid injections, and Visco supplementation injections-the last on 8/29/2018.     Subjective: The benefit of her last injection is starting to wear off.  She denies a recent injury to the knee.    At her last visit here on 8/29/2018 she was feeling poorly.  We had her go to the ED.  She was diagnosed with an \"acute exacerbation of COPD\" per the ED record.  She states she was admitted to the hospital with a \"respiratory infection\" but the ED record states that she was discharged home on prednisone and doxycycline.  She states that she recently had another bout of the same condition, from which she only recently recovered.  Her Epic chart does confirm that her PCP treated her in September for a \"upper respiratory infection\".  She recalls both of these conditions being \"sepsis\".    After her right TKA in 2017 she was readmitted to the hospital on POD 4 with early incisional cellulitis.  Pulmonary infiltrates were noted on chest x-ray so a possible pneumonia was also treated with antibiotics.  She recalls this as being \"sepsis\".  She recovered and went on to a very successful outcome of her right TKA.    Her medical history is also significant for stage III kidney disease, cerebrovascular disease with at least one cerebral infarction, COPD and obesity.     She smokes and is enrolled in the Quit Plan at this time.    Objective:/80  Pulse 88  Temp 97.4  F (36.3  C) (Tympanic)  Ht 4' 10\" (1.473 m)  Wt 180 lb (81.6 kg)  SpO2 94%  BMI 37.62 kg/m2 she is alert and oriented.  Her left knee has no effusion.  The neurovascular status of the limb is " intact.    Impression:  #1.  DJD left knee  #2.  NSAID contraindication: Gastric ulcer  #3.  Tobacco abuse  #4.  Poor surgical candidate due to multiple medical problems and history of recurrent respiratory infections.    Plan: She would like to avoid surgery on the left knee if possible.  I support her in that decision as I believe she is a poor surgical candidate. I therefore offered her a steroid injection to get her by until she is eligible for her next Visco supplementation injection in March. She agreed.    Procedure: After obtaining written informed consent and sterilely prepping the site a timeout was held.  Sterile technique was employed as the left knee was then injected with 4 mg of dexamethasone mixed with 3mL of Carbocaine.  Topical ethyl chloride spray was used as a topical anesthetic.  The patient tolerated the procedure well and there were no complications.

## 2018-11-28 NOTE — MR AVS SNAPSHOT
After Visit Summary   11/28/2018    Sydni Hardin    MRN: [id number]           Patient Information     Date Of Birth          1950        Visit Information        Provider Department      11/28/2018 11:40 AM Moisés Olson MD [facility]        Today's Diagnoses     Primary osteoarthritis of left knee    -  1    Tobacco abuse           Follow-ups after your visit        Follow-up notes from your care team     Return in about 3 months (around 3/6/2019).      Your next 10 appointments already scheduled     Nov 29, 2018 10:00 AM CST   (Arrive by 9:45 AM)   MA SCREENING BILATERAL W/ SHANNA with HCMA1   [facility] )    [address]  [place] [address]   [phone]           How do I prepare for my exam? (Food and drink instructions) No Food and Drink Restrictions.  How do I prepare for my exam? (Other instructions) Do not use any powder, lotion or deodorant under your arms or on your breast. If you do, we will ask you to remove it before your exam.  What should I wear: Wear comfortable, two-piece clothing.  How long does the exam take: Most scans will take 15 minutes.  What should I bring: Bring any previous mammograms from other facilities or have them mailed to the breast center.  Do I need a :  No  is needed.  What do I need to tell my doctor: If you have any allergies, tell your care team.  What should I do after the exam: No restrictions, You may resume normal activities.  What is this test: This test is an x-ray of the breast to look for breast disease. The breast is pressed between two plates to flatten and spread the tissue. An X-ray is taken of the breast from different angles.  Who should I call with questions: If you have any questions, please call the Imaging Department where you will have your exam. Directions, parking instructions, and other information is available on our website,  [email or web address].  Other information about my exam Three-dimensional (3D) mammograms are available at [facility] locations in [facility], [place], [place], [facility], [place], and [place].  Health locations include [place] and the [facility] in [place].  Benefits of 3D mammograms include: * Improved rate of cancer detection * Decreases your chance of having to go back for more tests, which means fewer: * \"False-positive\" results (This means that there is an abnormal area but it isn't cancer.) * Invasive testing procedures, such as a biopsy or surgery * Can provide clearer images of the breast if you have dense breast tissue.  *3D mammography is an optional exam that anyone can have with a 2D mammogram. It doesn't replace or take the place of a 2D mammogram. 2D mammograms remain an effective screening test for all women.  Not all insurance companies cover the cost of a 3D mammogram. Check with your insurance.            Nov 29, 2018 10:30 AM CST   US ABDOMEN COMPLETE with HIUS2   [facility] ULTRASOUND ([facility] )    [address] [place] [address]   [phone]           How do I prepare for my exam? (Food and drink instructions) Adults: No eating, smoking, gum chewing or drinking for 8 hours before the exam. You may take medicine with a small sip of water.  Children: * Infants, breast-fed: may have breast milk up to 2 hours before exam. * Infants, formula: may have bottle until 4 hours before exam. * Children 1-5 years: No food or drink for 4 hours before exam. * Children 6 -12 years: No food or drink for 6 hours before exam. * Children over 12 years: No food or drink for 8 hours before exam.  * J Tube Fed: No need to stop feedings.  What should I wear: Wear comfortable clothes.  How long does the exam take: Most ultrasounds take 30 to 60 minutes.  What should I bring: Bring a list of your medicines, including vitamins, minerals and over-the-counter drugs. It " is safest to leave personal items at home.  Do I need a :  No  is needed.  What do I need to tell my doctor: Tell your doctor about any allergies you may have.  What should I do after the exam: No restrictions, You may resume normal activities.  What is this test: An ultrasound uses sound waves to make pictures of the body. Sound waves do not cause pain. The only discomfort may be the pressure of the wand against your skin or full bladder.  Who should I call with questions: If you have any questions, please call the Imaging Department where you will have your exam. Directions, parking instructions, and other information is available on our website, [email or web address].            Dec 03, 2018  1:30 PM CST   (Arrive by 1:15 PM)   SHORT with Gibran Alarcon MD   [facility]    [address]  [facility] [address]   [phone]            Mar 07, 2019  2:20 PM CST   (Arrive by 2:05 PM)   Return Visit with Moisés Olson MD   [facility] ([facility] )    [address] [facility] [address] [phone]              Who to contact     If you have questions or need follow up information about today's clinic visit or your schedule please contact [facility] directly at [phone].  Normal or non-critical lab and imaging results will be communicated to you by MyChart, letter or phone within 4 business days after the clinic has received the results. If you do not hear from us within 7 days, please contact the clinic through MyChart or phone. If you have a critical or abnormal lab result, we will notify you by phone as soon as possible.  Submit refill requests through [facility] or call your pharmacy and they will forward the refill request to us. Please allow 3 business days for your refill to be completed.          Additional Information About Your Visit        Care EveryWhere  "ID     This is your Care EveryWhere ID. This could be used by other organizations to access your [facility] medical records  [id number]        Your Vitals Were     Pulse Temperature Height Pulse Oximetry BMI (Body Mass Index)       88 97.4  F (36.3  C) (Tympanic) 4' 10\" (1.473 m) 94% 37.62 kg/m2        Blood Pressure from Last 3 Encounters:   11/28/18 108/80   11/05/18 128/80   10/02/18 136/88    Weight from Last 3 Encounters:   11/28/18 180 lb (81.6 kg)   11/05/18 180 lb (81.6 kg)   10/02/18 178 lb (80.7 kg)              Today, you had the following     No orders found for display         Today's Medication Changes          These changes are accurate as of 11/28/18 12:23 PM.  If you have any questions, ask your nurse or doctor.               These medicines have changed or have updated prescriptions.        Dose/Directions    aspirin 81 MG EC tablet   Commonly known as:  ASA   This may have changed:  when to take this   Used for:  S/P total knee replacement using cement, right        Dose:  81 mg   Take 1 tablet (81 mg) by mouth 2 times daily   Refills:  0                Primary Care Provider Office Phone # Fax #    Gibran Alarcon [phone]       [address] [place] [address]        Equal Access to Services     TREVIN KENNEDY AH: Hadii dixon sánchez hadasho Soifeanyi, waaxda luqadaha, qaybta kaalmada adeegyada, cierra claudio . So [facility] [phone].    ATENCIÓN: Si habla español, tiene a guaman disposición servicios gratuitos de asistencia lingüística. Llame al [phone].    We comply with applicable federal civil rights laws and [place] laws. We do not discriminate on the basis of race, color, national origin, age, disability, sex, sexual orientation, or gender identity.            Thank you!     Thank you for choosing [facility]  for your care. Our goal is always to provide you with excellent care. Hearing back from our patients is one way we can " continue to improve our services. Please take a few minutes to complete the written survey that you may receive in the mail after your visit with us. Thank you!             Your Updated Medication List - Protect others around you: Learn how to safely use, store and throw away your medicines at www.disposemymeds.org.          This list is accurate as of 11/28/18 12:23 PM.  Always use your most recent med list.                   Brand Name Dispense Instructions for use Diagnosis    ALPRAZolam 1 MG tablet    XANAX    90 tablet    TAKE 1 TABLET BY MOUTH 3 TIMES DAILY AS NEEDED FOR ANXIETY    Anxiety       aspirin 81 MG EC tablet    ASA     Take 1 tablet (81 mg) by mouth 2 times daily    S/P total knee replacement using cement, right       atorvastatin 40 MG tablet    LIPITOR    90 tablet    TAKE 1 TABLET BY MOUTH DAILY    Mixed hyperlipidemia       benazepril 10 MG tablet    LOTENSIN    90 tablet    TAKE 1 TABLET BY MOUTH DAILY    Essential hypertension       benzonatate 200 MG capsule    TESSALON    60 capsule    TAKE 1 CAPSULE BY MOUTH 3 TIMES A DAY AS NEEDED FOR COUGH    Preop general physical exam, Tobacco abuse       budesonide-formoterol 160-4.5 MCG/ACT Inhaler    SYMBICORT    1 Inhaler    Inhale 2 puffs into the lungs 2 times daily. As needed    Asthma       butalbital-acetaminophen-caffeine -40 MG tablet    FIORICET/ESGIC    30 tablet    Take 1 tablet by mouth every 6 hours as needed    Scalp hematoma, sequela       cimetidine 800 MG tablet    TAGAMET    90 tablet    Take 1 tablet (800 mg) by mouth At Bedtime As needed    Esophageal reflux       doxepin 10 MG capsule    SINEquan    90 capsule    Take 1 capsule (10 mg) by mouth At Bedtime    Primary osteoarthritis of right knee       EPIPEN 2-JAIRO 0.3 MG/0.3ML injection 2-pack   Generic drug:  EPINEPHrine     2 each    INJECT 1 SYRINGE INTRAMUSCULARLY ONCE AS NEEDED FOR ANAPHYLAXIS    Anaphylactic reaction       escitalopram 20 MG tablet    LEXAPRO    180  tablet    TAKE 1 TABLET BY MOUTH 2 TIMES A DAY    Dysthymia       fluconazole 150 MG tablet    DIFLUCAN    4 tablet    Take 1 tablet (150 mg) by mouth every 3 days    Candidiasis of vulva and vagina       hydrochlorothiazide 25 MG tablet    HYDRODIURIL    90 tablet    TAKE 1 TABLET BY MOUTH DAILY    Benign essential hypertension       ipratropium - albuterol 0.5 mg/2.5 mg/3 mL 0.5-2.5 (3) MG/3ML neb solution    DUONEB    90 mL    Take 1 vial (3 mLs) by nebulization every 4 hours as needed for shortness of breath / dyspnea or wheezing    Acute bronchospasm, SOB (shortness of breath)       meloxicam 15 MG tablet    MOBIC    90 tablet    TAKE 1 TABLET BY MOUTH DAILY    Pain       montelukast 10 MG tablet    SINGULAIR    90 tablet    TAKE 1 TABLET BY MOUTH DAILY    Mixed hyperlipidemia       * nicotine 14 MG/24HR 24 hr patch    NICODERM CQ    30 patch    Place 1 patch onto the skin every 24 hours    Tobacco abuse       * nicotine 7 MG/24HR 24 hr patch    NICODERM CQ    30 patch    Place 1 patch onto the skin every 24 hours    Tobacco abuse       * nicotine 21 MG/24HR 24 hr patch    NICODERM CQ    30 patch    Place 1 patch onto the skin every 24 hours    Tobacco abuse       nicotine 4 MG lozenge    EQL NICOTINE    360 tablet    Place 1 lozenge (4 mg) inside cheek as needed for smoking cessation    Tobacco abuse       nystatin 769383 UNIT/ML suspension    MYCOSTATIN    473 mL    TAKE 1 TEASPOON (5MLS) BY MOUTH 4 TIMES DAILY    Thrush       * order for DME     2 each    Equipment being ordered: Nebulizer supplies    Primary osteoarthritis of right knee       * order for DME     1 each    Equipment being ordered: mcgee walker    Primary osteoarthritis of right knee       * order for DME     1 each    Equipment being ordered: toilet seat riser    Postoperative state       * order for DME     1 Units    Equipment being ordered: Please install safety bar in bathroom to assist patient in getting up from toilet.    Status post  total right knee replacement       order for DME     1 each    Discontinue O2 services    Chronic obstructive pulmonary disease, unspecified COPD type (H)       oxyCODONE-acetaminophen 5-325 MG tablet    PERCOCET    60 tablet    TAKE 1 TO 2 TABLETS BY MOUTH EVERY 4 HOURS AS NEEDED FOR PAIN    Primary osteoarthritis of both knees       potassium chloride ER 10 MEQ CR tablet    K-DUR/KLOR-CON M    180 tablet    TAKE 1 TABLET BY MOUTH 2 TIMES DAILY WITH FOOD    Benign essential hypertension       predniSONE 10 MG tablet    DELTASONE    24 tablet    Take 4 tablets daily for 3 days,  take 2 tablets daily for 3 days, take 1 tablet daily for 3 days, take half a tablet for 3 days.    Acute exacerbation of chronic obstructive pulmonary disease (H)       temazepam 30 MG capsule    RESTORIL    90 capsule    TAKE 1 CAPSULE BY MOUTH NIGHTLY AS NEEDED FOR SLEEP    Persistent insomnia       VENTOLIN  (90 Base) MCG/ACT inhaler   Generic drug:  albuterol     18 g    USE 1 PUFF FOUR TIMES A DAY AS NEEDED    Tobacco abuse       VOLTAREN 1 % topical gel   Generic drug:  diclofenac     100 g    APPLY 4 GRAMS TO KNEES FOUR TIMES A DAY USING ENCLOSED DOSING CARD    Primary osteoarthritis of left knee, Chronic pain of right knee       * Notice:  This list has 7 medication(s) that are the same as other medications prescribed for you. Read the directions carefully, and ask your doctor or other care provider to review them with you.

## 2018-11-28 NOTE — NURSING NOTE
"Chief Complaint   Patient presents with     RECHECK     Left Knee       Initial /80  Pulse 88  Temp 97.4  F (36.3  C) (Tympanic)  Ht 4' 10\" (1.473 m)  Wt 180 lb (81.6 kg)  SpO2 94%  BMI 37.62 kg/m2 Estimated body mass index is 37.62 kg/(m^2) as calculated from the following:    Height as of this encounter: 4' 10\" (1.473 m).    Weight as of this encounter: 180 lb (81.6 kg).  Medication Reconciliation: complete    Marleni Johnson LPN    "

## 2018-11-29 ENCOUNTER — RADIANT APPOINTMENT (OUTPATIENT)
Dept: MAMMOGRAPHY | Facility: OTHER | Age: 68
End: 2018-11-29
Attending: FAMILY MEDICINE
Payer: MEDICARE

## 2018-11-29 ENCOUNTER — HOSPITAL ENCOUNTER (OUTPATIENT)
Dept: ULTRASOUND IMAGING | Facility: HOSPITAL | Age: 68
Discharge: HOME | End: 2018-11-29
Attending: FAMILY MEDICINE | Admitting: FAMILY MEDICINE
Payer: MEDICARE

## 2018-11-29 DIAGNOSIS — Z12.39 SCREENING BREAST EXAMINATION: ICD-10-CM

## 2018-11-29 DIAGNOSIS — R10.12 LUQ ABDOMINAL PAIN: ICD-10-CM

## 2018-11-29 PROCEDURE — 77063 BREAST TOMOSYNTHESIS BI: CPT | Mod: TC

## 2018-11-29 PROCEDURE — 76700 US EXAM ABDOM COMPLETE: CPT | Mod: TC

## 2018-12-07 NOTE — PROGRESS NOTES
SUBJECTIVE:                                                    Sydni Hardin is a 68 year old female who presents to clinic today for the following health issues:    COPD Follow-Up    Symptoms are currently: improved    Current fatigue or dyspnea with ambulation: stable     Shortness of breath: stable, depending on what is going on    Increased or change in Cough/Sputum: No    Fever(s): No    Baseline ambulation without stopping to rest:  2 blocks. Able to walk up 15 stairs without stopping to rest.    Any ER/UC or hospital admissions since your last visit? No     History   Smoking Status     Current Every Day Smoker     Packs/day: 1.00     Years: 53.00     Types: Cigarettes     Start date: 1/1/1965   Smokeless Tobacco     Never Used     Comment: pt enrolled in quit plan 11/28/18     No results found for: FEV1, AAZ1PYA    Amount of exercise or physical activity: 2-3 days/week for an average of greater than 60 minutes    Problems taking medications regularly: No    Medication side effects: none    Diet: regular (no restrictions)      Musculoskeletal problem/pain      Duration: follow up    Description  Location:both shoulders and both knees but the right knee is hurting more    Intensity:  moderate    Accompanying signs and symptoms: radiation of pain to fingers, numbness and tingling and cramping in both hands. More often when hands are cold.    History  Previous similar problem: YES- ongoing, has had some shoulder and knee surgeries in the past.  Previous evaluation:  x-ray    Precipitating or alleviating factors:  Trauma or overuse: no   Aggravating factors include: standing, walking, climbing stairs and overuse    Therapies tried and outcome: acetaminophen and deep heating rub helps      PROBLEMS TO ADD ON...    Problem list and histories reviewed & adjusted, as indicated.  Additional history: having a lot of tension.  Stress high.  Knee pain severe.  Breathing normal.  We discussed and reviewed.       Patient Active Problem List   Diagnosis     Benign essential hypertension     Calculus of gallbladder     Headache     Osteoarthrosis, unspecified whether generalized or localized, involving lower leg     Dysthymia     Lung nodules     Tobacco abuse     SCC (squamous cell carcinoma), face     IC (interstitial cystitis)     Primary osteoarthritis of both knees     S/P total knee replacement using cement, right     Asthma     Leukocytosis     Dehydration     Knee pain     Acute respiratory failure with hypoxia (H)     COPD exacerbation (H)     CKD (chronic kidney disease) stage 3, GFR 30-59 ml/min (H)     Past Surgical History:   Procedure Laterality Date     ARTHROPLASTY KNEE Right 3/21/2017    Procedure: ARTHROPLASTY KNEE;  Surgeon: Moisés Olson MD;  Location: [facility]     ARTHROSCOPY KNEE       BIOPSY BREAST  2000    left - free of disease      SECTION        SECTION       CHOLECYSTECTOMY       COLONOSCOPY  2008    repeat in 10 years     HEAD & NECK SURGERY  2014    hematoma on head drained     HYSTERECTOMY       ORTHOPEDIC SURGERY  2014    right shoulder replacement     shoulder x2  2009    left       Social History     Tobacco Use     Smoking status: Current Every Day Smoker     Packs/day: 1.00     Years: 53.00     Pack years: 53.00     Types: Cigarettes     Start date: 1965     Smokeless tobacco: Never Used     Tobacco comment: pt enrolled in quit plan 18   Substance Use Topics     Alcohol use: Yes     Alcohol/week: 0.0 oz     Comment: rarely     Family History   Problem Relation Age of Onset     Cancer Mother         skin     Breast Cancer Mother      Cerebrovascular Disease Mother      Alzheimer Disease Mother      Myocardial Infarction Father 76        myocardial infarction - cause of death     Cancer Daughter         cervical      Diabetes Maternal Grandfather      Heart Disease Maternal Grandfather         MI     Diabetes Maternal Grandmother      Diabetes  Paternal Grandmother      Diabetes Paternal Grandfather          Current Outpatient Medications   Medication Sig Dispense Refill     ALPRAZolam (XANAX) 1 MG tablet TAKE 1 TABLET BY MOUTH 3 TIMES DAILY AS NEEDED FOR ANXIETY 90 tablet 3     aspirin EC 81 MG EC tablet Take 1 tablet (81 mg) by mouth 2 times daily (Patient taking differently: Take 81 mg by mouth daily )       atorvastatin (LIPITOR) 40 MG tablet TAKE 1 TABLET BY MOUTH DAILY 90 tablet 1     benazepril (LOTENSIN) 10 MG tablet TAKE 1 TABLET BY MOUTH DAILY 90 tablet 0     benzonatate (TESSALON) 200 MG capsule TAKE 1 CAPSULE BY MOUTH 3 TIMES A DAY AS NEEDED FOR COUGH 60 capsule 3     budesonide-formoterol (SYMBICORT) 160-4.5 MCG/ACT inhaler Inhale 2 puffs into the lungs 2 times daily. As needed 1 Inhaler 11     butalbital-acetaminophen-caffeine (FIORICET/ESGIC) -40 MG tablet Take 1 tablet by mouth every 6 hours as needed for headaches 30 tablet 3     cimetidine (TAGAMET) 800 MG tablet Take 1 tablet (800 mg) by mouth At Bedtime As needed 90 tablet 3     doxepin (SINEQUAN) 10 MG capsule Take 1 capsule (10 mg) by mouth At Bedtime 90 capsule 3     EPIPEN 2-JAIRO 0.3 MG/0.3ML injection INJECT 1 SYRINGE INTRAMUSCULARLY ONCE AS NEEDED FOR ANAPHYLAXIS 2 each 0     escitalopram (LEXAPRO) 20 MG tablet TAKE 1 TABLET BY MOUTH 2 TIMES A  tablet 3     hydrochlorothiazide (HYDRODIURIL) 25 MG tablet TAKE 1 TABLET BY MOUTH DAILY 90 tablet 3     ipratropium - albuterol 0.5 mg/2.5 mg/3 mL (DUONEB) 0.5-2.5 (3) MG/3ML neb solution Take 1 vial (3 mLs) by nebulization every 4 hours as needed for shortness of breath / dyspnea or wheezing 90 mL 3     meloxicam (MOBIC) 15 MG tablet TAKE 1 TABLET BY MOUTH DAILY 90 tablet 1     montelukast (SINGULAIR) 10 MG tablet TAKE 1 TABLET BY MOUTH DAILY 90 tablet 1     nystatin (MYCOSTATIN) 318921 UNIT/ML suspension TAKE 1 TEASPOON (5MLS) BY MOUTH 4 TIMES DAILY 473 mL 1     order for DME Discontinue O2 services 1 each 0     order for DME  Equipment being ordered: Please install safety bar in bathroom to assist patient in getting up from toilet. 1 Units 0     order for DME Equipment being ordered: toilet seat riser 1 each 0     order for DME Equipment being ordered: Nebulizer supplies 2 each 11     order for DME Equipment being ordered: mcgee walker 1 each 0     oxyCODONE-acetaminophen (PERCOCET) 5-325 MG per tablet TAKE 1 TO 2 TABLETS BY MOUTH EVERY 4 HOURS AS NEEDED FOR PAIN 60 tablet 0     potassium chloride SA (K-DUR/KLOR-CON M) 10 MEQ CR tablet TAKE 1 TABLET BY MOUTH 2 TIMES DAILY WITH FOOD 180 tablet 1     temazepam (RESTORIL) 30 MG capsule TAKE 1 CAPSULE BY MOUTH NIGHTLY AS NEEDED FOR SLEEP 90 capsule 3     VENTOLIN  (90 Base) MCG/ACT inhaler USE 1 PUFF FOUR TIMES A DAY AS NEEDED 18 g 1     VOLTAREN 1 % GEL topical gel APPLY 4 GRAMS TO KNEES FOUR TIMES A DAY USING ENCLOSED DOSING CARD 100 g 0     dexamethasone (DECADRON) 4 MG/ML injection Inject 1 mL (4 mg) as directed once for 1 dose Use 4 mg or dose determined by provider for iontophoresis. 1 mL 0     fluconazole (DIFLUCAN) 150 MG tablet Take 1 tablet (150 mg) by mouth every 3 days (Patient not taking: Reported on 12/12/2018) 4 tablet 0     nicotine (NICODERM CQ) 14 MG/24HR 24 hr patch Place 1 patch onto the skin every 24 hours (Patient not taking: Reported on 12/12/2018) 30 patch 1     nicotine (NICODERM CQ) 21 MG/24HR 24 hr patch Place 1 patch onto the skin every 24 hours (Patient not taking: Reported on 12/12/2018) 30 patch 1     nicotine (NICODERM CQ) 7 MG/24HR 24 hr patch Place 1 patch onto the skin every 24 hours (Patient not taking: Reported on 12/12/2018) 30 patch 1     nicotine polacrilex (EQL NICOTINE) 4 MG lozenge Place 1 lozenge (4 mg) inside cheek as needed for smoking cessation (Patient not taking: Reported on 12/12/2018) 360 tablet 1     Allergies   Allergen Reactions     Bee Pollen Other (See Comments)     Throat and eyes close up     Codeine Difficulty breathing      "\"My throat closes shut.\"     Takes Percocet at home     Keflex [Cephalexin Hcl] Hives     Sulfonamide Derivatives Other (See Comments)     Throat closes     Ciprofloxacin Rash     Rash, bumps on lips     Bupropion Hcl Other (See Comments)     Made her very irritable     Celecoxib Other (See Comments) and Cough     Celebrex - wheeze  Takes ASA at home     Cheese      swiss     Clonazepam Hives     Takes Xanax at home     Erythromycin Other (See Comments)     Erythromycin base - abdominal pain     Lisinopril Other (See Comments)     Zestril - headaches     Nifedipine Hives     Procardia     Strawberries [Strawberry]      Tomato      Tramadol Hcl      Takes percocet at home       ROS:  Constitutional, HEENT, cardiovascular, pulmonary, gi and gu systems are negative, except as otherwise noted.    OBJECTIVE:                                                    /84   Pulse 85   Temp 98.4  F (36.9  C) (Tympanic)   Wt 80.7 kg (178 lb)   SpO2 92%   BMI 37.20 kg/m    Body mass index is 37.2 kg/m .  GENERAL APPEARANCE: Alert, no acute distress  SKIN: no suspicious lesions or rashes to visualized skin  NEURO: Alert, oriented x 3, speech and mentation normal           ASSESSMENT/PLAN:                                                    1. Nonintractable episodic headache, unspecified headache type  Lengthy review today.  Update ESr.  I suspect tension very much.  We talked a long time about this.  fioricet sent.  Chiropractic for the neck recommended and she has had some success with that in the past.  No change otherwise.    - butalbital-acetaminophen-caffeine (FIORICET/ESGIC) -40 MG tablet; Take 1 tablet by mouth every 6 hours as needed for headaches  Dispense: 30 tablet; Refill: 3  - ESR: Erythrocyte sedimentation rate    2. Chronic obstructive pulmonary disease, unspecified COPD type (H)  Stable.  We reviewed.  She told me she has a heart problem of unknown significance.  It was suggested to her verbally.  I " can't find anything.  We looked through the chart.  F/u routine.  No issues.      3. Chronic pain of both knees  As above.  She is going with shots for now.        25 minutes spent with patient over 50%in counseling about the heart, lungs, headache, update her pneumovax, and her knees.    Gibran Alarcon MD  [facility]

## 2018-12-12 ENCOUNTER — OFFICE VISIT (OUTPATIENT)
Dept: FAMILY MEDICINE | Facility: OTHER | Age: 68
End: 2018-12-12
Attending: FAMILY MEDICINE
Payer: MEDICARE

## 2018-12-12 VITALS
TEMPERATURE: 98.4 F | SYSTOLIC BLOOD PRESSURE: 136 MMHG | OXYGEN SATURATION: 92 % | HEART RATE: 85 BPM | DIASTOLIC BLOOD PRESSURE: 84 MMHG | WEIGHT: 178 LBS | BODY MASS INDEX: 37.2 KG/M2

## 2018-12-12 DIAGNOSIS — M25.562 CHRONIC PAIN OF BOTH KNEES: ICD-10-CM

## 2018-12-12 DIAGNOSIS — J44.9 CHRONIC OBSTRUCTIVE PULMONARY DISEASE, UNSPECIFIED COPD TYPE (H): ICD-10-CM

## 2018-12-12 DIAGNOSIS — Z23 NEED FOR VACCINATION: ICD-10-CM

## 2018-12-12 DIAGNOSIS — M25.561 CHRONIC PAIN OF BOTH KNEES: ICD-10-CM

## 2018-12-12 DIAGNOSIS — R51.9 NONINTRACTABLE EPISODIC HEADACHE, UNSPECIFIED HEADACHE TYPE: Primary | ICD-10-CM

## 2018-12-12 DIAGNOSIS — G89.29 CHRONIC PAIN OF BOTH KNEES: ICD-10-CM

## 2018-12-12 LAB — ERYTHROCYTE [SEDIMENTATION RATE] IN BLOOD BY WESTERGREN METHOD: 5 MM/H (ref 0–30)

## 2018-12-12 PROCEDURE — 36415 COLL VENOUS BLD VENIPUNCTURE: CPT | Mod: ZL | Performed by: FAMILY MEDICINE

## 2018-12-12 PROCEDURE — 85652 RBC SED RATE AUTOMATED: CPT | Mod: ZL | Performed by: FAMILY MEDICINE

## 2018-12-12 PROCEDURE — G0009 ADMIN PNEUMOCOCCAL VACCINE: HCPCS | Performed by: FAMILY MEDICINE

## 2018-12-12 PROCEDURE — G0463 HOSPITAL OUTPT CLINIC VISIT: HCPCS | Mod: 25

## 2018-12-12 PROCEDURE — 90732 PPSV23 VACC 2 YRS+ SUBQ/IM: CPT

## 2018-12-12 PROCEDURE — 99214 OFFICE O/P EST MOD 30 MIN: CPT | Performed by: FAMILY MEDICINE

## 2018-12-12 RX ORDER — BUTALBITAL, ACETAMINOPHEN AND CAFFEINE 50; 325; 40 MG/1; MG/1; MG/1
1 TABLET ORAL EVERY 6 HOURS PRN
Qty: 30 TABLET | Refills: 3 | Status: SHIPPED | OUTPATIENT
Start: 2018-12-12 | End: 2019-07-15

## 2018-12-12 ASSESSMENT — ANXIETY QUESTIONNAIRES
7. FEELING AFRAID AS IF SOMETHING AWFUL MIGHT HAPPEN: NEARLY EVERY DAY
2. NOT BEING ABLE TO STOP OR CONTROL WORRYING: NEARLY EVERY DAY
1. FEELING NERVOUS, ANXIOUS, OR ON EDGE: NEARLY EVERY DAY
6. BECOMING EASILY ANNOYED OR IRRITABLE: NEARLY EVERY DAY
5. BEING SO RESTLESS THAT IT IS HARD TO SIT STILL: MORE THAN HALF THE DAYS
4. TROUBLE RELAXING: NEARLY EVERY DAY
3. WORRYING TOO MUCH ABOUT DIFFERENT THINGS: NEARLY EVERY DAY
GAD7 TOTAL SCORE: 20
IF YOU CHECKED OFF ANY PROBLEMS ON THIS QUESTIONNAIRE, HOW DIFFICULT HAVE THESE PROBLEMS MADE IT FOR YOU TO DO YOUR WORK, TAKE CARE OF THINGS AT HOME, OR GET ALONG WITH OTHER PEOPLE: NOT DIFFICULT AT ALL

## 2018-12-12 ASSESSMENT — PAIN SCALES - GENERAL: PAINLEVEL: MODERATE PAIN (5)

## 2018-12-12 ASSESSMENT — PATIENT HEALTH QUESTIONNAIRE - PHQ9: SUM OF ALL RESPONSES TO PHQ QUESTIONS 1-9: 17

## 2018-12-12 NOTE — NURSING NOTE
"Chief Complaint   Patient presents with     Knee Pain     COPD       Initial /84   Pulse 85   Temp 98.4  F (36.9  C) (Tympanic)   Wt 80.7 kg (178 lb)   SpO2 92%   BMI 37.20 kg/m   Estimated body mass index is 37.2 kg/m  as calculated from the following:    Height as of 11/28/18: 1.473 m (4' 10\").    Weight as of this encounter: 80.7 kg (178 lb).  Medication Reconciliation: complete    Nicole Aguirre MA    "

## 2018-12-12 NOTE — LETTER
My COPD Action Plan     Name: Syndi Hardin    YOB: 1950   Date: 12/7/2018    My doctor: Gibran Alarcon MD   My clinic: [address] [address]  [phone]  My Controller Medicine: none   Dose:      My Rescue Medicine: Albuterol (Proair/Ventolin/Proventil) inhaler   Dose: 1 puff four times daily as needed     My Flare Up Medicine: Prednisone   Dose:      My COPD Severity:      Use of Oxygen: Oxygen Not Prescribed      Make sure you've had your pneumonia   vaccines.          GREEN ZONE       Doing well today      Usual level of activity and exercise    Usual amount of cough and mucus    No shortness of breath    Usual level of health (thinking clearly, sleeping well, feel like eating) Actions:      Take daily medicines    Use oxygen as prescribed    Follow regular exercise and diet plan    Avoid cigarette smoke and other irritants that harm the lungs           YELLOW ZONE          Having a bad day or flare up      Short of breath more than usual    A lot more sputum (mucus) than usual    Sputum looks yellow, green, tan, brown or bloody    More coughing or wheezing    Fever or chills    Less energy; trouble completing activities    Trouble thinking or focusing    Using quick relief inhaler or nebulizer more often    Poor sleep; symptoms wake me up    Do not feel like eating Actions:      Get plenty of rest    Take daily medicines    Use quick relief inhaler every 6 hours    If you use oxygen, call you doctor to see if you should adjust your oxygen    Do breathing exercises or other things to help you relax    Let a loved one, friend or neighbor know you are feeling worse    Call your care team if you have 2 or more symptoms.  Start taking steroids or antibiotics if directed by your care team           RED ZONE       Need medical care now      Severe shortness of breath (feel you can't breathe)    Fever, chills    Not enough breath to do any  activity    Trouble coughing up mucus, walking or talking    Blood in mucus    Frequent coughing   Rescue medicines are not working    Not able to sleep because of breathing    Feel confused or drowsy    Chest pain    Actions:      Call your health care team.  If you cannot reach your care team, call 911 or go to the emergency room.        Annual Reminders:  Meet with Care Team, Flu Shot every Fall  Pharmacy: [facility] - [facility], [place] - [address]

## 2018-12-13 ASSESSMENT — ANXIETY QUESTIONNAIRES: GAD7 TOTAL SCORE: 20

## 2018-12-27 DIAGNOSIS — I10 ESSENTIAL HYPERTENSION: ICD-10-CM

## 2018-12-27 DIAGNOSIS — F34.1 DYSTHYMIA: ICD-10-CM

## 2018-12-27 NOTE — TELEPHONE ENCOUNTER
ESCITALOPRAM 20 MG TABLET      Last Written Prescription Date:  10/25/17  Last Fill Quantity: 180,   # refills: 3  Last Office Visit: 12/12/18  Future Office visit:    Next 5 appointments (look out 90 days)    Mar 07, 2019  2:20 PM CST  (Arrive by 2:05 PM)  Return Visit with Moisés Olson MD  [facility] ([facility] ) [address] [facility] [address]  [phone]           Routing refill request to provider for review/approval because:    BENAZEPRIL HCL 10 MG TABLET      Last Written Prescription Date:  10/10/18  Last Fill Quantity: 90,   # refills: 0  Last Office Visit: 12/12/18  Future Office visit:    Next 5 appointments (look out 90 days)    Mar 07, 2019  2:20 PM CST  (Arrive by 2:05 PM)  Return Visit with Moisés Olson MD  [facility] ([facility] ) [address] [facility] [address]  [phone]           Routing refill request to provider for review/approval because:    Graciela Jones MA

## 2018-12-31 RX ORDER — BENAZEPRIL HYDROCHLORIDE 10 MG/1
TABLET ORAL
Qty: 90 TABLET | Refills: 2 | Status: SHIPPED | OUTPATIENT
Start: 2018-12-31 | End: 2019-11-11

## 2018-12-31 RX ORDER — ESCITALOPRAM OXALATE 20 MG/1
TABLET ORAL
Qty: 180 TABLET | Refills: 0 | Status: SHIPPED | OUTPATIENT
Start: 2018-12-31 | End: 2019-05-08

## 2018-12-31 NOTE — TELEPHONE ENCOUNTER
Rx fails protocol due to last PHQ.  Please advise.   PCP Dorian    PHQ-9 score:    PHQ-9 SCORE 12/12/2018   PHQ-9 Total Score -   PHQ-9 Total Score 17

## 2019-01-14 ENCOUNTER — OFFICE VISIT (OUTPATIENT)
Dept: FAMILY MEDICINE | Facility: OTHER | Age: 69
End: 2019-01-14
Attending: FAMILY MEDICINE
Payer: COMMERCIAL

## 2019-01-14 VITALS
BODY MASS INDEX: 36.78 KG/M2 | TEMPERATURE: 100.2 F | SYSTOLIC BLOOD PRESSURE: 106 MMHG | DIASTOLIC BLOOD PRESSURE: 72 MMHG | WEIGHT: 176 LBS | OXYGEN SATURATION: 92 % | HEART RATE: 84 BPM

## 2019-01-14 DIAGNOSIS — B37.31 CANDIDIASIS OF VULVA AND VAGINA: ICD-10-CM

## 2019-01-14 DIAGNOSIS — J44.1 COPD EXACERBATION (H): Primary | ICD-10-CM

## 2019-01-14 DIAGNOSIS — M54.16 LUMBAR RADICULOPATHY: ICD-10-CM

## 2019-01-14 PROBLEM — E66.01 MORBID OBESITY (H): Status: ACTIVE | Noted: 2019-01-14

## 2019-01-14 PROCEDURE — G0463 HOSPITAL OUTPT CLINIC VISIT: HCPCS

## 2019-01-14 PROCEDURE — 99214 OFFICE O/P EST MOD 30 MIN: CPT | Performed by: FAMILY MEDICINE

## 2019-01-14 RX ORDER — LEVOFLOXACIN 500 MG/1
500 TABLET, FILM COATED ORAL DAILY
Qty: 10 TABLET | Refills: 0 | Status: SHIPPED | OUTPATIENT
Start: 2019-01-14 | End: 2019-02-04

## 2019-01-14 RX ORDER — BENZONATATE 200 MG/1
CAPSULE ORAL
Qty: 60 CAPSULE | Refills: 3 | Status: SHIPPED | OUTPATIENT
Start: 2019-01-14 | End: 2019-05-30

## 2019-01-14 RX ORDER — PREDNISONE 20 MG/1
TABLET ORAL
Qty: 20 TABLET | Refills: 0 | Status: SHIPPED | OUTPATIENT
Start: 2019-01-14 | End: 2019-03-25

## 2019-01-14 RX ORDER — FLUCONAZOLE 150 MG/1
150 TABLET ORAL
Qty: 4 TABLET | Refills: 0 | Status: SHIPPED | OUTPATIENT
Start: 2019-01-14 | End: 2019-05-30

## 2019-01-14 ASSESSMENT — PAIN SCALES - GENERAL: PAINLEVEL: EXTREME PAIN (8)

## 2019-01-14 NOTE — NURSING NOTE
"Chief Complaint   Patient presents with     URI       Initial /72   Pulse 84   Temp 100.2  F (37.9  C) (Tympanic)   Wt 79.8 kg (176 lb)   SpO2 92%   BMI 36.78 kg/m   Estimated body mass index is 36.78 kg/m  as calculated from the following:    Height as of 11/28/18: 1.473 m (4' 10\").    Weight as of this encounter: 79.8 kg (176 lb).  Medication Reconciliation: complete    Nicole Aguirre MA    "

## 2019-01-14 NOTE — PROGRESS NOTES
SUBJECTIVE:                                                    Sydni Hardin is a 68 year old female who presents to clinic today for the following health issues:      RESPIRATORY SYMPTOMS      Duration: since saturday    Description  nasal congestion, rhinorrhea, cough, fever, fatigue/malaise, hoarse voice, myalgias, diarrhea and very short of breath    Severity: moderate to severe at night    Accompanying signs and symptoms: None    History (predisposing factors):  asthma, COPD and tobacco abuse    Precipitating or alleviating factors: been around someone recently with a really bad cold.    Therapies tried and outcome:  obi DM      PROBLEMS TO ADD ON...    Problem list and histories reviewed & adjusted, as indicated.  Additional history: doing ok overall.  URI with bad cough.  Low back painful again.  We reviewed a lot of things today.  See below.     Patient Active Problem List   Diagnosis     Benign essential hypertension     Calculus of gallbladder     Headache     Osteoarthrosis, unspecified whether generalized or localized, involving lower leg     Dysthymia     Lung nodules     Tobacco abuse     SCC (squamous cell carcinoma), face     IC (interstitial cystitis)     Primary osteoarthritis of both knees     S/P total knee replacement using cement, right     Asthma     Leukocytosis     Dehydration     Knee pain     Acute respiratory failure with hypoxia (H)     COPD exacerbation (H)     CKD (chronic kidney disease) stage 3, GFR 30-59 ml/min (H)     Obesity (BMI 35.0-39.9) with comorbidity (H)     Past Surgical History:   Procedure Laterality Date     ARTHROPLASTY KNEE Right 3/21/2017    Procedure: ARTHROPLASTY KNEE;  Surgeon: Moisés Olson MD;  Location: [facility]     ARTHROSCOPY KNEE       BIOPSY BREAST  2000    left - free of disease      SECTION        SECTION       CHOLECYSTECTOMY  2006     COLONOSCOPY  2008    repeat in 10 years     HEAD & NECK SURGERY      hematoma  on head drained     HYSTERECTOMY       ORTHOPEDIC SURGERY  02/04/2014    right shoulder replacement     shoulder x2  2009    left       Social History     Tobacco Use     Smoking status: Current Every Day Smoker     Packs/day: 1.00     Years: 53.00     Pack years: 53.00     Types: Cigarettes     Start date: 1/1/1965     Smokeless tobacco: Never Used     Tobacco comment: pt enrolled in quit plan 11/28/18   Substance Use Topics     Alcohol use: Yes     Alcohol/week: 0.0 oz     Comment: rarely     Family History   Problem Relation Age of Onset     Cancer Mother         skin     Breast Cancer Mother      Cerebrovascular Disease Mother      Alzheimer Disease Mother      Myocardial Infarction Father 76        myocardial infarction - cause of death     Cancer Daughter         cervical      Diabetes Maternal Grandfather      Heart Disease Maternal Grandfather         MI     Diabetes Maternal Grandmother      Diabetes Paternal Grandmother      Diabetes Paternal Grandfather          Current Outpatient Medications   Medication Sig Dispense Refill     ALPRAZolam (XANAX) 1 MG tablet TAKE 1 TABLET BY MOUTH 3 TIMES DAILY AS NEEDED FOR ANXIETY 90 tablet 3     aspirin EC 81 MG EC tablet Take 1 tablet (81 mg) by mouth 2 times daily (Patient taking differently: Take 81 mg by mouth daily )       atorvastatin (LIPITOR) 40 MG tablet TAKE 1 TABLET BY MOUTH DAILY 90 tablet 1     benazepril (LOTENSIN) 10 MG tablet TAKE 1 TABLET BY MOUTH DAILY 90 tablet 2     benzonatate (TESSALON) 200 MG capsule TAKE 1 CAPSULE BY MOUTH 3 TIMES A DAY AS NEEDED FOR COUGH 60 capsule 3     budesonide-formoterol (SYMBICORT) 160-4.5 MCG/ACT inhaler Inhale 2 puffs into the lungs 2 times daily. As needed 1 Inhaler 11     butalbital-acetaminophen-caffeine (FIORICET/ESGIC) -40 MG tablet Take 1 tablet by mouth every 6 hours as needed for headaches 30 tablet 3     cimetidine (TAGAMET) 800 MG tablet Take 1 tablet (800 mg) by mouth At Bedtime As needed 90 tablet 3      doxepin (SINEQUAN) 10 MG capsule Take 1 capsule (10 mg) by mouth At Bedtime 90 capsule 3     EPIPEN 2-JAIRO 0.3 MG/0.3ML injection INJECT 1 SYRINGE INTRAMUSCULARLY ONCE AS NEEDED FOR ANAPHYLAXIS 2 each 0     escitalopram (LEXAPRO) 20 MG tablet TAKE 1 TABLET BY MOUTH 2 TIMES A  tablet 0     fluconazole (DIFLUCAN) 150 MG tablet Take 1 tablet (150 mg) by mouth every 3 days 4 tablet 0     hydrochlorothiazide (HYDRODIURIL) 25 MG tablet TAKE 1 TABLET BY MOUTH DAILY 90 tablet 3     ipratropium - albuterol 0.5 mg/2.5 mg/3 mL (DUONEB) 0.5-2.5 (3) MG/3ML neb solution Take 1 vial (3 mLs) by nebulization every 4 hours as needed for shortness of breath / dyspnea or wheezing 90 mL 3     levofloxacin (LEVAQUIN) 500 MG tablet Take 1 tablet (500 mg) by mouth daily for 10 days 10 tablet 0     meloxicam (MOBIC) 15 MG tablet TAKE 1 TABLET BY MOUTH DAILY 90 tablet 1     montelukast (SINGULAIR) 10 MG tablet TAKE 1 TABLET BY MOUTH DAILY 90 tablet 1     nystatin (MYCOSTATIN) 726906 UNIT/ML suspension TAKE 1 TEASPOON (5MLS) BY MOUTH 4 TIMES DAILY 473 mL 1     order for DME Discontinue O2 services 1 each 0     order for DME Equipment being ordered: Please install safety bar in bathroom to assist patient in getting up from toilet. 1 Units 0     order for DME Equipment being ordered: toilet seat riser 1 each 0     order for DME Equipment being ordered: Nebulizer supplies 2 each 11     order for DME Equipment being ordered: mcgee walker 1 each 0     oxyCODONE-acetaminophen (PERCOCET) 5-325 MG per tablet TAKE 1 TO 2 TABLETS BY MOUTH EVERY 4 HOURS AS NEEDED FOR PAIN 60 tablet 0     potassium chloride SA (K-DUR/KLOR-CON M) 10 MEQ CR tablet TAKE 1 TABLET BY MOUTH 2 TIMES DAILY WITH FOOD 180 tablet 1     predniSONE (DELTASONE) 20 MG tablet Take 60 mg by mouth daily for 3 days, THEN 40 mg daily for 3 days, THEN 20 mg daily for 3 days, THEN 10 mg daily for 3 days. 20 tablet 0     temazepam (RESTORIL) 30 MG capsule TAKE 1 CAPSULE BY MOUTH  "NIGHTLY AS NEEDED FOR SLEEP 90 capsule 3     VENTOLIN  (90 Base) MCG/ACT inhaler USE 1 PUFF FOUR TIMES A DAY AS NEEDED 18 g 1     VOLTAREN 1 % topical gel APPLY 4 GRAMS TO KNEES FOUR TIMES A DAY USING ENCLOSED DOSING CARD 100 g 0     dexamethasone (DECADRON) 4 MG/ML injection Inject 1 mL (4 mg) as directed once for 1 dose Use 4 mg or dose determined by provider for iontophoresis. 1 mL 0     nicotine (NICODERM CQ) 14 MG/24HR 24 hr patch Place 1 patch onto the skin every 24 hours (Patient not taking: Reported on 1/14/2019) 30 patch 1     nicotine (NICODERM CQ) 21 MG/24HR 24 hr patch Place 1 patch onto the skin every 24 hours (Patient not taking: Reported on 1/14/2019) 30 patch 1     nicotine (NICODERM CQ) 7 MG/24HR 24 hr patch Place 1 patch onto the skin every 24 hours (Patient not taking: Reported on 1/14/2019) 30 patch 1     nicotine polacrilex (EQL NICOTINE) 4 MG lozenge Place 1 lozenge (4 mg) inside cheek as needed for smoking cessation (Patient not taking: Reported on 1/14/2019) 360 tablet 1     Allergies   Allergen Reactions     Bee Pollen Other (See Comments)     Throat and eyes close up     Codeine Difficulty breathing     \"My throat closes shut.\"     Takes Percocet at home     Keflex [Cephalexin Hcl] Hives     Sulfonamide Derivatives Other (See Comments)     Throat closes     Ciprofloxacin Rash     Rash, bumps on lips     Bupropion Hcl Other (See Comments)     Made her very irritable     Celecoxib Other (See Comments) and Cough     Celebrex - wheeze  Takes ASA at home     Cheese      swiss     Clonazepam Hives     Takes Xanax at home     Erythromycin Other (See Comments)     Erythromycin base - abdominal pain     Lisinopril Other (See Comments)     Zestril - headaches     Nifedipine Hives     Procardia     Strawberries [Strawberry]      Tomato      Tramadol Hcl      Takes percocet at home       ROS:  Constitutional, HEENT, cardiovascular, pulmonary, gi and gu systems are negative, except as otherwise " noted.    OBJECTIVE:                                                    /72   Pulse 84   Temp 100.2  F (37.9  C) (Tympanic)   Wt 79.8 kg (176 lb)   SpO2 92%   BMI 36.78 kg/m    Body mass index is 36.78 kg/m .  GENERAL APPEARANCE: Alert, no acute distress  CV: regular rate and rhythm, no murmur, rub or gallop  RESP: lungs clear to auscultation bilaterally with diffuse exp and inspiratory wheeze bilaterally  ABDOMEN: normal bowel sounds, soft, nontender, no hepatosplenomegaly or other masses  SKIN: no suspicious lesions or rashes to visualized skin  NEURO: Alert, oriented x 3, speech and mentation normal           ASSESSMENT/PLAN:                                                    1. COPD exacerbation (H)  Reviewed at length today.  Levaquin, prednisone, albuterol.  F/u promptly with any change for the worse.  Described this to her at length.    - levofloxacin (LEVAQUIN) 500 MG tablet; Take 1 tablet (500 mg) by mouth daily for 10 days  Dispense: 10 tablet; Refill: 0  - predniSONE (DELTASONE) 20 MG tablet; Take 60 mg by mouth daily for 3 days, THEN 40 mg daily for 3 days, THEN 20 mg daily for 3 days, THEN 10 mg daily for 3 days.  Dispense: 20 tablet; Refill: 0    2. Candidiasis of vulva and vagina  With the abx this is expected.  Sent the anticipatory diflucan.   - fluconazole (DIFLUCAN) 150 MG tablet; Take 1 tablet (150 mg) by mouth every 3 days  Dispense: 4 tablet; Refill: 0    3. Lumbar radiculopathy  Discussed.  Looking at getting shots in the near future.            Gibran Alarcon MD  [facility]

## 2019-01-22 NOTE — PROGRESS NOTES
SUBJECTIVE:   Sydni Hardin is a 68 year old female who presents to clinic today for the following health issues:    Musculoskeletal problem/pain      Duration: follow up    Description  Location: low back    Intensity:  severe    Accompanying signs and symptoms: none    History  Previous similar problem: YES- ongoing  Previous evaluation:  x-ray and MRI    Precipitating or alleviating factors:  Trauma or overuse: no   Aggravating factors include: climbing stairs and bending over and vacuuming    Therapies tried and outcome: heat, chiropractor and and injection in the past      RESPIRATORY SYMPTOMS      Duration: 8 days    Description  facial pain/pressure, cough and myalgias    Severity: moderate    Accompanying signs and symptoms: None    History (predisposing factors):  none    Precipitating or alleviating factors: None    Therapies tried and outcome:  On antibiotics and prednisone      Problem list and histories reviewed & adjusted, as indicated.  Additional history: still coughing and having terrible back pain.  Prednisone helped.  Cough terrible.  Bad stress right now which is playing in here.      Patient Active Problem List   Diagnosis     Benign essential hypertension     Calculus of gallbladder     Headache     Osteoarthrosis, unspecified whether generalized or localized, involving lower leg     Dysthymia     Lung nodules     Tobacco abuse     SCC (squamous cell carcinoma), face     IC (interstitial cystitis)     Primary osteoarthritis of both knees     S/P total knee replacement using cement, right     Asthma     Leukocytosis     Dehydration     Knee pain     Acute respiratory failure with hypoxia (H)     COPD exacerbation (H)     CKD (chronic kidney disease) stage 3, GFR 30-59 ml/min (H)     Obesity (BMI 35.0-39.9) with comorbidity (H)     Past Surgical History:   Procedure Laterality Date     ARTHROPLASTY KNEE Right 3/21/2017    Procedure: ARTHROPLASTY KNEE;  Surgeon: Moisés Olson MD;   Location: [facility]     ARTHROSCOPY KNEE       BIOPSY BREAST  2000    left - free of disease      SECTION        SECTION       CHOLECYSTECTOMY  2006     COLONOSCOPY  2008    repeat in 10 years     HEAD & NECK SURGERY  2014    hematoma on head drained     HYSTERECTOMY       ORTHOPEDIC SURGERY  2014    right shoulder replacement     shoulder x2  2009    left       Social History     Tobacco Use     Smoking status: Current Every Day Smoker     Packs/day: 1.00     Years: 53.00     Pack years: 53.00     Types: Cigarettes     Start date: 1965     Smokeless tobacco: Never Used   Substance Use Topics     Alcohol use: Yes     Alcohol/week: 0.0 oz     Comment: rarely     Family History   Problem Relation Age of Onset     Cancer Mother         skin     Breast Cancer Mother      Cerebrovascular Disease Mother      Alzheimer Disease Mother      Myocardial Infarction Father 76        myocardial infarction - cause of death     Cancer Daughter         cervical      Diabetes Maternal Grandfather      Heart Disease Maternal Grandfather         MI     Diabetes Maternal Grandmother      Diabetes Paternal Grandmother      Diabetes Paternal Grandfather          Current Outpatient Medications   Medication Sig Dispense Refill     ALPRAZolam (XANAX) 1 MG tablet TAKE 1 TABLET BY MOUTH 3 TIMES DAILY AS NEEDED FOR ANXIETY 90 tablet 3     aspirin EC 81 MG EC tablet Take 1 tablet (81 mg) by mouth 2 times daily (Patient taking differently: Take 81 mg by mouth daily )       atorvastatin (LIPITOR) 40 MG tablet TAKE 1 TABLET BY MOUTH DAILY 90 tablet 1     benazepril (LOTENSIN) 10 MG tablet TAKE 1 TABLET BY MOUTH DAILY 90 tablet 2     benzonatate (TESSALON) 200 MG capsule TAKE 1 CAPSULE BY MOUTH 3 TIMES A DAY AS NEEDED FOR COUGH 60 capsule 3     budesonide-formoterol (SYMBICORT) 160-4.5 MCG/ACT inhaler Inhale 2 puffs into the lungs 2 times daily. As needed 1 Inhaler 11     butalbital-acetaminophen-caffeine (FIORICET/ESGIC)  -40 MG tablet Take 1 tablet by mouth every 6 hours as needed for headaches 30 tablet 3     cimetidine (TAGAMET) 800 MG tablet Take 1 tablet (800 mg) by mouth At Bedtime As needed 90 tablet 3     doxepin (SINEQUAN) 10 MG capsule Take 1 capsule (10 mg) by mouth At Bedtime 90 capsule 3     EPIPEN 2-JAIRO 0.3 MG/0.3ML injection INJECT 1 SYRINGE INTRAMUSCULARLY ONCE AS NEEDED FOR ANAPHYLAXIS 2 each 0     escitalopram (LEXAPRO) 20 MG tablet TAKE 1 TABLET BY MOUTH 2 TIMES A  tablet 0     fluconazole (DIFLUCAN) 150 MG tablet Take 1 tablet (150 mg) by mouth every 3 days 4 tablet 0     hydrochlorothiazide (HYDRODIURIL) 25 MG tablet TAKE 1 TABLET BY MOUTH DAILY 90 tablet 3     ipratropium - albuterol 0.5 mg/2.5 mg/3 mL (DUONEB) 0.5-2.5 (3) MG/3ML neb solution Take 1 vial (3 mLs) by nebulization every 4 hours as needed for shortness of breath / dyspnea or wheezing 90 mL 3     levofloxacin (LEVAQUIN) 500 MG tablet Take 1 tablet (500 mg) by mouth daily for 10 days 10 tablet 0     meloxicam (MOBIC) 15 MG tablet TAKE 1 TABLET BY MOUTH DAILY 90 tablet 1     montelukast (SINGULAIR) 10 MG tablet TAKE 1 TABLET BY MOUTH DAILY 90 tablet 1     nicotine (NICODERM CQ) 14 MG/24HR 24 hr patch Place 1 patch onto the skin every 24 hours 30 patch 1     nicotine (NICODERM CQ) 21 MG/24HR 24 hr patch Place 1 patch onto the skin every 24 hours 30 patch 1     nicotine (NICODERM CQ) 7 MG/24HR 24 hr patch Place 1 patch onto the skin every 24 hours 30 patch 1     nicotine polacrilex (EQL NICOTINE) 4 MG lozenge Place 1 lozenge (4 mg) inside cheek as needed for smoking cessation 360 tablet 1     nystatin (MYCOSTATIN) 411042 UNIT/ML suspension TAKE 1 TEASPOON (5MLS) BY MOUTH 4 TIMES DAILY 473 mL 1     order for DME Discontinue O2 services 1 each 0     order for DME Equipment being ordered: Please install safety bar in bathroom to assist patient in getting up from toilet. 1 Units 0     order for DME Equipment being ordered: toilet seat riser 1  "each 0     order for DME Equipment being ordered: Nebulizer supplies 2 each 11     order for DME Equipment being ordered: mcgee walker 1 each 0     oxyCODONE-acetaminophen (PERCOCET) 5-325 MG per tablet TAKE 1 TO 2 TABLETS BY MOUTH EVERY 4 HOURS AS NEEDED FOR PAIN 60 tablet 0     potassium chloride SA (K-DUR/KLOR-CON M) 10 MEQ CR tablet TAKE 1 TABLET BY MOUTH 2 TIMES DAILY WITH FOOD 180 tablet 1     predniSONE (DELTASONE) 20 MG tablet Take 60 mg by mouth daily for 3 days, THEN 40 mg daily for 3 days, THEN 20 mg daily for 3 days, THEN 10 mg daily for 3 days. 20 tablet 0     predniSONE (DELTASONE) 20 MG tablet Take 60 mg by mouth daily for 3 days, THEN 40 mg daily for 3 days, THEN 20 mg daily for 3 days, THEN 10 mg daily for 3 days. 20 tablet 0     temazepam (RESTORIL) 30 MG capsule TAKE 1 CAPSULE BY MOUTH NIGHTLY AS NEEDED FOR SLEEP 90 capsule 3     VENTOLIN  (90 Base) MCG/ACT inhaler USE 1 PUFF FOUR TIMES A DAY AS NEEDED 18 g 1     VOLTAREN 1 % topical gel APPLY 4 GRAMS TO KNEES FOUR TIMES A DAY USING ENCLOSED DOSING CARD 100 g 0     dexamethasone (DECADRON) 4 MG/ML injection Inject 1 mL (4 mg) as directed once for 1 dose Use 4 mg or dose determined by provider for iontophoresis. 1 mL 0     Allergies   Allergen Reactions     Bee Pollen Other (See Comments)     Throat and eyes close up     Codeine Difficulty breathing     \"My throat closes shut.\"     Takes Percocet at home     Keflex [Cephalexin Hcl] Hives     Sulfonamide Derivatives Other (See Comments)     Throat closes     Ciprofloxacin Rash     Rash, bumps on lips     Bupropion Hcl Other (See Comments)     Made her very irritable     Celecoxib Other (See Comments) and Cough     Celebrex - wheeze  Takes ASA at home     Cheese      swiss     Clonazepam Hives     Takes Xanax at home     Erythromycin Other (See Comments)     Erythromycin base - abdominal pain     Lisinopril Other (See Comments)     Zestril - headaches     Nifedipine Hives     Procardia     " Strawberries [Strawberry]      Tomato      Tramadol Hcl      Takes percocet at home       Reviewed and updated as needed this visit by clinical staff       Reviewed and updated as needed this visit by Provider         ROS:  Constitutional, HEENT, cardiovascular, pulmonary, gi and gu systems are negative, except as otherwise noted.    OBJECTIVE:                                                    /62   Pulse 82   Temp 98.6  F (37  C) (Tympanic)   Wt 79.8 kg (176 lb)   SpO2 90%   BMI 36.78 kg/m    Body mass index is 36.78 kg/m .  GENERAL APPEARANCE: Alert, no acute distress  CV: regular rate and rhythm, no murmur, rub or gallop  RESP: lungs clear to auscultation bilaterally with bilateral wheeze ongoing.   ABDOMEN: normal bowel sounds, soft, nontender, no hepatosplenomegaly or other masses  SKIN: no suspicious lesions or rashes to visualized skin  NEURO: Alert, oriented x 3, speech and mentation normal           ASSESSMENT/PLAN:                                                    1. Acute bronchitis, unspecified organism  Discussed.  Extending the steroids for 3 more days and then down to 10.  Finish the levaquin.  She is anxious about not getting seen next week if not better so we made an appt for her early next week.    - predniSONE (DELTASONE) 20 MG tablet; Take 60 mg by mouth daily for 3 days, THEN 40 mg daily for 3 days, THEN 20 mg daily for 3 days, THEN 10 mg daily for 3 days.  Dispense: 20 tablet; Refill: 0    2. Acute reaction to stress  Discussed at length.  She is being investigated for fraud and it seems her daughter is up to no good.  Her other daughter is in witness protection.  I just listen and offer any advice I can.  She sees counseling and has the meds on board as well.     3. COPD exacerbation (H)  As above.        25 minutes spent with patient over 50%in counseling about the stress, her medicines, and copd.  I stressed quitting smoking to her today.      Gibran Alarcon MD  [facility]

## 2019-01-24 ENCOUNTER — OFFICE VISIT (OUTPATIENT)
Dept: FAMILY MEDICINE | Facility: OTHER | Age: 69
End: 2019-01-24
Attending: FAMILY MEDICINE
Payer: COMMERCIAL

## 2019-01-24 VITALS
OXYGEN SATURATION: 90 % | BODY MASS INDEX: 36.78 KG/M2 | TEMPERATURE: 98.6 F | SYSTOLIC BLOOD PRESSURE: 118 MMHG | WEIGHT: 176 LBS | HEART RATE: 82 BPM | DIASTOLIC BLOOD PRESSURE: 62 MMHG

## 2019-01-24 DIAGNOSIS — F43.0 ACUTE REACTION TO STRESS: ICD-10-CM

## 2019-01-24 DIAGNOSIS — E78.2 MIXED HYPERLIPIDEMIA: ICD-10-CM

## 2019-01-24 DIAGNOSIS — J20.9 ACUTE BRONCHITIS, UNSPECIFIED ORGANISM: Primary | ICD-10-CM

## 2019-01-24 DIAGNOSIS — J44.1 COPD EXACERBATION (H): ICD-10-CM

## 2019-01-24 PROCEDURE — 99214 OFFICE O/P EST MOD 30 MIN: CPT | Performed by: FAMILY MEDICINE

## 2019-01-24 PROCEDURE — G0463 HOSPITAL OUTPT CLINIC VISIT: HCPCS

## 2019-01-24 RX ORDER — PREDNISONE 20 MG/1
TABLET ORAL
Qty: 20 TABLET | Refills: 0 | Status: SHIPPED | OUTPATIENT
Start: 2019-01-24 | End: 2019-03-25

## 2019-01-24 ASSESSMENT — ANXIETY QUESTIONNAIRES
2. NOT BEING ABLE TO STOP OR CONTROL WORRYING: NEARLY EVERY DAY
7. FEELING AFRAID AS IF SOMETHING AWFUL MIGHT HAPPEN: NEARLY EVERY DAY
IF YOU CHECKED OFF ANY PROBLEMS ON THIS QUESTIONNAIRE, HOW DIFFICULT HAVE THESE PROBLEMS MADE IT FOR YOU TO DO YOUR WORK, TAKE CARE OF THINGS AT HOME, OR GET ALONG WITH OTHER PEOPLE: EXTREMELY DIFFICULT
4. TROUBLE RELAXING: NEARLY EVERY DAY
1. FEELING NERVOUS, ANXIOUS, OR ON EDGE: NEARLY EVERY DAY
GAD7 TOTAL SCORE: 21
5. BEING SO RESTLESS THAT IT IS HARD TO SIT STILL: NEARLY EVERY DAY
3. WORRYING TOO MUCH ABOUT DIFFERENT THINGS: NEARLY EVERY DAY
6. BECOMING EASILY ANNOYED OR IRRITABLE: NEARLY EVERY DAY

## 2019-01-24 ASSESSMENT — PATIENT HEALTH QUESTIONNAIRE - PHQ9: SUM OF ALL RESPONSES TO PHQ QUESTIONS 1-9: 19

## 2019-01-24 ASSESSMENT — PAIN SCALES - GENERAL: PAINLEVEL: SEVERE PAIN (6)

## 2019-01-24 NOTE — NURSING NOTE
"Chief Complaint   Patient presents with     Back Pain       Initial /62   Pulse 82   Temp 98.6  F (37  C) (Tympanic)   Wt 79.8 kg (176 lb)   SpO2 90%   BMI 36.78 kg/m   Estimated body mass index is 36.78 kg/m  as calculated from the following:    Height as of 11/28/18: 1.473 m (4' 10\").    Weight as of this encounter: 79.8 kg (176 lb).  Medication Reconciliation: complete    Nicole Aguirre MA    "

## 2019-01-25 RX ORDER — MONTELUKAST SODIUM 10 MG/1
1 TABLET ORAL DAILY
Qty: 90 TABLET | Refills: 0 | Status: SHIPPED | OUTPATIENT
Start: 2019-01-25 | End: 2019-05-08

## 2019-01-25 RX ORDER — MONTELUKAST SODIUM 10 MG/1
TABLET ORAL
Qty: 90 TABLET | Refills: 0 | OUTPATIENT
Start: 2019-01-25

## 2019-01-25 ASSESSMENT — ANXIETY QUESTIONNAIRES: GAD7 TOTAL SCORE: 21

## 2019-01-25 NOTE — TELEPHONE ENCOUNTER
montelukast (SINGULAIR) 10 MG tablet  Last Written Prescription Date:  7/6/18  Last Fill Quantity: 90,   # refills: 1  Last Office Visit: 1/24/18  Future Office visit:    Next 5 appointments (look out 90 days)    Jan 29, 2019  2:15 PM CST  (Arrive by 2:00 PM)  SHORT with Gibran Alarcon MD  [facility] [address] [facility] [address]  [phone]   Mar 07, 2019  2:20 PM CST  (Arrive by 2:05 PM)  Return Visit with Moisés Olson MD  [facility] ([facility] ) [address] [facility] [id number] [phone]

## 2019-01-29 ENCOUNTER — TELEPHONE (OUTPATIENT)
Dept: FAMILY MEDICINE | Facility: OTHER | Age: 69
End: 2019-01-29

## 2019-01-29 NOTE — TELEPHONE ENCOUNTER
Called LM to schedule appt with Dr Dorian Bhakta Monday 2/4/19 2:30pm arrive 2:15pm  Michelle Yarbrough

## 2019-01-29 NOTE — TELEPHONE ENCOUNTER
11:23 AM    Reason for Call: OVERBOOK2    Patient is having the following symptoms: URI for ongoing days.    The patient is requesting an appointment for Monday 02/04/19 with provider.    Was an appointment offered for this call? No  If yes : Appointment type              Date    Preferred method for responding to this message: Telephone Call  What is your phone number ?[phone]    If we cannot reach you directly, may we leave a detailed response at the number you provided? Yes    Can this message wait until your PCP/provider returns, if unavailable today? Not applicable, provider is in    Connie Arias    2

## 2019-01-30 ENCOUNTER — TELEPHONE (OUTPATIENT)
Dept: FAMILY MEDICINE | Facility: OTHER | Age: 69
End: 2019-01-30

## 2019-01-30 NOTE — TELEPHONE ENCOUNTER
11:47 AM    Reason for Call: Phone Call    Description:     Was an appointment offered for this call? No  If yes : Appointment type              Date    Preferred method for responding to this message:   What is your phone number ?    If we cannot reach you directly, may we leave a detailed response at the number you provided?    Can this message wait until your PCP/provider returns, if available today?    Kelly Shoen

## 2019-01-30 NOTE — TELEPHONE ENCOUNTER
11:48 AM    Reason for Call: OVERBOOK    Patient is having the following symptoms: follow up on cough for 1 weeks.    The patient is requesting an appointment forfollow up on cough with Dr Alarcon.    Was an appointment offered for this call? No  If yes : Appointment type              Date    Preferred method for responding to this message: Telephone Call  What is your phone number ? [phone]    If we cannot reach you directly, may we leave a detailed response at the number you provided? Yes    Can this message wait until your PCP/provider returns, if unavailable today? Not applicable, Provider is in    Kelly Shoen

## 2019-01-31 NOTE — PROGRESS NOTES
SUBJECTIVE:                                                    Sydni Hardin is a 68 year old female who presents to clinic today for the following health issues:      Cough       Duration: 2 1/2 weeks    Description (location/character/radiation): improved    Intensity:  mild    Accompanying signs and symptoms: cough    History (similar episodes/previous evaluation): COPD    Precipitating or alleviating factors: None    Therapies tried and outcome: levaquin, tessalon       PROBLEMS TO ADD ON...    Problem list and histories reviewed & adjusted, as indicated.  Additional history: cough is better.  Stress is ongoing with family.      Patient Active Problem List   Diagnosis     Benign essential hypertension     Calculus of gallbladder     Headache     Osteoarthrosis, unspecified whether generalized or localized, involving lower leg     Dysthymia     Lung nodules     Tobacco abuse     SCC (squamous cell carcinoma), face     IC (interstitial cystitis)     Primary osteoarthritis of both knees     S/P total knee replacement using cement, right     Asthma     Leukocytosis     Dehydration     Knee pain     Acute respiratory failure with hypoxia (H)     COPD exacerbation (H)     CKD (chronic kidney disease) stage 3, GFR 30-59 ml/min (H)     Obesity (BMI 35.0-39.9) with comorbidity (H)     Past Surgical History:   Procedure Laterality Date     ARTHROPLASTY KNEE Right 3/21/2017    Procedure: ARTHROPLASTY KNEE;  Surgeon: Moisés Olson MD;  Location: [facility]     ARTHROSCOPY KNEE       BIOPSY BREAST  2000    left - free of disease      SECTION        SECTION       CHOLECYSTECTOMY       COLONOSCOPY  2008    repeat in 10 years     HEAD & NECK SURGERY  2014    hematoma on head drained     HYSTERECTOMY       ORTHOPEDIC SURGERY  2014    right shoulder replacement     shoulder x2  2009    left       Social History     Tobacco Use     Smoking status: Current Every Day Smoker     Packs/day: 1.00      Years: 53.00     Pack years: 53.00     Types: Cigarettes     Start date: 1/1/1965     Smokeless tobacco: Never Used   Substance Use Topics     Alcohol use: Yes     Alcohol/week: 0.0 oz     Comment: rarely     Family History   Problem Relation Age of Onset     Cancer Mother         skin     Breast Cancer Mother      Cerebrovascular Disease Mother      Alzheimer Disease Mother      Myocardial Infarction Father 76        myocardial infarction - cause of death     Cancer Daughter         cervical      Diabetes Maternal Grandfather      Heart Disease Maternal Grandfather         MI     Diabetes Maternal Grandmother      Diabetes Paternal Grandmother      Diabetes Paternal Grandfather          Current Outpatient Medications   Medication Sig Dispense Refill     ALPRAZolam (XANAX) 1 MG tablet TAKE 1 TABLET BY MOUTH 3 TIMES DAILY AS NEEDED FOR ANXIETY 90 tablet 3     aspirin EC 81 MG EC tablet Take 1 tablet (81 mg) by mouth 2 times daily (Patient taking differently: Take 81 mg by mouth daily )       atorvastatin (LIPITOR) 40 MG tablet TAKE 1 TABLET BY MOUTH DAILY 90 tablet 1     benazepril (LOTENSIN) 10 MG tablet TAKE 1 TABLET BY MOUTH DAILY 90 tablet 2     benzonatate (TESSALON) 200 MG capsule TAKE 1 CAPSULE BY MOUTH 3 TIMES A DAY AS NEEDED FOR COUGH 60 capsule 3     budesonide-formoterol (SYMBICORT) 160-4.5 MCG/ACT inhaler Inhale 2 puffs into the lungs 2 times daily. As needed 1 Inhaler 11     butalbital-acetaminophen-caffeine (FIORICET/ESGIC) -40 MG tablet Take 1 tablet by mouth every 6 hours as needed for headaches 30 tablet 3     cimetidine (TAGAMET) 800 MG tablet Take 1 tablet (800 mg) by mouth At Bedtime As needed 90 tablet 3     doxepin (SINEQUAN) 10 MG capsule Take 1 capsule (10 mg) by mouth At Bedtime 90 capsule 3     EPIPEN 2-JAIRO 0.3 MG/0.3ML injection INJECT 1 SYRINGE INTRAMUSCULARLY ONCE AS NEEDED FOR ANAPHYLAXIS 2 each 0     escitalopram (LEXAPRO) 20 MG tablet TAKE 1 TABLET BY MOUTH 2 TIMES A   tablet 0     fluconazole (DIFLUCAN) 150 MG tablet Take 1 tablet (150 mg) by mouth every 3 days 4 tablet 0     hydrochlorothiazide (HYDRODIURIL) 25 MG tablet TAKE 1 TABLET BY MOUTH DAILY 90 tablet 3     ipratropium - albuterol 0.5 mg/2.5 mg/3 mL (DUONEB) 0.5-2.5 (3) MG/3ML neb solution Take 1 vial (3 mLs) by nebulization every 4 hours as needed for shortness of breath / dyspnea or wheezing 90 mL 3     meloxicam (MOBIC) 15 MG tablet TAKE 1 TABLET BY MOUTH DAILY 90 tablet 1     montelukast (SINGULAIR) 10 MG tablet Take 1 tablet (10 mg) by mouth daily 90 tablet 0     nicotine (NICODERM CQ) 14 MG/24HR 24 hr patch Place 1 patch onto the skin every 24 hours 30 patch 1     nicotine (NICODERM CQ) 21 MG/24HR 24 hr patch Place 1 patch onto the skin every 24 hours 30 patch 1     nicotine (NICODERM CQ) 7 MG/24HR 24 hr patch Place 1 patch onto the skin every 24 hours 30 patch 1     nicotine polacrilex (EQL NICOTINE) 4 MG lozenge Place 1 lozenge (4 mg) inside cheek as needed for smoking cessation 360 tablet 1     nystatin (MYCOSTATIN) 159431 UNIT/ML suspension TAKE 1 TEASPOON (5MLS) BY MOUTH 4 TIMES DAILY 473 mL 1     order for DME Discontinue O2 services 1 each 0     order for DME Equipment being ordered: Please install safety bar in bathroom to assist patient in getting up from toilet. 1 Units 0     order for DME Equipment being ordered: toilet seat riser 1 each 0     order for DME Equipment being ordered: Nebulizer supplies 2 each 11     order for DME Equipment being ordered: mcgee walker 1 each 0     oxyCODONE-acetaminophen (PERCOCET) 5-325 MG per tablet TAKE 1 TO 2 TABLETS BY MOUTH EVERY 4 HOURS AS NEEDED FOR PAIN 60 tablet 0     potassium chloride SA (K-DUR/KLOR-CON M) 10 MEQ CR tablet TAKE 1 TABLET BY MOUTH 2 TIMES DAILY WITH FOOD 180 tablet 1     predniSONE (DELTASONE) 20 MG tablet Take 60 mg by mouth daily for 3 days, THEN 40 mg daily for 3 days, THEN 20 mg daily for 3 days, THEN 10 mg daily for 3 days. 20 tablet 0      "temazepam (RESTORIL) 30 MG capsule TAKE 1 CAPSULE BY MOUTH NIGHTLY AS NEEDED FOR SLEEP 90 capsule 3     VENTOLIN  (90 Base) MCG/ACT inhaler USE 1 PUFF FOUR TIMES A DAY AS NEEDED 18 g 1     VOLTAREN 1 % topical gel APPLY 4 GRAMS TO KNEES FOUR TIMES A DAY USING ENCLOSED DOSING CARD 100 g 0     Allergies   Allergen Reactions     Bee Pollen Other (See Comments)     Throat and eyes close up     Codeine Difficulty breathing     \"My throat closes shut.\"     Takes Percocet at home     Keflex [Cephalexin Hcl] Hives     Sulfonamide Derivatives Other (See Comments)     Throat closes     Ciprofloxacin Rash     Rash, bumps on lips     Bupropion Hcl Other (See Comments)     Made her very irritable     Celecoxib Other (See Comments) and Cough     Celebrex - wheeze  Takes ASA at home     Cheese      swiss     Clonazepam Hives     Takes Xanax at home     Erythromycin Other (See Comments)     Erythromycin base - abdominal pain     Lisinopril Other (See Comments)     Zestril - headaches     Nifedipine Hives     Procardia     Strawberries [Strawberry]      Tomato      Tramadol Hcl      Takes percocet at home       ROS:  Constitutional, HEENT, cardiovascular, pulmonary, gi and gu systems are negative, except as otherwise noted.    OBJECTIVE:                                                    /80   Pulse 70   Temp 96.9  F (36.1  C)   Wt 79.4 kg (175 lb)   SpO2 97%   BMI 36.58 kg/m    Body mass index is 36.58 kg/m .  GENERAL APPEARANCE: Alert, no acute distress  CV: regular rate and rhythm, no murmur, rub or gallop  RESP: lungs clear to auscultation bilaterally  ABDOMEN: normal bowel sounds, soft, nontender, no hepatosplenomegaly or other masses  SKIN: no suspicious lesions or rashes to visualized skin  NEURO: Alert, oriented x 3, speech and mentation normal           ASSESSMENT/PLAN:                                                    1. Persistent insomnia  Stable.  No change.   - temazepam (RESTORIL) 30 MG capsule; " TAKE 1 CAPSULE BY MOUTH NIGHTLY AS NEEDED FOR SLEEP  Dispense: 90 capsule; Refill: 3    2. Tobacco abuse  Ongoing.  Recommend cessation.  - Tobacco Cessation - Order to Satisfy Health Maintenance    3. Tobacco abuse counseling   as above.      4. LRTI (lower respiratory tract infection)  Resolved.  Finish the taper and take it from there.           Gibran Alarcon MD  [facility]

## 2019-02-04 ENCOUNTER — OFFICE VISIT (OUTPATIENT)
Dept: FAMILY MEDICINE | Facility: OTHER | Age: 69
End: 2019-02-04
Attending: FAMILY MEDICINE
Payer: COMMERCIAL

## 2019-02-04 VITALS
WEIGHT: 175 LBS | DIASTOLIC BLOOD PRESSURE: 80 MMHG | TEMPERATURE: 96.9 F | BODY MASS INDEX: 36.58 KG/M2 | SYSTOLIC BLOOD PRESSURE: 124 MMHG | HEART RATE: 70 BPM | OXYGEN SATURATION: 97 %

## 2019-02-04 DIAGNOSIS — G47.00 PERSISTENT INSOMNIA: ICD-10-CM

## 2019-02-04 DIAGNOSIS — Z72.0 TOBACCO ABUSE: ICD-10-CM

## 2019-02-04 DIAGNOSIS — Z71.6 TOBACCO ABUSE COUNSELING: ICD-10-CM

## 2019-02-04 DIAGNOSIS — J22 LRTI (LOWER RESPIRATORY TRACT INFECTION): Primary | ICD-10-CM

## 2019-02-04 PROCEDURE — 99213 OFFICE O/P EST LOW 20 MIN: CPT | Performed by: FAMILY MEDICINE

## 2019-02-04 PROCEDURE — G0463 HOSPITAL OUTPT CLINIC VISIT: HCPCS

## 2019-02-04 RX ORDER — TEMAZEPAM 30 MG
CAPSULE ORAL
Qty: 90 CAPSULE | Refills: 3 | Status: SHIPPED | OUTPATIENT
Start: 2019-02-04 | End: 2020-01-09

## 2019-02-04 ASSESSMENT — PAIN SCALES - GENERAL: PAINLEVEL: NO PAIN (0)

## 2019-02-04 NOTE — PATIENT INSTRUCTIONS

## 2019-02-04 NOTE — NURSING NOTE
"Chief Complaint   Patient presents with     Cough       Initial /80   Pulse 70   Temp 96.9  F (36.1  C)   Wt 79.4 kg (175 lb)   SpO2 97%   BMI 36.58 kg/m   Estimated body mass index is 36.58 kg/m  as calculated from the following:    Height as of 11/28/18: 1.473 m (4' 10\").    Weight as of this encounter: 79.4 kg (175 lb).  Medication Reconciliation: complete    Jo Ayala LPN  "

## 2019-02-19 ENCOUNTER — TELEPHONE (OUTPATIENT)
Dept: FAMILY MEDICINE | Facility: OTHER | Age: 69
End: 2019-02-19

## 2019-02-19 DIAGNOSIS — M17.0 PRIMARY OSTEOARTHRITIS OF BOTH KNEES: ICD-10-CM

## 2019-02-19 DIAGNOSIS — M54.16 LUMBAR RADICULOPATHY: Primary | ICD-10-CM

## 2019-02-19 RX ORDER — OXYCODONE AND ACETAMINOPHEN 5; 325 MG/1; MG/1
TABLET ORAL
Qty: 60 TABLET | Refills: 0 | Status: SHIPPED | OUTPATIENT
Start: 2019-02-19 | End: 2020-01-09

## 2019-02-19 NOTE — TELEPHONE ENCOUNTER
oxyCODONE-acetaminophen (PERCOCET) 5-325 MG per tablet      Last Written Prescription Date:  11/29/17  Last Fill Quantity: 60,   # refills: 0  Last Office Visit: 2/4/19  Future Office visit:    Next 5 appointments (look out 90 days)    Mar 07, 2019  2:20 PM CST  (Arrive by 2:05 PM)  [facility] [address] [facility] [id number] [phone]   Mar 25, 2019  1:15 PM CDT  (Arrive by 1:00 PM)  SHORT [facility] [address] [facility] [address]  [phone]           Routing refill request to provider for review/approval because:  Drug not on the [facility], [facility] or [facility] refill protocol or controlled substance

## 2019-02-19 NOTE — TELEPHONE ENCOUNTER
Patient asks that I inform you that her daughter is sick, so she is concerned she will get sick, and she may be calling for an appt soon. Patient also states she would like to be referred back to the hospital for back injections. She states she has received them in the past, but cancelled all of them due to the extreme pain during the injection. Patient is also considering having xrays done on her hip at her appt with Dr. Olson 03/07/19, I see she would need an order for these. Thank you.

## 2019-02-19 NOTE — TELEPHONE ENCOUNTER
Percocet  Last Written Prescription Date: 11/29/17  Last Fill Quantity: 60 # of Refills: 0  Last Office Visit: 2/4/19

## 2019-02-20 ENCOUNTER — TELEPHONE (OUTPATIENT)
Dept: FAMILY MEDICINE | Facility: OTHER | Age: 69
End: 2019-02-20

## 2019-02-20 NOTE — TELEPHONE ENCOUNTER
Spoke with patient and advised ready for pickup. Rx sent with  to [facility] per patient request.

## 2019-02-20 NOTE — TELEPHONE ENCOUNTER
Reason for call:  Medication    1. Medication Name? benzonatate (TESSALON) 200 MG capsule  2. Is this request for a refill? Yes  3. What Pharmacy do you use? [facility]  4. Have you contacted your pharmacy? Yes    5. If yes, when? 02/19/19  (Please note that the turn-around-time for prescriptions is 72 business hours; I am sending your request at this time. SEND TO  [facility]  )  Description: Patient is requesting refill for this medication for her cough.   Was an appointment offered for this a call? No   Preferred method for responding to this messageTelephone Call  If we cannot reach you directly, may we leave a detailed response at the number you provided? Yes  Can this message wait until your PCP/Provider returns if not available today? Provider is in

## 2019-02-20 NOTE — TELEPHONE ENCOUNTER
Last filled 1/14/19 #60 R3. Too soon for refill. Attempted to call pt. No answer and voicemail is full.

## 2019-02-28 ENCOUNTER — HOSPITAL ENCOUNTER (OUTPATIENT)
Dept: MRI IMAGING | Facility: HOSPITAL | Age: 69
Discharge: HOME | End: 2019-02-28
Attending: FAMILY MEDICINE | Admitting: FAMILY MEDICINE
Payer: MEDICARE

## 2019-02-28 ENCOUNTER — HOSPITAL ENCOUNTER (OUTPATIENT)
Dept: GENERAL RADIOLOGY | Facility: HOSPITAL | Age: 69
End: 2019-02-28
Attending: FAMILY MEDICINE
Payer: MEDICARE

## 2019-02-28 DIAGNOSIS — M54.16 LUMBAR RADICULOPATHY: ICD-10-CM

## 2019-02-28 PROCEDURE — G0463 HOSPITAL OUTPT CLINIC VISIT: HCPCS | Mod: 25

## 2019-02-28 PROCEDURE — 72148 MRI LUMBAR SPINE W/O DYE: CPT | Mod: TC

## 2019-03-14 ENCOUNTER — OFFICE VISIT (OUTPATIENT)
Dept: ORTHOPEDICS | Facility: OTHER | Age: 69
End: 2019-03-14
Attending: ORTHOPAEDIC SURGERY
Payer: MEDICARE

## 2019-03-14 ENCOUNTER — ANCILLARY PROCEDURE (OUTPATIENT)
Dept: GENERAL RADIOLOGY | Facility: OTHER | Age: 69
End: 2019-03-14
Attending: ORTHOPAEDIC SURGERY
Payer: MEDICARE

## 2019-03-14 VITALS
DIASTOLIC BLOOD PRESSURE: 64 MMHG | OXYGEN SATURATION: 94 % | BODY MASS INDEX: 36.73 KG/M2 | WEIGHT: 175 LBS | HEART RATE: 93 BPM | TEMPERATURE: 98.2 F | HEIGHT: 58 IN | SYSTOLIC BLOOD PRESSURE: 100 MMHG

## 2019-03-14 DIAGNOSIS — M25.552 BILATERAL HIP PAIN: Primary | ICD-10-CM

## 2019-03-14 DIAGNOSIS — Z72.0 TOBACCO ABUSE: ICD-10-CM

## 2019-03-14 DIAGNOSIS — M25.551 BILATERAL HIP PAIN: ICD-10-CM

## 2019-03-14 DIAGNOSIS — M25.551 BILATERAL HIP PAIN: Primary | ICD-10-CM

## 2019-03-14 DIAGNOSIS — M17.12 PRIMARY OSTEOARTHRITIS OF LEFT KNEE: ICD-10-CM

## 2019-03-14 DIAGNOSIS — M25.552 BILATERAL HIP PAIN: ICD-10-CM

## 2019-03-14 PROCEDURE — 73523 X-RAY EXAM HIPS BI 5/> VIEWS: CPT | Mod: TC

## 2019-03-14 PROCEDURE — 99213 OFFICE O/P EST LOW 20 MIN: CPT | Mod: 25 | Performed by: ORTHOPAEDIC SURGERY

## 2019-03-14 PROCEDURE — 20610 DRAIN/INJ JOINT/BURSA W/O US: CPT | Mod: LT | Performed by: ORTHOPAEDIC SURGERY

## 2019-03-14 PROCEDURE — G0463 HOSPITAL OUTPT CLINIC VISIT: HCPCS | Mod: 25

## 2019-03-14 PROCEDURE — G0463 HOSPITAL OUTPT CLINIC VISIT: HCPCS

## 2019-03-14 ASSESSMENT — MIFFLIN-ST. JEOR: SCORE: 1203.54

## 2019-03-14 ASSESSMENT — PAIN SCALES - GENERAL: PAINLEVEL: SEVERE PAIN (7)

## 2019-03-14 NOTE — PROGRESS NOTES
"Chief complaint:  #1.  DJD left knee   #2.  New problem: Bilateral hip pain  #3.  NSAID contraindication: Gastric ulcer    Patient profile: 69-year-old smoker, patient of Dr. Alarcon, status post right TKA on 3/21/2017 which was complicated by a left lower lobe infiltrate and leukocytosis thought to be atelectasis but pneumonia could not be ruled out and so she was treated with antibiotics and made a full recovery however the patient insists that she had \"sepsis\".  Fortunately her right TKA did very well and she is very pleased with it.    HPI:   Left knee: She has long-standing DJD of the left knee.  Her last x-rays were taken on 1/3/2018 and showed complete medial joint space loss, mild varus deformity, and tricompartmental osteophyte formation.  She has been treated using topical Voltaren gel, a hinged knee brace, and Visco supplementation injections (Gel 1) -- the Last on 8/29/2018.  The benefits of the last injection have worn off and she requests another injection.  She also has decided it is time to have her left knee replaced.  She knows that I am retiring in several weeks but requests that I do her knee replacement before I leave.  I explained to her that to do so would constitute patient abandonment.  She therefore requests a referral to someone at the [facility] as she does not trust anyone in [place], and at the [facility] there are top-notch medical specialists available in the event she needed them.    Bilateral hips: She reports the insidious onset of symmetric bilateral lateral hip pain 6 months ago with slow increase in severity.  At present the pain is intermittent, grade 8 at its worst, provoked by prolonged weightbearing, and lessened by getting off her feet and applying heat.  Pain does not radiate down her legs.  It is not associated with numbness.  She requests hip x-rays be taken today.    She has a history of lumbar spondylosis with bilateral foraminal narrowing at L4-5 and L5-S1 with " "lateral recess narrowing at L5-S1.  She is scheduled for a translaminar epidural steroid injection at week.    Her musculoskeletal and neurologic review of systems are otherwise unchanged since seen last.    She still smokes and today accepted a referral to the Quit Plan.    Examination:/64   Pulse 93   Temp 98.2  F (36.8  C) (Tympanic)   Ht 1.473 m (4' 10\")   Wt 79.4 kg (175 lb)   SpO2 94%   BMI 36.58 kg/m     General: Elderly female in no obvious distress using no ambulatory appliance.  She is awake, alert and oriented.  Left knee: The left knee has no obvious effusion.  There is no deformity.    Hips: Her stance and gait are normal.  Her hips are nontender to palpation.  Passive range of motion of her hips is excellent.      The neurovascular status of the lower extremities is intact..    X-rays: Bilateral hip films taken today show no significant abnormality    Procedure: After obtaining informed written consent and conducting a timeout, sterile technique was employed as the left knee was aspirated of 2 mL of normal looking joint fluid, then injected with 3 mL of Gel 1 through an superolateral approach.  Topical ethyl chloride spray and subcutaneous 1% Xylocaine were used as local anesthetics.  The Xylocaine was given 10 minutes to set up before the Gel 1 was injected.  Following removal of the needle a Band-Aid was applied.  The patient tolerated the procedure well and there were no complications.    Impression:  #1.  DJD left knee  #2.  Bilateral hip pain, possibly referred from her back  #3.  Lumbar spondylosis with foraminal narrowing  #4.  Tobacco abuse    Plan:   Left knee: She was referred to Dr. Araujo at [facility] for a left TKA.  Bilateral hips: I suggested she see how her hips do following the lumbar steroid injection she is scheduled to receive next week.  If her hip pain responds to the back injection, then the pain is coming from her back.  If her back response to the injection but her hips " do not, she may be a candidate for hip steroid injections in the future.

## 2019-03-14 NOTE — NURSING NOTE
"Chief Complaint   Patient presents with     RECHECK     Left Knee   Possible injection       Initial /64   Pulse 93   Temp 98.2  F (36.8  C) (Tympanic)   Ht 1.473 m (4' 10\")   Wt 79.4 kg (175 lb)   SpO2 94%   BMI 36.58 kg/m   Estimated body mass index is 36.58 kg/m  as calculated from the following:    Height as of this encounter: 1.473 m (4' 10\").    Weight as of this encounter: 79.4 kg (175 lb).  Medication Reconciliation: complete    Marleni Johnson LPN  "

## 2019-03-15 RX ADMIN — Medication 30 MG: at 13:02

## 2019-03-15 NOTE — PROCEDURES
Prior to injection, verified patient identity using patient's name and date of birth.  Due to injection administration, patient instructed to remain in clinic for 15 minutes  afterwards, and to report any adverse reaction to me immediately.    Joint injection was performed.      Drug Amount Wasted:  None.  Vial/Syringe: Single dose vial  Expiration Date:  2019-11-25  Sandy Salazar LPN

## 2019-03-22 NOTE — PROGRESS NOTES
SUBJECTIVE:                                                    Sydni Hardin is a 69 year old female who presents to clinic today for the following health issues:      Breathing problem      Duration: 1 month f/u    Description (location/character/radiation): recheck    Intensity:  mild    Accompanying signs and symptoms: none    History (similar episodes/previous evaluation): None    Precipitating or alleviating factors: None    Therapies tried and outcome: tessalon, prednisone,        URINARY TRACT SYMPTOMS      Duration: 1 week    Description  dysuria, frequency and back pain    Intensity:  moderate    Accompanying signs and symptoms:  Fever/chills: no   Flank pain YES  Nausea and vomiting: no   Vaginal symptoms: none  Abdominal/Pelvic Pain: no     History  History of frequent UTI's: no   History of kidney stones: no   Sexually Active:   Possibility of pregnancy: No    Precipitating or alleviating factors: None    Therapies tried and outcome: none         Problem list and histories reviewed & adjusted, as indicated.  Additional history: doing fine.  Wants UA prior to back injection.  Breathing improved.  Can cough up mucous and it helps.      Patient Active Problem List   Diagnosis     Benign essential hypertension     Calculus of gallbladder     Headache     Osteoarthrosis, unspecified whether generalized or localized, involving lower leg     Dysthymia     Lung nodules     Tobacco abuse     SCC (squamous cell carcinoma), face     IC (interstitial cystitis)     Primary osteoarthritis of both knees     S/P total knee replacement using cement, right     Asthma     Leukocytosis     Dehydration     Knee pain     Acute respiratory failure with hypoxia (H)     COPD exacerbation (H)     CKD (chronic kidney disease) stage 3, GFR 30-59 ml/min (H)     Obesity (BMI 35.0-39.9) with comorbidity (H)     Past Surgical History:   Procedure Laterality Date     ARTHROPLASTY KNEE Right 3/21/2017    Procedure: ARTHROPLASTY  KNEE;  Surgeon: Moisés Olson MD;  Location: [facility]     ARTHROSCOPY KNEE       BIOPSY BREAST  2000    left - free of disease      SECTION        SECTION       CHOLECYSTECTOMY       COLONOSCOPY  2008    repeat in 10 years     HEAD & NECK SURGERY      hematoma on head drained     HYSTERECTOMY       IR CONSULTATION FOR IR EXAM  2019     ORTHOPEDIC SURGERY  2014    right shoulder replacement     shoulder x2  2009    left       Social History     Tobacco Use     Smoking status: Current Every Day Smoker     Packs/day: 1.00     Years: 53.00     Pack years: 53.00     Types: Cigarettes     Start date: 1965     Smokeless tobacco: Never Used   Substance Use Topics     Alcohol use: Yes     Alcohol/week: 0.0 oz     Comment: rarely     Family History   Problem Relation Age of Onset     Cancer Mother         skin     Breast Cancer Mother      Cerebrovascular Disease Mother      Alzheimer Disease Mother      Myocardial Infarction Father 76        myocardial infarction - cause of death     Cancer Daughter         cervical      Diabetes Maternal Grandfather      Heart Disease Maternal Grandfather         MI     Diabetes Maternal Grandmother      Diabetes Paternal Grandmother      Diabetes Paternal Grandfather          Current Outpatient Medications   Medication Sig Dispense Refill     ALPRAZolam (XANAX) 1 MG tablet TAKE 1 TABLET BY MOUTH 3 TIMES DAILY AS NEEDED FOR ANXIETY 90 tablet 0     aspirin EC 81 MG EC tablet Take 1 tablet (81 mg) by mouth 2 times daily (Patient taking differently: Take 81 mg by mouth daily )       atorvastatin (LIPITOR) 40 MG tablet TAKE 1 TABLET BY MOUTH DAILY 90 tablet 1     benazepril (LOTENSIN) 10 MG tablet TAKE 1 TABLET BY MOUTH DAILY 90 tablet 2     benzonatate (TESSALON) 200 MG capsule TAKE 1 CAPSULE BY MOUTH 3 TIMES A DAY AS NEEDED FOR COUGH 60 capsule 3     budesonide-formoterol (SYMBICORT) 160-4.5 MCG/ACT inhaler Inhale 2 puffs into the lungs 2 times  daily. As needed 1 Inhaler 11     butalbital-acetaminophen-caffeine (FIORICET/ESGIC) -40 MG tablet Take 1 tablet by mouth every 6 hours as needed for headaches 30 tablet 3     cimetidine (TAGAMET) 800 MG tablet Take 1 tablet (800 mg) by mouth At Bedtime As needed 90 tablet 3     doxepin (SINEQUAN) 10 MG capsule Take 1 capsule (10 mg) by mouth At Bedtime 90 capsule 3     EPIPEN 2-JAIRO 0.3 MG/0.3ML injection INJECT 1 SYRINGE INTRAMUSCULARLY ONCE AS NEEDED FOR ANAPHYLAXIS 2 each 0     escitalopram (LEXAPRO) 20 MG tablet TAKE 1 TABLET BY MOUTH 2 TIMES A  tablet 0     fluconazole (DIFLUCAN) 150 MG tablet Take 1 tablet (150 mg) by mouth every 3 days 4 tablet 0     hydrochlorothiazide (HYDRODIURIL) 25 MG tablet TAKE 1 TABLET BY MOUTH DAILY 90 tablet 3     ipratropium - albuterol 0.5 mg/2.5 mg/3 mL (DUONEB) 0.5-2.5 (3) MG/3ML neb solution Take 1 vial (3 mLs) by nebulization every 4 hours as needed for shortness of breath / dyspnea or wheezing 90 mL 3     meloxicam (MOBIC) 15 MG tablet TAKE 1 TABLET BY MOUTH DAILY 90 tablet 1     montelukast (SINGULAIR) 10 MG tablet Take 1 tablet (10 mg) by mouth daily 90 tablet 0     nicotine (NICODERM CQ) 14 MG/24HR 24 hr patch Place 1 patch onto the skin every 24 hours 30 patch 1     nicotine (NICODERM CQ) 21 MG/24HR 24 hr patch Place 1 patch onto the skin every 24 hours 30 patch 1     nicotine (NICODERM CQ) 7 MG/24HR 24 hr patch Place 1 patch onto the skin every 24 hours 30 patch 1     nicotine polacrilex (EQL NICOTINE) 4 MG lozenge Place 1 lozenge (4 mg) inside cheek as needed for smoking cessation 360 tablet 1     nystatin (MYCOSTATIN) 338868 UNIT/ML suspension TAKE 1 TEASPOON (5MLS) BY MOUTH 4 TIMES DAILY 473 mL 1     order for DME Discontinue O2 services 1 each 0     order for DME Equipment being ordered: Please install safety bar in bathroom to assist patient in getting up from toilet. 1 Units 0     order for DME Equipment being ordered: toilet seat riser 1 each 0      "order for DME Equipment being ordered: Nebulizer supplies 2 each 11     order for DME Equipment being ordered: mcgee walker 1 each 0     oxyCODONE-acetaminophen (PERCOCET) 5-325 MG tablet TAKE 1 TO 2 TABLETS BY MOUTH EVERY 4 HOURS AS NEEDED FOR PAIN 60 tablet 0     potassium chloride SA (K-DUR/KLOR-CON M) 10 MEQ CR tablet TAKE 1 TABLET BY MOUTH 2 TIMES DAILY WITH FOOD 180 tablet 1     temazepam (RESTORIL) 30 MG capsule TAKE 1 CAPSULE BY MOUTH NIGHTLY AS NEEDED FOR SLEEP 90 capsule 3     VENTOLIN  (90 Base) MCG/ACT inhaler USE 1 PUFF FOUR TIMES A DAY AS NEEDED 18 g 1     VOLTAREN 1 % topical gel APPLY 4 GRAMS TO KNEES FOUR TIMES A DAY USING ENCLOSED DOSING CARD 100 g 0     Allergies   Allergen Reactions     Bee Pollen Other (See Comments)     Throat and eyes close up     Codeine Difficulty breathing     \"My throat closes shut.\"     Takes Percocet at home     Keflex [Cephalexin Hcl] Hives     Sulfonamide Derivatives Other (See Comments)     Throat closes     Ciprofloxacin Rash     Rash, bumps on lips     Bupropion Hcl Other (See Comments)     Made her very irritable     Celecoxib Other (See Comments) and Cough     Celebrex - wheeze  Takes ASA at home     Cheese      swiss     Clonazepam Hives     Takes Xanax at home     Erythromycin Other (See Comments)     Erythromycin base - abdominal pain     Lisinopril Other (See Comments)     Zestril - headaches     Nifedipine Hives     Procardia     Strawberries [Strawberry]      Tomato      Tramadol Hcl      Takes percocet at home       ROS:  Constitutional, HEENT, cardiovascular, pulmonary, gi and gu systems are negative, except as otherwise noted.    OBJECTIVE:                                                    /70   Pulse 70   Wt 77.1 kg (170 lb)   SpO2 95%   BMI 35.53 kg/m    Body mass index is 35.53 kg/m .  GENERAL APPEARANCE: Alert, no acute distress  CV: regular rate and rhythm, no murmur, rub or gallop  RESP: lungs clear to auscultation " bilaterally  ABDOMEN: normal bowel sounds, soft, nontender, no hepatosplenomegaly or other masses  SKIN: no suspicious lesions or rashes to visualized skin  NEURO: Alert, oriented x 3, speech and mentation normal      UA positive LE without any cells on micro.      ASSESSMENT/PLAN:                                                    1. Special screening for malignant neoplasms, colon  Reviewed.  Set up.   - Immunos occult blood; Future  - Urine Microscopic    2. Dysuria  Discussed.  Has hx of UC as well.  Await cx.  No evidence of infection and sx stable.   - *UA reflex to Microscopic and Culture - [facility]    3. Abnormal urine findings  As above.    - Urine Culture Aerobic Bacterial    4. Lumbar radiculopathy  Discussed.  Ok for back shot.  F/u with ongoing concerns.            Gibran Alarcon MD  [facility]

## 2019-03-25 ENCOUNTER — TELEPHONE (OUTPATIENT)
Dept: ORTHOPEDICS | Facility: CLINIC | Age: 69
End: 2019-03-25

## 2019-03-25 ENCOUNTER — OFFICE VISIT (OUTPATIENT)
Dept: FAMILY MEDICINE | Facility: OTHER | Age: 69
End: 2019-03-25
Attending: FAMILY MEDICINE
Payer: MEDICARE

## 2019-03-25 VITALS
SYSTOLIC BLOOD PRESSURE: 114 MMHG | BODY MASS INDEX: 35.53 KG/M2 | OXYGEN SATURATION: 95 % | HEART RATE: 70 BPM | WEIGHT: 170 LBS | DIASTOLIC BLOOD PRESSURE: 70 MMHG

## 2019-03-25 DIAGNOSIS — M54.16 LUMBAR RADICULOPATHY: ICD-10-CM

## 2019-03-25 DIAGNOSIS — R82.90 ABNORMAL URINE FINDINGS: ICD-10-CM

## 2019-03-25 DIAGNOSIS — R30.0 DYSURIA: Primary | ICD-10-CM

## 2019-03-25 DIAGNOSIS — Z12.11 SPECIAL SCREENING FOR MALIGNANT NEOPLASMS, COLON: ICD-10-CM

## 2019-03-25 LAB
ALBUMIN UR-MCNC: NEGATIVE MG/DL
APPEARANCE UR: CLEAR
BILIRUB UR QL STRIP: NEGATIVE
COLOR UR AUTO: YELLOW
GLUCOSE UR STRIP-MCNC: NEGATIVE MG/DL
HGB UR QL STRIP: NEGATIVE
KETONES UR STRIP-MCNC: NEGATIVE MG/DL
LEUKOCYTE ESTERASE UR QL STRIP: ABNORMAL
NITRATE UR QL: NEGATIVE
PH UR STRIP: 6.5 PH (ref 5–7)
RBC #/AREA URNS AUTO: NORMAL /HPF
SOURCE: ABNORMAL
SP GR UR STRIP: 1.01 (ref 1–1.03)
UROBILINOGEN UR STRIP-ACNC: 0.2 EU/DL (ref 0.2–1)
WBC #/AREA URNS AUTO: NORMAL /HPF

## 2019-03-25 PROCEDURE — 99213 OFFICE O/P EST LOW 20 MIN: CPT | Performed by: FAMILY MEDICINE

## 2019-03-25 PROCEDURE — 81001 URINALYSIS AUTO W/SCOPE: CPT | Mod: ZL | Performed by: FAMILY MEDICINE

## 2019-03-25 PROCEDURE — G0463 HOSPITAL OUTPT CLINIC VISIT: HCPCS

## 2019-03-25 PROCEDURE — 87086 URINE CULTURE/COLONY COUNT: CPT | Mod: ZL | Performed by: FAMILY MEDICINE

## 2019-03-25 ASSESSMENT — PAIN SCALES - GENERAL: PAINLEVEL: SEVERE PAIN (7)

## 2019-03-25 ASSESSMENT — PATIENT HEALTH QUESTIONNAIRE - PHQ9: SUM OF ALL RESPONSES TO PHQ QUESTIONS 1-9: 21

## 2019-03-25 NOTE — NURSING NOTE
"Chief Complaint   Patient presents with     UTI     Breathing Problem       Initial /70   Pulse 70   Wt 77.1 kg (170 lb)   SpO2 95%   BMI 35.53 kg/m   Estimated body mass index is 35.53 kg/m  as calculated from the following:    Height as of 3/14/19: 1.473 m (4' 10\").    Weight as of this encounter: 77.1 kg (170 lb).  Medication Reconciliation: complete    Jo Ayala LPN  "

## 2019-03-26 ENCOUNTER — TELEPHONE (OUTPATIENT)
Dept: ORTHOPEDICS | Facility: CLINIC | Age: 69
End: 2019-03-26

## 2019-03-26 ENCOUNTER — HOSPITAL ENCOUNTER (OUTPATIENT)
Dept: INTERVENTIONAL RADIOLOGY/VASCULAR | Facility: HOSPITAL | Age: 69
Discharge: HOME | End: 2019-03-26
Attending: RADIOLOGY | Admitting: FAMILY MEDICINE
Payer: MEDICARE

## 2019-03-26 DIAGNOSIS — M54.16 LUMBAR RADICULOPATHY: ICD-10-CM

## 2019-03-26 DIAGNOSIS — M54.50 LOW BACK PAIN: ICD-10-CM

## 2019-03-26 PROCEDURE — 25000125 ZZHC RX 250: Performed by: RADIOLOGY

## 2019-03-26 PROCEDURE — 62323 NJX INTERLAMINAR LMBR/SAC: CPT | Mod: TC

## 2019-03-26 PROCEDURE — 25000128 H RX IP 250 OP 636: Performed by: RADIOLOGY

## 2019-03-26 RX ORDER — LIDOCAINE HYDROCHLORIDE 10 MG/ML
INJECTION, SOLUTION EPIDURAL; INFILTRATION; INTRACAUDAL; PERINEURAL
Status: DISPENSED
Start: 2019-03-26 | End: 2019-03-26

## 2019-03-26 RX ORDER — IOPAMIDOL 612 MG/ML
15 INJECTION, SOLUTION INTRATHECAL ONCE
Status: COMPLETED | OUTPATIENT
Start: 2019-03-26 | End: 2019-03-26

## 2019-03-26 RX ORDER — METHYLPREDNISOLONE ACETATE 80 MG/ML
80 INJECTION, SUSPENSION INTRA-ARTICULAR; INTRALESIONAL; INTRAMUSCULAR; SOFT TISSUE ONCE
Status: COMPLETED | OUTPATIENT
Start: 2019-03-26 | End: 2019-03-26

## 2019-03-26 RX ORDER — METHYLPREDNISOLONE ACETATE 80 MG/ML
INJECTION, SUSPENSION INTRA-ARTICULAR; INTRALESIONAL; INTRAMUSCULAR; SOFT TISSUE
Status: DISPENSED
Start: 2019-03-26 | End: 2019-03-26

## 2019-03-26 RX ADMIN — IOPAMIDOL 5 ML: 612 INJECTION, SOLUTION INTRATHECAL at 12:00

## 2019-03-26 RX ADMIN — LIDOCAINE HYDROCHLORIDE 5 ML: 10 INJECTION, SOLUTION EPIDURAL; INFILTRATION; INTRACAUDAL at 12:01

## 2019-03-26 RX ADMIN — METHYLPREDNISOLONE ACETATE 80 MG: 80 INJECTION, SUSPENSION INTRA-ARTICULAR; INTRALESIONAL; INTRAMUSCULAR; SOFT TISSUE at 12:00

## 2019-03-26 NOTE — TELEPHONE ENCOUNTER
Spoke with pt regarding referral to see Dr Araujo. Pt stated she just had an injection today and would like a call back in 1 month. Told pt I would call back then.

## 2019-03-26 NOTE — DISCHARGE INSTRUCTIONS
Cell number on file:    Telephone Information:   Mobile [phone]     Is it ok to leave a message at this number(s)? Yes    Dr Way completed your procedure on 3/26/2019.    Current Pain Level (0-10 Scale): 9/10  Post Pain Level (0-10):  0/10    Radiology Discharge instructions for Steroid Injection    Activity Level:     Do not do any heavy activity or exercise for 24 hours.   Do not drive for 4 hours after your injection.  Diet:   Return to your normal diet.  Medications:   If you have stopped taking your Aspirin, Coumadin/Warfarin, Ibuprofen, or any   other blood thinner for this procedure you may resume in the morning unless   your primary care provider has given you other instructions.    Diabetics may see an increase in blood sugar after steroid injections. If you are concerned about your blood sugar, please contact your family doctor.    Site Care:  Remove the bandage and bathe or shower the morning after the procedure.      This is a Pain Management procedure.  You will be contacted in two weeks for follow up.    Call your Primary Care Provider if you have the following (if your primary care provider is not available please seek emergency care):   Nausea with vomiting   Severe headache   Drowsiness or confusion   Redness or drainage at the injection or puncture site   Temperature over 101 degrees F   Other concerns   Worsening back pain   Stiff neck

## 2019-03-26 NOTE — IP AVS SNAPSHOT
[facility]  [address] [address]  Phone:  [phone]  Fax:  [phone]                                    After Visit Summary   3/26/2019    Sydni Hardin    MRN: [id number]           After Visit Summary Signature Page    I have received my discharge instructions, and my questions have been answered. I have discussed any challenges I see with this plan with the nurse or doctor.    ..........................................................................................................................................  Patient/Patient Representative Signature      ..........................................................................................................................................  Patient Representative Print Name and Relationship to Patient    ..................................................               ................................................  Date                                   Time    ..........................................................................................................................................  Reviewed by Signature/Title    ...................................................              ..............................................  Date                                               Time          22EPIC Rev 08/18

## 2019-03-27 LAB
BACTERIA SPEC CULT: NORMAL
Lab: NORMAL
SPECIMEN SOURCE: NORMAL

## 2019-04-09 ENCOUNTER — TELEPHONE (OUTPATIENT)
Dept: INTERVENTIONAL RADIOLOGY/VASCULAR | Facility: HOSPITAL | Age: 69
End: 2019-04-09

## 2019-04-09 NOTE — TELEPHONE ENCOUNTER
CONSULT PATIENT  PAIN INJECTION POST CALL    Procedure: Epidural Lumbar TL L4-L5  Radiologist(s): Dr. Ranjeet Way  Date of Procedure: 3/26/2019    I responded to the patient's questions/concerns.    Pre-procedure pain score was: 9 (See pre-procedure score)  Post-procedure pain score as of today is: 6  Where is the pain? Lower back pain, sometimes pain goes down front left leg.  Is this new pain? No    Patient would not like to pursue another injection.    Patient has lower back and hip pain that sometimes radiates down front left leg. Patient notices pain down the leg more so when it is raining or cold outside. Patient also states pain level is a 9 when doing certain activities such as vacuuming the house. Patient would like to wait another week to see how the pain is before making a decision regarding a following injection.       Marge Tapia

## 2019-04-17 DIAGNOSIS — I10 BENIGN ESSENTIAL HYPERTENSION: ICD-10-CM

## 2019-04-17 DIAGNOSIS — F41.9 ANXIETY: ICD-10-CM

## 2019-04-17 RX ORDER — ALPRAZOLAM 1 MG
TABLET ORAL
Qty: 90 TABLET | Refills: 0 | Status: SHIPPED | OUTPATIENT
Start: 2019-04-17 | End: 2019-05-16

## 2019-04-17 NOTE — TELEPHONE ENCOUNTER
Potassium Chloride SA      Last Written Prescription Date:  9/18/2018  Last Fill Quantity: 180,   # refills: 1  Last Office Visit: 3/25/2019  Future Office visit:    Next 5 appointments (look out 90 days)    May 02, 2019  2:00 PM CDT  (Arrive by 1:45 PM)  [facility] with Moisés Olson MD  [facility] [address] [facility] [address] [phone]

## 2019-04-17 NOTE — TELEPHONE ENCOUNTER
ALPRAZolam (XANAX) 1 MG tablet      Last Written Prescription Date:  3-8-19  Last Fill Quantity: 90,   # refills: 0  Last Office Visit: 3-25-19  Future Office visit:    Next 5 appointments (look out 90 days)    May 02, 2019  2:00 PM CDT  (Arrive by 1:45 PM)  Return Visit with Moisés Olson MD  [facility] [address] [facility] [address] [phone]           Routing refill request to provider for review/approval because:  Drug not on the [facility], [facility] or [facility] refill protocol or controlled substance

## 2019-04-19 RX ORDER — POTASSIUM CHLORIDE 750 MG/1
TABLET, EXTENDED RELEASE ORAL
Qty: 180 TABLET | Refills: 0 | Status: SHIPPED | OUTPATIENT
Start: 2019-04-19 | End: 2019-09-25

## 2019-05-02 ENCOUNTER — OFFICE VISIT (OUTPATIENT)
Dept: ORTHOPEDICS | Facility: OTHER | Age: 69
End: 2019-05-02
Attending: ORTHOPAEDIC SURGERY
Payer: COMMERCIAL

## 2019-05-02 VITALS
HEART RATE: 84 BPM | DIASTOLIC BLOOD PRESSURE: 70 MMHG | HEIGHT: 58 IN | WEIGHT: 170 LBS | TEMPERATURE: 97.8 F | SYSTOLIC BLOOD PRESSURE: 100 MMHG | OXYGEN SATURATION: 95 % | BODY MASS INDEX: 35.68 KG/M2

## 2019-05-02 DIAGNOSIS — M17.12 PRIMARY OSTEOARTHRITIS OF LEFT KNEE: Primary | ICD-10-CM

## 2019-05-02 DIAGNOSIS — Z72.0 TOBACCO ABUSE: ICD-10-CM

## 2019-05-02 DIAGNOSIS — Z71.6 TOBACCO ABUSE COUNSELING: ICD-10-CM

## 2019-05-02 PROCEDURE — G0463 HOSPITAL OUTPT CLINIC VISIT: HCPCS

## 2019-05-02 PROCEDURE — 99213 OFFICE O/P EST LOW 20 MIN: CPT | Performed by: ORTHOPAEDIC SURGERY

## 2019-05-02 ASSESSMENT — PAIN SCALES - GENERAL: PAINLEVEL: SEVERE PAIN (6)

## 2019-05-02 ASSESSMENT — MIFFLIN-ST. JEOR: SCORE: 1180.86

## 2019-05-02 NOTE — PROGRESS NOTES
"Chief complaint:  #1.  DJD left knee  #2.  Bilateral hip pain, possibly referred from her back  #3.  Lumbar spondylosis with foraminal   #4.  NSAID contraindication: Gastric ulcer     Patient profile: 69-year-old smoker, patient of Dr. Alarcon, status post right TKA on 3/21/2017 which was complicated by a left lower lobe infiltrate and leukocytosis thought to be atelectasis but pneumonia could not be ruled out: she was treated with antibiotics and made a full recovery.  Somehow the patient became convinced that the problem was \"sepsis\". Fortunately her right TKA did very well and she is very pleased with its outcome.    HPI:  Left knee: She has a long history of severe DJD of the left knee.  Treatment up to now has been conservative in the form of topical Voltaren gel, hinged knee brace, and Visco supplementation injections.  Her last Visco injection (Gel 1) was on 3/14/2019.  At her last visit she felt ready to have the left knee replaced.  Due to my upcoming intermediate, and due to her multiple medical problems, I recommended she have it done at a large [facility].  I referred her to Dr. Araujo at the [facility].  The  at the [facility] called her on 3/26/2019.  Sydni was having a procedure done that day and asked the  call back in a month.  When I referred her, Sydni was certain that her daughter would be able to drive her to the [facility] and back for this appointment, but her daughter is having mental health issues and is no longer reliable.  Sydni does not think she could get to the [facility] now.  Sydni also reports that she will run out of Voltaren gel in about 2 months.    Lumbar spondylosis: She had a translaminar epidural steroid injection on 3/26/2019.  Gave her about 30% improvement in her back pain.  She was told she could call the Interventional Radiology Department to schedule a second injection at any time.    Bilateral hip pain: She presented for this problem, for " "the first time, on 3/14/2019.  Problem was 6 months old at the time end of insidious onset.  Hip x-rays were unremarkable.  I felt it was quite possible her hip pain was lumbo-genic.  I suggested she see if she got any benefit from the back injection she was scheduled to have 12 days later.  Today she reports that since the injection her hips have hurt much less.  She is able to do her activities daily living without her hips limiting her.    She still smokes, and accepted another referral to the Quit Plan today.    Nothing else is changed with respect to her musculoskeletal or neurologic review of systems since seen last.    Objective:/70   Pulse 84   Temp 97.8  F (36.6  C) (Tympanic)   Ht 1.473 m (4' 10\")   Wt 77.1 kg (170 lb)   SpO2 95%   BMI 35.53 kg/m     Overweight but otherwise healthy-appearing elderly female in no obvious distress.  Her sclerae are clear.  Her respiratory efforts are unlabored.  She is not using an ambulatory appliance.  Stance is normal.  Gait is antalgic on the left.  The left knee has no effusion.  Its range of motion is 0 to 120 degrees.  The neurovascular status of the limb is intact.    Impression:  #1.  DJD left knee  #2.  Bilateral hip pain, possibly referred from her back  #3.  Lumbar spondylosis with foraminal   #4.  NSAID contraindication: Gastric ulcer  #5.  Tobacco abuse    Plan:  #1.  I suggested she accept a referral to an arthroplasty surgeon in [place].  She admitted to that she could drive herself to and from [place].  She does not like [facility] so asked to be referred to [facility].  I referred her to Dr. Lukasz Kim for consideration of a left TKA.  #2.  She was given a new prescription for Voltaren Gel  #3.  She will call Interventional Radiology to schedule another back injection.  #4.  She was enrolled in the quit plan.  #5.  She knows that I am scheduled to retire in 6 days.  She therefore will not have the opportunity to follow-up here " further.

## 2019-05-02 NOTE — PATIENT INSTRUCTIONS

## 2019-05-02 NOTE — NURSING NOTE
"Chief Complaint   Patient presents with     RECHECK     Left Knee       Initial /70   Pulse 84   Temp 97.8  F (36.6  C) (Tympanic)   Ht 1.473 m (4' 10\")   Wt 77.1 kg (170 lb)   SpO2 95%   BMI 35.53 kg/m   Estimated body mass index is 35.53 kg/m  as calculated from the following:    Height as of this encounter: 1.473 m (4' 10\").    Weight as of this encounter: 77.1 kg (170 lb).  Medication Reconciliation: complete    Marleni Johnson LPN  "

## 2019-05-08 DIAGNOSIS — F34.1 DYSTHYMIA: ICD-10-CM

## 2019-05-08 DIAGNOSIS — R52 PAIN: ICD-10-CM

## 2019-05-08 DIAGNOSIS — E78.2 MIXED HYPERLIPIDEMIA: ICD-10-CM

## 2019-05-08 RX ORDER — ESCITALOPRAM OXALATE 20 MG/1
TABLET ORAL
Qty: 180 TABLET | Refills: 0 | Status: SHIPPED | OUTPATIENT
Start: 2019-05-08 | End: 2019-10-10

## 2019-05-08 RX ORDER — MELOXICAM 15 MG/1
TABLET ORAL
Qty: 90 TABLET | Refills: 0 | Status: SHIPPED | OUTPATIENT
Start: 2019-05-08 | End: 2019-08-19

## 2019-05-08 RX ORDER — MONTELUKAST SODIUM 10 MG/1
TABLET ORAL
Qty: 90 TABLET | Refills: 0 | Status: SHIPPED | OUTPATIENT
Start: 2019-05-08 | End: 2020-01-09

## 2019-05-08 NOTE — TELEPHONE ENCOUNTER
escitalopram (LEXAPRO) 20 MG tablet      Last Written Prescription Date:  12-31-18  Last Fill Quantity: 180,   # refills: 0  Last Office Visit: 3-25-19    Protocol failed due to    PHQ-9 score less than 5 in past 6 months     Last PHQ9 was 3-25-19 score of 21    Please advise. Thank you.

## 2019-05-15 DIAGNOSIS — E78.2 MIXED HYPERLIPIDEMIA: ICD-10-CM

## 2019-05-15 NOTE — TELEPHONE ENCOUNTER
Lipitor       Last Written Prescription Date:  8/3/2018  Last Fill Quantity: 90,   # refills: 1  Last Office Visit: 5/2/2019  Future Office visit:

## 2019-05-16 DIAGNOSIS — F41.9 ANXIETY: ICD-10-CM

## 2019-05-16 RX ORDER — ATORVASTATIN CALCIUM 40 MG/1
TABLET, FILM COATED ORAL
Qty: 90 TABLET | Refills: 0 | Status: SHIPPED | OUTPATIENT
Start: 2019-05-16 | End: 2019-08-19

## 2019-05-17 RX ORDER — ALPRAZOLAM 1 MG
TABLET ORAL
Qty: 90 TABLET | Refills: 0 | Status: SHIPPED | OUTPATIENT
Start: 2019-05-17 | End: 2019-06-18

## 2019-05-17 NOTE — TELEPHONE ENCOUNTER
ALPRAZolam (XANAX) 1 MG tablet     Last Written Prescription Date:  04/17/2019  Last Fill Quantity: 90,   # refills: 0  Last Office Visit: 03/25/2019  Future Office visit:       Routing refill request to provider for review/approval because:

## 2019-05-24 ENCOUNTER — NURSE TRIAGE (OUTPATIENT)
Dept: FAMILY MEDICINE | Facility: OTHER | Age: 69
End: 2019-05-24

## 2019-05-24 DIAGNOSIS — J01.01 ACUTE RECURRENT MAXILLARY SINUSITIS: ICD-10-CM

## 2019-05-24 DIAGNOSIS — J20.9 ACUTE BRONCHITIS, UNSPECIFIED ORGANISM: ICD-10-CM

## 2019-05-24 RX ORDER — PREDNISONE 20 MG/1
TABLET ORAL
Qty: 20 TABLET | Refills: 0 | OUTPATIENT
Start: 2019-05-24

## 2019-05-24 NOTE — TELEPHONE ENCOUNTER
Patient is scheduled on 5/28, called trying to get in sooner with Dr. Alarcon.  Advised to visit UC if symptoms worsen over the weekend.  Patient understands and agrees with the plan.    Reason for Disposition    [1] Continuous (nonstop) coughing interferes with work or school AND [2] no improvement using cough treatment per Care Advice    Additional Information    Negative: Severe difficulty breathing (e.g., struggling for each breath, speaks in single words)    Negative: Bluish (or gray) lips or face now    Negative: [1] Rapid onset of cough AND [2] has hives    Negative: Coughing started suddenly after medicine, an allergic food or bee sting    Negative: [1] Difficulty breathing AND [2] exposure to flames, smoke, or fumes    Negative: [1] Stridor AND [2] difficulty breathing    Negative: Sounds like a life-threatening emergency to the triager    Wet (productive) cough (i.e., white-yellow, yellow, green, or ronak colored sputum)    Negative: Severe difficulty breathing (e.g., struggling for each breath, speaks in single words)    Negative: Bluish (or gray) lips or face now    Negative: [1] Difficulty breathing AND [2] exposure to flames, smoke, or fumes    Negative: [1] Stridor AND [2] difficulty breathing    Negative: Sounds like a life-threatening emergency to the triager    Negative: [1] Previous asthma attacks AND [2] this feels like asthma attack    Negative: Dry (non-productive) cough (i.e., no sputum or minimal clear sputum)    Negative: Chest pain  (Exception: MILD central chest pain, present only when coughing)    Negative: Difficulty breathing    Negative: Patient sounds very sick or weak to the triager    Negative: [1] Coughed up blood AND [2] > 1 tablespoon (15 ml) (Exception: blood-tinged sputum)    Negative: Fever > 103 F (39.4 C)    Negative: [1] Fever > 101 F (38.3 C) AND [2] age > 60    Negative: [1] Fever > 100.0 F (37.8 C) AND [2] bedridden (e.g., nursing home patient, CVA, chronic illness,  "recovering from surgery)    Negative: [1] Fever > 100.0 F (37.8 C) AND [2] diabetes mellitus or weak immune system (e.g., HIV positive, cancer chemo, splenectomy, chronic steroids)    Negative: Wheezing is present    Answer Assessment - Initial Assessment Questions  1. ONSET: \"When did the cough begin?\"       2 days ago  2. SEVERITY: \"How bad is the cough today?\"       Cough barking cough and has enphazema  3. RESPIRATORY DISTRESS: \"Describe your breathing.\"       Ok for now  4. FEVER: \"Do you have a fever?\" If so, ask: \"What is your temperature, how was it measured, and when did it start?\"      Feels warm and chills  5. HEMOPTYSIS: \"Are you coughing up any blood?\" If so ask: \"How much?\" (flecks, streaks, tablespoons, etc.)      Mucous yellow  6. TREATMENT: \"What have you done so far to treat the cough?\" (e.g., meds, fluids, humidifier)      Nebulizer, Robatussin  7. CARDIAC HISTORY: \"Do you have any history of heart disease?\" (e.g., heart attack, congestive heart failure)       no  8. LUNG HISTORY: \"Do you have any history of lung disease?\"  (e.g., pulmonary embolus, asthma, emphysema)      Asthma inhalers  9. PE RISK FACTORS: \"Do you have a history of blood clots?\" (or: recent major surgery, recent prolonged travel, bedridden )      no  10. OTHER SYMPTOMS: \"Do you have any other symptoms? (e.g., runny nose, wheezing, chest pain)        no  11. PREGNANCY: \"Is there any chance you are pregnant?\" \"When was your last menstrual period?\"        no  12. TRAVEL: \"Have you traveled out of the country in the last month?\" (e.g., travel history, exposures)        no    Protocols used: COUGH - ACUTE [id number], COUGH - ACUTE NON-PRODUCTIVE-A-AH      "

## 2019-05-24 NOTE — TELEPHONE ENCOUNTER
PCP Gibran Alarcon MD amoxicillin-clavulanate (AUGMENTIN) 875-125 MG per tablet       Last Written Prescription Date:  07/10/2018  Last Fill Quantity: 20,   # refills: 0  Last Office Visit: 03/25/2019  Future Office visit     predniSONE (DELTASONE) 10 MG tablet     Last Written Prescription Date:  09/27/2018  Last Fill Quantity: 24,   # refills: 0  Last Office Visit:03/25/2019  Future Office visit:    Next 5 appointments (look out 90 days)    May 28, 2019  3:30 PM CDT  (Arrive by 3:15 PM)  [facility] with ERIN Rico  [facility] [address]  [facility] [place] [place] [address]  [phone]           Routing refill request to provider for review/approval because:  Drug not on the [facility], [facility] or [facility] refill protocol or controlled substance  Next 5 appointments (look out 90 days)    May 28, 2019  3:30 PM CDT  (Arrive by 3:15 PM)  [facility] with ERIN Rico  [facility] [address]  [facility] [place] [facility] [place] [address]  [phone]           Routing refill request to provider for review/approval because:  Drug not on the [facility], [facility] or [facility] refill protocol or controlled substance

## 2019-05-28 ENCOUNTER — HOSPITAL ENCOUNTER (EMERGENCY)
Facility: HOSPITAL | Age: 69
Discharge: HOME | End: 2019-05-28
Attending: INTERNAL MEDICINE | Admitting: INTERNAL MEDICINE
Payer: MEDICARE

## 2019-05-28 ENCOUNTER — APPOINTMENT (OUTPATIENT)
Dept: GENERAL RADIOLOGY | Facility: HOSPITAL | Age: 69
End: 2019-05-28
Attending: INTERNAL MEDICINE
Payer: MEDICARE

## 2019-05-28 VITALS
HEART RATE: 82 BPM | DIASTOLIC BLOOD PRESSURE: 93 MMHG | SYSTOLIC BLOOD PRESSURE: 158 MMHG | TEMPERATURE: 97.9 F | OXYGEN SATURATION: 94 % | RESPIRATION RATE: 20 BRPM

## 2019-05-28 DIAGNOSIS — J44.1 COPD EXACERBATION (H): ICD-10-CM

## 2019-05-28 PROCEDURE — A9270 NON-COVERED ITEM OR SERVICE: HCPCS | Mod: GY | Performed by: INTERNAL MEDICINE

## 2019-05-28 PROCEDURE — 99284 EMERGENCY DEPT VISIT MOD MDM: CPT | Mod: Z6 | Performed by: INTERNAL MEDICINE

## 2019-05-28 PROCEDURE — 99283 EMERGENCY DEPT VISIT LOW MDM: CPT | Mod: 25

## 2019-05-28 PROCEDURE — 40000275 ZZH STATISTIC RCP TIME EA 10 MIN

## 2019-05-28 PROCEDURE — 71046 X-RAY EXAM CHEST 2 VIEWS: CPT | Mod: TC

## 2019-05-28 PROCEDURE — 25000132 ZZH RX MED GY IP 250 OP 250 PS 637: Mod: GY | Performed by: INTERNAL MEDICINE

## 2019-05-28 PROCEDURE — 25000125 ZZHC RX 250: Performed by: INTERNAL MEDICINE

## 2019-05-28 PROCEDURE — 94640 AIRWAY INHALATION TREATMENT: CPT

## 2019-05-28 PROCEDURE — 25000131 ZZH RX MED GY IP 250 OP 636 PS 637: Performed by: INTERNAL MEDICINE

## 2019-05-28 RX ORDER — IPRATROPIUM BROMIDE AND ALBUTEROL SULFATE 2.5; .5 MG/3ML; MG/3ML
3 SOLUTION RESPIRATORY (INHALATION) ONCE
Status: COMPLETED | OUTPATIENT
Start: 2019-05-28 | End: 2019-05-28

## 2019-05-28 RX ORDER — PREDNISONE 20 MG/1
TABLET ORAL
Qty: 9 TABLET | Refills: 0 | Status: SHIPPED | OUTPATIENT
Start: 2019-05-28 | End: 2019-07-15

## 2019-05-28 RX ORDER — PREDNISONE 20 MG/1
40 TABLET ORAL ONCE
Status: COMPLETED | OUTPATIENT
Start: 2019-05-28 | End: 2019-05-28

## 2019-05-28 RX ADMIN — IPRATROPIUM BROMIDE AND ALBUTEROL SULFATE 3 ML: .5; 3 SOLUTION RESPIRATORY (INHALATION) at 06:01

## 2019-05-28 RX ADMIN — PREDNISONE 40 MG: 20 TABLET ORAL at 06:31

## 2019-05-28 RX ADMIN — AMOXICILLIN AND CLAVULANATE POTASSIUM 1 TABLET: 875; 125 TABLET, FILM COATED ORAL at 06:30

## 2019-05-28 ASSESSMENT — ENCOUNTER SYMPTOMS
EYE REDNESS: 0
CONFUSION: 0
HEADACHES: 0
WHEEZING: 1
ARTHRALGIAS: 0
SHORTNESS OF BREATH: 0
FEVER: 0
DIFFICULTY URINATING: 0
ABDOMINAL PAIN: 0
NECK STIFFNESS: 0
COUGH: 1
COLOR CHANGE: 0

## 2019-05-28 NOTE — ED PROVIDER NOTES
"  History     Chief Complaint   Patient presents with     Cough     since last thursday     The history is provided by the patient.   Cough   Cough characteristics:  Productive  Sputum characteristics:  Brown  Severity:  Moderate  Onset quality:  Gradual  Timing:  Constant  Progression:  Worsening  Chronicity:  Recurrent  Associated symptoms: wheezing    Associated symptoms: no chest pain, no fever, no headaches and no shortness of breath          Allergies:  Allergies   Allergen Reactions     Bee Pollen Other (See Comments)     Throat and eyes close up     Codeine Difficulty breathing     \"My throat closes shut.\"     Takes Percocet at home     Keflex [Cephalexin Hcl] Hives     Sulfonamide Derivatives Other (See Comments)     Throat closes     Ciprofloxacin Rash     Rash, bumps on lips     Bupropion Hcl Other (See Comments)     Made her very irritable     Celecoxib Other (See Comments) and Cough     Celebrex - wheeze  Takes ASA at home     Cheese      swiss     Clonazepam Hives     Takes Xanax at home     Erythromycin Other (See Comments)     Erythromycin base - abdominal pain     Lisinopril Other (See Comments)     Zestril - headaches     Nifedipine Hives     Procardia     Strawberries [Strawberry]      Tomato      Tramadol Hcl      Takes percocet at home       Problem List:    Patient Active Problem List    Diagnosis Date Noted     Obesity (BMI 35.0-39.9) with comorbidity (H) 01/14/2019     Priority: Medium     Acute respiratory failure with hypoxia (H) 09/24/2018     Priority: Medium     COPD exacerbation (H) 09/24/2018     Priority: Medium     Leukocytosis 03/25/2017     Priority: Medium     Dehydration 03/25/2017     Priority: Medium     Knee pain 03/25/2017     Priority: Medium     S/P total knee replacement using cement, right 03/21/2017     Priority: Medium     Primary osteoarthritis of both knees 10/05/2016     Priority: Medium     IC (interstitial cystitis) 10/28/2015     Priority: Medium     SCC (squamous " cell carcinoma), face 08/05/2015     Priority: Medium     Lung nodules 06/19/2015     Priority: Medium     Tobacco abuse 06/19/2015     Priority: Medium     Dysthymia 12/31/2014     Priority: Medium     CKD (chronic kidney disease) stage 3, GFR 30-59 ml/min (H) 02/05/2014     Priority: Medium     Benign essential hypertension 01/05/2012     Priority: Medium     Problem list name updated by automated process. Provider to review       Asthma 03/20/2009     Priority: Medium     Calculus of gallbladder 02/02/2006     Priority: Medium     Problem list name updated by automated process. Provider to review       Headache 12/10/2003     Priority: Medium     Problem list name updated by automated process. Provider to review       Osteoarthrosis, unspecified whether generalized or localized, involving lower leg 10/04/2001     Priority: Medium     Problem list name updated by automated process. Provider to review          Past Medical History:    Past Medical History:   Diagnosis Date     Abnormal arterial blood gases 4/26/2006     Acute upper respiratory infections of unspecified site 7/2/2001     Anxiety 10/24/2011     Calculus of gallbladder without mention of cholecystitis or obstruction 2/2/2006     Depression 1/5/2012     Follow-up examination, following other surgery 2/28/2006     Headache(784.0) 12/10/2003     History of CVA (cerebrovascular accident) 9/28/2011     HTN (hypertension) 1/5/2012     Insomnia 3/13/2012     Lipoma of other skin and subcutaneous tissue 1/18/2006     Nonallopathic lesion of cervical region, not elsewhere classified 12/18/2003     Osteoarthrosis, unspecified whether generalized or localized, lower leg 10/4/2001     Special screening for malignant neoplasms, colon 12/7/2006     Urinary tract infection, site not specified 4/30/2007       Past Surgical History:    Past Surgical History:   Procedure Laterality Date     ARTHROPLASTY KNEE Right 3/21/2017    Procedure: ARTHROPLASTY KNEE;  Surgeon:  Moisés Olson MD;  Location: [facility] OR     ARTHROSCOPY KNEE       BIOPSY BREAST  2000    left - free of disease      SECTION        SECTION       CHOLECYSTECTOMY  2006     COLONOSCOPY  2008    repeat in 10 years     HEAD & NECK SURGERY  2014    hematoma on head drained     HYSTERECTOMY       IR CONSULTATION FOR IR EXAM  2019     ORTHOPEDIC SURGERY  2014    right shoulder replacement     shoulder x2  2009    left       Family History:    Family History   Problem Relation Age of Onset     Cancer Mother         skin     Breast Cancer Mother      Cerebrovascular Disease Mother      Alzheimer Disease Mother      Myocardial Infarction Father 76        myocardial infarction - cause of death     Cancer Daughter         cervical      Diabetes Maternal Grandfather      Heart Disease Maternal Grandfather         MI     Diabetes Maternal Grandmother      Diabetes Paternal Grandmother      Diabetes Paternal Grandfather        Social History:  Marital Status:   [5]  Social History     Tobacco Use     Smoking status: Current Every Day Smoker     Packs/day: 1.00     Years: 53.00     Pack years: 53.00     Types: Cigarettes     Start date: 1965     Smokeless tobacco: Never Used     Tobacco comment: pt enrolled in quit plan 19   Substance Use Topics     Alcohol use: Yes     Alcohol/week: 0.0 oz     Comment: rarely     Drug use: No        Medications:      ALPRAZolam (XANAX) 1 MG tablet   amoxicillin-clavulanate (AUGMENTIN) 875-125 MG tablet   aspirin EC 81 MG EC tablet   atorvastatin (LIPITOR) 40 MG tablet   benazepril (LOTENSIN) 10 MG tablet   benzonatate (TESSALON) 200 MG capsule   budesonide-formoterol (SYMBICORT) 160-4.5 MCG/ACT inhaler   butalbital-acetaminophen-caffeine (FIORICET/ESGIC) -40 MG tablet   cimetidine (TAGAMET) 800 MG tablet   doxepin (SINEQUAN) 10 MG capsule   escitalopram (LEXAPRO) 20 MG tablet   hydrochlorothiazide (HYDRODIURIL) 25 MG tablet   ipratropium -  albuterol 0.5 mg/2.5 mg/3 mL (DUONEB) 0.5-2.5 (3) MG/3ML neb solution   meloxicam (MOBIC) 15 MG tablet   montelukast (SINGULAIR) 10 MG tablet   nystatin (MYCOSTATIN) 017207 UNIT/ML suspension   order for DME   order for DME   order for DME   potassium chloride ER (K-DUR/KLOR-CON M) 10 MEQ CR tablet   predniSONE (DELTASONE) 20 MG tablet   temazepam (RESTORIL) 30 MG capsule   VENTOLIN  (90 Base) MCG/ACT inhaler   VOLTAREN 1 % topical gel   EPIPEN 2-JAIRO 0.3 MG/0.3ML injection   fluconazole (DIFLUCAN) 150 MG tablet   nicotine (NICODERM CQ) 14 MG/24HR 24 hr patch   nicotine (NICODERM CQ) 21 MG/24HR 24 hr patch   nicotine (NICODERM CQ) 7 MG/24HR 24 hr patch   nicotine polacrilex (EQL NICOTINE) 4 MG lozenge   order for DME   order for DME   oxyCODONE-acetaminophen (PERCOCET) 5-325 MG tablet         Review of Systems   Constitutional: Negative for fever.   HENT: Negative for congestion.    Eyes: Negative for redness.   Respiratory: Positive for cough and wheezing. Negative for shortness of breath.    Cardiovascular: Negative for chest pain.   Gastrointestinal: Negative for abdominal pain.   Genitourinary: Negative for difficulty urinating.   Musculoskeletal: Negative for arthralgias and neck stiffness.   Skin: Negative for color change.   Neurological: Negative for headaches.   Psychiatric/Behavioral: Negative for confusion.   All other systems reviewed and are negative.      Physical Exam   BP: 158/93  Pulse: 82  Temp: 97.9  F (36.6  C)  Resp: 20  SpO2: 94 %      Physical Exam   Constitutional: She is oriented to person, place, and time. She appears well-developed and well-nourished.   HENT:   Head: Normocephalic and atraumatic.   Mouth/Throat: No oropharyngeal exudate.   Eyes: Pupils are equal, round, and reactive to light. Conjunctivae are normal.   Neck: Normal range of motion. Neck supple. No JVD present. No tracheal deviation present. No thyromegaly present.   Cardiovascular: Normal rate, regular rhythm, normal  heart sounds and intact distal pulses. Exam reveals no gallop and no friction rub.   No murmur heard.  Pulmonary/Chest: Effort normal. No stridor. No respiratory distress. She has wheezes. She has no rales. She exhibits no tenderness.   Abdominal: Soft. Bowel sounds are normal. She exhibits no distension and no mass. There is no tenderness. There is no rebound and no guarding.   Musculoskeletal: Normal range of motion. She exhibits no edema or tenderness.   Lymphadenopathy:     She has no cervical adenopathy.   Neurological: She is alert and oriented to person, place, and time.   Skin: Skin is warm and dry. No rash noted. No erythema. No pallor.   Psychiatric: Her behavior is normal.   Nursing note and vitals reviewed.      ED Course        Procedures             No results found for this or any previous visit (from the past 24 hour(s)).    Medications   predniSONE (DELTASONE) tablet 40 mg (has no administration in time range)   amoxicillin-clavulanate (AUGMENTIN) 875-125 MG per tablet 1 tablet (has no administration in time range)   ipratropium - albuterol 0.5 mg/2.5 mg/3 mL (DUONEB) neb solution 3 mL (3 mLs Nebulization Given 5/28/19 0601)       Assessments & Plan (with Medical Decision Making)   COPD flare  CXR: suspeicous increased opacity in LLQ ,no obvious infiltration  As pt is not febrile, walks without feeling SOB, spo2 94 on RA , can be managed outpatient  As per patient only Augmentin works for her  Augmentin  + taper prednsione started   Follow-up with PCP( she also has appointment by PCP today)  I have reviewed the nursing notes.    I have reviewed the findings, diagnosis, plan and need for follow up with the patient.         Medication List      Started    amoxicillin-clavulanate 875-125 MG tablet  Commonly known as:  AUGMENTIN  1 tablet, Oral, 2 TIMES DAILY     predniSONE 20 MG tablet  Commonly known as:  DELTASONE  2 tabs day 1-2, then 1 tab days 3-4, then 1/2 tab daily for 6 days            Final  diagnoses:   COPD exacerbation (H)       5/28/2019   [facility]     Marcelino Carlos MD  05/28/19 0619

## 2019-05-28 NOTE — ED AVS SNAPSHOT
[facility]  [address] [address]  Phone:  [phone]                                    Sydni Hardin   MRN: [id number]    Department:  [facility]   Date of Visit:  5/28/2019           After Visit Summary Signature Page    I have received my discharge instructions, and my questions have been answered. I have discussed any challenges I see with this plan with the nurse or doctor.    ..........................................................................................................................................  Patient/Patient Representative Signature      ..........................................................................................................................................  Patient Representative Print Name and Relationship to Patient    ..................................................               ................................................  Date                                   Time    ..........................................................................................................................................  Reviewed by Signature/Title    ...................................................              ..............................................  Date                                               Time          22EPIC Rev 08/18

## 2019-05-28 NOTE — ED NOTES
Pt to room 2 of the ED by self. Pt reports she has been having a productive cough since last Thursday. Pt has copd and is a pack a day smoker. Call light in reach.

## 2019-05-29 NOTE — PROGRESS NOTES
SUBJECTIVE:   Sydni Hardin is a 69 year old female who presents to clinic today for the following   health issues:      ED/UC Followup:    [place]  [facility] [place]  Reason for visit: COPD exacerbation  Current Status: has not been able to sleep since starting prednisone. Coughing so bad. Leaving to [place] possibly next week and concerned about getting out there and having an exacerbation. Feels within the last week she has been having TIA's again. Feel her right side of mouth twitches and hand writing has gotten bad. having pains in the back of the neck.         PROBLEMS TO ADD ON...    Additional history: bp high right now on admission.  Stress is crazy.  She can't sleep.  She was off her meds.  Back on now.  Needs meds extended for travel.  We talked a long time about the stress with moving her adult daughter back out to [place].      Reviewed  and updated as needed this visit by clinical staff         Reviewed and updated as needed this visit by Provider         Patient Active Problem List   Diagnosis     Benign essential hypertension     Calculus of gallbladder     Headache     Osteoarthrosis, unspecified whether generalized or localized, involving lower leg     Dysthymia     Lung nodules     Tobacco abuse     SCC (squamous cell carcinoma), face     IC (interstitial cystitis)     Primary osteoarthritis of both knees     S/P total knee replacement using cement, right     Asthma     Leukocytosis     Dehydration     Knee pain     Acute respiratory failure with hypoxia (H)     COPD exacerbation (H)     CKD (chronic kidney disease) stage 3, GFR 30-59 ml/min (H)     Obesity (BMI 35.0-39.9) with comorbidity (H)     Past Surgical History:   Procedure Laterality Date     ARTHROPLASTY KNEE Right 3/21/2017    Procedure: ARTHROPLASTY KNEE;  Surgeon: Moisés Olson MD;  Location: [facility]     ARTHROSCOPY KNEE       BIOPSY BREAST  2000    left - free of disease      SECTION        SECTION        CHOLECYSTECTOMY  2006     COLONOSCOPY  01-    repeat in 10 years     HEAD & NECK SURGERY  2014    hematoma on head drained     HYSTERECTOMY       IR CONSULTATION FOR IR EXAM  2/28/2019     ORTHOPEDIC SURGERY  02/04/2014    right shoulder replacement     shoulder x2  2009    left       Social History     Tobacco Use     Smoking status: Current Every Day Smoker     Packs/day: 1.00     Years: 53.00     Pack years: 53.00     Types: Cigarettes     Start date: 1/1/1965     Smokeless tobacco: Never Used     Tobacco comment: pt enrolled in quit plan 5/2/19   Substance Use Topics     Alcohol use: Yes     Alcohol/week: 0.0 oz     Comment: rarely     Family History   Problem Relation Age of Onset     Cancer Mother         skin     Breast Cancer Mother      Cerebrovascular Disease Mother      Alzheimer Disease Mother      Myocardial Infarction Father 76        myocardial infarction - cause of death     Cancer Daughter         cervical      Diabetes Maternal Grandfather      Heart Disease Maternal Grandfather         MI     Diabetes Maternal Grandmother      Diabetes Paternal Grandmother      Diabetes Paternal Grandfather          Current Outpatient Medications   Medication Sig Dispense Refill     ALPRAZolam (XANAX) 1 MG tablet TAKE 1 TABLET BY MOUTH 3 TIMES DAILY AS NEEDED FOR ANXIETY 90 tablet 0     amoxicillin-clavulanate (AUGMENTIN) 875-125 MG tablet Take 1 tablet by mouth 2 times daily for 10 days 20 tablet 0     amoxicillin-clavulanate (AUGMENTIN) 875-125 MG tablet Take 1 tablet by mouth 2 times daily for 6 days 12 tablet 0     aspirin EC 81 MG EC tablet Take 1 tablet (81 mg) by mouth 2 times daily (Patient taking differently: Take 81 mg by mouth daily )       atorvastatin (LIPITOR) 40 MG tablet TAKE 1 TABLET BY MOUTH DAILY 90 tablet 0     benazepril (LOTENSIN) 10 MG tablet TAKE 1 TABLET BY MOUTH DAILY 90 tablet 2     benzonatate (TESSALON) 200 MG capsule TAKE 1 CAPSULE BY MOUTH 3 TIMES A DAY AS NEEDED FOR  COUGH 60 capsule 3     budesonide-formoterol (SYMBICORT) 160-4.5 MCG/ACT inhaler Inhale 2 puffs into the lungs 2 times daily. As needed 1 Inhaler 11     butalbital-acetaminophen-caffeine (FIORICET/ESGIC) -40 MG tablet Take 1 tablet by mouth every 6 hours as needed for headaches 30 tablet 3     cimetidine (TAGAMET) 800 MG tablet Take 1 tablet (800 mg) by mouth At Bedtime As needed 90 tablet 3     doxepin (SINEQUAN) 10 MG capsule Take 1 capsule (10 mg) by mouth At Bedtime 90 capsule 3     EPIPEN 2-JAIRO 0.3 MG/0.3ML injection INJECT 1 SYRINGE INTRAMUSCULARLY ONCE AS NEEDED FOR ANAPHYLAXIS 2 each 0     escitalopram (LEXAPRO) 20 MG tablet TAKE 1 TABLET BY MOUTH 2 TIMES A  tablet 0     fluconazole (DIFLUCAN) 150 MG tablet Take 1 tablet (150 mg) by mouth every 3 days 4 tablet 0     hydrochlorothiazide (HYDRODIURIL) 25 MG tablet TAKE 1 TABLET BY MOUTH DAILY 90 tablet 3     ipratropium - albuterol 0.5 mg/2.5 mg/3 mL (DUONEB) 0.5-2.5 (3) MG/3ML neb solution Take 1 vial (3 mLs) by nebulization every 4 hours as needed for shortness of breath / dyspnea or wheezing 90 mL 3     meloxicam (MOBIC) 15 MG tablet TAKE 1 TABLET BY MOUTH DAILY 90 tablet 0     montelukast (SINGULAIR) 10 MG tablet TAKE 1 TABLET BY MOUTH DAILY 90 tablet 0     nicotine (NICODERM CQ) 14 MG/24HR 24 hr patch Place 1 patch onto the skin every 24 hours 30 patch 1     nicotine (NICODERM CQ) 21 MG/24HR 24 hr patch Place 1 patch onto the skin every 24 hours 30 patch 1     nicotine (NICODERM CQ) 7 MG/24HR 24 hr patch Place 1 patch onto the skin every 24 hours 30 patch 1     nicotine polacrilex (EQL NICOTINE) 4 MG lozenge Place 1 lozenge (4 mg) inside cheek as needed for smoking cessation 360 tablet 1     nystatin (MYCOSTATIN) 269923 UNIT/ML suspension TAKE 1 TEASPOON (5MLS) BY MOUTH 4 TIMES DAILY 473 mL 1     order for DME Discontinue O2 services 1 each 0     order for DME Equipment being ordered: Please install safety bar in bathroom to assist patient in  "getting up from toilet. 1 Units 0     order for DME Equipment being ordered: toilet seat riser 1 each 0     order for DME Equipment being ordered: Nebulizer supplies 2 each 11     order for DME Equipment being ordered: mcgee walker 1 each 0     oxyCODONE-acetaminophen (PERCOCET) 5-325 MG tablet TAKE 1 TO 2 TABLETS BY MOUTH EVERY 4 HOURS AS NEEDED FOR PAIN 60 tablet 0     potassium chloride ER (K-DUR/KLOR-CON M) 10 MEQ CR tablet TAKE 1 TABLET BY MOUTH 2 TIMES DAILY WITH FOOD 180 tablet 0     predniSONE (DELTASONE) 20 MG tablet Take 3 tabs by mouth daily x 3 days, then 2 tabs daily x 3 days, then 1 tab daily x 3 days, then 1/2 tab daily x 3 days. 20 tablet 0     predniSONE (DELTASONE) 20 MG tablet 2 tabs day 1-2, then 1 tab days 3-4, then 1/2 tab daily for 6 days 9 tablet 0     temazepam (RESTORIL) 30 MG capsule TAKE 1 CAPSULE BY MOUTH NIGHTLY AS NEEDED FOR SLEEP 90 capsule 3     VENTOLIN  (90 Base) MCG/ACT inhaler USE 1 PUFF FOUR TIMES A DAY AS NEEDED 18 g 1     VOLTAREN 1 % topical gel APPLY 4 GRAMS TO KNEES FOUR TIMES A DAY USING ENCLOSED DOSING CARD 100 g 0     Allergies   Allergen Reactions     Bee Pollen Other (See Comments)     Throat and eyes close up     Codeine Difficulty breathing     \"My throat closes shut.\"     Takes Percocet at home     Keflex [Cephalexin Hcl] Hives     Sulfonamide Derivatives Other (See Comments)     Throat closes     Ciprofloxacin Rash     Rash, bumps on lips     Bupropion Hcl Other (See Comments)     Made her very irritable     Celecoxib Other (See Comments) and Cough     Celebrex - wheeze  Takes ASA at home     Cheese      swiss     Clonazepam Hives     Takes Xanax at home     Erythromycin Other (See Comments)     Erythromycin base - abdominal pain     Lisinopril Other (See Comments)     Zestril - headaches     Nifedipine Hives     Procardia     Strawberries [Strawberry]      Tomato      Tramadol Hcl      Takes percocet at home       ROS:  Constitutional, HEENT, " cardiovascular, pulmonary, gi and gu systems are negative, except as otherwise noted.    OBJECTIVE:                                                    /84   Pulse 82   Temp 98.6  F (37  C) (Tympanic)   Wt 79.8 kg (176 lb)   SpO2 91%   BMI 36.78 kg/m    Body mass index is 36.78 kg/m .  GENERAL APPEARANCE: Alert, no acute distress  CV: regular rate and rhythm, no murmur, rub or gallop  RESP: lungs clear to auscultation bilaterally with bilateral wheeze.   ABDOMEN: normal bowel sounds, soft, nontender, no hepatosplenomegaly or other masses  SKIN: no suspicious lesions or rashes to visualized skin  NEURO: Alert, oriented x 3, speech and mentation normal      Reviewed ER visit.      ASSESSMENT/PLAN:                                                    1. Acute bronchitis, unspecified organism  Ongoing.  Extend the prednisone with a refill and the augmentin for 14 instead of 10.  Tessalon refilled as well.    - predniSONE (DELTASONE) 20 MG tablet; Take 3 tabs by mouth daily x 3 days, then 2 tabs daily x 3 days, then 1 tab daily x 3 days, then 1/2 tab daily x 3 days.  Dispense: 20 tablet; Refill: 0  - amoxicillin-clavulanate (AUGMENTIN) 875-125 MG tablet; Take 1 tablet by mouth 2 times daily for 10 days  Dispense: 20 tablet; Refill: 0  - benzonatate (TESSALON) 200 MG capsule; TAKE 1 CAPSULE BY MOUTH 3 TIMES A DAY AS NEEDED FOR COUGH  Dispense: 60 capsule; Refill: 3    2. Benign essential hypertension  Stable recheck.     3. Candidiasis of vulva and vagina  With abx.  Sent the diflucan to use if needed.    - fluconazole (DIFLUCAN) 150 MG tablet; Take 1 tablet (150 mg) by mouth every 3 days  Dispense: 4 tablet; Refill: 0    4. Acute reaction to stress  Discussed.  She's ok here.  I encouraged her to stay strong.      5. Elevated blood sugar  History of.  Update today.    - Basic metabolic panel  - Hemoglobin A1c      25 minutes spent with patient over 50%in counseling about her current situation with her daughter,  and then the copd and respiratory issue currently.     Gibran Alarcon MD  [facility]

## 2019-05-30 ENCOUNTER — OFFICE VISIT (OUTPATIENT)
Dept: FAMILY MEDICINE | Facility: OTHER | Age: 69
End: 2019-05-30
Attending: FAMILY MEDICINE
Payer: COMMERCIAL

## 2019-05-30 VITALS
SYSTOLIC BLOOD PRESSURE: 132 MMHG | WEIGHT: 176 LBS | TEMPERATURE: 98.6 F | HEART RATE: 82 BPM | OXYGEN SATURATION: 91 % | DIASTOLIC BLOOD PRESSURE: 84 MMHG | BODY MASS INDEX: 36.78 KG/M2

## 2019-05-30 DIAGNOSIS — B37.31 CANDIDIASIS OF VULVA AND VAGINA: ICD-10-CM

## 2019-05-30 DIAGNOSIS — R73.9 ELEVATED BLOOD SUGAR: ICD-10-CM

## 2019-05-30 DIAGNOSIS — I10 BENIGN ESSENTIAL HYPERTENSION: ICD-10-CM

## 2019-05-30 DIAGNOSIS — J20.9 ACUTE BRONCHITIS, UNSPECIFIED ORGANISM: Primary | ICD-10-CM

## 2019-05-30 DIAGNOSIS — F43.0 ACUTE REACTION TO STRESS: ICD-10-CM

## 2019-05-30 PROCEDURE — 99214 OFFICE O/P EST MOD 30 MIN: CPT | Performed by: FAMILY MEDICINE

## 2019-05-30 PROCEDURE — G0463 HOSPITAL OUTPT CLINIC VISIT: HCPCS

## 2019-05-30 PROCEDURE — 36415 COLL VENOUS BLD VENIPUNCTURE: CPT | Mod: ZL | Performed by: FAMILY MEDICINE

## 2019-05-30 PROCEDURE — 40000788 ZZHCL STATISTIC ESTIMATED AVERAGE GLUCOSE: Mod: ZL | Performed by: FAMILY MEDICINE

## 2019-05-30 PROCEDURE — 83036 HEMOGLOBIN GLYCOSYLATED A1C: CPT | Mod: ZL | Performed by: FAMILY MEDICINE

## 2019-05-30 PROCEDURE — 80048 BASIC METABOLIC PNL TOTAL CA: CPT | Mod: ZL | Performed by: FAMILY MEDICINE

## 2019-05-30 RX ORDER — BENZONATATE 200 MG/1
CAPSULE ORAL
Qty: 60 CAPSULE | Refills: 3 | Status: SHIPPED | OUTPATIENT
Start: 2019-05-30 | End: 2019-07-15

## 2019-05-30 RX ORDER — PREDNISONE 20 MG/1
TABLET ORAL
Qty: 20 TABLET | Refills: 0 | Status: SHIPPED | OUTPATIENT
Start: 2019-05-30 | End: 2019-07-15

## 2019-05-30 RX ORDER — FLUCONAZOLE 150 MG/1
150 TABLET ORAL
Qty: 4 TABLET | Refills: 0 | Status: SHIPPED | OUTPATIENT
Start: 2019-05-30 | End: 2019-07-15

## 2019-05-30 ASSESSMENT — PATIENT HEALTH QUESTIONNAIRE - PHQ9: SUM OF ALL RESPONSES TO PHQ QUESTIONS 1-9: 22

## 2019-05-30 ASSESSMENT — PAIN SCALES - GENERAL: PAINLEVEL: EXTREME PAIN (8)

## 2019-05-30 NOTE — NURSING NOTE
"Chief Complaint   Patient presents with     ER F/U       Initial /84   Pulse 82   Temp 98.6  F (37  C) (Tympanic)   Wt 79.8 kg (176 lb)   SpO2 91%   BMI 36.78 kg/m   Estimated body mass index is 36.78 kg/m  as calculated from the following:    Height as of 5/2/19: 1.473 m (4' 10\").    Weight as of this encounter: 79.8 kg (176 lb).  Medication Reconciliation: complete    Nicole Aguirre MA    "

## 2019-05-30 NOTE — LETTER
My COPD Action Plan     Name: Sydni Hardin    YOB: 1950   Date: 12/7/2018    My doctor: Gibran Alarcon MD   My clinic: [address] [address]  [phone]  My Controller Medicine: none   Dose:      My Rescue Medicine: Albuterol (Proair/Ventolin/Proventil) inhaler   Dose: 1 puff four times daily as needed     My Flare Up Medicine: Prednisone   Dose:      My COPD Severity:      Use of Oxygen: Oxygen Not Prescribed      Make sure you've had your pneumonia   vaccines.          GREEN ZONE       Doing well today      Usual level of activity and exercise    Usual amount of cough and mucus    No shortness of breath    Usual level of health (thinking clearly, sleeping well, feel like eating) Actions:      Take daily medicines    Use oxygen as prescribed    Follow regular exercise and diet plan    Avoid cigarette smoke and other irritants that harm the lungs           YELLOW ZONE          Having a bad day or flare up      Short of breath more than usual    A lot more sputum (mucus) than usual    Sputum looks yellow, green, tan, brown or bloody    More coughing or wheezing    Fever or chills    Less energy; trouble completing activities    Trouble thinking or focusing    Using quick relief inhaler or nebulizer more often    Poor sleep; symptoms wake me up    Do not feel like eating Actions:      Get plenty of rest    Take daily medicines    Use quick relief inhaler every 6 hours    If you use oxygen, call you doctor to see if you should adjust your oxygen    Do breathing exercises or other things to help you relax    Let a loved one, friend or neighbor know you are feeling worse    Call your care team if you have 2 or more symptoms.  Start taking steroids or antibiotics if directed by your care team           RED ZONE       Need medical care now      Severe shortness of breath (feel you can't breathe)    Fever, chills    Not enough breath to do any  activity    Trouble coughing up mucus, walking or talking    Blood in mucus    Frequent coughing   Rescue medicines are not working    Not able to sleep because of breathing    Feel confused or drowsy    Chest pain    Actions:      Call your health care team.  If you cannot reach your care team, call 911 or go to the emergency room.        Annual Reminders:  Meet with Care Team, Flu Shot every Fall  [facility] [place] [facility] [address]

## 2019-05-31 LAB
ANION GAP SERPL CALCULATED.3IONS-SCNC: 9 MMOL/L (ref 3–14)
BUN SERPL-MCNC: 18 MG/DL (ref 7–30)
CALCIUM SERPL-MCNC: 9.5 MG/DL (ref 8.5–10.1)
CHLORIDE SERPL-SCNC: 94 MMOL/L (ref 94–109)
CO2 SERPL-SCNC: 24 MMOL/L (ref 20–32)
CREAT SERPL-MCNC: 0.87 MG/DL (ref 0.52–1.04)
EST. AVERAGE GLUCOSE BLD GHB EST-MCNC: 120 MG/DL
GFR SERPL CREATININE-BSD FRML MDRD: 67 ML/MIN/{1.73_M2}
GLUCOSE SERPL-MCNC: 112 MG/DL (ref 70–99)
HBA1C MFR BLD: 5.8 % (ref 0–5.6)
POTASSIUM SERPL-SCNC: 4.2 MMOL/L (ref 3.4–5.3)
SODIUM SERPL-SCNC: 127 MMOL/L (ref 133–144)

## 2019-06-18 DIAGNOSIS — F41.9 ANXIETY: ICD-10-CM

## 2019-06-18 RX ORDER — ALPRAZOLAM 1 MG
TABLET ORAL
Qty: 90 TABLET | Refills: 0 | Status: SHIPPED | OUTPATIENT
Start: 2019-06-18 | End: 2019-07-23

## 2019-06-18 NOTE — TELEPHONE ENCOUNTER
ALPRAZolam (XANAX) 1 MG tablet      Last Written Prescription Date:  5-17-19  Last Fill Quantity: 90,   # refills: 0  Last Office Visit: 5-30-19  Future Office visit:    Next 5 appointments (look out 90 days)    Jul 10, 2019  1:30 PM CDT  (Arrive by 1:15 PM)  [facility] with Gibran Alarcon MD  [facility] [address] [facility] [facility] [address]  [phone]           Routing refill request to provider for review/approval because:  Drug not on the [facility], [facility] or  Health refill protocol or controlled substance

## 2019-07-10 ENCOUNTER — TELEPHONE (OUTPATIENT)
Dept: FAMILY MEDICINE | Facility: OTHER | Age: 69
End: 2019-07-10

## 2019-07-10 NOTE — TELEPHONE ENCOUNTER
12:09 PM    Reason for Call: OVERBOOK    Patient is having the following symptoms: follow up  for 1 months.    The patient is requesting an appointment for ASAP with . Had one for today and could not make it to that one     Was an appointment offered for this call? Yes  If yes : Appointment type              Date 08/12/19 can not wait this long     Preferred method for responding to this message: Telephone Call  What is your phone number ?[phone]    If we cannot reach you directly, may we leave a detailed response at the number you provided? Yes    Can this message wait until your PCP/provider returns, if unavailable today? Not applicable, [facility]     [facility]

## 2019-07-12 NOTE — PROGRESS NOTES
SUBJECTIVE:   Sydni Hardin is a 69 year old female who presents to clinic today for the following   health issues:    RESPIRATORY SYMPTOMS      Duration: follow up    Description  cough, fatigue/malaise and short of breath. Facial pain and pressure    Severity: moderate    Accompanying signs and symptoms: sleeping more often during the day. Feeling so tired.    History (predisposing factors):  none    Precipitating or alleviating factors: None    Therapies tried and outcome:  Nebs and singulair      swelling      Duration: a month    Description (location/character/radiation): both feet and ankles    Intensity:  moderate    Accompanying signs and symptoms: fatigue    History (similar episodes/previous evaluation): None    Precipitating or alleviating factors: None    Therapies tried and outcome: elevating at night.       Additional history: lengthy discussion today.  Feeling poorly overall.  Got swelling with the prednisone.  Improving but not resolved.  Needs a host of refills.  Breathing and cough better finally.  Still some sinus pressure.  See below.      Reviewed  and updated as needed this visit by clinical staff         Reviewed and updated as needed this visit by Provider         Patient Active Problem List   Diagnosis     Benign essential hypertension     Calculus of gallbladder     Headache     Osteoarthrosis, unspecified whether generalized or localized, involving lower leg     Dysthymia     Lung nodules     Tobacco abuse     SCC (squamous cell carcinoma), face     IC (interstitial cystitis)     Primary osteoarthritis of both knees     S/P total knee replacement using cement, right     Asthma     Leukocytosis     Dehydration     Knee pain     Acute respiratory failure with hypoxia (H)     COPD exacerbation (H)     CKD (chronic kidney disease) stage 3, GFR 30-59 ml/min (H)     Obesity (BMI 35.0-39.9) with comorbidity (H)     Esophageal reflux     Past Surgical History:   Procedure Laterality Date      ARTHROPLASTY KNEE Right 3/21/2017    Procedure: ARTHROPLASTY KNEE;  Surgeon: Moisés Olson MD;  Location: [facility]     ARTHROSCOPY KNEE       BIOPSY BREAST  2000    left - free of disease      SECTION        SECTION       CHOLECYSTECTOMY       COLONOSCOPY  2008    repeat in 10 years     HEAD & NECK SURGERY      hematoma on head drained     HYSTERECTOMY       IR CONSULTATION FOR IR EXAM  2019     ORTHOPEDIC SURGERY  2014    right shoulder replacement     shoulder x2  2009    left       Social History     Tobacco Use     Smoking status: Current Every Day Smoker     Packs/day: 1.00     Years: 53.00     Pack years: 53.00     Types: Cigarettes     Start date: 1965     Smokeless tobacco: Never Used     Tobacco comment: pt enrolled in quit plan 19   Substance Use Topics     Alcohol use: Yes     Alcohol/week: 0.0 oz     Comment: rarely     Family History   Problem Relation Age of Onset     Cancer Mother         skin     Breast Cancer Mother      Cerebrovascular Disease Mother      Alzheimer Disease Mother      Myocardial Infarction Father 76        myocardial infarction - cause of death     Cancer Daughter         cervical      Diabetes Maternal Grandfather      Heart Disease Maternal Grandfather         MI     Diabetes Maternal Grandmother      Diabetes Paternal Grandmother      Diabetes Paternal Grandfather          Current Outpatient Medications   Medication Sig Dispense Refill     ALPRAZolam (XANAX) 1 MG tablet TAKE 1 TABLET BY MOUTH 3 TIMES DAILY AS NEEDED FOR ANXIETY 90 tablet 0     aspirin EC 81 MG EC tablet Take 1 tablet (81 mg) by mouth 2 times daily (Patient taking differently: Take 81 mg by mouth daily )       atorvastatin (LIPITOR) 40 MG tablet TAKE 1 TABLET BY MOUTH DAILY 90 tablet 0     benazepril (LOTENSIN) 10 MG tablet TAKE 1 TABLET BY MOUTH DAILY 90 tablet 2     benzonatate (TESSALON) 200 MG capsule TAKE 1 CAPSULE BY MOUTH 3 TIMES A DAY AS NEEDED FOR  COUGH 60 capsule 3     budesonide-formoterol (SYMBICORT) 160-4.5 MCG/ACT inhaler Inhale 2 puffs into the lungs 2 times daily. As needed 1 Inhaler 11     butalbital-acetaminophen-caffeine (FIORICET/ESGIC) -40 MG tablet Take 1 tablet by mouth every 6 hours as needed for headaches 30 tablet 3     cimetidine (TAGAMET) 800 MG tablet Take 1 tablet (800 mg) by mouth At Bedtime As needed 90 tablet 3     doxepin (SINEQUAN) 10 MG capsule Take 1 capsule (10 mg) by mouth At Bedtime 90 capsule 3     EPIPEN 2-JAIRO 0.3 MG/0.3ML injection INJECT 1 SYRINGE INTRAMUSCULARLY ONCE AS NEEDED FOR ANAPHYLAXIS 2 each 0     escitalopram (LEXAPRO) 20 MG tablet TAKE 1 TABLET BY MOUTH 2 TIMES A  tablet 0     hydrochlorothiazide (HYDRODIURIL) 25 MG tablet TAKE 1 TABLET BY MOUTH DAILY 90 tablet 3     ipratropium - albuterol 0.5 mg/2.5 mg/3 mL (DUONEB) 0.5-2.5 (3) MG/3ML neb solution Take 1 vial (3 mLs) by nebulization every 4 hours as needed for shortness of breath / dyspnea or wheezing 90 mL 3     loratadine (CLARITIN) 10 MG tablet Take 1 tablet (10 mg) by mouth daily 30 tablet 1     meloxicam (MOBIC) 15 MG tablet TAKE 1 TABLET BY MOUTH DAILY 90 tablet 0     montelukast (SINGULAIR) 10 MG tablet TAKE 1 TABLET BY MOUTH DAILY 90 tablet 0     nicotine (NICODERM CQ) 14 MG/24HR 24 hr patch Place 1 patch onto the skin every 24 hours 30 patch 1     nicotine (NICODERM CQ) 21 MG/24HR 24 hr patch Place 1 patch onto the skin every 24 hours 30 patch 1     nicotine (NICODERM CQ) 7 MG/24HR 24 hr patch Place 1 patch onto the skin every 24 hours 30 patch 1     nicotine polacrilex (EQL NICOTINE) 4 MG lozenge Place 1 lozenge (4 mg) inside cheek as needed for smoking cessation 360 tablet 1     nystatin (MYCOSTATIN) 096685 UNIT/ML suspension TAKE 1 TEASPOON (5MLS) BY MOUTH 4 TIMES DAILY 473 mL 1     order for DME Discontinue O2 services 1 each 0     order for DME Equipment being ordered: Please install safety bar in bathroom to assist patient in getting  "up from toilet. 1 Units 0     order for DME Equipment being ordered: toilet seat riser 1 each 0     order for DME Equipment being ordered: Nebulizer supplies 2 each 11     order for DME Equipment being ordered: mcgee walker 1 each 0     oxyCODONE-acetaminophen (PERCOCET) 5-325 MG tablet TAKE 1 TO 2 TABLETS BY MOUTH EVERY 4 HOURS AS NEEDED FOR PAIN 60 tablet 0     potassium chloride ER (K-DUR/KLOR-CON M) 10 MEQ CR tablet TAKE 1 TABLET BY MOUTH 2 TIMES DAILY WITH FOOD 180 tablet 0     temazepam (RESTORIL) 30 MG capsule TAKE 1 CAPSULE BY MOUTH NIGHTLY AS NEEDED FOR SLEEP 90 capsule 3     triamcinolone (KENALOG) 0.1 % external cream Apply topically 2 times daily 30 g 1     VENTOLIN  (90 Base) MCG/ACT inhaler USE 1 PUFF FOUR TIMES A DAY AS NEEDED 18 g 1     VOLTAREN 1 % topical gel APPLY 4 GRAMS TO KNEES FOUR TIMES A DAY USING ENCLOSED DOSING CARD 100 g 0     Allergies   Allergen Reactions     Bee Pollen Other (See Comments)     Throat and eyes close up     Codeine Difficulty breathing     \"My throat closes shut.\"     Takes Percocet at home     Keflex [Cephalexin Hcl] Hives     Sulfonamide Derivatives Other (See Comments)     Throat closes     Ciprofloxacin Rash     Rash, bumps on lips     Bupropion Hcl Other (See Comments)     Made her very irritable     Celecoxib Other (See Comments) and Cough     Celebrex - wheeze  Takes ASA at home     Cheese      swiss     Clonazepam Hives     Takes Xanax at home     Erythromycin Other (See Comments)     Erythromycin base - abdominal pain     Lisinopril Other (See Comments)     Zestril - headaches     Nifedipine Hives     Procardia     Strawberries [Strawberry]      Tomato      Tramadol Hcl      Takes percocet at home       ROS:  Constitutional, HEENT, cardiovascular, pulmonary, gi and gu systems are negative, except as otherwise noted.    OBJECTIVE:                                                    /72   Temp 98  F (36.7  C) (Tympanic)   Wt 80.7 kg (178 lb)   BMI " 37.20 kg/m    Body mass index is 37.2 kg/m .  GENERAL APPEARANCE: Alert, no acute distress  CV: regular rate and rhythm, no murmur, rub or gallop  RESP: lungs clear to auscultation bilaterally with decreased breath sounds.   ABDOMEN: normal bowel sounds, soft, nontender, no hepatosplenomegaly or other masses  SKIN: no suspicious lesions or rashes to visualized skin.  Eczematous rash to the forehead.    NEURO: Alert, oriented x 3, speech and mentation normal  Ext:  1+ ankle edema.          ASSESSMENT/PLAN:                                                    1. Nonintractable episodic headache, unspecified headache type  Stable.  Refill done.   - butalbital-acetaminophen-caffeine (FIORICET/ESGIC) -40 MG tablet; Take 1 tablet by mouth every 6 hours as needed for headaches  Dispense: 30 tablet; Refill: 3    2. Gastroesophageal reflux disease without esophagitis  Stable.  Will have to change to ranitidine due to unavailable.    - cimetidine (TAGAMET) 800 MG tablet; Take 1 tablet (800 mg) by mouth At Bedtime As needed  Dispense: 90 tablet; Refill: 3    3. Primary osteoarthritis of left knee  Refill.   - VOLTAREN 1 % topical gel; APPLY 4 GRAMS TO KNEES FOUR TIMES A DAY USING ENCLOSED DOSING CARD  Dispense: 100 g; Refill: 0    4. Chronic pain of right knee  Refill.  - VOLTAREN 1 % topical gel; APPLY 4 GRAMS TO KNEES FOUR TIMES A DAY USING ENCLOSED DOSING CARD  Dispense: 100 g; Refill: 0    5. Primary osteoarthritis of right knee  Refill.     6. Acute bronchospasm  Discussed.  Continue with the duonebs.  Reassured wheeze improved.  Again advised tobacco cessation.   - ipratropium - albuterol 0.5 mg/2.5 mg/3 mL (DUONEB) 0.5-2.5 (3) MG/3ML neb solution; Take 1 vial (3 mLs) by nebulization every 4 hours as needed for shortness of breath / dyspnea or wheezing  Dispense: 90 mL; Refill: 3  - loratadine (CLARITIN) 10 MG tablet; Take 1 tablet (10 mg) by mouth daily  Dispense: 30 tablet; Refill: 1    7. SOB (shortness of  breath)  As above.   - ipratropium - albuterol 0.5 mg/2.5 mg/3 mL (DUONEB) 0.5-2.5 (3) MG/3ML neb solution; Take 1 vial (3 mLs) by nebulization every 4 hours as needed for shortness of breath / dyspnea or wheezing  Dispense: 90 mL; Refill: 3  - loratadine (CLARITIN) 10 MG tablet; Take 1 tablet (10 mg) by mouth daily  Dispense: 30 tablet; Refill: 1  - Basic metabolic panel  - CBC with platelets and differential    8. Rash and nonspecific skin eruption  With eczema features.  Carefully avoid eyes using weaker kenalog.    - triamcinolone (KENALOG) 0.1 % external cream; Apply topically 2 times daily  Dispense: 30 g; Refill: 1  - Basic metabolic panel  - CBC with platelets and differential    9. COPD exacerbation (H)  As above.     10. Acute bronchitis, unspecified organism  As above.    - benzonatate (TESSALON) 200 MG capsule; TAKE 1 CAPSULE BY MOUTH 3 TIMES A DAY AS NEEDED FOR COUGH  Dispense: 60 capsule; Refill: 3          Gibran Alarcon MD  [facility]

## 2019-07-15 ENCOUNTER — OFFICE VISIT (OUTPATIENT)
Dept: FAMILY MEDICINE | Facility: OTHER | Age: 69
End: 2019-07-15
Attending: FAMILY MEDICINE
Payer: COMMERCIAL

## 2019-07-15 VITALS
TEMPERATURE: 98 F | SYSTOLIC BLOOD PRESSURE: 106 MMHG | BODY MASS INDEX: 37.2 KG/M2 | DIASTOLIC BLOOD PRESSURE: 72 MMHG | WEIGHT: 178 LBS

## 2019-07-15 DIAGNOSIS — R51.9 NONINTRACTABLE EPISODIC HEADACHE, UNSPECIFIED HEADACHE TYPE: ICD-10-CM

## 2019-07-15 DIAGNOSIS — R06.02 SOB (SHORTNESS OF BREATH): ICD-10-CM

## 2019-07-15 DIAGNOSIS — J98.01 ACUTE BRONCHOSPASM: ICD-10-CM

## 2019-07-15 DIAGNOSIS — J20.9 ACUTE BRONCHITIS, UNSPECIFIED ORGANISM: ICD-10-CM

## 2019-07-15 DIAGNOSIS — J44.1 COPD EXACERBATION (H): ICD-10-CM

## 2019-07-15 DIAGNOSIS — M17.11 PRIMARY OSTEOARTHRITIS OF RIGHT KNEE: ICD-10-CM

## 2019-07-15 DIAGNOSIS — K21.9 GASTROESOPHAGEAL REFLUX DISEASE WITHOUT ESOPHAGITIS: ICD-10-CM

## 2019-07-15 DIAGNOSIS — M25.561 CHRONIC PAIN OF RIGHT KNEE: ICD-10-CM

## 2019-07-15 DIAGNOSIS — R21 RASH AND NONSPECIFIC SKIN ERUPTION: Primary | ICD-10-CM

## 2019-07-15 DIAGNOSIS — G89.29 CHRONIC PAIN OF RIGHT KNEE: ICD-10-CM

## 2019-07-15 DIAGNOSIS — M17.12 PRIMARY OSTEOARTHRITIS OF LEFT KNEE: ICD-10-CM

## 2019-07-15 LAB
BASOPHILS # BLD AUTO: 0 10E9/L (ref 0–0.2)
BASOPHILS NFR BLD AUTO: 0.4 %
DIFFERENTIAL METHOD BLD: NORMAL
EOSINOPHIL # BLD AUTO: 0.1 10E9/L (ref 0–0.7)
EOSINOPHIL NFR BLD AUTO: 1.2 %
ERYTHROCYTE [DISTWIDTH] IN BLOOD BY AUTOMATED COUNT: 13.7 % (ref 10–15)
HCT VFR BLD AUTO: 44.2 % (ref 35–47)
HGB BLD-MCNC: 15.5 G/DL (ref 11.7–15.7)
LYMPHOCYTES # BLD AUTO: 2.1 10E9/L (ref 0.8–5.3)
LYMPHOCYTES NFR BLD AUTO: 22 %
MCH RBC QN AUTO: 31.8 PG (ref 26.5–33)
MCHC RBC AUTO-ENTMCNC: 35.1 G/DL (ref 31.5–36.5)
MCV RBC AUTO: 91 FL (ref 78–100)
MONOCYTES # BLD AUTO: 0.7 10E9/L (ref 0–1.3)
MONOCYTES NFR BLD AUTO: 7.1 %
NEUTROPHILS # BLD AUTO: 6.6 10E9/L (ref 1.6–8.3)
NEUTROPHILS NFR BLD AUTO: 69.3 %
PLATELET # BLD AUTO: 281 10E9/L (ref 150–450)
RBC # BLD AUTO: 4.87 10E12/L (ref 3.8–5.2)
WBC # BLD AUTO: 9.5 10E9/L (ref 4–11)

## 2019-07-15 PROCEDURE — 85025 COMPLETE CBC W/AUTO DIFF WBC: CPT | Mod: ZL | Performed by: FAMILY MEDICINE

## 2019-07-15 PROCEDURE — 36415 COLL VENOUS BLD VENIPUNCTURE: CPT | Mod: ZL | Performed by: FAMILY MEDICINE

## 2019-07-15 PROCEDURE — 80048 BASIC METABOLIC PNL TOTAL CA: CPT | Mod: ZL | Performed by: FAMILY MEDICINE

## 2019-07-15 PROCEDURE — 99214 OFFICE O/P EST MOD 30 MIN: CPT | Performed by: FAMILY MEDICINE

## 2019-07-15 PROCEDURE — G0463 HOSPITAL OUTPT CLINIC VISIT: HCPCS

## 2019-07-15 RX ORDER — TRIAMCINOLONE ACETONIDE 1 MG/G
CREAM TOPICAL 2 TIMES DAILY
Qty: 30 G | Refills: 1 | Status: SHIPPED | OUTPATIENT
Start: 2019-07-15 | End: 2020-01-09

## 2019-07-15 RX ORDER — BENZONATATE 200 MG/1
CAPSULE ORAL
Qty: 60 CAPSULE | Refills: 3 | Status: SHIPPED | OUTPATIENT
Start: 2019-07-15 | End: 2020-01-09

## 2019-07-15 RX ORDER — LORATADINE 10 MG/1
10 TABLET ORAL DAILY
Qty: 30 TABLET | Refills: 1 | Status: SHIPPED | OUTPATIENT
Start: 2019-07-15 | End: 2020-01-09

## 2019-07-15 RX ORDER — BUTALBITAL, ACETAMINOPHEN AND CAFFEINE 50; 325; 40 MG/1; MG/1; MG/1
1 TABLET ORAL EVERY 6 HOURS PRN
Qty: 30 TABLET | Refills: 3 | Status: SHIPPED | OUTPATIENT
Start: 2019-07-15 | End: 2020-01-09

## 2019-07-15 RX ORDER — CIMETIDINE 800 MG
800 TABLET ORAL AT BEDTIME
Qty: 90 TABLET | Refills: 3 | Status: SHIPPED | OUTPATIENT
Start: 2019-07-15 | End: 2019-07-15 | Stop reason: ALTCHOICE

## 2019-07-15 RX ORDER — IPRATROPIUM BROMIDE AND ALBUTEROL SULFATE 2.5; .5 MG/3ML; MG/3ML
1 SOLUTION RESPIRATORY (INHALATION) EVERY 4 HOURS PRN
Qty: 90 ML | Refills: 3 | Status: ON HOLD | OUTPATIENT
Start: 2019-07-15 | End: 2019-09-22

## 2019-07-15 ASSESSMENT — PATIENT HEALTH QUESTIONNAIRE - PHQ9: SUM OF ALL RESPONSES TO PHQ QUESTIONS 1-9: 21

## 2019-07-15 ASSESSMENT — PAIN SCALES - GENERAL: PAINLEVEL: NO PAIN (0)

## 2019-07-16 LAB
ANION GAP SERPL CALCULATED.3IONS-SCNC: 7 MMOL/L (ref 3–14)
BUN SERPL-MCNC: 11 MG/DL (ref 7–30)
CALCIUM SERPL-MCNC: 9.3 MG/DL (ref 8.5–10.1)
CHLORIDE SERPL-SCNC: 96 MMOL/L (ref 94–109)
CO2 SERPL-SCNC: 28 MMOL/L (ref 20–32)
CREAT SERPL-MCNC: 0.96 MG/DL (ref 0.52–1.04)
GFR SERPL CREATININE-BSD FRML MDRD: 60 ML/MIN/{1.73_M2}
GLUCOSE SERPL-MCNC: 88 MG/DL (ref 70–99)
POTASSIUM SERPL-SCNC: 3.8 MMOL/L (ref 3.4–5.3)
SODIUM SERPL-SCNC: 131 MMOL/L (ref 133–144)

## 2019-07-23 DIAGNOSIS — I10 BENIGN ESSENTIAL HYPERTENSION: ICD-10-CM

## 2019-07-23 DIAGNOSIS — F41.9 ANXIETY: ICD-10-CM

## 2019-07-23 DIAGNOSIS — E78.2 MIXED HYPERLIPIDEMIA: Primary | ICD-10-CM

## 2019-07-23 RX ORDER — HYDROCHLOROTHIAZIDE 25 MG/1
TABLET ORAL
Qty: 90 TABLET | Refills: 0 | Status: SHIPPED | OUTPATIENT
Start: 2019-07-23 | End: 2019-11-26

## 2019-07-23 RX ORDER — MONTELUKAST SODIUM 10 MG/1
TABLET ORAL
Qty: 90 TABLET | Refills: 0 | Status: ON HOLD | OUTPATIENT
Start: 2019-07-23 | End: 2019-09-17

## 2019-07-23 RX ORDER — ALPRAZOLAM 1 MG
TABLET ORAL
Qty: 90 TABLET | Refills: 0 | Status: SHIPPED | OUTPATIENT
Start: 2019-07-23 | End: 2019-08-19

## 2019-07-23 NOTE — TELEPHONE ENCOUNTER
ALPRAZolam (XANAX) 1 MG tablet      Last Written Prescription Date:  6/18/19  Last Fill Quantity: 90,   # refills: 0  Last Office Visit: 7/15/19  Future Office visit:    Next 5 appointments (look out 90 days)    Aug 19, 2019  2:45 PM CDT  (Arrive by 2:30 PM)  SHORT with Gibran Alarcon MD  [facility] ([facility] ) [address] [facility] [facility] [address]  [phone]           Routing refill request to provider for review/approval because:  Drug not on the [facility], [facility] or  Health refill protocol or controlled substance

## 2019-08-07 ENCOUNTER — TELEPHONE (OUTPATIENT)
Dept: FAMILY MEDICINE | Facility: OTHER | Age: 69
End: 2019-08-07

## 2019-08-07 NOTE — TELEPHONE ENCOUNTER
"To:  Provider/nurse covering for Dr. Bolden.  I explained that doctor was out until Aug 13th - she would like to speak with a nurse today \"personal\".  Her phone is [phone].  Thank you  "

## 2019-08-07 NOTE — TELEPHONE ENCOUNTER
"Pt states she is still having swelling in legs.  Offered an appt with Yaneth Parkinson or Radha Escobar, pt states \"are they doctors or NP\"?  I explained their titles and pt refuses appt with either and states she will wait until appt with Dr Alarcon.  Explained that she should be seen sooner if it gets worse.  "

## 2019-08-08 NOTE — PROGRESS NOTES
SUBJECTIVE:   Sydni Hardin is a 69 year old female who presents to clinic today for the following   health issues:      Fatigue       Duration: follow up    Description (location/character/radiation): none    Intensity:  moderate    Accompanying signs and symptoms: feeling very tired and fatigued. Feeling worse now than before.    History (similar episodes/previous evaluation): ongoing    Precipitating or alleviating factors: None    Therapies tried and outcome: None       PROBLEMS TO ADD ON...    Additional history: lengthy review today.  She is tearful.  Had a fall 1 week ago.  Can barely walk or move left arm.  Worried about fracture.  Stress is unbearable.  Due for some refills too.  See below.  Cough now worsened.  See below.     Reviewed  and updated as needed this visit by clinical staff         Reviewed and updated as needed this visit by Provider         Patient Active Problem List   Diagnosis     Benign essential hypertension     Calculus of gallbladder     Headache     Osteoarthrosis, unspecified whether generalized or localized, involving lower leg     Dysthymia     Lung nodules     Tobacco abuse     SCC (squamous cell carcinoma), face     IC (interstitial cystitis)     Primary osteoarthritis of both knees     S/P total knee replacement using cement, right     Asthma     Leukocytosis     Dehydration     Knee pain     Acute respiratory failure with hypoxia (H)     COPD exacerbation (H)     CKD (chronic kidney disease) stage 3, GFR 30-59 ml/min (H)     Obesity (BMI 35.0-39.9) with comorbidity (H)     Esophageal reflux     Past Surgical History:   Procedure Laterality Date     ARTHROPLASTY KNEE Right 3/21/2017    Procedure: ARTHROPLASTY KNEE;  Surgeon: Moisés Olson MD;  Location: [facility]     ARTHROSCOPY KNEE       BIOPSY BREAST  2000    left - free of disease      SECTION        SECTION       CHOLECYSTECTOMY       COLONOSCOPY  2008    repeat in 10 years     HEAD & NECK  SURGERY  2014    hematoma on head drained     HYSTERECTOMY       IR CONSULTATION FOR IR EXAM  2/28/2019     ORTHOPEDIC SURGERY  02/04/2014    right shoulder replacement     shoulder x2  2009    left       Social History     Tobacco Use     Smoking status: Current Every Day Smoker     Packs/day: 1.00     Years: 53.00     Pack years: 53.00     Types: Cigarettes     Start date: 1/1/1965     Smokeless tobacco: Never Used     Tobacco comment: pt enrolled in quit plan 5/2/19   Substance Use Topics     Alcohol use: Yes     Alcohol/week: 0.0 oz     Comment: rarely     Family History   Problem Relation Age of Onset     Cancer Mother         skin     Breast Cancer Mother      Cerebrovascular Disease Mother      Alzheimer Disease Mother      Myocardial Infarction Father 76        myocardial infarction - cause of death     Cancer Daughter         cervical      Diabetes Maternal Grandfather      Heart Disease Maternal Grandfather         MI     Diabetes Maternal Grandmother      Diabetes Paternal Grandmother      Diabetes Paternal Grandfather          Current Outpatient Medications   Medication Sig Dispense Refill     ALPRAZolam (XANAX) 1 MG tablet Take 1 tablet by mouth daily as needed for anxiety 90 tablet 0     aspirin EC 81 MG EC tablet Take 1 tablet (81 mg) by mouth 2 times daily (Patient taking differently: Take 81 mg by mouth daily )       atorvastatin (LIPITOR) 40 MG tablet Take 1 tablet (40 mg) by mouth daily 90 tablet 3     benazepril (LOTENSIN) 10 MG tablet TAKE 1 TABLET BY MOUTH DAILY 90 tablet 2     benzonatate (TESSALON) 200 MG capsule TAKE 1 CAPSULE BY MOUTH 3 TIMES A DAY AS NEEDED FOR COUGH 60 capsule 3     budesonide-formoterol (SYMBICORT) 160-4.5 MCG/ACT inhaler Inhale 2 puffs into the lungs 2 times daily. As needed 1 Inhaler 11     butalbital-acetaminophen-caffeine (FIORICET/ESGIC) -40 MG tablet Take 1 tablet by mouth every 6 hours as needed for headaches 30 tablet 3     doxepin (SINEQUAN) 10 MG capsule  Take 1 capsule (10 mg) by mouth At Bedtime 90 capsule 3     EPIPEN 2-JAIRO 0.3 MG/0.3ML injection INJECT 1 SYRINGE INTRAMUSCULARLY ONCE AS NEEDED FOR ANAPHYLAXIS 2 each 0     escitalopram (LEXAPRO) 20 MG tablet TAKE 1 TABLET BY MOUTH 2 TIMES A  tablet 0     hydrochlorothiazide (HYDRODIURIL) 25 MG tablet TAKE 1 TABLET BY MOUTH DAILY 90 tablet 0     ipratropium - albuterol 0.5 mg/2.5 mg/3 mL (DUONEB) 0.5-2.5 (3) MG/3ML neb solution Take 1 vial (3 mLs) by nebulization every 4 hours as needed for shortness of breath / dyspnea or wheezing 90 mL 3     loratadine (CLARITIN) 10 MG tablet Take 1 tablet (10 mg) by mouth daily 30 tablet 1     meloxicam (MOBIC) 15 MG tablet Take 1 tablet (15 mg) by mouth daily 90 tablet 3     montelukast (SINGULAIR) 10 MG tablet TAKE 1 TABLET BY MOUTH DAILY 90 tablet 0     montelukast (SINGULAIR) 10 MG tablet TAKE 1 TABLET BY MOUTH DAILY 90 tablet 0     nystatin (MYCOSTATIN) 511915 UNIT/ML suspension TAKE 1 TEASPOON (5MLS) BY MOUTH 4 TIMES DAILY 473 mL 1     order for DME Discontinue O2 services 1 each 0     order for DME Equipment being ordered: Please install safety bar in bathroom to assist patient in getting up from toilet. 1 Units 0     order for DME Equipment being ordered: toilet seat riser 1 each 0     order for DME Equipment being ordered: Nebulizer supplies 2 each 11     order for DME Equipment being ordered: mcgee walker 1 each 0     oxyCODONE-acetaminophen (PERCOCET) 5-325 MG tablet TAKE 1 TO 2 TABLETS BY MOUTH EVERY 4 HOURS AS NEEDED FOR PAIN 60 tablet 0     potassium chloride ER (K-DUR/KLOR-CON M) 10 MEQ CR tablet TAKE 1 TABLET BY MOUTH 2 TIMES DAILY WITH FOOD 180 tablet 0     predniSONE (DELTASONE) 20 MG tablet Take 1 tablet (20 mg) by mouth 2 times daily for 5 days 10 tablet 0     ranitidine (ZANTAC) 150 MG capsule Take 1 capsule (150 mg) by mouth At Bedtime 90 capsule 3     temazepam (RESTORIL) 30 MG capsule TAKE 1 CAPSULE BY MOUTH NIGHTLY AS NEEDED FOR SLEEP 90 capsule  "3     triamcinolone (KENALOG) 0.1 % external cream Apply topically 2 times daily 30 g 1     VENTOLIN  (90 Base) MCG/ACT inhaler USE 1 PUFF FOUR TIMES A DAY AS NEEDED 18 g 1     VOLTAREN 1 % topical gel APPLY 4 GRAMS TO KNEES FOUR TIMES A DAY USING ENCLOSED DOSING CARD 100 g 0     nicotine (NICODERM CQ) 14 MG/24HR 24 hr patch Place 1 patch onto the skin every 24 hours (Patient not taking: Reported on 8/19/2019) 30 patch 1     nicotine (NICODERM CQ) 21 MG/24HR 24 hr patch Place 1 patch onto the skin every 24 hours (Patient not taking: Reported on 8/19/2019) 30 patch 1     nicotine (NICODERM CQ) 7 MG/24HR 24 hr patch Place 1 patch onto the skin every 24 hours (Patient not taking: Reported on 8/19/2019) 30 patch 1     Allergies   Allergen Reactions     Bee Pollen Other (See Comments)     Throat and eyes close up     Codeine Difficulty breathing     \"My throat closes shut.\"     Takes Percocet at home     Keflex [Cephalexin Hcl] Hives     Sulfonamide Derivatives Other (See Comments)     Throat closes     Ciprofloxacin Rash     Rash, bumps on lips     Bupropion Hcl Other (See Comments)     Made her very irritable     Celecoxib Other (See Comments) and Cough     Celebrex - wheeze  Takes ASA at home     Cheese      swiss     Clonazepam Hives     Takes Xanax at home     Erythromycin Other (See Comments)     Erythromycin base - abdominal pain     Lisinopril Other (See Comments)     Zestril - headaches     Nifedipine Hives     Procardia     Strawberries [Strawberry]      Tomato      Tramadol Hcl      Takes percocet at home       ROS:  Constitutional, HEENT, cardiovascular, pulmonary, gi and gu systems are negative, except as otherwise noted.    OBJECTIVE:                                                    BP 92/64   Pulse 74   Wt 81.6 kg (180 lb)   SpO2 93%   BMI 37.62 kg/m    Body mass index is 37.62 kg/m .  GENERAL APPEARANCE: Alert, no acute distress  CV: regular rate and rhythm, no murmur, rub or gallop  RESP: " lungs clear to auscultation bilaterally with bilateral exp wheeze.    ABDOMEN: normal bowel sounds, soft, nontender, no hepatosplenomegaly or other masses  SKIN: no suspicious lesions or rashes to visualized skin  NEURO: Alert, oriented x 3, speech and mentation normal   MSK:  Both knees diffusely tender.  Abduction left arm very painful throughout the shoulder region.     xrays no acute findings.  I reviewed them with her at some length.       ASSESSMENT/PLAN:                                                    1. Anxiety  Stable but up with troubles with kids.  Sent the xanax refill. She does not combine with the opiate.   - ALPRAZolam (XANAX) 1 MG tablet; Take 1 tablet by mouth daily as needed for anxiety  Dispense: 90 tablet; Refill: 0    2. Mixed hyperlipidemia  As above.  Stable.   - atorvastatin (LIPITOR) 40 MG tablet; Take 1 tablet (40 mg) by mouth daily  Dispense: 90 tablet; Refill: 3    3. Pain  Diffuse chronic pain as well.  Continue mobic.    - meloxicam (MOBIC) 15 MG tablet; Take 1 tablet (15 mg) by mouth daily  Dispense: 90 tablet; Refill: 3    4. Acute pain of left shoulder  No fx.  Reassured.  Follow clinically.   - XR Shoulder Left G/E 3 Views; Future  - predniSONE (DELTASONE) 20 MG tablet; Take 1 tablet (20 mg) by mouth 2 times daily for 5 days  Dispense: 10 tablet; Refill: 0    5. Acute pain of both knees  Reviewed.  She is relieved.  Doing the prednisone for the cough and copd and explained this would help all 3 inflamed areas from the fall.    - XR Knee Bilateral G/E 4 Views; Future  - predniSONE (DELTASONE) 20 MG tablet; Take 1 tablet (20 mg) by mouth 2 times daily for 5 days  Dispense: 10 tablet; Refill: 0    6. Other fatigue  Ongoing.  Likely stress induced.  No changes made otherwise.      7. Cough  Discussed.  May need to use abx if ongoing, which I would send augmentin for 14 days over the phone.  Otherwise just the steroid right now.  Recommend tobacco cessation.    - predniSONE  (DELTASONE) 20 MG tablet; Take 1 tablet (20 mg) by mouth 2 times daily for 5 days  Dispense: 10 tablet; Refill: 0          Gibran Alarcon MD  [facility]

## 2019-08-19 ENCOUNTER — ANCILLARY PROCEDURE (OUTPATIENT)
Dept: GENERAL RADIOLOGY | Facility: OTHER | Age: 69
End: 2019-08-19
Attending: FAMILY MEDICINE
Payer: MEDICARE

## 2019-08-19 ENCOUNTER — OFFICE VISIT (OUTPATIENT)
Dept: FAMILY MEDICINE | Facility: OTHER | Age: 69
End: 2019-08-19
Attending: FAMILY MEDICINE
Payer: MEDICARE

## 2019-08-19 VITALS
WEIGHT: 180 LBS | BODY MASS INDEX: 37.62 KG/M2 | HEART RATE: 74 BPM | SYSTOLIC BLOOD PRESSURE: 92 MMHG | DIASTOLIC BLOOD PRESSURE: 64 MMHG | OXYGEN SATURATION: 93 %

## 2019-08-19 DIAGNOSIS — M25.512 ACUTE PAIN OF LEFT SHOULDER: ICD-10-CM

## 2019-08-19 DIAGNOSIS — E78.2 MIXED HYPERLIPIDEMIA: ICD-10-CM

## 2019-08-19 DIAGNOSIS — R52 PAIN: ICD-10-CM

## 2019-08-19 DIAGNOSIS — M25.562 ACUTE PAIN OF BOTH KNEES: ICD-10-CM

## 2019-08-19 DIAGNOSIS — M25.561 ACUTE PAIN OF BOTH KNEES: ICD-10-CM

## 2019-08-19 DIAGNOSIS — R05.9 COUGH: ICD-10-CM

## 2019-08-19 DIAGNOSIS — R53.83 OTHER FATIGUE: ICD-10-CM

## 2019-08-19 DIAGNOSIS — M25.512 ACUTE PAIN OF LEFT SHOULDER: Primary | ICD-10-CM

## 2019-08-19 DIAGNOSIS — F41.9 ANXIETY: ICD-10-CM

## 2019-08-19 PROCEDURE — G0463 HOSPITAL OUTPT CLINIC VISIT: HCPCS

## 2019-08-19 PROCEDURE — 99214 OFFICE O/P EST MOD 30 MIN: CPT | Performed by: FAMILY MEDICINE

## 2019-08-19 PROCEDURE — 73564 X-RAY EXAM KNEE 4 OR MORE: CPT | Mod: TC,50,FY

## 2019-08-19 PROCEDURE — 73030 X-RAY EXAM OF SHOULDER: CPT | Mod: TC,LT,FY

## 2019-08-19 RX ORDER — ATORVASTATIN CALCIUM 40 MG/1
40 TABLET, FILM COATED ORAL DAILY
Qty: 90 TABLET | Refills: 3 | Status: SHIPPED | OUTPATIENT
Start: 2019-08-19 | End: 2020-01-09

## 2019-08-19 RX ORDER — ALPRAZOLAM 1 MG
TABLET ORAL
Qty: 90 TABLET | Refills: 0 | Status: SHIPPED | OUTPATIENT
Start: 2019-08-19 | End: 2019-08-23

## 2019-08-19 RX ORDER — PREDNISONE 20 MG/1
20 TABLET ORAL 2 TIMES DAILY
Qty: 10 TABLET | Refills: 0 | Status: ON HOLD | OUTPATIENT
Start: 2019-08-19 | End: 2019-09-22

## 2019-08-19 RX ORDER — MELOXICAM 15 MG/1
15 TABLET ORAL DAILY
Qty: 90 TABLET | Refills: 3 | Status: SHIPPED | OUTPATIENT
Start: 2019-08-19 | End: 2020-01-09

## 2019-08-19 ASSESSMENT — PATIENT HEALTH QUESTIONNAIRE - PHQ9: SUM OF ALL RESPONSES TO PHQ QUESTIONS 1-9: 20

## 2019-08-19 ASSESSMENT — PAIN SCALES - GENERAL: PAINLEVEL: EXTREME PAIN (8)

## 2019-08-19 NOTE — NURSING NOTE
"Chief Complaint   Patient presents with     Fatigue       Initial BP 92/64   Pulse 74   Wt 81.6 kg (180 lb)   SpO2 93%   BMI 37.62 kg/m   Estimated body mass index is 37.62 kg/m  as calculated from the following:    Height as of 5/2/19: 1.473 m (4' 10\").    Weight as of this encounter: 81.6 kg (180 lb).  Medication Reconciliation: complete     Nicole Aguirre      "

## 2019-08-23 ENCOUNTER — TELEPHONE (OUTPATIENT)
Dept: FAMILY MEDICINE | Facility: OTHER | Age: 69
End: 2019-08-23

## 2019-08-23 DIAGNOSIS — F41.9 ANXIETY: ICD-10-CM

## 2019-08-23 NOTE — TELEPHONE ENCOUNTER
Pt was getting xanax 1 mg TID, last rx was written for 1 mg once daily, no record of change in note.  Do you want to continue TID or daily?  TID rx pended.

## 2019-08-26 RX ORDER — ALPRAZOLAM 1 MG
1 TABLET ORAL 3 TIMES DAILY PRN
Qty: 90 TABLET | Refills: 0 | Status: SHIPPED | OUTPATIENT
Start: 2019-08-26 | End: 2019-10-10

## 2019-09-14 ENCOUNTER — HOSPITAL ENCOUNTER (EMERGENCY)
Facility: HOSPITAL | Age: 69
Discharge: HOME | End: 2019-09-14
Attending: FAMILY MEDICINE | Admitting: FAMILY MEDICINE
Payer: MEDICARE

## 2019-09-14 VITALS
SYSTOLIC BLOOD PRESSURE: 109 MMHG | TEMPERATURE: 98.9 F | OXYGEN SATURATION: 94 % | DIASTOLIC BLOOD PRESSURE: 63 MMHG | RESPIRATION RATE: 20 BRPM

## 2019-09-14 DIAGNOSIS — N39.46 MIXED STRESS AND URGE URINARY INCONTINENCE: ICD-10-CM

## 2019-09-14 DIAGNOSIS — B37.2 CANDIDIASIS, INTERTRIGO: ICD-10-CM

## 2019-09-14 PROCEDURE — 25000132 ZZH RX MED GY IP 250 OP 250 PS 637: Mod: GY | Performed by: FAMILY MEDICINE

## 2019-09-14 PROCEDURE — G0463 HOSPITAL OUTPT CLINIC VISIT: HCPCS

## 2019-09-14 PROCEDURE — 99213 OFFICE O/P EST LOW 20 MIN: CPT | Mod: Z6 | Performed by: FAMILY MEDICINE

## 2019-09-14 RX ORDER — FLUCONAZOLE 150 MG/1
TABLET ORAL
Qty: 2 TABLET | Refills: 0 | Status: ON HOLD | OUTPATIENT
Start: 2019-09-14 | End: 2019-09-22

## 2019-09-14 RX ORDER — OXYBUTYNIN CHLORIDE 5 MG/1
5 TABLET, EXTENDED RELEASE ORAL DAILY
Qty: 14 TABLET | Refills: 0 | Status: SHIPPED | OUTPATIENT
Start: 2019-09-14 | End: 2019-09-25

## 2019-09-14 RX ORDER — NYSTATIN 100000/ML
500000 SUSPENSION, ORAL (FINAL DOSE FORM) ORAL 4 TIMES DAILY
Qty: 473 ML | Refills: 1 | Status: SHIPPED | OUTPATIENT
Start: 2019-09-14 | End: 2020-01-09

## 2019-09-14 RX ORDER — FLUCONAZOLE 100 MG/1
100 TABLET ORAL DAILY
Status: DISCONTINUED | OUTPATIENT
Start: 2019-09-14 | End: 2019-09-14 | Stop reason: HOSPADM

## 2019-09-14 RX ADMIN — FLUCONAZOLE 100 MG: 100 TABLET ORAL at 15:52

## 2019-09-14 ASSESSMENT — ENCOUNTER SYMPTOMS
ACTIVITY CHANGE: 1
PSYCHIATRIC NEGATIVE: 1

## 2019-09-14 NOTE — ED PROVIDER NOTES
"  History     Chief Complaint   Patient presents with     Rash     rash in vickie area, also notes urinary incont     HPI  Sydni Hardin is a 69 year old female who presents the emergency room with a rash that is in her groin area, it has gotten so severe that it is now cracking and bleeding.  She has a strong odor of yeast, and it does indeed look like intertrigo.  This rash is complicated by the fact that she has urinary incontinence and has extra skin folds so the area is rarely dry.  At this point she is quite miserable.  She wears depends at times, that seems to make it worse so now she is wearing a large incontinent pad in her underwear which seems to be a little bit better.  It is still irritating to the perineal area.  She has a history of hysterectomy, she did not have ovaries removed at that time.    Allergies:  Allergies   Allergen Reactions     Bee Pollen Other (See Comments)     Throat and eyes close up     Codeine Difficulty breathing     \"My throat closes shut.\"     Takes Percocet at home     Keflex [Cephalexin Hcl] Hives     Sulfonamide Derivatives Other (See Comments)     Throat closes     Ciprofloxacin Rash     Rash, bumps on lips     Bupropion Hcl Other (See Comments)     Made her very irritable     Celecoxib Other (See Comments) and Cough     Celebrex - wheeze  Takes ASA at home     Cheese      swiss     Clonazepam Hives     Takes Xanax at home     Erythromycin Other (See Comments)     Erythromycin base - abdominal pain     Lisinopril Other (See Comments)     Zestril - headaches     Nifedipine Hives     Procardia     Strawberries [Strawberry]      Tomato      Tramadol Hcl      Takes percocet at home       Problem List:    Patient Active Problem List    Diagnosis Date Noted     Esophageal reflux 07/15/2019     Priority: Medium     Obesity (BMI 35.0-39.9) with comorbidity (H) 01/14/2019     Priority: Medium     Acute respiratory failure with hypoxia (H) 09/24/2018     Priority: Medium     COPD " exacerbation (H) 09/24/2018     Priority: Medium     Leukocytosis 03/25/2017     Priority: Medium     Dehydration 03/25/2017     Priority: Medium     Knee pain 03/25/2017     Priority: Medium     S/P total knee replacement using cement, right 03/21/2017     Priority: Medium     Primary osteoarthritis of both knees 10/05/2016     Priority: Medium     IC (interstitial cystitis) 10/28/2015     Priority: Medium     SCC (squamous cell carcinoma), face 08/05/2015     Priority: Medium     Lung nodules 06/19/2015     Priority: Medium     Tobacco abuse 06/19/2015     Priority: Medium     Dysthymia 12/31/2014     Priority: Medium     CKD (chronic kidney disease) stage 3, GFR 30-59 ml/min (H) 02/05/2014     Priority: Medium     Benign essential hypertension 01/05/2012     Priority: Medium     Problem list name updated by automated process. Provider to review       Asthma 03/20/2009     Priority: Medium     Calculus of gallbladder 02/02/2006     Priority: Medium     Problem list name updated by automated process. Provider to review       Headache 12/10/2003     Priority: Medium     Problem list name updated by automated process. Provider to review       Osteoarthrosis, unspecified whether generalized or localized, involving lower leg 10/04/2001     Priority: Medium     Problem list name updated by automated process. Provider to review          Past Medical History:    Past Medical History:   Diagnosis Date     Abnormal arterial blood gases 4/26/2006     Acute upper respiratory infections of unspecified site 7/2/2001     Anxiety 10/24/2011     Calculus of gallbladder without mention of cholecystitis or obstruction 2/2/2006     Depression 1/5/2012     Follow-up examination, following other surgery 2/28/2006     Headache(784.0) 12/10/2003     History of CVA (cerebrovascular accident) 9/28/2011     HTN (hypertension) 1/5/2012     Insomnia 3/13/2012     Lipoma of other skin and subcutaneous tissue 1/18/2006     Nonallopathic lesion  of cervical region, not elsewhere classified 2003     Osteoarthrosis, unspecified whether generalized or localized, lower leg 10/4/2001     Special screening for malignant neoplasms, colon 2006     Urinary tract infection, site not specified 2007       Past Surgical History:    Past Surgical History:   Procedure Laterality Date     ARTHROPLASTY KNEE Right 3/21/2017    Procedure: ARTHROPLASTY KNEE;  Surgeon: Moisés Olson MD;  Location: [facility]     ARTHROSCOPY KNEE       BIOPSY BREAST  2000    left - free of disease      SECTION        SECTION       CHOLECYSTECTOMY       COLONOSCOPY  2008    repeat in 10 years     HEAD & NECK SURGERY      hematoma on head drained     HYSTERECTOMY       IR CONSULTATION FOR IR EXAM  2019     ORTHOPEDIC SURGERY  2014    right shoulder replacement     shoulder x2  2009    left       Family History:    Family History   Problem Relation Age of Onset     Cancer Mother         skin     Breast Cancer Mother      Cerebrovascular Disease Mother      Alzheimer Disease Mother      Myocardial Infarction Father 76        myocardial infarction - cause of death     Cancer Daughter         cervical      Diabetes Maternal Grandfather      Heart Disease Maternal Grandfather         MI     Diabetes Maternal Grandmother      Diabetes Paternal Grandmother      Diabetes Paternal Grandfather        Social History:  Marital Status:   [5]  Social History     Tobacco Use     Smoking status: Current Every Day Smoker     Packs/day: 1.00     Years: 53.00     Pack years: 53.00     Types: Cigarettes     Start date: 1965     Smokeless tobacco: Never Used     Tobacco comment: pt enrolled in quit plan 19   Substance Use Topics     Alcohol use: Yes     Alcohol/week: 0.0 oz     Comment: rarely     Drug use: No        Medications:      fluconazole (DIFLUCAN) 150 MG tablet   nystatin (MYCOSTATIN) 535522 UNIT/ML suspension   oxybutynin ER  (DITROPAN-XL) 5 MG 24 hr tablet   ALPRAZolam (XANAX) 1 MG tablet   aspirin EC 81 MG EC tablet   atorvastatin (LIPITOR) 40 MG tablet   benazepril (LOTENSIN) 10 MG tablet   benzonatate (TESSALON) 200 MG capsule   budesonide-formoterol (SYMBICORT) 160-4.5 MCG/ACT inhaler   butalbital-acetaminophen-caffeine (FIORICET/ESGIC) -40 MG tablet   doxepin (SINEQUAN) 10 MG capsule   EPIPEN 2-JAIRO 0.3 MG/0.3ML injection   escitalopram (LEXAPRO) 20 MG tablet   hydrochlorothiazide (HYDRODIURIL) 25 MG tablet   ipratropium - albuterol 0.5 mg/2.5 mg/3 mL (DUONEB) 0.5-2.5 (3) MG/3ML neb solution   loratadine (CLARITIN) 10 MG tablet   meloxicam (MOBIC) 15 MG tablet   montelukast (SINGULAIR) 10 MG tablet   montelukast (SINGULAIR) 10 MG tablet   nicotine (NICODERM CQ) 14 MG/24HR 24 hr patch   nicotine (NICODERM CQ) 21 MG/24HR 24 hr patch   nicotine (NICODERM CQ) 7 MG/24HR 24 hr patch   order for DME   order for DME   order for DME   order for DME   order for DME   oxyCODONE-acetaminophen (PERCOCET) 5-325 MG tablet   potassium chloride ER (K-DUR/KLOR-CON M) 10 MEQ CR tablet   ranitidine (ZANTAC) 150 MG capsule   temazepam (RESTORIL) 30 MG capsule   triamcinolone (KENALOG) 0.1 % external cream   VENTOLIN  (90 Base) MCG/ACT inhaler   VOLTAREN 1 % topical gel         Review of Systems   Constitutional: Positive for activity change.   HENT: Negative.    Genitourinary: Positive for vaginal pain.   Skin: Positive for rash.        In leg folds and groin area, see HPI   Psychiatric/Behavioral: Negative.        Physical Exam   BP: 109/63  Heart Rate: 94  Temp: 98.9  F (37.2  C)  Resp: 20  SpO2: 94 %      Physical Exam   Constitutional: She is oriented to person, place, and time. She appears well-developed and well-nourished. No distress.   HENT:   Head: Normocephalic and atraumatic.   Neck: Normal range of motion. Neck supple.   Neurological: She is alert and oriented to person, place, and time.   Skin: Skin is warm. Rash noted.         Rashes moist, smells of yeast, is desquamating and has small open areas.   Psychiatric: She has a normal mood and affect. Cognition and memory are normal.   Nursing note and vitals reviewed.      ED Course        Procedures    No results found for this or any previous visit (from the past 24 hour(s)).    Medications   fluconazole (DIFLUCAN) tablet 100 mg (100 mg Oral Given 9/14/19 0892)       Assessments & Plan (with Medical Decision Making)   Patient has intertrigo, is also suffering from urinary incontinence.  Will attempt to give her Diflucan and see if that will aid in her healing of her intertrigo, however she was instructed that she needs to clean the area thoroughly every day with soap and water at least twice and dry it thoroughly using a hair dryer on low.  She can then apply the nystatin powder.  As for the urinary incontinence, will give her Ditropan XL as a trial, she sees Dr. Alarcon in 10 days and can discuss with him whether it is working and whether she wants to have it increased if it is not.  We did talk about the fact that after hysterectomy frequently the bladder suspension is not as good as it was in its difficult to empty the bladder properly.    I have reviewed the nursing notes.    I have reviewed the findings, diagnosis, plan and need for follow up with the patient.  New Prescriptions    FLUCONAZOLE (DIFLUCAN) 150 MG TABLET    Take one tablet 9/17, and one tablet 9/20    NYSTATIN (MYCOSTATIN) 820425 UNIT/ML SUSPENSION    Take 5 mLs (500,000 Units) by mouth 4 times daily Apply after washing and drying the area thoroughly.    OXYBUTYNIN ER (DITROPAN-XL) 5 MG 24 HR TABLET    Take 1 tablet (5 mg) by mouth daily       Final diagnoses:   Candidiasis, intertrigo   Mixed stress and urge urinary incontinence       9/14/2019   [facility]     Sharron Saavedra MD  09/14/19 2329

## 2019-09-14 NOTE — ED TRIAGE NOTES
"States rash to vickie area x1 month that has now been bleeding the last few days. States has urinary incontinence, and wears briefs, and states sometimes she puts extra pads or towels in her briefs to help absorption to \"not ruin her furniture\"  "

## 2019-09-14 NOTE — ED AVS SNAPSHOT
[facility]  [address] [address]  Phone:  [phone]                                    Sydni Hardin   MRN: [id number]    Department:  [facility]   Date of Visit:  9/14/2019           After Visit Summary Signature Page    I have received my discharge instructions, and my questions have been answered. I have discussed any challenges I see with this plan with the nurse or doctor.    ..........................................................................................................................................  Patient/Patient Representative Signature      ..........................................................................................................................................  Patient Representative Print Name and Relationship to Patient    ..................................................               ................................................  Date                                   Time    ..........................................................................................................................................  Reviewed by Signature/Title    ...................................................              ..............................................  Date                                               Time          22EPIC Rev 08/18

## 2019-09-16 ENCOUNTER — NURSE TRIAGE (OUTPATIENT)
Dept: FAMILY MEDICINE | Facility: OTHER | Age: 69
End: 2019-09-16

## 2019-09-16 ENCOUNTER — TELEPHONE (OUTPATIENT)
Dept: FAMILY MEDICINE | Facility: OTHER | Age: 69
End: 2019-09-16

## 2019-09-16 DIAGNOSIS — B37.0 THRUSH: ICD-10-CM

## 2019-09-16 DIAGNOSIS — J44.1 COPD EXACERBATION (H): ICD-10-CM

## 2019-09-16 DIAGNOSIS — R91.8 LUNG NODULES: Primary | ICD-10-CM

## 2019-09-16 RX ORDER — NYSTATIN 100000/ML
500000 SUSPENSION, ORAL (FINAL DOSE FORM) ORAL 4 TIMES DAILY
Qty: 60 ML | Refills: 1 | Status: ON HOLD | OUTPATIENT
Start: 2019-09-16 | End: 2019-11-19

## 2019-09-16 RX ORDER — PREDNISONE 20 MG/1
TABLET ORAL
Qty: 20 TABLET | Refills: 0 | Status: ON HOLD | OUTPATIENT
Start: 2019-09-16 | End: 2019-09-22

## 2019-09-16 NOTE — TELEPHONE ENCOUNTER
Patient calls today stating that she can not make her appointment today with another provider because she is so ill.  Patient into ER on Saturday due to yeast infection.   (started diflucan and nystatin)     Patient states COPD is exacerbated and she needs the following:  Stating this is the game plan when this happens    Augmentin   Prednisone- the higher dose   Something to swish and spit for the oral thrush.    Please advise

## 2019-09-16 NOTE — TELEPHONE ENCOUNTER
"    Reason for Disposition    [1] Continuous (nonstop) coughing interferes with work or school AND [2] no improvement using cough treatment per Care Advice    Additional Information    Negative: Severe difficulty breathing (e.g., struggling for each breath, speaks in single words)    Negative: Bluish (or gray) lips or face now    Negative: [1] Difficulty breathing AND [2] exposure to flames, smoke, or fumes    Negative: [1] Stridor AND [2] difficulty breathing    Negative: Sounds like a life-threatening emergency to the triager    Negative: [1] Previous asthma attacks AND [2] this feels like asthma attack    Negative: Dry (non-productive) cough (i.e., no sputum or minimal clear sputum)    Answer Assessment - Initial Assessment Questions  1. ONSET: \"When did the cough begin?\"       yesterday  2. SEVERITY: \"How bad is the cough today?\"       Coughing all the time  3. RESPIRATORY DISTRESS: \"Describe your breathing.\"       Nebulizing and that is not helping  4. FEVER: \"Do you have a fever?\" If so, ask: \"What is your temperature, how was it measured, and when did it start?\"      unknown  5. SPUTUM: \"Describe the color of your sputum\" (clear, white, yellow, green)      white  6. HEMOPTYSIS: \"Are you coughing up any blood?\" If so ask: \"How much?\" (flecks, streaks, tablespoons, etc.)      no  7. CARDIAC HISTORY: \"Do you have any history of heart disease?\" (e.g., heart attack, congestive heart failure)       no  8. LUNG HISTORY: \"Do you have any history of lung disease?\"  (e.g., pulmonary embolus, asthma, emphysema)      COPD  9. PE RISK FACTORS: \"Do you have a history of blood clots?\" (or: recent major surgery, recent prolonged travel, bedridden )      no  10. OTHER SYMPTOMS: \"Do you have any other symptoms?\" (e.g., runny nose, wheezing, chest pain)        Wheezing,   11. PREGNANCY: \"Is there any chance you are pregnant?\" \"When was your last menstrual period?\"          12. TRAVEL: \"Have you traveled out of the country in " "the last month?\" (e.g., travel history, exposures)    Protocols used: COUGH - ACUTE [id number]      "

## 2019-09-16 NOTE — ED NOTES
[facility] called with questions regarding Nystantin Rx that was sent in for pt. A liquid suspension was ordered however the pt was seen for a yeast rash on her groin. Discussed with Dr. Ortiz and order was given to modify Rx to to Nystantin powder, dispense 60Grams and continue ordered to apply QID after thoroughly washing and drying area. Pharmacy thankful for order clarification.

## 2019-09-17 ENCOUNTER — HOSPITAL ENCOUNTER (INPATIENT)
Facility: HOSPITAL | Age: 69
LOS: 5 days | Discharge: HOME | DRG: 190 | End: 2019-09-22
Attending: EMERGENCY MEDICINE | Admitting: HOSPITALIST
Payer: MEDICARE

## 2019-09-17 ENCOUNTER — APPOINTMENT (OUTPATIENT)
Dept: GENERAL RADIOLOGY | Facility: HOSPITAL | Age: 69
DRG: 190 | End: 2019-09-17
Attending: EMERGENCY MEDICINE
Payer: MEDICARE

## 2019-09-17 DIAGNOSIS — J43.9 PULMONARY EMPHYSEMA, UNSPECIFIED EMPHYSEMA TYPE (H): ICD-10-CM

## 2019-09-17 DIAGNOSIS — J44.1 COPD WITH ACUTE EXACERBATION (H): ICD-10-CM

## 2019-09-17 DIAGNOSIS — J98.01 ACUTE BRONCHOSPASM: ICD-10-CM

## 2019-09-17 DIAGNOSIS — Z72.0 TOBACCO ABUSE: ICD-10-CM

## 2019-09-17 DIAGNOSIS — J44.1 COPD EXACERBATION (H): ICD-10-CM

## 2019-09-17 DIAGNOSIS — R06.02 SOB (SHORTNESS OF BREATH): ICD-10-CM

## 2019-09-17 PROBLEM — J96.01 ACUTE HYPOXEMIC RESPIRATORY FAILURE (H): Status: ACTIVE | Noted: 2018-09-24

## 2019-09-17 PROBLEM — J96.00 ACUTE RESPIRATORY FAILURE (H): Status: ACTIVE | Noted: 2019-09-17

## 2019-09-17 LAB
ALBUMIN UR-MCNC: NEGATIVE MG/DL
ANION GAP SERPL CALCULATED.3IONS-SCNC: 7 MMOL/L (ref 3–14)
APPEARANCE UR: CLEAR
BACTERIA #/AREA URNS HPF: ABNORMAL /HPF
BASE EXCESS BLDA CALC-SCNC: 1.2 MMOL/L
BASOPHILS # BLD AUTO: 0.1 10E9/L (ref 0–0.2)
BASOPHILS NFR BLD AUTO: 0.6 %
BILIRUB UR QL STRIP: NEGATIVE
BUN SERPL-MCNC: 18 MG/DL (ref 7–30)
CALCIUM SERPL-MCNC: 9 MG/DL (ref 8.5–10.1)
CHLORIDE SERPL-SCNC: 102 MMOL/L (ref 94–109)
CO2 SERPL-SCNC: 30 MMOL/L (ref 20–32)
COLOR UR AUTO: ABNORMAL
CREAT SERPL-MCNC: 0.92 MG/DL (ref 0.52–1.04)
DIFFERENTIAL METHOD BLD: NORMAL
EOSINOPHIL # BLD AUTO: 0.3 10E9/L (ref 0–0.7)
EOSINOPHIL NFR BLD AUTO: 3.5 %
ERYTHROCYTE [DISTWIDTH] IN BLOOD BY AUTOMATED COUNT: 12.7 % (ref 10–15)
GFR SERPL CREATININE-BSD FRML MDRD: 63 ML/MIN/{1.73_M2}
GLUCOSE SERPL-MCNC: 122 MG/DL (ref 70–99)
GLUCOSE UR STRIP-MCNC: 300 MG/DL
HCO3 BLD-SCNC: 27 MMOL/L (ref 21–28)
HCT VFR BLD AUTO: 44.4 % (ref 35–47)
HGB BLD-MCNC: 14.9 G/DL (ref 11.7–15.7)
HGB UR QL STRIP: NEGATIVE
IMM GRANULOCYTES # BLD: 0 10E9/L (ref 0–0.4)
IMM GRANULOCYTES NFR BLD: 0.4 %
KETONES UR STRIP-MCNC: NEGATIVE MG/DL
LACTATE BLD-SCNC: 1.2 MMOL/L (ref 0.7–2)
LEUKOCYTE ESTERASE UR QL STRIP: NEGATIVE
LYMPHOCYTES # BLD AUTO: 2.3 10E9/L (ref 0.8–5.3)
LYMPHOCYTES NFR BLD AUTO: 28.4 %
MCH RBC QN AUTO: 30.3 PG (ref 26.5–33)
MCHC RBC AUTO-ENTMCNC: 33.6 G/DL (ref 31.5–36.5)
MCV RBC AUTO: 90 FL (ref 78–100)
MONOCYTES # BLD AUTO: 0.8 10E9/L (ref 0–1.3)
MONOCYTES NFR BLD AUTO: 9.5 %
MUCOUS THREADS #/AREA URNS LPF: PRESENT /LPF
NEUTROPHILS # BLD AUTO: 4.7 10E9/L (ref 1.6–8.3)
NEUTROPHILS NFR BLD AUTO: 57.6 %
NITRATE UR QL: NEGATIVE
NRBC # BLD AUTO: 0 10*3/UL
NRBC BLD AUTO-RTO: 0 /100
NT-PROBNP SERPL-MCNC: 109 PG/ML (ref 0–900)
O2/TOTAL GAS SETTING VFR VENT: 40 %
OXYHGB MFR BLD: 89 % (ref 92–100)
PCO2 BLD: 48 MM HG (ref 35–45)
PH BLD: 7.36 PH (ref 7.35–7.45)
PH UR STRIP: 5 PH (ref 4.7–8)
PLATELET # BLD AUTO: 268 10E9/L (ref 150–450)
PO2 BLD: 62 MM HG (ref 80–105)
POTASSIUM SERPL-SCNC: 3.1 MMOL/L (ref 3.4–5.3)
PROCALCITONIN SERPL-MCNC: <0.05 NG/ML
RBC # BLD AUTO: 4.92 10E12/L (ref 3.8–5.2)
RBC #/AREA URNS AUTO: 1 /HPF (ref 0–2)
SODIUM SERPL-SCNC: 139 MMOL/L (ref 133–144)
SOURCE: ABNORMAL
SP GR UR STRIP: 1.02 (ref 1–1.03)
SQUAMOUS #/AREA URNS AUTO: 3 /HPF (ref 0–1)
UROBILINOGEN UR STRIP-MCNC: NORMAL MG/DL (ref 0–2)
WBC # BLD AUTO: 8.2 10E9/L (ref 4–11)
WBC #/AREA URNS AUTO: 1 /HPF (ref 0–5)

## 2019-09-17 PROCEDURE — 25000132 ZZH RX MED GY IP 250 OP 250 PS 637: Performed by: EMERGENCY MEDICINE

## 2019-09-17 PROCEDURE — 85025 COMPLETE CBC W/AUTO DIFF WBC: CPT | Performed by: EMERGENCY MEDICINE

## 2019-09-17 PROCEDURE — 96374 THER/PROPH/DIAG INJ IV PUSH: CPT

## 2019-09-17 PROCEDURE — 93005 ELECTROCARDIOGRAM TRACING: CPT

## 2019-09-17 PROCEDURE — 99285 EMERGENCY DEPT VISIT HI MDM: CPT | Mod: 25

## 2019-09-17 PROCEDURE — 25000125 ZZHC RX 250: Performed by: FAMILY MEDICINE

## 2019-09-17 PROCEDURE — 99223 1ST HOSP IP/OBS HIGH 75: CPT | Mod: AI | Performed by: HOSPITALIST

## 2019-09-17 PROCEDURE — 82805 BLOOD GASES W/O2 SATURATION: CPT | Performed by: HOSPITALIST

## 2019-09-17 PROCEDURE — 25000132 ZZH RX MED GY IP 250 OP 250 PS 637: Performed by: HOSPITALIST

## 2019-09-17 PROCEDURE — 12000000 ZZH R&B MED SURG/OB

## 2019-09-17 PROCEDURE — 25000125 ZZHC RX 250: Performed by: EMERGENCY MEDICINE

## 2019-09-17 PROCEDURE — 40000786 ZZHCL STATISTIC ACTIVE MRSA SURVEILLANCE CULTURE: Performed by: HOSPITALIST

## 2019-09-17 PROCEDURE — 99285 EMERGENCY DEPT VISIT HI MDM: CPT | Mod: Z6 | Performed by: EMERGENCY MEDICINE

## 2019-09-17 PROCEDURE — 94640 AIRWAY INHALATION TREATMENT: CPT | Mod: 76

## 2019-09-17 PROCEDURE — 80048 BASIC METABOLIC PNL TOTAL CA: CPT | Performed by: EMERGENCY MEDICINE

## 2019-09-17 PROCEDURE — 83880 ASSAY OF NATRIURETIC PEPTIDE: CPT | Performed by: EMERGENCY MEDICINE

## 2019-09-17 PROCEDURE — 94640 AIRWAY INHALATION TREATMENT: CPT

## 2019-09-17 PROCEDURE — 25000128 H RX IP 250 OP 636: Performed by: HOSPITALIST

## 2019-09-17 PROCEDURE — 40000275 ZZH STATISTIC RCP TIME EA 10 MIN

## 2019-09-17 PROCEDURE — 25000125 ZZHC RX 250: Performed by: HOSPITALIST

## 2019-09-17 PROCEDURE — 83605 ASSAY OF LACTIC ACID: CPT | Performed by: HOSPITALIST

## 2019-09-17 PROCEDURE — 71045 X-RAY EXAM CHEST 1 VIEW: CPT | Mod: TC

## 2019-09-17 PROCEDURE — 36415 COLL VENOUS BLD VENIPUNCTURE: CPT | Performed by: EMERGENCY MEDICINE

## 2019-09-17 PROCEDURE — 36600 WITHDRAWAL OF ARTERIAL BLOOD: CPT

## 2019-09-17 PROCEDURE — 87040 BLOOD CULTURE FOR BACTERIA: CPT | Performed by: EMERGENCY MEDICINE

## 2019-09-17 PROCEDURE — 84145 PROCALCITONIN (PCT): CPT | Performed by: INTERNAL MEDICINE

## 2019-09-17 PROCEDURE — 81001 URINALYSIS AUTO W/SCOPE: CPT | Performed by: EMERGENCY MEDICINE

## 2019-09-17 PROCEDURE — 25000128 H RX IP 250 OP 636: Performed by: EMERGENCY MEDICINE

## 2019-09-17 RX ORDER — IPRATROPIUM BROMIDE AND ALBUTEROL SULFATE 2.5; .5 MG/3ML; MG/3ML
3 SOLUTION RESPIRATORY (INHALATION) ONCE
Status: COMPLETED | OUTPATIENT
Start: 2019-09-17 | End: 2019-09-17

## 2019-09-17 RX ORDER — ALPRAZOLAM 0.5 MG
1 TABLET ORAL 3 TIMES DAILY PRN
Status: DISCONTINUED | OUTPATIENT
Start: 2019-09-17 | End: 2019-09-22 | Stop reason: HOSPADM

## 2019-09-17 RX ORDER — DOXEPIN HYDROCHLORIDE 10 MG/1
10 CAPSULE ORAL AT BEDTIME
Status: DISCONTINUED | OUTPATIENT
Start: 2019-09-17 | End: 2019-09-22 | Stop reason: HOSPADM

## 2019-09-17 RX ORDER — METHYLPREDNISOLONE SODIUM SUCCINATE 125 MG/2ML
60 INJECTION, POWDER, LYOPHILIZED, FOR SOLUTION INTRAMUSCULAR; INTRAVENOUS EVERY 12 HOURS
Status: DISCONTINUED | OUTPATIENT
Start: 2019-09-17 | End: 2019-09-17

## 2019-09-17 RX ORDER — IPRATROPIUM BROMIDE AND ALBUTEROL SULFATE 2.5; .5 MG/3ML; MG/3ML
3 SOLUTION RESPIRATORY (INHALATION)
Status: DISCONTINUED | OUTPATIENT
Start: 2019-09-17 | End: 2019-09-22 | Stop reason: HOSPADM

## 2019-09-17 RX ORDER — ALBUTEROL SULFATE 0.83 MG/ML
3 SOLUTION RESPIRATORY (INHALATION)
Status: DISCONTINUED | OUTPATIENT
Start: 2019-09-17 | End: 2019-09-17

## 2019-09-17 RX ORDER — OXYBUTYNIN CHLORIDE 5 MG/1
5 TABLET, EXTENDED RELEASE ORAL DAILY
Status: DISCONTINUED | OUTPATIENT
Start: 2019-09-17 | End: 2019-09-22 | Stop reason: HOSPADM

## 2019-09-17 RX ORDER — NALOXONE HYDROCHLORIDE 0.4 MG/ML
.1-.4 INJECTION, SOLUTION INTRAMUSCULAR; INTRAVENOUS; SUBCUTANEOUS
Status: DISCONTINUED | OUTPATIENT
Start: 2019-09-17 | End: 2019-09-22 | Stop reason: HOSPADM

## 2019-09-17 RX ORDER — ASPIRIN 81 MG/1
81 TABLET ORAL DAILY
Status: DISCONTINUED | OUTPATIENT
Start: 2019-09-17 | End: 2019-09-22 | Stop reason: HOSPADM

## 2019-09-17 RX ORDER — TRIAMCINOLONE ACETONIDE 1 MG/G
CREAM TOPICAL 2 TIMES DAILY
Status: DISCONTINUED | OUTPATIENT
Start: 2019-09-17 | End: 2019-09-22 | Stop reason: HOSPADM

## 2019-09-17 RX ORDER — ALBUTEROL SULFATE 0.83 MG/ML
3 SOLUTION RESPIRATORY (INHALATION) EVERY 4 HOURS
Status: DISCONTINUED | OUTPATIENT
Start: 2019-09-17 | End: 2019-09-17

## 2019-09-17 RX ORDER — ONDANSETRON 4 MG/1
4 TABLET, ORALLY DISINTEGRATING ORAL EVERY 6 HOURS PRN
Status: DISCONTINUED | OUTPATIENT
Start: 2019-09-17 | End: 2019-09-22 | Stop reason: HOSPADM

## 2019-09-17 RX ORDER — LORATADINE 10 MG/1
10 TABLET ORAL DAILY
Status: DISCONTINUED | OUTPATIENT
Start: 2019-09-17 | End: 2019-09-22 | Stop reason: HOSPADM

## 2019-09-17 RX ORDER — HYDROCHLOROTHIAZIDE 25 MG/1
25 TABLET ORAL DAILY
Status: DISCONTINUED | OUTPATIENT
Start: 2019-09-17 | End: 2019-09-17

## 2019-09-17 RX ORDER — AZITHROMYCIN 250 MG/1
500 TABLET, FILM COATED ORAL ONCE
Status: COMPLETED | OUTPATIENT
Start: 2019-09-17 | End: 2019-09-17

## 2019-09-17 RX ORDER — ALBUTEROL SULFATE 0.83 MG/ML
3 SOLUTION RESPIRATORY (INHALATION)
Status: DISCONTINUED | OUTPATIENT
Start: 2019-09-17 | End: 2019-09-22 | Stop reason: HOSPADM

## 2019-09-17 RX ORDER — IPRATROPIUM BROMIDE AND ALBUTEROL SULFATE 2.5; .5 MG/3ML; MG/3ML
3 SOLUTION RESPIRATORY (INHALATION)
Status: DISCONTINUED | OUTPATIENT
Start: 2019-09-17 | End: 2019-09-17

## 2019-09-17 RX ORDER — AZITHROMYCIN 250 MG/1
250 TABLET, FILM COATED ORAL DAILY
Status: DISCONTINUED | OUTPATIENT
Start: 2019-09-17 | End: 2019-09-22

## 2019-09-17 RX ORDER — LIDOCAINE 40 MG/G
CREAM TOPICAL
Status: DISCONTINUED | OUTPATIENT
Start: 2019-09-17 | End: 2019-09-22 | Stop reason: HOSPADM

## 2019-09-17 RX ORDER — NYSTATIN 100000/ML
500000 SUSPENSION, ORAL (FINAL DOSE FORM) ORAL 4 TIMES DAILY
Status: DISCONTINUED | OUTPATIENT
Start: 2019-09-17 | End: 2019-09-22 | Stop reason: HOSPADM

## 2019-09-17 RX ORDER — OXYCODONE AND ACETAMINOPHEN 5; 325 MG/1; MG/1
1 TABLET ORAL EVERY 4 HOURS PRN
Status: DISCONTINUED | OUTPATIENT
Start: 2019-09-17 | End: 2019-09-17

## 2019-09-17 RX ORDER — ATORVASTATIN CALCIUM 40 MG/1
40 TABLET, FILM COATED ORAL DAILY
Status: DISCONTINUED | OUTPATIENT
Start: 2019-09-17 | End: 2019-09-22 | Stop reason: HOSPADM

## 2019-09-17 RX ORDER — ESCITALOPRAM OXALATE 10 MG/1
20 TABLET ORAL DAILY
Status: DISCONTINUED | OUTPATIENT
Start: 2019-09-17 | End: 2019-09-22 | Stop reason: HOSPADM

## 2019-09-17 RX ORDER — DEXAMETHASONE SODIUM PHOSPHATE 10 MG/ML
10 INJECTION INTRAMUSCULAR; INTRAVENOUS ONCE
Status: COMPLETED | OUTPATIENT
Start: 2019-09-17 | End: 2019-09-17

## 2019-09-17 RX ORDER — ONDANSETRON 2 MG/ML
4 INJECTION INTRAMUSCULAR; INTRAVENOUS EVERY 6 HOURS PRN
Status: DISCONTINUED | OUTPATIENT
Start: 2019-09-17 | End: 2019-09-22 | Stop reason: HOSPADM

## 2019-09-17 RX ORDER — MONTELUKAST SODIUM 10 MG/1
10 TABLET ORAL DAILY
Status: DISCONTINUED | OUTPATIENT
Start: 2019-09-17 | End: 2019-09-22 | Stop reason: HOSPADM

## 2019-09-17 RX ORDER — BENAZEPRIL HYDROCHLORIDE 10 MG/1
10 TABLET ORAL DAILY
Status: DISCONTINUED | OUTPATIENT
Start: 2019-09-17 | End: 2019-09-17

## 2019-09-17 RX ORDER — BUTALBITAL, ACETAMINOPHEN AND CAFFEINE 50; 325; 40 MG/1; MG/1; MG/1
1 TABLET ORAL EVERY 6 HOURS PRN
Status: DISCONTINUED | OUTPATIENT
Start: 2019-09-17 | End: 2019-09-22 | Stop reason: HOSPADM

## 2019-09-17 RX ORDER — NICOTINE 21 MG/24HR
1 PATCH, TRANSDERMAL 24 HOURS TRANSDERMAL DAILY
Status: DISCONTINUED | OUTPATIENT
Start: 2019-09-17 | End: 2019-09-22 | Stop reason: HOSPADM

## 2019-09-17 RX ORDER — GUAIFENESIN 600 MG/1
600 TABLET, EXTENDED RELEASE ORAL 2 TIMES DAILY
Status: DISCONTINUED | OUTPATIENT
Start: 2019-09-17 | End: 2019-09-22 | Stop reason: HOSPADM

## 2019-09-17 RX ORDER — OXYCODONE AND ACETAMINOPHEN 5; 325 MG/1; MG/1
1 TABLET ORAL EVERY 4 HOURS PRN
Status: DISCONTINUED | OUTPATIENT
Start: 2019-09-17 | End: 2019-09-22 | Stop reason: HOSPADM

## 2019-09-17 RX ORDER — TEMAZEPAM 7.5 MG/1
15 CAPSULE ORAL
Status: DISCONTINUED | OUTPATIENT
Start: 2019-09-17 | End: 2019-09-22 | Stop reason: HOSPADM

## 2019-09-17 RX ORDER — POLYETHYLENE GLYCOL 3350 17 G/17G
17 POWDER, FOR SOLUTION ORAL DAILY PRN
Status: DISCONTINUED | OUTPATIENT
Start: 2019-09-17 | End: 2019-09-22 | Stop reason: HOSPADM

## 2019-09-17 RX ORDER — POTASSIUM CHLORIDE 750 MG/1
10 CAPSULE, EXTENDED RELEASE ORAL 2 TIMES DAILY
Status: DISCONTINUED | OUTPATIENT
Start: 2019-09-17 | End: 2019-09-22 | Stop reason: HOSPADM

## 2019-09-17 RX ORDER — MELOXICAM 7.5 MG/1
15 TABLET ORAL DAILY
Status: DISCONTINUED | OUTPATIENT
Start: 2019-09-17 | End: 2019-09-17

## 2019-09-17 RX ORDER — METHYLPREDNISOLONE SODIUM SUCCINATE 125 MG/2ML
60 INJECTION, POWDER, LYOPHILIZED, FOR SOLUTION INTRAMUSCULAR; INTRAVENOUS EVERY 6 HOURS
Status: DISCONTINUED | OUTPATIENT
Start: 2019-09-17 | End: 2019-09-18

## 2019-09-17 RX ORDER — ALBUTEROL SULFATE 5 MG/ML
5 SOLUTION RESPIRATORY (INHALATION) ONCE
Status: COMPLETED | OUTPATIENT
Start: 2019-09-17 | End: 2019-09-17

## 2019-09-17 RX ORDER — BENZONATATE 100 MG/1
100 CAPSULE ORAL EVERY 4 HOURS PRN
Status: DISCONTINUED | OUTPATIENT
Start: 2019-09-17 | End: 2019-09-22 | Stop reason: HOSPADM

## 2019-09-17 RX ADMIN — DICLOFENAC 2 G: 10 GEL TOPICAL at 17:44

## 2019-09-17 RX ADMIN — LORATADINE 10 MG: 10 TABLET ORAL at 09:05

## 2019-09-17 RX ADMIN — MICONAZOLE NITRATE: 2 POWDER TOPICAL at 21:10

## 2019-09-17 RX ADMIN — POTASSIUM CHLORIDE 10 MEQ: 750 CAPSULE, EXTENDED RELEASE ORAL at 21:04

## 2019-09-17 RX ADMIN — IPRATROPIUM BROMIDE AND ALBUTEROL SULFATE 3 ML: .5; 3 SOLUTION RESPIRATORY (INHALATION) at 23:15

## 2019-09-17 RX ADMIN — OXYCODONE HYDROCHLORIDE AND ACETAMINOPHEN 1 TABLET: 5; 325 TABLET ORAL at 20:16

## 2019-09-17 RX ADMIN — IPRATROPIUM BROMIDE AND ALBUTEROL SULFATE 3 ML: .5; 3 SOLUTION RESPIRATORY (INHALATION) at 02:24

## 2019-09-17 RX ADMIN — RANITIDINE 150 MG: 150 TABLET ORAL at 21:47

## 2019-09-17 RX ADMIN — ALPRAZOLAM 1 MG: 0.5 TABLET ORAL at 09:05

## 2019-09-17 RX ADMIN — GUAIFENESIN 600 MG: 600 TABLET, EXTENDED RELEASE ORAL at 21:04

## 2019-09-17 RX ADMIN — METHYLPREDNISOLONE SODIUM SUCCINATE 62.5 MG: 125 INJECTION, POWDER, FOR SOLUTION INTRAMUSCULAR; INTRAVENOUS at 22:58

## 2019-09-17 RX ADMIN — IPRATROPIUM BROMIDE 0.5 MG: 0.5 SOLUTION RESPIRATORY (INHALATION) at 03:58

## 2019-09-17 RX ADMIN — IPRATROPIUM BROMIDE AND ALBUTEROL SULFATE 3 ML: .5; 3 SOLUTION RESPIRATORY (INHALATION) at 19:19

## 2019-09-17 RX ADMIN — METHYLPREDNISOLONE SODIUM SUCCINATE 62.5 MG: 125 INJECTION, POWDER, FOR SOLUTION INTRAMUSCULAR; INTRAVENOUS at 17:30

## 2019-09-17 RX ADMIN — IPRATROPIUM BROMIDE AND ALBUTEROL SULFATE 3 ML: .5; 3 SOLUTION RESPIRATORY (INHALATION) at 11:16

## 2019-09-17 RX ADMIN — NICOTINE 1 PATCH: 21 PATCH, EXTENDED RELEASE TRANSDERMAL at 05:37

## 2019-09-17 RX ADMIN — NYSTATIN 500000 UNITS: 100000 SUSPENSION ORAL at 21:12

## 2019-09-17 RX ADMIN — AZITHROMYCIN 250 MG: 250 TABLET, FILM COATED ORAL at 21:04

## 2019-09-17 RX ADMIN — ATORVASTATIN CALCIUM 40 MG: 40 TABLET, FILM COATED ORAL at 09:05

## 2019-09-17 RX ADMIN — OXYBUTYNIN CHLORIDE 5 MG: 5 TABLET, EXTENDED RELEASE ORAL at 09:05

## 2019-09-17 RX ADMIN — FLUTICASONE FUROATE AND VILANTEROL TRIFENATATE 1 PUFF: 200; 25 POWDER RESPIRATORY (INHALATION) at 08:04

## 2019-09-17 RX ADMIN — ALPRAZOLAM 1 MG: 0.5 TABLET ORAL at 21:51

## 2019-09-17 RX ADMIN — AZITHROMYCIN 500 MG: 250 TABLET, FILM COATED ORAL at 03:14

## 2019-09-17 RX ADMIN — GUAIFENESIN 600 MG: 600 TABLET, EXTENDED RELEASE ORAL at 05:36

## 2019-09-17 RX ADMIN — HYDROCHLOROTHIAZIDE 25 MG: 25 TABLET ORAL at 09:05

## 2019-09-17 RX ADMIN — ENOXAPARIN SODIUM 40 MG: 40 INJECTION SUBCUTANEOUS at 05:37

## 2019-09-17 RX ADMIN — METHYLPREDNISOLONE SODIUM SUCCINATE 62.5 MG: 125 INJECTION, POWDER, FOR SOLUTION INTRAMUSCULAR; INTRAVENOUS at 11:38

## 2019-09-17 RX ADMIN — MONTELUKAST 10 MG: 10 TABLET, FILM COATED ORAL at 09:05

## 2019-09-17 RX ADMIN — DOXEPIN HYDROCHLORIDE 10 MG: 10 CAPSULE ORAL at 21:47

## 2019-09-17 RX ADMIN — ALPRAZOLAM 1 MG: 0.5 TABLET ORAL at 17:49

## 2019-09-17 RX ADMIN — ALBUTEROL SULFATE 5 MG: 2.5 SOLUTION RESPIRATORY (INHALATION) at 04:00

## 2019-09-17 RX ADMIN — NYSTATIN 500000 UNITS: 100000 SUSPENSION ORAL at 09:04

## 2019-09-17 RX ADMIN — ASPIRIN 81 MG: 81 TABLET, COATED ORAL at 09:05

## 2019-09-17 RX ADMIN — DICLOFENAC 2 G: 10 GEL TOPICAL at 20:23

## 2019-09-17 RX ADMIN — TRIAMCINOLONE ACETONIDE: 1 CREAM TOPICAL at 09:15

## 2019-09-17 RX ADMIN — DEXAMETHASONE SODIUM PHOSPHATE 10 MG: 10 INJECTION INTRAMUSCULAR; INTRAVENOUS at 02:39

## 2019-09-17 RX ADMIN — TEMAZEPAM 15 MG: 7.5 CAPSULE ORAL at 22:58

## 2019-09-17 RX ADMIN — NYSTATIN 500000 UNITS: 100000 SUSPENSION ORAL at 17:41

## 2019-09-17 RX ADMIN — IPRATROPIUM BROMIDE AND ALBUTEROL SULFATE 3 ML: .5; 3 SOLUTION RESPIRATORY (INHALATION) at 07:55

## 2019-09-17 RX ADMIN — OXYCODONE HYDROCHLORIDE AND ACETAMINOPHEN 1 TABLET: 5; 325 TABLET ORAL at 05:36

## 2019-09-17 RX ADMIN — DICLOFENAC 2 G: 10 GEL TOPICAL at 13:51

## 2019-09-17 RX ADMIN — POTASSIUM CHLORIDE 10 MEQ: 750 CAPSULE, EXTENDED RELEASE ORAL at 09:05

## 2019-09-17 RX ADMIN — IPRATROPIUM BROMIDE AND ALBUTEROL SULFATE 3 ML: .5; 3 SOLUTION RESPIRATORY (INHALATION) at 15:44

## 2019-09-17 RX ADMIN — ESCITALOPRAM OXALATE 20 MG: 10 TABLET ORAL at 09:05

## 2019-09-17 RX ADMIN — DICLOFENAC 2 G: 10 GEL TOPICAL at 09:10

## 2019-09-17 ASSESSMENT — ACTIVITIES OF DAILY LIVING (ADL)
SWALLOWING: 0-->SWALLOWS FOODS/LIQUIDS WITHOUT DIFFICULTY
RETIRED_COMMUNICATION: 0-->UNDERSTANDS/COMMUNICATES WITHOUT DIFFICULTY
ADLS_ACUITY_SCORE: 24
COGNITION: 0 - NO COGNITION ISSUES REPORTED
ADLS_ACUITY_SCORE: 22
BATHING: 1-->ASSISTIVE EQUIPMENT
ADLS_ACUITY_SCORE: 23
TOILETING: 0-->INDEPENDENT
WHICH_OF_THE_ABOVE_FUNCTIONAL_RISKS_HAD_A_RECENT_ONSET_OR_CHANGE?: AMBULATION;TOILETING;FALL HISTORY
ADLS_ACUITY_SCORE: 24
AMBULATION: 1-->ASSISTIVE EQUIPMENT
DRESS: 0-->INDEPENDENT
TRANSFERRING: 1-->ASSISTIVE EQUIPMENT
FALL_HISTORY_WITHIN_LAST_SIX_MONTHS: YES
RETIRED_EATING: 0-->INDEPENDENT
NUMBER_OF_TIMES_PATIENT_HAS_FALLEN_WITHIN_LAST_SIX_MONTHS: 4

## 2019-09-17 ASSESSMENT — ENCOUNTER SYMPTOMS
FEVER: 0
COUGH: 1
SHORTNESS OF BREATH: 1

## 2019-09-17 ASSESSMENT — MIFFLIN-ST. JEOR: SCORE: 1235.75

## 2019-09-17 NOTE — PROGRESS NOTES
[facility]    Hospitalist Progress Note      Assessment & Plan   Sydni Hardin is a 69 year old female who was admitted on 9/17/2019.      1.  Acute hypoxemic respiratory failure: Patient long-standing history of fairly significant COPD with significant ongoing tobacco use.  She has had increasing cough congestion shortness of breath over the last several days.  Not on room air usually.  When came her O2 sats are 85% on room air.  Did arterial blood gas with pH of 7.36 PCO2 48 PO2 62 bicarb 27 that was on 40% FiO2 her sats are 89%.  She is less dyspneic at this time.  However still requires years of O2.  O2 sats are at 94%.  Continue aggressively treating her underlying COPD.  No need for any dilatory support.    2.  COPD exacerbation secondary to probable bronchitis: Patient long-standing history of COPD unfortunately continues to smoke upwards of 3 packs a day.  Is on bronchodilators at home.  Comes in with cough congestion increasing dyspnea.  As above fairly significant hypoxia.  Chest x-ray without any acute infiltrate.  She does have a lot of upper airway rhonchi more of this acute bronchitis than anything else.  Is getting IV steroids.  Also getting IV antibiotics azithromycin and Rocephin.  And aggressive bronchodilators.  Will even try a flutter valve here to help try and clear up some of this increased secretions.  Procalcitonin was actually negative.  No sputum culture obtained unfortunately.  We will try and get one.    3.  Tobacco dependence: 3 packs/day at this point.  Has tried in the past to quit but she truthfully will likely not quit per her report.    4.  Hypertension: Blood pressure stable currently 100 -120.  She is on Benzapril and hydrochlorothiazide.  Will hold both of those for now.    5.  Anxiety: She has some anxiety.  Is on a fair amount of other medications for this had to be cautious she is somewhat confused today.    6.  Confusion: Definitely is not her usual self.   PCO2 was up somewhat earlier but appears to be actually better from a pulmonary standpoint but likely will need to recheck this if she becomes more sedate.  Would like to cut back on the amounts of benzos and narcotics also.  Follow her closely.  There is no focal neurological issues however.      Diet: Combination Diet Regular Diet Adult  Fluids: lock    DVT Prophylaxis: Enoxaparin (Lovenox) SQ  Code Status: Full Code    Disposition: Expected discharge in 2-4 days once pulmonary status improved.    Arpit Herndon    Interval History   Patient is alert but somewhat confused this morning.  States breathing is better.  No chest pains at all no intercurrent nausea vomiting.  She does have go off on tangents no major fevers 99 max sats have been fine on 4 to 5 L.    -Data reviewed today: I reviewed all new labs and imaging results over the last 24 hours. I personally reviewed no images or EKG's today.    Physical Exam   Temp: 99.1  F (37.3  C) Temp src: Tympanic BP: 102/78(apically) Pulse: 107 Heart Rate: 108 Resp: 22 SpO2: (!) 91 % O2 Device: Nasal cannula Oxygen Delivery: 5 LPM  Vitals:    09/17/19 0505   Weight: 82.6 kg (182 lb 1.6 oz)     Vital Signs with Ranges  Temp:  [99.1  F (37.3  C)-99.6  F (37.6  C)] 99.1  F (37.3  C)  Pulse:  [107] 107  Heart Rate:  [] 108  Resp:  [20-22] 22  BP: (102-116)/(59-88) 102/78  SpO2:  [85 %-96 %] 91 %  I/O last 3 completed shifts:  In: 400 [P.O.:400]  Out: -     Peripheral IV 09/17/19 Right Upper forearm (Active)   Site Assessment WDL 9/17/2019  4:41 AM   Line Status Saline locked 9/17/2019  4:41 AM   Phlebitis Scale 0-->no symptoms 9/17/2019  4:41 AM   Infiltration Scale 0 9/17/2019  4:41 AM   Extravasation? No 9/17/2019  4:41 AM   Number of days: 0       Wound 10/16/13 Right Finger (Comment which one) (Active)   Number of days: 2162       Wound 10/16/13 Lip (Active)   Number of days: 2162       Wound 08/08/14 Right Arm Abrasion(s) (Active)   Number of days: 1866        Rash 09/17/19 0701 lower groin (Active)   Distribution regional 9/17/2019  4:45 AM   Configuration/Shape asymmetric 9/17/2019  4:45 AM   Borders diffuse;irregular 9/17/2019  4:45 AM   Characteristics dry;itching;pain/discomfort;edema 9/17/2019  4:45 AM   Color red;pink 9/17/2019  4:45 AM   Care, Rash open to air 9/17/2019  4:45 AM   Number of days: 0       Incision/Surgical Site 03/21/17 Right Knee (Active)   Number of days: 910     No line/device    Constitutional: Alert and oriented x3. No distress    HEENT: Normocephalic/atraumatic, PERRL, EOMI, mouth moist, neck supple, no LN.     Cardiovascular: RRR. no Murmur, no  rubs, or gallops.  JVD na.  Bruits no.  Pulses 2+    Respiratory: Lots of upper airway rhonchi and squeaks.  Decreased breath sounds throughout no obvious areas of consolidation however.      Abdomen: Soft, nontender, nondistended, no organomegaly. Bowel sounds present    Extremities: Warm/dry.  No edema    Neuro:   Non focal, cranial nerves intact, Moves all extremities.  Medications       albuterol  3 mL Nebulization Q4H     aspirin  81 mg Oral Daily     atorvastatin  40 mg Oral Daily     azithromycin  250 mg Oral Daily     benazepril  10 mg Oral Daily     diclofenac  2 g Topical 4x Daily     doxepin  10 mg Oral At Bedtime     enoxaparin  40 mg Subcutaneous Q24H     escitalopram  20 mg Oral Daily     fluticasone-vilanterol  1 puff Inhalation Daily     guaiFENesin  600 mg Oral BID     hydrochlorothiazide  25 mg Oral Daily     ipratropium - albuterol 0.5 mg/2.5 mg/3 mL  3 mL Nebulization Q4H While awake     loratadine  10 mg Oral Daily     meloxicam  15 mg Oral Daily     methylPREDNISolone  62.5 mg Intravenous Q6H     montelukast  10 mg Oral Daily     nicotine  1 patch Transdermal Daily     nicotine   Transdermal Q8H     [START ON 9/18/2019] nicotine   Transdermal Daily     nystatin  500,000 Units Oral 4x Daily     oxybutynin ER  5 mg Oral Daily     [START ON 9/18/2019] pneumococcal vaccine  0.5 mL  Intramuscular Prior to discharge     potassium chloride ER  10 mEq Oral BID     ranitidine  150 mg Oral At Bedtime     sodium chloride (PF)  3 mL Intracatheter Q8H     triamcinolone   Topical BID       Data   Recent Labs   Lab 09/17/19  0222   WBC 8.2   HGB 14.9   MCV 90         POTASSIUM 3.1*   CHLORIDE 102   CO2 30   BUN 18   CR 0.92   ANIONGAP 7   JOSÉ 9.0   *       Recent Results (from the past 24 hour(s))   XR Chest Port 1 View    Narrative    Procedure:XR CHEST PORT 1 VW    Clinical history:Female, 69 years, dyspnea    Technique: Single view was obtained.    Comparison: None    Findings: The cardiac silhouette is normal. The pulmonary vasculature  is normal.    The lungs are similar in appearance and again demonstrate mild diffuse  interstitial prominence. Bony structures are similar in appearance.  Right shoulder arthroplasty is not significantly changed.      Impression    Impression:   No acute abnormality. No evidence of acute or active disease.    MELITON MONTAÑO MD

## 2019-09-17 NOTE — H&P
[facility]    History and Physical  Hospitalist       Date of Admission:  9/17/2019    Assessment & Plan   Sydni Hardin is a 69 year old female with a long-standing history of ongoing tobacco abuse, hypertension and COPD presented to the ED with worsening shortness of breath.  Her issues are the following:    Acute hypoxemic respiratory failure: The patient presented with significant hypoxia.  She has been ill for about a month.  No significant infiltrates on the chest x-ray.  ABGs revealing pH of 7.36 PCO2 of 48 and PO2 of 62.  This is most probably due to COPD exacerbation.  BNP was 109, unremarkable.    COPD exacerbation: The patient will be treated with oxygen supplementation, azithromycin, IV Solu-Medrol and scheduled DuoNeb treatments.  She received a dose of Decadron 10 mg IV x1 in the ED.  I will give her Solu-Medrol 60 mg every 6 hours.  There are no obvious infiltrates on the chest x-ray.  We will also use guaifenesin 600 mg twice a day for expectoration.  She is currently on 4 L nasal cannula oxygen.  Will try to wean it down.    Tobacco dependence: According to the daughter she continues to smoke 3 packs every day.  She has made attempts to quit but not successfully.  Smoking cessation counseling was done.  She wants to quit.  I will start her on nicotine patch during inpatient stay.    Hypertension: Continue on hydrochlorothiazide and benazepril.    Anxiety: Continue on Xanax as needed.  She is also on doxepin for insomnia.  Also continue on Lexapro 20 mg daily.    Severe osteoarthritis: Continue meloxicam and Voltaren gel.    Hypokalemia: Replete and recheck.  The patient is on potassium supplementation at home.    DVT Prophylaxis: Enoxaparin (Lovenox) SQ  Code Status: Full Code    Disposition: Expected discharge in 2-3 days once stable.     Radha Bay    Primary Care Physician   Gibran Alarcon    Chief Complaint   Worsening SOB and Cough    History is obtained from the  patient    History of Present Illness   Sydni Hardin is a 69 year old female with a long-standing history of ongoing tobacco abuse, hypertension and COPD presented to the ED with worsening shortness of breath.  The patient never had pulmonary function tests done.  She tells me for about a month she has had issues with shortness of breath, cough and fatigue.  She called the clinic and was prescribed Augmentin and prednisone but had to come to the ED so never received any doses.  In the ED her oxygen saturation was down to 70.  She had to be placed on 4 L nasal cannula oxygen bring up the oxygen saturation to 94%.  Chest x-ray done in the ER was rather unremarkable.  She denies any chest pain, abdominal pain, nausea or vomiting.  She also denies any leg swelling.  She has not been sleeping well.  She continues to smoke 3 packs/day.  In the past she has tried to quit but failed.  She denies fevers at home but was found to have low-grade temp in the ED at 99.9.    Past Medical History    I have reviewed this patient's medical history and updated it with pertinent information if needed.   Past Medical History:   Diagnosis Date     Abnormal arterial blood gases 4/26/2006     Acute upper respiratory infections of unspecified site 7/2/2001     Anxiety 10/24/2011     Calculus of gallbladder without mention of cholecystitis or obstruction 2/2/2006     Depression 1/5/2012     Follow-up examination, following other surgery 2/28/2006     Headache(784.0) 12/10/2003     History of CVA (cerebrovascular accident) 9/28/2011     HTN (hypertension) 1/5/2012     Insomnia 3/13/2012     Lipoma of other skin and subcutaneous tissue 1/18/2006     Nonallopathic lesion of cervical region, not elsewhere classified 12/18/2003     Osteoarthrosis, unspecified whether generalized or localized, lower leg 10/4/2001     Special screening for malignant neoplasms, colon 12/7/2006     Urinary tract infection, site not specified 4/30/2007        Past Surgical History   I have reviewed this patient's surgical history and updated it with pertinent information if needed.  Past Surgical History:   Procedure Laterality Date     ARTHROPLASTY KNEE Right 3/21/2017    Procedure: ARTHROPLASTY KNEE;  Surgeon: Moisés Olson MD;  Location: [facility]     ARTHROSCOPY KNEE       BIOPSY BREAST  2000    left - free of disease      SECTION        SECTION       CHOLECYSTECTOMY       COLONOSCOPY  2008    repeat in 10 years     HEAD & NECK SURGERY      hematoma on head drained     HYSTERECTOMY       IR CONSULTATION FOR IR EXAM  2019     ORTHOPEDIC SURGERY  2014    right shoulder replacement     shoulder x2  2009    left       Prior to Admission Medications   Prior to Admission Medications   Prescriptions Last Dose Informant Patient Reported? Taking?   ALPRAZolam (XANAX) 1 MG tablet   No No   Sig: Take 1 tablet (1 mg) by mouth 3 times daily as needed for anxiety Take 1 tablet by mouth daily as needed for anxiety   EPIPEN 2-JAIRO 0.3 MG/0.3ML injection  Self No No   Sig: INJECT 1 SYRINGE INTRAMUSCULARLY ONCE AS NEEDED FOR ANAPHYLAXIS   VENTOLIN  (90 Base) MCG/ACT inhaler  Self No No   Sig: USE 1 PUFF FOUR TIMES A DAY AS NEEDED   VOLTAREN 1 % topical gel   No No   Sig: APPLY 4 GRAMS TO KNEES FOUR TIMES A DAY USING ENCLOSED DOSING CARD   amoxicillin-clavulanate (AUGMENTIN) 875-125 MG tablet   No No   Sig: Take 1 tablet by mouth 2 times daily for 10 days   aspirin EC 81 MG EC tablet  Self No No   Sig: Take 1 tablet (81 mg) by mouth 2 times daily   Patient taking differently: Take 81 mg by mouth daily    atorvastatin (LIPITOR) 40 MG tablet   No No   Sig: Take 1 tablet (40 mg) by mouth daily   benazepril (LOTENSIN) 10 MG tablet  Self No No   Sig: TAKE 1 TABLET BY MOUTH DAILY   benzonatate (TESSALON) 200 MG capsule   No No   Sig: TAKE 1 CAPSULE BY MOUTH 3 TIMES A DAY AS NEEDED FOR COUGH   budesonide-formoterol (SYMBICORT) 160-4.5  MCG/ACT inhaler  Self No No   Sig: Inhale 2 puffs into the lungs 2 times daily. As needed   butalbital-acetaminophen-caffeine (FIORICET/ESGIC) -40 MG tablet   No No   Sig: Take 1 tablet by mouth every 6 hours as needed for headaches   doxepin (SINEQUAN) 10 MG capsule  Self No No   Sig: Take 1 capsule (10 mg) by mouth At Bedtime   escitalopram (LEXAPRO) 20 MG tablet   No No   Sig: TAKE 1 TABLET BY MOUTH 2 TIMES A DAY   fluconazole (DIFLUCAN) 150 MG tablet   No No   Sig: Take one tablet 9/17, and one tablet 9/20   hydrochlorothiazide (HYDRODIURIL) 25 MG tablet   No No   Sig: TAKE 1 TABLET BY MOUTH DAILY   ipratropium - albuterol 0.5 mg/2.5 mg/3 mL (DUONEB) 0.5-2.5 (3) MG/3ML neb solution   No No   Sig: Take 1 vial (3 mLs) by nebulization every 4 hours as needed for shortness of breath / dyspnea or wheezing   loratadine (CLARITIN) 10 MG tablet   No No   Sig: Take 1 tablet (10 mg) by mouth daily   meloxicam (MOBIC) 15 MG tablet   No No   Sig: Take 1 tablet (15 mg) by mouth daily   montelukast (SINGULAIR) 10 MG tablet   No No   Sig: TAKE 1 TABLET BY MOUTH DAILY   montelukast (SINGULAIR) 10 MG tablet   No No   Sig: TAKE 1 TABLET BY MOUTH DAILY   nicotine (NICODERM CQ) 14 MG/24HR 24 hr patch  Self No No   Sig: Place 1 patch onto the skin every 24 hours   Patient not taking: Reported on 8/19/2019   nicotine (NICODERM CQ) 21 MG/24HR 24 hr patch  Self No No   Sig: Place 1 patch onto the skin every 24 hours   Patient not taking: Reported on 8/19/2019   nicotine (NICODERM CQ) 7 MG/24HR 24 hr patch  Self No No   Sig: Place 1 patch onto the skin every 24 hours   Patient not taking: Reported on 8/19/2019   nystatin (MYCOSTATIN) 900204 UNIT/ML suspension   No No   Sig: Take 5 mLs (500,000 Units) by mouth 4 times daily Apply after washing and drying the area thoroughly.   nystatin (MYCOSTATIN) 468941 UNIT/ML suspension   No No   Sig: Take 5 mLs (500,000 Units) by mouth 4 times daily   order for DME  Self No No   Sig: Equipment  "being ordered: Nebulizer supplies   order for DME  Self No No   Sig: Equipment being ordered: mcgee walker   order for DME  Self No No   Sig: Equipment being ordered: toilet seat riser   order for DME  Self No No   Sig: Equipment being ordered: Please install safety bar in bathroom to assist patient in getting up from toilet.   order for DME  Self No No   Sig: Discontinue O2 services   oxyCODONE-acetaminophen (PERCOCET) 5-325 MG tablet  Self No No   Sig: TAKE 1 TO 2 TABLETS BY MOUTH EVERY 4 HOURS AS NEEDED FOR PAIN   oxybutynin ER (DITROPAN-XL) 5 MG 24 hr tablet   No No   Sig: Take 1 tablet (5 mg) by mouth daily   potassium chloride ER (K-DUR/KLOR-CON M) 10 MEQ CR tablet   No No   Sig: TAKE 1 TABLET BY MOUTH 2 TIMES DAILY WITH FOOD   predniSONE (DELTASONE) 20 MG tablet   No No   Sig: Take 1 tablet (20 mg) by mouth 2 times daily for 5 days   predniSONE (DELTASONE) 20 MG tablet   No No   Sig: Take 3 tabs by mouth daily x 3 days, then 2 tabs daily x 3 days, then 1 tab daily x 3 days, then 1/2 tab daily x 3 days.   ranitidine (ZANTAC) 150 MG capsule   No No   Sig: Take 1 capsule (150 mg) by mouth At Bedtime   temazepam (RESTORIL) 30 MG capsule  Self No No   Sig: TAKE 1 CAPSULE BY MOUTH NIGHTLY AS NEEDED FOR SLEEP   triamcinolone (KENALOG) 0.1 % external cream   No No   Sig: Apply topically 2 times daily      Facility-Administered Medications: None     Allergies   Allergies   Allergen Reactions     Bee Pollen Other (See Comments)     Throat and eyes close up     Codeine Difficulty breathing     \"My throat closes shut.\"     Takes Percocet at home     Keflex [Cephalexin Hcl] Hives     Sulfonamide Derivatives Other (See Comments)     Throat closes     Ciprofloxacin Rash     Rash, bumps on lips     Bupropion Hcl Other (See Comments)     Made her very irritable     Celecoxib Other (See Comments) and Cough     Celebrex - wheeze  Takes ASA at home     Cheese      swiss     Clonazepam Hives     Takes Xanax at home     " Erythromycin Other (See Comments)     Erythromycin base - abdominal pain     Lisinopril Other (See Comments)     Zestril - headaches     Nifedipine Hives     Procardia     Strawberries [Strawberry]      Tomato      Tramadol Hcl      Takes percocet at home       Social History   I have reviewed this patient's social history and updated it with pertinent information if needed. Sydni Hardin  reports that she has been smoking cigarettes.  She started smoking about 54 years ago. She has a 53.00 pack-year smoking history. She has never used smokeless tobacco. She reports that she drinks alcohol. She reports that she does not use drugs.    Family History   I have reviewed this patient's family history and updated it with pertinent information if needed.   Family History   Problem Relation Age of Onset     Cancer Mother         skin     Breast Cancer Mother      Cerebrovascular Disease Mother      Alzheimer Disease Mother      Myocardial Infarction Father 76        myocardial infarction - cause of death     Cancer Daughter         cervical      Diabetes Maternal Grandfather      Heart Disease Maternal Grandfather         MI     Diabetes Maternal Grandmother      Diabetes Paternal Grandmother      Diabetes Paternal Grandfather        Review of Systems   GENERAL/CONSTITUTIONAL: The patient denies weakness, weight gain or weight loss.  HEAD, EYES, EARS, NOSE AND THROAT: Eyes - The patient denies pain, redness, ,Ears, nose, mouth and throat. The patient denies ringing in the ears,  nosebleeds, CARDIOVASCULAR: The patient denies  irregular heartbeats,  orthopnea or PND  RESPIRATORY: As per HPI  GASTROINTESTINAL: The patient denies decreased appetite, nausea, vomiting, ,hematochezia, melena or hematemesis. No abdominal pain  GENITOURINARY: The patient denies difficult urination, pain or burning with urination, blood in the urine,  MUSCULOSKELETAL:b/l knee pain  SKIN : The patient denies easy bruising, skin redness, skin  rash,   NEUROLOGIC: The patient denies headache, dizziness, fainting, seizures, loss of consciousness, memory loss.  PSYCHIATRIC: The patient denies thoughts of suicide,  ENDOCRINE: The patient denies intolerance to hot or cold temperature,  HEMATOLOGIC/LYMPHATIC: The patient denies clotting tendency.    Physical Exam   Temp: 99.1  F (37.3  C) Temp src: Tympanic BP: 103/59 Pulse: 107 Heart Rate: 108 Resp: 22 SpO2: 96 % O2 Device: Nasal cannula Oxygen Delivery: 5 LPM  Vital Signs with Ranges  Temp:  [99.1  F (37.3  C)-99.6  F (37.6  C)] 99.1  F (37.3  C)  Pulse:  [107] 107  Heart Rate:  [] 108  Resp:  [20-22] 22  BP: (102-116)/(59-88) 103/59  SpO2:  [85 %-96 %] 96 %  0 lbs 0 oz  GENERAL APPEARANCE: The patient is  Obese SOB, AOX3  HEENT: Normocephalic and atraumatic. No scleral icterus.PERRLA.  No oropharyngeal lesions.  NECK: Supple. Trachea is midline. No evidence of thyroid enlargement. No lymphadenopathy or tenderness.  CHEST: Symmetric. Nontender to palpation.  LUNGS: Bilateral inspiratory and expiratory wheezing and coarse crackles scattered all over the chest  HEART: Regular rate and rhythm with normal S1 and S2. No murmurs, gallops, or rubs.  ABDOMEN: Soft, flat, and benign. No mass, tenderness, guarding, or rebound.  Bowel sounds are present.   RECTAL: Deferred  EXTREMITIES: No cyanosis, clubbing, or edema.  NEUROLOGIC: No focal sensory or motor deficits are noted. Cranial nerves II through XII are intact. Grossly nonfocal examination  PSYCHIATRIC: Appropriate mood and affect.  SKIN: Warm, dry, and well perfused. Good turgor. No lesions, nodules or rashes are noted.     Data   Data reviewed today:  I personally reviewed the EKG tracing showing , Sinus tachycardia, no acute ST-T segment changes.  Recent Labs   Lab 09/17/19  0222   WBC 8.2   HGB 14.9   MCV 90         POTASSIUM 3.1*   CHLORIDE 102   CO2 30   BUN 18   CR 0.92   ANIONGAP 7   JOSÉ 9.0   *       No results found for  this or any previous visit (from the past 24 hour(s)).    CXR: report pending. No obvious infiltrates, +Emphysema    Total time spent in patient care was 65minutes. >  50% of time was spent in coordination of care and patient counseling.

## 2019-09-17 NOTE — ED PROVIDER NOTES
"  History     Chief Complaint   Patient presents with     Respiratory Distress     The history is provided by the patient and a relative.   Shortness of Breath   Severity:  Severe  Onset quality:  Gradual  Timing:  Constant  Progression:  Worsening  Chronicity:  New  Context: URI    Relieved by:  Nothing  Worsened by:  Nothing  Ineffective treatments:  Inhaler  Associated symptoms: cough    Associated symptoms: no fever      Sydni Hardin is a 69 year old female who comes in by ambulance with difficulty breathing.  Per patient, has had a cough for the past 5 days, subjective fever at home.  Daughter states patient had a oxygen saturation of 77% on room air, after a few nebs by EMS went up to the high 80s.  Patient denies chest pain, diaphoresis, pain swelling to lower extremities.  Patient denies history of intubation.    Allergies:  Allergies   Allergen Reactions     Bee Pollen Other (See Comments)     Throat and eyes close up     Codeine Difficulty breathing     \"My throat closes shut.\"     Takes Percocet at home     Keflex [Cephalexin Hcl] Hives     Sulfonamide Derivatives Other (See Comments)     Throat closes     Ciprofloxacin Rash     Rash, bumps on lips     Bupropion Hcl Other (See Comments)     Made her very irritable     Celecoxib Other (See Comments) and Cough     Celebrex - wheeze  Takes ASA at home     Cheese      swiss     Clonazepam Hives     Takes Xanax at home     Erythromycin Other (See Comments)     Erythromycin base - abdominal pain     Lisinopril Other (See Comments)     Zestril - headaches     Nifedipine Hives     Procardia     Strawberries [Strawberry]      Tomato      Tramadol Hcl      Takes percocet at home       Problem List:    Patient Active Problem List    Diagnosis Date Noted     Esophageal reflux 07/15/2019     Priority: Medium     Obesity (BMI 35.0-39.9) with comorbidity (H) 01/14/2019     Priority: Medium     Acute respiratory failure with hypoxia (H) 09/24/2018     Priority: " Medium     COPD exacerbation (H) 09/24/2018     Priority: Medium     Leukocytosis 03/25/2017     Priority: Medium     Dehydration 03/25/2017     Priority: Medium     Knee pain 03/25/2017     Priority: Medium     S/P total knee replacement using cement, right 03/21/2017     Priority: Medium     Primary osteoarthritis of both knees 10/05/2016     Priority: Medium     IC (interstitial cystitis) 10/28/2015     Priority: Medium     SCC (squamous cell carcinoma), face 08/05/2015     Priority: Medium     Lung nodules 06/19/2015     Priority: Medium     Tobacco abuse 06/19/2015     Priority: Medium     Dysthymia 12/31/2014     Priority: Medium     CKD (chronic kidney disease) stage 3, GFR 30-59 ml/min (H) 02/05/2014     Priority: Medium     Benign essential hypertension 01/05/2012     Priority: Medium     Problem list name updated by automated process. Provider to review       Asthma 03/20/2009     Priority: Medium     Calculus of gallbladder 02/02/2006     Priority: Medium     Problem list name updated by automated process. Provider to review       Headache 12/10/2003     Priority: Medium     Problem list name updated by automated process. Provider to review       Osteoarthrosis, unspecified whether generalized or localized, involving lower leg 10/04/2001     Priority: Medium     Problem list name updated by automated process. Provider to review          Past Medical History:    Past Medical History:   Diagnosis Date     Abnormal arterial blood gases 4/26/2006     Acute upper respiratory infections of unspecified site 7/2/2001     Anxiety 10/24/2011     Calculus of gallbladder without mention of cholecystitis or obstruction 2/2/2006     Depression 1/5/2012     Follow-up examination, following other surgery 2/28/2006     Headache(784.0) 12/10/2003     History of CVA (cerebrovascular accident) 9/28/2011     HTN (hypertension) 1/5/2012     Insomnia 3/13/2012     Lipoma of other skin and subcutaneous tissue 1/18/2006      Nonallopathic lesion of cervical region, not elsewhere classified 2003     Osteoarthrosis, unspecified whether generalized or localized, lower leg 10/4/2001     Special screening for malignant neoplasms, colon 2006     Urinary tract infection, site not specified 2007       Past Surgical History:    Past Surgical History:   Procedure Laterality Date     ARTHROPLASTY KNEE Right 3/21/2017    Procedure: ARTHROPLASTY KNEE;  Surgeon: Moisés Olson MD;  Location: [facility]     ARTHROSCOPY KNEE       BIOPSY BREAST  2000    left - free of disease      SECTION        SECTION       CHOLECYSTECTOMY       COLONOSCOPY  2008    repeat in 10 years     HEAD & NECK SURGERY      hematoma on head drained     HYSTERECTOMY       IR CONSULTATION FOR IR EXAM  2019     ORTHOPEDIC SURGERY  2014    right shoulder replacement     shoulder x2  2009    left       Family History:    Family History   Problem Relation Age of Onset     Cancer Mother         skin     Breast Cancer Mother      Cerebrovascular Disease Mother      Alzheimer Disease Mother      Myocardial Infarction Father 76        myocardial infarction - cause of death     Cancer Daughter         cervical      Diabetes Maternal Grandfather      Heart Disease Maternal Grandfather         MI     Diabetes Maternal Grandmother      Diabetes Paternal Grandmother      Diabetes Paternal Grandfather        Social History:  Marital Status:   [5]  Social History     Tobacco Use     Smoking status: Current Every Day Smoker     Packs/day: 1.00     Years: 53.00     Pack years: 53.00     Types: Cigarettes     Start date: 1965     Smokeless tobacco: Never Used     Tobacco comment: pt enrolled in quit plan 19   Substance Use Topics     Alcohol use: Yes     Alcohol/week: 0.0 oz     Comment: rarely     Drug use: No        Medications:      ALPRAZolam (XANAX) 1 MG tablet   amoxicillin-clavulanate (AUGMENTIN) 875-125 MG tablet    aspirin EC 81 MG EC tablet   atorvastatin (LIPITOR) 40 MG tablet   benazepril (LOTENSIN) 10 MG tablet   benzonatate (TESSALON) 200 MG capsule   budesonide-formoterol (SYMBICORT) 160-4.5 MCG/ACT inhaler   butalbital-acetaminophen-caffeine (FIORICET/ESGIC) -40 MG tablet   doxepin (SINEQUAN) 10 MG capsule   EPIPEN 2-JAIRO 0.3 MG/0.3ML injection   escitalopram (LEXAPRO) 20 MG tablet   fluconazole (DIFLUCAN) 150 MG tablet   hydrochlorothiazide (HYDRODIURIL) 25 MG tablet   ipratropium - albuterol 0.5 mg/2.5 mg/3 mL (DUONEB) 0.5-2.5 (3) MG/3ML neb solution   loratadine (CLARITIN) 10 MG tablet   meloxicam (MOBIC) 15 MG tablet   montelukast (SINGULAIR) 10 MG tablet   montelukast (SINGULAIR) 10 MG tablet   nicotine (NICODERM CQ) 14 MG/24HR 24 hr patch   nicotine (NICODERM CQ) 21 MG/24HR 24 hr patch   nicotine (NICODERM CQ) 7 MG/24HR 24 hr patch   nystatin (MYCOSTATIN) 029749 UNIT/ML suspension   nystatin (MYCOSTATIN) 659415 UNIT/ML suspension   order for DME   order for DME   order for DME   order for DME   order for DME   oxybutynin ER (DITROPAN-XL) 5 MG 24 hr tablet   oxyCODONE-acetaminophen (PERCOCET) 5-325 MG tablet   potassium chloride ER (K-DUR/KLOR-CON M) 10 MEQ CR tablet   predniSONE (DELTASONE) 20 MG tablet   ranitidine (ZANTAC) 150 MG capsule   temazepam (RESTORIL) 30 MG capsule   triamcinolone (KENALOG) 0.1 % external cream   VENTOLIN  (90 Base) MCG/ACT inhaler   VOLTAREN 1 % topical gel         Review of Systems   Constitutional: Negative for fever.   Respiratory: Positive for cough and shortness of breath.    All other systems reviewed and are negative.      Physical Exam   BP: 102/75  Pulse: 107  Temp: 99.1  F (37.3  C)  Resp: 20  SpO2: (!) 85 %(o2 at 3L via NC placed)      Physical Exam   Constitutional: No distress.   HENT:   Head: Atraumatic.   Mouth/Throat: Oropharynx is clear and moist. No oropharyngeal exudate.   Eyes: Pupils are equal, round, and reactive to light. No scleral icterus.    Cardiovascular: Normal heart sounds and intact distal pulses. Tachycardia present.   Pulmonary/Chest: Tachypnea noted. No respiratory distress. She has wheezes.   Abdominal: Soft. Bowel sounds are normal. There is no tenderness.   Musculoskeletal: She exhibits no edema or tenderness.   Skin: Skin is warm. No rash noted. She is not diaphoretic.       ED Course     ED Course as of Sep 17 0424   Tue Sep 17, 2019   0347 Patient satting at 90% on 3 L nasal cannula, will give another DuoNeb, increase nasal cannula to 4 L.  Will admit given persistent hypoxia.        Procedures               EKG Interpretation:      Interpreted by Zenon Mosley MD  Time reviewed: 0239  Symptoms at time of EKG: dyspnea   Rhythm: normal sinus   Rate: normal  Axis: normal  Ectopy: none  Conduction: normal  ST Segments/ T Waves: No ST-T wave changes  Q Waves: none  Comparison to prior: Unchanged    Clinical Impression: normal EKG          Critical Care time:  none               Results for orders placed or performed during the hospital encounter of 09/17/19 (from the past 24 hour(s))   CBC with platelets differential   Result Value Ref Range    WBC 8.2 4.0 - 11.0 10e9/L    RBC Count 4.92 3.8 - 5.2 10e12/L    Hemoglobin 14.9 11.7 - 15.7 g/dL    Hematocrit 44.4 35.0 - 47.0 %    MCV 90 78 - 100 fl    MCH 30.3 26.5 - 33.0 pg    MCHC 33.6 31.5 - 36.5 g/dL    RDW 12.7 10.0 - 15.0 %    Platelet Count 268 150 - 450 10e9/L    Diff Method Automated Method     % Neutrophils 57.6 %    % Lymphocytes 28.4 %    % Monocytes 9.5 %    % Eosinophils 3.5 %    % Basophils 0.6 %    % Immature Granulocytes 0.4 %    Nucleated RBCs 0 0 /100    Absolute Neutrophil 4.7 1.6 - 8.3 10e9/L    Absolute Lymphocytes 2.3 0.8 - 5.3 10e9/L    Absolute Monocytes 0.8 0.0 - 1.3 10e9/L    Absolute Eosinophils 0.3 0.0 - 0.7 10e9/L    Absolute Basophils 0.1 0.0 - 0.2 10e9/L    Abs Immature Granulocytes 0.0 0 - 0.4 10e9/L    Absolute Nucleated RBC 0.0    Basic metabolic panel    Result Value Ref Range    Sodium 139 133 - 144 mmol/L    Potassium 3.1 (L) 3.4 - 5.3 mmol/L    Chloride 102 94 - 109 mmol/L    Carbon Dioxide 30 20 - 32 mmol/L    Anion Gap 7 3 - 14 mmol/L    Glucose 122 (H) 70 - 99 mg/dL    Urea Nitrogen 18 7 - 30 mg/dL    Creatinine 0.92 0.52 - 1.04 mg/dL    GFR Estimate 63 >60 mL/min/[1.73_m2]    GFR Estimate If Black 73 >60 mL/min/[1.73_m2]    Calcium 9.0 8.5 - 10.1 mg/dL       Medications   azithromycin (ZITHROMAX) tablet 500 mg (has no administration in time range)   ipratropium - albuterol 0.5 mg/2.5 mg/3 mL (DUONEB) neb solution 3 mL (3 mLs Nebulization Given 9/17/19 4144)   dexamethasone (DECADRON) injection 10 mg (10 mg Intravenous Given 9/17/19 6122)       Assessments & Plan (with Medical Decision Making)     I have reviewed the nursing notes.    I have reviewed the findings, diagnosis, plan and need for follow up with the patient.   69-year-old female past medical surgical history as above, here with COPD exacerbation.  Decadron, antibiotics, will reassess after steroids have taken effect.  Suspect probable admission.    New Prescriptions    No medications on file       Final diagnoses:   COPD with acute exacerbation (H)       9/17/2019   [facility]     Zenon Mosley MD  09/17/19 0313       Zenon Mosley MD  09/17/19 1089

## 2019-09-17 NOTE — PROGRESS NOTES
Assessment completed see flowsheet.    LOC: alert   Others present: Patient     Dx: hypoxia  Chronic Disease Management: Respiratory failure, COPD, HTN    Lives with: adult daughter Devorah  Living at:  [facility]  Transportation: YES, both drive    Primary PCP: Gibran Alarcon  Insurance:  Medicare and [facility]  Medicare Inpatient letter reviewed with Sydni.    Support System:  Sr Dahiana and siblings  Homecare/PCA: no  /County Services:   no  : NO      [facility] Referral line called: no    Health Care Directive: YES   Guardian: no  POA: no    Pharmacy: [facility]  Meds management: YES, manages own    Adequate Resources for needs (housing, utilities, food/med): YES  Household chores: does herself  Work/community/social activity: YES, gets out to play bridge    ADLs: independent  Ambulation:uses walker or cane  Falls: 3 mos ago, no fractures  Nutrition: no concern  Sleep: sleeps well    Equipment used: walker, cane      Oxygen supplier: no      Does the supplier have valid oxygen orders: n/a    Mental health: no  Substance abuse: cigarettes  Exposure to violence/abuse: no  Stressors: both of her daughters    Able to Return to Prior Living Arrangements: YES    Choice of Vendor: if going home on O2 wants to use [facility]    Barriers: none    MG: none    Plan: home    Sydni is looking for someone that can scrub floors and change linens, we will provide [facility] Resource Book. If she needs O2 on discharge would like to use [facility], would recommend Homecare as well being she would be new to O2. CM remains available.

## 2019-09-17 NOTE — PLAN OF CARE
Face to face report given with opportunity to observe patient.  Report given to Birdie PERKINS RN.    Flaquita Valenzuela RN  9/17/2019, 3:20 PM

## 2019-09-17 NOTE — PLAN OF CARE
"Reason for hospital stay: Patient is admitted for acute hypoxemic respiratory failure.     Most recent vitals: /84   Pulse 107   Temp 98.2  F (36.8  C) (Tympanic)   Resp 20   Ht 1.473 m (4' 10\")   Wt 82.6 kg (182 lb 1.6 oz)   SpO2 94%   BMI 38.06 kg/m      Pain Management: Patient denied pain all shift.     Orientation:  Patient is quite confused today. She believed it was 2 am when it was really 9 am. When this nurse offered redirection/reorientation, she told this nurse that I am confused. Pt. Made many statements that showed she is confused. She is cooperative but she was anxious. She asked for Xanax this morning and said that she takes it every day.     Cardiac: BP WNL today. HR elevated but work of breathing is also increased. Pt. Is on tele. Reading per ICU nurses is SR in 80s.      Respiratory: Patient's lung sounds are coarse throughout with expiratory wheezing. She was on 5L of O2 via NC this morning but we decreased to 4L/min.     GI:  Bowel sounds audible and active. Pt. Denied any nausea.     : Patient voided this morning (right at the beginning of the shift) but has not since. She has slept all shift and we have been unable to get her to wake up enough to void and get a U/A. Trying again at this time (15:09).      Nutrition: Patient is on a regular diet. She ate very little this morning as she had been up all night and thought it was 2 am. She was sleeping too hard to    Skin Issues: Pt. Has scattered bruising. See PCS for skin assessment.     IVF:  IV is SL.     ABX:  Pt. Is on Azithromycin PO daily at 9pm.     Mobility:  Patient is 1 assist with gait belt and ww.     Safety:  Bed is in low position, brakes on, bed alarm on, chair alarm on, call light in reach.     Comments: Patient is in good spirits.       9/17/2019  3:00 PM  Flaquita Valenzuela RN   "

## 2019-09-17 NOTE — PLAN OF CARE
Pt is A&O, regular diet, Ax1.   VS /78  RR 22 T 99.1 O2 92% on 5 LPM, c/o chronic back pain.  Pt medicated w/ PRN Percocet, relief noted.  Lungs are coarse/crackles w/ wheezes throughout.  Pt has loose, congested cough.  Bowels active x4, passing flatus.  Pt is incontinent of urine, brief in place.  Also noted yeasty rash area to pannus and groin.  Pt denies N/V.  Pt RUE ROM is limited due to previous surgeries.  Per ICU tele report pt is .  IV is SL, abx continue.  Brakes locked, call light within reach, makes needs known.  Frequent rounding done, free from falls.    Face to face report given with opportunity to observe patient.    Report given to ALANIS Goel RN   9/17/2019  7:54 AM

## 2019-09-17 NOTE — ED NOTES
LUDIVINA Mosley for PO azithromycin. Discussed medication and patient erythromycin allergy documented abdominal pain. Ok to give PO azithromycin.

## 2019-09-18 LAB
ANION GAP SERPL CALCULATED.3IONS-SCNC: 10 MMOL/L (ref 3–14)
BACTERIA SPEC CULT: NORMAL
BUN SERPL-MCNC: 21 MG/DL (ref 7–30)
CALCIUM SERPL-MCNC: 8.8 MG/DL (ref 8.5–10.1)
CHLORIDE SERPL-SCNC: 97 MMOL/L (ref 94–109)
CO2 SERPL-SCNC: 24 MMOL/L (ref 20–32)
CREAT SERPL-MCNC: 0.82 MG/DL (ref 0.52–1.04)
ERYTHROCYTE [DISTWIDTH] IN BLOOD BY AUTOMATED COUNT: 12.5 % (ref 10–15)
GFR SERPL CREATININE-BSD FRML MDRD: 73 ML/MIN/{1.73_M2}
GLUCOSE SERPL-MCNC: 205 MG/DL (ref 70–99)
HCT VFR BLD AUTO: 40.4 % (ref 35–47)
HGB BLD-MCNC: 13.4 G/DL (ref 11.7–15.7)
LACTATE BLD-SCNC: 3.9 MMOL/L (ref 0.7–2)
MCH RBC QN AUTO: 29.8 PG (ref 26.5–33)
MCHC RBC AUTO-ENTMCNC: 33.2 G/DL (ref 31.5–36.5)
MCV RBC AUTO: 90 FL (ref 78–100)
PLATELET # BLD AUTO: 266 10E9/L (ref 150–450)
POTASSIUM SERPL-SCNC: 3.5 MMOL/L (ref 3.4–5.3)
RBC # BLD AUTO: 4.5 10E12/L (ref 3.8–5.2)
SODIUM SERPL-SCNC: 131 MMOL/L (ref 133–144)
SPECIMEN SOURCE: NORMAL
WBC # BLD AUTO: 15.8 10E9/L (ref 4–11)

## 2019-09-18 PROCEDURE — 94640 AIRWAY INHALATION TREATMENT: CPT

## 2019-09-18 PROCEDURE — 85027 COMPLETE CBC AUTOMATED: CPT | Performed by: HOSPITALIST

## 2019-09-18 PROCEDURE — 80048 BASIC METABOLIC PNL TOTAL CA: CPT | Performed by: HOSPITALIST

## 2019-09-18 PROCEDURE — 36415 COLL VENOUS BLD VENIPUNCTURE: CPT | Performed by: HOSPITALIST

## 2019-09-18 PROCEDURE — 40000275 ZZH STATISTIC RCP TIME EA 10 MIN

## 2019-09-18 PROCEDURE — 25000132 ZZH RX MED GY IP 250 OP 250 PS 637: Performed by: HOSPITALIST

## 2019-09-18 PROCEDURE — 25000131 ZZH RX MED GY IP 250 OP 636 PS 637: Performed by: INTERNAL MEDICINE

## 2019-09-18 PROCEDURE — 25000125 ZZHC RX 250: Performed by: HOSPITALIST

## 2019-09-18 PROCEDURE — 25000128 H RX IP 250 OP 636: Performed by: HOSPITALIST

## 2019-09-18 PROCEDURE — 94640 AIRWAY INHALATION TREATMENT: CPT | Mod: 76

## 2019-09-18 PROCEDURE — 36415 COLL VENOUS BLD VENIPUNCTURE: CPT | Performed by: INTERNAL MEDICINE

## 2019-09-18 PROCEDURE — 83605 ASSAY OF LACTIC ACID: CPT | Performed by: INTERNAL MEDICINE

## 2019-09-18 PROCEDURE — 12000000 ZZH R&B MED SURG/OB

## 2019-09-18 PROCEDURE — 99232 SBSQ HOSP IP/OBS MODERATE 35: CPT | Performed by: INTERNAL MEDICINE

## 2019-09-18 RX ORDER — PREDNISONE 20 MG/1
40 TABLET ORAL DAILY
Status: DISCONTINUED | OUTPATIENT
Start: 2019-09-18 | End: 2019-09-22 | Stop reason: HOSPADM

## 2019-09-18 RX ADMIN — GUAIFENESIN 600 MG: 600 TABLET, EXTENDED RELEASE ORAL at 20:55

## 2019-09-18 RX ADMIN — ENOXAPARIN SODIUM 40 MG: 40 INJECTION SUBCUTANEOUS at 05:09

## 2019-09-18 RX ADMIN — OXYBUTYNIN CHLORIDE 5 MG: 5 TABLET, EXTENDED RELEASE ORAL at 08:25

## 2019-09-18 RX ADMIN — GUAIFENESIN 600 MG: 600 TABLET, EXTENDED RELEASE ORAL at 08:26

## 2019-09-18 RX ADMIN — RANITIDINE 150 MG: 150 TABLET ORAL at 21:03

## 2019-09-18 RX ADMIN — MICONAZOLE NITRATE: 2 POWDER TOPICAL at 21:19

## 2019-09-18 RX ADMIN — BENZONATATE 100 MG: 100 CAPSULE ORAL at 21:23

## 2019-09-18 RX ADMIN — FLUTICASONE FUROATE AND VILANTEROL TRIFENATATE 1 PUFF: 200; 25 POWDER RESPIRATORY (INHALATION) at 07:46

## 2019-09-18 RX ADMIN — PREDNISONE 40 MG: 20 TABLET ORAL at 09:25

## 2019-09-18 RX ADMIN — POTASSIUM CHLORIDE 10 MEQ: 750 CAPSULE, EXTENDED RELEASE ORAL at 20:55

## 2019-09-18 RX ADMIN — DOXEPIN HYDROCHLORIDE 10 MG: 10 CAPSULE ORAL at 21:03

## 2019-09-18 RX ADMIN — MONTELUKAST 10 MG: 10 TABLET, FILM COATED ORAL at 08:28

## 2019-09-18 RX ADMIN — IPRATROPIUM BROMIDE AND ALBUTEROL SULFATE 3 ML: .5; 3 SOLUTION RESPIRATORY (INHALATION) at 11:10

## 2019-09-18 RX ADMIN — NYSTATIN 500000 UNITS: 100000 SUSPENSION ORAL at 21:03

## 2019-09-18 RX ADMIN — NYSTATIN 500000 UNITS: 100000 SUSPENSION ORAL at 08:35

## 2019-09-18 RX ADMIN — IPRATROPIUM BROMIDE AND ALBUTEROL SULFATE 3 ML: .5; 3 SOLUTION RESPIRATORY (INHALATION) at 07:45

## 2019-09-18 RX ADMIN — TRIAMCINOLONE ACETONIDE: 1 CREAM TOPICAL at 21:19

## 2019-09-18 RX ADMIN — ASPIRIN 81 MG: 81 TABLET, COATED ORAL at 08:23

## 2019-09-18 RX ADMIN — ALPRAZOLAM 1 MG: 0.5 TABLET ORAL at 15:56

## 2019-09-18 RX ADMIN — ESCITALOPRAM OXALATE 20 MG: 10 TABLET ORAL at 08:27

## 2019-09-18 RX ADMIN — DICLOFENAC 2 G: 10 GEL TOPICAL at 17:17

## 2019-09-18 RX ADMIN — NYSTATIN 500000 UNITS: 100000 SUSPENSION ORAL at 14:34

## 2019-09-18 RX ADMIN — ALPRAZOLAM 1 MG: 0.5 TABLET ORAL at 21:16

## 2019-09-18 RX ADMIN — DICLOFENAC 2 G: 10 GEL TOPICAL at 14:56

## 2019-09-18 RX ADMIN — IPRATROPIUM BROMIDE AND ALBUTEROL SULFATE 3 ML: .5; 3 SOLUTION RESPIRATORY (INHALATION) at 19:19

## 2019-09-18 RX ADMIN — ATORVASTATIN CALCIUM 40 MG: 40 TABLET, FILM COATED ORAL at 08:25

## 2019-09-18 RX ADMIN — AZITHROMYCIN 250 MG: 250 TABLET, FILM COATED ORAL at 20:55

## 2019-09-18 RX ADMIN — POTASSIUM CHLORIDE 10 MEQ: 750 CAPSULE, EXTENDED RELEASE ORAL at 08:29

## 2019-09-18 RX ADMIN — METHYLPREDNISOLONE SODIUM SUCCINATE 62.5 MG: 125 INJECTION, POWDER, FOR SOLUTION INTRAMUSCULAR; INTRAVENOUS at 05:09

## 2019-09-18 RX ADMIN — IPRATROPIUM BROMIDE AND ALBUTEROL SULFATE 3 ML: .5; 3 SOLUTION RESPIRATORY (INHALATION) at 15:01

## 2019-09-18 RX ADMIN — TEMAZEPAM 15 MG: 7.5 CAPSULE ORAL at 21:16

## 2019-09-18 RX ADMIN — LORATADINE 10 MG: 10 TABLET ORAL at 08:24

## 2019-09-18 RX ADMIN — DICLOFENAC 2 G: 10 GEL TOPICAL at 21:06

## 2019-09-18 RX ADMIN — NICOTINE 1 PATCH: 21 PATCH, EXTENDED RELEASE TRANSDERMAL at 08:30

## 2019-09-18 RX ADMIN — NYSTATIN 500000 UNITS: 100000 SUSPENSION ORAL at 17:16

## 2019-09-18 RX ADMIN — MICONAZOLE NITRATE: 2 POWDER TOPICAL at 05:39

## 2019-09-18 ASSESSMENT — ACTIVITIES OF DAILY LIVING (ADL)
ADLS_ACUITY_SCORE: 23
ADLS_ACUITY_SCORE: 23
ADLS_ACUITY_SCORE: 22
ADLS_ACUITY_SCORE: 23

## 2019-09-18 ASSESSMENT — MIFFLIN-ST. JEOR: SCORE: 1230.75

## 2019-09-18 NOTE — PLAN OF CARE
Face to face report given with opportunity to observe patient.    Report given to ALANIS Packer   9/17/2019  8:16 PM

## 2019-09-18 NOTE — PROGRESS NOTES
CM attended Care Conference. On O2, attempting to wean off. Possible Friday discharge to home.     Into see patient, room darkened, patient sleeping. CM will continue to remain available and will follow for discharge planning.

## 2019-09-18 NOTE — PLAN OF CARE
VSS, afebrile, HRR, lungs have crackles in the bases, expiratory wheezes throughout and diminished. Bowels are hypoactive, pt denies passing gas. Pt has been incontinent this shift of a large amount. Pt is very busy, and forgetful. Isn't sure about her creams but did remember that she has a powder for her yeasty groin folds. Pt remains on Telemetry SR 90's per ICU staff.     Face to face report given with opportunity to observe patient.    Report given to ALANIS Anderson RN   9/18/2019  7:44 AM

## 2019-09-18 NOTE — PROGRESS NOTES
[facility]    Hospitalist Progress Note      Assessment & Plan   Sydni Hardin is a 69 year old female who was admitted on 9/17/2019.      1.  Acute on chronic respiratory failure with hypoxemia and hypercapnia:: This is due to fairly severe COPD with an acute exacerbation probably due to bronchitis.  No signs of an obvious pneumonia.  Her O2 blood gas did show compensated hypercapnia.  But did require almost 5 to 6 L.  She is markedly better in terms of her oxygenation today.  We are trying to wean down her oxygen she is down to 4 L with O2 sats 90 to 94%.    2.  COPD exacerbation secondary to probable bronchitis: Long-standing COPD and a significant ongoing smoker.  Had a lot of upper airway rhonchi.  Chest x-ray without any obvious infiltrate.  She is looking a lot better today.  Lung exam she has decreased breath sounds consistent with her COPD but just some upper airway rhonchi and occasional wheeze.  No areas of consolidation.  White count is 15,800 probably due to the steroids.  Procalcitonin yesterday was unremarkable.  I think she is Taz starting to turn the corner.  We will continue to try and wean down her O2 today continue with the steroids and antibiotics.    3.  Elevated lactic acid: Is no signs of sepsis in this patient.  Her blood pressure is 130 heart rates in the 80s she is afebrile I think this is just due to her underlying work of breathing.  There is no endorgan issues ongoing.    4.  Hypertension: Blood pressure is very stable at this time.  We have continue to hold her Benzapril hydrochlorothiazide.    5.  Mental status: Seems much more alert today.  Yesterday's very mild confusion was probably due to her acute illness.          Diet: Combination Diet Regular Diet Adult  Fluids: iv lock    DVT Prophylaxis: Enoxaparin (Lovenox) SQ  Code Status: Full Code    Disposition: Expected discharge in 2-3 days once pulmonary status stable.    Arpit Herndon    Interval History    Patient is alert today calm seems more coherent.  Actually normal.  States is feeling better.  Afebrile O2 sats are well in the 90% range.  Have trended down to 4 L now.  Hemodynamically stable.  Said I think this is probably just more of a bronchitis and anything else.  We will continue with the antibiotics will continue with the aggressive pulmonary toilet wean down her oxygen as tolerated.  Perhaps she can go for a walk today.  Anticipate she might be able to be ready by Friday   we will see how she does.  Did talk to her that she may require O2 temporarily.  We will see how she does.    -Data reviewed today: I reviewed all new labs and imaging results over the last 24 hours. I personally reviewed no images or EKG's today.    Physical Exam   Temp: 97.1  F (36.2  C) Temp src: Tympanic BP: 134/89 Pulse: 80 Heart Rate: 87 Resp: 24 SpO2: 92 % O2 Device: Nasal cannula with humidification Oxygen Delivery: 4 LPM  Vitals:    09/17/19 0505 09/18/19 0548   Weight: 82.6 kg (182 lb 1.6 oz) 82.1 kg (181 lb)     Vital Signs with Ranges  Temp:  [96.8  F (36  C)-98.7  F (37.1  C)] 97.1  F (36.2  C)  Pulse:  [80] 80  Heart Rate:  [] 87  Resp:  [20-24] 24  BP: (103-135)/(53-89) 134/89  SpO2:  [91 %-95 %] 92 %  I/O last 3 completed shifts:  In: 400 [P.O.:400]  Out: 100 [Urine:100]    Peripheral IV 09/17/19 Right Upper forearm (Active)   Site Assessment WDL except;Bleeding 9/17/2019 11:59 PM   Line Status Saline locked 9/17/2019 11:59 PM   Phlebitis Scale 0-->no symptoms 9/17/2019 11:59 PM   Infiltration Scale 0 9/17/2019 11:59 PM   Extravasation? No 9/17/2019  2:00 PM   Number of days: 1       Wound 10/16/13 Right Finger (Comment which one) (Active)   Number of days: 2163       Wound 10/16/13 Lip (Active)   Number of days: 2163       Wound 08/08/14 Right Arm Abrasion(s) (Active)   Number of days: 1867       Rash 09/17/19 0701 lower groin (Active)   Distribution regional 9/17/2019  4:20 PM   Configuration/Shape asymmetric  9/17/2019  8:59 AM   Borders diffuse;irregular 9/17/2019  8:59 AM   Characteristics dry;itching;pain/discomfort;edema 9/17/2019  8:59 AM   Color red 9/17/2019  4:20 PM   Care, Rash antimicrobial agent applied 9/17/2019  9:00 PM   Number of days: 1       Incision/Surgical Site 03/21/17 Right Knee (Active)   Number of days: 911     No line/device    Constitutional: Alert and oriented x3. No distress    HEENT: Normocephalic/atraumatic, PERRL, EOMI, mouth moist, neck supple, no LN.     Cardiovascular: RRR. no Murmur, no  rubs, or gallops.  JVD no.  Bruits no.  Pulses 2+    Respiratory: Occasional wheeze occasional upper airway rhonchi much better than previously.  General decreased breath sounds consistent with her COPD.    Abdomen: Soft, nontender, nondistended, no organomegaly. Bowel sounds present    Extremities: Warm/dry. No edema    Neuro:   Non focal, cranial nerves intact, Moves all extremities.  Medications       aspirin  81 mg Oral Daily     atorvastatin  40 mg Oral Daily     azithromycin  250 mg Oral Daily     diclofenac  2 g Topical 4x Daily     doxepin  10 mg Oral At Bedtime     enoxaparin  40 mg Subcutaneous Q24H     escitalopram  20 mg Oral Daily     fluticasone-vilanterol  1 puff Inhalation Daily     guaiFENesin  600 mg Oral BID     ipratropium - albuterol 0.5 mg/2.5 mg/3 mL  3 mL Nebulization Q4H While awake     loratadine  10 mg Oral Daily     methylPREDNISolone  62.5 mg Intravenous Q6H     miconazole   Topical BID     montelukast  10 mg Oral Daily     nicotine  1 patch Transdermal Daily     nicotine   Transdermal Q8H     nicotine   Transdermal Daily     nystatin  500,000 Units Oral 4x Daily     oxybutynin ER  5 mg Oral Daily     pneumococcal vaccine  0.5 mL Intramuscular Prior to discharge     potassium chloride ER  10 mEq Oral BID     ranitidine  150 mg Oral At Bedtime     sodium chloride (PF)  3 mL Intracatheter Q8H     triamcinolone   Topical BID       Data   Recent Labs   Lab 09/18/19  6416  09/17/19  0222   WBC 15.8* 8.2   HGB 13.4 14.9   MCV 90 90    268   * 139   POTASSIUM 3.5 3.1*   CHLORIDE 97 102   CO2 24 30   BUN 21 18   CR 0.82 0.92   ANIONGAP 10 7   JOSÉ 8.8 9.0   * 122*       No results found for this or any previous visit (from the past 24 hour(s)).

## 2019-09-18 NOTE — PLAN OF CARE
Staff attempted to wean O2, but unable to maintain O2 SATS, remains at 5L.  C/O cramping and pain in legs at end of shift, eased by the Voltaren and Percocet.  Became more confused as the evening progressed.    Face to face report given with opportunity to observe patient.    Report given to Fredis TONY RN   9/18/2019  12:35 AM

## 2019-09-18 NOTE — PROVIDER NOTIFICATION
DATE:  9/18/2019   TIME OF RECEIPT FROM LAB:  0715  LAB TEST:  Lactate  LAB VALUE:  3.9  RESULTS GIVEN WITH READ-BACK TO (PROVIDER):  Arpit Herndon MD  TIME LAB VALUE REPORTED TO PROVIDER:   0716

## 2019-09-18 NOTE — PLAN OF CARE
Patient alert, oriented to self, occasional intermittent confusion. Afebrile. Patient remains on oxygen, but was able to wean down O2 from 5 LPM down to 3 LPM via NC. Lungs are diminished with expiratory wheezes. Congested/loose cough. BRAND, desats to mid 80's with ambulation, takes a few minutes to recover. Continuous pulse oximeter on. Patient switched to oral prednisone 40 mg today. Scheduled neb treatments. Telemetry: SR 80's per written report from ICU. RR 22-24. Bowel sounds are active, incontinent of urine. IV is saline locked. Patient up in chair for meals, took a nap in between meals. Alarms on, call light within reach. Free from falls.     Face to face report given with opportunity to observe patient.    Report given to Beata SOLIMAN RN & Student nurse    Monica Pinto RN   9/18/2019  3:25 PM

## 2019-09-19 LAB
ANION GAP SERPL CALCULATED.3IONS-SCNC: 6 MMOL/L (ref 3–14)
BASOPHILS # BLD AUTO: 0 10E9/L (ref 0–0.2)
BASOPHILS NFR BLD AUTO: 0.2 %
BUN SERPL-MCNC: 23 MG/DL (ref 7–30)
CALCIUM SERPL-MCNC: 8.7 MG/DL (ref 8.5–10.1)
CHLORIDE SERPL-SCNC: 96 MMOL/L (ref 94–109)
CO2 SERPL-SCNC: 28 MMOL/L (ref 20–32)
CREAT SERPL-MCNC: 0.78 MG/DL (ref 0.52–1.04)
DIFFERENTIAL METHOD BLD: ABNORMAL
EOSINOPHIL # BLD AUTO: 0 10E9/L (ref 0–0.7)
EOSINOPHIL NFR BLD AUTO: 0 %
ERYTHROCYTE [DISTWIDTH] IN BLOOD BY AUTOMATED COUNT: 12.2 % (ref 10–15)
GFR SERPL CREATININE-BSD FRML MDRD: 76 ML/MIN/{1.73_M2}
GLUCOSE SERPL-MCNC: 136 MG/DL (ref 70–99)
HCT VFR BLD AUTO: 39.2 % (ref 35–47)
HGB BLD-MCNC: 13.3 G/DL (ref 11.7–15.7)
IMM GRANULOCYTES # BLD: 0.2 10E9/L (ref 0–0.4)
IMM GRANULOCYTES NFR BLD: 0.9 %
LACTATE BLD-SCNC: 2.2 MMOL/L (ref 0.7–2)
LYMPHOCYTES # BLD AUTO: 1.4 10E9/L (ref 0.8–5.3)
LYMPHOCYTES NFR BLD AUTO: 7.6 %
MCH RBC QN AUTO: 30 PG (ref 26.5–33)
MCHC RBC AUTO-ENTMCNC: 33.9 G/DL (ref 31.5–36.5)
MCV RBC AUTO: 88 FL (ref 78–100)
MONOCYTES # BLD AUTO: 0.9 10E9/L (ref 0–1.3)
MONOCYTES NFR BLD AUTO: 5 %
NEUTROPHILS # BLD AUTO: 15.5 10E9/L (ref 1.6–8.3)
NEUTROPHILS NFR BLD AUTO: 86.3 %
NRBC # BLD AUTO: 0 10*3/UL
NRBC BLD AUTO-RTO: 0 /100
PLATELET # BLD AUTO: 270 10E9/L (ref 150–450)
POTASSIUM SERPL-SCNC: 4.3 MMOL/L (ref 3.4–5.3)
RBC # BLD AUTO: 4.44 10E12/L (ref 3.8–5.2)
SODIUM SERPL-SCNC: 130 MMOL/L (ref 133–144)
WBC # BLD AUTO: 18 10E9/L (ref 4–11)

## 2019-09-19 PROCEDURE — 40000275 ZZH STATISTIC RCP TIME EA 10 MIN

## 2019-09-19 PROCEDURE — 25000125 ZZHC RX 250: Performed by: HOSPITALIST

## 2019-09-19 PROCEDURE — 94640 AIRWAY INHALATION TREATMENT: CPT

## 2019-09-19 PROCEDURE — 25000128 H RX IP 250 OP 636: Performed by: HOSPITALIST

## 2019-09-19 PROCEDURE — 85025 COMPLETE CBC W/AUTO DIFF WBC: CPT | Performed by: INTERNAL MEDICINE

## 2019-09-19 PROCEDURE — 83605 ASSAY OF LACTIC ACID: CPT | Performed by: INTERNAL MEDICINE

## 2019-09-19 PROCEDURE — 94640 AIRWAY INHALATION TREATMENT: CPT | Mod: 76

## 2019-09-19 PROCEDURE — 12000000 ZZH R&B MED SURG/OB

## 2019-09-19 PROCEDURE — 25000125 ZZHC RX 250: Performed by: INTERNAL MEDICINE

## 2019-09-19 PROCEDURE — 99232 SBSQ HOSP IP/OBS MODERATE 35: CPT | Performed by: INTERNAL MEDICINE

## 2019-09-19 PROCEDURE — 25000132 ZZH RX MED GY IP 250 OP 250 PS 637: Performed by: HOSPITALIST

## 2019-09-19 PROCEDURE — 25000131 ZZH RX MED GY IP 250 OP 636 PS 637: Performed by: INTERNAL MEDICINE

## 2019-09-19 PROCEDURE — 36415 COLL VENOUS BLD VENIPUNCTURE: CPT | Performed by: INTERNAL MEDICINE

## 2019-09-19 PROCEDURE — 80048 BASIC METABOLIC PNL TOTAL CA: CPT | Performed by: INTERNAL MEDICINE

## 2019-09-19 RX ADMIN — POTASSIUM CHLORIDE 10 MEQ: 750 CAPSULE, EXTENDED RELEASE ORAL at 09:55

## 2019-09-19 RX ADMIN — NICOTINE 1 PATCH: 21 PATCH, EXTENDED RELEASE TRANSDERMAL at 09:19

## 2019-09-19 RX ADMIN — MICONAZOLE NITRATE: 2 POWDER TOPICAL at 09:19

## 2019-09-19 RX ADMIN — DOXEPIN HYDROCHLORIDE 10 MG: 10 CAPSULE ORAL at 20:43

## 2019-09-19 RX ADMIN — ALBUTEROL SULFATE 2.5 MG: 2.5 SOLUTION RESPIRATORY (INHALATION) at 09:40

## 2019-09-19 RX ADMIN — NYSTATIN 500000 UNITS: 100000 SUSPENSION ORAL at 20:47

## 2019-09-19 RX ADMIN — NYSTATIN 500000 UNITS: 100000 SUSPENSION ORAL at 14:21

## 2019-09-19 RX ADMIN — ATORVASTATIN CALCIUM 40 MG: 40 TABLET, FILM COATED ORAL at 09:55

## 2019-09-19 RX ADMIN — ESCITALOPRAM OXALATE 20 MG: 10 TABLET ORAL at 09:54

## 2019-09-19 RX ADMIN — IPRATROPIUM BROMIDE AND ALBUTEROL SULFATE 3 ML: .5; 3 SOLUTION RESPIRATORY (INHALATION) at 11:23

## 2019-09-19 RX ADMIN — BENZONATATE 100 MG: 100 CAPSULE ORAL at 22:23

## 2019-09-19 RX ADMIN — DICLOFENAC 2 G: 10 GEL TOPICAL at 09:19

## 2019-09-19 RX ADMIN — GUAIFENESIN 600 MG: 600 TABLET, EXTENDED RELEASE ORAL at 20:43

## 2019-09-19 RX ADMIN — MONTELUKAST 10 MG: 10 TABLET, FILM COATED ORAL at 09:53

## 2019-09-19 RX ADMIN — GUAIFENESIN 600 MG: 600 TABLET, EXTENDED RELEASE ORAL at 09:56

## 2019-09-19 RX ADMIN — TRIAMCINOLONE ACETONIDE: 1 CREAM TOPICAL at 09:19

## 2019-09-19 RX ADMIN — POTASSIUM CHLORIDE 10 MEQ: 750 CAPSULE, EXTENDED RELEASE ORAL at 20:43

## 2019-09-19 RX ADMIN — AZITHROMYCIN 250 MG: 250 TABLET, FILM COATED ORAL at 20:43

## 2019-09-19 RX ADMIN — ALPRAZOLAM 0.5 MG: 0.5 TABLET ORAL at 10:00

## 2019-09-19 RX ADMIN — FLUTICASONE FUROATE AND VILANTEROL TRIFENATATE 1 PUFF: 200; 25 POWDER RESPIRATORY (INHALATION) at 07:31

## 2019-09-19 RX ADMIN — IPRATROPIUM BROMIDE AND ALBUTEROL SULFATE 3 ML: .5; 3 SOLUTION RESPIRATORY (INHALATION) at 15:21

## 2019-09-19 RX ADMIN — DICLOFENAC 2 G: 10 GEL TOPICAL at 20:49

## 2019-09-19 RX ADMIN — NYSTATIN 500000 UNITS: 100000 SUSPENSION ORAL at 09:21

## 2019-09-19 RX ADMIN — IPRATROPIUM BROMIDE AND ALBUTEROL SULFATE 3 ML: .5; 3 SOLUTION RESPIRATORY (INHALATION) at 07:31

## 2019-09-19 RX ADMIN — TRIAMCINOLONE ACETONIDE: 1 CREAM TOPICAL at 20:48

## 2019-09-19 RX ADMIN — RANITIDINE 150 MG: 150 TABLET ORAL at 20:43

## 2019-09-19 RX ADMIN — ASPIRIN 81 MG: 81 TABLET, COATED ORAL at 09:51

## 2019-09-19 RX ADMIN — ENOXAPARIN SODIUM 40 MG: 40 INJECTION SUBCUTANEOUS at 06:10

## 2019-09-19 RX ADMIN — MICONAZOLE NITRATE: 2 POWDER TOPICAL at 20:48

## 2019-09-19 RX ADMIN — OXYBUTYNIN CHLORIDE 5 MG: 5 TABLET, EXTENDED RELEASE ORAL at 09:53

## 2019-09-19 RX ADMIN — ALPRAZOLAM 1 MG: 0.5 TABLET ORAL at 19:20

## 2019-09-19 RX ADMIN — IPRATROPIUM BROMIDE AND ALBUTEROL SULFATE 3 ML: .5; 3 SOLUTION RESPIRATORY (INHALATION) at 19:31

## 2019-09-19 RX ADMIN — PREDNISONE 40 MG: 20 TABLET ORAL at 09:52

## 2019-09-19 RX ADMIN — DICLOFENAC 2 G: 10 GEL TOPICAL at 14:19

## 2019-09-19 RX ADMIN — LORATADINE 10 MG: 10 TABLET ORAL at 09:51

## 2019-09-19 RX ADMIN — DICLOFENAC 2 G: 10 GEL TOPICAL at 16:27

## 2019-09-19 RX ADMIN — ALPRAZOLAM 0.5 MG: 0.5 TABLET ORAL at 09:56

## 2019-09-19 ASSESSMENT — ACTIVITIES OF DAILY LIVING (ADL)
ADLS_ACUITY_SCORE: 22
ADLS_ACUITY_SCORE: 23
ADLS_ACUITY_SCORE: 22

## 2019-09-19 ASSESSMENT — MIFFLIN-ST. JEOR: SCORE: 1230.75

## 2019-09-19 NOTE — PLAN OF CARE
Patient alert, confused/forgetful. Remains on 3 LPM O2 at this time, had weaned patient down to 1 LPM during the shift but patient didn't tolerate and had to gradually increase O2 back up to 3 LPM. Lungs are diminished with expiratory wheezes. Infrequent congested cough. Afebrile. Telemetry: SR 90's per written report from ICU. Continuous pulse oximeter on. Bowel sounds active. Patient up in chair for breakfast and lunch. Patient wanted to sleep after breakfast and needed encouragement to get up again for lunch. Patient anxious and requested PRN Xanax once this shift. Alarms on, call light within reach, makes needs known. Free from falls this shift.     Face to face report given with opportunity to observe patient.    Report given to Denise Pinto RN   9/19/2019  3:38 PM

## 2019-09-19 NOTE — PROGRESS NOTES
[facility]    Hospitalist Progress Note      Assessment & Plan   Sydni Hardin is a 69 year old female who was admitted on 9/17/2019.      1.  Acute on chronic respiratory failure with hypoxemia and hypercapnia: due to severe COPD with an acute exacerbation probably from bronchitis.  No signs of an obvious pneumonia.  She is currently on 3L NC, however wheezing is still significant.    2.  COPD exacerbation secondary to probable bronchitis: Long-standing COPD and a significant ongoing smoker.  Had a lot of upper airway rhonchi.  Chest x-ray without any obvious infiltrate. WBC increasing to 18K today, likely due to steroids.  Continue weaning down her O2 today continue with the steroids and antibiotics.    3.  Elevated lactic acid: resolved.  no signs of sepsis in this patient.  Her blood pressure is 130 heart rates in the 80s she is afebrile I think this is just due to her underlying work of breathing.     4.  Hypertension: Blood pressure bp somewhat elevated into the 150s systolic.  Will look to restart her Benzapril hydrochlorothiazide at some point.    5.  Mental status: Somnolent today.  Will reassess later in the day when she is more awake.      Diet: Combination Diet Regular Diet Adult  Fluids: iv lock    DVT Prophylaxis: Enoxaparin (Lovenox) SQ  Code Status: Full Code    Disposition: Expected discharge in 1-2 days once pulmonary status stable.    Matias Iniguez MD    Interval History   Patient seen in room.  Somnolent, easily awaken but falls to sleep quickly again.  States that she had a rough night, did not sleep well.  Denies pain.  Otherwise no acute events overnight, no new symptoms.    -Data reviewed today: I reviewed all new labs and imaging results over the last 24 hours. I personally reviewed no images or EKG's today.    Physical Exam   Temp: 97.6  F (36.4  C) Temp src: Tympanic BP: 154/56   Heart Rate: 68 Resp: 24 SpO2: 96 % O2 Device: Nasal cannula with humidification Oxygen  Delivery: 4 LPM(turned to 3)  Vitals:    09/17/19 0505 09/18/19 0548 09/19/19 0614   Weight: 82.6 kg (182 lb 1.6 oz) 82.1 kg (181 lb) 82.1 kg (181 lb)     Vital Signs with Ranges  Temp:  [97.1  F (36.2  C)-98.7  F (37.1  C)] 97.6  F (36.4  C)  Heart Rate:  [68-93] 68  Resp:  [22-28] 24  BP: (117-154)/(56-89) 154/56  SpO2:  [85 %-96 %] 96 %  I/O last 3 completed shifts:  In: 810 [P.O.:810]  Out: 175 [Urine:175]    Peripheral IV 09/18/19 Right;Dorsal Hand (Active)   Site Assessment WDL 9/19/2019  1:08 AM   Line Status Saline locked 9/19/2019  1:08 AM   Phlebitis Scale 0-->no symptoms 9/19/2019  1:08 AM   Infiltration Scale 0 9/19/2019  1:08 AM   Number of days: 1       Wound 10/16/13 Right Finger (Comment which one) (Active)   Number of days: 2164       Wound 10/16/13 Lip (Active)   Number of days: 2164       Wound 08/08/14 Right Arm Abrasion(s) (Active)   Number of days: 1868       Rash 09/17/19 0701 lower groin (Active)   Distribution regional 9/19/2019  1:09 AM   Configuration/Shape asymmetric 9/19/2019  1:09 AM   Borders irregular 9/19/2019  1:09 AM   Characteristics dry;raised;edema 9/19/2019  1:09 AM   Color pink;red 9/19/2019  1:09 AM   Care, Rash open to air;other (see comments) 9/19/2019  1:09 AM   Number of days: 2       Incision/Surgical Site 03/21/17 Right Knee (Active)   Number of days: 912     No line/device    Constitutional: Somnolent. No distress  HEENT: Normocephalic/atraumatic, PERRL, EOMI, mouth moist, neck supple, no LN.   Cardiovascular: RRR. no Murmur, no  rubs, or gallops.  JVD no.  Bruits no.  Pulses 2+  Respiratory: b/l wheezes, audible without auscultation  Abdomen: Soft, nontender, nondistended, no organomegaly. Bowel sounds present  Extremities: Warm/dry. No edema  Neuro:   Non focal, cranial nerves intact, Moves all extremities.    Medications       aspirin  81 mg Oral Daily     atorvastatin  40 mg Oral Daily     azithromycin  250 mg Oral Daily     diclofenac  2 g Topical 4x Daily      doxepin  10 mg Oral At Bedtime     enoxaparin  40 mg Subcutaneous Q24H     escitalopram  20 mg Oral Daily     fluticasone-vilanterol  1 puff Inhalation Daily     guaiFENesin  600 mg Oral BID     ipratropium - albuterol 0.5 mg/2.5 mg/3 mL  3 mL Nebulization Q4H While awake     loratadine  10 mg Oral Daily     miconazole   Topical BID     montelukast  10 mg Oral Daily     nicotine  1 patch Transdermal Daily     nicotine   Transdermal Q8H     nicotine   Transdermal Daily     nystatin  500,000 Units Oral 4x Daily     oxybutynin ER  5 mg Oral Daily     pneumococcal vaccine  0.5 mL Intramuscular Prior to discharge     potassium chloride ER  10 mEq Oral BID     predniSONE  40 mg Oral Daily     ranitidine  150 mg Oral At Bedtime     sodium chloride (PF)  3 mL Intracatheter Q8H     triamcinolone   Topical BID       Data   Recent Labs   Lab 09/19/19  0510 09/18/19  0509 09/17/19  0222   WBC 18.0* 15.8* 8.2   HGB 13.3 13.4 14.9   MCV 88 90 90    266 268   * 131* 139   POTASSIUM 4.3 3.5 3.1*   CHLORIDE 96 97 102   CO2 28 24 30   BUN 23 21 18   CR 0.78 0.82 0.92   ANIONGAP 6 10 7   JOSÉ 8.7 8.8 9.0   * 205* 122*       No results found for this or any previous visit (from the past 24 hour(s)).

## 2019-09-19 NOTE — PHARMACY
[facility]    Pharmacy    Antimicrobial Stewardship Note     Current antimicrobial therapy:  Anti-infectives (From now, onward)    Start     Dose/Rate Route Frequency Ordered Stop    09/17/19 2100  azithromycin (ZITHROMAX) tablet 250 mg      250 mg Oral DAILY 09/17/19 0438          Indication: COPD exacerbation     Days of Therapy: 4 (received 500 mg dose 9/17 at 0314 + 250 mg dose 9/17 at 2104)     Pertinent labs:  Creatinine   Creatinine   Date Value Ref Range Status   09/19/2019 0.78 0.52 - 1.04 mg/dL Final   09/18/2019 0.82 0.52 - 1.04 mg/dL Final   09/17/2019 0.92 0.52 - 1.04 mg/dL Final     WBC   WBC   Date Value Ref Range Status   09/19/2019 18.0 (H) 4.0 - 11.0 10e9/L Final   09/18/2019 15.8 (H) 4.0 - 11.0 10e9/L Final   09/17/2019 8.2 4.0 - 11.0 10e9/L Final     Procalcitonin   Procalcitonin   Date Value Ref Range Status   09/17/2019 <0.05 ng/ml Final     Comment:     <0.05 ng/ml  Normal  Recommendation: Very low risk of bacterial infection.   Discourage antibiotics unless strong clinical suspicion for serious infection.     09/24/2018 <0.05 ng/ml Final     Comment:     <0.05 ng/ml  Normal  Recommendation: Very low risk of bacterial infection.   Discourage antibiotics unless strong clinical suspicion for serious infection.       CRP   CRP Inflammation   Date Value Ref Range Status   03/25/2017 140.0 (H) 0.0 - 8.0 mg/L Final     Culture Results:   7-Day Micro Results   Procedure Component Value Units Date/Time   Active MRSA Surveillance Culture [[id number]] Collected: 09/17/19 0450   Order Status: Completed Lab Status: Final result Updated: 09/18/19 0724   Specimen: Swab from Nose     Specimen Description Swab    Culture Micro No MRSA isolated   Blood culture [[id number]] Collected: 09/17/19 0305   Order Status: Completed Lab Status: Preliminary result Updated: 09/19/19 0737   Specimen: Blood     Specimen Description Blood    Special Requests Right Arm    Culture Micro No growth after 2 days   Blood  culture [[id number]] Collected: 09/17/19 0252   Order Status: Completed Lab Status: Preliminary result Updated: 09/19/19 0737   Specimen: Blood     Specimen Description Blood    Special Requests --    Right Arm   8 ML     Culture Micro No growth after 2 days     Recommendations/Interventions:  1. WBC increasing--on prednisone 40 mg daily. Consider putting a stop date on the azithromycin therapy. A 5 day course of therapy would end after the dose on 9/20/19.     Chelsea Rodrigues RPH  September 19, 2019

## 2019-09-19 NOTE — PROGRESS NOTES
Checked in with Sydni.  Provided with [facility] booklet. Denies questions or concerns at this time. Will need to be qualified for O2 before discharge.  CTS will continue to remain available for support and resources.

## 2019-09-19 NOTE — PLAN OF CARE
VSS, afebrile, HRR, lungs have expiratory wheezes throughout, and are diminished, pt remains on 3L of O2 via NC with humidification, sats remain >90%. Bowels are acitive, pt denies passing gas, is incontinent of urine. Pt has scattered bruises through out and has a red yeasty rash in her lower abdominal folds, and her groin area. Pt is alert to self, and place, but can be forgetful to situation and time of day. Pt has slept well throughout the night, with no complaints of pain. Bed alarm is on. Pt is up with an assist of 1.    Face to face report given with opportunity to observe patient.    Report given to ALANIS Anderson RN   9/19/2019  7:32 AM

## 2019-09-19 NOTE — PLAN OF CARE
Face to face report given with opportunity to observe patient.    Report given to Beata. Patient stated that if she was ever to need to get transferred out she wants to go to [facility] and she also would like to speak to with a  about getting help at home. This was relayed to Beata along with patient's vitals.    Breanna Valente   9/18/2019  7:56 PM

## 2019-09-19 NOTE — PLAN OF CARE
DATE:  9/19/2019   TIME OF RECEIPT FROM LAB:  9817  LAB TEST:  Lactic acid  LAB VALUE:  2.2  RESULTS GIVEN WITH READ-BACK TO (PROVIDER):  Dr. Iniguez  TIME LAB VALUE REPORTED TO PROVIDER:   2997

## 2019-09-19 NOTE — PLAN OF CARE
Temp: 98.7  F (37.1  C) Temp src: Temporal BP: 117/70   Heart Rate: 93 Resp: 22 SpO2: (!) 90 % O2 Device: Nasal cannula Oxygen Delivery: 3 LPM    A&O, c/o paint to R knee area has been receiving voltaren cream which she states help with knee pain.  LS are dim with inspiratory and expiratory wheezes.  Does get dyspneic with exertion, tried to ween O2 further this shift, but pt continued to dip below 90%.  Continues to have productive cough, did receive tesslon once this shift and receiving neb treatments.  Pt was feeling a little stressed with family dynamic this shift, received xanax twice and would like to speak with  in the AM about increase in home services.  Juanita area was cleaned, cream and powder applied.  Pt was incontinent of urine this shift, up to the commode with Ax1 walker and gait belt.  Per ICU tele report SR 80's with 1st degree AVB.     Face to face report given with opportunity to observe patient.    Report given to Fredis Christianson RN   9/18/2019  11:31 PM

## 2019-09-20 LAB
ANION GAP SERPL CALCULATED.3IONS-SCNC: 6 MMOL/L (ref 3–14)
BUN SERPL-MCNC: 17 MG/DL (ref 7–30)
CALCIUM SERPL-MCNC: 9 MG/DL (ref 8.5–10.1)
CHLORIDE SERPL-SCNC: 96 MMOL/L (ref 94–109)
CO2 SERPL-SCNC: 29 MMOL/L (ref 20–32)
CREAT SERPL-MCNC: 0.74 MG/DL (ref 0.52–1.04)
ERYTHROCYTE [DISTWIDTH] IN BLOOD BY AUTOMATED COUNT: 12.4 % (ref 10–15)
GFR SERPL CREATININE-BSD FRML MDRD: 82 ML/MIN/{1.73_M2}
GLUCOSE SERPL-MCNC: 129 MG/DL (ref 70–99)
HCT VFR BLD AUTO: 40.8 % (ref 35–47)
HGB BLD-MCNC: 13.9 G/DL (ref 11.7–15.7)
LACTATE BLD-SCNC: 2.3 MMOL/L (ref 0.7–2)
MCH RBC QN AUTO: 29.7 PG (ref 26.5–33)
MCHC RBC AUTO-ENTMCNC: 34.1 G/DL (ref 31.5–36.5)
MCV RBC AUTO: 87 FL (ref 78–100)
PLATELET # BLD AUTO: 286 10E9/L (ref 150–450)
POTASSIUM SERPL-SCNC: 3.9 MMOL/L (ref 3.4–5.3)
RBC # BLD AUTO: 4.68 10E12/L (ref 3.8–5.2)
SODIUM SERPL-SCNC: 131 MMOL/L (ref 133–144)
WBC # BLD AUTO: 13.9 10E9/L (ref 4–11)

## 2019-09-20 PROCEDURE — 36415 COLL VENOUS BLD VENIPUNCTURE: CPT | Performed by: INTERNAL MEDICINE

## 2019-09-20 PROCEDURE — 94640 AIRWAY INHALATION TREATMENT: CPT

## 2019-09-20 PROCEDURE — 99232 SBSQ HOSP IP/OBS MODERATE 35: CPT | Performed by: INTERNAL MEDICINE

## 2019-09-20 PROCEDURE — 94640 AIRWAY INHALATION TREATMENT: CPT | Mod: 76

## 2019-09-20 PROCEDURE — 25000125 ZZHC RX 250: Performed by: HOSPITALIST

## 2019-09-20 PROCEDURE — 12000000 ZZH R&B MED SURG/OB

## 2019-09-20 PROCEDURE — 83605 ASSAY OF LACTIC ACID: CPT | Performed by: INTERNAL MEDICINE

## 2019-09-20 PROCEDURE — 25000132 ZZH RX MED GY IP 250 OP 250 PS 637: Performed by: HOSPITALIST

## 2019-09-20 PROCEDURE — 25000131 ZZH RX MED GY IP 250 OP 636 PS 637: Performed by: INTERNAL MEDICINE

## 2019-09-20 PROCEDURE — 85027 COMPLETE CBC AUTOMATED: CPT | Performed by: INTERNAL MEDICINE

## 2019-09-20 PROCEDURE — 80048 BASIC METABOLIC PNL TOTAL CA: CPT | Performed by: INTERNAL MEDICINE

## 2019-09-20 PROCEDURE — 40000275 ZZH STATISTIC RCP TIME EA 10 MIN

## 2019-09-20 PROCEDURE — 25000128 H RX IP 250 OP 636: Performed by: HOSPITALIST

## 2019-09-20 RX ADMIN — POTASSIUM CHLORIDE 10 MEQ: 750 CAPSULE, EXTENDED RELEASE ORAL at 10:01

## 2019-09-20 RX ADMIN — OXYBUTYNIN CHLORIDE 5 MG: 5 TABLET, EXTENDED RELEASE ORAL at 09:58

## 2019-09-20 RX ADMIN — NYSTATIN 500000 UNITS: 100000 SUSPENSION ORAL at 20:52

## 2019-09-20 RX ADMIN — AZITHROMYCIN 250 MG: 250 TABLET, FILM COATED ORAL at 20:49

## 2019-09-20 RX ADMIN — IPRATROPIUM BROMIDE AND ALBUTEROL SULFATE 3 ML: .5; 3 SOLUTION RESPIRATORY (INHALATION) at 15:19

## 2019-09-20 RX ADMIN — MICONAZOLE NITRATE: 2 POWDER TOPICAL at 10:06

## 2019-09-20 RX ADMIN — PREDNISONE 40 MG: 20 TABLET ORAL at 10:00

## 2019-09-20 RX ADMIN — DICLOFENAC 2 G: 10 GEL TOPICAL at 21:08

## 2019-09-20 RX ADMIN — NYSTATIN 500000 UNITS: 100000 SUSPENSION ORAL at 10:03

## 2019-09-20 RX ADMIN — IPRATROPIUM BROMIDE AND ALBUTEROL SULFATE 3 ML: .5; 3 SOLUTION RESPIRATORY (INHALATION) at 19:43

## 2019-09-20 RX ADMIN — DICLOFENAC 2 G: 10 GEL TOPICAL at 10:06

## 2019-09-20 RX ADMIN — ENOXAPARIN SODIUM 40 MG: 40 INJECTION SUBCUTANEOUS at 04:50

## 2019-09-20 RX ADMIN — ATORVASTATIN CALCIUM 40 MG: 40 TABLET, FILM COATED ORAL at 10:01

## 2019-09-20 RX ADMIN — IPRATROPIUM BROMIDE AND ALBUTEROL SULFATE 3 ML: .5; 3 SOLUTION RESPIRATORY (INHALATION) at 23:05

## 2019-09-20 RX ADMIN — GUAIFENESIN 600 MG: 600 TABLET, EXTENDED RELEASE ORAL at 20:49

## 2019-09-20 RX ADMIN — NICOTINE 1 PATCH: 21 PATCH, EXTENDED RELEASE TRANSDERMAL at 10:03

## 2019-09-20 RX ADMIN — POTASSIUM CHLORIDE 10 MEQ: 750 CAPSULE, EXTENDED RELEASE ORAL at 20:50

## 2019-09-20 RX ADMIN — ASPIRIN 81 MG: 81 TABLET, COATED ORAL at 09:57

## 2019-09-20 RX ADMIN — TRIAMCINOLONE ACETONIDE: 1 CREAM TOPICAL at 21:08

## 2019-09-20 RX ADMIN — TEMAZEPAM 15 MG: 7.5 CAPSULE ORAL at 02:20

## 2019-09-20 RX ADMIN — ESCITALOPRAM OXALATE 20 MG: 10 TABLET ORAL at 09:59

## 2019-09-20 RX ADMIN — RANITIDINE 150 MG: 150 TABLET ORAL at 20:50

## 2019-09-20 RX ADMIN — MONTELUKAST 10 MG: 10 TABLET, FILM COATED ORAL at 09:58

## 2019-09-20 RX ADMIN — DICLOFENAC 2 G: 10 GEL TOPICAL at 16:24

## 2019-09-20 RX ADMIN — IPRATROPIUM BROMIDE AND ALBUTEROL SULFATE 3 ML: .5; 3 SOLUTION RESPIRATORY (INHALATION) at 02:26

## 2019-09-20 RX ADMIN — ALPRAZOLAM 1 MG: 0.5 TABLET ORAL at 23:50

## 2019-09-20 RX ADMIN — IPRATROPIUM BROMIDE AND ALBUTEROL SULFATE 3 ML: .5; 3 SOLUTION RESPIRATORY (INHALATION) at 11:22

## 2019-09-20 RX ADMIN — ALPRAZOLAM 0.5 MG: 0.5 TABLET ORAL at 21:33

## 2019-09-20 RX ADMIN — ALPRAZOLAM 0.5 MG: 0.5 TABLET ORAL at 20:49

## 2019-09-20 RX ADMIN — MICONAZOLE NITRATE: 2 POWDER TOPICAL at 21:08

## 2019-09-20 RX ADMIN — IPRATROPIUM BROMIDE AND ALBUTEROL SULFATE 3 ML: .5; 3 SOLUTION RESPIRATORY (INHALATION) at 07:02

## 2019-09-20 RX ADMIN — GUAIFENESIN 600 MG: 600 TABLET, EXTENDED RELEASE ORAL at 10:02

## 2019-09-20 RX ADMIN — NYSTATIN 500000 UNITS: 100000 SUSPENSION ORAL at 16:24

## 2019-09-20 RX ADMIN — FLUTICASONE FUROATE AND VILANTEROL TRIFENATATE 1 PUFF: 200; 25 POWDER RESPIRATORY (INHALATION) at 07:02

## 2019-09-20 RX ADMIN — DOXEPIN HYDROCHLORIDE 10 MG: 10 CAPSULE ORAL at 20:50

## 2019-09-20 RX ADMIN — NYSTATIN 500000 UNITS: 100000 SUSPENSION ORAL at 12:50

## 2019-09-20 RX ADMIN — TRIAMCINOLONE ACETONIDE: 1 CREAM TOPICAL at 10:06

## 2019-09-20 RX ADMIN — LORATADINE 10 MG: 10 TABLET ORAL at 09:57

## 2019-09-20 RX ADMIN — DICLOFENAC 2 G: 10 GEL TOPICAL at 12:49

## 2019-09-20 ASSESSMENT — ACTIVITIES OF DAILY LIVING (ADL)
ADLS_ACUITY_SCORE: 22

## 2019-09-20 ASSESSMENT — MIFFLIN-ST. JEOR: SCORE: 1230.75

## 2019-09-20 NOTE — PLAN OF CARE
Face to face report given with opportunity to observe patient.    Report given to ALANIS Maya.    Denise Abad RN   9/19/2019  11:20 PM

## 2019-09-20 NOTE — PLAN OF CARE
Face to face report given with opportunity to observe patient.    Report given to Monica Leiva   9/20/2019  8:20 AM

## 2019-09-20 NOTE — PROGRESS NOTES
Remains on 4 L O2. Sats 93% when patient is sleeping, does de-sat into the mid to upper 80's with activity. Crackles in lung bases with insp/exp wheezes throughout. Neb given earlier in shift. Is quite dyspneic with any activity. Episodes of frequent cough, refused Tessalon. Restoril was given later tonight per patient request d/t trouble sleeping which was causing her to have anxiety.

## 2019-09-20 NOTE — PLAN OF CARE
Pt afebrile. VSS. HR increases with exertion (100-120s) and SaO2 high 80s-low 90s on 3LPM. RT increased O2 to 4LPM. SaO2 remaining in low 90s on 4LPM of humidified O2. No c/o pain.    Pt c/o anxiety. PRN xanax administered at 1920.  R: labored, dyspneic, shortness of breath. Lungs: wheezing throughout. Dry, hacking cough at bedtime. PRN tessalon administered at 2223. Abd: soft and nontender. Active bowel sounds. Regular diet tolerated well. Abd/groin folds: reddened. Cleansed, dried, and medications applied. Pt Ax1 with a walker and gait belt.     ABX: PO zithromycin. PIV saline-locked.

## 2019-09-20 NOTE — PROGRESS NOTES
[facility]    Hospitalist Progress Note      Assessment & Plan   Sydni Hardin is a 69 year old female who was admitted on 9/17/2019.      1.  Acute on chronic respiratory failure with hypoxemia and hypercapnia: due to severe COPD with an acute exacerbation probably from bronchitis.  No signs of an obvious pneumonia.  She is currently on 4L NC  - wean O2 as able    2.  COPD exacerbation secondary to probable bronchitis: Long-standing COPD and a significant ongoing smoker.  Had a lot of upper airway rhonchi.  Chest x-ray without any obvious infiltrate. WBC improved to 13.9K.  Continue weaning down her O2 today continue with the steroids and antibiotics.    3.  Elevated lactic acid: resolved.  no signs of sepsis in this patient.  Her blood pressure is 130 heart rates in the 80s she is afebrile    4.  Hypertension: Blood pressure bp somewhat elevated into the 150s systolic.  Will look to restart her Benzapril hydrochlorothiazide at some point.    5.  Mental status: this is back to baseline now.     Diet: Combination Diet Regular Diet Adult  Fluids: iv lock    DVT Prophylaxis: Enoxaparin (Lovenox) SQ  Code Status: Full Code    Disposition: Expected discharge in 1-2 days once pulmonary status stable.    Matias Iniguez MD    Interval History   Patient seen in room.  AA, conversant today.  Breathing has improved subjectively, wheezing is better.  Denies pain.  Otherwise no acute events overnight, no new symptoms.    -Data reviewed today: I reviewed all new labs and imaging results over the last 24 hours. I personally reviewed no images or EKG's today.    Physical Exam   Temp: 97.7  F (36.5  C) Temp src: Tympanic BP: 122/78 Pulse: 80 Heart Rate: 92 Resp: 25 SpO2: (!) 90 % O2 Device: Nasal cannula with humidification Oxygen Delivery: 4 LPM  Vitals:    09/18/19 0548 09/19/19 0614 09/20/19 0603   Weight: 82.1 kg (181 lb) 82.1 kg (181 lb) 82.1 kg (181 lb)     Vital Signs with Ranges  Temp:  [97.3  F (36.3   C)-98.6  F (37  C)] 97.7  F (36.5  C)  Pulse:  [80-90] 80  Heart Rate:  [75-94] 92  Resp:  [20-28] 25  BP: (122-173)/(63-98) 122/78  SpO2:  [87 %-95 %] 90 %  I/O last 3 completed shifts:  In: 240 [P.O.:240]  Out: 2050 [Urine:2050]    Peripheral IV 09/18/19 Right;Dorsal Hand (Active)   Site Assessment WDL 9/20/2019  8:11 AM   Line Status Saline locked 9/20/2019  8:11 AM   Phlebitis Scale 0-->no symptoms 9/20/2019  8:11 AM   Infiltration Scale 0 9/20/2019  8:11 AM   Extravasation? No 9/19/2019  4:24 PM   Number of days: 2       Wound 10/16/13 Right Finger (Comment which one) (Active)   Number of days: 2165       Wound 10/16/13 Lip (Active)   Number of days: 2165       Wound 08/08/14 Right Arm Abrasion(s) (Active)   Number of days: 1869       Rash 09/17/19 0701 lower groin (Active)   Distribution regional 9/19/2019  8:16 PM   Configuration/Shape asymmetric 9/19/2019  8:16 PM   Borders irregular 9/19/2019  8:16 PM   Characteristics dry;itching 9/19/2019  8:16 PM   Color pink;red 9/19/2019  8:16 PM   Care, Rash cleansed with;soap and water;open to air 9/19/2019  8:16 PM   Number of days: 3       Incision/Surgical Site 03/21/17 Right Knee (Active)   Number of days: 913     No line/device    Constitutional: AA, NAD. No distress  HEENT: Normocephalic/atraumatic, PERRL, EOMI, mouth moist, neck supple, no LN.   Cardiovascular: RRR. no Murmur, no  rubs, or gallops.  JVD no.  Bruits no.  Pulses 2+  Respiratory: reduced bs b/l, improved from yesterday  Abdomen: Soft, nontender, nondistended, no organomegaly. Bowel sounds present  Extremities: Warm/dry. No edema  Neuro:   Non focal, cranial nerves intact, Moves all extremities.    Medications       aspirin  81 mg Oral Daily     atorvastatin  40 mg Oral Daily     azithromycin  250 mg Oral Daily     diclofenac  2 g Topical 4x Daily     doxepin  10 mg Oral At Bedtime     enoxaparin  40 mg Subcutaneous Q24H     escitalopram  20 mg Oral Daily     fluticasone-vilanterol  1 puff  Inhalation Daily     guaiFENesin  600 mg Oral BID     ipratropium - albuterol 0.5 mg/2.5 mg/3 mL  3 mL Nebulization Q4H While awake     loratadine  10 mg Oral Daily     miconazole   Topical BID     montelukast  10 mg Oral Daily     nicotine  1 patch Transdermal Daily     nicotine   Transdermal Q8H     nicotine   Transdermal Daily     nystatin  500,000 Units Oral 4x Daily     oxybutynin ER  5 mg Oral Daily     pneumococcal vaccine  0.5 mL Intramuscular Prior to discharge     potassium chloride ER  10 mEq Oral BID     predniSONE  40 mg Oral Daily     ranitidine  150 mg Oral At Bedtime     sodium chloride (PF)  3 mL Intracatheter Q8H     triamcinolone   Topical BID       Data   Recent Labs   Lab 09/20/19  0511 09/19/19  0510 09/18/19  0509   WBC 13.9* 18.0* 15.8*   HGB 13.9 13.3 13.4   MCV 87 88 90    270 266   * 130* 131*   POTASSIUM 3.9 4.3 3.5   CHLORIDE 96 96 97   CO2 29 28 24   BUN 17 23 21   CR 0.74 0.78 0.82   ANIONGAP 6 6 10   JOSÉ 9.0 8.7 8.8   * 136* 205*       No results found for this or any previous visit (from the past 24 hour(s)).

## 2019-09-20 NOTE — TREATMENT PLAN
Lactic acid level 2.3. Pt. Does not have sepsis clinically. Her lactic acid levels are elevated due to respiratory failure leading to accessory muscle use. Will stop checking lactic acid levels.

## 2019-09-20 NOTE — PROVIDER NOTIFICATION
Dr. Lindsay in room seeing patient. Notified MD that Sepsis BPA fired, per MD no lactic ordered. Patient sitting up at edge of bed speaking with MD.

## 2019-09-20 NOTE — PLAN OF CARE
Patient alert, oriented with intermittent confusion. Afebrile. RR remains mid 20's. Patient was on 4 LPM O2 at the start of shift. Patient is on 3.5 LPM O2 with Sats 90-91%. Per UA, patient did walk in hallway x1 with patient's O2 sats dropping to 87% and P 130-140 per UA. Patient's Sats remained 87% and required O2 be turned up from 3 to 3.5 LPM by this writer as well as patient in tripod position, and working on breathing exercises. Patient's Sats increased to 89% and several minutes later up to 90/91%. Patient continues to have expiratory wheezes. Continues with neb treatments, PO Zithromax, and PO Prednisone. Patient has poor activity tolerance, takes several minutes to recover from ambulating even short distances at this time. Patient continues with infrequent cough. Tachycardic at times. Patient was back in bed asleep and had to be woke up before this writer could bring morning meds. Continues to want to nap after breakfast and lunch. Reviewed plan of care to continue to try to wean O2 and to continue to work with patient on ambulating/tolerating activity in order to be able to discharge. Patient and daughter Liz agreeable to plan of care and that patient is not quite ready to return home at this time. Patient remains on telemetry, IV is saline locked and patent. Alarms on, call within reach, makes needs known. Free from falls this shift.     Face to face report given with opportunity to observe patient.    Report given to Kirit Pinto RN   9/20/2019  3:30 PM

## 2019-09-21 LAB
ANION GAP SERPL CALCULATED.3IONS-SCNC: 4 MMOL/L (ref 3–14)
BUN SERPL-MCNC: 15 MG/DL (ref 7–30)
CALCIUM SERPL-MCNC: 8.9 MG/DL (ref 8.5–10.1)
CHLORIDE SERPL-SCNC: 98 MMOL/L (ref 94–109)
CO2 SERPL-SCNC: 31 MMOL/L (ref 20–32)
CREAT SERPL-MCNC: 0.8 MG/DL (ref 0.52–1.04)
GFR SERPL CREATININE-BSD FRML MDRD: 74 ML/MIN/{1.73_M2}
GLUCOSE SERPL-MCNC: 117 MG/DL (ref 70–99)
POTASSIUM SERPL-SCNC: 3.9 MMOL/L (ref 3.4–5.3)
SODIUM SERPL-SCNC: 133 MMOL/L (ref 133–144)

## 2019-09-21 PROCEDURE — 25000132 ZZH RX MED GY IP 250 OP 250 PS 637: Performed by: HOSPITALIST

## 2019-09-21 PROCEDURE — 40000275 ZZH STATISTIC RCP TIME EA 10 MIN

## 2019-09-21 PROCEDURE — 94640 AIRWAY INHALATION TREATMENT: CPT

## 2019-09-21 PROCEDURE — 80048 BASIC METABOLIC PNL TOTAL CA: CPT | Performed by: INTERNAL MEDICINE

## 2019-09-21 PROCEDURE — 25000125 ZZHC RX 250: Performed by: HOSPITALIST

## 2019-09-21 PROCEDURE — 12000000 ZZH R&B MED SURG/OB

## 2019-09-21 PROCEDURE — 99232 SBSQ HOSP IP/OBS MODERATE 35: CPT | Performed by: INTERNAL MEDICINE

## 2019-09-21 PROCEDURE — 25000131 ZZH RX MED GY IP 250 OP 636 PS 637: Performed by: INTERNAL MEDICINE

## 2019-09-21 PROCEDURE — 36415 COLL VENOUS BLD VENIPUNCTURE: CPT | Performed by: INTERNAL MEDICINE

## 2019-09-21 PROCEDURE — 94640 AIRWAY INHALATION TREATMENT: CPT | Mod: 76

## 2019-09-21 PROCEDURE — 25000128 H RX IP 250 OP 636: Performed by: HOSPITALIST

## 2019-09-21 RX ADMIN — NYSTATIN 500000 UNITS: 100000 SUSPENSION ORAL at 16:01

## 2019-09-21 RX ADMIN — MICONAZOLE NITRATE: 2 POWDER TOPICAL at 09:42

## 2019-09-21 RX ADMIN — ESCITALOPRAM OXALATE 20 MG: 10 TABLET ORAL at 09:27

## 2019-09-21 RX ADMIN — DICLOFENAC 2 G: 10 GEL TOPICAL at 09:42

## 2019-09-21 RX ADMIN — ALPRAZOLAM 1 MG: 0.5 TABLET ORAL at 23:52

## 2019-09-21 RX ADMIN — GUAIFENESIN 600 MG: 600 TABLET, EXTENDED RELEASE ORAL at 09:28

## 2019-09-21 RX ADMIN — TRIAMCINOLONE ACETONIDE: 1 CREAM TOPICAL at 09:42

## 2019-09-21 RX ADMIN — LORATADINE 10 MG: 10 TABLET ORAL at 09:33

## 2019-09-21 RX ADMIN — PREDNISONE 40 MG: 20 TABLET ORAL at 09:34

## 2019-09-21 RX ADMIN — NYSTATIN 500000 UNITS: 100000 SUSPENSION ORAL at 21:08

## 2019-09-21 RX ADMIN — ATORVASTATIN CALCIUM 40 MG: 40 TABLET, FILM COATED ORAL at 09:32

## 2019-09-21 RX ADMIN — IPRATROPIUM BROMIDE AND ALBUTEROL SULFATE 3 ML: .5; 3 SOLUTION RESPIRATORY (INHALATION) at 17:21

## 2019-09-21 RX ADMIN — FLUTICASONE FUROATE AND VILANTEROL TRIFENATATE 1 PUFF: 200; 25 POWDER RESPIRATORY (INHALATION) at 08:38

## 2019-09-21 RX ADMIN — DICLOFENAC 2 G: 10 GEL TOPICAL at 16:02

## 2019-09-21 RX ADMIN — NYSTATIN 500000 UNITS: 100000 SUSPENSION ORAL at 09:35

## 2019-09-21 RX ADMIN — DICLOFENAC 2 G: 10 GEL TOPICAL at 21:22

## 2019-09-21 RX ADMIN — GUAIFENESIN 600 MG: 600 TABLET, EXTENDED RELEASE ORAL at 21:08

## 2019-09-21 RX ADMIN — ENOXAPARIN SODIUM 40 MG: 40 INJECTION SUBCUTANEOUS at 05:11

## 2019-09-21 RX ADMIN — MONTELUKAST 10 MG: 10 TABLET, FILM COATED ORAL at 09:32

## 2019-09-21 RX ADMIN — ALPRAZOLAM 1 MG: 0.5 TABLET ORAL at 15:39

## 2019-09-21 RX ADMIN — IPRATROPIUM BROMIDE AND ALBUTEROL SULFATE 3 ML: .5; 3 SOLUTION RESPIRATORY (INHALATION) at 12:48

## 2019-09-21 RX ADMIN — NICOTINE 1 PATCH: 21 PATCH, EXTENDED RELEASE TRANSDERMAL at 09:36

## 2019-09-21 RX ADMIN — AZITHROMYCIN 250 MG: 250 TABLET, FILM COATED ORAL at 21:08

## 2019-09-21 RX ADMIN — DOXEPIN HYDROCHLORIDE 10 MG: 10 CAPSULE ORAL at 21:08

## 2019-09-21 RX ADMIN — DICLOFENAC 2 G: 10 GEL TOPICAL at 13:14

## 2019-09-21 RX ADMIN — ASPIRIN 81 MG: 81 TABLET, COATED ORAL at 09:33

## 2019-09-21 RX ADMIN — POTASSIUM CHLORIDE 10 MEQ: 750 CAPSULE, EXTENDED RELEASE ORAL at 09:32

## 2019-09-21 RX ADMIN — OXYBUTYNIN CHLORIDE 5 MG: 5 TABLET, EXTENDED RELEASE ORAL at 09:33

## 2019-09-21 RX ADMIN — BENZONATATE 100 MG: 100 CAPSULE ORAL at 23:52

## 2019-09-21 RX ADMIN — POTASSIUM CHLORIDE 10 MEQ: 750 CAPSULE, EXTENDED RELEASE ORAL at 21:08

## 2019-09-21 RX ADMIN — TRIAMCINOLONE ACETONIDE: 1 CREAM TOPICAL at 21:10

## 2019-09-21 RX ADMIN — IPRATROPIUM BROMIDE AND ALBUTEROL SULFATE 3 ML: .5; 3 SOLUTION RESPIRATORY (INHALATION) at 21:27

## 2019-09-21 RX ADMIN — RANITIDINE 150 MG: 150 TABLET ORAL at 21:08

## 2019-09-21 RX ADMIN — MICONAZOLE NITRATE: 2 POWDER TOPICAL at 21:10

## 2019-09-21 RX ADMIN — IPRATROPIUM BROMIDE AND ALBUTEROL SULFATE 3 ML: .5; 3 SOLUTION RESPIRATORY (INHALATION) at 08:38

## 2019-09-21 RX ADMIN — ALPRAZOLAM 1 MG: 0.5 TABLET ORAL at 09:32

## 2019-09-21 ASSESSMENT — ACTIVITIES OF DAILY LIVING (ADL)
ADLS_ACUITY_SCORE: 22
ADLS_ACUITY_SCORE: 21
ADLS_ACUITY_SCORE: 21
ADLS_ACUITY_SCORE: 22

## 2019-09-21 NOTE — PLAN OF CARE
"Reason for hospital stay:  Bronchitis / COPD Exac    Most recent vitals: /79 (BP Location: Left arm)   Pulse 80   Temp 97.2  F (36.2  C) (Oral)   Resp 26   Ht 1.473 m (4' 10\")   Wt 82.1 kg (181 lb)   SpO2 (!) 91%   BMI 37.83 kg/m      Pain Management:  Denied any pain this shift.     Orientation:  Oriented to self and place. Confused at times and forgetful. Talkative and pleasant, visiting with daughter much of evening.     Cardiac:  Tachy with HR increasing to 130s - 140s with activity and with coughing.     Respiratory:  Lungs diminished with expiratory wheezes throughout. Shortness of breath with minimal exertion. Dyspnea after walking (ambulated in oliva on 4LPM O2) - had to increase O2 to 5 LPM to maintain sats 90-91% for approx 1 hour until recovered after ambulating in oliva. Occasional congested coughing.     GI:  WDL - Fair appetite - ate buttered toast, jello and had coffee for supper.     :  Incontinent at times.    IVF:  SL    ABX:  Continues on PO Zithromax    Mobility:  Up with standby assist, gait belt, and walker. Ambulated to bathroom and in hallway. Up in chair for supper.     Safety:  Alarms on      Face to face report given with opportunity to observe patient.    Report given to Manasa Magallon RN   9/20/2019  11:33 PM        "

## 2019-09-21 NOTE — PLAN OF CARE
VSS, afebrile, HRR - telemetry reads SR 80's per ICU staff, lungs initially clear, and dim, then later coarse crackles throughout. Pt continues to have a loose, productive cough at times. Bowels are active, pt denies passing gas. Pt is incontinent of bladder. Alert and oriented x 4, makes her needs known, is anxious at times and forgetful. Received PRN Xanax 1x this shift. Pt stated that this does help her anxiety. Pt denies pain, but like the pain relief cream to her knees. Pt continues to have the red yeasty rash to her abdominal folds, however it appears to be improving.     Face to face report given with opportunity to observe patient.    Report given to ALANIS Beth RN   9/21/2019  4:25 PM

## 2019-09-21 NOTE — PLAN OF CARE
"Reason for hospital stay:  Patient is admitted for acute hypoxemic respiratory failure.     Most recent vitals: /82 (BP Location: Right arm)   Pulse 80   Temp 97.7  F (36.5  C) (Tympanic)   Resp 24   Ht 1.473 m (4' 10\")   Wt 82.1 kg (181 lb)   SpO2 93%   BMI 37.83 kg/m      Pain Management:  Patient denied pain.     Orientation:  Patient is alert and oriented most of the time. She does have some forgetfulness at times and some confusion at times.     Cardiac: Patient BP and HR are WNL. She is on tele and her rhythm according to ICU nurses is SR 80s. AP regular.     Respiratory:  Lung sounds this morning have some expiratory wheezes. She is on 3 1/2 L of O2 and her SpO2 is in the 93% range.     GI: Bowel sounds audible and active. Patient is passing gas.     :  Patient is voiding adequately.     Nutrition: Patient is on a regular diet.     Skin Issues: See PCS for skin assessment. Patient does have a rashy area we are treating.     IVF: Patient's IV leaked and needed removing during the night. Patient does not want one put back in. This nurse left a note for the physician asking if one needs to be put back in.     ABX:  PO Azithromycin.     Mobility: Patient is 1 assist with gb and WW to the bathroom and walking in the room. She is able to walk a very short distance in the oliva before her HR goes up quickly and she tires out.     Safety: Patient's bed alarm is on, call light in reach, bed in low position with brakes on.     Comments: Patient is very stressed out about her daughters fighting and about being in the hospital. She asks for xanax to be given three times a day like she takes it at home. Will pass this along to day shift.       9/21/2019  6:18 AM  Flaquita Valenzuela RN   "

## 2019-09-21 NOTE — PROGRESS NOTES
[facility]    Hospitalist Progress Note      Assessment & Plan   Sydni Hardin is a 69 year old female who was admitted on 9/17/2019.      1.  Acute on chronic respiratory failure with hypoxemia and hypercapnia: due to severe COPD with an acute exacerbation probably from bronchitis.  No signs of an obvious pneumonia.  She still has increased wob.  She is currently on 3.5L NC.  - wean O2 as able    2.  COPD exacerbation secondary to probable bronchitis: Long-standing COPD and a significant ongoing smoker.  Had a lot of upper airway rhonchi.  Chest x-ray without any obvious infiltrate. WBC improving.  Continue weaning down her O2 today continue with the steroids and antibiotics.    3.  Elevated lactic acid: resolved.  no signs of sepsis in this patient.     4.  Hypertension: Blood pressure bp somewhat elevated into the 150s systolic.  Will look to restart her Benzapril hydrochlorothiazide at some point.    5.  Mental status: this is back to baseline now.     Diet: Combination Diet Regular Diet Adult  Fluids: iv lock    DVT Prophylaxis: Enoxaparin (Lovenox) SQ  Code Status: Full Code    Disposition: Expected discharge in 1-2 days once pulmonary status stable.    Matias Iniguez MD    Interval History   Patient seen in room.  AA, conversant today.  Breathing still somewhat labored, wheezing is better.  Denies pain.  Otherwise no acute events overnight, no new symptoms.    -Data reviewed today: I reviewed all new labs and imaging results over the last 24 hours. I personally reviewed no images or EKG's today.    Physical Exam   Temp: 98.1  F (36.7  C) Temp src: Tympanic BP: 128/87   Heart Rate: 92 Resp: 24 SpO2: 92 % O2 Device: Nasal cannula with humidification Oxygen Delivery: 4 LPM  Vitals:    09/18/19 0548 09/19/19 0614 09/20/19 0603   Weight: 82.1 kg (181 lb) 82.1 kg (181 lb) 82.1 kg (181 lb)     Vital Signs with Ranges  Temp:  [97.2  F (36.2  C)-98.1  F (36.7  C)] 98.1  F (36.7  C)  Heart Rate:   [] 92  Resp:  [24-26] 24  BP: (128-142)/(79-87) 128/87  SpO2:  [87 %-93 %] 92 %  I/O last 3 completed shifts:  In: 1200 [P.O.:1200]  Out: 900 [Urine:900]    Wound 10/16/13 Right Finger (Comment which one) (Active)   Number of days: 2166       Wound 10/16/13 Lip (Active)   Number of days: 2166       Wound 08/08/14 Right Arm Abrasion(s) (Active)   Number of days: 1870       Rash 09/17/19 0701 lower groin (Active)   Distribution regional 9/21/2019  9:27 AM   Configuration/Shape asymmetric 9/21/2019  9:27 AM   Borders irregular 9/21/2019  9:27 AM   Characteristics dry;itching 9/21/2019  9:27 AM   Color pink;red 9/21/2019  9:27 AM   Care, Rash antimicrobial agent applied 9/21/2019  9:27 AM   Number of days: 4       Incision/Surgical Site 03/21/17 Right Knee (Active)   Number of days: 914     No line/device    Constitutional: AA, NAD. No distress  HEENT: Normocephalic/atraumatic, PERRL, EOMI, mouth moist, neck supple, no LN.   Cardiovascular: RRR. no Murmur, no  rubs, or gallops.  JVD no.  Bruits no.  Pulses 2+  Respiratory: reduced bs b/l, improved but still using accessory muscles   Abdomen: Soft, nontender, nondistended, no organomegaly. Bowel sounds present  Extremities: Warm/dry. No edema  Neuro:   Non focal, cranial nerves intact, Moves all extremities.    Medications       aspirin  81 mg Oral Daily     atorvastatin  40 mg Oral Daily     azithromycin  250 mg Oral Daily     diclofenac  2 g Topical 4x Daily     doxepin  10 mg Oral At Bedtime     enoxaparin  40 mg Subcutaneous Q24H     escitalopram  20 mg Oral Daily     fluticasone-vilanterol  1 puff Inhalation Daily     guaiFENesin  600 mg Oral BID     ipratropium - albuterol 0.5 mg/2.5 mg/3 mL  3 mL Nebulization Q4H While awake     loratadine  10 mg Oral Daily     miconazole   Topical BID     montelukast  10 mg Oral Daily     nicotine  1 patch Transdermal Daily     nicotine   Transdermal Q8H     nicotine   Transdermal Daily     nystatin  500,000 Units Oral 4x  Daily     oxybutynin ER  5 mg Oral Daily     pneumococcal vaccine  0.5 mL Intramuscular Prior to discharge     potassium chloride ER  10 mEq Oral BID     predniSONE  40 mg Oral Daily     ranitidine  150 mg Oral At Bedtime     sodium chloride (PF)  3 mL Intracatheter Q8H     triamcinolone   Topical BID       Data   Recent Labs   Lab 09/21/19  0530 09/20/19  0511 09/19/19  0510 09/18/19  0509   WBC  --  13.9* 18.0* 15.8*   HGB  --  13.9 13.3 13.4   MCV  --  87 88 90   PLT  --  286 270 266    131* 130* 131*   POTASSIUM 3.9 3.9 4.3 3.5   CHLORIDE 98 96 96 97   CO2 31 29 28 24   BUN 15 17 23 21   CR 0.80 0.74 0.78 0.82   ANIONGAP 4 6 6 10   JOSÉ 8.9 9.0 8.7 8.8   * 129* 136* 205*       No results found for this or any previous visit (from the past 24 hour(s)).

## 2019-09-21 NOTE — PHARMACY
[facility]    Pharmacy      Antimicrobial Stewardship Note     Current antimicrobial therapy:  Anti-infectives (From now, onward)    Start     Dose/Rate Route Frequency Ordered Stop    09/17/19 2100  azithromycin (ZITHROMAX) tablet 250 mg      250 mg Oral DAILY 09/17/19 0438            Indication: COPD exacerbation    Days of Therapy: 5      Pertinent labs:  Creatinine   Creatinine   Date Value Ref Range Status   09/21/2019 0.80 0.52 - 1.04 mg/dL Final   09/20/2019 0.74 0.52 - 1.04 mg/dL Final   09/19/2019 0.78 0.52 - 1.04 mg/dL Final     WBC   WBC   Date Value Ref Range Status   09/20/2019 13.9 (H) 4.0 - 11.0 10e9/L Final   09/19/2019 18.0 (H) 4.0 - 11.0 10e9/L Final   09/18/2019 15.8 (H) 4.0 - 11.0 10e9/L Final     Procalcitonin   Procalcitonin   Date Value Ref Range Status   09/17/2019 <0.05 ng/ml Final     Comment:     <0.05 ng/ml  Normal  Recommendation: Very low risk of bacterial infection.   Discourage antibiotics unless strong clinical suspicion for serious infection.     09/24/2018 <0.05 ng/ml Final     Comment:     <0.05 ng/ml  Normal  Recommendation: Very low risk of bacterial infection.   Discourage antibiotics unless strong clinical suspicion for serious infection.       CRP   CRP Inflammation   Date Value Ref Range Status   03/25/2017 140.0 (H) 0.0 - 8.0 mg/L Final       Culture Results:   Procedure Component Value Units Date/Time   Active MRSA Surveillance Culture [[id number]] Collected: 09/17/19 0450   Order Status: Completed Lab Status: Final result Updated: 09/18/19 0724   Specimen: Swab from Nose     Specimen Description Swab    Culture Micro No MRSA isolated   Blood culture [[id number]] Collected: 09/17/19 0305   Order Status: Completed Lab Status: Preliminary result Updated: 09/21/19 0618   Specimen: Blood     Specimen Description Blood    Special Requests Right Arm    Culture Micro No growth after 4 days   Blood culture [[id number]] Collected: 09/17/19 0252   Order Status: Completed Lab Status:  Preliminary result Updated: 09/21/19 0618   Specimen: Blood     Specimen Description Blood    Special Requests --    Right Arm   8 ML     Culture Micro No growth after 4 days          Recommendations/Interventions:  1. WBC improving. Consider discontinuing Zithromax, as patient will complete 5-day course after tonight's dose.     Janie Naranjo, [facility]  September 21, 2019

## 2019-09-21 NOTE — PLAN OF CARE
Face to face report given with opportunity to observe patient.  Report given to ALANIS Mcneal.    Flaquita Valenzuela RN  9/21/2019, 7:30 AM

## 2019-09-22 VITALS
OXYGEN SATURATION: 88 % | TEMPERATURE: 97.7 F | RESPIRATION RATE: 22 BRPM | BODY MASS INDEX: 38.04 KG/M2 | HEART RATE: 80 BPM | WEIGHT: 181.22 LBS | HEIGHT: 58 IN | DIASTOLIC BLOOD PRESSURE: 82 MMHG | SYSTOLIC BLOOD PRESSURE: 159 MMHG

## 2019-09-22 LAB
ANION GAP SERPL CALCULATED.3IONS-SCNC: 7 MMOL/L (ref 3–14)
BUN SERPL-MCNC: 18 MG/DL (ref 7–30)
CALCIUM SERPL-MCNC: 8.8 MG/DL (ref 8.5–10.1)
CHLORIDE SERPL-SCNC: 100 MMOL/L (ref 94–109)
CO2 SERPL-SCNC: 30 MMOL/L (ref 20–32)
CREAT SERPL-MCNC: 0.88 MG/DL (ref 0.52–1.04)
GFR SERPL CREATININE-BSD FRML MDRD: 66 ML/MIN/{1.73_M2}
GLUCOSE SERPL-MCNC: 96 MG/DL (ref 70–99)
POTASSIUM SERPL-SCNC: 4.1 MMOL/L (ref 3.4–5.3)
SODIUM SERPL-SCNC: 137 MMOL/L (ref 133–144)

## 2019-09-22 PROCEDURE — 80048 BASIC METABOLIC PNL TOTAL CA: CPT | Performed by: INTERNAL MEDICINE

## 2019-09-22 PROCEDURE — 40000275 ZZH STATISTIC RCP TIME EA 10 MIN

## 2019-09-22 PROCEDURE — 94640 AIRWAY INHALATION TREATMENT: CPT

## 2019-09-22 PROCEDURE — 25000125 ZZHC RX 250: Performed by: HOSPITALIST

## 2019-09-22 PROCEDURE — 25000131 ZZH RX MED GY IP 250 OP 636 PS 637: Performed by: INTERNAL MEDICINE

## 2019-09-22 PROCEDURE — 99239 HOSP IP/OBS DSCHRG MGMT >30: CPT | Performed by: INTERNAL MEDICINE

## 2019-09-22 PROCEDURE — 25000128 H RX IP 250 OP 636: Performed by: HOSPITALIST

## 2019-09-22 PROCEDURE — 94640 AIRWAY INHALATION TREATMENT: CPT | Mod: 76

## 2019-09-22 PROCEDURE — 25000132 ZZH RX MED GY IP 250 OP 250 PS 637: Performed by: HOSPITALIST

## 2019-09-22 PROCEDURE — 36415 COLL VENOUS BLD VENIPUNCTURE: CPT | Performed by: INTERNAL MEDICINE

## 2019-09-22 RX ORDER — PREDNISONE 20 MG/1
TABLET ORAL
Qty: 20 TABLET | Refills: 0 | Status: ON HOLD | OUTPATIENT
Start: 2019-09-22 | End: 2019-11-19

## 2019-09-22 RX ORDER — IPRATROPIUM BROMIDE AND ALBUTEROL SULFATE 2.5; .5 MG/3ML; MG/3ML
1 SOLUTION RESPIRATORY (INHALATION) EVERY 4 HOURS PRN
Qty: 90 ML | Refills: 3 | Status: ON HOLD | OUTPATIENT
Start: 2019-09-22 | End: 2019-11-19

## 2019-09-22 RX ORDER — ALBUTEROL SULFATE 90 UG/1
AEROSOL, METERED RESPIRATORY (INHALATION)
Qty: 18 G | Refills: 1 | Status: SHIPPED | OUTPATIENT
Start: 2019-09-22 | End: 2020-01-09

## 2019-09-22 RX ORDER — NICOTINE 21 MG/24HR
1 PATCH, TRANSDERMAL 24 HOURS TRANSDERMAL EVERY 24 HOURS
Qty: 10 PATCH | Refills: 1 | Status: ON HOLD | OUTPATIENT
Start: 2019-09-22 | End: 2019-11-20

## 2019-09-22 RX ORDER — BUDESONIDE AND FORMOTEROL FUMARATE DIHYDRATE 160; 4.5 UG/1; UG/1
2 AEROSOL RESPIRATORY (INHALATION) 2 TIMES DAILY
Qty: 1 INHALER | Refills: 11 | Status: SHIPPED | OUTPATIENT
Start: 2019-09-22 | End: 2020-01-09

## 2019-09-22 RX ADMIN — NYSTATIN 500000 UNITS: 100000 SUSPENSION ORAL at 09:19

## 2019-09-22 RX ADMIN — NICOTINE 1 PATCH: 21 PATCH, EXTENDED RELEASE TRANSDERMAL at 09:19

## 2019-09-22 RX ADMIN — ESCITALOPRAM OXALATE 20 MG: 10 TABLET ORAL at 09:14

## 2019-09-22 RX ADMIN — ASPIRIN 81 MG: 81 TABLET, COATED ORAL at 09:17

## 2019-09-22 RX ADMIN — PREDNISONE 40 MG: 20 TABLET ORAL at 09:17

## 2019-09-22 RX ADMIN — MONTELUKAST 10 MG: 10 TABLET, FILM COATED ORAL at 09:16

## 2019-09-22 RX ADMIN — POTASSIUM CHLORIDE 10 MEQ: 750 CAPSULE, EXTENDED RELEASE ORAL at 09:14

## 2019-09-22 RX ADMIN — FLUTICASONE FUROATE AND VILANTEROL TRIFENATATE 1 PUFF: 200; 25 POWDER RESPIRATORY (INHALATION) at 08:24

## 2019-09-22 RX ADMIN — OXYBUTYNIN CHLORIDE 5 MG: 5 TABLET, EXTENDED RELEASE ORAL at 09:17

## 2019-09-22 RX ADMIN — LORATADINE 10 MG: 10 TABLET ORAL at 09:18

## 2019-09-22 RX ADMIN — GUAIFENESIN 600 MG: 600 TABLET, EXTENDED RELEASE ORAL at 09:16

## 2019-09-22 RX ADMIN — ENOXAPARIN SODIUM 40 MG: 40 INJECTION SUBCUTANEOUS at 05:38

## 2019-09-22 RX ADMIN — BENZONATATE 100 MG: 100 CAPSULE ORAL at 05:43

## 2019-09-22 RX ADMIN — IPRATROPIUM BROMIDE AND ALBUTEROL SULFATE 3 ML: .5; 3 SOLUTION RESPIRATORY (INHALATION) at 12:28

## 2019-09-22 RX ADMIN — ATORVASTATIN CALCIUM 40 MG: 40 TABLET, FILM COATED ORAL at 09:14

## 2019-09-22 RX ADMIN — DICLOFENAC 2 G: 10 GEL TOPICAL at 09:19

## 2019-09-22 RX ADMIN — ALPRAZOLAM 1 MG: 0.5 TABLET ORAL at 09:15

## 2019-09-22 RX ADMIN — IPRATROPIUM BROMIDE AND ALBUTEROL SULFATE 3 ML: .5; 3 SOLUTION RESPIRATORY (INHALATION) at 08:24

## 2019-09-22 RX ADMIN — DICLOFENAC 2 G: 10 GEL TOPICAL at 13:58

## 2019-09-22 ASSESSMENT — ACTIVITIES OF DAILY LIVING (ADL)
ADLS_ACUITY_SCORE: 22

## 2019-09-22 ASSESSMENT — MIFFLIN-ST. JEOR: SCORE: 1231.75

## 2019-09-22 NOTE — PLAN OF CARE
"Reason for hospital stay:  Bronchitis / COPD Exac    Most recent vitals: /88   Pulse 80   Temp 98.5  F (36.9  C) (Tympanic)   Resp 22   Ht 1.473 m (4' 10\")   Wt 82.1 kg (181 lb)   SpO2 92%   BMI 37.83 kg/m      Pain Management:  Denied any pain this evening.    Orientation:  A+O x4 - forgetful at times    Cardiac:  WDL - tachy at times with HR 100s. Tele reading SR 90s per ICU nurse.     Respiratory:  Lungs clear but diminished throughout. Shortness of breath with minimal exertion. Encouraged coughing and deep breathing. Occasional congested cough became more frequent at HS - gave Tessalon with good relief. O2 weaned down to 2LPM with sats maintaining 89-92%.      GI:  WDL    :  Incontinent at times    Skin Issues:  Red areas to groin and abdominal folds - nystatin powder applied.     IVF:  No IV Access    AXB:  Continues on PO Zithromax    Mobility:  Up with standby assist - up in chair for supper. Ambulated in oliva.     Safety:  Alarms on       Face to face report given with opportunity to observe patient.    Report given to Fredis Magallon RN   9/22/2019  7:39 AM        "

## 2019-09-22 NOTE — PLAN OF CARE
Patient discharged at 3:06 PM via wheel chair accompanied by sister and staff. Prescriptions sent to patients preferred pharmacy. All belongings sent with patient.     Discharge instructions reviewed with Patient, daughter Charleen, and sister. Listed belongings gathered and returned to patient. yes    Patient discharged to home.     Core Measures and Vaccines  Core Measures applicable during stay: NoPneumonia and Influenza given prior to discharge, if indicated: N/A    Surgical Patient   Surgical Procedures during stay: No  Did patient receive discharge instruction on wound care and recognition of infection symptoms? N/A    MISC  Follow up appointment made:  Yes  Home and hospital aquired medications returned to patient: Yes  Patient reports pain was well managed at discharge: Yes    Prednisone called into [email or web address] in [place], spoke to Greta @ pharmacy.     Pt to take 1 (20mg) by mouth 2x daily x 5 days, then 1 tab (20mg) daily for 7 days, and then 0.5 tablet (10 mg) x 7 days.     Prednisone dosing reviewed with Dr. Mcdowell prior to contacting pharmacy.

## 2019-09-22 NOTE — PROGRESS NOTES
"Name: Sydni Hardin    MRN#: [id number]    Reason for Hospitalization: COPD with acute exacerbation (H) [J44.1]    MG: average    Discharge Date: 9/22/2019    Medicare IM letter reviewed with her last today    Patient / Family response to discharge plan: they understand and agree    At one point Sydni became agitated and said she was not going home and would be going to an assisted living in [place]. This has not been arranged by her nor voiced to family or staff prior to this moment. She was informed that AL placement takes advance notice and preparation that can sometimes take days to arrange. In response to this she said \"then I'm staying here!\"; and so she was informed that she is no longer medically appropriate to remain in a hospital setting and therefor would potentially have to pay privately for any time she remain here. She initially began to voice random reasons that she could remain (such as because her oxygen saturation was low enough), but quickly calmed and agreed she would go home. She was reminded that she can work with the clinic , [facility], or she or her family can work on assisted living placement from her home setting. Dtjesica Hill says she will plan to remain at home with Sydni as long as needed. They were provided the numbers for the [place] assisted livings, [facility] and the clinic.     Follow-Up Appt:   Future Appointments   Date Time Provider Department Center   9/24/2019  1:30 PM Gibran Alarcon MD [facility]   9/26/2019  2:00 PM Chino Zarate DO [facility]       Other Providers (Care Coordinator, County Services, PCA services etc): Yes: message left for [facility] and orders faxed    Discharge Disposition: home via karlos Blackwell CM RN      "

## 2019-09-22 NOTE — DOWNTIME EVENT NOTE
The EMR was down for 2.5 hours on 9/22/2019.    Kirit Magallon RN was responsible for completing the paper charting during this time period.     The following information was re-entered into the system by Kirit Magallon RN: Flowsheet data    The following information will remain in the paper chart: N/A    Kirit Magallon RN  9/22/2019

## 2019-09-22 NOTE — DISCHARGE SUMMARY
[facility]    Discharge Summary  Hospitalist    Date of Admission:  9/17/2019  Date of Discharge:  9/22/2019  Discharging Provider: Matias Iniguez MD  Date of Service (when I saw the patient): 09/22/19    Discharge Diagnoses   Principal Problem:    Acute hypoxemic respiratory failure (H) (9/24/2018)  Active Problems:    Benign essential hypertension (1/5/2012)    COPD exacerbation (H) (9/24/2018)    Obesity (BMI 35.0-39.9) with comorbidity (H) (1/14/2019)    Esophageal reflux (7/15/2019)    Acute respiratory failure (H) (9/17/2019)      History of Present Illness   Sydni Hardin is an 69 year old female who presented with sob.  Please see admission H+P for additional details.    Hospital Course   Sydni Hardin was admitted on 9/17/2019.  She is a 69-year-old female with a history of tobacco abuse, with COPD not previously on supplemental oxygen at home.  Was she comes in with acute hypoxemic respiratory failure secondary to COPD exacerbation.  She had profound wheezing as well as hypoxia on admission.  Her chest x-ray did not show convincing evidence of an infiltrate to suggest pneumonia, however she was empirically treated with azithromycin for 5 days.  Along with that, she had scheduled nebulizers and steroid burst, and she was able to be weaned to 2 L of supplemental oxygen.  At this time however it appears that she will need oxygen chronically at home.  We will discharge her with this.  In addition, her functional status at home is tenuous at best.  She would like to try a period at home before committing herself to transitional care.  We will ask home care to enroll her for further management of her respiratory status.    Matias Iniguez MD      Significant Results and Procedures   See below.    Pending Results   These results will be followed up by Gibran Alarcon    Unresulted Labs Ordered in the Past 30 Days of this Admission     Date and Time Order Name Status Description    9/17/2019  0227 Blood culture Preliminary     9/17/2019 0227 Blood culture Preliminary           Code Status   Full Code       Primary Care Physician   Gibran Alarcon    Physical Exam   Temp: 97.7  F (36.5  C) Temp src: Tympanic BP: 159/82   Heart Rate: 96 Resp: 22 SpO2: (!) 89 % O2 Device: Nasal cannula Oxygen Delivery: 2 LPM  Vitals:    09/19/19 0614 09/20/19 0603 09/22/19 0514   Weight: 82.1 kg (181 lb) 82.1 kg (181 lb) 82.2 kg (181 lb 3.5 oz)     Vital Signs with Ranges  Temp:  [97.7  F (36.5  C)-98.5  F (36.9  C)] 97.7  F (36.5  C)  Heart Rate:  [] 96  Resp:  [20-24] 22  BP: (124-159)/(82-88) 159/82  SpO2:  [85 %-93 %] 89 %  I/O last 3 completed shifts:  In: 500 [P.O.:500]  Out: 500 [Urine:500]    Constitutional: AA, NAD  Eyes: PERRLA, no injection, no icterus  HEENT: atraumatic, normocephalic  Respiratory: minimal wheezing b/l, reduced breath sounds b/l  Cardiovascular: S1 S2 RRR  GI: soft, NT, ND, + bowel sounds  Lymph/Hematologic: no palpable lymphadenopathy  Skin: no rashes, no lesions  Musculoskeletal: No edema, good tone, no deformities  Neurologic: oriented x 3, no focal deficits  Psychiatric: appropriate affect    Discharge Disposition   Discharged to home  Condition at discharge: Satisfactory    Consultations This Hospital Stay   RESPIRATORY CARE IP CONSULT  RESPIRATORY CARE IP CONSULT    Time Spent on this Encounter   IMatias MD, MD, personally saw the patient today and spent greater than 30 minutes discharging this patient.    Discharge Orders      Reason for your hospital stay    COPD exacerbation     Follow-up and recommended labs and tests     Follow up with primary care provider, Gibran Alarcon, within 7 days for hospital follow- up.  No follow up labs or test are needed.     Activity    Your activity upon discharge: activity as tolerated     When to contact your care team    Call your primary doctor if you have any of the following:  increased shortness of breath.     Full Code      Oxygen Adult    [facility] Order 2 liter(s) by nasal cannula continuously with use of portable tank. Expected treatment length is indefinite (99 months).. Test on conserving device as applicable.    Patients who qualify for home O2 coverage under the CMS guidelines require ABG tests or O2 sat readings obtained closest to, but no earlier than 2 days prior to the discharge, as evidence of the need for home oxygen therapy. Testing must be performed while patient is in the chronic stable state. See notes for O2 sats.    I certify that this patient, Sydni Hardin has been under my care and that I, or a nurse practitioner or physician's assistant working with me, had a face-to-face encounter that meets the face-to-face encounter requirements with this patient on September 22, 2019. The patient, Sydni Hardin was evaluated or treated in whole, or in part, for the following medical condition, which necessitates the use of the ordered oxygen. Treatment Diagnosis: COPD    Attending Provider: Matias Iniguez MD, MD  Physician signature: See electronic signature associated with these discharge orders    I certify that this patient, Sydni Hardin has been under my care and that I, or a nurse practitioner or physician's assistant working with me, had a face-to-face encounter that meets face-to-face encounter requirements with this patient on September 22, 2019.    Sydni Hardin is now in a chronic stable state and continues to require supplemental oxygen due to continued oxygen desaturation.  This patient has been treated in part, or in whole for the following medical condition(s):  COPD J44.9  Treatments tried and failed or ruled out to treat hypoxemia include nebs.  If portability is ordered, is the patient mobile within the home? yes  Date of Order: September 22, 2019     Diet    Follow this diet upon discharge: Orders Placed This Encounter      Combination Diet Regular Diet Adult     Discharge  Medications   Current Discharge Medication List      CONTINUE these medications which have CHANGED    Details   albuterol (VENTOLIN HFA) 108 (90 Base) MCG/ACT inhaler USE 1 PUFF FOUR TIMES A DAY AS NEEDED  Qty: 18 g, Refills: 1    Comments: Pharmacy may dispense brand covered by insurance (Proair, or proventil or ventolin or generic albuterol inhaler)  Associated Diagnoses: Tobacco abuse      budesonide-formoterol (SYMBICORT) 160-4.5 MCG/ACT Inhaler Inhale 2 puffs into the lungs 2 times daily As needed  Qty: 1 Inhaler, Refills: 11    Associated Diagnoses: Pulmonary emphysema, unspecified emphysema type (H)      ipratropium - albuterol 0.5 mg/2.5 mg/3 mL (DUONEB) 0.5-2.5 (3) MG/3ML neb solution Take 1 vial (3 mLs) by nebulization every 4 hours as needed for shortness of breath / dyspnea or wheezing  Qty: 90 mL, Refills: 3    Associated Diagnoses: Acute bronchospasm; SOB (shortness of breath)      predniSONE (DELTASONE) 20 MG tablet Take 1 tablet (20 mg) by mouth 2 times daily for 5 days, THEN 1 tablet (20 mg) daily for 7 days, THEN 0.5 tablets (10 mg) daily for 7 days. Take 3 tabs by mouth daily x 3 days, then 2 tabs daily x 3 days, then 1 tab daily x 3 days, then 1/2 tab daily x 3 days.  Qty: 20 tablet, Refills: 0    Associated Diagnoses: COPD exacerbation (H)         CONTINUE these medications which have NOT CHANGED    Details   ALPRAZolam (XANAX) 1 MG tablet Take 1 tablet (1 mg) by mouth 3 times daily as needed for anxiety Take 1 tablet by mouth daily as needed for anxiety  Qty: 90 tablet, Refills: 0    Associated Diagnoses: Anxiety      amoxicillin-clavulanate (AUGMENTIN) 875-125 MG tablet Take 1 tablet by mouth 2 times daily for 10 days  Qty: 20 tablet, Refills: 0    Associated Diagnoses: COPD exacerbation (H)      atorvastatin (LIPITOR) 40 MG tablet Take 1 tablet (40 mg) by mouth daily  Qty: 90 tablet, Refills: 3    Associated Diagnoses: Mixed hyperlipidemia      benazepril (LOTENSIN) 10 MG tablet TAKE 1 TABLET  BY MOUTH DAILY  Qty: 90 tablet, Refills: 2    Associated Diagnoses: Essential hypertension      benzonatate (TESSALON) 200 MG capsule TAKE 1 CAPSULE BY MOUTH 3 TIMES A DAY AS NEEDED FOR COUGH  Qty: 60 capsule, Refills: 3    Associated Diagnoses: Acute bronchitis, unspecified organism      butalbital-acetaminophen-caffeine (FIORICET/ESGIC) -40 MG tablet Take 1 tablet by mouth every 6 hours as needed for headaches  Qty: 30 tablet, Refills: 3    Associated Diagnoses: Nonintractable episodic headache, unspecified headache type      doxepin (SINEQUAN) 10 MG capsule Take 1 capsule (10 mg) by mouth At Bedtime  Qty: 90 capsule, Refills: 3    Associated Diagnoses: Primary osteoarthritis of right knee      escitalopram (LEXAPRO) 20 MG tablet TAKE 1 TABLET BY MOUTH 2 TIMES A DAY  Qty: 180 tablet, Refills: 0    Associated Diagnoses: Dysthymia      hydrochlorothiazide (HYDRODIURIL) 25 MG tablet TAKE 1 TABLET BY MOUTH DAILY  Qty: 90 tablet, Refills: 0    Associated Diagnoses: Benign essential hypertension      loratadine (CLARITIN) 10 MG tablet Take 1 tablet (10 mg) by mouth daily  Qty: 30 tablet, Refills: 1    Associated Diagnoses: Acute bronchospasm; SOB (shortness of breath)      meloxicam (MOBIC) 15 MG tablet Take 1 tablet (15 mg) by mouth daily  Qty: 90 tablet, Refills: 3    Associated Diagnoses: Pain      montelukast (SINGULAIR) 10 MG tablet TAKE 1 TABLET BY MOUTH DAILY  Qty: 90 tablet, Refills: 0    Associated Diagnoses: Mixed hyperlipidemia      !! nystatin (MYCOSTATIN) 097590 UNIT/ML suspension Take 5 mLs (500,000 Units) by mouth 4 times daily  Qty: 60 mL, Refills: 1    Associated Diagnoses: Thrush      !! nystatin (MYCOSTATIN) 507823 UNIT/ML suspension Take 5 mLs (500,000 Units) by mouth 4 times daily Apply after washing and drying the area thoroughly.  Qty: 473 mL, Refills: 1      potassium chloride ER (K-DUR/KLOR-CON M) 10 MEQ CR tablet TAKE 1 TABLET BY MOUTH 2 TIMES DAILY WITH FOOD  Qty: 180 tablet, Refills: 0     Associated Diagnoses: Benign essential hypertension      temazepam (RESTORIL) 30 MG capsule TAKE 1 CAPSULE BY MOUTH NIGHTLY AS NEEDED FOR SLEEP  Qty: 90 capsule, Refills: 3    Associated Diagnoses: Persistent insomnia      triamcinolone (KENALOG) 0.1 % external cream Apply topically 2 times daily  Qty: 30 g, Refills: 1    Associated Diagnoses: Rash and nonspecific skin eruption      VOLTAREN 1 % topical gel APPLY 4 GRAMS TO KNEES FOUR TIMES A DAY USING ENCLOSED DOSING CARD  Qty: 100 g, Refills: 0    Associated Diagnoses: Primary osteoarthritis of left knee; Chronic pain of right knee      aspirin EC 81 MG EC tablet Take 1 tablet (81 mg) by mouth 2 times daily    Associated Diagnoses: S/P total knee replacement using cement, right      EPIPEN 2-JAIRO 0.3 MG/0.3ML injection INJECT 1 SYRINGE INTRAMUSCULARLY ONCE AS NEEDED FOR ANAPHYLAXIS  Qty: 2 each, Refills: 0    Associated Diagnoses: Anaphylactic reaction      !! order for DME Discontinue O2 services  Qty: 1 each, Refills: 0    Associated Diagnoses: Chronic obstructive pulmonary disease, unspecified COPD type (H)      !! order for DME Equipment being ordered: Please install safety bar in bathroom to assist patient in getting up from toilet.  Qty: 1 Units, Refills: 0    Associated Diagnoses: Status post total right knee replacement      !! order for DME Equipment being ordered: toilet seat riser  Qty: 1 each, Refills: 0    Associated Diagnoses: Postoperative state      !! order for DME Equipment being ordered: Nebulizer supplies  Qty: 2 each, Refills: 11    Associated Diagnoses: Primary osteoarthritis of right knee      !! order for DME Equipment being ordered: mcgee walker  Qty: 1 each, Refills: 0    Associated Diagnoses: Primary osteoarthritis of right knee      oxybutynin ER (DITROPAN-XL) 5 MG 24 hr tablet Take 1 tablet (5 mg) by mouth daily  Qty: 14 tablet, Refills: 0      oxyCODONE-acetaminophen (PERCOCET) 5-325 MG tablet TAKE 1 TO 2 TABLETS BY MOUTH EVERY 4  "HOURS AS NEEDED FOR PAIN  Qty: 60 tablet, Refills: 0    Associated Diagnoses: Primary osteoarthritis of both knees      ranitidine (ZANTAC) 150 MG capsule Take 1 capsule (150 mg) by mouth At Bedtime  Qty: 90 capsule, Refills: 3    Associated Diagnoses: Gastroesophageal reflux disease without esophagitis       !! - Potential duplicate medications found. Please discuss with provider.        Allergies   Allergies   Allergen Reactions     Bee Pollen Other (See Comments)     Throat and eyes close up     Codeine Difficulty breathing     \"My throat closes shut.\"     Takes Percocet at home     Keflex [Cephalexin Hcl] Hives     Sulfonamide Derivatives Other (See Comments)     Throat closes     Ciprofloxacin Rash     Rash, bumps on lips     Bupropion Hcl Other (See Comments)     Made her very irritable     Celecoxib Other (See Comments) and Cough     Celebrex - wheeze  Takes ASA at home     Cheese      swiss     Clonazepam Hives     Takes Xanax at home     Erythromycin Other (See Comments)     Erythromycin base - abdominal pain     Lisinopril Other (See Comments)     Zestril - headaches     Nifedipine Hives     Procardia     Strawberries [Strawberry]      Tomato      Tramadol Hcl      Takes percocet at home     Data   Most Recent 3 CBC's:  Recent Labs   Lab Test 09/20/19  0511 09/19/19  0510 09/18/19  0509   WBC 13.9* 18.0* 15.8*   HGB 13.9 13.3 13.4   MCV 87 88 90    270 266      Most Recent 3 BMP's:  Recent Labs   Lab Test 09/22/19  0547 09/21/19  0530 09/20/19  0511    133 131*   POTASSIUM 4.1 3.9 3.9   CHLORIDE 100 98 96   CO2 30 31 29   BUN 18 15 17   CR 0.88 0.80 0.74   ANIONGAP 7 4 6   JOSÉ 8.8 8.9 9.0   GLC 96 117* 129*     Most Recent 2 LFT's:  Recent Labs   Lab Test 09/27/18  0532 09/26/18  0547   AST 30 19   ALT 38 32   ALKPHOS 58 65   BILITOTAL 0.4 0.3     Most Recent INR's and Anticoagulation Dosing History:  Anticoagulation Dose History     There is no flowsheet data to display.        Most Recent 3 " Troponin's:  Recent Labs   Lab Test 08/29/18  1514   TROPI <0.015     Most Recent Cholesterol Panel:  Recent Labs   Lab Test 02/01/18  1254   CHOL 144   LDL 71   HDL 35*   TRIG 191*     Most Recent 6 Bacteria Isolates From Any Culture (See EPIC Reports for Culture Details):  Recent Labs   Lab Test 09/17/19  0450 09/17/19  0305 09/17/19  0252 03/25/19  1316 09/24/18  1800 10/25/17  1425   CULT No MRSA isolated No growth after 5 days No growth after 5 days 50,000 to 100,000 colonies/mL  mixed urogenital brian  No further identification or sensitivity done   No MRSA isolated No MRSA isolated     Most Recent TSH, T4 and A1c Labs:  Recent Labs   Lab Test 05/30/19  1458 02/01/18  1254   TSH  --  1.07   A1C 5.8*  --      Results for orders placed or performed during the hospital encounter of 09/17/19   XR Chest Port 1 View    Narrative    Procedure:XR CHEST PORT 1 VW    Clinical history:Female, 69 years, dyspnea    Technique: Single view was obtained.    Comparison: None    Findings: The cardiac silhouette is normal. The pulmonary vasculature  is normal.    The lungs are similar in appearance and again demonstrate mild diffuse  interstitial prominence. Bony structures are similar in appearance.  Right shoulder arthroplasty is not significantly changed.      Impression    Impression:   No acute abnormality. No evidence of acute or active disease.    MELITON MONTAÑO MD

## 2019-09-22 NOTE — PROGRESS NOTES
Pt.placed on room air- SPO2 decreased to86% on room air-placed back on 3lpm- it took over 5 minutes to return to 90%

## 2019-09-22 NOTE — DISCHARGE INSTRUCTIONS
Assisted living's in [place]:  [facility] (formerly [facility]) ph [phone]  [facility] ph [phone]    [facility] Linkage Line [phone], they can also help you find assisted livings if the [place] facility is unable to take you at this time.     Please remember, we have social workers and care coordinators here at [facility] who can help you get connected with a community services that can alleviate a variety of burden's you may be experiencing.  Call us at [phone].         NUMBERS TO CALL IN CRISIS    National Suicide Prevention Lifeline ([facility])  1-166.203.3425    Crisis Text Line ([place])  Text  HOME  to 439951    Mental Health Crisis Line ([place], [place])  [phone]    [facility] Mobile Crisis ([place], [place])   [phone]      If you are feeling very unsafe, call 911 or bring yourself to the Emergency Room        Counseling in [place]:  [facility]        [phone]  Psychological Services ([place])       [phone]  [facility]          [phone]  [facility]      [phone]  [facility]  (meds) (kids)      [phone]  [facility]       [phone]  [facility]    (kids)      [phone]  [facility]   (kids)        For adults call: Dayan Tabor [facility]    [phone]     Nay Iyer [facility]                                  [phone]                  Lili Tena  [facility]   [phone]      Jeanne [facility]     [phone]   For children, teens, or families call [facility]   [phone]  [facility]  (kids)    Blanca Man [facility] (young kids)    [phone]   Albania Hernandez [facility] (older kids & adults)  [phone]   Wilberto Kimball       [phone]  [facility]    Jay[facility]   (meds)      [phone]  [facility]       [phone]   [facility]  [address]       (kids)            Vero  Donya psyD  [phone]               Yue Cartagena psyD   [phone]               Emilia Moreno psychologist     [phone]  [phone]               [facility]  (meds)   [phone]   [facility]  (meds)     [phone]  Crista Mosami Zimmer (meds)      [phone]  [facility]  (meds)  [facility]   [phone]    Adilson Ta MA, LMFT    Loly Hernandez MS RN PM NP        (kids) denotes providers who also see kids  (meds) denotes providers who also provide medication management

## 2019-09-22 NOTE — PROGRESS NOTES
Referral faxed to [facility] and message left to update them that she will contact them if she needs placement in the next 30 days.

## 2019-09-23 LAB
BACTERIA SPEC CULT: NORMAL
BACTERIA SPEC CULT: NORMAL
Lab: NORMAL
Lab: NORMAL
SPECIMEN SOURCE: NORMAL
SPECIMEN SOURCE: NORMAL

## 2019-09-24 ENCOUNTER — TELEPHONE (OUTPATIENT)
Dept: FAMILY MEDICINE | Facility: OTHER | Age: 69
End: 2019-09-24

## 2019-09-24 NOTE — TELEPHONE ENCOUNTER
Daughter Liz calls to cancel mom's appt for today at 1:30  Pt has hospital follow-up from COPD, came home Sunday afternoon      Pt had home care assessment done today for 2 1/2 hrs and is totally exhausted and unable to come in for appt    Would like to be seen sometime this week if possible    If not, is doing ok  Is receiving home care 2 times weekly along with OT and PT    Also is now on continuous 02    Pt has hospital follow-up from COPD, came home Sunday afternoon   please call pt with appt.    Liz #[phone]    Natalie Jacques LPN

## 2019-09-25 DIAGNOSIS — I10 BENIGN ESSENTIAL HYPERTENSION: ICD-10-CM

## 2019-09-25 DIAGNOSIS — R39.15 URINARY URGENCY: ICD-10-CM

## 2019-09-25 DIAGNOSIS — M17.11 PRIMARY OSTEOARTHRITIS OF RIGHT KNEE: ICD-10-CM

## 2019-09-25 DIAGNOSIS — G47.00 INSOMNIA, UNSPECIFIED TYPE: Primary | ICD-10-CM

## 2019-09-26 ENCOUNTER — TELEPHONE (OUTPATIENT)
Dept: FAMILY MEDICINE | Facility: OTHER | Age: 69
End: 2019-09-26

## 2019-09-26 NOTE — TELEPHONE ENCOUNTER
To: Dr. Kidd nurse  Nurse looking for verbal orders for home care.  Her phone is [phone]. Thank you

## 2019-09-27 RX ORDER — POTASSIUM CHLORIDE 750 MG/1
TABLET, EXTENDED RELEASE ORAL
Qty: 180 TABLET | Refills: 0 | Status: SHIPPED | OUTPATIENT
Start: 2019-09-27 | End: 2020-01-09

## 2019-09-27 RX ORDER — DOXEPIN HYDROCHLORIDE 10 MG/1
CAPSULE ORAL
Qty: 90 CAPSULE | Refills: 0 | Status: SHIPPED | OUTPATIENT
Start: 2019-09-27 | End: 2019-12-11

## 2019-09-27 RX ORDER — TEMAZEPAM 30 MG
CAPSULE ORAL
Qty: 90 CAPSULE | Refills: 0 | Status: ON HOLD | OUTPATIENT
Start: 2019-09-27 | End: 2019-11-19

## 2019-09-27 RX ORDER — OXYBUTYNIN CHLORIDE 5 MG/1
TABLET, EXTENDED RELEASE ORAL
Qty: 14 TABLET | Refills: 0 | Status: ON HOLD | OUTPATIENT
Start: 2019-09-27 | End: 2019-11-19

## 2019-09-27 NOTE — TELEPHONE ENCOUNTER
Beata from [facility] care called stating patient was just discharged from hospital and needs refills on these meds as soon as possible. Would like a call back after they are signed so she can call [facility] to have them delivered. [phone]        temazepam (RESTORIL) 30 MG capsule   (needs diagnosis linked)  Last Written Prescription Date:  2-4-19  Last Fill Quantity: 90,   # refills: 3  Last Office Visit: 8-19-19  Future Office visit:    Next 5 appointments (look out 90 days)    Sep 30, 2019  2:15 PM CDT  (Arrive by 2:00 PM)  SHORT with ERIN Rico  [facility] ) [address] [facility] [address]  [phone]         Routing refill request to provider for review/approval because:  Drug not on the [facility], [facility] or [facility] refill protocol or controlled substance      potassium chloride ER (K-DUR/KLOR-CON M) 10 MEQ CR tablet      Last Written Prescription Date:  4-19-19  Last Fill Quantity: 180,   # refills: 0      oxybutynin ER (DITROPAN-XL) 5 MG 24 hr tablet   (need diagnosis linked)  Last Written Prescription Date:  9-14-19  Last Fill Quantity: 14,   # refills: 0    Routing refill request to provider for review/approval because:  Last ordered by Sharron Saavedra MD on 9-14-19.      doxepin (SINEQUAN) 10 MG capsule      Last Written Prescription Date:  10-2-18  Last Fill Quantity: 90,   # refills: 3

## 2019-09-30 ENCOUNTER — TRANSFERRED RECORDS (OUTPATIENT)
Dept: FAMILY MEDICINE | Facility: OTHER | Age: 69
End: 2019-09-30

## 2019-09-30 DIAGNOSIS — E66.01 MORBID OBESITY (H): ICD-10-CM

## 2019-09-30 DIAGNOSIS — Z72.0 TOBACCO ABUSE: ICD-10-CM

## 2019-09-30 DIAGNOSIS — J44.1 COPD EXACERBATION (H): Primary | ICD-10-CM

## 2019-09-30 DIAGNOSIS — Z53.9 PERSONS ENCOUNTERING HEALTH SERVICES FOR SPECIFIC PROCEDURES, NOT CARRIED OUT: Primary | ICD-10-CM

## 2019-10-01 PROBLEM — Z51.5 PALLIATIVE CARE PATIENT: Status: ACTIVE | Noted: 2019-10-01

## 2019-10-03 ENCOUNTER — TELEPHONE (OUTPATIENT)
Dept: FAMILY MEDICINE | Facility: OTHER | Age: 69
End: 2019-10-03

## 2019-10-03 NOTE — TELEPHONE ENCOUNTER
Vida with [facility] care called requesting VO to continue seeing patient in home for COPD. Verbal orders given.

## 2019-10-04 ENCOUNTER — TELEPHONE (OUTPATIENT)
Dept: FAMILY MEDICINE | Facility: OTHER | Age: 69
End: 2019-10-04

## 2019-10-04 DIAGNOSIS — R31.0 GROSS HEMATURIA: Primary | ICD-10-CM

## 2019-10-04 DIAGNOSIS — R30.9 PAIN WITH URINATION: ICD-10-CM

## 2019-10-04 DIAGNOSIS — R82.90 FOUL SMELLING URINE: ICD-10-CM

## 2019-10-04 DIAGNOSIS — R39.15 URINARY URGENCY: Primary | ICD-10-CM

## 2019-10-04 DIAGNOSIS — R39.11 URINARY HESITANCY: ICD-10-CM

## 2019-10-04 NOTE — TELEPHONE ENCOUNTER
11:44 AM    Reason for Call: Phone Call    Description: Vida RN from homecare hospice/would like VO for portable devicefor O2 and pt thinks she may have a UA, can they have order for this also, from [facility]    Was an appointment offered for this call? no  If yes : Appointment type              Date    Preferred method for responding to this message: telephone  What is your phone number ?Vida /[phone]    If we cannot reach you directly, may we leave a detailed response at the number you provided? yes    Can this message wait until your PCP/provider returns, if available today? No    Kimberlee Law

## 2019-10-04 NOTE — TELEPHONE ENCOUNTER
owen the homecare nurse states patient is not on palative skilled maintence program and has an upcoming visit with dr bain but they cant reach him either to get an order .  Nurse states she doesn't have transportation to get to the clinic. But someone would drop a urine sample off if there is an order . Please advise.    Torrie HER LPN

## 2019-10-04 NOTE — PROGRESS NOTES
Home care RN report that patient has symptoms of pain with urination, hesitancy, foul smelling urine and some hematuria.  She denies fevers or chills.  UA ordered and RN will collect and bring to lab.    Jeni Kelly CNP, CWOCN  Hospice and Palliative Care

## 2019-10-07 ENCOUNTER — TRANSFERRED RECORDS (OUTPATIENT)
Dept: HEALTH INFORMATION MANAGEMENT | Facility: CLINIC | Age: 69
End: 2019-10-07

## 2019-10-07 ENCOUNTER — PATIENT OUTREACH (OUTPATIENT)
Dept: CARE COORDINATION | Facility: OTHER | Age: 69
End: 2019-10-07

## 2019-10-07 NOTE — PROGRESS NOTES
Clinic Care Coordination Contact  Care Team Conversations    CC received a message from CC Lauren Pipkin to call HC Nurse Beata in regards to patient.  CC reviewed the chart.  CC called and left a message for HC Nurse Beata and waiting a return call.  Looks like patient has upcoming Palliative Care visit on 10/9/19 with Dr. Carrillo.  Patient is receiving HC 2 days a week for education on COPD, PT and OT.  On discharge from hospital there was a question if home or TCU.  CC is unsure if patient lives alone or not so looking to find this out and how CC can assist.  Have added patient to CC list and snapshot.  No letter mailed at this point.  Toyin Uribe, RN  Care Coordination

## 2019-10-10 DIAGNOSIS — G89.29 CHRONIC PAIN OF RIGHT KNEE: ICD-10-CM

## 2019-10-10 DIAGNOSIS — M17.12 PRIMARY OSTEOARTHRITIS OF LEFT KNEE: ICD-10-CM

## 2019-10-10 DIAGNOSIS — F34.1 DYSTHYMIA: ICD-10-CM

## 2019-10-10 DIAGNOSIS — F41.9 ANXIETY: ICD-10-CM

## 2019-10-10 DIAGNOSIS — M25.561 CHRONIC PAIN OF RIGHT KNEE: ICD-10-CM

## 2019-10-10 NOTE — TELEPHONE ENCOUNTER
xanax      Last Written Prescription Date:  8/26/19  Last Fill Quantity: 90,   # refills: 0  Last Office Visit: 8/19/19  Future Office visit:    Next 5 appointments (look out 90 days)    Oct 22, 2019  2:30 PM CDT  (Arrive by 2:15 PM)  SHORT with Gibran Alarcon MD  [facility] ([facility] ) [address] [facility] [address]  [phone]           Routing refill request to provider for review/approval because:  Drug not on the [facility], [facility] or [facility] refill protocol or controlled substance   voltren- unknown    lexapro 5/8/19 #180

## 2019-10-11 RX ORDER — ALPRAZOLAM 1 MG
TABLET ORAL
Qty: 90 TABLET | Refills: 0 | Status: SHIPPED | OUTPATIENT
Start: 2019-10-11 | End: 2019-11-12

## 2019-10-11 RX ORDER — ESCITALOPRAM OXALATE 20 MG/1
TABLET ORAL
Qty: 180 TABLET | Refills: 0 | Status: SHIPPED | OUTPATIENT
Start: 2019-10-11 | End: 2020-01-09

## 2019-10-14 ENCOUNTER — PATIENT OUTREACH (OUTPATIENT)
Dept: CARE COORDINATION | Facility: OTHER | Age: 69
End: 2019-10-14

## 2019-10-14 NOTE — PROGRESS NOTES
Clinic Care Coordination Contact  Care Team Conversations    CC called and talked with patient briefly and introduced myself.  She was meeting with SW from Palliative Care.  Patient asked that I talk with daughter.  I  Introduced myself and CC services and they are interested.  We discussed Life Alert and that they are interested.  We discussed the cost and she is going to be checking with the Uncle who manages the finances.  She states that prescriptions are costing a lot and they are turning in a 340 B to see if they qualify.  We discussed that once she knows if patient could afford it or not we could go forward.  She states patient lives with sister who is disabled.  The sister sleeps a lot during the day.  She does have a cell phone that she can use if it is near her but doesn't always bring it with her.  She uses a walker and is on O2.  She would be able to push the button on the Life Alert. Daughter states that patient had one fall the other day when she fell out of bed.  She has been seen by Palliative Care SAMREEN Kelly.  Daughter rearranged her bedroom so less obstacles.  She can now reach with clear paths wherever she needs to go.  She states there are grab bars for patient to use.  She has a rug to get in and out of shower otherwise no further rugs.  She is assisted when showering.    She has taken down CC phone number and will let me know when she hears from the Uncle if they can afford Life Alert or not and when they have turned in 340 B.  CC mailed initial CC letter and emergency care plan to patient.  Toyin Uribe RN  Care Coordinaiton

## 2019-10-14 NOTE — LETTER
[facility] CARE COORDINATION  [address]   [place] [address]    October 14, 2019    Sydni Hardin  [address]  [place] [address]      Dear Sydni,    I am a clinic care coordinator who works with Dr. Alarcon at [facility].  I wanted to introduce myself and provide you with my contact information so that you can call me with questions or concerns about your health care. Below is a description of clinic care coordination and how I can further assist you.     The clinic care coordinator is a registered nurse and/or  who understand the health care system. The goal of clinic care coordination is to assist you with your health and improve access to the [facility] system in the most efficient manner. The registered nurse can assist you in meeting your health care goals by providing education, coordinating services, and strengthening the communication among your providers. The  can assist you with financial, behavioral, psychosocial, chemical dependency, counseling, and/or psychiatric resources.    Please feel free to contact me at [phone] option #2, if you are interested in working with a care coordinator. We at [facility] are focused on providing you with the highest-quality healthcare experience possible and that all starts with you.     Sincerely,     Toyin Uribe RN Care Coordination    Enclosed: I have enclosed a copy of a 24 Hour Access Plan. This has helpful phone numbers for you to call when needed. Please keep this in an easy to access place to use as needed.

## 2019-10-14 NOTE — LETTER
[facility]  [address]  [place] [address] [phone]      October 14, 2019      Sydni Hardin  [address]  [place] [address]      EMERGENCY CARE PLAN  Presenting Problem Treatment Plan   Questions or concerns during clinic hours    24 hour Nurse line available - Call main clinic line and follow prompts I will call the clinic directly: [phone]    24 Hour Nurse Line [phone]- Follow prompts and press option for nurse advisor    [facility]  [address] [facility] [place] [address]   Patient needs to schedule an appointment  I will call the scheduling team during business hours at [phone]   Same day treatment   I will call the clinic first, then urgent care if needed   Clinic Care Coordinators [facility]  RN Clinic Care Coordinator  [phone]    [phone]   Crisis Services:  Behavioral or Mental Health [facility]al [facility]  [phone]   Emergency treatment--Immediately CALL 911

## 2019-10-22 ENCOUNTER — TELEPHONE (OUTPATIENT)
Dept: FAMILY MEDICINE | Facility: OTHER | Age: 69
End: 2019-10-22

## 2019-10-22 NOTE — TELEPHONE ENCOUNTER
"Patient reports she will cancel her appointment today with PCP \"I can't come in I don't feel good\". Pt encourage to keep appointments. Pt reports Dr. Carrillo did visit last week. 10/16/19, appointment shows as \"no show\".  Patient does seem confused at times. Pt with family now. Pt stated she was in the hospital last week.   Pt requesting Hospital follow-up/ Overbook  request.   "

## 2019-10-24 NOTE — PROGRESS NOTES
Clinic Care Coordination Contact  Care Team Conversations    CC received a call from Radu.  He states that they haven't mailed out a 340 B application as they weren't contacted by patient.  He will reach out to patient to see if they need an application.  He understands that daughter states uncle is in charge of finances.  Toyin Uribe, RN  Care Coordination

## 2019-10-24 NOTE — PROGRESS NOTES
Clinic Care Coordination Contact  Care Team Conversations    CC talked with HC Nurse Beata today in regards to note below.  CC hasn't heard from family yet.  CC left a message for Radu in patient financial to see if patient had turned information in for 340 B.  Waiting to hear back from patient financial.  Toyin Uribe, RN  Care Coordination

## 2019-11-07 ENCOUNTER — OFFICE VISIT (OUTPATIENT)
Dept: UROLOGY | Facility: OTHER | Age: 69
End: 2019-11-07
Attending: UROLOGY
Payer: COMMERCIAL

## 2019-11-07 VITALS
TEMPERATURE: 97.5 F | DIASTOLIC BLOOD PRESSURE: 62 MMHG | OXYGEN SATURATION: 92 % | SYSTOLIC BLOOD PRESSURE: 138 MMHG | HEART RATE: 83 BPM

## 2019-11-07 DIAGNOSIS — R33.9 INCOMPLETE BLADDER EMPTYING: Primary | ICD-10-CM

## 2019-11-07 DIAGNOSIS — N39.42 URINARY INCONTINENCE WITHOUT SENSORY AWARENESS: ICD-10-CM

## 2019-11-07 PROCEDURE — G0463 HOSPITAL OUTPT CLINIC VISIT: HCPCS

## 2019-11-07 PROCEDURE — G0463 HOSPITAL OUTPT CLINIC VISIT: HCPCS | Mod: 25

## 2019-11-07 PROCEDURE — 51798 US URINE CAPACITY MEASURE: CPT

## 2019-11-07 PROCEDURE — 99203 OFFICE O/P NEW LOW 30 MIN: CPT | Performed by: UROLOGY

## 2019-11-07 ASSESSMENT — ENCOUNTER SYMPTOMS
SHORTNESS OF BREATH: 1
CONSTIPATION: 1
WHEEZING: 1
PALPITATIONS: 1
BACK PAIN: 1
DIARRHEA: 1
DIZZINESS: 1
HEADACHES: 1

## 2019-11-07 ASSESSMENT — PAIN SCALES - GENERAL: PAINLEVEL: MODERATE PAIN (5)

## 2019-11-07 NOTE — PROGRESS NOTES
"  History     Chief Complaint:    Consult and Incontinence (and hesitency.)      HPI   Sydni Hardin is a 69 year old female who presents with a history of difficulty urinating.  Sydni is here with her daughter.  Sydni currently is on hospice care because of chronic pulmonary disease.  Sydni had a hysterectomy at the age of 35 and she has been having difficulty going to the bathroom at times.  During the day she feels she can urinate okay but after about 6:00 in the evening she has more difficulty getting her stream started and has a lot of hesitancy.  At night sometimes she will wake up and she just is not able to urinate very easily.  She does have incontinence and wears 2-3 depends a day and sometimes the urine just comes out.  During the day she voids about every 1 hour and she does wet her depends while she is sleeping.  She was tried on oxybutynin at one point and she had a lot of difficulty urinating so that was stopped.  Patient denies any blood in her urine and I have not seen that she has had any significant urinary tract infections at least in the past 9 months.  She had a urinalysis done both in September and October and that did not show any infection.  She denies any stool problems.  She has had 2 C-sections and no vaginal deliveries.  She has had no previous genitourinary surgery that could have gated any obstruction.    Allergies:    Allergies   Allergen Reactions     Bee Pollen Other (See Comments)     Throat and eyes close up     Codeine Difficulty breathing     \"My throat closes shut.\"     Takes Percocet at home     Keflex [Cephalexin Hcl] Hives     Sulfonamide Derivatives Other (See Comments)     Throat closes     Ciprofloxacin Rash     Rash, bumps on lips     Bupropion Hcl Other (See Comments)     Made her very irritable     Celecoxib Other (See Comments) and Cough     Celebrex - wheeze  Takes ASA at home     Cheese      swiss     Clonazepam Hives     Takes Xanax at home     Erythromycin " Other (See Comments)     Erythromycin base - abdominal pain     Lisinopril Other (See Comments)     Zestril - headaches     Nifedipine Hives     Procardia     Strawberries [Strawberry]      Tomato      Tramadol Hcl      Takes percocet at home        Medications:      albuterol (VENTOLIN HFA) 108 (90 Base) MCG/ACT inhaler  ALPRAZolam (XANAX) 1 MG tablet  aspirin EC 81 MG EC tablet  atorvastatin (LIPITOR) 40 MG tablet  benazepril (LOTENSIN) 10 MG tablet  benzonatate (TESSALON) 200 MG capsule  budesonide-formoterol (SYMBICORT) 160-4.5 MCG/ACT Inhaler  butalbital-acetaminophen-caffeine (FIORICET/ESGIC) -40 MG tablet  diclofenac (VOLTAREN) 1 % topical gel  doxepin (SINEQUAN) 10 MG capsule  EPIPEN 2-JAIRO 0.3 MG/0.3ML injection  escitalopram (LEXAPRO) 20 MG tablet  hydrochlorothiazide (HYDRODIURIL) 25 MG tablet  ipratropium - albuterol 0.5 mg/2.5 mg/3 mL (DUONEB) 0.5-2.5 (3) MG/3ML neb solution  loratadine (CLARITIN) 10 MG tablet  meloxicam (MOBIC) 15 MG tablet  montelukast (SINGULAIR) 10 MG tablet  nicotine (NICODERM CQ) 21 MG/24HR 24 hr patch  nystatin (MYCOSTATIN) 712590 UNIT/ML suspension  nystatin (MYCOSTATIN) 691185 UNIT/ML suspension  order for DME  order for DME  order for DME  order for DME  order for DME  order for DME  oxyCODONE-acetaminophen (PERCOCET) 5-325 MG tablet  potassium chloride ER (K-TAB/KLOR-CON) 10 MEQ CR tablet  ranitidine (ZANTAC) 150 MG capsule  temazepam (RESTORIL) 30 MG capsule  temazepam (RESTORIL) 30 MG capsule  triamcinolone (KENALOG) 0.1 % external cream  oxybutynin ER (DITROPAN-XL) 5 MG 24 hr tablet        Problem List:      Patient Active Problem List    Diagnosis Date Noted     Centrilobular emphysema (H) 10/16/2019     Priority: Medium     Palliative care patient 10/01/2019     Priority: Medium     Acute respiratory failure (H) 09/17/2019     Priority: Medium     Esophageal reflux 07/15/2019     Priority: Medium     Obesity (BMI 35.0-39.9) with comorbidity (H) 01/14/2019      Priority: Medium     Acute hypoxemic respiratory failure (H) 09/24/2018     Priority: Medium     COPD exacerbation (H) 09/24/2018     Priority: Medium     Leukocytosis 03/25/2017     Priority: Medium     Dehydration 03/25/2017     Priority: Medium     Knee pain 03/25/2017     Priority: Medium     S/P total knee replacement using cement, right 03/21/2017     Priority: Medium     Primary osteoarthritis of both knees 10/05/2016     Priority: Medium     IC (interstitial cystitis) 10/28/2015     Priority: Medium     SCC (squamous cell carcinoma), face 08/05/2015     Priority: Medium     Lung nodules 06/19/2015     Priority: Medium     Tobacco abuse 06/19/2015     Priority: Medium     Dysthymia 12/31/2014     Priority: Medium     CKD (chronic kidney disease) stage 3, GFR 30-59 ml/min (H) 02/05/2014     Priority: Medium     Benign essential hypertension 01/05/2012     Priority: Medium     Problem list name updated by automated process. Provider to review       Asthma 03/20/2009     Priority: Medium     Calculus of gallbladder 02/02/2006     Priority: Medium     Problem list name updated by automated process. Provider to review       Headache 12/10/2003     Priority: Medium     Problem list name updated by automated process. Provider to review       Osteoarthrosis, unspecified whether generalized or localized, involving lower leg 10/04/2001     Priority: Medium     Problem list name updated by automated process. Provider to review          Past Medical History:      Past Medical History:   Diagnosis Date     Abnormal arterial blood gases 4/26/2006     Acute upper respiratory infections of unspecified site 7/2/2001     Anxiety 10/24/2011     Calculus of gallbladder without mention of cholecystitis or obstruction 2/2/2006     Depression 1/5/2012     Follow-up examination, following other surgery 2/28/2006     Headache(784.0) 12/10/2003     History of CVA (cerebrovascular accident) 9/28/2011     HTN (hypertension) 1/5/2012      Insomnia 3/13/2012     Lipoma of other skin and subcutaneous tissue 2006     Nonallopathic lesion of cervical region, not elsewhere classified 2003     Osteoarthrosis, unspecified whether generalized or localized, lower leg 10/4/2001     Special screening for malignant neoplasms, colon 2006     Urinary tract infection, site not specified 2007       Past Surgical History:      Past Surgical History:   Procedure Laterality Date     ARTHROPLASTY KNEE Right 3/21/2017    Procedure: ARTHROPLASTY KNEE;  Surgeon: Moisés Olson MD;  Location: [facility]     ARTHROSCOPY KNEE       BIOPSY BREAST  2000    left - free of disease      SECTION        SECTION       CHOLECYSTECTOMY       COLONOSCOPY  2008    repeat in 10 years     HEAD & NECK SURGERY      hematoma on head drained     HYSTERECTOMY       IR CONSULTATION FOR IR EXAM  2019     ORTHOPEDIC SURGERY  2014    right shoulder replacement     shoulder x2  2009    left       Family History:      Family History   Problem Relation Age of Onset     Cancer Mother         skin     Breast Cancer Mother      Cerebrovascular Disease Mother      Alzheimer Disease Mother      Myocardial Infarction Father 76        myocardial infarction - cause of death     Cancer Daughter         cervical      Diabetes Maternal Grandfather      Heart Disease Maternal Grandfather         MI     Diabetes Maternal Grandmother      Diabetes Paternal Grandmother      Diabetes Paternal Grandfather        Social History:    Marital Status:   [5]  Social History     Tobacco Use     Smoking status: Current Every Day Smoker     Packs/day: 1.00     Years: 53.00     Pack years: 53.00     Types: Cigarettes     Start date: 1965     Smokeless tobacco: Never Used     Tobacco comment: pt enrolled in quit plan 19   Substance Use Topics     Alcohol use: Yes     Alcohol/week: 0.0 standard drinks     Comment: rarely     Drug use: No        Review of  Systems   Respiratory: Positive for shortness of breath and wheezing.    Cardiovascular: Positive for palpitations and leg swelling.   Gastrointestinal: Positive for constipation and diarrhea.   Endocrine: Positive for cold intolerance.   Musculoskeletal: Positive for back pain.   Neurological: Positive for dizziness and headaches.   All other systems reviewed and are negative.        Physical Exam   Vitals:  /62   Pulse 83   Temp 97.5  F (36.4  C) (Tympanic)   SpO2 92%       Physical Exam  Exam conducted with a chaperone present.   Abdominal:      General: Bowel sounds are normal.      Palpations: Abdomen is soft. There is no mass.      Hernia: There is no hernia in the right inguinal area or left inguinal area.   Genitourinary:     Exam position: Lithotomy position.      Pubic Area: No rash.       Labia:         Right: No rash.         Left: No rash.       Uretha: No prolapse or urethral pain.      Comments: External genitalia look relatively normal.  Her urethral meatus is normal.  There is no prolapse.  On vaginal exam I do not appreciate any significant prolapse.  Even when I have the patient bear down I do not appreciate more than about a grade 2 cystocele.  External rectal area is normal normal rectal tone but there is hard stool in the rectal vault despite what the patient states about her stool there is a significant amount of hard stool in the rectal vault.  Neurological:      Mental Status: She is alert.       Postvoid residual was 186 mL      Impression: Incomplete bladder emptying  Plan   Plan: I talked with Sydni and her daughter and this is often a difficult problem to manage in women.  Chintan is not a candidate for any surgical intervention and the really is not much to offer women who have difficulty emptying other than possibly InterStim.  I would not recommend an alpha-blocker for this patient because of concerns of dizziness as well as interfering with any of her pulmonary status.  I am  recommending MiraLAX to get her bowels very soft and mushy to prevent any obstruction from hard stools.  The daughter is willing to learn intermittent catheterizations in case Sydni has trouble during the night with urinary retention.  I think this is a very reasonable option and we have a very willing family member that will learn.  If we get to a situation where the patient is unable to urinate at all then we could just do 3 times daily intermittent catheterizations or we could consider a chronic indwelling catheter but that would be our last resort.  Sydin and her daughter have asked if Janepapito Kelly could possibly do the catheterizing training and so I will reach out to her.  If that is not possible patient and her daughter will have to return to my office for teaching      No follow-ups on file.    Katharina Castro MD  [facility]

## 2019-11-07 NOTE — NURSING NOTE
"Chief Complaint   Patient presents with     Consult     Incontinence     and hesitency.       Initial /62   Pulse 83   Temp 97.5  F (36.4  C) (Tympanic)   SpO2 92%  Estimated body mass index is 37.87 kg/m  as calculated from the following:    Height as of 9/17/19: 1.473 m (4' 10\").    Weight as of 9/22/19: 82.2 kg (181 lb 3.5 oz).  Medication Reconciliation: complete   Review of Systems:    Weight loss:    No     Recent fever/chills:  No   Night sweats:   yes  Current skin rash:  No   Recent hair loss:  No  Heat intolerance:  No   Cold intolerance:  yes  Chest pain:   No   Palpitations:   yes  Shortness of breath:  yes   Wheezing:   yes  Constipation:    yes   Diarrhea:   yes   Nausea:   No   Vomiting:   No   Kidney/side pain:  No   Back pain:   yes  Frequent headaches:  yes   Dizziness:     yes  Leg swelling:   yes   Calf pain:    yes    Parents, brothers or sisters with history of kidney cancer:   No  Parents, brothers or sisters with history of bladder cancer: No    Graciela Roper LPN    "

## 2019-11-09 DIAGNOSIS — I10 ESSENTIAL HYPERTENSION: ICD-10-CM

## 2019-11-11 RX ORDER — BENAZEPRIL HYDROCHLORIDE 10 MG/1
10 TABLET ORAL DAILY
Qty: 90 TABLET | Refills: 0 | Status: SHIPPED | OUTPATIENT
Start: 2019-11-11 | End: 2020-01-09

## 2019-11-11 RX ORDER — BENAZEPRIL HYDROCHLORIDE 10 MG/1
TABLET ORAL
Qty: 90 TABLET | Refills: 0 | OUTPATIENT
Start: 2019-11-11

## 2019-11-12 DIAGNOSIS — F41.9 ANXIETY: ICD-10-CM

## 2019-11-12 DIAGNOSIS — Z72.0 TOBACCO ABUSE: Primary | ICD-10-CM

## 2019-11-13 RX ORDER — ALPRAZOLAM 1 MG
TABLET ORAL
Qty: 90 TABLET | Refills: 0 | Status: SHIPPED | OUTPATIENT
Start: 2019-11-13 | End: 2019-12-31

## 2019-11-13 RX ORDER — ALBUTEROL SULFATE 0.83 MG/ML
SOLUTION RESPIRATORY (INHALATION)
Qty: 90 ML | Refills: 0 | Status: SHIPPED | OUTPATIENT
Start: 2019-11-13 | End: 2020-01-09

## 2019-11-13 NOTE — TELEPHONE ENCOUNTER
ALPRAZolam (XANAX) 1 MG tablet      Last Written Prescription Date:  10-11-19  Last Fill Quantity: 90,   # refills: 0  Last Office Visit: 8-19-19  Future Office visit:       Routing refill request to provider for review/approval because:  Drug not on the [facility], P or [facility] refill protocol or controlled substance

## 2019-11-18 ENCOUNTER — HOSPITAL ENCOUNTER (INPATIENT)
Facility: HOSPITAL | Age: 69
LOS: 2 days | Discharge: HOME | DRG: 190 | End: 2019-11-20
Attending: PHYSICIAN ASSISTANT | Admitting: INTERNAL MEDICINE
Payer: MEDICARE

## 2019-11-18 ENCOUNTER — APPOINTMENT (OUTPATIENT)
Dept: GENERAL RADIOLOGY | Facility: HOSPITAL | Age: 69
DRG: 190 | End: 2019-11-18
Attending: PHYSICIAN ASSISTANT
Payer: MEDICARE

## 2019-11-18 DIAGNOSIS — J96.01 ACUTE HYPOXEMIC RESPIRATORY FAILURE (H): Primary | ICD-10-CM

## 2019-11-18 DIAGNOSIS — J44.1 COPD WITH ACUTE EXACERBATION (H): ICD-10-CM

## 2019-11-18 DIAGNOSIS — J44.1 COPD EXACERBATION (H): ICD-10-CM

## 2019-11-18 DIAGNOSIS — G93.41 METABOLIC ENCEPHALOPATHY: ICD-10-CM

## 2019-11-18 DIAGNOSIS — J96.21 ACUTE ON CHRONIC RESPIRATORY FAILURE WITH HYPOXIA (H): ICD-10-CM

## 2019-11-18 PROBLEM — J18.9 PNEUMONIA: Status: ACTIVE | Noted: 2019-11-18

## 2019-11-18 LAB
ANION GAP SERPL CALCULATED.3IONS-SCNC: 5 MMOL/L (ref 3–14)
BASE EXCESS BLDV CALC-SCNC: 0.7 MMOL/L
BASOPHILS # BLD AUTO: 0.1 10E9/L (ref 0–0.2)
BASOPHILS NFR BLD AUTO: 0.6 %
BUN SERPL-MCNC: 19 MG/DL (ref 7–30)
CALCIUM SERPL-MCNC: 9 MG/DL (ref 8.5–10.1)
CHLORIDE SERPL-SCNC: 100 MMOL/L (ref 94–109)
CO2 SERPL-SCNC: 30 MMOL/L (ref 20–32)
CREAT SERPL-MCNC: 0.98 MG/DL (ref 0.52–1.04)
DIFFERENTIAL METHOD BLD: NORMAL
EOSINOPHIL # BLD AUTO: 0.3 10E9/L (ref 0–0.7)
EOSINOPHIL NFR BLD AUTO: 3 %
ERYTHROCYTE [DISTWIDTH] IN BLOOD BY AUTOMATED COUNT: 13.5 % (ref 10–15)
GFR SERPL CREATININE-BSD FRML MDRD: 58 ML/MIN/{1.73_M2}
GLUCOSE SERPL-MCNC: 102 MG/DL (ref 70–99)
HCO3 BLDV-SCNC: 27 MMOL/L (ref 21–28)
HCT VFR BLD AUTO: 42.9 % (ref 35–47)
HGB BLD-MCNC: 14.2 G/DL (ref 11.7–15.7)
IMM GRANULOCYTES # BLD: 0 10E9/L (ref 0–0.4)
IMM GRANULOCYTES NFR BLD: 0.4 %
LYMPHOCYTES # BLD AUTO: 2.3 10E9/L (ref 0.8–5.3)
LYMPHOCYTES NFR BLD AUTO: 23 %
MCH RBC QN AUTO: 29.7 PG (ref 26.5–33)
MCHC RBC AUTO-ENTMCNC: 33.1 G/DL (ref 31.5–36.5)
MCV RBC AUTO: 90 FL (ref 78–100)
MONOCYTES # BLD AUTO: 0.5 10E9/L (ref 0–1.3)
MONOCYTES NFR BLD AUTO: 5.4 %
NEUTROPHILS # BLD AUTO: 6.6 10E9/L (ref 1.6–8.3)
NEUTROPHILS NFR BLD AUTO: 67.6 %
NRBC # BLD AUTO: 0 10*3/UL
NRBC BLD AUTO-RTO: 0 /100
O2/TOTAL GAS SETTING VFR VENT: ABNORMAL %
OXYHGB MFR BLDV: 84 %
PCO2 BLDV: 47 MM HG (ref 40–50)
PH BLDV: 7.36 PH (ref 7.32–7.43)
PLATELET # BLD AUTO: 291 10E9/L (ref 150–450)
PO2 BLDV: 53 MM HG (ref 25–47)
POTASSIUM SERPL-SCNC: 4.1 MMOL/L (ref 3.4–5.3)
RBC # BLD AUTO: 4.78 10E12/L (ref 3.8–5.2)
SODIUM SERPL-SCNC: 135 MMOL/L (ref 133–144)
WBC # BLD AUTO: 9.8 10E9/L (ref 4–11)

## 2019-11-18 PROCEDURE — 99285 EMERGENCY DEPT VISIT HI MDM: CPT | Mod: 25

## 2019-11-18 PROCEDURE — 25000125 ZZHC RX 250: Performed by: PHYSICIAN ASSISTANT

## 2019-11-18 PROCEDURE — 12000000 ZZH R&B MED SURG/OB

## 2019-11-18 PROCEDURE — 80048 BASIC METABOLIC PNL TOTAL CA: CPT | Performed by: PHYSICIAN ASSISTANT

## 2019-11-18 PROCEDURE — 36415 COLL VENOUS BLD VENIPUNCTURE: CPT | Performed by: PHYSICIAN ASSISTANT

## 2019-11-18 PROCEDURE — 25000128 H RX IP 250 OP 636: Performed by: PHYSICIAN ASSISTANT

## 2019-11-18 PROCEDURE — 96374 THER/PROPH/DIAG INJ IV PUSH: CPT

## 2019-11-18 PROCEDURE — 25000128 H RX IP 250 OP 636: Performed by: INTERNAL MEDICINE

## 2019-11-18 PROCEDURE — 99284 EMERGENCY DEPT VISIT MOD MDM: CPT | Mod: Z6 | Performed by: PHYSICIAN ASSISTANT

## 2019-11-18 PROCEDURE — 94640 AIRWAY INHALATION TREATMENT: CPT | Mod: 76

## 2019-11-18 PROCEDURE — 71045 X-RAY EXAM CHEST 1 VIEW: CPT | Mod: TC

## 2019-11-18 PROCEDURE — 99223 1ST HOSP IP/OBS HIGH 75: CPT | Performed by: INTERNAL MEDICINE

## 2019-11-18 PROCEDURE — 85025 COMPLETE CBC W/AUTO DIFF WBC: CPT | Performed by: PHYSICIAN ASSISTANT

## 2019-11-18 PROCEDURE — 25000125 ZZHC RX 250: Performed by: INTERNAL MEDICINE

## 2019-11-18 PROCEDURE — 94640 AIRWAY INHALATION TREATMENT: CPT

## 2019-11-18 PROCEDURE — 25000132 ZZH RX MED GY IP 250 OP 250 PS 637: Performed by: PHYSICIAN ASSISTANT

## 2019-11-18 PROCEDURE — 40000275 ZZH STATISTIC RCP TIME EA 10 MIN

## 2019-11-18 PROCEDURE — 25000132 ZZH RX MED GY IP 250 OP 250 PS 637: Performed by: INTERNAL MEDICINE

## 2019-11-18 PROCEDURE — 87040 BLOOD CULTURE FOR BACTERIA: CPT | Performed by: PHYSICIAN ASSISTANT

## 2019-11-18 PROCEDURE — 40000786 ZZHCL STATISTIC ACTIVE MRSA SURVEILLANCE CULTURE: Performed by: INTERNAL MEDICINE

## 2019-11-18 PROCEDURE — 82805 BLOOD GASES W/O2 SATURATION: CPT | Performed by: PHYSICIAN ASSISTANT

## 2019-11-18 RX ORDER — IPRATROPIUM BROMIDE AND ALBUTEROL SULFATE 2.5; .5 MG/3ML; MG/3ML
3 SOLUTION RESPIRATORY (INHALATION)
Status: DISCONTINUED | OUTPATIENT
Start: 2019-11-18 | End: 2019-11-20 | Stop reason: HOSPADM

## 2019-11-18 RX ORDER — LISINOPRIL 10 MG/1
10 TABLET ORAL DAILY
Status: DISCONTINUED | OUTPATIENT
Start: 2019-11-19 | End: 2019-11-20 | Stop reason: HOSPADM

## 2019-11-18 RX ORDER — MONTELUKAST SODIUM 10 MG/1
10 TABLET ORAL DAILY
Status: DISCONTINUED | OUTPATIENT
Start: 2019-11-19 | End: 2019-11-20 | Stop reason: HOSPADM

## 2019-11-18 RX ORDER — HYDROCHLOROTHIAZIDE 25 MG/1
25 TABLET ORAL DAILY
Status: DISCONTINUED | OUTPATIENT
Start: 2019-11-19 | End: 2019-11-20 | Stop reason: HOSPADM

## 2019-11-18 RX ORDER — ATORVASTATIN CALCIUM 40 MG/1
40 TABLET, FILM COATED ORAL DAILY
Status: DISCONTINUED | OUTPATIENT
Start: 2019-11-19 | End: 2019-11-20 | Stop reason: HOSPADM

## 2019-11-18 RX ORDER — METHYLPREDNISOLONE SODIUM SUCCINATE 125 MG/2ML
80 INJECTION, POWDER, LYOPHILIZED, FOR SOLUTION INTRAMUSCULAR; INTRAVENOUS EVERY 8 HOURS
Status: DISCONTINUED | OUTPATIENT
Start: 2019-11-18 | End: 2019-11-19

## 2019-11-18 RX ORDER — NALOXONE HYDROCHLORIDE 0.4 MG/ML
.1-.4 INJECTION, SOLUTION INTRAMUSCULAR; INTRAVENOUS; SUBCUTANEOUS
Status: DISCONTINUED | OUTPATIENT
Start: 2019-11-18 | End: 2019-11-20 | Stop reason: HOSPADM

## 2019-11-18 RX ORDER — LIDOCAINE 40 MG/G
CREAM TOPICAL
Status: DISCONTINUED | OUTPATIENT
Start: 2019-11-18 | End: 2019-11-20 | Stop reason: HOSPADM

## 2019-11-18 RX ORDER — METHYLPREDNISOLONE SODIUM SUCCINATE 125 MG/2ML
125 INJECTION, POWDER, LYOPHILIZED, FOR SOLUTION INTRAMUSCULAR; INTRAVENOUS ONCE
Status: COMPLETED | OUTPATIENT
Start: 2019-11-18 | End: 2019-11-18

## 2019-11-18 RX ORDER — ESCITALOPRAM OXALATE 10 MG/1
20 TABLET ORAL DAILY
Status: DISCONTINUED | OUTPATIENT
Start: 2019-11-18 | End: 2019-11-19

## 2019-11-18 RX ORDER — ALPRAZOLAM 0.5 MG
0.5 TABLET ORAL 2 TIMES DAILY PRN
Status: DISCONTINUED | OUTPATIENT
Start: 2019-11-18 | End: 2019-11-20 | Stop reason: HOSPADM

## 2019-11-18 RX ORDER — ASPIRIN 81 MG/1
81 TABLET ORAL DAILY
Status: DISCONTINUED | OUTPATIENT
Start: 2019-11-19 | End: 2019-11-19

## 2019-11-18 RX ORDER — OXYCODONE AND ACETAMINOPHEN 5; 325 MG/1; MG/1
1 TABLET ORAL EVERY 6 HOURS PRN
Status: DISCONTINUED | OUTPATIENT
Start: 2019-11-18 | End: 2019-11-20 | Stop reason: HOSPADM

## 2019-11-18 RX ORDER — DOXYCYCLINE HYCLATE 100 MG
100 TABLET ORAL EVERY 12 HOURS SCHEDULED
Status: DISCONTINUED | OUTPATIENT
Start: 2019-11-19 | End: 2019-11-19

## 2019-11-18 RX ORDER — IPRATROPIUM BROMIDE AND ALBUTEROL SULFATE 2.5; .5 MG/3ML; MG/3ML
3 SOLUTION RESPIRATORY (INHALATION) ONCE
Status: COMPLETED | OUTPATIENT
Start: 2019-11-18 | End: 2019-11-18

## 2019-11-18 RX ORDER — POTASSIUM CHLORIDE 750 MG/1
10 CAPSULE, EXTENDED RELEASE ORAL DAILY
Status: DISCONTINUED | OUTPATIENT
Start: 2019-11-19 | End: 2019-11-19

## 2019-11-18 RX ORDER — DOXYCYCLINE HYCLATE 100 MG
100 TABLET ORAL ONCE
Status: COMPLETED | OUTPATIENT
Start: 2019-11-18 | End: 2019-11-18

## 2019-11-18 RX ORDER — HEPARIN SODIUM 5000 [USP'U]/.5ML
5000 INJECTION, SOLUTION INTRAVENOUS; SUBCUTANEOUS EVERY 12 HOURS
Status: DISCONTINUED | OUTPATIENT
Start: 2019-11-18 | End: 2019-11-20 | Stop reason: HOSPADM

## 2019-11-18 RX ADMIN — Medication 1 MG: at 22:36

## 2019-11-18 RX ADMIN — METHYLPREDNISOLONE SODIUM SUCCINATE 81.25 MG: 125 INJECTION, POWDER, FOR SOLUTION INTRAMUSCULAR; INTRAVENOUS at 22:34

## 2019-11-18 RX ADMIN — ALPRAZOLAM 0.5 MG: 0.5 TABLET ORAL at 22:36

## 2019-11-18 RX ADMIN — METHYLPREDNISOLONE SODIUM SUCCINATE 125 MG: 125 INJECTION, POWDER, FOR SOLUTION INTRAMUSCULAR; INTRAVENOUS at 15:32

## 2019-11-18 RX ADMIN — HEPARIN SODIUM 5000 UNITS: 10000 INJECTION, SOLUTION INTRAVENOUS; SUBCUTANEOUS at 22:32

## 2019-11-18 RX ADMIN — IPRATROPIUM BROMIDE AND ALBUTEROL SULFATE 3 ML: .5; 3 SOLUTION RESPIRATORY (INHALATION) at 15:14

## 2019-11-18 RX ADMIN — DOXYCYCLINE HYCLATE 100 MG: 100 TABLET, COATED ORAL at 17:26

## 2019-11-18 RX ADMIN — IPRATROPIUM BROMIDE AND ALBUTEROL SULFATE 3 ML: .5; 3 SOLUTION RESPIRATORY (INHALATION) at 19:14

## 2019-11-18 ASSESSMENT — ENCOUNTER SYMPTOMS
NAUSEA: 0
ACTIVITY CHANGE: 0
ABDOMINAL PAIN: 0
APPETITE CHANGE: 0
COUGH: 1
FEVER: 0
SHORTNESS OF BREATH: 1
CHILLS: 0
CHEST TIGHTNESS: 1
VOMITING: 0
SORE THROAT: 0
WHEEZING: 1
FATIGUE: 0

## 2019-11-18 ASSESSMENT — ACTIVITIES OF DAILY LIVING (ADL): ADLS_ACUITY_SCORE: 30

## 2019-11-18 NOTE — ED NOTES
Per other nurse who got a report from her aid, stated that she's been in bed for days, more lethargic, and more confused. Per the aid she had no pills until she gave them today at 2:24pm before the ambulance called.

## 2019-11-18 NOTE — ED TRIAGE NOTES
EMS was called by patients home health provider as she seemed more SOB. Per EMS she was smoking a cigarette when they arrived. Pt states she's not been anywhere in 8 weeks or so.  Pt states she's on 2.5L of O2 at night and uses it during the day if she needs.

## 2019-11-18 NOTE — ED PROVIDER NOTES
"  History     Chief Complaint   Patient presents with     Shortness of Breath     The history is provided by the patient.     Sydni Hardin is a 69 year old female who presented to the emergency department via EMS for evaluation of worsening shortness of breath.  The patient has a history of end-stage COPD requiring continuous oxygen therapy of 2-1/2 L/min.  She continues to smoke unfortunately.  Reports 3-day history of worsening shortness of breath and cough.  Denies any fevers or chills.  Oxygen saturation was low in the mid 80s on EMS arrival.  She denies any other questions or concerns.    Allergies:  Allergies   Allergen Reactions     Bee Pollen Other (See Comments)     Throat and eyes close up     Codeine Difficulty breathing     \"My throat closes shut.\"     Takes Percocet at home     Keflex [Cephalexin Hcl] Hives     Sulfonamide Derivatives Other (See Comments)     Throat closes     Ciprofloxacin Rash     Rash, bumps on lips     Bupropion Hcl Other (See Comments)     Made her very irritable     Celecoxib Other (See Comments) and Cough     Celebrex - wheeze  Takes ASA at home     Cheese      swiss     Clonazepam Hives     Takes Xanax at home     Erythromycin Other (See Comments)     Erythromycin base - abdominal pain     Lisinopril Other (See Comments)     Zestril - headaches     Nifedipine Hives     Procardia     Strawberries [Strawberry]      Tomato      Tramadol Hcl      Takes percocet at home       Problem List:    Patient Active Problem List    Diagnosis Date Noted     Pneumonia 11/18/2019     Priority: Medium     Centrilobular emphysema (H) 10/16/2019     Priority: Medium     Palliative care patient 10/01/2019     Priority: Medium     Acute respiratory failure (H) 09/17/2019     Priority: Medium     Esophageal reflux 07/15/2019     Priority: Medium     Obesity (BMI 35.0-39.9) with comorbidity (H) 01/14/2019     Priority: Medium     Acute hypoxemic respiratory failure (H) 09/24/2018     Priority: " Medium     COPD exacerbation (H) 09/24/2018     Priority: Medium     Leukocytosis 03/25/2017     Priority: Medium     Dehydration 03/25/2017     Priority: Medium     Knee pain 03/25/2017     Priority: Medium     S/P total knee replacement using cement, right 03/21/2017     Priority: Medium     Primary osteoarthritis of both knees 10/05/2016     Priority: Medium     IC (interstitial cystitis) 10/28/2015     Priority: Medium     SCC (squamous cell carcinoma), face 08/05/2015     Priority: Medium     Lung nodules 06/19/2015     Priority: Medium     Tobacco abuse 06/19/2015     Priority: Medium     Dysthymia 12/31/2014     Priority: Medium     CKD (chronic kidney disease) stage 3, GFR 30-59 ml/min (H) 02/05/2014     Priority: Medium     Benign essential hypertension 01/05/2012     Priority: Medium     Problem list name updated by automated process. Provider to review       Asthma 03/20/2009     Priority: Medium     Calculus of gallbladder 02/02/2006     Priority: Medium     Problem list name updated by automated process. Provider to review       Headache 12/10/2003     Priority: Medium     Problem list name updated by automated process. Provider to review       Osteoarthrosis, unspecified whether generalized or localized, involving lower leg 10/04/2001     Priority: Medium     Problem list name updated by automated process. Provider to review          Past Medical History:    Past Medical History:   Diagnosis Date     Abnormal arterial blood gases 4/26/2006     Acute upper respiratory infections of unspecified site 7/2/2001     Anxiety 10/24/2011     Calculus of gallbladder without mention of cholecystitis or obstruction 2/2/2006     Depression 1/5/2012     Follow-up examination, following other surgery 2/28/2006     Headache(784.0) 12/10/2003     History of CVA (cerebrovascular accident) 9/28/2011     HTN (hypertension) 1/5/2012     Insomnia 3/13/2012     Lipoma of other skin and subcutaneous tissue 1/18/2006      Nonallopathic lesion of cervical region, not elsewhere classified 2003     Osteoarthrosis, unspecified whether generalized or localized, lower leg 10/4/2001     Special screening for malignant neoplasms, colon 2006     Urinary tract infection, site not specified 2007       Past Surgical History:    Past Surgical History:   Procedure Laterality Date     ARTHROPLASTY KNEE Right 3/21/2017    Procedure: ARTHROPLASTY KNEE;  Surgeon: Moisés Olson MD;  Location: [facility] OR     ARTHROSCOPY KNEE       BIOPSY BREAST  2000    left - free of disease      SECTION        SECTION       CHOLECYSTECTOMY       COLONOSCOPY  2008    repeat in 10 years     HEAD & NECK SURGERY      hematoma on head drained     HYSTERECTOMY       IR CONSULTATION FOR IR EXAM  2019     ORTHOPEDIC SURGERY  2014    right shoulder replacement     shoulder x2  2009    left       Family History:    Family History   Problem Relation Age of Onset     Cancer Mother         skin     Breast Cancer Mother      Cerebrovascular Disease Mother      Alzheimer Disease Mother      Myocardial Infarction Father 76        myocardial infarction - cause of death     Cancer Daughter         cervical      Diabetes Maternal Grandfather      Heart Disease Maternal Grandfather         MI     Diabetes Maternal Grandmother      Diabetes Paternal Grandmother      Diabetes Paternal Grandfather        Social History:  Marital Status:   [5]  Social History     Tobacco Use     Smoking status: Current Every Day Smoker     Packs/day: 1.00     Years: 53.00     Pack years: 53.00     Types: Cigarettes     Start date: 1965     Smokeless tobacco: Never Used     Tobacco comment: pt enrolled in quit plan 19   Substance Use Topics     Alcohol use: Yes     Alcohol/week: 0.0 standard drinks     Comment: rarely     Drug use: No        Medications:    albuterol (PROVENTIL) (2.5 MG/3ML) 0.083% neb solution  albuterol (VENTOLIN HFA)  108 (90 Base) MCG/ACT inhaler  ALPRAZolam (XANAX) 1 MG tablet  aspirin EC 81 MG EC tablet  atorvastatin (LIPITOR) 40 MG tablet  benazepril (LOTENSIN) 10 MG tablet  benzonatate (TESSALON) 200 MG capsule  budesonide-formoterol (SYMBICORT) 160-4.5 MCG/ACT Inhaler  butalbital-acetaminophen-caffeine (FIORICET/ESGIC) -40 MG tablet  diclofenac (VOLTAREN) 1 % topical gel  doxepin (SINEQUAN) 10 MG capsule  EPIPEN 2-JAIRO 0.3 MG/0.3ML injection  escitalopram (LEXAPRO) 20 MG tablet  hydrochlorothiazide (HYDRODIURIL) 25 MG tablet  ipratropium - albuterol 0.5 mg/2.5 mg/3 mL (DUONEB) 0.5-2.5 (3) MG/3ML neb solution  loratadine (CLARITIN) 10 MG tablet  meloxicam (MOBIC) 15 MG tablet  montelukast (SINGULAIR) 10 MG tablet  nicotine (NICODERM CQ) 21 MG/24HR 24 hr patch  nystatin (MYCOSTATIN) 857308 UNIT/ML suspension  nystatin (MYCOSTATIN) 829443 UNIT/ML suspension  order for DME  order for DME  order for DME  order for DME  order for DME  order for DME  oxybutynin ER (DITROPAN-XL) 5 MG 24 hr tablet  oxyCODONE-acetaminophen (PERCOCET) 5-325 MG tablet  potassium chloride ER (K-TAB/KLOR-CON) 10 MEQ CR tablet  ranitidine (ZANTAC) 150 MG capsule  temazepam (RESTORIL) 30 MG capsule  temazepam (RESTORIL) 30 MG capsule  triamcinolone (KENALOG) 0.1 % external cream          Review of Systems   Constitutional: Negative for activity change, appetite change, chills, fatigue and fever.   HENT: Negative for congestion and sore throat.    Respiratory: Positive for cough, chest tightness, shortness of breath and wheezing.    Cardiovascular: Negative for chest pain.   Gastrointestinal: Negative for abdominal pain, nausea and vomiting.   Genitourinary: Negative.    Skin: Negative.        Physical Exam   BP: 140/58  Pulse: 79  Heart Rate: 78  Temp: 98.7  F (37.1  C)  SpO2: 96 %      Physical Exam  Vitals signs and nursing note reviewed.   Constitutional:       General: She is not in acute distress.     Appearance: She is normal weight. She is not  ill-appearing, toxic-appearing or diaphoretic.   Cardiovascular:      Rate and Rhythm: Normal rate and regular rhythm.   Pulmonary:      Effort: Pulmonary effort is normal.      Comments: Expiratory wheezes bilaterally.  No significant tachypnea or increased work of breathing.  Skin:     General: Skin is warm and dry.      Capillary Refill: Capillary refill takes less than 2 seconds.   Neurological:      General: No focal deficit present.      Mental Status: She is alert and oriented to person, place, and time.   Psychiatric:         Mood and Affect: Mood normal.         ED Course        Procedures               Critical Care time:  none               Results for orders placed or performed during the hospital encounter of 11/18/19 (from the past 24 hour(s))   CBC with platelets differential   Result Value Ref Range    WBC 9.8 4.0 - 11.0 10e9/L    RBC Count 4.78 3.8 - 5.2 10e12/L    Hemoglobin 14.2 11.7 - 15.7 g/dL    Hematocrit 42.9 35.0 - 47.0 %    MCV 90 78 - 100 fl    MCH 29.7 26.5 - 33.0 pg    MCHC 33.1 31.5 - 36.5 g/dL    RDW 13.5 10.0 - 15.0 %    Platelet Count 291 150 - 450 10e9/L    Diff Method Automated Method     % Neutrophils 67.6 %    % Lymphocytes 23.0 %    % Monocytes 5.4 %    % Eosinophils 3.0 %    % Basophils 0.6 %    % Immature Granulocytes 0.4 %    Nucleated RBCs 0 0 /100    Absolute Neutrophil 6.6 1.6 - 8.3 10e9/L    Absolute Lymphocytes 2.3 0.8 - 5.3 10e9/L    Absolute Monocytes 0.5 0.0 - 1.3 10e9/L    Absolute Eosinophils 0.3 0.0 - 0.7 10e9/L    Absolute Basophils 0.1 0.0 - 0.2 10e9/L    Abs Immature Granulocytes 0.0 0 - 0.4 10e9/L    Absolute Nucleated RBC 0.0    Basic metabolic panel   Result Value Ref Range    Sodium 135 133 - 144 mmol/L    Potassium 4.1 3.4 - 5.3 mmol/L    Chloride 100 94 - 109 mmol/L    Carbon Dioxide 30 20 - 32 mmol/L    Anion Gap 5 3 - 14 mmol/L    Glucose 102 (H) 70 - 99 mg/dL    Urea Nitrogen 19 7 - 30 mg/dL    Creatinine 0.98 0.52 - 1.04 mg/dL    GFR Estimate 58 (L)  >60 mL/min/[1.73_m2]    GFR Estimate If Black 68 >60 mL/min/[1.73_m2]    Calcium 9.0 8.5 - 10.1 mg/dL   XR Chest Port 1 View    Narrative    Procedure:XR CHEST PORT 1 VW    Clinical history:Female, 69 years, dyspnea    Technique: Single view was obtained.    Comparison: 9/17/2019    Findings: The cardiac silhouette is enlarged. The pulmonary  vasculature is normal.    The lungs demonstrates silhouetting the left heart border and slight  blunting of the right costophrenic angle. Bony structures again  demonstrate right shoulder arthroplasty and degenerative changes of  the AC joints and left shoulder.      Impression    Impression:   Increased density at the left lung base suggesting lingular and  perhaps left lower lobe pneumonia.    MELITON MONTAÑO MD   Blood gas venous and oxyhgb   Result Value Ref Range    Ph Venous 7.36 7.32 - 7.43 pH    PCO2 Venous 47 40 - 50 mm Hg    PO2 Venous 53 (H) 25 - 47 mm Hg    Bicarbonate Venous 27 21 - 28 mmol/L    FIO2 24%     Oxyhemoglobin Venous 84 %    Base Excess Venous 0.7 mmol/L       Medications   doxycycline hyclate (VIBRA-TABS) tablet 100 mg (has no administration in time range)   ipratropium - albuterol 0.5 mg/2.5 mg/3 mL (DUONEB) neb solution 3 mL (3 mLs Nebulization Given 11/18/19 1514)   methylPREDNISolone sodium succinate (solu-MEDROL) injection 125 mg (125 mg Intravenous Given 11/18/19 1532)       Assessments & Plan (with Medical Decision Making)   Sydni is well-known to this department in facility.  She has end-stage COPD and is oxygen dependent virtually on palliative care.  Recent multiple antibiotics.  Augmentin in October.  She will be treated with IV fluids, IV Solu-Medrol, and oral doxycycline with admission. Discussed with and graciously accepted by Dr. Robles.    This document was prepared using a combination of typing and voice generated software.  While every attempt was made for accuracy, spelling and grammatical errors may exist.    I have reviewed the  nursing notes.    I have reviewed the findings, diagnosis, plan and need for follow up with the patient.       New Prescriptions    No medications on file       Final diagnoses:   Metabolic encephalopathy   COPD exacerbation (H)   Acute on chronic respiratory failure with hypoxia (H)       11/18/2019   [facility]     Savi Mahmood PA-C  11/18/19 2955

## 2019-11-18 NOTE — H&P
[facility]    History and Physical - Hospitalist Service       Date of Admission:  11/18/2019    Assessment & Plan   Sydni Hardin is a 69 year old female admitted on 11/18/2019. She presents with SOB     1. COPD exacerbation  2. Left lower lobe pneumonia  3. Bronchitis  4. History of end-stage COPD requiring 2.5 L of oxygen at home.  5. Hyperlipidemia.  On Lipitor  6. Hypertension.  On hydrochlorothiazide and lisinopril    - Duoneb treatment 4 times daily while in hospital  - Solu-Medrol 80 mg IV every 8 hours  - Check lactic acid, procalcitonin levels  - Check viral respiratory panel  - Heparin subcu for DVT prophylaxis  - Nicotine patch while in hospital  - Smoking cessation was discussed in great detail with patient    Diet: Regular Diet Adult    DVT Prophylaxis: Heparin SQ  Walter Catheter: not present  Code Status: FULL     Disposition Plan   Expected discharge: 2 - 3 days, recommended to prior living arrangement once COPD exacerbation has resolved     Entered: Blake Robles MD 11/18/2019, 5:19 PM     The patient's care was discussed with the Bedside Nurse.    Blake Robles MD  [facility]    ______________________________________________________________________    Chief Complaint   Shortness of breath    History is obtained from the patient    History of Present Illness   Sydni Hardin is a 69 year old female who current medical history of end-stage COPD, requiring 2.5 L of oxygen at home.  Patient into the hospital with chief complaint of shortness of breath for the past few days.  Denies any recent travel history.  Denies any respiratory sick contacts.  Patient reports that she has been compliant with her nebulizing treatment at home including albuterol, Symbicort, Singulair.  When the EMS arrived, patient was still smoking a cigarette.  Patient oxygen saturation was in low 80s upon EMS arrival.  In the ER patient was found to have left-sided pneumonia based on chest  x-ray.  Patient also reports productive cough for the past couple of days.  Patient was here in the hospital for the same problem in the month of October.  At that time she was prescribed Augmentin for bronchitis.  Currently patient is hemodynamically stable.  In the ER she did receive DuoNeb treatment along with Solu-Medrol 125 mg IV.  Patient is being admitted to the hospital for treatment of acute COPD exacerbation with underlying pneumonia.    Review of Systems    All 12 point ROS negative except for the one noted above in HPI    Past Medical History    I have reviewed this patient's medical history and updated it with pertinent information if needed.   Past Medical History:   Diagnosis Date     Abnormal arterial blood gases 2006     Acute upper respiratory infections of unspecified site 2001     Anxiety 10/24/2011     Calculus of gallbladder without mention of cholecystitis or obstruction 2006     Depression 2012     Follow-up examination, following other surgery 2006     Headache(784.0) 12/10/2003     History of CVA (cerebrovascular accident) 2011     HTN (hypertension) 2012     Insomnia 3/13/2012     Lipoma of other skin and subcutaneous tissue 2006     Nonallopathic lesion of cervical region, not elsewhere classified 2003     Osteoarthrosis, unspecified whether generalized or localized, lower leg 10/4/2001     Special screening for malignant neoplasms, colon 2006     Urinary tract infection, site not specified 2007       Past Surgical History   I have reviewed this patient's surgical history and updated it with pertinent information if needed.  Past Surgical History:   Procedure Laterality Date     ARTHROPLASTY KNEE Right 3/21/2017    Procedure: ARTHROPLASTY KNEE;  Surgeon: Moisés Olson MD;  Location: [facility]     ARTHROSCOPY KNEE       BIOPSY BREAST  2000    left - free of disease      SECTION        SECTION       CHOLECYSTECTOMY        COLONOSCOPY  01-    repeat in 10 years     HEAD & NECK SURGERY  2014    hematoma on head drained     HYSTERECTOMY       IR CONSULTATION FOR IR EXAM  2/28/2019     ORTHOPEDIC SURGERY  02/04/2014    right shoulder replacement     shoulder x2  2009    left       Social History   I have reviewed this patient's social history and updated it with pertinent information if needed.  Social History     Tobacco Use     Smoking status: Current Every Day Smoker     Packs/day: 1.00     Years: 53.00     Pack years: 53.00     Types: Cigarettes     Start date: 1/1/1965     Smokeless tobacco: Never Used     Tobacco comment: pt enrolled in quit plan 5/2/19   Substance Use Topics     Alcohol use: Yes     Alcohol/week: 0.0 standard drinks     Comment: rarely     Drug use: No       Family History   I have reviewed this patient's family history and updated it with pertinent information if needed.   Family History   Problem Relation Age of Onset     Cancer Mother         skin     Breast Cancer Mother      Cerebrovascular Disease Mother      Alzheimer Disease Mother      Myocardial Infarction Father 76        myocardial infarction - cause of death     Cancer Daughter         cervical      Diabetes Maternal Grandfather      Heart Disease Maternal Grandfather         MI     Diabetes Maternal Grandmother      Diabetes Paternal Grandmother      Diabetes Paternal Grandfather        Prior to Admission Medications   Prior to Admission Medications   Prescriptions Last Dose Informant Patient Reported? Taking?   ALPRAZolam (XANAX) 1 MG tablet   No No   Sig: TAKE 1 TABLET BY MOUTH 3 TIMES DAILY AS NEEDED FOR ANXIETY   EPIPEN 2-JAIRO 0.3 MG/0.3ML injection  Self No No   Sig: INJECT 1 SYRINGE INTRAMUSCULARLY ONCE AS NEEDED FOR ANAPHYLAXIS   albuterol (PROVENTIL) (2.5 MG/3ML) 0.083% neb solution   No No   Sig: USE 1 VIAL IN NEBULIZER 3 TIMES PER DAY   albuterol (VENTOLIN HFA) 108 (90 Base) MCG/ACT inhaler   No No   Sig: USE 1 PUFF FOUR TIMES A  DAY AS NEEDED   aspirin EC 81 MG EC tablet  Self No No   Sig: Take 1 tablet (81 mg) by mouth 2 times daily   Patient taking differently: Take 81 mg by mouth daily    atorvastatin (LIPITOR) 40 MG tablet   No No   Sig: Take 1 tablet (40 mg) by mouth daily   benazepril (LOTENSIN) 10 MG tablet   No No   Sig: Take 1 tablet (10 mg) by mouth daily   benzonatate (TESSALON) 200 MG capsule   No No   Sig: TAKE 1 CAPSULE BY MOUTH 3 TIMES A DAY AS NEEDED FOR COUGH   budesonide-formoterol (SYMBICORT) 160-4.5 MCG/ACT Inhaler   No No   Sig: Inhale 2 puffs into the lungs 2 times daily As needed   butalbital-acetaminophen-caffeine (FIORICET/ESGIC) -40 MG tablet   No No   Sig: Take 1 tablet by mouth every 6 hours as needed for headaches   diclofenac (VOLTAREN) 1 % topical gel   No No   Sig: APPLY 4 GRAMS TO KNEES FOUR TIMES A DAY USING ENCLOSED DOSING CARD   doxepin (SINEQUAN) 10 MG capsule   No No   Sig: TAKE 1 CAPSULE BY MOUTH AT BEDTIME   escitalopram (LEXAPRO) 20 MG tablet   No No   Sig: TAKE 1 TABLET BY MOUTH 2 TIMES A DAY   hydrochlorothiazide (HYDRODIURIL) 25 MG tablet   No No   Sig: TAKE 1 TABLET BY MOUTH DAILY   ipratropium - albuterol 0.5 mg/2.5 mg/3 mL (DUONEB) 0.5-2.5 (3) MG/3ML neb solution   No No   Sig: Take 1 vial (3 mLs) by nebulization every 4 hours as needed for shortness of breath / dyspnea or wheezing   loratadine (CLARITIN) 10 MG tablet   No No   Sig: Take 1 tablet (10 mg) by mouth daily   meloxicam (MOBIC) 15 MG tablet   No No   Sig: Take 1 tablet (15 mg) by mouth daily   montelukast (SINGULAIR) 10 MG tablet   No No   Sig: TAKE 1 TABLET BY MOUTH DAILY   nicotine (NICODERM CQ) 21 MG/24HR 24 hr patch   No No   Sig: Place 1 patch onto the skin every 24 hours   nystatin (MYCOSTATIN) 247871 UNIT/ML suspension   No No   Sig: Take 5 mLs (500,000 Units) by mouth 4 times daily Apply after washing and drying the area thoroughly.   nystatin (MYCOSTATIN) 893123 UNIT/ML suspension   No No   Sig: Take 5 mLs (500,000  "Units) by mouth 4 times daily   order for DME  Self No No   Sig: Equipment being ordered: Nebulizer supplies   order for DME  Self No No   Sig: Equipment being ordered: mcgee walker   order for DME  Self No No   Sig: Equipment being ordered: toilet seat riser   order for DME  Self No No   Sig: Equipment being ordered: Please install safety bar in bathroom to assist patient in getting up from toilet.   order for DME  Self No No   Sig: Discontinue O2 services   order for DME   No No   Sig: Equipment being ordered:   Dressing type: optifoam  Dressing size: 3X3\" bordered foam  Number/Amount to be used at each dressing change: 1  Frequency of dressing change: every 3 days  Fill:  5  Refill: 1    Note: wound is on abdomen, moderate amount of tan drainage, no odor, surrounding skin is intact and pink, measures 0.3 X 0.2 X 0.3 cm deep, wound bed is tan   oxyCODONE-acetaminophen (PERCOCET) 5-325 MG tablet  Self No No   Sig: TAKE 1 TO 2 TABLETS BY MOUTH EVERY 4 HOURS AS NEEDED FOR PAIN   oxybutynin ER (DITROPAN-XL) 5 MG 24 hr tablet   No No   Sig: TAKE 1 TABLET BY MOUTH DAILY   Patient not taking: Reported on 11/7/2019   potassium chloride ER (K-TAB/KLOR-CON) 10 MEQ CR tablet   No No   Sig: TAKE 1 TABLET BY MOUTH 2 TIMES DAILY WITH FOOD   predniSONE (DELTASONE) 20 MG tablet   No No   Sig: Take 1 tablet (20 mg) by mouth 2 times daily for 5 days, THEN 1 tablet (20 mg) daily for 7 days, THEN 0.5 tablets (10 mg) daily for 7 days. Take 3 tabs by mouth daily x 3 days, then 2 tabs daily x 3 days, then 1 tab daily x 3 days, then 1/2 tab daily x 3 days.   ranitidine (ZANTAC) 150 MG capsule   No No   Sig: Take 1 capsule (150 mg) by mouth At Bedtime   temazepam (RESTORIL) 30 MG capsule  Self No No   Sig: TAKE 1 CAPSULE BY MOUTH NIGHTLY AS NEEDED FOR SLEEP   temazepam (RESTORIL) 30 MG capsule   No No   Sig: TAKE 1 CAPSULE BY MOUTH NIGHTLY AS NEEDED FOR SLEEP   triamcinolone (KENALOG) 0.1 % external cream   No No   Sig: Apply topically " "2 times daily      Facility-Administered Medications: None     Allergies   Allergies   Allergen Reactions     Bee Pollen Other (See Comments)     Throat and eyes close up     Codeine Difficulty breathing     \"My throat closes shut.\"     Takes Percocet at home     Keflex [Cephalexin Hcl] Hives     Sulfonamide Derivatives Other (See Comments)     Throat closes     Ciprofloxacin Rash     Rash, bumps on lips     Bupropion Hcl Other (See Comments)     Made her very irritable     Celecoxib Other (See Comments) and Cough     Celebrex - wheeze  Takes ASA at home     Cheese      swiss     Clonazepam Hives     Takes Xanax at home     Erythromycin Other (See Comments)     Erythromycin base - abdominal pain     Lisinopril Other (See Comments)     Zestril - headaches     Nifedipine Hives     Procardia     Strawberries [Strawberry]      Tomato      Tramadol Hcl      Takes percocet at home       Physical Exam   Vital Signs: Temp: 98.7  F (37.1  C) Temp src: Oral BP: 130/65 Pulse: 79 Heart Rate: 78   SpO2: 93 % O2 Device: Nasal cannula Oxygen Delivery: 1.5 LPM  Weight: 0 lbs 0 oz    General Appearance: No apparent distress afebrile  Eyes: Normal conjunctiva  HEENT: Normocephalic, atraumatic.  No JVP  Respiratory: Bilateral wheezing lower lung bases.  No crackles   cardiovascular: Regular rate rhythm.  No murmurs  GI: Soft, nontender, nondistended.  Positive bowel sounds  Lymph/Hematologic: No evidence of lymphadenopathy.  No rashes  Genitourinary: Deferred  Skin: No rashes  Musculoskeletal: +2 pulses.  No edema bilateral lower extremities.  Neurologic: No focal neurological deficits  Psychiatric: Mood is stable.  No evidence of agitation, depression, anxiety    Data     Recent Labs   Lab 11/18/19  1508   WBC 9.8   HGB 14.2   MCV 90         POTASSIUM 4.1   CHLORIDE 100   CO2 30   BUN 19   CR 0.98   ANIONGAP 5   JOSÉ 9.0   *     "

## 2019-11-19 LAB
ANION GAP SERPL CALCULATED.3IONS-SCNC: 7 MMOL/L (ref 3–14)
BUN SERPL-MCNC: 22 MG/DL (ref 7–30)
CALCIUM SERPL-MCNC: 9.1 MG/DL (ref 8.5–10.1)
CHLORIDE SERPL-SCNC: 98 MMOL/L (ref 94–109)
CO2 SERPL-SCNC: 26 MMOL/L (ref 20–32)
CREAT SERPL-MCNC: 0.88 MG/DL (ref 0.52–1.04)
ERYTHROCYTE [DISTWIDTH] IN BLOOD BY AUTOMATED COUNT: 13.2 % (ref 10–15)
GFR SERPL CREATININE-BSD FRML MDRD: 67 ML/MIN/{1.73_M2}
GLUCOSE SERPL-MCNC: 165 MG/DL (ref 70–99)
HCT VFR BLD AUTO: 38.8 % (ref 35–47)
HGB BLD-MCNC: 13.4 G/DL (ref 11.7–15.7)
LACTATE BLD-SCNC: 0.7 MMOL/L (ref 0.7–2)
MAGNESIUM SERPL-MCNC: 1.9 MG/DL (ref 1.6–2.3)
MCH RBC QN AUTO: 30 PG (ref 26.5–33)
MCHC RBC AUTO-ENTMCNC: 34.5 G/DL (ref 31.5–36.5)
MCV RBC AUTO: 87 FL (ref 78–100)
PHOSPHATE SERPL-MCNC: 3.3 MG/DL (ref 2.5–4.5)
PLATELET # BLD AUTO: 265 10E9/L (ref 150–450)
POTASSIUM SERPL-SCNC: 3.4 MMOL/L (ref 3.4–5.3)
PROCALCITONIN SERPL-MCNC: <0.05 NG/ML
RBC # BLD AUTO: 4.47 10E12/L (ref 3.8–5.2)
SODIUM SERPL-SCNC: 131 MMOL/L (ref 133–144)
WBC # BLD AUTO: 7.4 10E9/L (ref 4–11)

## 2019-11-19 PROCEDURE — 12000000 ZZH R&B MED SURG/OB

## 2019-11-19 PROCEDURE — 36415 COLL VENOUS BLD VENIPUNCTURE: CPT | Performed by: INTERNAL MEDICINE

## 2019-11-19 PROCEDURE — 90662 IIV NO PRSV INCREASED AG IM: CPT | Performed by: INTERNAL MEDICINE

## 2019-11-19 PROCEDURE — 85027 COMPLETE CBC AUTOMATED: CPT | Performed by: INTERNAL MEDICINE

## 2019-11-19 PROCEDURE — 94640 AIRWAY INHALATION TREATMENT: CPT

## 2019-11-19 PROCEDURE — 25000128 H RX IP 250 OP 636: Performed by: INTERNAL MEDICINE

## 2019-11-19 PROCEDURE — 83735 ASSAY OF MAGNESIUM: CPT | Performed by: INTERNAL MEDICINE

## 2019-11-19 PROCEDURE — 25000132 ZZH RX MED GY IP 250 OP 250 PS 637: Performed by: INTERNAL MEDICINE

## 2019-11-19 PROCEDURE — 84145 PROCALCITONIN (PCT): CPT | Performed by: INTERNAL MEDICINE

## 2019-11-19 PROCEDURE — 80048 BASIC METABOLIC PNL TOTAL CA: CPT | Performed by: INTERNAL MEDICINE

## 2019-11-19 PROCEDURE — 25000132 ZZH RX MED GY IP 250 OP 250 PS 637: Performed by: NURSE PRACTITIONER

## 2019-11-19 PROCEDURE — 87633 RESP VIRUS 12-25 TARGETS: CPT | Performed by: INTERNAL MEDICINE

## 2019-11-19 PROCEDURE — 25000125 ZZHC RX 250: Performed by: INTERNAL MEDICINE

## 2019-11-19 PROCEDURE — 25000131 ZZH RX MED GY IP 250 OP 636 PS 637: Performed by: NURSE PRACTITIONER

## 2019-11-19 PROCEDURE — 40000275 ZZH STATISTIC RCP TIME EA 10 MIN

## 2019-11-19 PROCEDURE — 94640 AIRWAY INHALATION TREATMENT: CPT | Mod: 76

## 2019-11-19 PROCEDURE — 84100 ASSAY OF PHOSPHORUS: CPT | Performed by: INTERNAL MEDICINE

## 2019-11-19 PROCEDURE — 99232 SBSQ HOSP IP/OBS MODERATE 35: CPT | Performed by: NURSE PRACTITIONER

## 2019-11-19 PROCEDURE — 83605 ASSAY OF LACTIC ACID: CPT | Performed by: INTERNAL MEDICINE

## 2019-11-19 RX ORDER — NICOTINE 21 MG/24HR
1 PATCH, TRANSDERMAL 24 HOURS TRANSDERMAL DAILY
Status: DISCONTINUED | OUTPATIENT
Start: 2019-11-19 | End: 2019-11-20 | Stop reason: HOSPADM

## 2019-11-19 RX ORDER — LORATADINE 10 MG/1
10 TABLET ORAL DAILY
Status: DISCONTINUED | OUTPATIENT
Start: 2019-11-19 | End: 2019-11-20 | Stop reason: HOSPADM

## 2019-11-19 RX ORDER — NYSTATIN 100000 [USP'U]/G
POWDER TOPICAL DAILY
COMMUNITY
Start: 2019-09-16 | End: 2020-01-09

## 2019-11-19 RX ORDER — ACETAMINOPHEN 500 MG
500-1000 TABLET ORAL PRN
Status: ON HOLD | COMMUNITY
End: 2019-11-19

## 2019-11-19 RX ORDER — BUTALBITAL, ACETAMINOPHEN AND CAFFEINE 50; 325; 40 MG/1; MG/1; MG/1
1 TABLET ORAL EVERY 6 HOURS PRN
Status: DISCONTINUED | OUTPATIENT
Start: 2019-11-19 | End: 2019-11-20 | Stop reason: HOSPADM

## 2019-11-19 RX ORDER — ASPIRIN 81 MG/1
81 TABLET ORAL DAILY
COMMUNITY
End: 2020-01-09

## 2019-11-19 RX ORDER — DOXEPIN HYDROCHLORIDE 10 MG/1
10 CAPSULE ORAL AT BEDTIME
Status: DISCONTINUED | OUTPATIENT
Start: 2019-11-19 | End: 2019-11-19

## 2019-11-19 RX ORDER — POTASSIUM CHLORIDE 750 MG/1
10 CAPSULE, EXTENDED RELEASE ORAL 2 TIMES DAILY
Status: DISCONTINUED | OUTPATIENT
Start: 2019-11-19 | End: 2019-11-20 | Stop reason: HOSPADM

## 2019-11-19 RX ORDER — TEMAZEPAM 30 MG
30 CAPSULE ORAL
Status: DISCONTINUED | OUTPATIENT
Start: 2019-11-19 | End: 2019-11-19

## 2019-11-19 RX ORDER — TEMAZEPAM 30 MG
30 CAPSULE ORAL ONCE
Status: DISCONTINUED | OUTPATIENT
Start: 2019-11-19 | End: 2019-11-19

## 2019-11-19 RX ORDER — DOXEPIN HYDROCHLORIDE 10 MG/1
10 CAPSULE ORAL AT BEDTIME
Status: DISCONTINUED | OUTPATIENT
Start: 2019-11-19 | End: 2019-11-20 | Stop reason: HOSPADM

## 2019-11-19 RX ORDER — PREDNISONE 20 MG/1
40 TABLET ORAL DAILY
Status: DISCONTINUED | OUTPATIENT
Start: 2019-11-19 | End: 2019-11-20 | Stop reason: HOSPADM

## 2019-11-19 RX ORDER — FLUCONAZOLE 150 MG/1
TABLET ORAL
Status: ON HOLD | COMMUNITY
Start: 2019-09-16 | End: 2019-11-19

## 2019-11-19 RX ORDER — FAMOTIDINE 20 MG/1
20 TABLET, FILM COATED ORAL AT BEDTIME
Status: DISCONTINUED | OUTPATIENT
Start: 2019-11-19 | End: 2019-11-20 | Stop reason: HOSPADM

## 2019-11-19 RX ORDER — ESCITALOPRAM OXALATE 10 MG/1
20 TABLET ORAL 2 TIMES DAILY
Status: DISCONTINUED | OUTPATIENT
Start: 2019-11-19 | End: 2019-11-20 | Stop reason: HOSPADM

## 2019-11-19 RX ORDER — MELOXICAM 7.5 MG/1
15 TABLET ORAL DAILY
Status: DISCONTINUED | OUTPATIENT
Start: 2019-11-19 | End: 2019-11-19

## 2019-11-19 RX ORDER — BUTALBITAL, ACETAMINOPHEN AND CAFFEINE 50; 325; 40 MG/1; MG/1; MG/1
1 TABLET ORAL ONCE
Status: COMPLETED | OUTPATIENT
Start: 2019-11-19 | End: 2019-11-19

## 2019-11-19 RX ORDER — ASPIRIN 81 MG/1
81 TABLET ORAL DAILY
Status: DISCONTINUED | OUTPATIENT
Start: 2019-11-20 | End: 2019-11-20 | Stop reason: HOSPADM

## 2019-11-19 RX ORDER — BENZONATATE 100 MG/1
100 CAPSULE ORAL EVERY 6 HOURS PRN
Status: DISCONTINUED | OUTPATIENT
Start: 2019-11-19 | End: 2019-11-20 | Stop reason: HOSPADM

## 2019-11-19 RX ADMIN — FAMOTIDINE 20 MG: 20 TABLET ORAL at 20:27

## 2019-11-19 RX ADMIN — LEVOFLOXACIN 750 MG: 500 TABLET, FILM COATED ORAL at 14:14

## 2019-11-19 RX ADMIN — ALPRAZOLAM 0.5 MG: 0.5 TABLET ORAL at 14:14

## 2019-11-19 RX ADMIN — LORATADINE 10 MG: 10 TABLET ORAL at 14:14

## 2019-11-19 RX ADMIN — BENZONATATE 100 MG: 100 CAPSULE ORAL at 14:14

## 2019-11-19 RX ADMIN — POTASSIUM CHLORIDE 10 MEQ: 750 CAPSULE, EXTENDED RELEASE ORAL at 20:29

## 2019-11-19 RX ADMIN — HEPARIN SODIUM 5000 UNITS: 10000 INJECTION, SOLUTION INTRAVENOUS; SUBCUTANEOUS at 20:43

## 2019-11-19 RX ADMIN — DOXEPIN HYDROCHLORIDE 10 MG: 10 CAPSULE ORAL at 20:29

## 2019-11-19 RX ADMIN — IPRATROPIUM BROMIDE AND ALBUTEROL SULFATE 3 ML: .5; 3 SOLUTION RESPIRATORY (INHALATION) at 11:23

## 2019-11-19 RX ADMIN — BUTALBITAL, ACETAMINOPHEN AND CAFFEINE 1 TABLET: 50; 325; 40 TABLET ORAL at 12:27

## 2019-11-19 RX ADMIN — IPRATROPIUM BROMIDE AND ALBUTEROL SULFATE 3 ML: .5; 3 SOLUTION RESPIRATORY (INHALATION) at 07:39

## 2019-11-19 RX ADMIN — LISINOPRIL 10 MG: 10 TABLET ORAL at 09:41

## 2019-11-19 RX ADMIN — POTASSIUM CHLORIDE 10 MEQ: 750 CAPSULE, EXTENDED RELEASE ORAL at 09:41

## 2019-11-19 RX ADMIN — HEPARIN SODIUM 5000 UNITS: 10000 INJECTION, SOLUTION INTRAVENOUS; SUBCUTANEOUS at 09:41

## 2019-11-19 RX ADMIN — MONTELUKAST 10 MG: 10 TABLET, FILM COATED ORAL at 09:41

## 2019-11-19 RX ADMIN — DOXYCYCLINE HYCLATE 100 MG: 100 TABLET, FILM COATED ORAL at 05:29

## 2019-11-19 RX ADMIN — ESCITALOPRAM OXALATE 20 MG: 10 TABLET ORAL at 09:41

## 2019-11-19 RX ADMIN — IPRATROPIUM BROMIDE AND ALBUTEROL SULFATE 3 ML: .5; 3 SOLUTION RESPIRATORY (INHALATION) at 19:37

## 2019-11-19 RX ADMIN — ESCITALOPRAM OXALATE 20 MG: 10 TABLET ORAL at 20:28

## 2019-11-19 RX ADMIN — ASPIRIN 81 MG: 81 TABLET, COATED ORAL at 09:41

## 2019-11-19 RX ADMIN — ATORVASTATIN CALCIUM 40 MG: 40 TABLET, FILM COATED ORAL at 09:41

## 2019-11-19 RX ADMIN — INFLUENZA A VIRUS A/MICHIGAN/45/2015 X-275 (H1N1) ANTIGEN (FORMALDEHYDE INACTIVATED), INFLUENZA A VIRUS A/SINGAPORE/INFIMH-16-0019/2016 IVR-186 (H3N2) ANTIGEN (FORMALDEHYDE INACTIVATED), AND INFLUENZA B VIRUS B/MARYLAND/15/2016 BX-69A (A B/COLORADO/6/2017-LIKE VIRUS) ANTIGEN (FORMALDEHYDE INACTIVATED) 0.5 ML: 60; 60; 60 INJECTION, SUSPENSION INTRAMUSCULAR at 14:15

## 2019-11-19 RX ADMIN — METHYLPREDNISOLONE SODIUM SUCCINATE 81.25 MG: 125 INJECTION, POWDER, FOR SOLUTION INTRAMUSCULAR; INTRAVENOUS at 07:31

## 2019-11-19 RX ADMIN — IPRATROPIUM BROMIDE AND ALBUTEROL SULFATE 3 ML: .5; 3 SOLUTION RESPIRATORY (INHALATION) at 15:35

## 2019-11-19 RX ADMIN — HYDROCHLOROTHIAZIDE 25 MG: 25 TABLET ORAL at 09:41

## 2019-11-19 RX ADMIN — PREDNISONE 40 MG: 20 TABLET ORAL at 12:26

## 2019-11-19 ASSESSMENT — ACTIVITIES OF DAILY LIVING (ADL)
ADLS_ACUITY_SCORE: 30
ADLS_ACUITY_SCORE: 28
ADLS_ACUITY_SCORE: 30

## 2019-11-19 ASSESSMENT — MIFFLIN-ST. JEOR: SCORE: 1235.75

## 2019-11-19 NOTE — PLAN OF CARE
Face to face report given with opportunity to observe patient.    Report given to Jessica Ludwig RN   11/19/2019  7:15 AM

## 2019-11-19 NOTE — PROVIDER NOTIFICATION
Dr. Robles text paged regarding pt request for temazepam and doxepin as taken at home at bedtime. Awaiting clarification of order.

## 2019-11-19 NOTE — PLAN OF CARE
Medicated with xanax and tessalon pearle now per patients request . Daughter here visiting. Flu vaccination also given now.

## 2019-11-19 NOTE — PHARMACY
[facility]    Pharmacy      Antimicrobial Stewardship Note     Current antimicrobial therapy:  Anti-infectives (From now, onward)    Start     Dose/Rate Route Frequency Ordered Stop    11/19/19 1415  levofloxacin (LEVAQUIN) tablet 750 mg      750 mg Oral DAILY 11/19/19 1355      11/19/19 0600  doxycycline hyclate (VIBRA-TABS) tablet 100 mg      100 mg Oral EVERY 12 HOURS SCHEDULED 11/18/19 1744            Indication: CAP    Days of Therapy: 2 (doxycycline), 1 (levofloxacin)     Pertinent labs:  Creatinine   Creatinine   Date Value Ref Range Status   11/19/2019 0.88 0.52 - 1.04 mg/dL Final   11/18/2019 0.98 0.52 - 1.04 mg/dL Final   09/22/2019 0.88 0.52 - 1.04 mg/dL Final     WBC   WBC   Date Value Ref Range Status   11/19/2019 7.4 4.0 - 11.0 10e9/L Final   11/18/2019 9.8 4.0 - 11.0 10e9/L Final   09/20/2019 13.9 (H) 4.0 - 11.0 10e9/L Final     Procalcitonin   Procalcitonin   Date Value Ref Range Status   11/19/2019 <0.05 ng/ml Final     Comment:     <0.05 ng/ml  Normal  Recommendation: Very low risk of bacterial infection.   Discourage antibiotics unless strong clinical suspicion for serious infection.     09/17/2019 <0.05 ng/ml Final     Comment:     <0.05 ng/ml  Normal  Recommendation: Very low risk of bacterial infection.   Discourage antibiotics unless strong clinical suspicion for serious infection.     09/24/2018 <0.05 ng/ml Final     Comment:     <0.05 ng/ml  Normal  Recommendation: Very low risk of bacterial infection.   Discourage antibiotics unless strong clinical suspicion for serious infection.       CRP   CRP Inflammation   Date Value Ref Range Status   03/25/2017 140.0 (H) 0.0 - 8.0 mg/L Final       Culture Results:   Collected Updated Procedure Result Status    11/19/2019 0155 11/19/2019 0201 Respiratory Virus Panel by PCR [[id number]]   Nasopharyngeal    In process No component results           11/18/2019 1834 11/19/2019 0624 Active MRSA Surveillance Culture [[id number]]   Nares from Nose     Preliminary result Specimen Description Nares   Culture Micro Culture in progress P           11/18/2019 1713 11/19/2019 0618 Blood culture [[id number]]   Blood    Preliminary result Specimen Description Blood   Special Requests Right Arm P   Culture Micro No growth after 13 hours P           11/18/2019 1706 11/19/2019 0618 Blood culture [[id number]]   Blood    Preliminary result Specimen Description Blood   Special Requests Right Arm P   Culture Micro No growth after 13 hours P                  Recommendations/Interventions:  1. None at this time    Janie Naranjo, [facility]  November 19, 2019

## 2019-11-19 NOTE — PROGRESS NOTES
[facility]    Hospitalist Progress Note    Date of Service (when I saw the patient): 11/19/2019    Assessment & Plan       COPD exacerbation, possible LLL pneumonia (H): Feels breathing has improved minimally, still having severe coughing. Continue nebs, steroids. She was started on doxycycline, however with possibilty of pneumonia with LLL infiltrate versus atelectasis will give more broad coverage with Levaquin. Cannot simply add Rocephin due to her allergy.     Acute on chronic hypoxemic respiratory failure (H): Resolved. She initially needed higher flow oxygen than her normal 2.5 Liters. Now she is saturating 94% on 2 liters.       Benign essential hypertension: stable, continue home meds.       CKD (chronic kidney disease) stage 3, GFR 30-59 ml/min (H):BUN/Creat normal today and has been relatively stable there for the last few visits.       Obesity (BMI 35.0-39.9) with comorbidity (H): BMI 38.06      Centrilobular emphysema (H): Chronic       DVT Prophylaxis: Enoxaparin (Lovenox) SQ  Code Status: Full Code    Disposition: Expected discharge in 1-3 days once respiratory status stable.    Jennifer Junior, CNP    Interval History   Feels breathing has begun to improve, still has hacking cough that is nonproductive. She denies chest pain, abdominal pain, nausea.     -Data reviewed today: I reviewed all new labs and imaging results over the last 24 hours.     Physical Exam   Temp: 97.2  F (36.2  C) Temp src: Tympanic BP: (!) 124/46 Pulse: 76 Heart Rate: 73 Resp: 20 SpO2: 93 % O2 Device: Nasal cannula Oxygen Delivery: 2 LPM  Vitals:    11/19/19 0200   Weight: 82.6 kg (182 lb 1.6 oz)     Vital Signs with Ranges  Temp:  [97.2  F (36.2  C)-98.4  F (36.9  C)] 97.2  F (36.2  C)  Pulse:  [75-79] 76  Heart Rate:  [73-77] 73  Resp:  [18-20] 20  BP: ()/(46-93) 124/46  SpO2:  [91 %-100 %] 93 %  I/O last 3 completed shifts:  In: -   Out: 400 [Urine:400]    Peripheral IV 11/18/19 Left (Active)   Site  Assessment WDL 11/19/2019  2:00 PM   Line Status Saline locked 11/19/2019  2:00 PM   Phlebitis Scale 0-->no symptoms 11/19/2019  2:00 PM   Infiltration Scale 0 11/19/2019  2:00 PM   Dressing Intervention New dressing  11/18/2019  3:09 PM   Number of days: 1       Wound 10/16/13 Right Finger (Comment which one) (Active)   Number of days: 2225       Wound 10/16/13 Lip (Active)   Number of days: 2225       Wound 08/08/14 Right Arm Abrasion(s) (Active)   Number of days: 1929       Rash 09/17/19 0701 lower groin (Active)   Number of days: 63       Incision/Surgical Site 03/21/17 Right Knee (Active)   Number of days: 973     Line/device assessment(s) completed for medical necessity    Constitutional: Awake,aleret, no acute distress.  Respiratory: Diminished bilateral, but no wheezes, crackles or rhonchi. Upper lobes are more diminished than lower lobes.   Cardiovascular: HRR, no murmurs, rubs, thrills.   GI: Obese,soft, nontender, bowel sounds positive  Skin/Integumen: no unusual rashes,bruises, open areas.       Medications       [START ON 11/20/2019] aspirin  81 mg Oral Daily     atorvastatin  40 mg Oral Daily     doxepin  10 mg Oral At Bedtime     doxepin  10 mg Oral At Bedtime     doxycycline hyclate  100 mg Oral Q12H MARK     escitalopram  20 mg Oral Daily     famotidine  20 mg Oral At Bedtime     heparin ANTICOAGULANT  5,000 Units Subcutaneous Q12H     hydrochlorothiazide  25 mg Oral Daily     ipratropium - albuterol 0.5 mg/2.5 mg/3 mL  3 mL Nebulization 4x daily     levofloxacin  750 mg Oral Daily     lisinopril  10 mg Oral Daily     loratadine  10 mg Oral Daily     meloxicam  15 mg Oral Daily     montelukast  10 mg Oral Daily     nicotine  1 patch Transdermal Daily     nicotine   Transdermal Q8H     [START ON 11/20/2019] nicotine   Transdermal Daily     potassium chloride ER  10 mEq Oral Daily     predniSONE  40 mg Oral Daily     sodium chloride (PF)  3 mL Intracatheter Q8H     temazepam  30 mg Oral Once       Data    Recent Labs   Lab 11/19/19  0520 11/18/19  1508   WBC 7.4 9.8   HGB 13.4 14.2   MCV 87 90    291   * 135   POTASSIUM 3.4 4.1   CHLORIDE 98 100   CO2 26 30   BUN 22 19   CR 0.88 0.98   ANIONGAP 7 5   JOSÉ 9.1 9.0   * 102*       Recent Results (from the past 24 hour(s))   XR Chest Port 1 View    Narrative    Procedure:XR CHEST PORT 1 VW    Clinical history:Female, 69 years, dyspnea    Technique: Single view was obtained.    Comparison: 9/17/2019    Findings: The cardiac silhouette is enlarged. The pulmonary  vasculature is normal.    The lungs demonstrates silhouetting the left heart border and slight  blunting of the right costophrenic angle. Bony structures again  demonstrate right shoulder arthroplasty and degenerative changes of  the AC joints and left shoulder.      Impression    Impression:   Increased density at the left lung base suggesting lingular and  perhaps left lower lobe pneumonia.    MELITON MONTAÑO MD

## 2019-11-19 NOTE — PLAN OF CARE
"Pt has been pleasantly confused and redirectable. Her sao2's have been good on 2 LNC and will attempt to wean when awake. BBS are diminished . She has been sleeping sound without the need for her Restoril 30 mg po which she usually takes \" every night for 30 years\" She did take 0.5 mg of xanax and 1 mg of melatonin. She was incontinent of a lg amt of odorous urine while sleeping hard. She also used the commode with assist of 2. She says she uses a W/CH at home. Her daughter reviewed some of her meds and will bring in her med list in the morning.Viral nasal swab was sent to lab. Exit alarm on bed. Will continue plan of care.  "

## 2019-11-19 NOTE — PLAN OF CARE
Face to face report given with opportunity to observe patient.    Report given to Rachel Gupta RN   11/18/2019  7:00 PM

## 2019-11-19 NOTE — PLAN OF CARE
PTA medlist reviewed. Medication [facility] med list (they do not manage her meds, just printed a med list which is dated) and clarified questions, in person, with Sydni and her daughter.     The only nebulizer she thinks she is using at home is the albuterol 0.083%. The duoneb (which I deleted) was picked up in July and she believes she ran out completely.     She is taking alprazolam, doxepin, and temazepam at night because she clenches her jaw and has broken teeth from it so her dental provider wants her on three different meds. However, when I look at each history of the meds it looks like they were prescribed for anxiety, osteoarthritis and insomnia.     She has a hard time remembering what each med she takes is prescribed for and sort of knows them by color and shape.     Patient states ranitidine isn't working and wants to be prescribed Tagamet 800 mg for a 30 ds since she only uses it as needed.     Her Symbicort and Advair are too expensive so she hasn't been picking them up.     Jennifer notified of changes/updates.     Nydia Stein on 11/19/2019 at 1:50 PM

## 2019-11-19 NOTE — PLAN OF CARE
[facility] Inpatient Admission Note:    Patient admitted to 3210/3210-1 at approximately 1729 via cart accompanied by transport tech from emergency room . Report received from Yaneth Hull  in SBAR format at 1729 via telephone. Patient transferred to bed via self.. Patient is alert and oriented X 3, denies pain; rates at 0 on 0-10 scale.  Patient oriented to room, unit, hourly rounding, and plan of care. Explained admission packet and patient handbook with patient bill of rights brochure. Will continue to monitor and document as needed.     Inpatient Nursing criteria listed below was met:    Health care directives status obtained and documented: No    Care Everywhere authorization obtained No    MRSA swab completed for patient 65 years and older: Yes    Patient identifies a surrogate decision maker: Yes If yes, who:Liz Contact Information:see facesheet     If initial lactic acid >2.0, repeat lactic acid drawn within one hour of arrival to unit: NA. If no, state reason: wnl     Vaccination assessment and education completed: Yes   Vaccinations received prior to admission: Pneumovax yes  Influenza(seasonal)  NO   Vaccination(s) ordered: influenza vaccine    Clergy visit ordered if patient requests: No    Skin issues/needs documented: No    Isolation Patient: no Education given, correct sign in place and documentation row added to PCS:  No    Fall Prevention Yes: Care plan updated, education given and documented, sticker and magnet in place: Yes    Care Plan initiated: Yes    Education Documented (including assessment): Yes    Patient has discharge needs : No If yes, please explain:NA

## 2019-11-19 NOTE — DISCHARGE INSTRUCTIONS
NUMBERS TO CALL IN CRISIS    National Suicide Prevention Lifeline ([facility])  1-764.840.5920    Crisis Text Line ([place])  Text  HOME  to 273658    Mental Health Crisis Line ([place], [place])  [phone]    [facility] Crisis ([place], [place])   [phone]      If you are feeling very unsafe, call 911 or bring yourself to the Emergency Room        Counseling in [place]:  Adrian Edge           [phone]  [facility]    Jayleen Valenzuela [facility]      [phone]  [facility]     Blanca Man, [facility] (young kids)    [phone]   Albania Hernandez, [facility] (older kids & adults)  [phone]   Wilberto Kimball [facility]      [phone]  [facility]       [phone]   Dennis Brunson MS,    Cherelle Reese MA, [facility]   Mayelin Fontenot MS psychotherapist   Kristyn Edgar MS, [facility]   Queta Montejo [facility], counselor   Ana Brunson [facility], counselor  [place]        [phone]  Chris Thorpe, counseling  Dr Margot Clark, counseling  Aimee Espinoza, psychiatry   [facility]       [phone]   Graciela Biggs MA, LMFT, [facility]   Nelly Liu MSW, [facility]          [phone]  [facility]      [phone]   Ana Clark MS, [facility]          [phone]        Dayan Tabor MSW, [facility] , C-MI   Nay Iyer MSW, [facility]                                                    Jordan Pospecryan [facility]      Ashanti Vega MS, [facility]                [phone]      Digna MeehanyD     Yue MeehanyD, owner      Emilia Cary MPAS, PARhiannonC    Taina Grove PhD   Yolis Palmer MSW, LICSW [facility]       [phone]   Vibha Arredondo [facility]   Sid HARDY, CNP,     Koki Faustin MSW, [facility] (adolescents)   Jackie Grinnel APRN, CNP (Hib via telehealth; adolescents)   Arin UGARTE CNP (Hib via telehealth)   Tr Gutiérrez MA, LPC, BCIA (Hib  via telehealth)   [facility] MSW, LGSW   Sindi Copelandrabee LICSW   Ever Velazquez DNP, APRN, CNP   Janie Willoughby RN, BSN   Lucrecia Rangel RN-BC, BSN (Hib via telehealth)  [facility]         [phone]   Marleni Infante, MSN, [facility]           [phone]   Adilson Ta MA, LMFT   Loly Hernandez MS RN [facility] NP   Ana Anne, [facility]         [phone]  [facility]       [phone]   [facility]   Carin Nicole (to be LPCC)   Felton Lazaro (to be LPC)      Counseling in [place]:  [facility]       [phone]   mental health & CD counseling  [facility] Sutton       [phone]  [facility]       [phone]  [facility]        [phone]   Graciela Mireles  [facility]       [employer]              [phone]   can do weekends and evenings   Julita Hudson, MSW, LICSW, LCSW, CCTP    Abiodun Gee MA, LMFT, [facility]  [place]       evenings, weekends  [phone]      Counseling in [place]:  [facility]         [phone]   Marleni Infante, MSN, [facility]   Jovita Lema MA, [facility]  [facility]      [phone]  [facility]       [phone]   Jane Valenzuela PMFT  [facility]       Adrianna Dey       [phone]  [facility] [place] [id number]     [place]      [phone]  [facility]     Renny Lea MS Ed, LMFT    [phone]    (kids) denotes providers who also see kids

## 2019-11-19 NOTE — PROGRESS NOTES
Assessment completed see flowsheet.    LOC: alert   Others present: Patient and Family - daughterLiz came toward the end of the assessment    Dx: COPD exacerbation  Chronic Disease Management: states she recently saw a Nephrologist but states she doesn't have a follow-up planned. States she doesn't see any other specialists. States she sees dentist, Dr. Kerr of [place] and an eye doctor at [facility] in [place].    Lives with: two adult daughters   Living at: apartment   Transportation: YES - daughters provide transportation and will transport on hospital discharge    Primary PCP: Gibran Alarcon  Insurance:  Medicare & BCBS Platinum Blue  Medicare IM letter reviewed with patient.    Support System:  Children, siblings   Homecare/PCA: yes- [facility] and gets nursing visits, social work visits and has one more PT visit scheduled. Updated Klaus Velasco of hospitalization and expected discharge plan.  /[facility] Services:   no  [place]: NO      How was the [facility] notification completed: N/A    Health Care Directive: YES - on file. Lists primary agents to act together, daughters Liz and Devorah. 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  States she has lots of supportive people in her life that she talks to.      ADLs: independent   Ambulation: with walker   Falls: states three recent falls, two where she tripped in the house and one where she rolled out of bed. She states she will prevent future falls by being extra careful.   Nutrition: states her daughters do the food prep at home   Sleep: denies sleep concerns      Equipment used: oxygen, neb machine, walker, canes      Oxygen supplier: patient is unsure and states she will let me know when she thinks of it. Thinks it is through a company in [place]. States she has extra full tanks at home.       Does the supplier have valid oxygen orders: Unsure until patient can let me know what company she uses     Mental health: states she takes anxiety and depression medications as prescribed by PCP and states it helps her moods. She states she's had lots of recent family stressors and has been feeling up and down and states her psychologist recently retired and she hasn't seen anyone new since. She accepted a list of local mental health providers and states she is interested in getting back into counseling. She also mentioned she plans to ask her retired psychologist who he'd recommend for her.   Substance abuse: tobacco- states she smokes 1.5-2 ppd and states she always removes her oxygen before she smokes. She states she's interested in quitting and verbalizes an understanding to the recommendation to quit for her health, especially with her respiratory problems. She accepted a smoking cessation resource and also states she will plan to talk to PCP about other options to help her quit as well. She states she drinks alcohol a couple of times per month and has a few drinks on each occasion. She denies any other drug use.   Exposure to violence/abuse: denies  Stressors: recent stressors d/t complicated family dynamics. She feels getting back into counseling may help her deal with the stressors.     Able to Return to Prior Living Arrangements: YES    Choice of Vendor: N/A    Barriers: none identified     MG: Average    Plan: Return home with resumed  Pallilative Care, transport by daughter.

## 2019-11-19 NOTE — PLAN OF CARE
Patient doing about the same today. Took oxygen off this evening now and sats are 90-91% on room air. IV lock patent , flushed easily. VSS. Afebrile. Face is flushed this evening. Complains of being warm. BBS are clear but diminished. Has been getting up into the bathroom to void. Incontinent of urine at times.  Daughter here at bedside now. Upset with daughter at home. States its very stressful having her home with her. Appetite good. Offers no further complaints./

## 2019-11-20 VITALS
OXYGEN SATURATION: 90 % | BODY MASS INDEX: 38.22 KG/M2 | TEMPERATURE: 98 F | DIASTOLIC BLOOD PRESSURE: 65 MMHG | HEIGHT: 58 IN | WEIGHT: 182.1 LBS | RESPIRATION RATE: 20 BRPM | SYSTOLIC BLOOD PRESSURE: 135 MMHG | HEART RATE: 74 BPM

## 2019-11-20 LAB
ANION GAP SERPL CALCULATED.3IONS-SCNC: 8 MMOL/L (ref 3–14)
BACTERIA SPEC CULT: NORMAL
BUN SERPL-MCNC: 23 MG/DL (ref 7–30)
CALCIUM SERPL-MCNC: 8.9 MG/DL (ref 8.5–10.1)
CHLORIDE SERPL-SCNC: 95 MMOL/L (ref 94–109)
CO2 SERPL-SCNC: 26 MMOL/L (ref 20–32)
CREAT SERPL-MCNC: 0.88 MG/DL (ref 0.52–1.04)
ERYTHROCYTE [DISTWIDTH] IN BLOOD BY AUTOMATED COUNT: 13.1 % (ref 10–15)
FLUAV H1 2009 PAND RNA SPEC QL NAA+PROBE: NEGATIVE
FLUAV H1 RNA SPEC QL NAA+PROBE: NEGATIVE
FLUAV H3 RNA SPEC QL NAA+PROBE: NEGATIVE
FLUAV RNA SPEC QL NAA+PROBE: NEGATIVE
FLUBV RNA SPEC QL NAA+PROBE: NEGATIVE
GFR SERPL CREATININE-BSD FRML MDRD: 66 ML/MIN/{1.73_M2}
GLUCOSE SERPL-MCNC: 158 MG/DL (ref 70–99)
HADV DNA SPEC QL NAA+PROBE: NEGATIVE
HADV DNA SPEC QL NAA+PROBE: NEGATIVE
HCT VFR BLD AUTO: 35.2 % (ref 35–47)
HGB BLD-MCNC: 12.1 G/DL (ref 11.7–15.7)
HMPV RNA SPEC QL NAA+PROBE: NEGATIVE
HPIV1 RNA SPEC QL NAA+PROBE: NEGATIVE
HPIV2 RNA SPEC QL NAA+PROBE: NEGATIVE
HPIV3 RNA SPEC QL NAA+PROBE: NEGATIVE
MCH RBC QN AUTO: 29.4 PG (ref 26.5–33)
MCHC RBC AUTO-ENTMCNC: 34.4 G/DL (ref 31.5–36.5)
MCV RBC AUTO: 86 FL (ref 78–100)
MICROBIOLOGIST REVIEW: NORMAL
PLATELET # BLD AUTO: 259 10E9/L (ref 150–450)
POTASSIUM SERPL-SCNC: 3.6 MMOL/L (ref 3.4–5.3)
PROCALCITONIN SERPL-MCNC: <0.05 NG/ML
RBC # BLD AUTO: 4.11 10E12/L (ref 3.8–5.2)
RHINOVIRUS RNA SPEC QL NAA+PROBE: NEGATIVE
RSV RNA SPEC QL NAA+PROBE: NEGATIVE
RSV RNA SPEC QL NAA+PROBE: NEGATIVE
SODIUM SERPL-SCNC: 129 MMOL/L (ref 133–144)
SPECIMEN SOURCE: NORMAL
SPECIMEN SOURCE: NORMAL
WBC # BLD AUTO: 16.3 10E9/L (ref 4–11)

## 2019-11-20 PROCEDURE — 25000128 H RX IP 250 OP 636: Performed by: INTERNAL MEDICINE

## 2019-11-20 PROCEDURE — 36415 COLL VENOUS BLD VENIPUNCTURE: CPT | Performed by: NURSE PRACTITIONER

## 2019-11-20 PROCEDURE — 25000125 ZZHC RX 250: Performed by: INTERNAL MEDICINE

## 2019-11-20 PROCEDURE — 94640 AIRWAY INHALATION TREATMENT: CPT

## 2019-11-20 PROCEDURE — 84145 PROCALCITONIN (PCT): CPT | Performed by: NURSE PRACTITIONER

## 2019-11-20 PROCEDURE — 25000132 ZZH RX MED GY IP 250 OP 250 PS 637: Performed by: INTERNAL MEDICINE

## 2019-11-20 PROCEDURE — 80048 BASIC METABOLIC PNL TOTAL CA: CPT | Performed by: NURSE PRACTITIONER

## 2019-11-20 PROCEDURE — 25000131 ZZH RX MED GY IP 250 OP 636 PS 637: Performed by: NURSE PRACTITIONER

## 2019-11-20 PROCEDURE — 25000132 ZZH RX MED GY IP 250 OP 250 PS 637: Performed by: NURSE PRACTITIONER

## 2019-11-20 PROCEDURE — 99239 HOSP IP/OBS DSCHRG MGMT >30: CPT | Performed by: NURSE PRACTITIONER

## 2019-11-20 PROCEDURE — 85027 COMPLETE CBC AUTOMATED: CPT | Performed by: NURSE PRACTITIONER

## 2019-11-20 RX ORDER — PREDNISONE 20 MG/1
TABLET ORAL
Qty: 9 TABLET | Refills: 0 | Status: SHIPPED | OUTPATIENT
Start: 2019-11-20 | End: 2020-01-09

## 2019-11-20 RX ORDER — IPRATROPIUM BROMIDE AND ALBUTEROL SULFATE 2.5; .5 MG/3ML; MG/3ML
3 SOLUTION RESPIRATORY (INHALATION) 4 TIMES DAILY
Qty: 360 ML | Refills: 0 | Status: SHIPPED | OUTPATIENT
Start: 2019-11-20 | End: 2020-01-09

## 2019-11-20 RX ORDER — DOXYCYCLINE HYCLATE 100 MG
100 TABLET ORAL EVERY 12 HOURS SCHEDULED
Status: DISCONTINUED | OUTPATIENT
Start: 2019-11-20 | End: 2019-11-20 | Stop reason: HOSPADM

## 2019-11-20 RX ORDER — DOXYCYCLINE HYCLATE 100 MG
100 TABLET ORAL EVERY 12 HOURS
Qty: 14 TABLET | Refills: 0 | Status: SHIPPED | OUTPATIENT
Start: 2019-11-20 | End: 2020-01-09

## 2019-11-20 RX ORDER — NICOTINE 21 MG/24HR
1 PATCH, TRANSDERMAL 24 HOURS TRANSDERMAL EVERY 24 HOURS
Qty: 14 PATCH | Refills: 1 | Status: SHIPPED | OUTPATIENT
Start: 2019-11-20 | End: 2020-01-09

## 2019-11-20 RX ADMIN — DOXYCYCLINE HYCLATE 100 MG: 100 TABLET, FILM COATED ORAL at 08:44

## 2019-11-20 RX ADMIN — MONTELUKAST 10 MG: 10 TABLET, FILM COATED ORAL at 08:44

## 2019-11-20 RX ADMIN — ATORVASTATIN CALCIUM 40 MG: 40 TABLET, FILM COATED ORAL at 08:43

## 2019-11-20 RX ADMIN — LISINOPRIL 10 MG: 10 TABLET ORAL at 08:44

## 2019-11-20 RX ADMIN — IPRATROPIUM BROMIDE AND ALBUTEROL SULFATE 3 ML: .5; 3 SOLUTION RESPIRATORY (INHALATION) at 08:03

## 2019-11-20 RX ADMIN — HYDROCHLOROTHIAZIDE 25 MG: 25 TABLET ORAL at 08:44

## 2019-11-20 RX ADMIN — LORATADINE 10 MG: 10 TABLET ORAL at 09:07

## 2019-11-20 RX ADMIN — ASPIRIN 81 MG: 81 TABLET, COATED ORAL at 08:44

## 2019-11-20 RX ADMIN — ESCITALOPRAM OXALATE 20 MG: 10 TABLET ORAL at 08:44

## 2019-11-20 RX ADMIN — HEPARIN SODIUM 5000 UNITS: 10000 INJECTION, SOLUTION INTRAVENOUS; SUBCUTANEOUS at 08:44

## 2019-11-20 RX ADMIN — POTASSIUM CHLORIDE 10 MEQ: 750 CAPSULE, EXTENDED RELEASE ORAL at 08:43

## 2019-11-20 RX ADMIN — PREDNISONE 40 MG: 20 TABLET ORAL at 08:44

## 2019-11-20 ASSESSMENT — ACTIVITIES OF DAILY LIVING (ADL)
ADLS_ACUITY_SCORE: 31

## 2019-11-20 NOTE — PLAN OF CARE
Face to face report given with opportunity to observe patient.    Report given to Jada Gupta RN   11/19/2019  7:04 PM

## 2019-11-20 NOTE — PLAN OF CARE
Face to face report given with opportunity to observe patient.    Report given to Jessica Haynes RN   11/20/2019  7:15 AM

## 2019-11-20 NOTE — PLAN OF CARE
Pt alert with confusion, easily redirected. IV S.L. Afebrile. Lung sounds diminished with occ EW.Nebs per RT. O2 2 lpm via N.C. as at home. Denies pain. No nausea.

## 2019-11-20 NOTE — PROGRESS NOTES
Name: Sydni Hardin    MRN#: [id number]    Reason for Hospitalization: Metabolic encephalopathy [G93.41]  COPD exacerbation (H) [J44.1]  Acute on chronic respiratory failure with hypoxia (H) [J96.21]    Discharge Date: 11/20/2019    Patient / Family response to discharge plan: in agreement    Follow-Up Appt:   Future Appointments   Date Time Provider Department Center   11/25/2019 11:00 AM Gibran Alarcon MD [facility]       Other Providers (Care Coordinator, County Services, PCA services etc): Yes: [facility]    Discharge Disposition: home via family    Elsa Mariscal CM

## 2019-11-20 NOTE — DISCHARGE SUMMARY
[facility]    Discharge Summary  Hospitalist    Date of Admission:  11/18/2019  Date of Discharge:  11/20/2019 11:39 AM  Discharging Provider: Jennifer Junior CNP  Date of Service (when I saw the patient): 11/20/19    Discharge Diagnoses   Principal Problem:    COPD exacerbation (H)  Active Problems:    Benign essential hypertension    Acute hypoxemic respiratory failure (H)    CKD (chronic kidney disease) stage 3, GFR 30-59 ml/min (H)    Obesity (BMI 35.0-39.9) with comorbidity (H)    Centrilobular emphysema (H)    History of Present Illness   From admission:Sydni Hardin is a 69 year old female who current medical history of end-stage COPD, requiring 2.5 L of oxygen at home.  Patient into the hospital with chief complaint of shortness of breath for the past few days.  Denies any recent travel history.  Denies any respiratory sick contacts.  Patient reports that she has been compliant with her nebulizing treatment at home including albuterol, Symbicort, Singulair.  When the EMS arrived, patient was still smoking a cigarette.  Patient oxygen saturation was in low 80s upon EMS arrival.  In the ER patient was found to have left-sided pneumonia based on chest x-ray.  Patient also reports productive cough for the past couple of days.  Patient was here in the hospital for the same problem in the month of October.  At that time she was prescribed Augmentin for bronchitis.  Currently patient is hemodynamically stable.  In the ER she did receive DuoNeb treatment along with Solu-Medrol 125 mg IV.  Patient is being admitted to the hospital for treatment of acute COPD exacerbation with underlying pneumonia.    Hospital Course   Sydni Hardin was admitted on 11/18/2019.  The following problems were addressed during her hospitalization:    COPD exacerbation (H): Initially there was concern of left lower lobe pneumonia, however based on rapid response to steroids and negative procalcitonin levels this  has been ruled out. She will however finish course of doxycycline for coverage of her acute exacerbation. He will also be discharged on rapid taper of prednisone and continue duonebs. She has not been taking using her Advair as she states it is too expensive. Unfortunately there is no equivalent that is covered better. She is encouraged to use this to prevent future exacerbations.      Acute on chronic hypoxemic respiratory failure (H): Resolved. Due to above. She initially needed higher flow oxygen than her normal 2.5 Liters. Now she is saturating 94% on 2 liters.        Benign essential hypertension: stable, continue home meds.        CKD (chronic kidney disease) stage 3, GFR 30-59 ml/min (H):BUN/Creat normal today and has been relatively stable there for the last few visits.        Obesity (BMI 35.0-39.9) with comorbidity (H): BMI 38.06       Centrilobular emphysema (H): Chronic     Jennifer Junior CNP        Pending Results     Unresulted Labs Ordered in the Past 30 Days of this Admission     Date and Time Order Name Status Description    11/18/2019 1716 Respiratory Virus Panel by PCR In process     11/18/2019 1655 Blood culture Preliminary     11/18/2019 1655 Blood culture Preliminary           Code Status   Full Code       Primary Care Physician   Gibran Alarcon    Discharge Disposition   Discharged to home  Condition at discharge: Stable    Consultations This Hospital Stay   None    Time Spent on this Encounter   IJennifer NP, personally saw the patient today and spent greater than 30 minutes discharging this patient.    Discharge Orders      Reason for your hospital stay    COPD exacerbation     Follow-up and recommended labs and tests     Follow up with primary care provider, Gibran Alarcon, within 5-7 days for hospital follow- up.  No follow up labs or test are needed.     Activity    Your activity upon discharge: activity as tolerated     When to contact your care team    Call your  primary doctor if you have any of the following: increased dyspnea, fever, worsening cough or sputum production.     Full Code     Oxygen Adult    Continue home oxygen at 2 L/nc     Diet    Follow this diet upon discharge: Orders Placed This Encounter      Fluid restriction 1500 ML FLUID      Regular Diet Adult     Discharge Medications   Current Discharge Medication List      START taking these medications    Details   doxycycline hyclate (VIBRA-TABS) 100 MG tablet Take 1 tablet (100 mg) by mouth every 12 hours for 7 days  Qty: 14 tablet, Refills: 0    Associated Diagnoses: COPD exacerbation (H); Acute hypoxemic respiratory failure (H)      ipratropium - albuterol 0.5 mg/2.5 mg/3 mL (DUONEB) 0.5-2.5 (3) MG/3ML neb solution Take 1 vial (3 mLs) by nebulization 4 times daily  Qty: 360 mL, Refills: 0    Associated Diagnoses: COPD exacerbation (H); Acute hypoxemic respiratory failure (H)      predniSONE (DELTASONE) 20 MG tablet Take 1.5 tablets (30 mg) by mouth daily for 3 days, THEN 1 tablet (20 mg) daily for 3 days, THEN 0.5 tablets (10 mg) daily for 3 days.  Qty: 9 tablet, Refills: 0    Associated Diagnoses: COPD exacerbation (H); Acute hypoxemic respiratory failure (H)         CONTINUE these medications which have CHANGED    Details   nicotine (NICODERM CQ) 21 MG/24HR 24 hr patch Place 1 patch onto the skin every 24 hours  Qty: 14 patch, Refills: 1    Associated Diagnoses: COPD with acute exacerbation (H)         CONTINUE these medications which have NOT CHANGED    Details   albuterol (PROVENTIL) (2.5 MG/3ML) 0.083% neb solution USE 1 VIAL IN NEBULIZER 3 TIMES PER DAY  Qty: 90 mL, Refills: 0    Comments: $  Associated Diagnoses: Tobacco abuse      albuterol (VENTOLIN HFA) 108 (90 Base) MCG/ACT inhaler USE 1 PUFF FOUR TIMES A DAY AS NEEDED  Qty: 18 g, Refills: 1    Comments: Pharmacy may dispense brand covered by insurance (Proair, or proventil or ventolin or generic albuterol inhaler)  Associated Diagnoses: Tobacco  abuse      ALPRAZolam (XANAX) 1 MG tablet TAKE 1 TABLET BY MOUTH 3 TIMES DAILY AS NEEDED FOR ANXIETY  Qty: 90 tablet, Refills: 0    Comments: $  Associated Diagnoses: Anxiety      aspirin 81 MG EC tablet Take 81 mg by mouth daily      atorvastatin (LIPITOR) 40 MG tablet Take 1 tablet (40 mg) by mouth daily  Qty: 90 tablet, Refills: 3    Associated Diagnoses: Mixed hyperlipidemia      benazepril (LOTENSIN) 10 MG tablet Take 1 tablet (10 mg) by mouth daily  Qty: 90 tablet, Refills: 0    Associated Diagnoses: Essential hypertension      benzonatate (TESSALON) 200 MG capsule TAKE 1 CAPSULE BY MOUTH 3 TIMES A DAY AS NEEDED FOR COUGH  Qty: 60 capsule, Refills: 3    Associated Diagnoses: Acute bronchitis, unspecified organism      butalbital-acetaminophen-caffeine (FIORICET/ESGIC) -40 MG tablet Take 1 tablet by mouth every 6 hours as needed for headaches  Qty: 30 tablet, Refills: 3    Associated Diagnoses: Nonintractable episodic headache, unspecified headache type      diclofenac (VOLTAREN) 1 % topical gel APPLY 4 GRAMS TO KNEES FOUR TIMES A DAY USING ENCLOSED DOSING CARD  Qty: 100 g, Refills: 0    Associated Diagnoses: Primary osteoarthritis of left knee; Chronic pain of right knee      doxepin (SINEQUAN) 10 MG capsule TAKE 1 CAPSULE BY MOUTH AT BEDTIME  Qty: 90 capsule, Refills: 0    Associated Diagnoses: Primary osteoarthritis of right knee      escitalopram (LEXAPRO) 20 MG tablet TAKE 1 TABLET BY MOUTH 2 TIMES A DAY  Qty: 180 tablet, Refills: 0    Associated Diagnoses: Dysthymia      hydrochlorothiazide (HYDRODIURIL) 25 MG tablet TAKE 1 TABLET BY MOUTH DAILY  Qty: 90 tablet, Refills: 0    Associated Diagnoses: Benign essential hypertension      loratadine (CLARITIN) 10 MG tablet Take 1 tablet (10 mg) by mouth daily  Qty: 30 tablet, Refills: 1    Associated Diagnoses: Acute bronchospasm; SOB (shortness of breath)      meloxicam (MOBIC) 15 MG tablet Take 1 tablet (15 mg) by mouth daily  Qty: 90 tablet, Refills: 3     Associated Diagnoses: Pain      montelukast (SINGULAIR) 10 MG tablet TAKE 1 TABLET BY MOUTH DAILY  Qty: 90 tablet, Refills: 0    Associated Diagnoses: Mixed hyperlipidemia      nystatin (MYCOSTATIN) 056337 UNIT/ML suspension Take 5 mLs (500,000 Units) by mouth 4 times daily Apply after washing and drying the area thoroughly.  Qty: 473 mL, Refills: 1      oxyCODONE-acetaminophen (PERCOCET) 5-325 MG tablet TAKE 1 TO 2 TABLETS BY MOUTH EVERY 4 HOURS AS NEEDED FOR PAIN  Qty: 60 tablet, Refills: 0    Associated Diagnoses: Primary osteoarthritis of both knees      potassium chloride ER (K-TAB/KLOR-CON) 10 MEQ CR tablet TAKE 1 TABLET BY MOUTH 2 TIMES DAILY WITH FOOD  Qty: 180 tablet, Refills: 0    Associated Diagnoses: Benign essential hypertension      temazepam (RESTORIL) 30 MG capsule TAKE 1 CAPSULE BY MOUTH NIGHTLY AS NEEDED FOR SLEEP  Qty: 90 capsule, Refills: 3    Associated Diagnoses: Persistent insomnia      budesonide-formoterol (SYMBICORT) 160-4.5 MCG/ACT Inhaler Inhale 2 puffs into the lungs 2 times daily As needed  Qty: 1 Inhaler, Refills: 11    Associated Diagnoses: Pulmonary emphysema, unspecified emphysema type (H)      EPIPEN 2-JAIRO 0.3 MG/0.3ML injection INJECT 1 SYRINGE INTRAMUSCULARLY ONCE AS NEEDED FOR ANAPHYLAXIS  Qty: 2 each, Refills: 0    Associated Diagnoses: Anaphylactic reaction      nystatin (MYCOSTATIN) 322043 UNIT/GM external powder Apply topically daily       ranitidine (ZANTAC) 150 MG capsule Take 1 capsule (150 mg) by mouth At Bedtime  Qty: 90 capsule, Refills: 3    Associated Diagnoses: Gastroesophageal reflux disease without esophagitis      triamcinolone (KENALOG) 0.1 % external cream Apply topically 2 times daily  Qty: 30 g, Refills: 1    Associated Diagnoses: Rash and nonspecific skin eruption         STOP taking these medications       fluticasone-salmeterol (ADVAIR) 500-50 MCG/DOSE inhaler Comments:   Reason for Stopping:             Allergies   Allergies   Allergen Reactions      "Bee Pollen Other (See Comments)     Throat and eyes close up     Codeine Difficulty breathing     \"My throat closes shut.\"     Takes Percocet at home     Keflex [Cephalexin Hcl] Hives     Sulfonamide Derivatives Other (See Comments)     Throat closes     Ciprofloxacin Rash     Rash, bumps on lips     Bupropion Hcl Other (See Comments)     Made her very irritable     Celecoxib Other (See Comments) and Cough     Celebrex - wheeze  Takes ASA at home     Cheese      swiss     Clonazepam Hives     Takes Xanax at home     Erythromycin Other (See Comments)     Erythromycin base - abdominal pain     Lisinopril Other (See Comments)     Zestril - headaches     Nifedipine Hives     Procardia     Strawberries [Strawberry]      Tomato      Tramadol Hcl      Takes percocet at home     Data   Most Recent 3 CBC's:  Recent Labs   Lab Test 11/20/19  0516 11/19/19  0520 11/18/19  1508   WBC 16.3* 7.4 9.8   HGB 12.1 13.4 14.2   MCV 86 87 90    265 291      Most Recent 3 BMP's:  Recent Labs   Lab Test 11/20/19  0516 11/19/19  0520 11/18/19  1508   * 131* 135   POTASSIUM 3.6 3.4 4.1   CHLORIDE 95 98 100   CO2 26 26 30   BUN 23 22 19   CR 0.88 0.88 0.98   ANIONGAP 8 7 5   JOSÉ 8.9 9.1 9.0   * 165* 102*     Most Recent 2 LFT's:  Recent Labs   Lab Test 09/27/18  0532 09/26/18  0547   AST 30 19   ALT 38 32   ALKPHOS 58 65   BILITOTAL 0.4 0.3     Most Recent INR's and Anticoagulation Dosing History:  Anticoagulation Dose History     There is no flowsheet data to display.        Most Recent 3 Troponin's:  Recent Labs   Lab Test 08/29/18  1514   TROPI <0.015     Most Recent Cholesterol Panel:  Recent Labs   Lab Test 02/01/18  1254   CHOL 144   LDL 71   HDL 35*   TRIG 191*     Most Recent 6 Bacteria Isolates From Any Culture (See EPIC Reports for Culture Details):  Recent Labs   Lab Test 11/18/19  1834 11/18/19  1713 11/18/19  1706 09/17/19  0450 09/17/19  0305 09/17/19  0252   CULT No MRSA isolated No growth after 4 days No " growth after 4 days No MRSA isolated No growth after 6 days No growth after 6 days     Most Recent TSH, T4 and A1c Labs:  Recent Labs   Lab Test 05/30/19  1458 02/01/18  1254   TSH  --  1.07   A1C 5.8*  --      Results for orders placed or performed during the hospital encounter of 11/18/19   XR Chest Port 1 View    Narrative    Procedure:XR CHEST PORT 1 VW    Clinical history:Female, 69 years, dyspnea    Technique: Single view was obtained.    Comparison: 9/17/2019    Findings: The cardiac silhouette is enlarged. The pulmonary  vasculature is normal.    The lungs demonstrates silhouetting the left heart border and slight  blunting of the right costophrenic angle. Bony structures again  demonstrate right shoulder arthroplasty and degenerative changes of  the AC joints and left shoulder.      Impression    Impression:   Increased density at the left lung base suggesting lingular and  perhaps left lower lobe pneumonia.    MELITON MONTAÑO MD

## 2019-11-20 NOTE — PLAN OF CARE
Patient discharged at 11:37 AM via wheel chair accompanied by daughter and staff. Prescriptions sent to patients preferred pharmacy. All belongings sent with patient.     Discharge instructions reviewed with patient . Listed belongings gathered and returned to patient. done    Patient discharged to home.   Report called to NA     Core Measures and Vaccines  Core Measures applicable during stay: No. If yes, state diagnosis: NA  Pneumonia and Influenza given prior to discharge, if indicated: Yes    Surgical Patient   Surgical Procedures during stay: NA  Did patient receive discharge instruction on wound care and recognition of infection symptoms? N/A    MISC  Follow up appointment made:  Yes  Home and hospital aquired medications returned to patient: Yes  Patient reports pain was well managed at discharge: Yes

## 2019-11-22 ENCOUNTER — MEDICAL CORRESPONDENCE (OUTPATIENT)
Dept: HEALTH INFORMATION MANAGEMENT | Facility: CLINIC | Age: 69
End: 2019-11-22

## 2019-11-22 ENCOUNTER — DOCUMENTATION ONLY (OUTPATIENT)
Dept: FAMILY MEDICINE | Facility: OTHER | Age: 69
End: 2019-11-22

## 2019-11-22 PROBLEM — Z51.5 HOSPICE CARE PATIENT: Status: ACTIVE | Noted: 2019-11-22

## 2019-11-22 NOTE — PROGRESS NOTES
PHYSICIAN CERTIFICATION OF TERMINAL ILLNESS FOR HOSPICE BENEFIT    November 22, 2019    Sydni Hardin    1950      Effective Date of Certification    Start Date:  11/22/2019      End Date:  2/19/2020    Terminal Diagnosis:    COPD      Secondary Diagnoses:    Chronic respiratory failure    Tobacco abuse    Prognosis Related Comorbidities:    Chronic kidney disease     Hypertension               Narrative Statement:  Client suffers from end stage COPD (chronic obstructive pulmonary disease) and is on maximal tolerated medical therapy.  She continues to decline. Client resides at home and requires assistance with ADL's.  Goals of symptom control will be met.  Because of the progressive nature of the illness and associated comorbidities, client qualifies for hospice care.             I certify that this patient is terminally ill and has a life expectancy of 6 months or less if the terminal illness runs its anticipated course.        Attestation:  I confirm that this narrative was composed by myself and is based on review of the medical record and/or examination of the patient.            Tavares Carrillo MD

## 2019-11-26 DIAGNOSIS — J44.1 COPD EXACERBATION (H): Primary | ICD-10-CM

## 2019-11-26 DIAGNOSIS — I10 BENIGN ESSENTIAL HYPERTENSION: ICD-10-CM

## 2019-11-29 RX ORDER — MONTELUKAST SODIUM 10 MG/1
TABLET ORAL
Qty: 90 TABLET | Refills: 0 | Status: SHIPPED | OUTPATIENT
Start: 2019-11-29 | End: 2020-01-09

## 2019-11-29 RX ORDER — HYDROCHLOROTHIAZIDE 25 MG/1
TABLET ORAL
Qty: 90 TABLET | Refills: 0 | Status: SHIPPED | OUTPATIENT
Start: 2019-11-29 | End: 2020-01-09

## 2019-12-03 ENCOUNTER — DOCUMENTATION ONLY (OUTPATIENT)
Dept: OTHER | Facility: CLINIC | Age: 69
End: 2019-12-03

## 2019-12-11 DIAGNOSIS — M17.11 PRIMARY OSTEOARTHRITIS OF RIGHT KNEE: ICD-10-CM

## 2019-12-11 DIAGNOSIS — G47.00 INSOMNIA, UNSPECIFIED TYPE: Primary | ICD-10-CM

## 2019-12-11 RX ORDER — DOXEPIN HYDROCHLORIDE 10 MG/1
CAPSULE ORAL
Qty: 90 CAPSULE | Refills: 0 | Status: SHIPPED | OUTPATIENT
Start: 2019-12-11 | End: 2020-01-09

## 2019-12-11 RX ORDER — TEMAZEPAM 30 MG
CAPSULE ORAL
Qty: 30 CAPSULE | Refills: 0 | Status: SHIPPED | OUTPATIENT
Start: 2019-12-11 | End: 2020-01-09

## 2019-12-11 NOTE — TELEPHONE ENCOUNTER
restoril      Last Written Prescription Date:  2/4/19  Last Fill Quantity: 90,   # refills: 3  Last Office Visit: 10/16/19  Future Office visit:       Routing refill request to provider for review/approval because:  Drug not on the [facility], [facility] or [facility] refill protocol or controlled substance  doxepin      Last Written Prescription Date:  9/27/19  Last Fill Quantity: 90,   # refills: 0  Last Office Visit: 10/16/19  Future Office visit:       Routing refill request to provider for review/approval because:  Drug not on the [facility], [facility] or [facility] refill protocol or controlled substance

## 2019-12-26 DIAGNOSIS — R09.02 HYPOXEMIA: Primary | ICD-10-CM

## 2019-12-26 DIAGNOSIS — F41.9 ANXIETY: ICD-10-CM

## 2019-12-27 ENCOUNTER — DOCUMENTATION ONLY (OUTPATIENT)
Dept: SLEEP MEDICINE | Facility: HOSPITAL | Age: 69
End: 2019-12-27
Attending: FAMILY MEDICINE
Payer: MEDICARE

## 2019-12-27 NOTE — PROGRESS NOTES
The nurse returned the patients oximeter the data was downloaded and the report scanned to epic inter office Dr. Carrillo and a copy given to hospice nurse.

## 2019-12-27 NOTE — TELEPHONE ENCOUNTER
Xanax       Last Written Prescription Date:  11/13/2019  Last Fill Quantity: 90,   # refills: 0  Last Office Visit: 10/25/2019  Future Office visit:       Routing refill request to provider for review/approval because:  Drug not on the [facility], [facility] or [facility] refill protocol or controlled substance

## 2019-12-30 PROBLEM — Z51.5 HOSPICE CARE PATIENT: Status: RESOLVED | Noted: 2019-11-22 | Resolved: 2019-12-30

## 2019-12-30 NOTE — TELEPHONE ENCOUNTER
It's not clear to me if I am still her primary.  Looks like she is on hospice.  Can we have these go to hospice.  I don't want her to have multiple providers giving similar meds.  I can fill if I am still primary.  Thanks. Gibran Alarcon MD

## 2019-12-31 RX ORDER — ALPRAZOLAM 1 MG
TABLET ORAL
Qty: 90 TABLET | Refills: 0 | Status: SHIPPED | OUTPATIENT
Start: 2019-12-31 | End: 2020-01-09

## 2020-01-01 ENCOUNTER — OFFICE VISIT (OUTPATIENT)
Dept: FAMILY MEDICINE | Facility: OTHER | Age: 70
End: 2020-01-01
Attending: FAMILY MEDICINE
Payer: COMMERCIAL

## 2020-01-01 ENCOUNTER — TELEPHONE (OUTPATIENT)
Dept: FAMILY MEDICINE | Facility: OTHER | Age: 70
End: 2020-01-01

## 2020-01-01 ENCOUNTER — PATIENT OUTREACH (OUTPATIENT)
Dept: CARE COORDINATION | Facility: OTHER | Age: 70
End: 2020-01-01

## 2020-01-01 ENCOUNTER — ALLIED HEALTH/NURSE VISIT (OUTPATIENT)
Dept: FAMILY MEDICINE | Facility: OTHER | Age: 70
End: 2020-01-01
Attending: FAMILY MEDICINE
Payer: MEDICARE

## 2020-01-01 DIAGNOSIS — F51.04 PSYCHOPHYSIOLOGIC INSOMNIA: ICD-10-CM

## 2020-01-01 DIAGNOSIS — M17.11 PRIMARY OSTEOARTHRITIS OF RIGHT KNEE: ICD-10-CM

## 2020-01-01 DIAGNOSIS — J44.1 COPD EXACERBATION (H): ICD-10-CM

## 2020-01-01 DIAGNOSIS — G89.29 CHRONIC PAIN OF RIGHT KNEE: ICD-10-CM

## 2020-01-01 DIAGNOSIS — I10 BENIGN ESSENTIAL HYPERTENSION: ICD-10-CM

## 2020-01-01 DIAGNOSIS — F41.9 ANXIETY: ICD-10-CM

## 2020-01-01 DIAGNOSIS — J96.01 ACUTE HYPOXEMIC RESPIRATORY FAILURE (H): ICD-10-CM

## 2020-01-01 DIAGNOSIS — I10 ESSENTIAL HYPERTENSION: ICD-10-CM

## 2020-01-01 DIAGNOSIS — J43.2 CENTRILOBULAR EMPHYSEMA (H): ICD-10-CM

## 2020-01-01 DIAGNOSIS — F34.1 DYSTHYMIA: ICD-10-CM

## 2020-01-01 DIAGNOSIS — E78.2 MIXED HYPERLIPIDEMIA: ICD-10-CM

## 2020-01-01 DIAGNOSIS — M25.561 CHRONIC PAIN OF RIGHT KNEE: ICD-10-CM

## 2020-01-01 DIAGNOSIS — Z72.0 TOBACCO ABUSE: ICD-10-CM

## 2020-01-01 DIAGNOSIS — J20.9 ACUTE BRONCHITIS, UNSPECIFIED ORGANISM: ICD-10-CM

## 2020-01-01 DIAGNOSIS — Z23 NEED FOR PROPHYLACTIC VACCINATION AND INOCULATION AGAINST INFLUENZA: Primary | ICD-10-CM

## 2020-01-01 DIAGNOSIS — J43.2 CENTRILOBULAR EMPHYSEMA (H): Primary | ICD-10-CM

## 2020-01-01 PROCEDURE — 99348 HOME/RES VST EST LOW MDM 30: CPT | Performed by: FAMILY MEDICINE

## 2020-01-01 PROCEDURE — G0008 ADMIN INFLUENZA VIRUS VAC: HCPCS | Performed by: COUNSELOR

## 2020-01-01 RX ORDER — OXYCODONE AND ACETAMINOPHEN 5; 325 MG/1; MG/1
TABLET ORAL
Qty: 28 TABLET | Refills: 0 | Status: SHIPPED | OUTPATIENT
Start: 2020-01-01 | End: 2021-01-01

## 2020-01-01 RX ORDER — ALPRAZOLAM 1 MG
TABLET ORAL
Qty: 90 TABLET | Refills: 0 | Status: SHIPPED | OUTPATIENT
Start: 2020-01-01 | End: 2020-01-01

## 2020-01-01 RX ORDER — DOXEPIN HYDROCHLORIDE 10 MG/1
CAPSULE ORAL
Qty: 90 CAPSULE | Refills: 0 | Status: SHIPPED | OUTPATIENT
Start: 2020-01-01 | End: 2021-01-01

## 2020-01-01 RX ORDER — IPRATROPIUM BROMIDE AND ALBUTEROL SULFATE 2.5; .5 MG/3ML; MG/3ML
SOLUTION RESPIRATORY (INHALATION)
Qty: 360 ML | Refills: 0 | Status: SHIPPED | OUTPATIENT
Start: 2020-01-01 | End: 2021-01-01

## 2020-01-01 RX ORDER — ESCITALOPRAM OXALATE 20 MG/1
TABLET ORAL
Qty: 180 TABLET | Refills: 0 | Status: SHIPPED | OUTPATIENT
Start: 2020-01-01 | End: 2021-01-01

## 2020-01-01 RX ORDER — PREDNISONE 5 MG/1
TABLET ORAL
Qty: 90 TABLET | Refills: 0 | Status: SHIPPED | OUTPATIENT
Start: 2020-01-01 | End: 2021-01-01

## 2020-01-01 RX ORDER — HYDROCHLOROTHIAZIDE 25 MG/1
TABLET ORAL
Qty: 90 TABLET | Refills: 0 | Status: SHIPPED | OUTPATIENT
Start: 2020-01-01 | End: 2021-01-01

## 2020-01-01 RX ORDER — ALBUTEROL SULFATE 90 UG/1
AEROSOL, METERED RESPIRATORY (INHALATION)
Qty: 18 G | Refills: 0 | Status: SHIPPED | OUTPATIENT
Start: 2020-01-01 | End: 2021-01-01

## 2020-01-01 RX ORDER — ALPRAZOLAM 1 MG
TABLET ORAL
Qty: 90 TABLET | Refills: 0 | Status: SHIPPED | OUTPATIENT
Start: 2020-01-01 | End: 2021-01-01

## 2020-01-01 RX ORDER — BENAZEPRIL HYDROCHLORIDE 10 MG/1
TABLET ORAL
Qty: 90 TABLET | Refills: 0 | Status: SHIPPED | OUTPATIENT
Start: 2020-01-01 | End: 2021-01-01

## 2020-01-01 RX ORDER — BENZONATATE 200 MG/1
CAPSULE ORAL
Qty: 60 CAPSULE | Refills: 0 | Status: SHIPPED | OUTPATIENT
Start: 2020-01-01 | End: 2021-01-01

## 2020-01-01 RX ORDER — ATORVASTATIN CALCIUM 40 MG/1
40 TABLET, FILM COATED ORAL DAILY
Qty: 90 TABLET | Refills: 3 | Status: SHIPPED | OUTPATIENT
Start: 2020-01-01 | End: 2021-01-01

## 2020-01-01 RX ORDER — POTASSIUM CHLORIDE 750 MG/1
TABLET, EXTENDED RELEASE ORAL
Qty: 180 TABLET | Refills: 0 | Status: SHIPPED | OUTPATIENT
Start: 2020-01-01 | End: 2021-01-01

## 2020-01-01 RX ORDER — TEMAZEPAM 30 MG
CAPSULE ORAL
Qty: 30 CAPSULE | Refills: 0 | Status: SHIPPED | OUTPATIENT
Start: 2020-01-01 | End: 2021-01-01

## 2020-01-09 ENCOUNTER — TELEPHONE (OUTPATIENT)
Dept: FAMILY MEDICINE | Facility: OTHER | Age: 70
End: 2020-01-09

## 2020-01-09 DIAGNOSIS — J96.01 ACUTE HYPOXEMIC RESPIRATORY FAILURE (H): ICD-10-CM

## 2020-01-09 DIAGNOSIS — J44.1 COPD EXACERBATION (H): ICD-10-CM

## 2020-01-09 DIAGNOSIS — M25.561 CHRONIC PAIN OF RIGHT KNEE: ICD-10-CM

## 2020-01-09 DIAGNOSIS — J98.01 ACUTE BRONCHOSPASM: ICD-10-CM

## 2020-01-09 DIAGNOSIS — G47.00 INSOMNIA, UNSPECIFIED TYPE: ICD-10-CM

## 2020-01-09 DIAGNOSIS — E78.2 MIXED HYPERLIPIDEMIA: ICD-10-CM

## 2020-01-09 DIAGNOSIS — J44.1 COPD WITH ACUTE EXACERBATION (H): ICD-10-CM

## 2020-01-09 DIAGNOSIS — F34.1 DYSTHYMIA: ICD-10-CM

## 2020-01-09 DIAGNOSIS — R21 RASH AND NONSPECIFIC SKIN ERUPTION: ICD-10-CM

## 2020-01-09 DIAGNOSIS — G89.29 CHRONIC PAIN OF RIGHT KNEE: ICD-10-CM

## 2020-01-09 DIAGNOSIS — I10 BENIGN ESSENTIAL HYPERTENSION: ICD-10-CM

## 2020-01-09 DIAGNOSIS — I10 ESSENTIAL HYPERTENSION: ICD-10-CM

## 2020-01-09 DIAGNOSIS — J43.2 CENTRILOBULAR EMPHYSEMA (H): Primary | ICD-10-CM

## 2020-01-09 DIAGNOSIS — M17.12 PRIMARY OSTEOARTHRITIS OF LEFT KNEE: ICD-10-CM

## 2020-01-09 DIAGNOSIS — R51.9 NONINTRACTABLE EPISODIC HEADACHE, UNSPECIFIED HEADACHE TYPE: ICD-10-CM

## 2020-01-09 DIAGNOSIS — J43.9 PULMONARY EMPHYSEMA, UNSPECIFIED EMPHYSEMA TYPE (H): ICD-10-CM

## 2020-01-09 DIAGNOSIS — F51.04 PSYCHOPHYSIOLOGIC INSOMNIA: ICD-10-CM

## 2020-01-09 DIAGNOSIS — Z72.0 TOBACCO ABUSE: ICD-10-CM

## 2020-01-09 DIAGNOSIS — J20.9 ACUTE BRONCHITIS, UNSPECIFIED ORGANISM: ICD-10-CM

## 2020-01-09 DIAGNOSIS — F41.9 ANXIETY: ICD-10-CM

## 2020-01-09 DIAGNOSIS — T78.2XXA ANAPHYLACTIC REACTION: ICD-10-CM

## 2020-01-09 DIAGNOSIS — R52 PAIN: ICD-10-CM

## 2020-01-09 DIAGNOSIS — G47.00 PERSISTENT INSOMNIA: ICD-10-CM

## 2020-01-09 DIAGNOSIS — M17.11 PRIMARY OSTEOARTHRITIS OF RIGHT KNEE: ICD-10-CM

## 2020-01-09 DIAGNOSIS — K21.9 GASTROESOPHAGEAL REFLUX DISEASE WITHOUT ESOPHAGITIS: ICD-10-CM

## 2020-01-09 DIAGNOSIS — R06.02 SOB (SHORTNESS OF BREATH): ICD-10-CM

## 2020-01-09 DIAGNOSIS — T78.2XXD ANAPHYLAXIS, SUBSEQUENT ENCOUNTER: Primary | ICD-10-CM

## 2020-01-09 RX ORDER — IPRATROPIUM BROMIDE AND ALBUTEROL SULFATE 2.5; .5 MG/3ML; MG/3ML
3 SOLUTION RESPIRATORY (INHALATION) 4 TIMES DAILY
Qty: 360 ML | Refills: 0 | Status: SHIPPED | OUTPATIENT
Start: 2020-01-09 | End: 2020-04-09

## 2020-01-09 RX ORDER — TEMAZEPAM 30 MG
CAPSULE ORAL
Qty: 90 CAPSULE | Refills: 3 | Status: CANCELLED | OUTPATIENT
Start: 2020-01-09

## 2020-01-09 RX ORDER — TEMAZEPAM 30 MG
CAPSULE ORAL
Qty: 30 CAPSULE | Refills: 0 | OUTPATIENT
Start: 2020-01-09

## 2020-01-09 RX ORDER — DOXEPIN HYDROCHLORIDE 10 MG/1
CAPSULE ORAL
Qty: 90 CAPSULE | Refills: 0 | Status: CANCELLED | OUTPATIENT
Start: 2020-01-09

## 2020-01-09 RX ORDER — PREDNISONE 20 MG/1
TABLET ORAL
Qty: 9 TABLET | Refills: 0 | OUTPATIENT
Start: 2020-01-09 | End: 2020-01-18

## 2020-01-09 RX ORDER — BENZONATATE 200 MG/1
CAPSULE ORAL
Qty: 60 CAPSULE | Refills: 3 | Status: CANCELLED | OUTPATIENT
Start: 2020-01-09

## 2020-01-09 RX ORDER — POTASSIUM CHLORIDE 750 MG/1
TABLET, EXTENDED RELEASE ORAL
Qty: 180 TABLET | Refills: 0 | OUTPATIENT
Start: 2020-01-09

## 2020-01-09 RX ORDER — TRIAMCINOLONE ACETONIDE 1 MG/G
CREAM TOPICAL 2 TIMES DAILY
Qty: 30 G | Refills: 1 | Status: SHIPPED | OUTPATIENT
Start: 2020-01-09

## 2020-01-09 RX ORDER — MELOXICAM 15 MG/1
15 TABLET ORAL DAILY
Qty: 90 TABLET | Refills: 3 | Status: CANCELLED | OUTPATIENT
Start: 2020-01-09

## 2020-01-09 RX ORDER — BENZONATATE 200 MG/1
CAPSULE ORAL
Qty: 60 CAPSULE | Refills: 3 | Status: SHIPPED | OUTPATIENT
Start: 2020-01-09 | End: 2020-08-04

## 2020-01-09 RX ORDER — BENAZEPRIL HYDROCHLORIDE 10 MG/1
10 TABLET ORAL DAILY
Qty: 90 TABLET | Refills: 0 | Status: CANCELLED | OUTPATIENT
Start: 2020-01-09

## 2020-01-09 RX ORDER — ALPRAZOLAM 1 MG
TABLET ORAL
Qty: 90 TABLET | Refills: 0 | Status: CANCELLED | OUTPATIENT
Start: 2020-01-09

## 2020-01-09 RX ORDER — NICOTINE 21 MG/24HR
1 PATCH, TRANSDERMAL 24 HOURS TRANSDERMAL EVERY 24 HOURS
Qty: 14 PATCH | Refills: 1 | Status: SHIPPED | OUTPATIENT
Start: 2020-01-09 | End: 2020-08-06

## 2020-01-09 RX ORDER — ATORVASTATIN CALCIUM 40 MG/1
40 TABLET, FILM COATED ORAL DAILY
Qty: 90 TABLET | Refills: 3 | Status: SHIPPED | OUTPATIENT
Start: 2020-01-09 | End: 2020-01-01

## 2020-01-09 RX ORDER — ALBUTEROL SULFATE 90 UG/1
AEROSOL, METERED RESPIRATORY (INHALATION)
Qty: 18 G | Refills: 1 | Status: CANCELLED | OUTPATIENT
Start: 2020-01-09

## 2020-01-09 RX ORDER — HYDROCHLOROTHIAZIDE 25 MG/1
25 TABLET ORAL DAILY
Qty: 90 TABLET | Refills: 0 | Status: SHIPPED | OUTPATIENT
Start: 2020-01-09 | End: 2020-04-09

## 2020-01-09 RX ORDER — ALPRAZOLAM 0.5 MG
0.5 TABLET ORAL 2 TIMES DAILY PRN
Qty: 100 TABLET | Refills: 3 | Status: SHIPPED | OUTPATIENT
Start: 2020-01-09 | End: 2020-08-06

## 2020-01-09 RX ORDER — BENAZEPRIL HYDROCHLORIDE 10 MG/1
10 TABLET ORAL DAILY
Qty: 90 TABLET | Refills: 0 | Status: SHIPPED | OUTPATIENT
Start: 2020-01-09 | End: 2020-05-04

## 2020-01-09 RX ORDER — HYDROCHLOROTHIAZIDE 25 MG/1
25 TABLET ORAL DAILY
Qty: 90 TABLET | Refills: 0 | Status: CANCELLED | OUTPATIENT
Start: 2020-01-09

## 2020-01-09 RX ORDER — ATORVASTATIN CALCIUM 40 MG/1
40 TABLET, FILM COATED ORAL DAILY
Qty: 90 TABLET | Refills: 3 | Status: CANCELLED | OUTPATIENT
Start: 2020-01-09

## 2020-01-09 RX ORDER — ASPIRIN 81 MG/1
81 TABLET ORAL DAILY
Qty: 100 TABLET | Refills: 3 | Status: SHIPPED | OUTPATIENT
Start: 2020-01-09

## 2020-01-09 RX ORDER — ALBUTEROL SULFATE 0.83 MG/ML
SOLUTION RESPIRATORY (INHALATION)
Qty: 90 ML | Refills: 0 | Status: SHIPPED | OUTPATIENT
Start: 2020-01-09 | End: 2020-03-18

## 2020-01-09 RX ORDER — EPINEPHRINE 0.3 MG/.3ML
INJECTION SUBCUTANEOUS
Qty: 2 EACH | Refills: 0 | Status: SHIPPED | OUTPATIENT
Start: 2020-01-09

## 2020-01-09 RX ORDER — NICOTINE 21 MG/24HR
1 PATCH, TRANSDERMAL 24 HOURS TRANSDERMAL EVERY 24 HOURS
Qty: 14 PATCH | Refills: 1 | Status: CANCELLED | OUTPATIENT
Start: 2020-01-09

## 2020-01-09 RX ORDER — DOXEPIN HYDROCHLORIDE 10 MG/1
CAPSULE ORAL
Qty: 90 CAPSULE | Refills: 0 | Status: SHIPPED | OUTPATIENT
Start: 2020-01-09 | End: 2020-03-05

## 2020-01-09 RX ORDER — BUDESONIDE AND FORMOTEROL FUMARATE DIHYDRATE 160; 4.5 UG/1; UG/1
2 AEROSOL RESPIRATORY (INHALATION) 2 TIMES DAILY
Qty: 1 INHALER | Refills: 11 | Status: CANCELLED | OUTPATIENT
Start: 2020-01-09

## 2020-01-09 RX ORDER — LORATADINE 10 MG/1
10 TABLET ORAL DAILY
Qty: 30 TABLET | Refills: 1 | Status: CANCELLED | OUTPATIENT
Start: 2020-01-09

## 2020-01-09 RX ORDER — ESCITALOPRAM OXALATE 20 MG/1
TABLET ORAL
Qty: 180 TABLET | Refills: 0 | Status: CANCELLED | OUTPATIENT
Start: 2020-01-09

## 2020-01-09 RX ORDER — MELOXICAM 15 MG/1
15 TABLET ORAL DAILY
Qty: 90 TABLET | Refills: 3 | Status: SHIPPED | OUTPATIENT
Start: 2020-01-09 | End: 2021-01-01

## 2020-01-09 RX ORDER — MONTELUKAST SODIUM 10 MG/1
1 TABLET ORAL DAILY
Qty: 90 TABLET | Refills: 0 | OUTPATIENT
Start: 2020-01-09

## 2020-01-09 RX ORDER — POTASSIUM CHLORIDE 750 MG/1
TABLET, EXTENDED RELEASE ORAL
Qty: 180 TABLET | Refills: 0 | Status: CANCELLED | OUTPATIENT
Start: 2020-01-09

## 2020-01-09 RX ORDER — MONTELUKAST SODIUM 10 MG/1
1 TABLET ORAL DAILY
Qty: 90 TABLET | Refills: 0 | Status: CANCELLED | OUTPATIENT
Start: 2020-01-09

## 2020-01-09 RX ORDER — IPRATROPIUM BROMIDE AND ALBUTEROL SULFATE 2.5; .5 MG/3ML; MG/3ML
3 SOLUTION RESPIRATORY (INHALATION) 4 TIMES DAILY
Qty: 360 ML | Refills: 0 | Status: CANCELLED | OUTPATIENT
Start: 2020-01-09

## 2020-01-09 RX ORDER — ALBUTEROL SULFATE 0.83 MG/ML
SOLUTION RESPIRATORY (INHALATION)
Qty: 90 ML | Refills: 0 | Status: CANCELLED | OUTPATIENT
Start: 2020-01-09

## 2020-01-09 RX ORDER — ESCITALOPRAM OXALATE 20 MG/1
TABLET ORAL
Qty: 180 TABLET | Refills: 0 | Status: SHIPPED | OUTPATIENT
Start: 2020-01-09 | End: 2020-04-29

## 2020-01-09 RX ORDER — TRIAMCINOLONE ACETONIDE 1 MG/G
CREAM TOPICAL 2 TIMES DAILY
Qty: 30 G | Refills: 1 | Status: CANCELLED | OUTPATIENT
Start: 2020-01-09

## 2020-01-09 RX ORDER — BUTALBITAL, ACETAMINOPHEN AND CAFFEINE 50; 325; 40 MG/1; MG/1; MG/1
1 TABLET ORAL EVERY 6 HOURS PRN
Qty: 30 TABLET | Refills: 3 | Status: SHIPPED | OUTPATIENT
Start: 2020-01-09 | End: 2021-01-01

## 2020-01-09 RX ORDER — ALBUTEROL SULFATE 90 UG/1
AEROSOL, METERED RESPIRATORY (INHALATION)
Qty: 18 G | Refills: 1 | Status: SHIPPED | OUTPATIENT
Start: 2020-01-09 | End: 2020-08-06

## 2020-01-09 RX ORDER — BUTALBITAL, ACETAMINOPHEN AND CAFFEINE 50; 325; 40 MG/1; MG/1; MG/1
1 TABLET ORAL EVERY 6 HOURS PRN
Qty: 30 TABLET | Refills: 3 | Status: CANCELLED | OUTPATIENT
Start: 2020-01-09

## 2020-01-09 NOTE — TELEPHONE ENCOUNTER
I need to see her.  It's not clear to me where her cares are and she is needing meds that I really need to monitor, along with the prednisone for bronchitis.  Visit when able and I can fill.  If I am not her pcp anymore, which I'm not sure of, then it would go to them.  I see she was hospice but now she is not and I just would need to see her to reestablish.  Thanks.  Gibran Alarcon MD

## 2020-01-09 NOTE — TELEPHONE ENCOUNTER
Patient is not in Hospice anymore.  Pharmacy called and these four  meds are not on patients current med list.  Please advise.            potassium chloride ER (MICRO-K) CR capsule 10 mEq (Discontinued)        Last Written Prescription Date:  11/19/19-11/20/19  Last Fill Quantity: 10meq,   # refills: 2 times daily  Last Office Visit: 11/25/19  Future Office visit:       Routing refill request to provider for review/approval because:  Drug not on the [facility], [facility] or [facility] refill protocol or controlled substance        montelukast (SINGULAIR) 10 MG tablet (Discontinued)          Last Written Prescription Date:  11/19/19-11/20/19  Last Fill Quantity: 10mg,   # refills: daily  Last Office Visit: 11/25/19  Future Office visit:       Routing refill request to provider for review/approval because:  Drug not on the [facility], [facility] or [facility] refill protocol or controlled substance      temazepam (RESTORIL) 30 MG capsule (Discontinued)          Last Written Prescription Date:  12/11/19-1/9/20  Last Fill Quantity: 30,   # refills: 0  Last Office Visit: 11/25/19  Future Office visit:       Routing refill request to provider for review/approval because:  Drug not on the [facility], [facility] or [facility] refill protocol or controlled substance      predniSONE (DELTASONE) 20 MG tablet (Discontinued          Last Written Prescription Date:  11/20/19-1/9/20  Last Fill Quantity: 9,   # refills: 0  Last Office Visit: 11/25/19  Future Office visit:       Routing refill request to provider for review/approval because:  Drug not on the [facility], [facility] or [facility] refill protocol or controlled substance.

## 2020-01-09 NOTE — TELEPHONE ENCOUNTER
Patient daughter calling regarding getting medications filled, looks like these may have already been pended.  Daughter notified

## 2020-01-09 NOTE — TELEPHONE ENCOUNTER
Patient will be following with Dr. Alarcon, this was her primary provider prior to hospice services.

## 2020-01-13 ENCOUNTER — TELEPHONE (OUTPATIENT)
Dept: FAMILY MEDICINE | Facility: OTHER | Age: 70
End: 2020-01-13

## 2020-01-13 DIAGNOSIS — J43.2 CENTRILOBULAR EMPHYSEMA (H): Primary | ICD-10-CM

## 2020-01-13 NOTE — TELEPHONE ENCOUNTER
Prednisone      Last Written Prescription Date:    Last Fill Quantity: ,   # refills:   Last Office Visit: 12/30/2019  Future Office visit:       Routing refill request to provider for review/approval because:  Drug not active on patient's medication list    Temazepam      Last Written Prescription Date:    Last Fill Quantity: ,   # refills:   Last Office Visit: 12/30/2019  Future Office visit:       Routing refill request to provider for review/approval because:  Drug not active on patient's medication list    Montelukast      Last Written Prescription Date:    Last Fill Quantity: ,   # refills:   Last Office Visit: 12/30/2019  Future Office visit:       Routing refill request to provider for review/approval because:  Drug not active on patient's medication list    Potassium chloride      Last Written Prescription Date:    Last Fill Quantity: ,   # refills:   Last Office Visit: 12/30/2019  Future Office visit:       Routing refill request to provider for review/approval because:  Drug not active on patient's medication list

## 2020-01-14 RX ORDER — TEMAZEPAM 30 MG
CAPSULE ORAL
Qty: 30 CAPSULE | Refills: 0 | Status: SHIPPED | OUTPATIENT
Start: 2020-01-14 | End: 2020-03-06

## 2020-01-14 RX ORDER — PREDNISONE 5 MG/1
TABLET ORAL
Qty: 15 TABLET | Status: SHIPPED | OUTPATIENT
Start: 2020-01-14 | End: 2020-02-03

## 2020-01-14 RX ORDER — MONTELUKAST SODIUM 10 MG/1
TABLET ORAL
Qty: 90 TABLET | Status: SHIPPED | OUTPATIENT
Start: 2020-01-14 | End: 2021-01-01

## 2020-01-14 RX ORDER — POTASSIUM CHLORIDE 750 MG/1
TABLET, EXTENDED RELEASE ORAL
Qty: 180 TABLET | Refills: 0 | Status: SHIPPED | OUTPATIENT
Start: 2020-01-14 | End: 2020-05-04

## 2020-01-20 DIAGNOSIS — M25.561 CHRONIC PAIN OF RIGHT KNEE: Primary | ICD-10-CM

## 2020-01-20 DIAGNOSIS — G89.29 CHRONIC PAIN OF RIGHT KNEE: Primary | ICD-10-CM

## 2020-01-20 RX ORDER — OXYCODONE AND ACETAMINOPHEN 5; 325 MG/1; MG/1
1 TABLET ORAL 4 TIMES DAILY PRN
COMMUNITY
Start: 2019-02-21 | End: 2020-01-20

## 2020-01-20 NOTE — TELEPHONE ENCOUNTER
Client requesting refill of her Oxycodone-APAP. She reports her last fill was in Feb. 2019. She does not use often.

## 2020-01-21 RX ORDER — OXYCODONE AND ACETAMINOPHEN 5; 325 MG/1; MG/1
1 TABLET ORAL EVERY 6 HOURS PRN
Qty: 40 TABLET | Refills: 0 | Status: SHIPPED | OUTPATIENT
Start: 2020-01-21 | End: 2020-08-04

## 2020-01-31 DIAGNOSIS — J43.2 CENTRILOBULAR EMPHYSEMA (H): ICD-10-CM

## 2020-02-03 RX ORDER — PREDNISONE 5 MG/1
TABLET ORAL
Qty: 90 TABLET | Refills: 0 | Status: SHIPPED | OUTPATIENT
Start: 2020-02-03 | End: 2020-05-05

## 2020-02-03 NOTE — TELEPHONE ENCOUNTER
predniSONE (DELTASONE) 5 MG tablet      Last Written Prescription Date:  1/14/20  Last Fill Quantity: 15,   # refills: PRN  Last Office Visit: 12/30/19  Future Office visit:       Routing refill request to provider for review/approval because:  Drug not on the [facility], [facility] or [facility] refill protocol or controlled substance    Pt requests 90 day supply

## 2020-02-05 DIAGNOSIS — F41.9 ANXIETY: ICD-10-CM

## 2020-02-06 NOTE — TELEPHONE ENCOUNTER
xanax      Last Written Prescription Date:  1/9/20  Last Fill Quantity: 100,   # refills: 3  Last Office Visit: 12/30/19  Future Office visit:       Routing refill request to provider for review/approval because:  Drug not on the [facility], [facility] or [facility] refill protocol or controlled substance

## 2020-02-07 DIAGNOSIS — J43.2 CENTRILOBULAR EMPHYSEMA (H): Primary | ICD-10-CM

## 2020-02-07 RX ORDER — ALPRAZOLAM 1 MG
TABLET ORAL
Qty: 90 TABLET | Refills: 0 | Status: SHIPPED | OUTPATIENT
Start: 2020-02-07 | End: 2020-03-16

## 2020-02-07 NOTE — TELEPHONE ENCOUNTER
Pt called requested neb tubing supplies be sent to [facility] in [place],  DME supply(s) have been requested by the patient. DME orders(s) have been queued up and pended for the provider to review. Patient reports the need for DME supplies due to emphysema. Pt had office visit with PCP on 12/30/19. PCP did chart continue same bronchodilator and steroid regimen.

## 2020-02-10 ENCOUNTER — PATIENT OUTREACH (OUTPATIENT)
Dept: CARE COORDINATION | Facility: OTHER | Age: 70
End: 2020-02-10

## 2020-02-10 NOTE — PROGRESS NOTES
Clinic Care Coordination Contact  Care Team Conversations    CC reviewed chart and patient has palliative care visit with DIRK Kelly scheduled 2/11.  Toyin Uribe, ALANIS  Care Coordination

## 2020-02-11 ENCOUNTER — OFFICE VISIT (OUTPATIENT)
Dept: SLEEP MEDICINE | Facility: HOSPITAL | Age: 70
End: 2020-02-11
Attending: FAMILY MEDICINE
Payer: MEDICARE

## 2020-02-11 DIAGNOSIS — R09.02 HYPOXIA: Primary | ICD-10-CM

## 2020-02-11 PROCEDURE — 99207 ZZC NO CHARGE NURSE ONLY: CPT

## 2020-02-11 NOTE — NURSING NOTE
Patient picked up over night oximeter number [id number]. Patient was instructed on use of device. Patient verbalized understanding.     Patient will return device tomorrow by: noon

## 2020-02-14 ENCOUNTER — DOCUMENTATION ONLY (OUTPATIENT)
Dept: SLEEP MEDICINE | Facility: HOSPITAL | Age: 70
End: 2020-02-14
Attending: FAMILY MEDICINE
Payer: MEDICARE

## 2020-02-14 PROCEDURE — 99207 ZZC NO CHARGE NURSE ONLY: CPT

## 2020-02-14 NOTE — PROGRESS NOTES
home care nurse returned the oximeter this is third attempt the data for all test were downloaded and the reports sent with the nurse to DIRK Kelly who will be seeing her today they are also being sent to scan

## 2020-03-02 DIAGNOSIS — F51.04 PSYCHOPHYSIOLOGIC INSOMNIA: ICD-10-CM

## 2020-03-02 DIAGNOSIS — M17.11 PRIMARY OSTEOARTHRITIS OF RIGHT KNEE: ICD-10-CM

## 2020-03-03 NOTE — TELEPHONE ENCOUNTER
Temazepam 30 MG      Last Written Prescription Date:  1-  Last Fill Quantity: 30,   # refills: 0  Last Office Visit: 2-  Future Office visit:    Next 5 appointments (look out 90 days)    Mar 23, 2020  1:45 PM CDT  (Arrive by 1:30 PM)  PHYSICAL with Gibran Alarcon MD  [facility] [address]  [facility] [address]  [phone]           Doxepin 1/9/20 #90

## 2020-03-05 RX ORDER — DOXEPIN HYDROCHLORIDE 10 MG/1
CAPSULE ORAL
Qty: 90 CAPSULE | Refills: 0 | Status: SHIPPED | OUTPATIENT
Start: 2020-03-05 | End: 2020-05-27

## 2020-03-05 NOTE — TELEPHONE ENCOUNTER
Temazepam       Last Written Prescription Date:  1/14/2020  Last Fill Quantity: 30,   # refills: 0  Last Office Visit: 12/30/2019  Future Office visit:    Next 5 appointments (look out 90 days)    Mar 23, 2020  1:45 PM CDT  (Arrive by 1:30 PM)  PHYSICAL with Gibran Alarcon MD  [facility] [address] [facility] [address]  [phone]           Routing refill request to provider for review/approval because:  Drug not on the [facility], [facility] or [facility] refill protocol or controlled substance

## 2020-03-06 RX ORDER — TEMAZEPAM 30 MG
CAPSULE ORAL
Qty: 90 CAPSULE | Refills: 0 | Status: SHIPPED | OUTPATIENT
Start: 2020-03-06 | End: 2020-08-26

## 2020-03-13 DIAGNOSIS — F41.9 ANXIETY: ICD-10-CM

## 2020-03-16 RX ORDER — ALPRAZOLAM 1 MG
TABLET ORAL
Qty: 90 TABLET | Refills: 0 | Status: SHIPPED | OUTPATIENT
Start: 2020-03-16 | End: 2020-04-24

## 2020-03-16 NOTE — TELEPHONE ENCOUNTER
Xanax  Palliative Care Maintenance  Last office visit: 2/14/20 SAMREEN Kelly  Last refill: 2/7/20 #90, 0 R  Thank you.

## 2020-03-17 ENCOUNTER — TELEPHONE (OUTPATIENT)
Dept: FAMILY MEDICINE | Facility: OTHER | Age: 70
End: 2020-03-17

## 2020-03-17 DIAGNOSIS — Z72.0 TOBACCO ABUSE: ICD-10-CM

## 2020-03-17 NOTE — TELEPHONE ENCOUNTER
11:50 AM    Reason for Call: Phone Call    Description:  Pt is in hospice care.  Pt stated she didn't need physical/coming in 03/23.  She needs cxr,labs.  Med refills. Requesting cologuard. Do you want her to keep Monday's apt?    Was an appointment offered for this call? No  If yes : Appointment type              Date    Preferred method for responding to this message: Telephone Call  What is your phone number ?[phone]    If we cannot reach you directly, may we leave a detailed response at the number you provided? Yes    Can this message wait until your PCP/provider returns, if available today? Not applicable    Kimberlee Law

## 2020-03-17 NOTE — TELEPHONE ENCOUNTER
Pt is due for mammogram, labs and low dose CT screen for lung cancer.  Pt is requesting orders be placed.  Please advise.

## 2020-03-17 NOTE — TELEPHONE ENCOUNTER
I strongly advise her to stay out of medical facilities if possible for the next couple of months depending on how things go.  Advise against for now, and we will catch up when there is less risk to her with her poor lungs.  Thanks. Gibran Alarcon MD

## 2020-03-17 NOTE — TELEPHONE ENCOUNTER
1:02 PM    Reason for Call: Phone Call    Description: Sydni states that she is not going to see Dr. Carrillo any more and would like Dr. Alarcon to put in orders for a  Chest x-ray and labs. I mentioned that being she is in Hospice he might not be able to. Please call Sydni to advise.     Was an appointment offered for this call?  No and cancelled 03/23/2020    Preferred method for responding to this message: [phone]    If we cannot reach you directly, may we leave a detailed response at the number you provided? Yes      Queta Smith

## 2020-03-18 RX ORDER — ALBUTEROL SULFATE 0.83 MG/ML
SOLUTION RESPIRATORY (INHALATION)
Qty: 90 ML | Refills: 0 | Status: SHIPPED | OUTPATIENT
Start: 2020-03-18 | End: 2020-05-26

## 2020-04-09 DIAGNOSIS — J96.01 ACUTE HYPOXEMIC RESPIRATORY FAILURE (H): ICD-10-CM

## 2020-04-09 DIAGNOSIS — J44.1 COPD EXACERBATION (H): ICD-10-CM

## 2020-04-09 DIAGNOSIS — I10 BENIGN ESSENTIAL HYPERTENSION: ICD-10-CM

## 2020-04-09 RX ORDER — IPRATROPIUM BROMIDE AND ALBUTEROL SULFATE 2.5; .5 MG/3ML; MG/3ML
SOLUTION RESPIRATORY (INHALATION)
Qty: 360 ML | Refills: 0 | Status: SHIPPED | OUTPATIENT
Start: 2020-04-09 | End: 2020-05-26

## 2020-04-09 RX ORDER — HYDROCHLOROTHIAZIDE 25 MG/1
TABLET ORAL
Qty: 90 TABLET | Refills: 0 | Status: SHIPPED | OUTPATIENT
Start: 2020-04-09 | End: 2020-07-14

## 2020-04-09 NOTE — TELEPHONE ENCOUNTER
Hydrochlorothiazide       Last Written Prescription Date:  1/9/2020  Last Fill Quantity: 90,   # refills: 0  Last Office Visit: 2/14/2020  Future Office visit:       Routing refill request to provider for review/approval because:  Failed due to creatinine  Creatinine   Date Value Ref Range Status   11/20/2019 0.88 0.52 - 1.04 mg/dL Final        duoneb       Last Written Prescription Date:  1/9/2020  Last Fill Quantity: 360,   # refills: 0  Last Office Visit: 2/14/2020  Future Office visit:       Routing refill request to provider for review/approval because:  Failed due to ACT

## 2020-04-23 DIAGNOSIS — F41.9 ANXIETY: ICD-10-CM

## 2020-04-23 NOTE — TELEPHONE ENCOUNTER
ALPRAZolam (XANAX) 1 MG tablet       Last Written Prescription Date:  3/16/20  Last Fill Quantity: 90,   # refills: 0  Last Office Visit: 12/30/19  Future Office visit:       Routing refill request to provider for review/approval because:  Drug not on the [facility], P or [facility] refill protocol or controlled substance

## 2020-04-24 RX ORDER — ALPRAZOLAM 1 MG
TABLET ORAL
Qty: 90 TABLET | Refills: 0 | Status: SHIPPED | OUTPATIENT
Start: 2020-04-24 | End: 2020-05-28

## 2020-04-28 DIAGNOSIS — F34.1 DYSTHYMIA: ICD-10-CM

## 2020-04-28 NOTE — TELEPHONE ENCOUNTER
Lexapro    Last Written Prescription Date: 1.9.2020  Last Fill Quantity: 180   # refills: 0  Last Office Visit: 2.14.2020  Future Office visit:       Routing refill request to provider for review/approval because:  Used for dysthymia

## 2020-04-29 RX ORDER — ESCITALOPRAM OXALATE 20 MG/1
TABLET ORAL
Qty: 180 TABLET | Refills: 0 | Status: SHIPPED | OUTPATIENT
Start: 2020-04-29 | End: 2020-08-06

## 2020-05-04 DIAGNOSIS — J43.2 CENTRILOBULAR EMPHYSEMA (H): ICD-10-CM

## 2020-05-04 DIAGNOSIS — I10 ESSENTIAL HYPERTENSION: ICD-10-CM

## 2020-05-04 DIAGNOSIS — I10 BENIGN ESSENTIAL HYPERTENSION: ICD-10-CM

## 2020-05-04 RX ORDER — BENAZEPRIL HYDROCHLORIDE 10 MG/1
TABLET ORAL
Qty: 90 TABLET | Refills: 0 | Status: SHIPPED | OUTPATIENT
Start: 2020-05-04 | End: 2020-08-06

## 2020-05-04 RX ORDER — POTASSIUM CHLORIDE 750 MG/1
TABLET, EXTENDED RELEASE ORAL
Qty: 180 TABLET | Refills: 0 | Status: SHIPPED | OUTPATIENT
Start: 2020-05-04 | End: 2020-08-06

## 2020-05-04 NOTE — TELEPHONE ENCOUNTER
Prednisone      Last Written Prescription Date:  2/3/2020  Last Fill Quantity: 90,   # refills: 0  Last Office Visit: 2/14/2020 With Jace galicia  Future Office visit:   none    Routing refill request to provider for review/approval because:  Drug not on the [facility], P or [facility] refill protocol or controlled substance

## 2020-05-05 RX ORDER — PREDNISONE 5 MG/1
TABLET ORAL
Qty: 90 TABLET | Refills: 0 | Status: SHIPPED | OUTPATIENT
Start: 2020-05-05 | End: 2020-08-06

## 2020-05-06 ENCOUNTER — TELEPHONE (OUTPATIENT)
Dept: FAMILY MEDICINE | Facility: OTHER | Age: 70
End: 2020-05-06

## 2020-05-06 DIAGNOSIS — Z13.9 SCREENING FOR CONDITION: Primary | ICD-10-CM

## 2020-05-06 DIAGNOSIS — Z87.891 PERSONAL HISTORY OF NICOTINE DEPENDENCE: ICD-10-CM

## 2020-05-06 NOTE — TELEPHONE ENCOUNTER
Patient called and stated she believes she is supposed to have lab work ordered. Patient advised no lab work is noted. Patient stated she should have some as she has not had any labs done since she was in the hospital. Please advise.

## 2020-05-06 NOTE — TELEPHONE ENCOUNTER
Patient called and states that she needs an order for lung cancer screening and also needs labs placed. Please order labs you would like done . Thank you

## 2020-05-07 DIAGNOSIS — I10 BENIGN ESSENTIAL HYPERTENSION: Primary | ICD-10-CM

## 2020-05-07 PROBLEM — M25.569 KNEE PAIN: Status: RESOLVED | Noted: 2017-03-25 | Resolved: 2020-05-07

## 2020-05-07 PROBLEM — J96.01 ACUTE HYPOXEMIC RESPIRATORY FAILURE (H): Status: RESOLVED | Noted: 2018-09-24 | Resolved: 2020-05-07

## 2020-05-07 PROBLEM — E86.0 DEHYDRATION: Status: RESOLVED | Noted: 2017-03-25 | Resolved: 2020-05-07

## 2020-05-07 PROBLEM — D72.829 LEUKOCYTOSIS: Status: RESOLVED | Noted: 2017-03-25 | Resolved: 2020-05-07

## 2020-05-07 PROBLEM — J43.2 CENTRILOBULAR EMPHYSEMA (H): Status: ACTIVE | Noted: 2019-10-16

## 2020-05-07 PROBLEM — J18.9 PNEUMONIA: Status: RESOLVED | Noted: 2019-11-18 | Resolved: 2020-05-07

## 2020-05-07 PROBLEM — J44.1 COPD EXACERBATION (H): Status: RESOLVED | Noted: 2018-09-24 | Resolved: 2020-05-07

## 2020-05-07 PROBLEM — J96.00 ACUTE RESPIRATORY FAILURE (H): Status: RESOLVED | Noted: 2019-09-17 | Resolved: 2020-05-07

## 2020-05-07 NOTE — TELEPHONE ENCOUNTER
Tavares Carrillo MD  You 9 hours ago (5:10 AM)       Fasting lab orders placed    Message text      Patient was contacted and advised. Patient verbalized understanding.

## 2020-05-18 ENCOUNTER — TELEPHONE (OUTPATIENT)
Dept: FAMILY MEDICINE | Facility: OTHER | Age: 70
End: 2020-05-18

## 2020-05-27 DIAGNOSIS — F41.9 ANXIETY: ICD-10-CM

## 2020-05-27 DIAGNOSIS — M17.11 PRIMARY OSTEOARTHRITIS OF RIGHT KNEE: ICD-10-CM

## 2020-05-27 RX ORDER — DOXEPIN HYDROCHLORIDE 10 MG/1
CAPSULE ORAL
Qty: 90 CAPSULE | Refills: 0 | Status: SHIPPED | OUTPATIENT
Start: 2020-05-27 | End: 2020-08-26

## 2020-05-27 NOTE — TELEPHONE ENCOUNTER
Xanax      Last Written Prescription Date:  4/24/2020  Last Fill Quantity: 90,   # refills: 0  Last Office Visit: 12/30/19  Future Office visit:       Routing refill request to provider for review/approval because:  Drug not on the [facility], P or [facility] refill protocol or controlled substance

## 2020-05-28 RX ORDER — ALPRAZOLAM 1 MG
TABLET ORAL
Qty: 90 TABLET | Refills: 0 | Status: SHIPPED | OUTPATIENT
Start: 2020-05-28 | End: 2020-06-30

## 2020-06-02 ENCOUNTER — TELEPHONE (OUTPATIENT)
Dept: FAMILY MEDICINE | Facility: OTHER | Age: 70
End: 2020-06-02

## 2020-06-02 NOTE — TELEPHONE ENCOUNTER
Received a PA from [facility] for Triamcinolone 0.1% lotion. Submitted on [facility]. Received an instant response stating that this medication is available and no PA is needed. Forms scanned to Epic.

## 2020-06-15 ENCOUNTER — PATIENT OUTREACH (OUTPATIENT)
Dept: CARE COORDINATION | Facility: OTHER | Age: 70
End: 2020-06-15

## 2020-06-15 NOTE — PROGRESS NOTES
Clinic Care Coordination Contact  Care Team Conversations    CC sent staff message to Dr. Alarcon to see what he advises as patient coming in for labs and was still requesting due for mammogram and low dose CT scan for lung cancer.  CC awaiting reply from Dr. Alarcon.  Toyin Uribe, RN  Care Coordination

## 2020-06-19 ENCOUNTER — ANCILLARY PROCEDURE (OUTPATIENT)
Dept: MAMMOGRAPHY | Facility: OTHER | Age: 70
End: 2020-06-19
Attending: NURSE PRACTITIONER
Payer: MEDICARE

## 2020-06-19 ENCOUNTER — HOSPITAL ENCOUNTER (OUTPATIENT)
Dept: CT IMAGING | Facility: HOSPITAL | Age: 70
End: 2020-06-19
Attending: NURSE PRACTITIONER
Payer: MEDICARE

## 2020-06-19 DIAGNOSIS — Z12.31 ENCOUNTER FOR SCREENING MAMMOGRAM FOR BREAST CANCER: ICD-10-CM

## 2020-06-19 DIAGNOSIS — Z87.891 PERSONAL HISTORY OF NICOTINE DEPENDENCE: ICD-10-CM

## 2020-06-19 DIAGNOSIS — Z12.2 ENCOUNTER FOR SCREENING FOR LUNG CANCER: ICD-10-CM

## 2020-06-19 DIAGNOSIS — I10 BENIGN ESSENTIAL HYPERTENSION: ICD-10-CM

## 2020-06-19 LAB
ALBUMIN SERPL-MCNC: 3.2 G/DL (ref 3.4–5)
ALBUMIN UR-MCNC: NEGATIVE MG/DL
ALP SERPL-CCNC: 75 U/L (ref 40–150)
ALT SERPL W P-5'-P-CCNC: 31 U/L (ref 0–50)
ANION GAP SERPL CALCULATED.3IONS-SCNC: 5 MMOL/L (ref 3–14)
APPEARANCE UR: CLEAR
AST SERPL W P-5'-P-CCNC: 21 U/L (ref 0–45)
BACTERIA #/AREA URNS HPF: ABNORMAL /HPF
BASOPHILS # BLD AUTO: 0.1 10E9/L (ref 0–0.2)
BASOPHILS NFR BLD AUTO: 0.8 %
BILIRUB SERPL-MCNC: 0.6 MG/DL (ref 0.2–1.3)
BILIRUB UR QL STRIP: NEGATIVE
BUN SERPL-MCNC: 27 MG/DL (ref 7–30)
CALCIUM SERPL-MCNC: 8.8 MG/DL (ref 8.5–10.1)
CHLORIDE SERPL-SCNC: 99 MMOL/L (ref 94–109)
CHOLEST SERPL-MCNC: 127 MG/DL
CO2 SERPL-SCNC: 28 MMOL/L (ref 20–32)
COLOR UR AUTO: ABNORMAL
CREAT SERPL-MCNC: 1.16 MG/DL (ref 0.52–1.04)
DIFFERENTIAL METHOD BLD: ABNORMAL
EOSINOPHIL # BLD AUTO: 0.4 10E9/L (ref 0–0.7)
EOSINOPHIL NFR BLD AUTO: 2.8 %
ERYTHROCYTE [DISTWIDTH] IN BLOOD BY AUTOMATED COUNT: 13.1 % (ref 10–15)
GFR SERPL CREATININE-BSD FRML MDRD: 47 ML/MIN/{1.73_M2}
GLUCOSE SERPL-MCNC: 78 MG/DL (ref 70–99)
GLUCOSE UR STRIP-MCNC: NEGATIVE MG/DL
HCT VFR BLD AUTO: 40.5 % (ref 35–47)
HDLC SERPL-MCNC: 40 MG/DL
HGB BLD-MCNC: 13.6 G/DL (ref 11.7–15.7)
HGB UR QL STRIP: NEGATIVE
IMM GRANULOCYTES # BLD: 0.1 10E9/L (ref 0–0.4)
IMM GRANULOCYTES NFR BLD: 0.5 %
KETONES UR STRIP-MCNC: NEGATIVE MG/DL
LDLC SERPL CALC-MCNC: 66 MG/DL
LEUKOCYTE ESTERASE UR QL STRIP: ABNORMAL
LYMPHOCYTES # BLD AUTO: 4.4 10E9/L (ref 0.8–5.3)
LYMPHOCYTES NFR BLD AUTO: 34.6 %
MCH RBC QN AUTO: 31.3 PG (ref 26.5–33)
MCHC RBC AUTO-ENTMCNC: 33.6 G/DL (ref 31.5–36.5)
MCV RBC AUTO: 93 FL (ref 78–100)
MONOCYTES # BLD AUTO: 1 10E9/L (ref 0–1.3)
MONOCYTES NFR BLD AUTO: 7.7 %
MUCOUS THREADS #/AREA URNS LPF: PRESENT /LPF
NEUTROPHILS # BLD AUTO: 6.9 10E9/L (ref 1.6–8.3)
NEUTROPHILS NFR BLD AUTO: 53.6 %
NITRATE UR QL: NEGATIVE
NONHDLC SERPL-MCNC: 87 MG/DL
NRBC # BLD AUTO: 0 10*3/UL
NRBC BLD AUTO-RTO: 0 /100
PH UR STRIP: 5.5 PH (ref 4.7–8)
PLATELET # BLD AUTO: 310 10E9/L (ref 150–450)
POTASSIUM SERPL-SCNC: 3.6 MMOL/L (ref 3.4–5.3)
PROT SERPL-MCNC: 6.5 G/DL (ref 6.8–8.8)
RBC # BLD AUTO: 4.34 10E12/L (ref 3.8–5.2)
RBC #/AREA URNS AUTO: <1 /HPF (ref 0–2)
SODIUM SERPL-SCNC: 132 MMOL/L (ref 133–144)
SOURCE: ABNORMAL
SP GR UR STRIP: 1.01 (ref 1–1.03)
SQUAMOUS #/AREA URNS AUTO: 2 /HPF (ref 0–1)
TRIGL SERPL-MCNC: 107 MG/DL
TSH SERPL DL<=0.005 MIU/L-ACNC: 1.26 MU/L (ref 0.4–4)
UROBILINOGEN UR STRIP-MCNC: NORMAL MG/DL (ref 0–2)
WBC # BLD AUTO: 12.8 10E9/L (ref 4–11)
WBC #/AREA URNS AUTO: 2 /HPF (ref 0–5)

## 2020-06-19 PROCEDURE — 77063 BREAST TOMOSYNTHESIS BI: CPT | Mod: TC

## 2020-06-19 PROCEDURE — 85025 COMPLETE CBC W/AUTO DIFF WBC: CPT | Mod: ZL | Performed by: FAMILY MEDICINE

## 2020-06-19 PROCEDURE — 36415 COLL VENOUS BLD VENIPUNCTURE: CPT | Mod: ZL | Performed by: FAMILY MEDICINE

## 2020-06-19 PROCEDURE — 80053 COMPREHEN METABOLIC PANEL: CPT | Mod: ZL | Performed by: FAMILY MEDICINE

## 2020-06-19 PROCEDURE — 84443 ASSAY THYROID STIM HORMONE: CPT | Mod: ZL | Performed by: FAMILY MEDICINE

## 2020-06-19 PROCEDURE — 81001 URINALYSIS AUTO W/SCOPE: CPT | Mod: ZL | Performed by: FAMILY MEDICINE

## 2020-06-19 PROCEDURE — 80061 LIPID PANEL: CPT | Mod: ZL | Performed by: FAMILY MEDICINE

## 2020-06-19 PROCEDURE — G0297 LDCT FOR LUNG CA SCREEN: HCPCS | Mod: TC

## 2020-06-29 DIAGNOSIS — F41.9 ANXIETY: ICD-10-CM

## 2020-06-29 NOTE — TELEPHONE ENCOUNTER
xanax      Last Written Prescription Date:  5.28.2020  Last Fill Quantity: 90,   # refills: 0  Last Office Visit: 12.30.19

## 2020-06-30 DIAGNOSIS — N30.00 ACUTE CYSTITIS WITHOUT HEMATURIA: Primary | ICD-10-CM

## 2020-06-30 DIAGNOSIS — R30.0 DYSURIA: ICD-10-CM

## 2020-06-30 RX ORDER — ALPRAZOLAM 1 MG
TABLET ORAL
Qty: 90 TABLET | Refills: 0 | Status: SHIPPED | OUTPATIENT
Start: 2020-06-30 | End: 2020-08-13

## 2020-06-30 NOTE — PROGRESS NOTES
Patient still has issues with burning with urination.   Her last urinalysis was did not flex to culture, but did have some bacteria present.    She will try to stop in and give another urine sample.  I will treat with antibiotic, she will increase her water intake.  Jeni Kelly  CNP, CWOCN  Hospice and Palliative Care

## 2020-07-01 DIAGNOSIS — R30.0 DYSURIA: ICD-10-CM

## 2020-07-01 DIAGNOSIS — N30.00 ACUTE CYSTITIS WITHOUT HEMATURIA: ICD-10-CM

## 2020-07-02 ENCOUNTER — TELEPHONE (OUTPATIENT)
Dept: FAMILY MEDICINE | Facility: OTHER | Age: 70
End: 2020-07-02

## 2020-07-02 DIAGNOSIS — N30.00 ACUTE CYSTITIS WITHOUT HEMATURIA: ICD-10-CM

## 2020-07-02 DIAGNOSIS — B37.31 CANDIDIASIS OF VAGINA: Primary | ICD-10-CM

## 2020-07-02 DIAGNOSIS — R30.0 DYSURIA: ICD-10-CM

## 2020-07-02 LAB
ALBUMIN UR-MCNC: NEGATIVE MG/DL
APPEARANCE UR: CLEAR
BACTERIA #/AREA URNS HPF: ABNORMAL /HPF
BILIRUB UR QL STRIP: NEGATIVE
COLOR UR AUTO: ABNORMAL
GLUCOSE UR STRIP-MCNC: NEGATIVE MG/DL
HGB UR QL STRIP: NEGATIVE
KETONES UR STRIP-MCNC: NEGATIVE MG/DL
LEUKOCYTE ESTERASE UR QL STRIP: ABNORMAL
MUCOUS THREADS #/AREA URNS LPF: PRESENT /LPF
NITRATE UR QL: NEGATIVE
PH UR STRIP: 5.5 PH (ref 4.7–8)
RBC #/AREA URNS AUTO: 1 /HPF (ref 0–2)
SOURCE: ABNORMAL
SP GR UR STRIP: 1.02 (ref 1–1.03)
UROBILINOGEN UR STRIP-MCNC: NORMAL MG/DL (ref 0–2)
WBC #/AREA URNS AUTO: 1 /HPF (ref 0–5)

## 2020-07-02 PROCEDURE — 81001 URINALYSIS AUTO W/SCOPE: CPT | Mod: ZL | Performed by: FAMILY MEDICINE

## 2020-07-02 RX ORDER — FLUCONAZOLE 150 MG/1
150 TABLET ORAL ONCE
Qty: 1 TABLET | Refills: 0 | Status: SHIPPED | OUTPATIENT
Start: 2020-07-02 | End: 2020-07-02

## 2020-07-02 NOTE — TELEPHONE ENCOUNTER
Demetrice Granados MD.    ANESTHESIOLOGIST:  Jacinto Mcneill Pt requesting DIRK Kelly call her back regarding UA.    [id number]  Job#:  [id number]

## 2020-07-02 NOTE — TELEPHONE ENCOUNTER
Nurse called patient to inform her that her UA came back with no bacteria. Patient states she understands that there is no bacteria in her urine. Patient states that she is going to take the Augmentin that she has at home because she states her white blood cell count was elevated, also the patient would like Rx for Diflucan because she gets yeast infections when taking antibiotics.

## 2020-07-10 DIAGNOSIS — I10 BENIGN ESSENTIAL HYPERTENSION: ICD-10-CM

## 2020-07-14 RX ORDER — HYDROCHLOROTHIAZIDE 25 MG/1
TABLET ORAL
Qty: 90 TABLET | Refills: 0 | Status: SHIPPED | OUTPATIENT
Start: 2020-07-14 | End: 2020-10-13

## 2020-07-20 ENCOUNTER — TELEPHONE (OUTPATIENT)
Dept: FAMILY MEDICINE | Facility: OTHER | Age: 70
End: 2020-07-20

## 2020-07-20 NOTE — TELEPHONE ENCOUNTER
PT is requesting a phone call from Dr. Alarcon to discuss a few concerns and medication refill. Pt was adamant she will only speak with Dr. Alarcon or his nurse.     Please call today, 7/20/20

## 2020-07-20 NOTE — TELEPHONE ENCOUNTER
Called pt she insists in speaking with Dr Alarcon.  I explained to her that he is seeing pt's in clinic and cannot call her.  I asked if I could help her and she states she is shocked that she cannot speak with him.  I again explained that he is seeing pt's in clinic.  She requests an appt, scheduled her an appt for 8/6.   Tissue Cultured Epidermal Autograft Text: The defect edges were debeveled with a #15 scalpel blade.  Given the location of the defect, shape of the defect and the proximity to free margins a tissue cultured epidermal autograft was deemed most appropriate.  The graft was then trimmed to fit the size of the defect.  The graft was then placed in the primary defect and oriented appropriately.

## 2020-07-23 ENCOUNTER — TRANSFERRED RECORDS (OUTPATIENT)
Dept: HEALTH INFORMATION MANAGEMENT | Facility: CLINIC | Age: 70
End: 2020-07-23

## 2020-07-29 ENCOUNTER — TELEPHONE (OUTPATIENT)
Dept: FAMILY MEDICINE | Facility: OTHER | Age: 70
End: 2020-07-29

## 2020-07-29 DIAGNOSIS — G89.29 CHRONIC PAIN OF RIGHT KNEE: ICD-10-CM

## 2020-07-29 DIAGNOSIS — J20.9 ACUTE BRONCHITIS, UNSPECIFIED ORGANISM: ICD-10-CM

## 2020-07-29 DIAGNOSIS — M25.561 CHRONIC PAIN OF RIGHT KNEE: ICD-10-CM

## 2020-07-29 NOTE — TELEPHONE ENCOUNTER
Patient called, she went to see Dr. Snyder in [place], [place] for evaluation of her chest lesions.  She had them removed a few days ago and now feels like one of the sites is infected.  She is wondering if I could come over and look at it.  I have asked her to call Dr. Mclean to let them know about this.   She is also complaining of feeling very tired at this time.  She has an appointment to see Dr. Alarcon next week and is encouraged to tell him all of her symptoms so that he can better manage her care.    Jeni Kelly  CNP, CWOCN  Hospice and Palliative Care

## 2020-07-31 NOTE — TELEPHONE ENCOUNTER
Pt of Dr.Copeman Kennedy     Last Written Prescription Date:  1.9.2020  Last Fill Quantity: 60,   # refills: 3  Last Office Visit: 5.26.2020  Future Office visit:    Next 5 appointments (look out 90 days)    Aug 06, 2020  2:45 PM CDT  (Arrive by 2:30 PM)  [facility] [address]  [phone]           Routing refill request to provider for review/approval because:  Non formulary      Percocet    Last Written Prescription Date:  1.2.2020  Last Fill Quantity: 40,   # refills: 0  Last Office Visit:   Future Office visit:    Next 5 appointments (look out 90 days)    Aug 06, 2020  2:45 PM CDT  (Arrive by 2:30 PM)  SHORT with Gibran Alarcon MD  [facility] ) [address]  [facility] [address]  [phone]           Routing refill request to provider for review/approval because:  Drug not on the [facility], [facility] or  Health refill protocol or controlled substance

## 2020-08-04 RX ORDER — OXYCODONE AND ACETAMINOPHEN 5; 325 MG/1; MG/1
TABLET ORAL
Qty: 28 TABLET | Refills: 0 | Status: SHIPPED | OUTPATIENT
Start: 2020-08-04 | End: 2020-11-07

## 2020-08-04 RX ORDER — BENZONATATE 200 MG/1
CAPSULE ORAL
Qty: 60 CAPSULE | Refills: 0 | Status: SHIPPED | OUTPATIENT
Start: 2020-08-04 | End: 2020-10-28

## 2020-08-05 NOTE — PROGRESS NOTES
Subjective     Sydni Hardin is a 70 year old female who presents to clinic today for the following health issues:    HPI       Wound check       Duration: 2 weeks    Description (location/character/radiation): right breast    Intensity:  moderate    Accompanying signs and symptoms: odor, painful and red    History (similar episodes/previous evaluation): surgery with Dr Snyder 2 weeks ago    Precipitating or alleviating factors: None    Therapies tried and outcome: peroxide      Hand problem       Duration: months    Description (location/character/radiation): bilateral hand    Intensity:  moderate    Accompanying signs and symptoms: cramping     History (similar episodes/previous evaluation): None    Precipitating or alleviating factors: None    Therapies tried and outcome: None       Patient Active Problem List   Diagnosis     Essential hypertension     Primary osteoarthritis involving multiple joints     Dysthymia     Lung nodules     Tobacco abuse     SCC (squamous cell carcinoma), face     IC (interstitial cystitis)     Primary osteoarthritis of both knees     S/P total knee replacement using cement, right     Asthma     CKD (chronic kidney disease) stage 3, GFR 30-59 ml/min (H)     Obesity, morbid (H)     Esophageal reflux     Palliative care patient     Centrilobular emphysema (H)     Obstructive chronic bronchitis without exacerbation (H)     Past Surgical History:   Procedure Laterality Date     ARTHROPLASTY KNEE Right 3/21/2017    Procedure: ARTHROPLASTY KNEE;  Surgeon: Moisés Olson MD;  Location: [facility] OR     ARTHROSCOPY KNEE       BIOPSY BREAST  2000    left - free of disease      SECTION        SECTION       CHOLECYSTECTOMY       COLONOSCOPY  2008    repeat in 10 years     HEAD & NECK SURGERY      hematoma on head drained     HYSTERECTOMY       IR CONSULTATION FOR IR EXAM  2019     ORTHOPEDIC SURGERY  2014    right shoulder replacement     shoulder x2   2009    left       Social History     Tobacco Use     Smoking status: Current Every Day Smoker     Packs/day: 1.50     Years: 53.00     Pack years: 79.50     Types: Cigarettes     Start date: 1/1/1965     Smokeless tobacco: Never Used     Tobacco comment: pt enrolled in quit plan 5/2/19   Substance Use Topics     Alcohol use: Yes     Alcohol/week: 0.0 standard drinks     Comment: rarely     Family History   Problem Relation Age of Onset     Cancer Mother         skin     Breast Cancer Mother      Cerebrovascular Disease Mother      Alzheimer Disease Mother      Myocardial Infarction Father 76        myocardial infarction - cause of death     Cancer Daughter         cervical      Diabetes Maternal Grandfather      Heart Disease Maternal Grandfather         MI     Diabetes Maternal Grandmother      Diabetes Paternal Grandmother      Diabetes Paternal Grandfather          Current Outpatient Medications   Medication Sig Dispense Refill     albuterol (PROVENTIL) (2.5 MG/3ML) 0.083% neb solution USE 1 VIAL IN NEBULIZER 4 TIMES PER DAY AS NEEDED 90 mL 3     ALPRAZolam (XANAX) 1 MG tablet TAKE 1 TABLET BY MOUTH 3 TIMES DAILY AS NEEDED FOR ANXIETY 90 tablet 0     aspirin 81 MG EC tablet Take 1 tablet (81 mg) by mouth daily 100 tablet 3     atorvastatin (LIPITOR) 40 MG tablet Take 1 tablet (40 mg) by mouth daily 90 tablet 3     benzonatate (TESSALON) 200 MG capsule TAKE 1 CAPSULE BY MOUTH 3 TIMES A DAY AS NEEDED FOR COUGH 60 capsule 0     butalbital-acetaminophen-caffeine (FIORICET/ESGIC) -40 MG tablet Take 1 tablet by mouth every 6 hours as needed for headaches 30 tablet 3     doxepin (SINEQUAN) 10 MG capsule TAKE 1 CAPSULE BY MOUTH AT BEDTIME 90 capsule 0     EPINEPHrine (EPIPEN 2-JAIRO) 0.3 MG/0.3ML injection 2-pack INJECT 1 SYRINGE INTRAMUSCULARLY ONCE AS NEEDED FOR ANAPHYLAXIS 2 each 0     hydrochlorothiazide (HYDRODIURIL) 25 MG tablet TAKE 1 TABLET BY MOUTH DAILY 90 tablet 0     ipratropium - albuterol 0.5 mg/2.5  "mg/3 mL (DUONEB) 0.5-2.5 (3) MG/3ML neb solution Take 1 vial (3 mLs) by nebulization 4 times daily 360 mL 3     meloxicam (MOBIC) 15 MG tablet Take 1 tablet (15 mg) by mouth daily 90 tablet 3     montelukast (SINGULAIR) 10 MG tablet TAKE 1 TABLET BY MOUTH DAILY AT BEDTIME 90 tablet PRN     nystatin (MYCOSTATIN) 644790 UNIT/ML suspension SWISH AND SWALLOW 1 TEASPOONFUL (5ML) BY MOUTH FOUR TIMES A  mL 0     order for DME Equipment being ordered: nebulizer tubing supplies, dx emphysema  FAX TO [facility] IN [place] 1 Device 0     oxyCODONE-acetaminophen (PERCOCET) 5-325 MG tablet TAKE 1 TABLET BY MOUTH EVERY 6 HOURS AS NEEDED FOR PAIN 28 tablet 0     temazepam (RESTORIL) 30 MG capsule TAKE 1 CAPSULE BY MOUTH NIGHTLY AS NEEDED FOR SLEEP 90 capsule 0     triamcinolone (KENALOG) 0.1 % external cream Apply topically 2 times daily 30 g 1     triamcinolone (KENALOG) 0.1 % external lotion Apply topically 2 times daily 60 mL 0     benazepril (LOTENSIN) 10 MG tablet TAKE 1 TABLET BY MOUTH DAILY 90 tablet 0     escitalopram (LEXAPRO) 20 MG tablet TAKE 1 TABLET BY MOUTH 2 TIMES A  tablet 0     potassium chloride ER (K-TAB/KLOR-CON) 10 MEQ CR tablet TAKE 1 TABLET BY MOUTH 2 TIMES DAILY WITH FOOD 180 tablet 0     predniSONE (DELTASONE) 5 MG tablet TAKE 1 TABLET BY MOUTH DAILY 90 tablet 0     Allergies   Allergen Reactions     Bee Pollen Other (See Comments)     Throat and eyes close up     Codeine Difficulty breathing     \"My throat closes shut.\"     Takes Percocet at home     Keflex [Cephalexin Hcl] Hives     Sulfonamide Derivatives Other (See Comments)     Throat closes     Ciprofloxacin Rash     Rash, bumps on lips     Bupropion Hcl Other (See Comments)     Made her very irritable     Celecoxib Other (See Comments) and Cough     Celebrex - wheeze  Takes ASA at home     Cheese      swiss     Clonazepam Hives     Takes Xanax at home     Erythromycin Other (See Comments)     Erythromycin base - abdominal pain     " "Lisinopril Other (See Comments)     Zestril - headaches     Nifedipine Hives     Procardia     Strawberries [Strawberry]      Tomato      Tramadol Hcl      Takes percocet at home       Reviewed and updated as needed this visit by Provider         Review of Systems   Constitutional, HEENT, cardiovascular, pulmonary, gi and gu systems are negative, except as otherwise noted.      Objective    /70   Pulse 83   Temp 98.6  F (37  C)   Ht 1.499 m (4' 11\")   Wt 87.5 kg (193 lb)   SpO2 91%   BMI 38.98 kg/m    Body mass index is 38.98 kg/m .  Physical Exam   GENERAL: healthy, alert and no distress  NECK: no adenopathy, no asymmetry, masses, or scars and thyroid normal to palpation  RESP: lungs clear to auscultation - no rales, rhonchi or wheezes  BREAST: biopsy sites on the skin of the upper abdomen and breasts are not infected but inflamed.  Ring of minimal erythema just around the wound edge.  No cellulitis.    CV: regular rate and rhythm, normal S1 S2, no S3 or S4, no murmur, click or rub, no peripheral edema and peripheral pulses strong  ABDOMEN: soft, nontender, no hepatosplenomegaly, no masses and bowel sounds normal  MS: no gross musculoskeletal defects noted, no edema    Diagnostic Test Results:  Labs reviewed in Epic        Assessment & Plan       ICD-10-CM    1. Cervicalgia  M54.2 CHIROPRACTIC REFERRAL   2. Obstructive chronic bronchitis without exacerbation (H)  J44.9    3. Lung nodules  R91.8    4. Skin inflammation  L08.9      25 minutes spent with patient, over 50%in counseling.  I reveiwed her CT, set her up with Maksim, and looked at the wounds that she thought were infected and told her just to go with abx salve.  She will watch.     Tobacco Cessation:   reports that she has been smoking cigarettes. She started smoking about 55 years ago. She has a 79.50 pack-year smoking history. She has never used smokeless tobacco.        BMI:   Estimated body mass index is 38.98 kg/m  as calculated from the " "following:    Height as of this encounter: 1.499 m (4' 11\").    Weight as of this encounter: 87.5 kg (193 lb).               No follow-ups on file.    Gibran Alarcon MD  [facility]  "

## 2020-08-06 ENCOUNTER — OFFICE VISIT (OUTPATIENT)
Dept: FAMILY MEDICINE | Facility: OTHER | Age: 70
End: 2020-08-06
Attending: FAMILY MEDICINE
Payer: COMMERCIAL

## 2020-08-06 VITALS
DIASTOLIC BLOOD PRESSURE: 70 MMHG | SYSTOLIC BLOOD PRESSURE: 122 MMHG | HEIGHT: 59 IN | OXYGEN SATURATION: 91 % | WEIGHT: 193 LBS | BODY MASS INDEX: 38.91 KG/M2 | TEMPERATURE: 98.6 F | HEART RATE: 83 BPM

## 2020-08-06 DIAGNOSIS — L08.9 SKIN INFLAMMATION: ICD-10-CM

## 2020-08-06 DIAGNOSIS — I10 BENIGN ESSENTIAL HYPERTENSION: ICD-10-CM

## 2020-08-06 DIAGNOSIS — J44.89 OBSTRUCTIVE CHRONIC BRONCHITIS WITHOUT EXACERBATION (H): ICD-10-CM

## 2020-08-06 DIAGNOSIS — J43.2 CENTRILOBULAR EMPHYSEMA (H): ICD-10-CM

## 2020-08-06 DIAGNOSIS — F34.1 DYSTHYMIA: ICD-10-CM

## 2020-08-06 DIAGNOSIS — I10 ESSENTIAL HYPERTENSION: ICD-10-CM

## 2020-08-06 DIAGNOSIS — M54.2 CERVICALGIA: Primary | ICD-10-CM

## 2020-08-06 DIAGNOSIS — R91.8 LUNG NODULES: ICD-10-CM

## 2020-08-06 PROCEDURE — 99214 OFFICE O/P EST MOD 30 MIN: CPT | Performed by: FAMILY MEDICINE

## 2020-08-06 PROCEDURE — G0463 HOSPITAL OUTPT CLINIC VISIT: HCPCS

## 2020-08-06 RX ORDER — ESCITALOPRAM OXALATE 20 MG/1
TABLET ORAL
Qty: 180 TABLET | Refills: 0 | Status: SHIPPED | OUTPATIENT
Start: 2020-08-06 | End: 2020-01-01

## 2020-08-06 RX ORDER — PREDNISONE 5 MG/1
TABLET ORAL
Qty: 90 TABLET | Refills: 0 | Status: SHIPPED | OUTPATIENT
Start: 2020-08-06 | End: 2020-01-01

## 2020-08-06 RX ORDER — BENAZEPRIL HYDROCHLORIDE 10 MG/1
TABLET ORAL
Qty: 90 TABLET | Refills: 0 | Status: SHIPPED | OUTPATIENT
Start: 2020-08-06 | End: 2020-01-01

## 2020-08-06 RX ORDER — POTASSIUM CHLORIDE 750 MG/1
TABLET, EXTENDED RELEASE ORAL
Qty: 180 TABLET | Refills: 0 | Status: SHIPPED | OUTPATIENT
Start: 2020-08-06 | End: 2020-01-01

## 2020-08-06 ASSESSMENT — MIFFLIN-ST. JEOR: SCORE: 1296.07

## 2020-08-06 ASSESSMENT — PAIN SCALES - GENERAL: PAINLEVEL: SEVERE PAIN (6)

## 2020-08-06 NOTE — NURSING NOTE
"Chief Complaint   Patient presents with     RECHECK       Initial /70   Pulse 83   Temp 98.6  F (37  C)   Ht 1.499 m (4' 11\")   Wt 87.5 kg (193 lb)   SpO2 91%   BMI 38.98 kg/m   Estimated body mass index is 38.98 kg/m  as calculated from the following:    Height as of this encounter: 1.499 m (4' 11\").    Weight as of this encounter: 87.5 kg (193 lb).  Medication Reconciliation: complete  Jo Ayala LPN  "

## 2020-08-06 NOTE — TELEPHONE ENCOUNTER
Potassium CL ER      Last Written Prescription Date:  5/4/2020  Last Fill Quantity: 180 tab,   # refills: 0  Last Office Visit: 5/26/2020  Future Office visit:    Next 5 appointments (look out 90 days)    Aug 06, 2020  2:45 PM CDT  (Arrive by 2:30 PM)  [facility]con [facility]  [facility] [address] [facility] [address]  [phone]           Routing refill request to provider for review/approval because:      Prednisone      Last Written Prescription Date:  5/5/2020  Last Fill Quantity: 90 tab,   # refills: 0  Last Office Visit: 5/26/2020  Future Office visit:    Next 5 appointments (look out 90 days)    Aug 06, 2020  2:45 PM CDT  (Arrive by 2:30 PM)  SHORT with Gibran Alarcon MD  [facility] [address] [facility] [address]  [phone]           Routing refill request to provider for review/approval because:    Escitalopram      Last Written Prescription Date:  4/29/2020  Last Fill Quantity: 180 tab,   # refills: 0  Last Office Visit: 5/26/2020  Future Office visit:    Next 5 appointments (look out 90 days)    Aug 06, 2020  2:45 PM CDT  (Arrive by 2:30 PM)  SHORT with Gibran Alarcon MD  [facility] [address] [facility] [address]  [phone]           Routing refill request to provider for review/approval because:      Benazepril      Last Written Prescription Date:  5/4/2020  Last Fill Quantity: 90tab,   # refills: 0  Last Office Visit: 5/26/2020  Future Office visit:    Next 5 appointments (look out 90 days)    Aug 06, 2020  2:45 PM CDT  (Arrive by 2:30 PM)  SHORT with Gibran Alarcon MD  [facility] [address] [facility] [address]  [phone]           Routing refill request to provider for review/approval because:

## 2020-08-13 ENCOUNTER — OFFICE VISIT (OUTPATIENT)
Dept: CHIROPRACTIC MEDICINE | Facility: OTHER | Age: 70
End: 2020-08-13
Attending: CHIROPRACTOR
Payer: COMMERCIAL

## 2020-08-13 DIAGNOSIS — M99.02 SEGMENTAL AND SOMATIC DYSFUNCTION OF THORACIC REGION: ICD-10-CM

## 2020-08-13 DIAGNOSIS — M99.01 SEGMENTAL AND SOMATIC DYSFUNCTION OF CERVICAL REGION: Primary | ICD-10-CM

## 2020-08-13 DIAGNOSIS — F41.9 ANXIETY: ICD-10-CM

## 2020-08-13 DIAGNOSIS — M99.03 SEGMENTAL AND SOMATIC DYSFUNCTION OF LUMBAR REGION: ICD-10-CM

## 2020-08-13 DIAGNOSIS — M54.2 CERVICALGIA: ICD-10-CM

## 2020-08-13 PROCEDURE — 98941 CHIROPRACT MANJ 3-4 REGIONS: CPT | Mod: AT | Performed by: CHIROPRACTOR

## 2020-08-13 RX ORDER — ALPRAZOLAM 1 MG
TABLET ORAL
Qty: 90 TABLET | Refills: 0 | Status: SHIPPED | OUTPATIENT
Start: 2020-08-13 | End: 2020-09-22

## 2020-08-13 NOTE — TELEPHONE ENCOUNTER
xanax      Last Written Prescription Date:  6/30/20  Last Fill Quantity: 90,   # refills: 0  Last Office Visit: 8/6/20  Future Office visit:       Routing refill request to provider for review/approval because:  Drug not on the [facility], P or [facility] refill protocol or controlled substance

## 2020-08-18 ENCOUNTER — APPOINTMENT (OUTPATIENT)
Dept: GENERAL RADIOLOGY | Facility: HOSPITAL | Age: 70
End: 2020-08-18
Attending: NURSE PRACTITIONER
Payer: MEDICARE

## 2020-08-18 ENCOUNTER — HOSPITAL ENCOUNTER (EMERGENCY)
Facility: HOSPITAL | Age: 70
Discharge: HOME | End: 2020-08-18
Attending: NURSE PRACTITIONER | Admitting: NURSE PRACTITIONER
Payer: MEDICARE

## 2020-08-18 VITALS
OXYGEN SATURATION: 95 % | SYSTOLIC BLOOD PRESSURE: 136 MMHG | HEART RATE: 72 BPM | TEMPERATURE: 99.1 F | DIASTOLIC BLOOD PRESSURE: 60 MMHG | RESPIRATION RATE: 16 BRPM

## 2020-08-18 DIAGNOSIS — J44.9 CHRONIC OBSTRUCTIVE PULMONARY DISEASE, UNSPECIFIED COPD TYPE (H): Primary | ICD-10-CM

## 2020-08-18 DIAGNOSIS — R06.02 SHORTNESS OF BREATH: ICD-10-CM

## 2020-08-18 DIAGNOSIS — Z72.0 TOBACCO ABUSE: ICD-10-CM

## 2020-08-18 LAB
ALBUMIN UR-MCNC: NEGATIVE MG/DL
ANION GAP SERPL CALCULATED.3IONS-SCNC: 7 MMOL/L (ref 3–14)
APPEARANCE UR: CLEAR
BASOPHILS # BLD AUTO: 0.1 10E9/L (ref 0–0.2)
BASOPHILS NFR BLD AUTO: 0.4 %
BILIRUB UR QL STRIP: NEGATIVE
BUN SERPL-MCNC: 18 MG/DL (ref 7–30)
CALCIUM SERPL-MCNC: 9.5 MG/DL (ref 8.5–10.1)
CHLORIDE SERPL-SCNC: 95 MMOL/L (ref 94–109)
CO2 SERPL-SCNC: 27 MMOL/L (ref 20–32)
COLOR UR AUTO: NORMAL
CREAT SERPL-MCNC: 0.96 MG/DL (ref 0.52–1.04)
DIFFERENTIAL METHOD BLD: ABNORMAL
EOSINOPHIL # BLD AUTO: 0.1 10E9/L (ref 0–0.7)
EOSINOPHIL NFR BLD AUTO: 0.6 %
ERYTHROCYTE [DISTWIDTH] IN BLOOD BY AUTOMATED COUNT: 12.8 % (ref 10–15)
GFR SERPL CREATININE-BSD FRML MDRD: 60 ML/MIN/{1.73_M2}
GLUCOSE SERPL-MCNC: 108 MG/DL (ref 70–99)
GLUCOSE UR STRIP-MCNC: NEGATIVE MG/DL
HCT VFR BLD AUTO: 38.7 % (ref 35–47)
HGB BLD-MCNC: 13.2 G/DL (ref 11.7–15.7)
HGB UR QL STRIP: NEGATIVE
IMM GRANULOCYTES # BLD: 0.1 10E9/L (ref 0–0.4)
IMM GRANULOCYTES NFR BLD: 0.4 %
KETONES UR STRIP-MCNC: NEGATIVE MG/DL
LEUKOCYTE ESTERASE UR QL STRIP: NEGATIVE
LYMPHOCYTES # BLD AUTO: 1.6 10E9/L (ref 0.8–5.3)
LYMPHOCYTES NFR BLD AUTO: 10.1 %
MCH RBC QN AUTO: 31.5 PG (ref 26.5–33)
MCHC RBC AUTO-ENTMCNC: 34.1 G/DL (ref 31.5–36.5)
MCV RBC AUTO: 92 FL (ref 78–100)
MONOCYTES # BLD AUTO: 0.7 10E9/L (ref 0–1.3)
MONOCYTES NFR BLD AUTO: 4.3 %
NEUTROPHILS # BLD AUTO: 13.2 10E9/L (ref 1.6–8.3)
NEUTROPHILS NFR BLD AUTO: 84.2 %
NITRATE UR QL: NEGATIVE
NRBC # BLD AUTO: 0 10*3/UL
NRBC BLD AUTO-RTO: 0 /100
PH UR STRIP: 7 PH (ref 4.7–8)
PLATELET # BLD AUTO: 316 10E9/L (ref 150–450)
POTASSIUM SERPL-SCNC: 4.4 MMOL/L (ref 3.4–5.3)
RBC # BLD AUTO: 4.19 10E12/L (ref 3.8–5.2)
SODIUM SERPL-SCNC: 129 MMOL/L (ref 133–144)
SOURCE: NORMAL
SP GR UR STRIP: 1.01 (ref 1–1.03)
UROBILINOGEN UR STRIP-MCNC: NORMAL MG/DL (ref 0–2)
WBC # BLD AUTO: 15.7 10E9/L (ref 4–11)

## 2020-08-18 PROCEDURE — 99284 EMERGENCY DEPT VISIT MOD MDM: CPT | Mod: 25

## 2020-08-18 PROCEDURE — 36415 COLL VENOUS BLD VENIPUNCTURE: CPT | Performed by: NURSE PRACTITIONER

## 2020-08-18 PROCEDURE — 81003 URINALYSIS AUTO W/O SCOPE: CPT | Performed by: NURSE PRACTITIONER

## 2020-08-18 PROCEDURE — 99284 EMERGENCY DEPT VISIT MOD MDM: CPT | Mod: Z6 | Performed by: NURSE PRACTITIONER

## 2020-08-18 PROCEDURE — 85025 COMPLETE CBC W/AUTO DIFF WBC: CPT | Performed by: NURSE PRACTITIONER

## 2020-08-18 PROCEDURE — 80048 BASIC METABOLIC PNL TOTAL CA: CPT | Performed by: NURSE PRACTITIONER

## 2020-08-18 PROCEDURE — 71046 X-RAY EXAM CHEST 2 VIEWS: CPT | Mod: TC

## 2020-08-18 ASSESSMENT — ENCOUNTER SYMPTOMS
FREQUENCY: 0
CONSTIPATION: 0
SORE THROAT: 0
DIFFICULTY URINATING: 0
ABDOMINAL PAIN: 0
SHORTNESS OF BREATH: 1
COUGH: 1
FATIGUE: 1
RHINORRHEA: 0
VOMITING: 0
HEMATURIA: 0
DIZZINESS: 0
DYSURIA: 0
PALPITATIONS: 0
NAUSEA: 0
WEAKNESS: 0
BLOOD IN STOOL: 0
LIGHT-HEADEDNESS: 0
DIARRHEA: 1
CHILLS: 0
FEVER: 0
WHEEZING: 1
HEADACHES: 0

## 2020-08-18 NOTE — ED AVS SNAPSHOT
[facility]  [address] [address]  Phone:  [phone]                                    Sydni Hardin   MRN: [id number]    Department:  [facility]   Date of Visit:  8/18/2020           After Visit Summary Signature Page    I have received my discharge instructions, and my questions have been answered. I have discussed any challenges I see with this plan with the nurse or doctor.    ..........................................................................................................................................  Patient/Patient Representative Signature      ..........................................................................................................................................  Patient Representative Print Name and Relationship to Patient    ..................................................               ................................................  Date                                   Time    ..........................................................................................................................................  Reviewed by Signature/Title    ...................................................              ..............................................  Date                                               Time          22EPIC Rev 08/18

## 2020-08-18 NOTE — ED NOTES
"Pt states the past 1.5 weeks she has been more short of breath, more fatigued and has noticed swelling in her ankles/feet. Pt has edema in ankles bilaterally. No personal Hx of CHF but does have familial Hx. Pt states she normally uses 2-3 L O2 at home while asleep but not while awake. Pt states O2 is normally 92-95% on RA after neb treatment. Normally does 3-5 nebs per day. O2 is 88-90% on RA while awake when not immediately after neb treatment. Last neb at 1500. Pt states she takes her temp at home daily and it is normally 96.?. Does not tolerate wearing mask in room. Daughter at bedside. Per daughter patient has been on hospice in the past and is now on \"palliative care\". States she \"gets more frequent assessments at home\" although hasn't seen anyone recently \"due to COVID\".  "

## 2020-08-18 NOTE — ED PROVIDER NOTES
"  History     Chief Complaint   Patient presents with     Shortness of Breath     HPI     Sydni Hardin is a 70 year old female who presents ambulatory from home for concerns of increased shortness of breath, fatigue, dehydration \"I think I am dehydrated because when you pull up my skin it stays up. She reports increased SOB x 1.5 weeks. She does have associated chronic cough which daughter reports is \"somewhat more than usual.\" Cough sometimes productive of clear sputum. Denies fever, chills. Oxygen dependent at bedtime, not during the day- usual SAO2 is 95% on room air at home after nebulizer and will chronically drop to 84-85% then she will do a nebulizer and it will improve. In regards to oxygen she states \"I try not to use it too much because I don't want to become dependent.\" Denies chest pain, palpitations.     Reports abdominal pain \"I think it is my ulcer.\" Reports she has had it for 50 years and currently denies pain. No nausea, vomiting, constipation. Diarrhea- \"it has always been, it just depends on what I eat and lately I haven't been eating right.\"  She recently completed a course of Augmentin for infection of skin tag removal site.     Daughter reports she has been encouraging fluids- water, propel and it has helped.    Current 1-2 ppd smoker.       Allergies:  Allergies   Allergen Reactions     Bee Pollen Other (See Comments)     Throat and eyes close up     Codeine Difficulty breathing     \"My throat closes shut.\"     Takes Percocet at home     Keflex [Cephalexin Hcl] Hives     Sulfonamide Derivatives Other (See Comments)     Throat closes     Ciprofloxacin Rash     Rash, bumps on lips     Bupropion Hcl Other (See Comments)     Made her very irritable     Celecoxib Other (See Comments) and Cough     Celebrex - wheeze  Takes ASA at home     Cheese      swiss     Clonazepam Hives     Takes Xanax at home     Erythromycin Other (See Comments)     Erythromycin base - abdominal pain     Lisinopril " Other (See Comments)     Zestril - headaches     Nifedipine Hives     Procardia     Strawberries [Strawberry]      Tomato      Tramadol Hcl      Takes percocet at home       Problem List:    Patient Active Problem List    Diagnosis Date Noted     Centrilobular emphysema (H) 10/16/2019     Priority: Medium     Obstructive chronic bronchitis without exacerbation (H) 10/09/2019     Priority: Medium     Last Assessment & Plan:   The pt was set up with an [employer] portable concentrator. She needed level 3 to keep Sats above 90% with activity. Her and her daughter were instructed on the care and use of the equipment. A stationary concentrator was also delivered to her home for night time use.       Palliative care patient 10/01/2019     Priority: Medium     Esophageal reflux 07/15/2019     Priority: Medium     Obesity, morbid (H) 01/14/2019     Priority: Medium     S/P total knee replacement using cement, right 03/21/2017     Priority: Medium     Primary osteoarthritis of both knees 10/05/2016     Priority: Medium     IC (interstitial cystitis) 10/28/2015     Priority: Medium     SCC (squamous cell carcinoma), face 08/05/2015     Priority: Medium     Lung nodules 06/19/2015     Priority: Medium     Tobacco abuse 06/19/2015     Priority: Medium     Dysthymia 12/31/2014     Priority: Medium     CKD (chronic kidney disease) stage 3, GFR 30-59 ml/min (H) 02/05/2014     Priority: Medium     Essential hypertension 01/05/2012     Priority: Medium     Asthma 03/20/2009     Priority: Medium     Primary osteoarthritis involving multiple joints 10/04/2001     Priority: Medium        Past Medical History:    Past Medical History:   Diagnosis Date     Abnormal arterial blood gases 4/26/2006     Anxiety 10/24/2011     Calculus of gallbladder without mention of cholecystitis or obstruction 2/2/2006     Depression 1/5/2012     Headache(784.0) 12/10/2003     History of CVA (cerebrovascular accident) 9/28/2011     HTN (hypertension) 1/5/2012      Insomnia 3/13/2012     Lipoma of other skin and subcutaneous tissue 2006     Nonallopathic lesion of cervical region, not elsewhere classified 2003     Osteoarthrosis, unspecified whether generalized or localized, lower leg 10/4/2001     Special screening for malignant neoplasms, colon 2006     Urinary tract infection, site not specified 2007       Past Surgical History:    Past Surgical History:   Procedure Laterality Date     ARTHROPLASTY KNEE Right 3/21/2017    Procedure: ARTHROPLASTY KNEE;  Surgeon: Moisés Olson MD;  Location: [facility]     ARTHROSCOPY KNEE       BIOPSY BREAST  2000    left - free of disease      SECTION        SECTION       CHOLECYSTECTOMY       COLONOSCOPY  2008    repeat in 10 years     HEAD & NECK SURGERY      hematoma on head drained     HYSTERECTOMY       IR CONSULTATION FOR IR EXAM  2019     ORTHOPEDIC SURGERY  2014    right shoulder replacement     shoulder x2  2009    left       Family History:    Family History   Problem Relation Age of Onset     Cancer Mother         skin     Breast Cancer Mother      Cerebrovascular Disease Mother      Alzheimer Disease Mother      Myocardial Infarction Father 76        myocardial infarction - cause of death     Cancer Daughter         cervical      Diabetes Maternal Grandfather      Heart Disease Maternal Grandfather         MI     Diabetes Maternal Grandmother      Diabetes Paternal Grandmother      Diabetes Paternal Grandfather        Social History:  Marital Status:   [5]  Social History     Tobacco Use     Smoking status: Current Every Day Smoker     Packs/day: 1.50     Years: 53.00     Pack years: 79.50     Types: Cigarettes     Start date: 1965     Smokeless tobacco: Never Used     Tobacco comment: pt enrolled in quit plan 19   Substance Use Topics     Alcohol use: Yes     Alcohol/week: 0.0 standard drinks     Comment: rarely     Drug use: No         Medications:    albuterol (PROVENTIL) (2.5 MG/3ML) 0.083% neb solution  ALPRAZolam (XANAX) 1 MG tablet  aspirin 81 MG EC tablet  atorvastatin (LIPITOR) 40 MG tablet  benazepril (LOTENSIN) 10 MG tablet  benzonatate (TESSALON) 200 MG capsule  butalbital-acetaminophen-caffeine (FIORICET/ESGIC) -40 MG tablet  doxepin (SINEQUAN) 10 MG capsule  EPINEPHrine (EPIPEN 2-JAIRO) 0.3 MG/0.3ML injection 2-pack  escitalopram (LEXAPRO) 20 MG tablet  hydrochlorothiazide (HYDRODIURIL) 25 MG tablet  ipratropium - albuterol 0.5 mg/2.5 mg/3 mL (DUONEB) 0.5-2.5 (3) MG/3ML neb solution  meloxicam (MOBIC) 15 MG tablet  montelukast (SINGULAIR) 10 MG tablet  nystatin (MYCOSTATIN) 601355 UNIT/ML suspension  order for DME  oxyCODONE-acetaminophen (PERCOCET) 5-325 MG tablet  potassium chloride ER (K-TAB/KLOR-CON) 10 MEQ CR tablet  predniSONE (DELTASONE) 5 MG tablet  temazepam (RESTORIL) 30 MG capsule  triamcinolone (KENALOG) 0.1 % external cream  triamcinolone (KENALOG) 0.1 % external lotion          Review of Systems   Constitutional: Positive for fatigue. Negative for chills and fever.   HENT: Negative for congestion, ear pain, rhinorrhea and sore throat.    Respiratory: Positive for cough, shortness of breath and wheezing.    Cardiovascular: Positive for leg swelling. Negative for chest pain and palpitations.   Gastrointestinal: Positive for diarrhea. Negative for abdominal pain, blood in stool, constipation, nausea and vomiting.   Genitourinary: Negative for difficulty urinating, dysuria, frequency, hematuria and urgency.   Skin: Negative for rash.   Neurological: Negative for dizziness, weakness, light-headedness and headaches.       Physical Exam   BP: 171/77  Pulse: 71  Heart Rate: 83  Temp: 99.1  F (37.3  C)  Resp: 18  SpO2: 94 %      Physical Exam  Constitutional:       General: She is not in acute distress.     Appearance: She is not ill-appearing, toxic-appearing or diaphoretic.      Comments: Pleasant   Cardiovascular:       Rate and Rhythm: Normal rate and regular rhythm.      Pulses:           Dorsalis pedis pulses are 2+ on the right side and 2+ on the left side.      Heart sounds: S1 normal and S2 normal. No murmur. No friction rub. No gallop.    Pulmonary:      Effort: No tachypnea or respiratory distress.      Breath sounds: Examination of the right-upper field reveals decreased breath sounds. Examination of the left-upper field reveals decreased breath sounds. Examination of the right-middle field reveals decreased breath sounds. Examination of the left-middle field reveals decreased breath sounds. Examination of the right-lower field reveals decreased breath sounds. Examination of the left-lower field reveals decreased breath sounds. Decreased breath sounds and wheezing (expiratory wheezing throughout bilateral posterior lung fields) present. No rhonchi or rales.   Abdominal:      General: Bowel sounds are normal.      Palpations: Abdomen is soft.      Tenderness: There is no abdominal tenderness. There is no right CVA tenderness, left CVA tenderness, guarding or rebound. Negative signs include Grubbs's sign, Rovsing's sign and McBurney's sign.   Musculoskeletal:      Right lower leg: No edema.      Left lower leg: No edema.   Skin:     General: Skin is warm and dry.      Capillary Refill: Capillary refill takes less than 2 seconds.   Neurological:      Mental Status: She is alert and oriented to person, place, and time.      Gait: Gait is intact.   Psychiatric:         Mood and Affect: Mood normal.         Speech: Speech normal.         Behavior: Behavior normal. Behavior is cooperative.         ED Course     ED Course as of Aug 18 2148   Tue Aug 18, 2020   1907 Updated patient and daughter on results. Urine pending. Discussed plan for discharge given overall stable labs, CXR negative, she has been 90%+ on room air while in the ED, no significant dyspnea or respiratory symptoms. Bother patient and daughter in agreement with  discharge plan.  Marie York CNP on 8/18/2020 at 7:08 PM          Procedures         Results for orders placed or performed during the hospital encounter of 08/18/20 (from the past 24 hour(s))   Basic metabolic panel   Result Value Ref Range    Sodium 129 (L) 133 - 144 mmol/L    Potassium 4.4 3.4 - 5.3 mmol/L    Chloride 95 94 - 109 mmol/L    Carbon Dioxide 27 20 - 32 mmol/L    Anion Gap 7 3 - 14 mmol/L    Glucose 108 (H) 70 - 99 mg/dL    Urea Nitrogen 18 7 - 30 mg/dL    Creatinine 0.96 0.52 - 1.04 mg/dL    GFR Estimate 60 (L) >60 mL/min/[1.73_m2]    GFR Estimate If Black 69 >60 mL/min/[1.73_m2]    Calcium 9.5 8.5 - 10.1 mg/dL   CBC with platelets differential   Result Value Ref Range    WBC 15.7 (H) 4.0 - 11.0 10e9/L    RBC Count 4.19 3.8 - 5.2 10e12/L    Hemoglobin 13.2 11.7 - 15.7 g/dL    Hematocrit 38.7 35.0 - 47.0 %    MCV 92 78 - 100 fl    MCH 31.5 26.5 - 33.0 pg    MCHC 34.1 31.5 - 36.5 g/dL    RDW 12.8 10.0 - 15.0 %    Platelet Count 316 150 - 450 10e9/L    Diff Method Automated Method     % Neutrophils 84.2 %    % Lymphocytes 10.1 %    % Monocytes 4.3 %    % Eosinophils 0.6 %    % Basophils 0.4 %    % Immature Granulocytes 0.4 %    Nucleated RBCs 0 0 /100    Absolute Neutrophil 13.2 (H) 1.6 - 8.3 10e9/L    Absolute Lymphocytes 1.6 0.8 - 5.3 10e9/L    Absolute Monocytes 0.7 0.0 - 1.3 10e9/L    Absolute Eosinophils 0.1 0.0 - 0.7 10e9/L    Absolute Basophils 0.1 0.0 - 0.2 10e9/L    Abs Immature Granulocytes 0.1 0 - 0.4 10e9/L    Absolute Nucleated RBC 0.0    XR Chest 2 Views    Narrative    XR CHEST 2 VW    HISTORY: 70 years Female dyspnea, end-stage COPD    COMPARISON: 11/18/2019    TECHNIQUE: 2 views of the chest were obtained.    FINDINGS: Two views of the chest were obtained. Heart size and  pulmonary vascularity are within normal limits, lungs are clear on  both views. No consolidating air space opacities are present.    Lung volumes are high. There is flattening of the hemidiaphragms.       Impression    IMPRESSION: High lung volumes with air trapping. Lungs are otherwise  clear.    COLBY FONSECA MD   UA reflex to Microscopic and Culture    Specimen: Midstream Urine   Result Value Ref Range    Color Urine Light Yellow     Appearance Urine Clear     Glucose Urine Negative NEG^Negative mg/dL    Bilirubin Urine Negative NEG^Negative    Ketones Urine Negative NEG^Negative mg/dL    Specific Gravity Urine 1.011 1.003 - 1.035    Blood Urine Negative NEG^Negative    pH Urine 7.0 4.7 - 8.0 pH    Protein Albumin Urine Negative NEG^Negative mg/dL    Urobilinogen mg/dL Normal 0.0 - 2.0 mg/dL    Nitrite Urine Negative NEG^Negative    Leukocyte Esterase Urine Negative NEG^Negative    Source Midstream Urine        Medications - No data to display    Assessments & Plan (with Medical Decision Making)     I have reviewed the nursing notes.    I have reviewed the findings, diagnosis, plan and need for follow up with the patient.  (J44.9) Chronic obstructive pulmonary disease, unspecified COPD type (H)  (primary encounter diagnosis)  (R06.02) Shortness of breath  70-year old female presents ambulatory accompanied by daughter whom she lives with for concerns of increased dyspnea, fatigue. She is on palliative care for management of end-stage COPD. While in the ED she is not hypoxic, tachycardic. She is hemodynamically stable. Mild hyponatremia - review of chart shows this is chronic and ongoing. Leukocytosis with WBC of 15 - CXR is negative for infection, UA is negative for infection, this too also looks chronic and ongoing likely due to daily prednisone use. She has had diarrhea- only 1 episode today, no abdominal cramping, no hematochezia. C. Diff would be on differentials if she has liquid stools, abdominal cramping, mucus and/or blood in stools- should follow up if these presents.   Recommend:  - Continue with usual medications  - Wear oxygen even during the day if your oxygen saturation is less than 90% but  especially if it is less than 88%. Start with 1 LPM via nasal cannula and slowly increase. Long discussion that COPD is chronic and progressive - body will not become dependent on oxygen due to use but more due to progression of disease.  - Smoking cessation strongly recommended.   - Ensure you are drinking plenty of fluids        RETURN TO THE ED WITH NEW OR WORSENING SYMPTOMS.    FOLLOW-UP WITH YOUR PRIMARY CARE PROVIDER IN 7-10 DAYS.      Marie oYrk CNP    Discharge Medication List as of 8/18/2020  7:35 PM          Final diagnoses:   Chronic obstructive pulmonary disease, unspecified COPD type (H)   Shortness of breath       8/18/2020   [facility]     Marie York CNP  08/18/20 2701

## 2020-08-18 NOTE — PROGRESS NOTES
Subjective Finding:    Chief compalint: Patient presents with:  Neck Pain  , Pain Scale: 2/10, Intensity: dull, Duration: 2 week, Change since last visit: , Radiating: no.    Date of injury:     Activities that the pain restricts:   Home/household activities: no.  Work duties: no.  Hobbies/social: yes.  Sleep: yes.  Makes symptoms better: rest and laying down.  Makes symptoms worse: activity, cervical extension and cervical flexion.  Have you seen anyone else for the symptoms? Yes: MD.  Work related: no.  Automobile related injury: no.    Objective and Assessment:    Posture Analysis:   High shoulder: right.  Head tilt: right.  High iliac crest: right.  Head carriage: forward.  Thoracic Kyphosis: neutral.  Lumbar Lordosis: forward.    Lumbar Range of Motion: extension decreased.  Cervical Range of Motion: extension decreased.  Thoracic Range of Motion: .  Extremity Range of Motion: right shoulder  decreased.    Palpation:   T paraspinals: sharp pain, no   subocc  Spasm    Segmental dysfunction pre-treatment: C2  C5-6  T5  L45  Assessment post-treatment:  Cervical:  Thoracic: .  Lumbar: ROM increased.    Comments: .      Complicating Factors: right shoulder surgery.    Plan / Procedure:    Expected release date: .  Treatment plan: PRN.  Instructed patient: ice 20 minutes every other hour as needed and rest.  Short term goals: reduce pain.  Long term goals: increase strength, reduce dependency on pain medications and restore normal function.  Prognosis: good.

## 2020-08-18 NOTE — ED TRIAGE NOTES
Patient presents with end stage COPD and states she feels as though she's becoming more SOB and edema ion her legs. Was on hospice then on palative care, however nurses are not coming with COVID.

## 2020-08-19 NOTE — DISCHARGE INSTRUCTIONS
(J44.9) Chronic obstructive pulmonary disease, unspecified COPD type (H)  (primary encounter diagnosis)  (R06.02) Shortness of breath  70-year old female presents ambulatory accompanied by daughter whom she lives with for concerns of increased dyspnea, fatigue. She is on palliative care for management of end-stage COPD. While in the ED she is not hypoxic, tachycardic. She is hemodynamically stable. Mild hyponatremia - review of chart shows this is chronic and ongoing. Leukocytosis with WBC of 15 - CXR is negative for infection, UA is negative for infection, this too also looks chronic and ongoing likely due to daily prednisone use. She has had diarrhea- only 1 episode today, no abdominal cramping, no hematochezia. C. Diff would be on differentials if she has liquid stools, abdominal cramping, mucus and/or blood in stools- should follow up if these presents.   Recommend:  - Continue with usual medications  - Wear oxygen even during the day if your oxygen saturation is less than 90% but especially if it is less than 88%. Start with 1 LPM via nasal cannula and slowly increase. Long discussion that COPD is chronic and progressive - body will not become dependent on oxygen due to use but more due to progression of disease.  - Smoking cessation strongly recommended.   - Ensure you are drinking plenty of fluids        RETURN TO THE ED WITH NEW OR WORSENING SYMPTOMS.    FOLLOW-UP WITH YOUR PRIMARY CARE PROVIDER IN 7-10 DAYS.      Marie York CNP      Results for orders placed or performed during the hospital encounter of 08/18/20   XR Chest 2 Views     Status: None    Narrative    XR CHEST 2 VW    HISTORY: 70 years Female dyspnea, end-stage COPD    COMPARISON: 11/18/2019    TECHNIQUE: 2 views of the chest were obtained.    FINDINGS: Two views of the chest were obtained. Heart size and  pulmonary vascularity are within normal limits, lungs are clear on  both views. No consolidating air space opacities are  present.    Lung volumes are high. There is flattening of the hemidiaphragms.      Impression    IMPRESSION: High lung volumes with air trapping. Lungs are otherwise  clear.    COLBY FONSECA MD   Basic metabolic panel     Status: Abnormal   Result Value Ref Range    Sodium 129 (L) 133 - 144 mmol/L    Potassium 4.4 3.4 - 5.3 mmol/L    Chloride 95 94 - 109 mmol/L    Carbon Dioxide 27 20 - 32 mmol/L    Anion Gap 7 3 - 14 mmol/L    Glucose 108 (H) 70 - 99 mg/dL    Urea Nitrogen 18 7 - 30 mg/dL    Creatinine 0.96 0.52 - 1.04 mg/dL    GFR Estimate 60 (L) >60 mL/min/[1.73_m2]    GFR Estimate If Black 69 >60 mL/min/[1.73_m2]    Calcium 9.5 8.5 - 10.1 mg/dL   CBC with platelets differential     Status: Abnormal   Result Value Ref Range    WBC 15.7 (H) 4.0 - 11.0 10e9/L    RBC Count 4.19 3.8 - 5.2 10e12/L    Hemoglobin 13.2 11.7 - 15.7 g/dL    Hematocrit 38.7 35.0 - 47.0 %    MCV 92 78 - 100 fl    MCH 31.5 26.5 - 33.0 pg    MCHC 34.1 31.5 - 36.5 g/dL    RDW 12.8 10.0 - 15.0 %    Platelet Count 316 150 - 450 10e9/L    Diff Method Automated Method     % Neutrophils 84.2 %    % Lymphocytes 10.1 %    % Monocytes 4.3 %    % Eosinophils 0.6 %    % Basophils 0.4 %    % Immature Granulocytes 0.4 %    Nucleated RBCs 0 0 /100    Absolute Neutrophil 13.2 (H) 1.6 - 8.3 10e9/L    Absolute Lymphocytes 1.6 0.8 - 5.3 10e9/L    Absolute Monocytes 0.7 0.0 - 1.3 10e9/L    Absolute Eosinophils 0.1 0.0 - 0.7 10e9/L    Absolute Basophils 0.1 0.0 - 0.2 10e9/L    Abs Immature Granulocytes 0.1 0 - 0.4 10e9/L    Absolute Nucleated RBC 0.0    UA reflex to Microscopic and Culture     Status: None    Specimen: Midstream Urine   Result Value Ref Range    Color Urine Light Yellow     Appearance Urine Clear     Glucose Urine Negative NEG^Negative mg/dL    Bilirubin Urine Negative NEG^Negative    Ketones Urine Negative NEG^Negative mg/dL    Specific Gravity Urine 1.011 1.003 - 1.035    Blood Urine Negative NEG^Negative    pH Urine 7.0 4.7 - 8.0 pH     Protein Albumin Urine Negative NEG^Negative mg/dL    Urobilinogen mg/dL Normal 0.0 - 2.0 mg/dL    Nitrite Urine Negative NEG^Negative    Leukocyte Esterase Urine Negative NEG^Negative    Source Midstream Urine

## 2020-08-19 NOTE — ED NOTES
Discharge instructions gone over with patient and she states understanding. Patient is then discharged in stable condition, per wheelchair, with daughter.

## 2020-08-24 DIAGNOSIS — M17.11 PRIMARY OSTEOARTHRITIS OF RIGHT KNEE: ICD-10-CM

## 2020-08-25 DIAGNOSIS — F51.04 PSYCHOPHYSIOLOGIC INSOMNIA: ICD-10-CM

## 2020-08-26 RX ORDER — DOXEPIN HYDROCHLORIDE 10 MG/1
CAPSULE ORAL
Qty: 90 CAPSULE | Refills: 0 | Status: SHIPPED | OUTPATIENT
Start: 2020-08-26 | End: 2020-10-27

## 2020-08-26 RX ORDER — TEMAZEPAM 30 MG
CAPSULE ORAL
Qty: 30 CAPSULE | Refills: 0 | Status: SHIPPED | OUTPATIENT
Start: 2020-08-26 | End: 2020-10-28

## 2020-08-26 NOTE — TELEPHONE ENCOUNTER
Restoril       Last Written Prescription Date:  3/6/2020  Last Fill Quantity: 90,   # refills: 0  Last Office Visit: 8/13/2020  Future Office visit:

## 2020-09-09 ENCOUNTER — PATIENT OUTREACH (OUTPATIENT)
Dept: CARE COORDINATION | Facility: OTHER | Age: 70
End: 2020-09-09

## 2020-09-09 NOTE — PROGRESS NOTES
Clinic Care Coordination Contact  Care Team Conversations    CC reviewed chart and patient was on hospice but is now on palliative maintenance and followed by them.  CC will continue to review chart and be available as needed.  Toyin Uribe RN  Care Coordination

## 2020-09-21 ENCOUNTER — TELEPHONE (OUTPATIENT)
Dept: FAMILY MEDICINE | Facility: OTHER | Age: 70
End: 2020-09-21

## 2020-09-21 NOTE — TELEPHONE ENCOUNTER
11:44 AM    Reason for Call: OVERBOOK    Patient is having the following symptoms: HOSPITAL FOLLOW UP  For 1  months.    The patient is requesting an appointment for SOMETIME IN OCTOBER with DR. KHAN.    Was an appointment offered for this call? Yes  If yes : Appointment type              Date  11/02/2020     Preferred method for responding to this message: Telephone Call  What is your phone number ?[phone]    If we cannot reach you directly, may we leave a detailed response at the number you provided? Yes    Can this message wait until your PCP/provider returns, if unavailable today? Not applicable    Sherlyn Neely

## 2020-09-22 DIAGNOSIS — F41.9 ANXIETY: ICD-10-CM

## 2020-09-22 RX ORDER — ALPRAZOLAM 1 MG
TABLET ORAL
Qty: 90 TABLET | Refills: 0 | Status: SHIPPED | OUTPATIENT
Start: 2020-09-22 | End: 2020-10-28

## 2020-09-22 NOTE — TELEPHONE ENCOUNTER
xanax      Last Written Prescription Date:  8/13/20  Last Fill Quantity: 90,   # refills: 0  Last Office Visit: 8/6/20  Future Office visit:       Routing refill request to provider for review/approval because:  Drug not on the [facility], [facility] or [facility] refill protocol or controlled substance

## 2020-09-23 NOTE — TELEPHONE ENCOUNTER
Appt made for 10/28 for f/u.  Pt was in ER 8/18, this appt is for routine f/u.  Explained to pt that if she needs to be seen sooner to call back.

## 2020-09-29 DIAGNOSIS — Z72.0 TOBACCO ABUSE: ICD-10-CM

## 2020-09-29 RX ORDER — ALBUTEROL SULFATE 90 UG/1
AEROSOL, METERED RESPIRATORY (INHALATION)
Qty: 18 G | Refills: 0 | Status: SHIPPED | OUTPATIENT
Start: 2020-09-29 | End: 2020-01-01

## 2020-10-09 DIAGNOSIS — I10 BENIGN ESSENTIAL HYPERTENSION: ICD-10-CM

## 2020-10-13 RX ORDER — HYDROCHLOROTHIAZIDE 25 MG/1
TABLET ORAL
Qty: 90 TABLET | Refills: 0 | Status: SHIPPED | OUTPATIENT
Start: 2020-10-13 | End: 2020-01-01

## 2020-10-19 ENCOUNTER — PATIENT OUTREACH (OUTPATIENT)
Dept: CARE COORDINATION | Facility: OTHER | Age: 70
End: 2020-10-19

## 2020-10-19 NOTE — PROGRESS NOTES
Clinic Care Coordination Contact  Care Team Conversations    CC received message from Ariadna Guillory RN in Palliative Care:  Wondering if you can facilitate having Sydni seen in the clinic. She is complaining of cough and congestion, and very fatigued and states it typically turns into pneumonia. She isn't able to get into [facility] until 10/28. Palliative care is recommending clinic visit due to possible need for lab and imaging. Did not recommend ED visit based on our conversation and phone assessment. Thank you.     Ariadna Guillory RN   0716 is my ext.     CC called patient and just offered appointment and didn't assess patient. Patient states open to another provider.  We discussed appointment on 10/20 opening with Dr. Carrillo and she states she has a Dentist appointment so not able to make that one.  CC offered Dr. Carrillo on 10/23 and patient states that works as not able to make morning appointments as too much to do as far as nebulizer's and things.  Patient states she will go into [facility] if needs to but just trying to be proactive.  No coughing or congestion or shortness of breath heard while talking with patient.  Toyin Uribe RN  Care Coordination

## 2020-10-23 ENCOUNTER — OFFICE VISIT (OUTPATIENT)
Dept: FAMILY MEDICINE | Facility: OTHER | Age: 70
End: 2020-10-23
Attending: FAMILY MEDICINE
Payer: COMMERCIAL

## 2020-10-23 DIAGNOSIS — J43.2 CENTRILOBULAR EMPHYSEMA (H): Primary | ICD-10-CM

## 2020-10-23 DIAGNOSIS — F17.200 TOBACCO USE DISORDER: ICD-10-CM

## 2020-10-23 DIAGNOSIS — F43.22 ADJUSTMENT DISORDER WITH ANXIOUS MOOD: ICD-10-CM

## 2020-10-23 PROCEDURE — 99349 HOME/RES VST EST MOD MDM 40: CPT | Performed by: FAMILY MEDICINE

## 2020-10-27 ENCOUNTER — TELEPHONE (OUTPATIENT)
Dept: FAMILY MEDICINE | Facility: OTHER | Age: 70
End: 2020-10-27

## 2020-10-27 DIAGNOSIS — M17.11 PRIMARY OSTEOARTHRITIS OF RIGHT KNEE: ICD-10-CM

## 2020-10-27 RX ORDER — DOXEPIN HYDROCHLORIDE 10 MG/1
CAPSULE ORAL
Qty: 90 CAPSULE | Refills: 0 | Status: SHIPPED | OUTPATIENT
Start: 2020-10-27 | End: 2020-01-01

## 2020-10-27 NOTE — TELEPHONE ENCOUNTER
2:36 PM    Reason for Call: OVERBOOK    Patient is having the following symptoms: Hospice, per patient for weeks weeks.    The patient is requesting an appointment for Hospice follow up with Dr [facility]. Pt called and canceled appt that was scheduled for tomorrow, October 28 and wanted a re-schedule for November. Advised pt there are currently no openings with Dr. l[facility] in November; that I would request his nurse to call pt to discuss an overbook    Was an appointment offered for this call? Yes  If yes : Appointment type No availability in November              Date    Preferred method for responding to this message: Telephone Call  What is your phone number ?[phone]    If we cannot reach you directly, may we leave a detailed response at the number you provided? No    Can this message wait until your PCP/provider returns, if unavailable today? Not applicable, Provider is in today    Marie Leigh

## 2020-10-27 NOTE — TELEPHONE ENCOUNTER
Doxepin      Last Written Prescription Date:  08/26/2020  Last Fill Quantity: 90,   # refills: 0  Last Office Visit: 10/23/2020  Future Office visit:    Next 5 appointments (look out 90 days)    Oct 28, 2020  1:45 PM  (Arrive by 1:30 PM)  SHORT with Gibran [facility] MD  [facility] ([facility] ) [address]  [facility] [address]  [phone]

## 2020-10-27 NOTE — TELEPHONE ENCOUNTER
"Pt states she needs a follow up appt from the ER on 8/18.  I offered next available appt which was 12/2 per Dr [facility].  I explained that I received an overbook request on 9/21 and made her an appt for 10/28 which she cancelled.  She states \"you rule the roost\".  I offered her an appt with Radha or Yaneth and pt refuses.  Pt is upset.  "

## 2020-10-28 DIAGNOSIS — F41.9 ANXIETY: ICD-10-CM

## 2020-10-28 DIAGNOSIS — J20.9 ACUTE BRONCHITIS, UNSPECIFIED ORGANISM: ICD-10-CM

## 2020-10-28 DIAGNOSIS — F51.04 PSYCHOPHYSIOLOGIC INSOMNIA: ICD-10-CM

## 2020-10-28 RX ORDER — TEMAZEPAM 30 MG
CAPSULE ORAL
Qty: 30 CAPSULE | Refills: 0 | Status: SHIPPED | OUTPATIENT
Start: 2020-10-28 | End: 2020-01-01

## 2020-10-28 RX ORDER — ALPRAZOLAM 1 MG
TABLET ORAL
Qty: 90 TABLET | Refills: 0 | Status: SHIPPED | OUTPATIENT
Start: 2020-10-28 | End: 2020-01-01

## 2020-10-28 RX ORDER — BENZONATATE 200 MG/1
CAPSULE ORAL
Qty: 60 CAPSULE | Refills: 0 | Status: SHIPPED | OUTPATIENT
Start: 2020-10-28 | End: 2020-01-01

## 2020-10-28 NOTE — TELEPHONE ENCOUNTER
12/2 is adequate.  I am sure you did explain but she has to understand the limits of the clinic with this pandemic, and we have to triage accordingly.  I think you handled that correctly.  Thanks.  Gibran [facility] MD

## 2020-10-28 NOTE — TELEPHONE ENCOUNTER
restoril      Last Written Prescription Date:  8/26/20  Last Fill Quantity: 30,   # refills: 0  Last Office Visit: 10/23/20  Future Office visit:       Routing refill request to provider for review/approval because:  Drug not on the [facility], P or [facility] refill protocol or controlled substance

## 2020-10-28 NOTE — TELEPHONE ENCOUNTER
xanax      Last Written Prescription Date:  9/22/20  Last Fill Quantity: 90,   # refills: 0  Last Office Visit: 8/6/20  Future Office visit:       Routing refill request to provider for review/approval because:  Drug not on the [facility], P or [facility] refill protocol or controlled substance

## 2020-10-28 NOTE — TELEPHONE ENCOUNTER
tessalon      Last Written Prescription Date:  8/4/20  Last Fill Quantity: 60,   # refills: 0  Last Office Visit: 10/23/20  Future Office visit:       Routing refill request to provider for review/approval because:  Drug not on the [facility], P or [facility] refill protocol or controlled substance

## 2020-11-03 ENCOUNTER — DOCUMENTATION ONLY (OUTPATIENT)
Dept: FAMILY MEDICINE | Facility: OTHER | Age: 70
End: 2020-11-03

## 2020-11-03 PROBLEM — F43.22 ADJUSTMENT DISORDER WITH ANXIOUS MOOD: Status: ACTIVE | Noted: 2020-11-03

## 2020-11-03 NOTE — PROGRESS NOTES
Subjective     Sydni Hardin is a 70 year old female who is seen at home at her request as it is a considerable and taxing effort to travel as a result of COPD (chronic obstructive pulmonary disease) and care is not able to be provided by telephone.`    HPI   COPD  Sydni is seen at home after developing persistent shortness of breath, cough, as well as generalized fatigue.  She has a chronic complicated past medical history including no COPD, chronic respiratory failure with hypoxia, as well as hypertension.  Her cough has been nonproductive.  She notes intermittent ches pain as well as a rib pain.    Upon interview she notes significant psychosocial stressors.  She has had several losses in her family as well as restrictions due to her lung status as well as Covid presents in the community.    She also notes leg and hand cramping as well as is skin tenting.  She has had intermittent diarrhea with fecal incontinence.    She continues with morning cough.  She has to had her oxygen increased to 4L on occasion with some improvement.      Patient Active Problem List   Diagnosis     Essential hypertension     Primary osteoarthritis involving multiple joints     Dysthymia     Lung nodules     Tobacco abuse     SCC (squamous cell carcinoma), face     IC (interstitial cystitis)     Primary osteoarthritis of both knees     S/P total knee replacement using cement, right     Asthma     CKD (chronic kidney disease) stage 3, GFR 30-59 ml/min     Obesity, morbid (H)     Esophageal reflux     Palliative care patient     Centrilobular emphysema (H)     Obstructive chronic bronchitis without exacerbation (H)     Past Surgical History:   Procedure Laterality Date     ARTHROPLASTY KNEE Right 3/21/2017    Procedure: ARTHROPLASTY KNEE;  Surgeon: Moisés Olson MD;  Location: [facility]     ARTHROSCOPY KNEE       BIOPSY BREAST  2000    left - free of disease      SECTION        SECTION       CHOLECYSTECTOMY        COLONOSCOPY  01-    repeat in 10 years     HEAD & NECK SURGERY  2014    hematoma on head drained     HYSTERECTOMY       IR CONSULTATION FOR IR EXAM  2/28/2019     ORTHOPEDIC SURGERY  02/04/2014    right shoulder replacement     shoulder x2  2009    left       Social History     Tobacco Use     Smoking status: Current Every Day Smoker     Packs/day: 1.50     Years: 53.00     Pack years: 79.50     Types: Cigarettes     Start date: 1/1/1965     Smokeless tobacco: Never Used     Tobacco comment: pt enrolled in quit plan 5/2/19   Substance Use Topics     Alcohol use: Yes     Alcohol/week: 0.0 standard drinks     Comment: rarely     Family History   Problem Relation Age of Onset     Cancer Mother         skin     Breast Cancer Mother      Cerebrovascular Disease Mother      Alzheimer Disease Mother      Myocardial Infarction Father 76        myocardial infarction - cause of death     Cancer Daughter         cervical      Diabetes Maternal Grandfather      Heart Disease Maternal Grandfather         MI     Diabetes Maternal Grandmother      Diabetes Paternal Grandmother      Diabetes Paternal Grandfather          Current Outpatient Medications   Medication Sig Dispense Refill     albuterol (PROAIR HFA/PROVENTIL HFA/VENTOLIN HFA) 108 (90 Base) MCG/ACT inhaler INHALE 1 OR 2 PUFFS INTO LUNGS EVERY 4 HOURS AS NEEDED 18 g 0     albuterol (PROVENTIL) (2.5 MG/3ML) 0.083% neb solution USE 1 VIAL IN NEBULIZER 4 TIMES PER DAY AS NEEDED 90 mL 3     ALPRAZolam (XANAX) 1 MG tablet TAKE 1 TABLET BY MOUTH 3 TIMES DAILY AS NEEDED FOR ANXIETY 90 tablet 0     aspirin 81 MG EC tablet Take 1 tablet (81 mg) by mouth daily 100 tablet 3     atorvastatin (LIPITOR) 40 MG tablet Take 1 tablet (40 mg) by mouth daily 90 tablet 3     benazepril (LOTENSIN) 10 MG tablet TAKE 1 TABLET BY MOUTH DAILY 90 tablet 0     benzonatate (TESSALON) 200 MG capsule TAKE 1 CAPSULE BY MOUTH 3 TIMES A DAY AS NEEDED FOR COUGH 60 capsule 0      "butalbital-acetaminophen-caffeine (FIORICET/ESGIC) -40 MG tablet Take 1 tablet by mouth every 6 hours as needed for headaches 30 tablet 3     doxepin (SINEQUAN) 10 MG capsule TAKE 1 CAPSULE BY MOUTH AT BEDTIME 90 capsule 0     EPINEPHrine (EPIPEN 2-JAIRO) 0.3 MG/0.3ML injection 2-pack INJECT 1 SYRINGE INTRAMUSCULARLY ONCE AS NEEDED FOR ANAPHYLAXIS 2 each 0     escitalopram (LEXAPRO) 20 MG tablet TAKE 1 TABLET BY MOUTH 2 TIMES A  tablet 0     hydrochlorothiazide (HYDRODIURIL) 25 MG tablet TAKE 1 TABLET BY MOUTH DAILY 90 tablet 0     ipratropium - albuterol 0.5 mg/2.5 mg/3 mL (DUONEB) 0.5-2.5 (3) MG/3ML neb solution Take 1 vial (3 mLs) by nebulization 4 times daily 360 mL 3     meloxicam (MOBIC) 15 MG tablet Take 1 tablet (15 mg) by mouth daily 90 tablet 3     montelukast (SINGULAIR) 10 MG tablet TAKE 1 TABLET BY MOUTH DAILY AT BEDTIME 90 tablet PRN     nystatin (MYCOSTATIN) 865001 UNIT/ML suspension SWISH AND SWALLOW 1 TEASPOONFUL (5ML) BY MOUTH FOUR TIMES A  mL 0     order for DME Equipment being ordered: nebulizer tubing supplies, dx emphysema  FAX TO [facility] IN [place] 1 Device 0     oxyCODONE-acetaminophen (PERCOCET) 5-325 MG tablet TAKE 1 TABLET BY MOUTH EVERY 6 HOURS AS NEEDED FOR PAIN 28 tablet 0     potassium chloride ER (K-TAB/KLOR-CON) 10 MEQ CR tablet TAKE 1 TABLET BY MOUTH 2 TIMES DAILY WITH FOOD 180 tablet 0     predniSONE (DELTASONE) 5 MG tablet TAKE 1 TABLET BY MOUTH DAILY 90 tablet 0     temazepam (RESTORIL) 30 MG capsule TAKE 1 CAPSULE BY MOUTH NIGHTLY AS NEEDED FOR SLEEP 30 capsule 0     triamcinolone (KENALOG) 0.1 % external cream Apply topically 2 times daily 30 g 1     triamcinolone (KENALOG) 0.1 % external lotion Apply topically 2 times daily 60 mL 0     Allergies   Allergen Reactions     Bee Pollen Other (See Comments)     Throat and eyes close up     Codeine Difficulty breathing     \"My throat closes shut.\"     Takes Percocet at home     Keflex [Cephalexin Hcl] Hives     " Sulfonamide Derivatives Other (See Comments)     Throat closes     Ciprofloxacin Rash     Rash, bumps on lips     Bupropion Hcl Other (See Comments)     Made her very irritable     Celecoxib Other (See Comments) and Cough     Celebrex - wheeze  Takes ASA at home     Cheese      swiss     Clonazepam Hives     Takes Xanax at home     Erythromycin Other (See Comments)     Erythromycin base - abdominal pain     Lisinopril Other (See Comments)     Zestril - headaches     Nifedipine Hives     Procardia     Strawberries [Strawberry]      Tomato      Tramadol Hcl      Takes percocet at home     Recent Labs   Lab Test 08/18/20  1814 06/19/20  1218 05/30/19  1458 05/30/19  1458 09/27/18  0532 09/26/18  0547 02/01/18  1254 02/01/18  1254 03/03/16  0916 03/03/16  0916   A1C  --   --   --  5.8*  --   --   --   --   --   --    LDL  --  66  --   --   --   --   --  71  --  68   HDL  --  40*  --   --   --   --   --  35*  --  29*   TRIG  --  107  --   --   --   --   --  191*  --  180*   ALT  --  31  --   --  38 32   < > 33   < > 23   CR 0.96 1.16*   < > 0.87 0.93 0.74   < > 0.92   < > 0.74   GFRESTIMATED 60* 47*   < > 67 60* 78   < > 60*   < > 79   GFRESTBLACK 69 55*   < > 78 73 >90   < > 73   < > >90   GFR Calc     POTASSIUM 4.4 3.6   < > 4.2 3.6 4.0   < > 4.1   < > 4.0   TSH  --  1.26  --   --   --   --   --  1.07  --   --     < > = values in this interval not displayed.      BP Readings from Last 3 Encounters:   08/18/20 136/60   08/06/20 122/70   11/20/19 135/65    Wt Readings from Last 3 Encounters:   08/06/20 87.5 kg (193 lb)   11/19/19 82.6 kg (182 lb 1.6 oz)   09/22/19 82.2 kg (181 lb 3.5 oz)                      Reviewed and updated as needed this visit by Provider                 Review of Systems   Constitutional, HEENT, cardiovascular, pulmonary, gi and gu systems are negative, except as otherwise noted.      Objective    There were no vitals taken for this visit.  There is no height or weight on file to  calculate BMI.  Physical Exam  Vitals signs and nursing note reviewed.   Constitutional:       General: She is not in acute distress.     Appearance: She is well-developed.   HENT:      Head: Normocephalic and atraumatic.      Right Ear: External ear normal.      Left Ear: External ear normal.   Eyes:      Conjunctiva/sclera: Conjunctivae normal.      Pupils: Pupils are equal, round, and reactive to light.   Neck:      Musculoskeletal: Neck supple.   Cardiovascular:      Rate and Rhythm: Normal rate and regular rhythm.      Heart sounds: Normal heart sounds.   Pulmonary:      Effort: Pulmonary effort is normal.      Breath sounds: Normal breath sounds.   Lymphadenopathy:      Cervical: No cervical adenopathy.   Skin:     General: Skin is warm and dry.   Neurological:      Mental Status: She is alert and oriented to person, place, and time.        Other exam not performed    Diagnostic Test Results:  Labs reviewed in [facility]  Admission on 08/18/2020, Discharged on 08/18/2020   Component Date Value Ref Range Status     Sodium 08/18/2020 129* 133 - 144 mmol/L Final     Potassium 08/18/2020 4.4  3.4 - 5.3 mmol/L Final     Chloride 08/18/2020 95  94 - 109 mmol/L Final     Carbon Dioxide 08/18/2020 27  20 - 32 mmol/L Final     Anion Gap 08/18/2020 7  3 - 14 mmol/L Final     Glucose 08/18/2020 108* 70 - 99 mg/dL Final     Urea Nitrogen 08/18/2020 18  7 - 30 mg/dL Final     Creatinine 08/18/2020 0.96  0.52 - 1.04 mg/dL Final     GFR Estimate 08/18/2020 60* >60 mL/min/[1.73_m2] Final    Comment: Non  GFR Calc  Starting 12/18/2018, serum creatinine based estimated GFR (eGFR) will be   calculated using the Chronic Kidney Disease Epidemiology Collaboration   (CKD-EPI) equation.       GFR Estimate If Black 08/18/2020 69  >60 mL/min/[1.73_m2] Final    Comment:  GFR Calc  Starting 12/18/2018, serum creatinine based estimated GFR (eGFR) will be   calculated using the Chronic Kidney Disease Epidemiology  Collaboration   (CKD-EPI) equation.       Calcium 08/18/2020 9.5  8.5 - 10.1 mg/dL Final     WBC 08/18/2020 15.7* 4.0 - 11.0 10e9/L Final     RBC Count 08/18/2020 4.19  3.8 - 5.2 10e12/L Final     Hemoglobin 08/18/2020 13.2  11.7 - 15.7 g/dL Final     Hematocrit 08/18/2020 38.7  35.0 - 47.0 % Final     MCV 08/18/2020 92  78 - 100 fl Final     MCH 08/18/2020 31.5  26.5 - 33.0 pg Final     MCHC 08/18/2020 34.1  31.5 - 36.5 g/dL Final     RDW 08/18/2020 12.8  10.0 - 15.0 % Final     Platelet Count 08/18/2020 316  150 - 450 10e9/L Final     Diff Method 08/18/2020 Automated Method   Final     % Neutrophils 08/18/2020 84.2  % Final     % Lymphocytes 08/18/2020 10.1  % Final     % Monocytes 08/18/2020 4.3  % Final     % Eosinophils 08/18/2020 0.6  % Final     % Basophils 08/18/2020 0.4  % Final     % Immature Granulocytes 08/18/2020 0.4  % Final     Nucleated RBCs 08/18/2020 0  0 /100 Final     Absolute Neutrophil 08/18/2020 13.2* 1.6 - 8.3 10e9/L Final     Absolute Lymphocytes 08/18/2020 1.6  0.8 - 5.3 10e9/L Final     Absolute Monocytes 08/18/2020 0.7  0.0 - 1.3 10e9/L Final     Absolute Eosinophils 08/18/2020 0.1  0.0 - 0.7 10e9/L Final     Absolute Basophils 08/18/2020 0.1  0.0 - 0.2 10e9/L Final     Abs Immature Granulocytes 08/18/2020 0.1  0 - 0.4 10e9/L Final     Absolute Nucleated RBC 08/18/2020 0.0   Final     Color Urine 08/18/2020 Light Yellow   Final     Appearance Urine 08/18/2020 Clear   Final     Glucose Urine 08/18/2020 Negative  NEG^Negative mg/dL Final     Bilirubin Urine 08/18/2020 Negative  NEG^Negative Final     Ketones Urine 08/18/2020 Negative  NEG^Negative mg/dL Final     Specific Gravity Urine 08/18/2020 1.011  1.003 - 1.035 Final     Blood Urine 08/18/2020 Negative  NEG^Negative Final     pH Urine 08/18/2020 7.0  4.7 - 8.0 pH Final     Protein Albumin Urine 08/18/2020 Negative  NEG^Negative mg/dL Final     Urobilinogen mg/dL 08/18/2020 Normal  0.0 - 2.0 mg/dL Final     Nitrite Urine 08/18/2020  "Negative  NEG^Negative Final     Leukocyte Esterase Urine 08/18/2020 Negative  NEG^Negative Final     Source 08/18/2020 Midstream Urine   Final             Assessment & Plan     Centrilobular emphysema (H)  Long discussion was held regarding the management of emphysema including the use of oxygen and nebulizers.  Her exam is benign I do not feel this represents an infectious process.  She will continue the same regimen and will be followed up in 1 month.    Adjustment disorder with anxious mood  She has had significant psychosocial stressors.  This contributes to her complaints of dyspnea as well as a chest pain.  This was discussed with her as well as with daughter who is present at this visit.    Tobacco use disorder  She currently is smoking.        Tobacco Cessation:   reports that she has been smoking cigarettes. She started smoking about 55 years ago. She has a 79.50 pack-year smoking history. She has never used smokeless tobacco.  Tobacco Cessation Action Plan: Self help information given to patient      BMI:   Estimated body mass index is 38.98 kg/m  as calculated from the following:    Height as of 8/6/20: 1.499 m (4' 11\").    Weight as of 8/6/20: 87.5 kg (193 lb).   Weight management plan: Discussed healthy diet and exercise guidelines         See Patient Instructions    No follow-ups on file.    Total time of visit:  1 hour  Tavares Carrillo MD  [facility] - [place]      "

## 2020-11-03 NOTE — PROGRESS NOTES
I called Sydni today.  (I tried Friday but could not reach her).  She received my letter today about her tattoo.  I let her know that we do not have the ability to give her a flu vaccine.    She may go to one of the local drug stores or the clinic for her shot.  [facility] in [place] was doing curb site injections, per Dr. Carrillo.  She will check into this.    Jeni Kelly  CNP, CWOCN  Hospice and Palliative Care

## 2020-11-06 DIAGNOSIS — G89.29 CHRONIC PAIN OF RIGHT KNEE: ICD-10-CM

## 2020-11-06 DIAGNOSIS — M25.561 CHRONIC PAIN OF RIGHT KNEE: ICD-10-CM

## 2020-11-06 NOTE — TELEPHONE ENCOUNTER
oxyCODONE-acetaminophen (PERCOCET) 5-325 MG tablet      Last Written Prescription Date:  8/4/20  Last Fill Quantity: 28,   # refills: 0  Last Office Visit: 10/23/20  Future Office visit:

## 2020-11-07 RX ORDER — OXYCODONE AND ACETAMINOPHEN 5; 325 MG/1; MG/1
TABLET ORAL
Qty: 28 TABLET | Refills: 0 | Status: SHIPPED | OUTPATIENT
Start: 2020-11-07 | End: 2020-01-01

## 2020-11-18 NOTE — TELEPHONE ENCOUNTER
lotensin 20 mg  Last office visit: 10/23/20  Last refill: 8/06/20      Prednisone 5 mg  Last office visit: 10/23/20  Last refill: 8/06/20    Thank you.

## 2020-11-19 NOTE — TELEPHONE ENCOUNTER
lynette      Last Written Prescription Date:  5/26/20  Last Fill Quantity: 360,   # refills: 3  Last Office Visit: 10/29/20  Future Office visit:

## 2020-11-24 NOTE — TELEPHONE ENCOUNTER
escitalopram 20 mg      Last Written Prescription Date:  08/06/2020  Last Fill Quantity: 180,   # refills: 0  Last Office Visit: 10/29/2020  Future Office visit:       Potassium er 10 meq      Last Written Prescription Date:  08/06/2020  Last Fill Quantity: 180,   # refills: 0

## 2020-11-25 NOTE — PROGRESS NOTES
Subjective     Sydni Hardin is a 70 year old female who is seen at home at her request as it is a considerable and taxing effort to travel as a result of COPD (chronic obstructive pulmonary disease) and care is not able to be provided by telephone.`    HPI   COPD  Sydni is seen at home after developing persistent shortness of breath, cough, as well as generalized fatigue.  She has a chronic complicated past medical history including no COPD, chronic respiratory failure with hypoxia, as well as hypertension.  Her cough has been nonproductive.  She notes intermittent ches pain as well as a rib pain.    Upon interview she notes significant psychosocial stressors.  She has had several losses in her family as well as restrictions due to her lung status as well as Covid presents in the community.    She also notes leg and hand cramping as well as is skin tenting.  She has had intermittent diarrhea with fecal incontinence.    She continues with morning cough.  She has to had her oxygen increased to 4L on occasion with some improvement.      Patient Active Problem List   Diagnosis     Essential hypertension     Primary osteoarthritis involving multiple joints     Dysthymia     Lung nodules     Tobacco use disorder     SCC (squamous cell carcinoma), face     IC (interstitial cystitis)     Primary osteoarthritis of both knees     S/P total knee replacement using cement, right     Asthma     CKD (chronic kidney disease) stage 3, GFR 30-59 ml/min     Obesity, morbid (H)     Esophageal reflux     Palliative care patient     Centrilobular emphysema (H)     Obstructive chronic bronchitis without exacerbation (H)     Adjustment disorder with anxious mood     Past Surgical History:   Procedure Laterality Date     ARTHROPLASTY KNEE Right 3/21/2017    Procedure: ARTHROPLASTY KNEE;  Surgeon: Moisés Olson MD;  Location: [facility]     ARTHROSCOPY KNEE       BIOPSY BREAST  2000    left - free of disease      SECTION         SECTION       CHOLECYSTECTOMY  2006     COLONOSCOPY  2008    repeat in 10 years     HEAD & NECK SURGERY  2014    hematoma on head drained     HYSTERECTOMY       IR CONSULTATION FOR IR EXAM  2019     ORTHOPEDIC SURGERY  2014    right shoulder replacement     shoulder x2  2009    left       Social History     Tobacco Use     Smoking status: Current Every Day Smoker     Packs/day: 1.50     Years: 53.00     Pack years: 79.50     Types: Cigarettes     Start date: 1965     Smokeless tobacco: Never Used     Tobacco comment: pt enrolled in quit plan 19   Substance Use Topics     Alcohol use: Yes     Alcohol/week: 0.0 standard drinks     Comment: rarely     Family History   Problem Relation Age of Onset     Cancer Mother         skin     Breast Cancer Mother      Cerebrovascular Disease Mother      Alzheimer Disease Mother      Myocardial Infarction Father 76        myocardial infarction - cause of death     Cancer Daughter         cervical      Diabetes Maternal Grandfather      Heart Disease Maternal Grandfather         MI     Diabetes Maternal Grandmother      Diabetes Paternal Grandmother      Diabetes Paternal Grandfather          Current Outpatient Medications   Medication Sig Dispense Refill     albuterol (PROAIR HFA/PROVENTIL HFA/VENTOLIN HFA) 108 (90 Base) MCG/ACT inhaler INHALE 1 OR 2 PUFFS INTO LUNGS EVERY 4 HOURS AS NEEDED 18 g 0     albuterol (PROVENTIL) (2.5 MG/3ML) 0.083% neb solution USE 1 VIAL IN NEBULIZER 4 TIMES PER DAY AS NEEDED 90 mL 3     ALPRAZolam (XANAX) 1 MG tablet TAKE 1 TABLET BY MOUTH 3 TIMES DAILY AS NEEDED FOR ANXIETY 90 tablet 0     aspirin 81 MG EC tablet Take 1 tablet (81 mg) by mouth daily 100 tablet 3     atorvastatin (LIPITOR) 40 MG tablet Take 1 tablet (40 mg) by mouth daily 90 tablet 3     benazepril (LOTENSIN) 10 MG tablet TAKE 1 TABLET BY MOUTH DAILY 90 tablet 0     benzonatate (TESSALON) 200 MG capsule TAKE 1 CAPSULE BY MOUTH 3 TIMES A DAY AS  "NEEDED FOR COUGH 60 capsule 0     butalbital-acetaminophen-caffeine (FIORICET/ESGIC) -40 MG tablet Take 1 tablet by mouth every 6 hours as needed for headaches 30 tablet 3     doxepin (SINEQUAN) 10 MG capsule TAKE 1 CAPSULE BY MOUTH AT BEDTIME 90 capsule 0     EPINEPHrine (EPIPEN 2-JAIRO) 0.3 MG/0.3ML injection 2-pack INJECT 1 SYRINGE INTRAMUSCULARLY ONCE AS NEEDED FOR ANAPHYLAXIS 2 each 0     escitalopram (LEXAPRO) 20 MG tablet TAKE 1 TABLET BY MOUTH 2 TIMES A  tablet 0     hydrochlorothiazide (HYDRODIURIL) 25 MG tablet TAKE 1 TABLET BY MOUTH DAILY 90 tablet 0     ipratropium - albuterol 0.5 mg/2.5 mg/3 mL (DUONEB) 0.5-2.5 (3) MG/3ML neb solution NEBULIZE CONTENTS OF 1 VIAL FOUR TIMES A  mL 0     meloxicam (MOBIC) 15 MG tablet Take 1 tablet (15 mg) by mouth daily 90 tablet 3     montelukast (SINGULAIR) 10 MG tablet TAKE 1 TABLET BY MOUTH DAILY AT BEDTIME 90 tablet PRN     nystatin (MYCOSTATIN) 427628 UNIT/ML suspension SWISH AND SWALLOW 1 TEASPOONFUL (5ML) BY MOUTH FOUR TIMES A  mL 0     order for DME Equipment being ordered: nebulizer tubing supplies, dx emphysema  FAX TO [facility] IN [place] 1 Device 0     oxyCODONE-acetaminophen (PERCOCET) 5-325 MG tablet TAKE 1 TABLET BY MOUTH EVERY 6 HOURS AS NEEDED FOR PAIN 28 tablet 0     potassium chloride ER (KLOR-CON M) 10 MEQ CR tablet TAKE 1 TABLET BY MOUTH 2 TIMES DAILY WITH FOOD, PREFERS YELLOW SMALL ONES 180 tablet 0     predniSONE (DELTASONE) 5 MG tablet TAKE 1 TABLET BY MOUTH DAILY 90 tablet 0     temazepam (RESTORIL) 30 MG capsule TAKE 1 CAPSULE BY MOUTH NIGHTLY AS NEEDED FOR SLEEP 30 capsule 0     triamcinolone (KENALOG) 0.1 % external cream Apply topically 2 times daily 30 g 1     triamcinolone (KENALOG) 0.1 % external lotion Apply topically 2 times daily 60 mL 0     Allergies   Allergen Reactions     Bee Pollen Other (See Comments)     Throat and eyes close up     Codeine Difficulty breathing     \"My throat closes shut.\"     Takes " Percocet at home     Keflex [Cephalexin Hcl] Hives     Sulfonamide Derivatives Other (See Comments)     Throat closes     Ciprofloxacin Rash     Rash, bumps on lips     Bupropion Hcl Other (See Comments)     Made her very irritable     Celecoxib Other (See Comments) and Cough     Celebrex - wheeze  Takes ASA at home     Cheese      swiss     Clonazepam Hives     Takes Xanax at home     Erythromycin Other (See Comments)     Erythromycin base - abdominal pain     Lisinopril Other (See Comments)     Zestril - headaches     Nifedipine Hives     Procardia     Strawberries [Strawberry]      Tomato      Tramadol Hcl      Takes percocet at home     Recent Labs   Lab Test 08/18/20  1814 06/19/20  1218 05/30/19  1458 05/30/19  1458 09/27/18  0532 09/26/18  0547 02/01/18  1254 02/01/18  1254 03/03/16  0916 03/03/16  0916   A1C  --   --   --  5.8*  --   --   --   --   --   --    LDL  --  66  --   --   --   --   --  71  --  68   HDL  --  40*  --   --   --   --   --  35*  --  29*   TRIG  --  107  --   --   --   --   --  191*  --  180*   ALT  --  31  --   --  38 32   < > 33   < > 23   CR 0.96 1.16*   < > 0.87 0.93 0.74   < > 0.92   < > 0.74   GFRESTIMATED 60* 47*   < > 67 60* 78   < > 60*   < > 79   GFRESTBLACK 69 55*   < > 78 73 >90   < > 73   < > >90   GFR Calc     POTASSIUM 4.4 3.6   < > 4.2 3.6 4.0   < > 4.1   < > 4.0   TSH  --  1.26  --   --   --   --   --  1.07  --   --     < > = values in this interval not displayed.      BP Readings from Last 3 Encounters:   08/18/20 136/60   08/06/20 122/70   11/20/19 135/65    Wt Readings from Last 3 Encounters:   08/06/20 87.5 kg (193 lb)   11/19/19 82.6 kg (182 lb 1.6 oz)   09/22/19 82.2 kg (181 lb 3.5 oz)                      Reviewed and updated as needed this visit by Provider                 Review of Systems   Constitutional, HEENT, cardiovascular, pulmonary, gi and gu systems are negative, except as otherwise noted.      Objective    There were no vitals taken for  this visit.  There is no height or weight on file to calculate BMI.  Physical Exam  Vitals signs and nursing note reviewed.   Constitutional:       General: She is not in acute distress.     Appearance: She is well-developed.   HENT:      Head: Normocephalic and atraumatic.      Right Ear: External ear normal.      Left Ear: External ear normal.   Eyes:      Conjunctiva/sclera: Conjunctivae normal.      Pupils: Pupils are equal, round, and reactive to light.   Neck:      Musculoskeletal: Neck supple.   Cardiovascular:      Rate and Rhythm: Normal rate and regular rhythm.      Heart sounds: Normal heart sounds.   Pulmonary:      Effort: Pulmonary effort is normal.      Breath sounds: Normal breath sounds.   Lymphadenopathy:      Cervical: No cervical adenopathy.   Skin:     General: Skin is warm and dry.   Neurological:      Mental Status: She is alert and oriented to person, place, and time.        Other exam not performed    Diagnostic Test Results:  Labs reviewed in [facility]  Admission on 08/18/2020, Discharged on 08/18/2020   Component Date Value Ref Range Status     Sodium 08/18/2020 129* 133 - 144 mmol/L Final     Potassium 08/18/2020 4.4  3.4 - 5.3 mmol/L Final     Chloride 08/18/2020 95  94 - 109 mmol/L Final     Carbon Dioxide 08/18/2020 27  20 - 32 mmol/L Final     Anion Gap 08/18/2020 7  3 - 14 mmol/L Final     Glucose 08/18/2020 108* 70 - 99 mg/dL Final     Urea Nitrogen 08/18/2020 18  7 - 30 mg/dL Final     Creatinine 08/18/2020 0.96  0.52 - 1.04 mg/dL Final     GFR Estimate 08/18/2020 60* >60 mL/min/[1.73_m2] Final    Comment: Non  GFR Calc  Starting 12/18/2018, serum creatinine based estimated GFR (eGFR) will be   calculated using the Chronic Kidney Disease Epidemiology Collaboration   (CKD-EPI) equation.       GFR Estimate If Black 08/18/2020 69  >60 mL/min/[1.73_m2] Final    Comment:  GFR Calc  Starting 12/18/2018, serum creatinine based estimated GFR (eGFR) will be    calculated using the Chronic Kidney Disease Epidemiology Collaboration   (CKD-EPI) equation.       Calcium 08/18/2020 9.5  8.5 - 10.1 mg/dL Final     WBC 08/18/2020 15.7* 4.0 - 11.0 10e9/L Final     RBC Count 08/18/2020 4.19  3.8 - 5.2 10e12/L Final     Hemoglobin 08/18/2020 13.2  11.7 - 15.7 g/dL Final     Hematocrit 08/18/2020 38.7  35.0 - 47.0 % Final     MCV 08/18/2020 92  78 - 100 fl Final     MCH 08/18/2020 31.5  26.5 - 33.0 pg Final     MCHC 08/18/2020 34.1  31.5 - 36.5 g/dL Final     RDW 08/18/2020 12.8  10.0 - 15.0 % Final     Platelet Count 08/18/2020 316  150 - 450 10e9/L Final     Diff Method 08/18/2020 Automated Method   Final     % Neutrophils 08/18/2020 84.2  % Final     % Lymphocytes 08/18/2020 10.1  % Final     % Monocytes 08/18/2020 4.3  % Final     % Eosinophils 08/18/2020 0.6  % Final     % Basophils 08/18/2020 0.4  % Final     % Immature Granulocytes 08/18/2020 0.4  % Final     Nucleated RBCs 08/18/2020 0  0 /100 Final     Absolute Neutrophil 08/18/2020 13.2* 1.6 - 8.3 10e9/L Final     Absolute Lymphocytes 08/18/2020 1.6  0.8 - 5.3 10e9/L Final     Absolute Monocytes 08/18/2020 0.7  0.0 - 1.3 10e9/L Final     Absolute Eosinophils 08/18/2020 0.1  0.0 - 0.7 10e9/L Final     Absolute Basophils 08/18/2020 0.1  0.0 - 0.2 10e9/L Final     Abs Immature Granulocytes 08/18/2020 0.1  0 - 0.4 10e9/L Final     Absolute Nucleated RBC 08/18/2020 0.0   Final     Color Urine 08/18/2020 Light Yellow   Final     Appearance Urine 08/18/2020 Clear   Final     Glucose Urine 08/18/2020 Negative  NEG^Negative mg/dL Final     Bilirubin Urine 08/18/2020 Negative  NEG^Negative Final     Ketones Urine 08/18/2020 Negative  NEG^Negative mg/dL Final     Specific Gravity Urine 08/18/2020 1.011  1.003 - 1.035 Final     Blood Urine 08/18/2020 Negative  NEG^Negative Final     pH Urine 08/18/2020 7.0  4.7 - 8.0 pH Final     Protein Albumin Urine 08/18/2020 Negative  NEG^Negative mg/dL Final     Urobilinogen mg/dL 08/18/2020  "Normal  0.0 - 2.0 mg/dL Final     Nitrite Urine 08/18/2020 Negative  NEG^Negative Final     Leukocyte Esterase Urine 08/18/2020 Negative  NEG^Negative Final     Source 08/18/2020 Midstream Urine   Final             Assessment & Plan     Centrilobular emphysema (H)  Long discussion was held regarding the management of emphysema including the use of oxygen and nebulizers.  Her exam is benign I do not feel this represents an infectious process.  She will continue the same regimen and will be followed up in 1 month.    Adjustment disorder with anxious mood  She has had significant psychosocial stressors.  This contributes to her complaints of dyspnea as well as a chest pain.  This was discussed with her as well as with daughter who is present at this visit.    Tobacco use disorder  She currently is smoking.        Tobacco Cessation:   reports that she has been smoking cigarettes. She started smoking about 55 years ago. She has a 79.50 pack-year smoking history. She has never used smokeless tobacco.  Tobacco Cessation Action Plan: Self help information given to patient      BMI:   Estimated body mass index is 38.98 kg/m  as calculated from the following:    Height as of 8/6/20: 1.499 m (4' 11\").    Weight as of 8/6/20: 87.5 kg (193 lb).   Weight management plan: Discussed healthy diet and exercise guidelines         See Patient Instructions    No follow-ups on file.    Total time of visit:  1 hour  Tavares Carrillo MD  [facility] - [place]      "

## 2020-12-07 NOTE — TELEPHONE ENCOUNTER
ALPRAZolam (XANAX) 1 MG tablet      Last Written Prescription Date:  10/28/20  Last Fill Quantity: 90,   # refills: 0  Last Office Visit: 8/6/20  Future Office visit:       Routing refill request to provider for review/approval

## 2020-12-08 NOTE — TELEPHONE ENCOUNTER
Albuterol inhaler      Last Written Prescription Date:  09/29/2020  Last Fill Quantity: 18g,   # refills: 0  Last Office Visit: 11/25/2020  Future Office visit:       Routing refill request to provider for review/approval because:      atorvastatin      Last Written Prescription Date:  01/09/2020  Last Fill Quantity: 90,   # refills: 3  Last Office Visit: 11/25/2020  Future Office visit:       Routing refill request to provider for review/approval because:    Patient stated that she spoke with Dr. Carrillo at her appointment on 11/30/2020 regarding the albuterol inhaler. Patient stated that she was going to get 2 inhalers. Please advise.

## 2020-12-10 NOTE — TELEPHONE ENCOUNTER
Called patient back. No answer. Left voicemail stating that I was not sure if she was referring to  because he does NOT do home visits. Wondering if she was thinking of Dr. Connolly. Also let her know that if she does need to see  his next available spot would be 1/12/2021 as of right now. Left number for her to call back and let us know.     KALLIE Brownlee

## 2020-12-10 NOTE — TELEPHONE ENCOUNTER
Pt called, stated she is Palliative Care patient and reported PCP does home visits. Pt is requesting a home visit. Pt stated she had some concerns she'd like to review with PCP. Pt reported this is not an emergency, but would like a home visit. Thank you    Call back number: [phone]

## 2020-12-14 NOTE — TELEPHONE ENCOUNTER
Tavares Carrillo MD Olmstead, Anna L, LPN 3 days ago     Wednesday the 23rd of December    Message text

## 2020-12-16 NOTE — TELEPHONE ENCOUNTER
Patient called asking if Dr. Carrillo could come see patient in her home. Patient states she is on Palliative care and is wondering if provider could come and see her next week. When nurse asked for reason for visit patient just said she normally sees him once per month and would like to see him. Patient states had COVID test today. Patient's contact information confirmed.

## 2020-12-21 NOTE — PROGRESS NOTES
Clinic Care Coordination Contact  Care Team Conversations    CC reviewed chart.  Patient has upcoming Dr. Carrillo Palliative Care visit scheduled for 12/23.  CC will continue to monitor chart and remain available.    Toyin Uribe RN  Care Coordination

## 2020-12-25 NOTE — TELEPHONE ENCOUNTER
Tessalon       Last Written Prescription Date:  10/28/2020  Last Fill Quantity: 60,   # refills: 0  Last Office Visit: 11/25/2020  Future Office visit:

## 2020-12-25 NOTE — TELEPHONE ENCOUNTER
Percocet       Last Written Prescription Date:  11/7/2020  Last Fill Quantity: 28,   # refills: 0  Last Office Visit: 11/25/2020  Future Office visit:

## 2020-12-28 NOTE — TELEPHONE ENCOUNTER
restoril      Last Written Prescription Date: 10/28/20   Last Fill Quantity: 30,   # refills: 0  Last Office Visit: 11/25/20  Future Office visit:       Routing refill request to provider for review/approval because:  Drug not on the [facility], [facility] or [facility] refill protocol or controlled substance  xanax      Last Written Prescription Date:  12/7/20  Last Fill Quantity: 90,   # refills: 0  Last Office Visit: 11/25/20  Future Office visit:       Routing refill request to provider for review/approval because:  Drug not on the [facility], [facility] or [facility] refill protocol or controlled substance

## 2020-12-28 NOTE — TELEPHONE ENCOUNTER
HCTZ      Last Written Prescription Date:  10/13/20  Last Fill Quantity: 90,   # refills: 0  Last Office Visit: 11/25/20  Future Office visit:         doxepin      Last Written Prescription Date:  10/27/20  Last Fill Quantity: 90,   # refills: 0

## 2020-12-28 NOTE — TELEPHONE ENCOUNTER
Pt is wondering why Dr. Carrillo has not shown for home care visits. She is requesting a visit ASAP has multiple issues and wants to make sure she is not getting worse

## 2021-01-01 ENCOUNTER — APPOINTMENT (OUTPATIENT)
Dept: CT IMAGING | Facility: HOSPITAL | Age: 71
End: 2021-01-01
Attending: EMERGENCY MEDICINE
Payer: COMMERCIAL

## 2021-01-01 ENCOUNTER — MYC REFILL (OUTPATIENT)
Dept: CARE COORDINATION | Facility: OTHER | Age: 71
End: 2021-01-01

## 2021-01-01 ENCOUNTER — OFFICE VISIT (OUTPATIENT)
Dept: FAMILY MEDICINE | Facility: OTHER | Age: 71
End: 2021-01-01
Attending: FAMILY MEDICINE
Payer: COMMERCIAL

## 2021-01-01 ENCOUNTER — APPOINTMENT (OUTPATIENT)
Dept: CT IMAGING | Facility: HOSPITAL | Age: 71
End: 2021-01-01
Attending: EMERGENCY MEDICINE
Payer: MEDICARE

## 2021-01-01 ENCOUNTER — MEDICAL CORRESPONDENCE (OUTPATIENT)
Dept: FAMILY MEDICINE | Facility: OTHER | Age: 71
End: 2021-01-01

## 2021-01-01 ENCOUNTER — MYC REFILL (OUTPATIENT)
Dept: FAMILY MEDICINE | Facility: OTHER | Age: 71
End: 2021-01-01

## 2021-01-01 ENCOUNTER — HOSPITAL ENCOUNTER (EMERGENCY)
Facility: HOSPITAL | Age: 71
Discharge: HOME | End: 2021-08-03
Attending: EMERGENCY MEDICINE | Admitting: EMERGENCY MEDICINE
Payer: MEDICARE

## 2021-01-01 ENCOUNTER — APPOINTMENT (OUTPATIENT)
Dept: ULTRASOUND IMAGING | Facility: HOSPITAL | Age: 71
End: 2021-01-01
Attending: EMERGENCY MEDICINE
Payer: MEDICARE

## 2021-01-01 ENCOUNTER — HEALTH MAINTENANCE LETTER (OUTPATIENT)
Age: 71
End: 2021-01-01

## 2021-01-01 ENCOUNTER — DOCUMENTATION ONLY (OUTPATIENT)
Dept: OTHER | Facility: CLINIC | Age: 71
End: 2021-01-01

## 2021-01-01 ENCOUNTER — TELEPHONE (OUTPATIENT)
Dept: FAMILY MEDICINE | Facility: OTHER | Age: 71
End: 2021-01-01

## 2021-01-01 ENCOUNTER — DOCUMENTATION ONLY (OUTPATIENT)
Dept: FAMILY MEDICINE | Facility: OTHER | Age: 71
End: 2021-01-01

## 2021-01-01 ENCOUNTER — PATIENT OUTREACH (OUTPATIENT)
Dept: CARE COORDINATION | Facility: OTHER | Age: 71
End: 2021-01-01

## 2021-01-01 ENCOUNTER — TELEPHONE (OUTPATIENT)
Dept: FAMILY MEDICINE | Facility: OTHER | Age: 71
End: 2021-01-01
Payer: COMMERCIAL

## 2021-01-01 ENCOUNTER — MYC MEDICAL ADVICE (OUTPATIENT)
Dept: FAMILY MEDICINE | Facility: OTHER | Age: 71
End: 2021-01-01

## 2021-01-01 ENCOUNTER — OFFICE VISIT (OUTPATIENT)
Dept: FAMILY MEDICINE | Facility: OTHER | Age: 71
End: 2021-01-01
Attending: NURSE PRACTITIONER
Payer: COMMERCIAL

## 2021-01-01 ENCOUNTER — HOSPITAL ENCOUNTER (OUTPATIENT)
Dept: RESPIRATORY THERAPY | Facility: HOSPITAL | Age: 71
Discharge: HOME | End: 2021-06-16
Attending: FAMILY MEDICINE | Admitting: INTERNAL MEDICINE
Payer: MEDICARE

## 2021-01-01 ENCOUNTER — HOSPITAL ENCOUNTER (OUTPATIENT)
Dept: CARDIOLOGY | Facility: HOSPITAL | Age: 71
Discharge: HOME | End: 2021-04-22
Attending: FAMILY MEDICINE | Admitting: INTERNAL MEDICINE
Payer: MEDICARE

## 2021-01-01 ENCOUNTER — HOSPITAL ENCOUNTER (EMERGENCY)
Facility: HOSPITAL | Age: 71
Discharge: HOME | End: 2021-10-10
Attending: EMERGENCY MEDICINE | Admitting: EMERGENCY MEDICINE
Payer: COMMERCIAL

## 2021-01-01 VITALS
DIASTOLIC BLOOD PRESSURE: 89 MMHG | SYSTOLIC BLOOD PRESSURE: 175 MMHG | TEMPERATURE: 98 F | HEART RATE: 73 BPM | OXYGEN SATURATION: 98 % | RESPIRATION RATE: 22 BRPM

## 2021-01-01 VITALS
OXYGEN SATURATION: 90 % | RESPIRATION RATE: 18 BRPM | DIASTOLIC BLOOD PRESSURE: 78 MMHG | HEART RATE: 71 BPM | TEMPERATURE: 97.5 F | SYSTOLIC BLOOD PRESSURE: 109 MMHG

## 2021-01-01 DIAGNOSIS — J96.01 ACUTE HYPOXEMIC RESPIRATORY FAILURE (H): ICD-10-CM

## 2021-01-01 DIAGNOSIS — G89.29 CHRONIC PAIN OF RIGHT KNEE: ICD-10-CM

## 2021-01-01 DIAGNOSIS — Z51.5 PALLIATIVE CARE PATIENT: Primary | ICD-10-CM

## 2021-01-01 DIAGNOSIS — J44.1 COPD EXACERBATION (H): ICD-10-CM

## 2021-01-01 DIAGNOSIS — F51.04 PSYCHOPHYSIOLOGIC INSOMNIA: ICD-10-CM

## 2021-01-01 DIAGNOSIS — Z72.0 TOBACCO ABUSE: ICD-10-CM

## 2021-01-01 DIAGNOSIS — J43.2 CENTRILOBULAR EMPHYSEMA (H): ICD-10-CM

## 2021-01-01 DIAGNOSIS — M15.0 PRIMARY OSTEOARTHRITIS INVOLVING MULTIPLE JOINTS: ICD-10-CM

## 2021-01-01 DIAGNOSIS — B37.31 CANDIDIASIS OF VAGINA: ICD-10-CM

## 2021-01-01 DIAGNOSIS — F41.9 ANXIETY: ICD-10-CM

## 2021-01-01 DIAGNOSIS — I10 BENIGN ESSENTIAL HYPERTENSION: ICD-10-CM

## 2021-01-01 DIAGNOSIS — S01.01XA LACERATION OF OCCIPITAL SCALP, INITIAL ENCOUNTER: ICD-10-CM

## 2021-01-01 DIAGNOSIS — N18.30 STAGE 3 CHRONIC KIDNEY DISEASE, UNSPECIFIED WHETHER STAGE 3A OR 3B CKD (H): ICD-10-CM

## 2021-01-01 DIAGNOSIS — R06.02 SOB (SHORTNESS OF BREATH): ICD-10-CM

## 2021-01-01 DIAGNOSIS — J44.1 COPD EXACERBATION (H): Primary | ICD-10-CM

## 2021-01-01 DIAGNOSIS — J44.1 ACUTE EXACERBATION OF CHRONIC OBSTRUCTIVE PULMONARY DISEASE (COPD) (H): ICD-10-CM

## 2021-01-01 DIAGNOSIS — R52 PAIN: ICD-10-CM

## 2021-01-01 DIAGNOSIS — J20.9 ACUTE BRONCHITIS, UNSPECIFIED ORGANISM: ICD-10-CM

## 2021-01-01 DIAGNOSIS — M17.11 PRIMARY OSTEOARTHRITIS OF RIGHT KNEE: ICD-10-CM

## 2021-01-01 DIAGNOSIS — S09.90XA CLOSED HEAD INJURY, INITIAL ENCOUNTER: ICD-10-CM

## 2021-01-01 DIAGNOSIS — M25.561 CHRONIC PAIN OF RIGHT KNEE: ICD-10-CM

## 2021-01-01 DIAGNOSIS — R09.3 EXCESSIVE SPUTUM: ICD-10-CM

## 2021-01-01 DIAGNOSIS — R51.9 NONINTRACTABLE EPISODIC HEADACHE, UNSPECIFIED HEADACHE TYPE: ICD-10-CM

## 2021-01-01 DIAGNOSIS — R06.09 DYSPNEA ON EXERTION: Primary | ICD-10-CM

## 2021-01-01 DIAGNOSIS — J96.11 CHRONIC RESPIRATORY FAILURE WITH HYPOXIA (H): ICD-10-CM

## 2021-01-01 DIAGNOSIS — N39.42 URINARY INCONTINENCE WITHOUT SENSORY AWARENESS: ICD-10-CM

## 2021-01-01 DIAGNOSIS — F34.1 DYSTHYMIA: ICD-10-CM
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
## 2021-01-01 DIAGNOSIS — R06.09 DYSPNEA ON EXERTION: ICD-10-CM

## 2021-01-01 DIAGNOSIS — Z51.5 PALLIATIVE CARE PATIENT: ICD-10-CM

## 2021-01-01 DIAGNOSIS — E78.2 MIXED HYPERLIPIDEMIA: ICD-10-CM

## 2021-01-01 DIAGNOSIS — J43.2 CENTRILOBULAR EMPHYSEMA (H): Primary | ICD-10-CM

## 2021-01-01 DIAGNOSIS — Z87.891 PERSONAL HISTORY OF NICOTINE DEPENDENCE: ICD-10-CM

## 2021-01-01 DIAGNOSIS — I10 ESSENTIAL HYPERTENSION: ICD-10-CM

## 2021-01-01 DIAGNOSIS — Z71.6 ENCOUNTER FOR TOBACCO USE CESSATION COUNSELING: ICD-10-CM

## 2021-01-01 DIAGNOSIS — R15.9 INCONTINENCE OF FECES, UNSPECIFIED FECAL INCONTINENCE TYPE: ICD-10-CM

## 2021-01-01 DIAGNOSIS — L30.9 ECZEMA, UNSPECIFIED TYPE: ICD-10-CM

## 2021-01-01 DIAGNOSIS — J22 LOWER RESPIRATORY INFECTION: Primary | ICD-10-CM

## 2021-01-01 DIAGNOSIS — C44.320 SCC (SQUAMOUS CELL CARCINOMA), FACE: ICD-10-CM

## 2021-01-01 LAB
ALBUMIN SERPL-MCNC: 3.2 G/DL (ref 3.4–5)
ALP SERPL-CCNC: 109 U/L (ref 40–150)
ALT SERPL W P-5'-P-CCNC: 25 U/L (ref 0–50)
ANION GAP SERPL CALCULATED.3IONS-SCNC: 4 MMOL/L (ref 3–14)
AST SERPL W P-5'-P-CCNC: 17 U/L (ref 0–45)
BASE EXCESS BLDV CALC-SCNC: 3.8 MMOL/L (ref -7.7–1.9)
BASOPHILS # BLD AUTO: 0.1 10E3/UL (ref 0–0.2)
BASOPHILS NFR BLD AUTO: 1 %
BILIRUB SERPL-MCNC: 0.3 MG/DL (ref 0.2–1.3)
BUN SERPL-MCNC: 13 MG/DL (ref 7–30)
CALCIUM SERPL-MCNC: 8.9 MG/DL (ref 8.5–10.1)
CHLORIDE BLD-SCNC: 106 MMOL/L (ref 94–109)
CO2 SERPL-SCNC: 30 MMOL/L (ref 20–32)
COHGB MFR BLD: 2.4 % (ref 0–2)
CREAT SERPL-MCNC: 1.08 MG/DL (ref 0.52–1.04)
EOSINOPHIL # BLD AUTO: 0.5 10E3/UL (ref 0–0.7)
EOSINOPHIL NFR BLD AUTO: 5 %
ERYTHROCYTE [DISTWIDTH] IN BLOOD BY AUTOMATED COUNT: 13.3 % (ref 10–15)
GFR SERPL CREATININE-BSD FRML MDRD: 51 ML/MIN/1.73M2
GLUCOSE BLD-MCNC: 96 MG/DL (ref 70–99)
HCO3 BLDV-SCNC: 31 MMOL/L (ref 21–28)
HCT VFR BLD AUTO: 40.7 % (ref 35–47)
HGB BLD-MCNC: 13.4 G/DL (ref 11.7–15.7)
HGB BLD-MCNC: 13.8 G/DL (ref 11.7–15.7)
HGB BLD-MCNC: 14.4 G/DL (ref 11.7–15.7)
HOLD SPECIMEN: NORMAL
IMM GRANULOCYTES # BLD: 0 10E3/UL
IMM GRANULOCYTES NFR BLD: 0 %
INR PPP: 0.94 (ref 0.85–1.15)
LYMPHOCYTES # BLD AUTO: 2.9 10E3/UL (ref 0.8–5.3)
LYMPHOCYTES NFR BLD AUTO: 28 %
MCH RBC QN AUTO: 30.9 PG (ref 26.5–33)
MCHC RBC AUTO-ENTMCNC: 32.9 G/DL (ref 31.5–36.5)
MCV RBC AUTO: 94 FL (ref 78–100)
MONOCYTES # BLD AUTO: 0.8 10E3/UL (ref 0–1.3)
MONOCYTES NFR BLD AUTO: 8 %
NEUTROPHILS # BLD AUTO: 5.9 10E3/UL (ref 1.6–8.3)
NEUTROPHILS NFR BLD AUTO: 58 %
NRBC # BLD AUTO: 0 10E3/UL
NRBC BLD AUTO-RTO: 0 /100
NT-PROBNP SERPL-MCNC: 720 PG/ML (ref 0–900)
O2/TOTAL GAS SETTING VFR VENT: 6 %
OXYHGB MFR BLDV: 68 % (ref 70–75)
PCO2 BLDV: 57 MM HG (ref 40–50)
PH BLDV: 7.34 [PH] (ref 7.32–7.43)
PLATELET # BLD AUTO: 311 10E3/UL (ref 150–450)
PO2 BLDV: 39 MM HG (ref 25–47)
POTASSIUM BLD-SCNC: 3.6 MMOL/L (ref 3.4–5.3)
PROT SERPL-MCNC: 6 G/DL (ref 6.8–8.8)
RBC # BLD AUTO: 4.33 10E6/UL (ref 3.8–5.2)
SARS-COV-2 RNA RESP QL NAA+PROBE: NEGATIVE
SODIUM SERPL-SCNC: 140 MMOL/L (ref 133–144)
TROPONIN I SERPL-MCNC: 0.02 UG/L (ref 0–0.04)
WBC # BLD AUTO: 10.1 10E3/UL (ref 4–11)

## 2021-01-01 PROCEDURE — 99349 HOME/RES VST EST MOD MDM 40: CPT | Performed by: NURSE PRACTITIONER

## 2021-01-01 PROCEDURE — 83880 ASSAY OF NATRIURETIC PEPTIDE: CPT | Performed by: EMERGENCY MEDICINE

## 2021-01-01 PROCEDURE — C9803 HOPD COVID-19 SPEC COLLECT: HCPCS

## 2021-01-01 PROCEDURE — 36415 COLL VENOUS BLD VENIPUNCTURE: CPT | Performed by: EMERGENCY MEDICINE

## 2021-01-01 PROCEDURE — 99349 HOME/RES VST EST MOD MDM 40: CPT | Performed by: FAMILY MEDICINE

## 2021-01-01 PROCEDURE — 93306 TTE W/DOPPLER COMPLETE: CPT | Mod: 26 | Performed by: INTERNAL MEDICINE

## 2021-01-01 PROCEDURE — U0003 INFECTIOUS AGENT DETECTION BY NUCLEIC ACID (DNA OR RNA); SEVERE ACUTE RESPIRATORY SYNDROME CORONAVIRUS 2 (SARS-COV-2) (CORONAVIRUS DISEASE [COVID-19]), AMPLIFIED PROBE TECHNIQUE, MAKING USE OF HIGH THROUGHPUT TECHNOLOGIES AS DESCRIBED BY CMS-2020-01-R: HCPCS | Performed by: EMERGENCY MEDICINE

## 2021-01-01 PROCEDURE — 12001 RPR S/N/AX/GEN/TRNK 2.5CM/<: CPT | Mod: GW | Performed by: EMERGENCY MEDICINE

## 2021-01-01 PROCEDURE — 250N000009 HC RX 250: Performed by: FAMILY MEDICINE

## 2021-01-01 PROCEDURE — 99284 EMERGENCY DEPT VISIT MOD MDM: CPT | Mod: 25

## 2021-01-01 PROCEDURE — 93005 ELECTROCARDIOGRAM TRACING: CPT

## 2021-01-01 PROCEDURE — 94060 EVALUATION OF WHEEZING: CPT | Mod: 26 | Performed by: INTERNAL MEDICINE

## 2021-01-01 PROCEDURE — 80053 COMPREHEN METABOLIC PANEL: CPT | Performed by: EMERGENCY MEDICINE

## 2021-01-01 PROCEDURE — 93306 TTE W/DOPPLER COMPLETE: CPT

## 2021-01-01 PROCEDURE — 99283 EMERGENCY DEPT VISIT LOW MDM: CPT | Mod: 25 | Performed by: EMERGENCY MEDICINE

## 2021-01-01 PROCEDURE — 99215 OFFICE O/P EST HI 40 MIN: CPT | Performed by: FAMILY MEDICINE

## 2021-01-01 PROCEDURE — 85025 COMPLETE CBC W/AUTO DIFF WBC: CPT | Performed by: EMERGENCY MEDICINE

## 2021-01-01 PROCEDURE — 85610 PROTHROMBIN TIME: CPT | Performed by: EMERGENCY MEDICINE

## 2021-01-01 PROCEDURE — 70450 CT HEAD/BRAIN W/O DYE: CPT

## 2021-01-01 PROCEDURE — 99285 EMERGENCY DEPT VISIT HI MDM: CPT | Performed by: EMERGENCY MEDICINE

## 2021-01-01 PROCEDURE — 250N000011 HC RX IP 250 OP 636: Performed by: EMERGENCY MEDICINE

## 2021-01-01 PROCEDURE — 99207 PR NO CHARGE LOS: CPT | Performed by: FAMILY MEDICINE

## 2021-01-01 PROCEDURE — 36415 COLL VENOUS BLD VENIPUNCTURE: CPT | Performed by: FAMILY MEDICINE

## 2021-01-01 PROCEDURE — 82805 BLOOD GASES W/O2 SATURATION: CPT | Performed by: EMERGENCY MEDICINE

## 2021-01-01 PROCEDURE — 12001 RPR S/N/AX/GEN/TRNK 2.5CM/<: CPT

## 2021-01-01 PROCEDURE — 250N000009 HC RX 250: Performed by: EMERGENCY MEDICINE

## 2021-01-01 PROCEDURE — 82375 ASSAY CARBOXYHB QUANT: CPT | Performed by: FAMILY MEDICINE

## 2021-01-01 PROCEDURE — 85018 HEMOGLOBIN: CPT | Performed by: EMERGENCY MEDICINE

## 2021-01-01 PROCEDURE — 96374 THER/PROPH/DIAG INJ IV PUSH: CPT | Mod: XU

## 2021-01-01 PROCEDURE — 94060 EVALUATION OF WHEEZING: CPT

## 2021-01-01 PROCEDURE — 94640 AIRWAY INHALATION TREATMENT: CPT

## 2021-01-01 PROCEDURE — 99285 EMERGENCY DEPT VISIT HI MDM: CPT | Mod: 25

## 2021-01-01 PROCEDURE — 84484 ASSAY OF TROPONIN QUANT: CPT | Performed by: EMERGENCY MEDICINE

## 2021-01-01 PROCEDURE — 93971 EXTREMITY STUDY: CPT | Mod: LT

## 2021-01-01 PROCEDURE — 99214 OFFICE O/P EST MOD 30 MIN: CPT | Mod: 95 | Performed by: FAMILY MEDICINE

## 2021-01-01 PROCEDURE — 85018 HEMOGLOBIN: CPT | Performed by: FAMILY MEDICINE

## 2021-01-01 PROCEDURE — 71275 CT ANGIOGRAPHY CHEST: CPT

## 2021-01-01 PROCEDURE — 93010 ELECTROCARDIOGRAM REPORT: CPT | Performed by: INTERNAL MEDICINE

## 2021-01-01 RX ORDER — IPRATROPIUM BROMIDE AND ALBUTEROL SULFATE 2.5; .5 MG/3ML; MG/3ML
SOLUTION RESPIRATORY (INHALATION)
Qty: 360 ML | Refills: 0 | Status: SHIPPED | OUTPATIENT
Start: 2021-01-01 | End: 2021-01-01

## 2021-01-01 RX ORDER — MONTELUKAST SODIUM 10 MG/1
TABLET ORAL
Qty: 90 TABLET | Refills: 0 | OUTPATIENT
Start: 2021-01-01

## 2021-01-01 RX ORDER — PREDNISONE 5 MG/1
5 TABLET ORAL DAILY
Qty: 90 TABLET | Refills: 0 | Status: SHIPPED | OUTPATIENT
Start: 2021-01-01 | End: 2021-01-01

## 2021-01-01 RX ORDER — ALBUTEROL SULFATE 90 UG/1
AEROSOL, METERED RESPIRATORY (INHALATION)
Qty: 18 G | Refills: 0 | Status: SHIPPED | OUTPATIENT
Start: 2021-01-01 | End: 2021-01-01

## 2021-01-01 RX ORDER — GUAIFENESIN 600 MG/1
1200 TABLET, EXTENDED RELEASE ORAL 2 TIMES DAILY
Qty: 120 TABLET | Refills: 3 | Status: SHIPPED | OUTPATIENT
Start: 2021-01-01

## 2021-01-01 RX ORDER — BENZONATATE 200 MG/1
CAPSULE ORAL
Qty: 60 CAPSULE | Refills: 0 | Status: SHIPPED | OUTPATIENT
Start: 2021-01-01 | End: 2021-01-01

## 2021-01-01 RX ORDER — DOXEPIN HYDROCHLORIDE 10 MG/1
CAPSULE ORAL
Qty: 90 CAPSULE | Refills: 0 | Status: SHIPPED | OUTPATIENT
Start: 2021-01-01 | End: 2021-01-01

## 2021-01-01 RX ORDER — ESCITALOPRAM OXALATE 20 MG/1
TABLET ORAL
Qty: 180 TABLET | Refills: 0 | Status: SHIPPED | OUTPATIENT
Start: 2021-01-01 | End: 2021-01-01

## 2021-01-01 RX ORDER — PREDNISONE 5 MG/1
TABLET ORAL
Qty: 90 TABLET | Refills: 0 | Status: SHIPPED | OUTPATIENT
Start: 2021-01-01

## 2021-01-01 RX ORDER — PREDNISONE 5 MG/1
TABLET ORAL
Qty: 90 TABLET | Refills: 0 | OUTPATIENT
Start: 2021-01-01

## 2021-01-01 RX ORDER — TEMAZEPAM 30 MG
CAPSULE ORAL
Qty: 90 CAPSULE | Refills: 0 | Status: SHIPPED | OUTPATIENT
Start: 2021-01-01

## 2021-01-01 RX ORDER — OXYCODONE AND ACETAMINOPHEN 5; 325 MG/1; MG/1
TABLET ORAL
Qty: 120 TABLET | Refills: 0 | Status: SHIPPED | OUTPATIENT
Start: 2021-01-01 | End: 2021-01-01

## 2021-01-01 RX ORDER — IPRATROPIUM BROMIDE AND ALBUTEROL SULFATE 2.5; .5 MG/3ML; MG/3ML
3 SOLUTION RESPIRATORY (INHALATION) ONCE
Status: COMPLETED | OUTPATIENT
Start: 2021-01-01 | End: 2021-01-01

## 2021-01-01 RX ORDER — HYDROCHLOROTHIAZIDE 25 MG/1
25 TABLET ORAL DAILY
Qty: 90 TABLET | Refills: 0 | Status: SHIPPED | OUTPATIENT
Start: 2021-01-01

## 2021-01-01 RX ORDER — ALBUTEROL SULFATE 0.83 MG/ML
SOLUTION RESPIRATORY (INHALATION)
Qty: 90 ML | Refills: 3 | Status: SHIPPED | OUTPATIENT
Start: 2021-01-01

## 2021-01-01 RX ORDER — ALPRAZOLAM 1 MG
1 TABLET ORAL 3 TIMES DAILY PRN
Qty: 90 TABLET | Refills: 0 | Status: SHIPPED | OUTPATIENT
Start: 2021-01-01 | End: 2021-01-01

## 2021-01-01 RX ORDER — MELOXICAM 15 MG/1
15 TABLET ORAL DAILY
Qty: 90 TABLET | Refills: 0 | Status: SHIPPED | OUTPATIENT
Start: 2021-01-01

## 2021-01-01 RX ORDER — MELOXICAM 15 MG/1
15 TABLET ORAL DAILY
Qty: 90 TABLET | Refills: 3 | Status: SHIPPED | OUTPATIENT
Start: 2021-01-01

## 2021-01-01 RX ORDER — BENAZEPRIL HYDROCHLORIDE 10 MG/1
10 TABLET ORAL DAILY
Qty: 90 TABLET | Refills: 0 | Status: SHIPPED | OUTPATIENT
Start: 2021-01-01 | End: 2021-01-01

## 2021-01-01 RX ORDER — OXYCODONE AND ACETAMINOPHEN 5; 325 MG/1; MG/1
TABLET ORAL
Qty: 120 TABLET | Refills: 0 | Status: SHIPPED | OUTPATIENT
Start: 2021-01-01

## 2021-01-01 RX ORDER — ALPRAZOLAM 1 MG
TABLET ORAL
Qty: 90 TABLET | Refills: 0 | Status: SHIPPED | OUTPATIENT
Start: 2021-01-01 | End: 2021-01-01

## 2021-01-01 RX ORDER — OXYCODONE AND ACETAMINOPHEN 5; 325 MG/1; MG/1
1 TABLET ORAL EVERY 6 HOURS PRN
Qty: 120 TABLET | Refills: 0 | Status: SHIPPED | OUTPATIENT
Start: 2021-01-01 | End: 2021-01-01

## 2021-01-01 RX ORDER — TEMAZEPAM 30 MG
CAPSULE ORAL
Qty: 90 CAPSULE | Refills: 0 | Status: SHIPPED | OUTPATIENT
Start: 2021-01-01 | End: 2021-01-01

## 2021-01-01 RX ORDER — DOXEPIN HYDROCHLORIDE 10 MG/1
CAPSULE ORAL
Qty: 90 CAPSULE | Refills: 0 | Status: SHIPPED | OUTPATIENT
Start: 2021-01-01

## 2021-01-01 RX ORDER — PREDNISONE 10 MG/1
10 TABLET ORAL DAILY
Qty: 90 TABLET | Refills: 0 | Status: SHIPPED | OUTPATIENT
Start: 2021-01-01

## 2021-01-01 RX ORDER — POTASSIUM CHLORIDE 750 MG/1
TABLET, EXTENDED RELEASE ORAL
Qty: 180 TABLET | Refills: 0 | Status: SHIPPED | OUTPATIENT
Start: 2021-01-01

## 2021-01-01 RX ORDER — TRIAMCINOLONE ACETONIDE 1 MG/G
OINTMENT TOPICAL
Qty: 30 G | Refills: 0 | Status: SHIPPED | OUTPATIENT
Start: 2021-01-01

## 2021-01-01 RX ORDER — ALBUTEROL SULFATE 0.83 MG/ML
2.5 SOLUTION RESPIRATORY (INHALATION) ONCE
Status: COMPLETED | OUTPATIENT
Start: 2021-01-01 | End: 2021-01-01

## 2021-01-01 RX ORDER — BUTALBITAL, ACETAMINOPHEN AND CAFFEINE 50; 325; 40 MG/1; MG/1; MG/1
1 TABLET ORAL EVERY 6 HOURS PRN
Qty: 30 TABLET | Refills: 0 | Status: SHIPPED | OUTPATIENT
Start: 2021-01-01 | End: 2021-01-01

## 2021-01-01 RX ORDER — ATORVASTATIN CALCIUM 40 MG/1
TABLET, FILM COATED ORAL
Qty: 90 TABLET | Refills: 0 | Status: SHIPPED | OUTPATIENT
Start: 2021-01-01

## 2021-01-01 RX ORDER — BUTALBITAL, ACETAMINOPHEN AND CAFFEINE 50; 325; 40 MG/1; MG/1; MG/1
1 TABLET ORAL EVERY 6 HOURS PRN
Qty: 30 TABLET | Refills: 0 | Status: SHIPPED | OUTPATIENT
Start: 2021-01-01

## 2021-01-01 RX ORDER — ALPRAZOLAM 1 MG
TABLET ORAL
Qty: 90 TABLET | Refills: 0 | Status: SHIPPED | OUTPATIENT
Start: 2021-01-01

## 2021-01-01 RX ORDER — MONTELUKAST SODIUM 10 MG/1
TABLET ORAL
Qty: 90 TABLET | Refills: 0 | Status: SHIPPED | OUTPATIENT
Start: 2021-01-01 | End: 2021-01-01

## 2021-01-01 RX ORDER — POTASSIUM CHLORIDE 750 MG/1
TABLET, EXTENDED RELEASE ORAL
Qty: 180 TABLET | Refills: 0 | Status: SHIPPED | OUTPATIENT
Start: 2021-01-01 | End: 2021-01-01

## 2021-01-01 RX ORDER — MELOXICAM 15 MG/1
TABLET ORAL
Qty: 90 TABLET | Refills: 0 | Status: SHIPPED | OUTPATIENT
Start: 2021-01-01 | End: 2021-01-01

## 2021-01-01 RX ORDER — DOXYCYCLINE 100 MG/1
100 CAPSULE ORAL 2 TIMES DAILY
Qty: 14 CAPSULE | Refills: 0 | Status: SHIPPED | OUTPATIENT
Start: 2021-01-01 | End: 2021-01-01

## 2021-01-01 RX ORDER — TEMAZEPAM 30 MG
CAPSULE ORAL
Qty: 90 CAPSULE | Refills: 1 | Status: SHIPPED | OUTPATIENT
Start: 2021-01-01 | End: 2021-01-01

## 2021-01-01 RX ORDER — FLUCONAZOLE 150 MG/1
150 TABLET ORAL DAILY
Qty: 3 TABLET | Refills: 0 | Status: SHIPPED | OUTPATIENT
Start: 2021-01-01 | End: 2021-01-01

## 2021-01-01 RX ORDER — ALBUTEROL SULFATE 90 UG/1
AEROSOL, METERED RESPIRATORY (INHALATION)
Qty: 18 G | Refills: 0 | Status: SHIPPED | OUTPATIENT
Start: 2021-01-01

## 2021-01-01 RX ORDER — IOPAMIDOL 755 MG/ML
100 INJECTION, SOLUTION INTRAVASCULAR ONCE
Status: COMPLETED | OUTPATIENT
Start: 2021-01-01 | End: 2021-01-01

## 2021-01-01 RX ORDER — IPRATROPIUM BROMIDE AND ALBUTEROL SULFATE 2.5; .5 MG/3ML; MG/3ML
SOLUTION RESPIRATORY (INHALATION)
Qty: 360 ML | Refills: 0 | Status: SHIPPED | OUTPATIENT
Start: 2021-01-01

## 2021-01-01 RX ORDER — PREDNISONE 20 MG/1
TABLET ORAL
Qty: 10 TABLET | Refills: 0 | Status: SHIPPED | OUTPATIENT
Start: 2021-01-01

## 2021-01-01 RX ORDER — MONTELUKAST SODIUM 10 MG/1
TABLET ORAL
Qty: 90 TABLET | Refills: 0 | Status: SHIPPED | OUTPATIENT
Start: 2021-01-01

## 2021-01-01 RX ORDER — METHYLPREDNISOLONE SODIUM SUCCINATE 125 MG/2ML
125 INJECTION, POWDER, LYOPHILIZED, FOR SOLUTION INTRAMUSCULAR; INTRAVENOUS ONCE
Status: COMPLETED | OUTPATIENT
Start: 2021-01-01 | End: 2021-01-01

## 2021-01-01 RX ORDER — BENAZEPRIL HYDROCHLORIDE 10 MG/1
TABLET ORAL
Qty: 90 TABLET | Refills: 0 | Status: SHIPPED | OUTPATIENT
Start: 2021-01-01

## 2021-01-01 RX ORDER — BENAZEPRIL HYDROCHLORIDE 10 MG/1
TABLET ORAL
Qty: 90 TABLET | Refills: 0 | Status: SHIPPED | OUTPATIENT
Start: 2021-01-01 | End: 2021-01-01

## 2021-01-01 RX ORDER — ESCITALOPRAM OXALATE 20 MG/1
TABLET ORAL
Qty: 180 TABLET | Refills: 0 | Status: SHIPPED | OUTPATIENT
Start: 2021-01-01

## 2021-01-01 RX ORDER — BENZONATATE 200 MG/1
CAPSULE ORAL
Qty: 60 CAPSULE | Refills: 0 | Status: SHIPPED | OUTPATIENT
Start: 2021-01-01

## 2021-01-01 RX ORDER — NYSTATIN 100000 [USP'U]/G
POWDER TOPICAL 2 TIMES DAILY PRN
Qty: 60 G | Refills: 1 | Status: SHIPPED | OUTPATIENT
Start: 2021-01-01

## 2021-01-01 RX ADMIN — IOPAMIDOL 100 ML: 755 INJECTION, SOLUTION INTRAVENOUS at 08:36

## 2021-01-01 RX ADMIN — IPRATROPIUM BROMIDE AND ALBUTEROL SULFATE 3 ML: .5; 3 SOLUTION RESPIRATORY (INHALATION) at 13:50

## 2021-01-01 RX ADMIN — METHYLPREDNISOLONE SODIUM SUCCINATE 125 MG: 125 INJECTION, POWDER, FOR SOLUTION INTRAMUSCULAR; INTRAVENOUS at 09:44

## 2021-01-01 RX ADMIN — ALBUTEROL SULFATE 2.5 MG: 2.5 SOLUTION RESPIRATORY (INHALATION) at 10:21

## 2021-01-01 ASSESSMENT — ENCOUNTER SYMPTOMS
MUSCULOSKELETAL NEGATIVE: 1
PALPITATIONS: 0
FEVER: 0
APPETITE CHANGE: 0
DIARRHEA: 0
SEIZURES: 0
HEMATURIA: 0
CONSTITUTIONAL NEGATIVE: 1
NEUROLOGICAL NEGATIVE: 1
SPEECH DIFFICULTY: 0
DIZZINESS: 0
MYALGIAS: 1
CONFUSION: 0
BLOOD IN STOOL: 0
CONSTITUTIONAL NEGATIVE: 1
NERVOUS/ANXIOUS: 0
WHEEZING: 1
DEPRESSION: 1
GASTROINTESTINAL NEGATIVE: 1
JOINT SWELLING: 1
WHEEZING: 1
LIGHT-HEADEDNESS: 0
EYE PROBLEMS: 0
LEG SWELLING: 1
ARTHRALGIAS: 1
CHEST TIGHTNESS: 1
FREQUENCY: 0
FATIGUE: 1
PALPITATIONS: 0
VOICE CHANGE: 0
DEPRESSION: 1
MYALGIAS: 1
TROUBLE SWALLOWING: 0
HEMATOLOGIC/LYMPHATIC NEGATIVE: 1
NERVOUS/ANXIOUS: 1
SPEECH DIFFICULTY: 0
LIGHT-HEADEDNESS: 0
CONFUSION: 0
CHILLS: 0
EYES NEGATIVE: 1
WOUND: 0
ADENOPATHY: 0
COUGH: 1
SLEEP DISTURBANCE: 0
NECK PAIN: 0
FEVER: 0
DIZZINESS: 0
RESPIRATORY NEGATIVE: 1
BLOOD IN STOOL: 0
APPETITE CHANGE: 0
DIARRHEA: 0
NECK PAIN: 1
FREQUENCY: 0
COUGH: 1
WHEEZING: 1
DYSURIA: 0
NEUROLOGICAL NEGATIVE: 1
UNEXPECTED WEIGHT CHANGE: 0
GASTROINTESTINAL NEGATIVE: 1
NUMBNESS: 0
ABDOMINAL PAIN: 0
SLEEP DISTURBANCE: 0
ADENOPATHY: 0
EYES NEGATIVE: 1
FATIGUE: 1
WOUND: 0
EXTREMITY WEAKNESS: 0
CONSTIPATION: 1
ENDOCRINE NEGATIVE: 1
NUMBNESS: 0
VOICE CHANGE: 0
CARDIOVASCULAR NEGATIVE: 1
SHORTNESS OF BREATH: 1
EYE PROBLEMS: 0
UNEXPECTED WEIGHT CHANGE: 0
DYSURIA: 0
BACK PAIN: 1
FEVER: 0
SHORTNESS OF BREATH: 1
CHEST TIGHTNESS: 1
HEMATURIA: 0
SLEEP DISTURBANCE: 0
ALLERGIC/IMMUNOLOGIC NEGATIVE: 1
TROUBLE SWALLOWING: 0
NERVOUS/ANXIOUS: 1
LIGHT-HEADEDNESS: 0
SEIZURES: 0
PSYCHIATRIC NEGATIVE: 1
LEG SWELLING: 1
EXTREMITY WEAKNESS: 0
ARTHRALGIAS: 1
SHORTNESS OF BREATH: 1
CONSTIPATION: 1
CARDIOVASCULAR NEGATIVE: 1
NECK STIFFNESS: 0
NECK PAIN: 1
ABDOMINAL PAIN: 0
BACK PAIN: 1

## 2021-01-01 ASSESSMENT — PAIN SCALES - GENERAL: PAINLEVEL: SEVERE PAIN (7)

## 2021-01-07 NOTE — TELEPHONE ENCOUNTER
meloxicam      Last Written Prescription Date:  01/09/2020  Last Fill Quantity: 90,   # refills: 3  Last Office Visit: 01/04/2021  Future Office visit:

## 2021-01-11 PROBLEM — J44.89 OBSTRUCTIVE CHRONIC BRONCHITIS WITHOUT EXACERBATION (H): Status: ACTIVE | Noted: 2019-10-09

## 2021-01-11 NOTE — TELEPHONE ENCOUNTER
Patient states she spoke with provider about getting a transfer belt prescribed and sent to [facility] at her appointment on 1/4.  I don't see anything about that in the charting, patient states maybe he forgot to order it..   Please order that for her and the meds below.    albuterol (PROVENTIL) (2.5 MG/3ML) 0.083% neb solution      Last Written Prescription Date:  5/26/20  Last Fill Quantity: 90ml,   # refills: 3  Last Office Visit: 1/4/20  Future Office visit:       Routing refill request to provider for review/approval because:  Drug not on the [facility], [facility] or [facility] refill protocol or controlled substance      doxepin (SINEQUAN) 10 MG capsule      Last Written Prescription Date:  12/28/20  Last Fill Quantity: 90,   # refills: 0  Last Office Visit: 1/4/20  Future Office visit:       Routing refill request to provider for review/approval because:  Drug not on the [facility], [facility] or [facility] refill protocol or controlled substance

## 2021-01-12 NOTE — TELEPHONE ENCOUNTER
Duoneb      Last Written Prescription Date:  11/19/2020  Last Fill Quantity: 360 ml,   # refills: 0  Last Office Visit: 01/04/2021  Future Office visit:

## 2021-01-28 NOTE — TELEPHONE ENCOUNTER
Seems like Dr. Carrillo is her primary now?  Please clarify.  I can send if he is out or what not but I think she is with him now.  If so, please update the chart on the left hand column to reflect that.  Thanks.  Gibran Alarcon MD

## 2021-01-28 NOTE — TELEPHONE ENCOUNTER
Xanax 1mg      Last Written Prescription Date:  12/28/20  Last Fill Quantity: 90,   # refills: 0  Last Office Visit: 8/6/20  Future Office visit:       Routing refill request to provider for review/approval because:

## 2021-02-08 NOTE — TELEPHONE ENCOUNTER
Oxycodone-acetaminophen 5-325 mg      Last Written Prescription Date:  12-  Last Fill Quantity: 28,   # refills: 0  Last Office Visit: 1-4-2021 virtual with Robin for palliative on 1-

## 2021-02-09 NOTE — TELEPHONE ENCOUNTER
montelukast      Last Written Prescription Date:  01/14/2020  Last Fill Quantity: 90,   # refills: prn  Last Office Visit: 01/21/21 palliative visit with DIRK Kelly  Future Office visit:

## 2021-02-09 NOTE — TELEPHONE ENCOUNTER
benazepril      Last Written Prescription Date:  11/18/2020  Last Fill Quantity: 90,   # refills: 0  Last Office Visit: 01/21/2021 palliative with DIRK Kelly  Future Office visit:       prednisone      Last Written Prescription Date:  11/18/2020  Last Fill Quantity: 90,   # refills: 0

## 2021-02-26 NOTE — TELEPHONE ENCOUNTER
Short-Acting Beta Agonist Inhalers Protocol [id number]/26/2021 12:43 PM   Asthma control assessment score within normal limits in last 6 months     Duoneb    Last Written Prescription Date:  1.12.2021  Last Fill Quantity: 360ml,   # refills: 0  Last Office Visit: 1.4.2021  Future Office visit:       Routing refill request to provider for review/approval because:  See above.    Xanax  Last Written Prescription Date:  1.28.2021  Last Fill Quantity: 90,   # refills: 0  Last Office Visit:   Future Office visit:       Routing refill request to provider for review/approval because:  Drug not on the [facility], [facility] or [facility] refill protocol or controlled substance    Lynne Giraldo RN

## 2021-03-15 NOTE — TELEPHONE ENCOUNTER
escitalopram      Last Written Prescription Date:  11/24/2020  Last Fill Quantity: 180,   # refills: 0  Last Office Visit: 01/04/2021  Future Office visit:

## 2021-03-30 NOTE — TELEPHONE ENCOUNTER
Xanax  Last Written Prescription Date: 2/26/21  Last Fill Quantity: 90 # of Refills: 0  Last Office Visit: 1/4/21

## 2021-03-31 NOTE — PROGRESS NOTES
Hospice/Palliative Care Consultation      Reason for Consultation:  Sydni Hardin, 71 year old, female is seen for evaluation of COPD (chronic obstructive pulmonary disease).  She is seen at her residence as it is leaving the home requires a considerable and taxing effort.  Family is present.    Advanced Directives:  Full    Hospice/Palliative Care Goals:  Symptom control    Attestation:   Total time spent on evaluation, counseling, and coordination of care:  60 minutes.     History of Present Illness:  ***    Nutritional Status:  Weight stable    Performance Status:    ECO      Karnofsky:  60      PPS:  60      Current Outpatient Medications:      albuterol (PROAIR HFA/PROVENTIL HFA/VENTOLIN HFA) 108 (90 Base) MCG/ACT inhaler, INHALE 1 OR 2 PUFFS INTO LUNGS EVERY 4 HOURS AS NEEDED, Disp: 18 g, Rfl: 0     albuterol (PROVENTIL) (2.5 MG/3ML) 0.083% neb solution, USE 1 VIAL IN NEBULIZER 4 TIMES PER DAY AS NEEDED, Disp: 90 mL, Rfl: 3     ALPRAZolam (XANAX) 1 MG tablet, Take 1 tablet (1 mg) by mouth 3 times daily as needed for anxiety, Disp: 90 tablet, Rfl: 0     aspirin 81 MG EC tablet, Take 1 tablet (81 mg) by mouth daily, Disp: 100 tablet, Rfl: 3     atorvastatin (LIPITOR) 40 MG tablet, Take 1 tablet (40 mg) by mouth daily, Disp: 90 tablet, Rfl: 3     benazepril (LOTENSIN) 10 MG tablet, Take 1 tablet (10 mg) by mouth daily, Disp: 90 tablet, Rfl: 0     benzonatate (TESSALON) 200 MG capsule, TAKE 1 CAPSULE BY MOUTH 3 TIMES A DAY AS NEEDED FOR COUGH, Disp: 60 capsule, Rfl: 0     butalbital-acetaminophen-caffeine (FIORICET/ESGIC) -40 MG tablet, Take 1 tablet by mouth every 6 hours as needed for headaches, Disp: 30 tablet, Rfl: 3     doxepin (SINEQUAN) 10 MG capsule, TAKE 1 CAPSULE BY MOUTH AT BEDTIME, Disp: 90 capsule, Rfl: 0     EPINEPHrine (EPIPEN 2-JAIRO) 0.3 MG/0.3ML injection 2-pack, INJECT 1 SYRINGE INTRAMUSCULARLY ONCE AS NEEDED FOR ANAPHYLAXIS, Disp: 2 each, Rfl: 0     escitalopram (LEXAPRO) 20 MG  "tablet, TAKE 1 TABLET BY MOUTH 2 TIMES A DAY, Disp: 180 tablet, Rfl: 0     hydrochlorothiazide (HYDRODIURIL) 25 MG tablet, TAKE 1 TABLET BY MOUTH DAILY, Disp: 90 tablet, Rfl: 0     ipratropium - albuterol 0.5 mg/2.5 mg/3 mL (DUONEB) 0.5-2.5 (3) MG/3ML neb solution, NEBULIZE CONTENTS OF 1 VIAL FOUR TIMES A DAY, Disp: 360 mL, Rfl: 0     meloxicam (MOBIC) 15 MG tablet, Take 1 tablet (15 mg) by mouth daily, Disp: 90 tablet, Rfl: 3     meloxicam (MOBIC) 15 MG tablet, TAKE 1 TABLET BY MOUTH DAILY, Disp: 90 tablet, Rfl: 0     montelukast (SINGULAIR) 10 MG tablet, TAKE 1 TABLET BY MOUTH DAILY AT BEDTIME, Disp: 90 tablet, Rfl: 0     nystatin (MYCOSTATIN) 693500 UNIT/ML suspension, SWISH AND SWALLOW 1 TEASPOONFUL (5ML) BY MOUTH FOUR TIMES A DAY, Disp: 473 mL, Rfl: 0     order for DME, Equipment being ordered: nebulizer tubing supplies, dx emphysema FAX TO [facility] IN [place], Disp: 1 Device, Rfl: 0     oxyCODONE-acetaminophen (PERCOCET) 5-325 MG tablet, Take 1 tablet by mouth every 6 hours as needed for severe pain TAKE 1 TABLET BY MOUTH EVERY 6 HOURS AS NEEDED FOR PAIN, Disp: 120 tablet, Rfl: 0     potassium chloride ER (K-TAB/KLOR-CON) 10 MEQ CR tablet, TAKE 1 TABLET BY MOUTH 2 TIMES DAILY WITH FOOD, PREFERS YELLOW SMALL ONES, Disp: 180 tablet, Rfl: 0     predniSONE (DELTASONE) 5 MG tablet, Take 1 tablet (5 mg) by mouth daily, Disp: 90 tablet, Rfl: 0     temazepam (RESTORIL) 30 MG capsule, TAKE 1 CAPSULE BY MOUTH NIGHTLY AS NEEDED FOR SLEEP, Disp: 90 capsule, Rfl: 1     triamcinolone (KENALOG) 0.1 % external cream, Apply topically 2 times daily, Disp: 30 g, Rfl: 1     triamcinolone (KENALOG) 0.1 % external lotion, Apply topically 2 times daily, Disp: 60 mL, Rfl: 0      Allergies   Allergen Reactions     Bee Pollen Other (See Comments)     Throat and eyes close up     Codeine Difficulty breathing     \"My throat closes shut.\"     Takes Percocet at home     Keflex [Cephalexin Hcl] Hives     Sulfonamide Derivatives Other (See " Comments)     Throat closes     Ciprofloxacin Rash     Rash, bumps on lips     Bupropion Hcl Other (See Comments)     Made her very irritable     Celecoxib Other (See Comments) and Cough     Celebrex - wheeze  Takes ASA at home     Cheese      swiss     Clonazepam Hives     Takes Xanax at home     Erythromycin Other (See Comments)     Erythromycin base - abdominal pain     Lisinopril Other (See Comments)     Zestril - headaches     Nifedipine Hives     Procardia     Strawberries [Strawberry]      Tomato      Tramadol Hcl      Takes percocet at home       Past Medical History:  Past Medical History:   Diagnosis Date     Abnormal arterial blood gases 2006     Anxiety 10/24/2011     Calculus of gallbladder without mention of cholecystitis or obstruction 2006     Depression 2012     Headache(784.0) 12/10/2003     History of CVA (cerebrovascular accident) 2011     HTN (hypertension) 2012     Insomnia 3/13/2012     Lipoma of other skin and subcutaneous tissue 2006     Nonallopathic lesion of cervical region, not elsewhere classified 2003     Osteoarthrosis, unspecified whether generalized or localized, lower leg 10/4/2001     Special screening for malignant neoplasms, colon 2006     Urinary tract infection, site not specified 2007         Past Surgical History:   Procedure Laterality Date     ARTHROPLASTY KNEE Right 3/21/2017    Procedure: ARTHROPLASTY KNEE;  Surgeon: Moisés Olson MD;  Location: [facility]     ARTHROSCOPY KNEE       BIOPSY BREAST  2000    left - free of disease      SECTION        SECTION       CHOLECYSTECTOMY       COLONOSCOPY  2008    repeat in 10 years     HEAD & NECK SURGERY      hematoma on head drained     HYSTERECTOMY       IR CONSULTATION FOR IR EXAM  2019     ORTHOPEDIC SURGERY  2014    right shoulder replacement     shoulder x2  2009    left         Family History:  Family History   Problem Relation Age of  Onset     Cancer Mother         skin     Breast Cancer Mother      Cerebrovascular Disease Mother      Alzheimer Disease Mother      Myocardial Infarction Father 76        myocardial infarction - cause of death     Cancer Daughter         cervical      Diabetes Maternal Grandfather      Heart Disease Maternal Grandfather         MI     Diabetes Maternal Grandmother      Diabetes Paternal Grandmother      Diabetes Paternal Grandfather        Social History:   reports that she has been smoking cigarettes. She started smoking about 56 years ago. She has a 79.50 pack-year smoking history. She has never used smokeless tobacco. She reports current alcohol use. She reports that she does not use drugs.      Review of Systems:  Review of Systems - Oncology    The remainder of a 10 point review of systems is negative.    Physical Examination:  GENERAL:  Chronically ill appearing  HEENT:  Oral cavity moist without lesion  NECK:  No palpable lymphadenopathy.  No JVD  CV:  Regular rate and rhythm.  No murmur appreciated.  No clicks or rubs.  LUNGS:  Markedly diminished breath sounds with occasional wheeze to auscultation.  ABDOMEN:  Soft and nontender.  No palpable masses.  Bowel sounds present.  EXTREM:  Trace edema.  NEURO:  Nonfocal  PSYCH:  Alert and oriented.  Affect bright     Laboratory/Imaging:  Reviewed      Assessment/Plan:  ***    Recommendations:  ***      Tavares Carrillo MD, FAAFP, [facility]  Hospice and Palliative Care

## 2021-04-05 NOTE — TELEPHONE ENCOUNTER
2:53 PM    Reason for Call: Phone Call    Description: pt wants to talk to Jo with a complaint about her last conversation    Was an appointment offered for this call? No  If yes : Appointment type              Date    Preferred method for responding to this message: Telephone Call  What is your phone number ?[phone]    If we cannot reach you directly, may we leave a detailed response at the number you provided? Yes    Can this message wait until your PCP/provider returns, if available today? No    Kimberlee Law

## 2021-04-06 NOTE — TELEPHONE ENCOUNTER
benzonatate (TESSALON) 200 MG capsule     Last Written Prescription Date:  12/28/2020  Last Fill Quantity: 60,   # refills: 0  Last Office Visit: 01/21/2021  Future Office visit:       Routing refill request to provider for review/approval because:

## 2021-04-06 NOTE — TELEPHONE ENCOUNTER
doxepin      Last Written Prescription Date:  01/12/2021  Last Fill Quantity: 90,   # refills: 0  Last Office Visit: 03/31/2021  Future Office visit:       hydrochlorothiazide      Last Written Prescription Date:  12/28/2020  Last Fill Quantity: 90,   # refills: 0    meloxicam      Last Written Prescription Date:  01/08/2021  Last Fill Quantity: 90,   # refills: 0    prednisone      Last Written Prescription Date:  02/09/2021  Last Fill Quantity: 90,   # refills: 0

## 2021-04-12 NOTE — TELEPHONE ENCOUNTER
Pt called, states that at office visit on 3/31/21 PCP was to order an echocardiogram and pulmonary tests. These have not been ordered. Please advise. Thank you!

## 2021-04-13 NOTE — TELEPHONE ENCOUNTER
Pt called on the below testing.She wants to get these scheduled.    Please order if appropriate and call pt back when ordered.      Lynne Giraldo RN

## 2021-04-20 NOTE — TELEPHONE ENCOUNTER
Percocet 5-325 mg      Last Written Prescription Date:  2-8-2021  Last Fill Quantity: 120,   # refills: 0  Last Office Visit: 1- ( there is a visit sooner 3- with Gerardo, but nothing filled out or signed in the encounter. Unknown if patient was seen that day)

## 2021-04-22 NOTE — PROGRESS NOTES
Clinic Care Coordination Contact  Care Team Conversations    CC reviewed the chart.  Patient still following with Palliative Care.  Patient had PCP visit on 3/31 and CC will review once complete.  Patient has Echo scheduled today 4/22.    Toyin Uribe RN  Care Coordination

## 2021-05-04 NOTE — TELEPHONE ENCOUNTER
Tessalon      Last Written Prescription Date:  04/06/21  Last Fill Quantity: 60,   # refills: 0  Last Office Visit: 01/21/21  Future Office visit:

## 2021-05-10 NOTE — PHARMACY
[facility]    Pharmacy      Antimicrobial Stewardship Note     Current antimicrobial therapy:  Anti-infectives (Future)    Start     Dose/Rate Route Frequency Ordered Stop    09/26/18 1400  azithromycin (ZITHROMAX) tablet 250 mg      250 mg Oral DAILY 09/26/18 0713            Indication: upper respiratory infection    Days of Therapy: 3     Pertinent labs:  Creatinine   Creatinine   Date Value Ref Range Status   09/26/2018 0.74 0.52 - 1.04 mg/dL Final   09/25/2018 0.78 0.52 - 1.04 mg/dL Final   09/24/2018 0.97 0.52 - 1.04 mg/dL Final     WBC   WBC   Date Value Ref Range Status   09/26/2018 19.7 (H) 4.0 - 11.0 10e9/L Final   09/25/2018 13.4 (H) 4.0 - 11.0 10e9/L Final   09/24/2018 12.7 (H) 4.0 - 11.0 10e9/L Final     Procalcitonin   Procalcitonin   Date Value Ref Range Status   09/24/2018 <0.05 ng/ml Final     Comment:     <0.05 ng/ml  Normal  Recommendation: Very low risk of bacterial infection.   Discourage antibiotics unless strong clinical suspicion for serious infection.       CRP   CRP Inflammation   Date Value Ref Range Status   03/25/2017 140.0 (H) 0.0 - 8.0 mg/L Final       Culture Results:       7-Day Micro Results      Procedure Component Value Units Date/Time     Active MRSA Surveillance Culture [[id number]] Collected: 09/24/18 1800     Order Status: Completed Lab Status: Final result Updated: 09/25/18 1422     Specimen: Nares from Nose       Specimen Description Nares      Culture Micro No MRSA isolated              Recommendations/Interventions:  1. None at this time.    Graciela Lewis, [facility]  September 26, 2018         yes

## 2021-05-14 NOTE — TELEPHONE ENCOUNTER
albuterol      Last Written Prescription Date:  1/12/21  Last Fill Quantity: 90mL,   # refills: 3  Last Office Visit: 1/21/21    benazepril      Last Written Prescription Date:  2/9/2021  Last Fill Quantity: 90,   # refills: 0

## 2021-05-17 PROBLEM — F11.90 CHRONIC, CONTINUOUS USE OF OPIOIDS: Chronic | Status: ACTIVE | Noted: 2021-01-01

## 2021-05-18 NOTE — TELEPHONE ENCOUNTER
duoneb      Last Written Prescription Date:  04/14/2021  Last Fill Quantity: 360ml,   # refills: 0  Last Office Visit: 03/31/2021  Future Office visit:

## 2021-05-19 NOTE — TELEPHONE ENCOUNTER
montelukast (SINGULAIR) 10 MG tablet      Last Written Prescription Date:  2/9/21  Last Fill Quantity: 90,   # refills: 0  Last Office Visit: 3/31/21  Future Office visit:       Routing refill request to provider for review/approval because:  Asthma control assessment score within normal limits in last 6 months

## 2021-05-20 NOTE — PROGRESS NOTES
"Palliative Care Consultation      Reason for Consultation:  Sydni Hardin, 71 year old, female is seen for a palliative maintenance care consultation.  She is seen at her home.   I last saw her on 1/21/2021.  Dr. Carrillo saw her on 3/31/21 so she was not due for a home visit yet, but she requested to have me make a home visit now because she needed to \"talk to you about some things\".    She has multiple concerns today, to include: a pruritic rash on her face, issues with urinary incontinence, issues with loose bowels and bowel incontinence, difficulty breathing, anxiety and increasing arthritic issues.  At her last visit she requested a hospital bed, which I did order and she received and is using.  She states that it is helpful with her breathing.    She currently has Percocet ordered for pain.  She is taking one at bedtime for her pain and for breathing and it does help.  She uses her oxygen currently at times during the day and all night long.   She is using her duoneb nebulizer 3X daily on average and the albuterol nebulizer occasionally.  She will use her albuterol inhaler about twice daily.  She uses her xanax on average daily.  She does cough more often now and brings up clear or tan sputum.  She is using Tessalon Perles at night to decrease her coughing.   Her daughter is living with her and is assisting her as needed.  She is planning on moving to a house in [place] to live with her daughter in the future and this is causing her anxiety.  She is planning on seeing her psychologist next week.  She has an appointment, however, if the psychologist will still not see her in person she relates she will cancel the appointment.       Adanced Directives:  Full Code     Palliative Care Goals:  To be comfortable    Attestation:   Total face to face time spent on evaluation, counseling, and coordination of care:    60 minutes.  An additional 15\" was spent on charting.       History of Present Illness:  Patient " has a long history of COPD and continues to smoke.  She has nicotine patches available.  She is on intermittent oxygen, anticholinergics, beta 2 agonist , beta 2 adrenergic agonist and steroids.    Co-morbidities include: osteoarthritis, chronic kidney disease stage 3, morbid obesity, and GERD.    Current Outpatient Medications:      albuterol (PROAIR HFA/PROVENTIL HFA/VENTOLIN HFA) 108 (90 Base) MCG/ACT inhaler, INHALE 1 OR 2 PUFFS INTO LUNGS EVERY 4 HOURS AS NEEDED, Disp: 18 g, Rfl: 0     albuterol (PROVENTIL) (2.5 MG/3ML) 0.083% neb solution, USE 1 VIAL IN NEBULIZER 4 TIMES PER DAY AS NEEDED, Disp: 90 mL, Rfl: 3     ALPRAZolam (XANAX) 1 MG tablet, TAKE 1 TABLET BY MOUTH 3 TIMES DAILY, Disp: 90 tablet, Rfl: 0     aspirin 81 MG EC tablet, Take 1 tablet (81 mg) by mouth daily, Disp: 100 tablet, Rfl: 3     atorvastatin (LIPITOR) 40 MG tablet, Take 1 tablet (40 mg) by mouth daily, Disp: 90 tablet, Rfl: 3     benazepril (LOTENSIN) 10 MG tablet, TAKE 1 TABLET BY MOUTH DAILY, Disp: 90 tablet, Rfl: 0     benzonatate (TESSALON) 200 MG capsule, TAKE 1 CAPSULE BY MOUTH 3 TIMES A DAY AS NEEDED FOR COUGH, Disp: 60 capsule, Rfl: 0     butalbital-acetaminophen-caffeine (FIORICET/ESGIC) -40 MG tablet, Take 1 tablet by mouth every 6 hours as needed for headaches, Disp: 30 tablet, Rfl: 3     doxepin (SINEQUAN) 10 MG capsule, TAKE 1 CAPSULE BY MOUTH AT BEDTIME, Disp: 90 capsule, Rfl: 0     EPINEPHrine (EPIPEN 2-JAIRO) 0.3 MG/0.3ML injection 2-pack, INJECT 1 SYRINGE INTRAMUSCULARLY ONCE AS NEEDED FOR ANAPHYLAXIS, Disp: 2 each, Rfl: 0     escitalopram (LEXAPRO) 20 MG tablet, TAKE 1 TABLET BY MOUTH 2 TIMES A DAY, Disp: 180 tablet, Rfl: 0     hydrochlorothiazide (HYDRODIURIL) 25 MG tablet, Take 1 tablet (25 mg) by mouth daily, Disp: 90 tablet, Rfl: 0     ipratropium - albuterol 0.5 mg/2.5 mg/3 mL (DUONEB) 0.5-2.5 (3) MG/3ML neb solution, NEBULIZE CONTENTS OF 1 VIAL FOUR TIMES A DAY, Disp: 360 mL, Rfl: 0     meloxicam (MOBIC) 15 MG  "tablet, Take 1 tablet (15 mg) by mouth daily, Disp: 90 tablet, Rfl: 0     meloxicam (MOBIC) 15 MG tablet, Take 1 tablet (15 mg) by mouth daily, Disp: 90 tablet, Rfl: 3     montelukast (SINGULAIR) 10 MG tablet, TAKE 1 TABLET BY MOUTH DAILY AT BEDTIME, Disp: 90 tablet, Rfl: 0     nystatin (MYCOSTATIN) 260603 UNIT/ML suspension, SWISH AND SWALLOW 1 TEASPOONFUL (5ML) BY MOUTH FOUR TIMES A DAY, Disp: 473 mL, Rfl: 0     order for DME, Equipment being ordered: nebulizer tubing supplies, dx emphysema FAX TO [facility] IN [place], Disp: 1 Device, Rfl: 0     oxyCODONE-acetaminophen (PERCOCET) 5-325 MG tablet, TAKE 1 TABLET BY MOUTH EVERY 6 HOURS AS NEEDED FOR PAIN, Disp: 120 tablet, Rfl: 0     potassium chloride ER (K-TAB/KLOR-CON) 10 MEQ CR tablet, TAKE 1 TABLET BY MOUTH 2 TIMES DAILY WITH FOOD, PREFERS YELLOW SMALL ONES, Disp: 180 tablet, Rfl: 0     predniSONE (DELTASONE) 5 MG tablet, Take 1 tablet (5 mg) by mouth daily, Disp: 90 tablet, Rfl: 0     temazepam (RESTORIL) 30 MG capsule, TAKE 1 CAPSULE BY MOUTH NIGHTLY AS NEEDED FOR SLEEP, Disp: 90 capsule, Rfl: 1     triamcinolone (KENALOG) 0.1 % external cream, Apply topically 2 times daily, Disp: 30 g, Rfl: 1     triamcinolone (KENALOG) 0.1 % external lotion, Apply topically 2 times daily, Disp: 60 mL, Rfl: 0      Allergies   Allergen Reactions     Bee Pollen Other (See Comments)     Throat and eyes close up     Codeine Difficulty breathing     \"My throat closes shut.\"     Takes Percocet at home     Keflex [Cephalexin Hcl] Hives     Sulfonamide Derivatives Other (See Comments)     Throat closes     Ciprofloxacin Rash     Rash, bumps on lips     Bupropion Hcl Other (See Comments)     Made her very irritable     Celecoxib Other (See Comments) and Cough     Celebrex - wheeze  Takes ASA at home     Cheese      swiss     Clonazepam Hives     Takes Xanax at home     Erythromycin Other (See Comments)     Erythromycin base - abdominal pain     Lisinopril Other (See Comments)     " Zestril - headaches     Nifedipine Hives     Procardia     Strawberries [Strawberry]      Tomato      Tramadol Hcl      Takes percocet at home       Past Medical History:  Past Medical History:   Diagnosis Date     Abnormal arterial blood gases 2006     Anxiety 10/24/2011     Calculus of gallbladder without mention of cholecystitis or obstruction 2006     Depression 2012     Headache(784.0) 12/10/2003     History of CVA (cerebrovascular accident) 2011     HTN (hypertension) 2012     Insomnia 3/13/2012     Lipoma of other skin and subcutaneous tissue 2006     Nonallopathic lesion of cervical region, not elsewhere classified 2003     Osteoarthrosis, unspecified whether generalized or localized, lower leg 10/4/2001     Special screening for malignant neoplasms, colon 2006     Urinary tract infection, site not specified 2007     Past Surgical History:   Procedure Laterality Date     ARTHROPLASTY KNEE Right 3/21/2017    Procedure: ARTHROPLASTY KNEE;  Surgeon: Moisés Olson MD;  Location: [facility]     ARTHROSCOPY KNEE       BIOPSY BREAST  2000    left - free of disease      SECTION        SECTION       CHOLECYSTECTOMY       COLONOSCOPY  2008    repeat in 10 years     HEAD & NECK SURGERY  2014    hematoma on head drained     HYSTERECTOMY       IR CONSULTATION FOR IR EXAM  2019     ORTHOPEDIC SURGERY  2014    right shoulder replacement     shoulder x2  2009    left     Family History:  Family History   Problem Relation Age of Onset     Cancer Mother         skin     Breast Cancer Mother      Cerebrovascular Disease Mother      Alzheimer Disease Mother      Myocardial Infarction Father 76        myocardial infarction - cause of death     Cancer Daughter         cervical      Diabetes Maternal Grandfather      Heart Disease Maternal Grandfather         MI     Diabetes Maternal Grandmother      Diabetes Paternal Grandmother      Diabetes  Paternal Grandfather        Social History:   reports that she has been smoking cigarettes. She started smoking about 56 years ago. She has a 79.50 pack-year smoking history. She has never used smokeless tobacco. She reports current alcohol use. She reports that she does not use drugs.    Review Of Systems:    Performance status:  ECOG 2    Skin: rash  Eyes: cataracts, glasses  Ears/Nose/Throat: hearing loss, TMJ, grinds teeth  Respiratory: Shortness of breath- with exertion, smokes, O2 dependent  Cardiovascular: negative  Gastrointestinal: hemmorhoids, constipation, diarrhea, incontinence  Genitourinary: incontinence, patient denies incontinence with urgency or cramps or with activity, she has no sensation that she needs to go, she just starts peeing, she tends to do this more at night and has difficulty going during the day  Musculoskeletal: joint pain and uses a walker when outside  Neurologic: negative  Psychiatric: anxiety and depression not stable  Hematologic/Lymphatic/Immunologic: negative  Endocrine: negative    Concerns/Worries: verbalizes concerns about not being able to do what she used to be able to do what she used to do and having to rely on others to help her    PHYSICAL EXAM:   VS: AP 84, regular,  O2 sat is 92% on room air   GENERAL: Well groomed, well developed, overweight female in no acute distress.    HEENT: extra ocular movements intact, atraumatic,  normocephalic.  SKIN: Warm to touch, dry.  Bruising noted on hands and lower arms.  Rash present on forehead is itchy, dry, red and scaly appearing.  RESP: No increased respiratory effort at rest. Lung sounds are congested sounding throughout.  Breath sounds decreased bilateral.  Patient does become short of breath and puffs with exercise.  CV: Rate and rhythm regular, no murmurs/rubs/gallops.  LYMPH: No lower extremity edema.  ABD: Soft, non tender, non distended, no palpable masses.  Normoactive bowel sounds.  No CVA tenderness bilaterally.  MS:  Full range of motion  in lower extremities.  Patient now has difficulty with bending her fingers.    NEURO: Alert and oriented x 3.  PSYCH: Normal mood and affect, pleasant and agreeable during interview and exam.  Sydni is more anxious due to life changes.    Laboratory/Imaging:  Reviewed    Assessment/Plan:    (Z51.5) Palliative care patient  (primary encounter diagnosis)  Comment: Patient has multiple issues today.  While I am happy to assist her as I can, she also needs to be seen in clinic.    Plan: We will continue palliative maintenance services.  I have instructed Sydni that if she does not feel better with the augmentin then she should make an appt with her PCP and be seen in clinic because she may need labwork done and a chest xray.  She verbalized understanding, but wanted to call me to have me make that appt.  I told her to make the appt herself, but she can call palliative care if she is having trouble getting in and we will see what we can do to assist the process.  I will see her again in 3 months, or sooner if needed.    (Z87.891) Personal history of nicotine dependence   Comment: Patient continues to smoke.  She is counseled to stop smoking.  Plan: Tobacco Cessation:   reports that she has been smoking. She has never used smokeless tobacco.  Tobacco Cessation Action Plan: Information offered: Patient not interested at this time     (F41.9) Anxiety  Comment: Sydni continues to be anxious,and continues with xanax.  Plan: Sydni is seeing a psychologist (Nelly Liu) in [place].  She does not feel that video visits are helpful.  She will see Nelly if she can see her in person.  I have encouraged her to see Nelly no matter what the format of the visit is, she requires this.  Patient also has Xanax and is not using it as much as prescribed.      (N18.30) Stage 3 chronic kidney disease, unspecified whether stage 3a or 3b CKD  Comment: Last labs were stable  Plan: Diagnosis affects the complexity of  her cares.  She will continue to follow with her PCP.    (J43.2) Centrilobular emphysema (H)  Comment: Patient becomes short of breath with exertion, O2 sats drop.  Plan: Increase duonebs to four times daily.   Focus on nebs rather than inhalers.  She wears her oxygen during the day now for a few hours and all night. We talked about using Mucinex previously and now I am ordering it for her and have explained why I am recommending that she use it. She is scheduled for pulmonary function testing on 5/26/2021.  We have been trying to get her to do this testing for months.  She is now indicating reluctance to go to the appt due to her current congestion.  She thinks that she won't do very well on the test.  I have explained that it is important for her to follow through and it doesn't matter if she does not do well, it is important that we have a real time measure of how her lung function is.  She verbalizes understanding.     (I10) Benign essential hypertension  Comment: Diagnosis affects the complexity of her cares.   Plan: She will continue to follow with her PCP regarding this and will continue to take her lotensin and hydrochlorothiazide as ordered    (M89.49) Primary osteoarthritis involving multiple joints  Comment: Sydni states that her pain is mostly bothering her in her fingers.  She can no longer bend some of her fingers, especially on her right hand and writing is now very difficult for her.     Plan: I recommended that she try a paraffin treatment and wear fingerless gloves at all time.  Other forms of heat might help as well.      (C44.320) SCC (squamous cell carcinoma), face  Comment: Diagnosis affects the complexity of her cares  Plan: Dermatology consulted.  Rash on face is not typical of squamous cell carcinoma and appears as eczema.  We will try treating with a steroid ointment.  If it isn't successful she should see dermatology and I have instructed her regarding this, and not using a steroid  ointment for a long period of time.     (N39.42) Urinary incontinence without sensory awareness  Comment: Diagnosis affects the complexity of her cares.    Plan: Discussed at length with patient.  She has mostly stress incontinence during the day, but also complains of insensate incontinence during the night.  She needs to see urology for this.  I offered a referral, so far she has refused.     (R15.9) Incontinence of feces  Comment: patient relates that she often has diarrhea and often cannot hold her stool  Plan: We discussed using a fiber supplement to firm up her stool, which she is not currently interested in doing.       Jeni Kelly  CNP, CWOCN  Hospice and Palliative Care

## 2021-06-04 NOTE — TELEPHONE ENCOUNTER
Xanax       Last Written Prescription Date:  4/29/2021  Last Fill Quantity: 90,   # refills: 0  Last Office Visit: 3/31/2021  Future Office visit:

## 2021-06-10 NOTE — TELEPHONE ENCOUNTER
Fioricet      Last Written Prescription Date:  01/09/20  Last Fill Quantity: 30,   # refills: 3  Last Office Visit: 05/19/21  Future Office visit:       Routing refill request to provider for review/approval because:  Drug not on the [facility], [facility] or [facility] refill protocol or controlled substance

## 2021-06-14 NOTE — TELEPHONE ENCOUNTER
Denise formerly [facility]  [phone].    Patient is supposed to have cataract surgery but they do not know if she is high risk for complications.  They would like a primary provider to let them know if she is a good cannidate for surgery or not.    Please call Denise to discuss.

## 2021-06-16 NOTE — TELEPHONE ENCOUNTER
Lexapro      Last Written Prescription Date:  3/15/21  Last Fill Quantity: 180,   # refills: 0  Last Office Visit: 5/19/21  Future Office visit:       Routing refill request to provider for review/approval because:      Potassium      Last Written Prescription Date:  3/12/21  Last Fill Quantity: 180,   # refills: 0  Last Office Visit: 5/19/21  Future Office visit:       Routing refill request to provider for review/approval because:

## 2021-06-17 NOTE — TELEPHONE ENCOUNTER
Patient called requesting to talk to Jace Kelly or  Gerardo.        Patient is on palliative care and her feet are swelling her hands are hurting and she can't go to the bathroom at night. States there are a lot of things going on.  Please call her today if possible    [phone]

## 2021-06-17 NOTE — TELEPHONE ENCOUNTER
I called and spoke to Sydni.  Her PFT test results are not back yet.  I will call her again when I get the results.  Jeni Kelly  CNP, CWOCN  Hospice and Palliative Care

## 2021-06-22 NOTE — TELEPHONE ENCOUNTER
Please resolve lab results from 6/16/21 labs and pulmonary function test.     [phone] - call back

## 2021-06-23 NOTE — TELEPHONE ENCOUNTER
temazepam (RESTORIL) 30 MG capsule    Last Written Prescription Date:  1/11/21  Last Fill Quantity: 90,   # refills: 1  Last Office Visit: 5/19/21  Future Office visit:       Routing refill request to provider for review/approval

## 2021-06-23 NOTE — TELEPHONE ENCOUNTER
Please call the patient back with her lab results from  6/16/21.  Provider is out and they haven't been reviewed yet.

## 2021-06-24 NOTE — TELEPHONE ENCOUNTER
Please call patient.  States some one called her regarding test results. Pulmonary function Test.  She want's to know today.

## 2021-06-24 NOTE — TELEPHONE ENCOUNTER
Carbon monoxide level is slightly increased which can be seen with smoking or emphysema. This is just borderline. Hemoglobin is stable. PFTs show severe obstructive disease which did respond to a bronchodilator.   Dr Carrillo to recommend other treatment if he feels it is indicated when he can review tests.     Please notify pt and forward to him

## 2021-07-06 NOTE — TELEPHONE ENCOUNTER
albuterol (PROAIR HFA/PROVENTIL HFA/VENTOLIN HFA) 108 (90 Base) MCG/ACT inhaler      Last Written Prescription Date:  5/14/2021  Last Fill Quantity: 18g,   # refills: 0  Last Office Visit: 5/19/2021  Future Office visit:

## 2021-07-08 NOTE — TELEPHONE ENCOUNTER
Lotensin      Last Written Prescription Date:  5/14/21  Last Fill Quantity: 90,   # refills: 0  Last Office Visit: 3/31/21  Future Office visit:       Routing refill request to provider for review/approval because:

## 2021-07-14 NOTE — LETTER
Opioid / Opioid Plus Controlled Substance Agreement    This is an agreement between you and your provider about the safe and appropriate use of controlled substance/opioids prescribed by your care team. Controlled substances are medicines that can cause physical and mental dependence (abuse).    There are strict laws about having and using these medicines. We here at [facility] are committing to working with you in your efforts to get better. To support you in this work, we ll help you schedule regular office appointments for medicine refills. If we must cancel or change your appointment for any reason, we ll make sure you have enough medicine to last until your next appointment.     As a Provider, I will:    Listen carefully to your concerns and treat you with respect.     Recommend a treatment plan that I believe is in your best interest. This plan may involve therapies other than opioid pain medication.     Talk with you often about the possible benefits, and the risk of harm of any medicine that we prescribe for you.     Provide a plan on how to taper (discontinue or go off) using this medicine if the decision is made to stop its use.    As a Patient, I understand that opioid(s):     Are a controlled substance prescribed by my care team to help me function or work and manage my condition(s).     Are strong medicines and can cause serious side effects such as:    Drowsiness, which can seriously affect my driving ability    A lower breathing rate, enough to cause death    Harm to my thinking ability     Depression     Abuse of and addiction to this medicine    Need to be taken exactly as prescribed. Combining opioids with certain medicines or chemicals (such as illegal drugs, sedatives, sleeping pills, and benzodiazepines) can be dangerous or even fatal. If I stop opioids suddenly, I may have severe withdrawal symptoms.    Do not work for all types of pain nor for all patients. If they re not helpful, I may  be asked to stop them.        The risks, benefits and side effects of these medicine(s) were explained to me. I agree that:  1. I will take part in other treatments as advised by my care team. This may be psychiatry or counseling, physical therapy, behavioral therapy, group treatment or a referral to a specialist.     2. I will keep all my appointments. I understand that this is part of the monitoring of opioids. My care team may require an office visit for EVERY opioid/controlled substance refill. If I miss appointments or don t follow instructions, my care team may stop my medicine.    3. I will take my medicines as prescribed. I will not change the dose or schedule unless my care team tells me to. There will be no refills if I run out early.     4. I may be asked to come to the clinic and complete a urine drug test or complete a pill count at any time. If I don t give a urine sample or participate in a pill count, the care team may stop my medicine.    5. I will only receive prescriptions from this clinic for chronic pain. If I am treated by another provider for acute pain issues, I will tell them that I am taking opioid pain medication for chronic pain and that I have a treatment agreement with this provider. I will inform my [facility] care team within one business day if I am given a prescription for any pain medication by another healthcare provider. My [facility] care team can contact other providers and pharmacists about my use of any medicines.    6. It is up to me to make sure that I don t run out of my medicines on weekends or holidays. If my care team is willing to refill my opioid prescription without a visit, I must request refills only during office hours. Refills may take up to 3 business days to process. I will use one pharmacy to fill all my opioid and other controlled substance prescriptions. I will notify the clinic about any changes to my insurance or medication  availability.    7. I am responsible for my prescriptions. If the medicine/prescription is lost, stolen or destroyed, it will not be replaced. I also agree not to share controlled substance medicines with anyone.    8. I am aware I should not use any illegal or recreational drugs. I agree not to drink alcohol unless my care team says I can.       9. If I enroll in the [place] [facility] Cannabis program, I will tell my care team prior to my next refill.     10. I will tell my care team right away if I become pregnant, have a new medical problem treated outside of my regular clinic, or have a change in my medications.    11. I understand that this medicine can affect my thinking, judgment and reaction time. Alcohol and drugs affect the brain and body, which can affect the safety of my driving. Being under the influence of alcohol or drugs can affect my decision-making, behaviors, personal safety, and the safety of others. Driving while impaired (DWI) can occur if a person is driving, operating, or in physical control of a car, motorcycle, boat, snowmobile, ATV, motorbike, off-road vehicle, or any other motor vehicle ([place] Statute 169A.20). I understand the risk if I choose to drive or operate any vehicle or machinery.    I understand that if I do not follow any of the conditions above, my prescriptions or treatment may be stopped or changed.          Opioids  What You Need to Know    What are opioids?   Opioids are pain medicines that must be prescribed by a doctor. They are also known as narcotics.     Examples are:   1. morphine (MS Contin, Carol)  2. oxycodone (Oxycontin)  3. oxycodone and acetaminophen (Percocet)  4. hydrocodone and acetaminophen (Vicodin, Norco)   5. fentanyl patch (Duragesic)   6. hydromorphone (Dilaudid)   7. methadone  8. codeine (Tylenol #3)     What do opioids do well?   Opioids are best for severe short-term pain such as after a surgery or injury. They may work well for cancer pain. They may  help some people with long-lasting (chronic) pain.     What do opioids NOT do well?   Opioids never get rid of pain entirely, and they don t work well for most patients with chronic pain. Opioids don t reduce swelling, one of the causes of pain.                                    Other ways to manage chronic pain and improve function include:       Treat the health problem that may be causing pain    Anti-inflammation medicines, which reduce swelling and tenderness, such as ibuprofen (Advil, Motrin) or naproxen (Aleve)    Acetaminophen (Tylenol)    Antidepressants and anti-seizure medicines, especially for nerve pain    Topical treatments such as patches or creams    Injections or nerve blocks    Chiropractic or osteopathic treatment    Acupuncture, massage, deep breathing, meditation, visual imagery, aromatherapy    Use heat or ice at the pain site    Physical therapy     Exercise    Stop smoking    Take part in therapy       Risks and side effects     Talk to your doctor before you start or decide to keep taking opioids. Possible side effects include:      Lowering your breathing rate enough to cause death    Overdose, including death, especially if taking higher than prescribed doses    Worse depression symptoms; less pleasure in things you usually enjoy    Feeling tired or sluggish    Slower thoughts or cloudy thinking    Being more sensitive to pain over time; pain is harder to control    Trouble sleeping or restless sleep    Changes in hormone levels (for example, less testosterone)    Changes in sex drive or ability to have sex    Constipation    Unsafe driving    Itching and sweating    Dizziness    Nausea, throwing up and dry mouth    What else should I know about opioids?    Opioids may lead to dependence, tolerance, or addiction.      Dependence means that if you stop or reduce the medicine too quickly, you will have withdrawal symptoms. These include loose poop (diarrhea), jitters, flu-like symptoms,  nervousness and tremors. Dependence is not the same as addiction.                       Tolerance means needing higher doses over time to get the same effect. This may increase the chance of serious side effects.      Addiction is when people improperly use a substance that harms their body, their mind or their relations with others. Use of opiates can cause a relapse of addiction if you have a history of drug or alcohol abuse.      People who have used opioids for a long time may have a lower quality of life, worse depression, higher levels of pain and more visits to doctors.    You can overdose on opioids. Take these steps to lower your risk of overdose:    1. Recognize the signs:  Signs of overdose include decrease or loss of consciousness (blackout), slowed breathing, trouble waking up and blue lips. If someone is worried about overdose, they should call 911.    2. Talk to your doctor about Narcan (naloxone).   If you are at risk for overdose, you may be given a prescription for Narcan. This medicine very quickly reverses the effects of opioids.   If you overdose, a friend or family member can give you Narcan while waiting for the ambulance. They need to know the signs of overdose and how to give Narcan.     3. Don't use alcohol or street drugs.   Taking them with opioids can cause death.    4. Do not take any of these medicines unless your doctor says it s OK. Taking these with opioids can cause death:    Benzodiazepines, such as lorazepam (Ativan), alprazolam (Xanax) or diazepam (Valium)    Muscle relaxers, such as cyclobenzaprine (Flexeril)    Sleeping pills like zolpidem (Ambien)     Other opioids      How to keep you and other people safe while taking opioids:    1. Never share your opioids with others.  Opioid medicines are regulated by the Drug Enforcement Agency (SUZAN). Selling or sharing medications is a criminal act.    2. Be sure to store opioids in a secure place, locked up if possible. Young children  can easily swallow them and overdose.    3. When you are traveling with your medicines, keep them in the original bottles. If you use a pill box, be sure you also carry a copy of your medicine list from your clinic or pharmacy.    4. Safe disposal of opioids    Most pharmacies have places to get rid of medicine, called disposal kiosks. Medicine disposal options are also available in every [facility]. Search your county and  medication disposal  to find more options. You can find more details at:  [email or web address]     I agree that my provider, clinic care team, and pharmacy may work with any city, state or federal law enforcement agency that investigates the misuse, sale, or other diversion of my controlled medicine. I will allow my provider to discuss my care with, or share a copy of, this agreement with any other treating provider, pharmacy or emergency room where I receive care.    I have read this agreement and have asked questions about anything I did not understand.    _______________________________________________________  Patient Signature - Sydni Hardin _____________________                   Date     _______________________________________________________  Provider Signature - Tavares Carrillo MD   _____________________                   Date     _______________________________________________________  Witness Signature (required if provider not present while patient signing)   _____________________                   Date

## 2021-07-14 NOTE — PROGRESS NOTES
Hospice/Palliative Care Consultation      Reason for Consultation:  Sydni Hardin, 71 year old, female is seen for evaluation of COPD (chronic obstructive pulmonary disease) .  She is seen at her residence as it is leaving the home requires a considerable and taxing effort.  Family is present.    Advanced Directives:  DNR POLST    Hospice/Palliative Care Goals:  Symptom control    Attestation:   Total time spent on evaluation, counseling, and coordination of care:  60 minutes.     History of Present Illness:  Sydni is seen in follow-up of COPD.  She underwent pulmonary function testing.  Complete pulmonary function tests were not able to be performed so she underwent spirometry with and without bronchodilator.  She has a 82-pack-year history of smoking.  While there her saturations were 87% on room air.  She continues to have 4 to 5 L/min at home and uses primarily at night and with activity.    Her forced vital capacity was 27% of predicted her FEV1 was 25% of predicted predicted her FEV1/FVC was 90% of predicted with a bronchodilator response.  Her F EF 2575 was 20% of predicted.  Her expiratory time was less than 6 seconds which does underestimate the degree of risk obstruction.  This was consistent with severe COPD.    She underwent echocardiogram in 2021.  This showed her to have an ejection fraction of 60 to 65% with with mild mitral insufficiency.  Her right ventricular systolic pressure was 9 mm above the right atrial pressure.    This is reviewed in detail with daughter present.    Sydni continues to be significantly dyspneic.  She has chronic cough as well as fatigue.  No recent falls.    Nutritional Status:  Weight stable    Performance Status:    ECO     Modified Britt Dyspnea Scale:  6    Current Outpatient Medications:      albuterol (PROAIR HFA/PROVENTIL HFA/VENTOLIN HFA) 108 (90 Base) MCG/ACT inhaler, INHALE 1 OR 2 PUFFS INTO LUNGS EVERY 4 HOURS AS NEEDED, Disp: 18 g, Rfl: 0      albuterol (PROVENTIL) (2.5 MG/3ML) 0.083% neb solution, USE 1 VIAL IN NEBULIZER 4 TIMES PER DAY AS NEEDED, Disp: 90 mL, Rfl: 3     ALPRAZolam (XANAX) 1 MG tablet, TAKE 1 TABLET BY MOUTH 3 TIMES DAILY, Disp: 90 tablet, Rfl: 0     amoxicillin-clavulanate (AUGMENTIN) 875-125 MG tablet, Take 1 tablet by mouth 2 times daily, Disp: 14 tablet, Rfl: 0     aspirin 81 MG EC tablet, Take 1 tablet (81 mg) by mouth daily, Disp: 100 tablet, Rfl: 3     atorvastatin (LIPITOR) 40 MG tablet, Take 1 tablet (40 mg) by mouth daily, Disp: 90 tablet, Rfl: 3     benazepril (LOTENSIN) 10 MG tablet, TAKE 1 TABLET BY MOUTH DAILY, Disp: 90 tablet, Rfl: 0     benzonatate (TESSALON) 200 MG capsule, TAKE 1 CAPSULE BY MOUTH 3 TIMES A DAY AS NEEDED FOR COUGH, Disp: 60 capsule, Rfl: 0     butalbital-acetaminophen-caffeine (ESGIC) -40 MG tablet, TAKE 1 TABLET BY MOUTH EVERY 6 HOURS AS NEEDED FOR HEADACHES, Disp: 30 tablet, Rfl: 0     doxepin (SINEQUAN) 10 MG capsule, TAKE 1 CAPSULE BY MOUTH AT BEDTIME, Disp: 90 capsule, Rfl: 0     EPINEPHrine (EPIPEN 2-JAIRO) 0.3 MG/0.3ML injection 2-pack, INJECT 1 SYRINGE INTRAMUSCULARLY ONCE AS NEEDED FOR ANAPHYLAXIS, Disp: 2 each, Rfl: 0     escitalopram (LEXAPRO) 20 MG tablet, TAKE 1 TABLET BY MOUTH 2 TIMES A DAY, Disp: 180 tablet, Rfl: 0     guaiFENesin (MUCINEX) 600 MG 12 hr tablet, Take 2 tablets (1,200 mg) by mouth 2 times daily, Disp: 120 tablet, Rfl: 3     hydrochlorothiazide (HYDRODIURIL) 25 MG tablet, Take 1 tablet (25 mg) by mouth daily, Disp: 90 tablet, Rfl: 0     ipratropium - albuterol 0.5 mg/2.5 mg/3 mL (DUONEB) 0.5-2.5 (3) MG/3ML neb solution, NEBULIZE CONTENTS OF 1 VIAL FOUR TIMES A DAY, Disp: 360 mL, Rfl: 0     meloxicam (MOBIC) 15 MG tablet, Take 1 tablet (15 mg) by mouth daily, Disp: 90 tablet, Rfl: 0     meloxicam (MOBIC) 15 MG tablet, Take 1 tablet (15 mg) by mouth daily, Disp: 90 tablet, Rfl: 3     montelukast (SINGULAIR) 10 MG tablet, TAKE 1 TABLET BY MOUTH DAILY AT BEDTIME, Disp: 90  "tablet, Rfl: 0     nystatin (MYCOSTATIN) 570380 UNIT/ML suspension, SWISH AND SWALLOW 1 TEASPOONFUL (5ML) BY MOUTH FOUR TIMES A DAY, Disp: 473 mL, Rfl: 0     order for DME, Equipment being ordered: nebulizer tubing supplies, dx emphysema FAX TO [facility] IN [place], Disp: 1 Device, Rfl: 0     oxyCODONE-acetaminophen (PERCOCET) 5-325 MG tablet, TAKE 1 TABLET BY MOUTH EVERY 6 HOURS AS NEEDED FOR PAIN, Disp: 120 tablet, Rfl: 0     potassium chloride ER (K-TAB/KLOR-CON) 10 MEQ CR tablet, TAKE 1 TABLET BY MOUTH 2 TIMES DAILY WITH FOOD, (PREFERS YELLOW SMALL ONES), Disp: 180 tablet, Rfl: 0     predniSONE (DELTASONE) 5 MG tablet, Take 1 tablet (5 mg) by mouth daily, Disp: 90 tablet, Rfl: 0     temazepam (RESTORIL) 30 MG capsule, TAKE 1 CAPSULE BY MOUTH NIGHTLY AS NEEDED FOR SLEEP, Disp: 90 capsule, Rfl: 0     triamcinolone (KENALOG) 0.1 % external cream, Apply topically 2 times daily, Disp: 30 g, Rfl: 1     triamcinolone (KENALOG) 0.1 % external lotion, Apply topically 2 times daily, Disp: 60 mL, Rfl: 0     triamcinolone (KENALOG) 0.1 % external ointment, Apply thinly to affected area twice daily, do not use longer than two weeks., Disp: 30 g, Rfl: 0      Allergies   Allergen Reactions     Bee Pollen Other (See Comments)     Throat and eyes close up     Codeine Difficulty breathing     \"My throat closes shut.\"     Takes Percocet at home     Keflex [Cephalexin Hcl] Hives     Sulfonamide Derivatives Other (See Comments)     Throat closes     Ciprofloxacin Rash     Rash, bumps on lips     Bupropion Hcl Other (See Comments)     Made her very irritable     Celecoxib Other (See Comments) and Cough     Celebrex - wheeze  Takes ASA at home     Cheese      swiss     Clonazepam Hives     Takes Xanax at home     Erythromycin Other (See Comments)     Erythromycin base - abdominal pain     Lisinopril Other (See Comments)     Zestril - headaches     Nifedipine Hives     Procardia     Strawberries [Strawberry]      Tomato      Tramadol " Hcl      Takes percocet at home       Past Medical History:  Past Medical History:   Diagnosis Date     Abnormal arterial blood gases 2006     Anxiety 10/24/2011     Calculus of gallbladder without mention of cholecystitis or obstruction 2006     Depression 2012     Headache(784.0) 12/10/2003     History of CVA (cerebrovascular accident) 2011     HTN (hypertension) 2012     Insomnia 3/13/2012     Lipoma of other skin and subcutaneous tissue 2006     Nonallopathic lesion of cervical region, not elsewhere classified 2003     Osteoarthrosis, unspecified whether generalized or localized, lower leg 10/4/2001     Special screening for malignant neoplasms, colon 2006     Urinary tract infection, site not specified 2007         Past Surgical History:   Procedure Laterality Date     ARTHROPLASTY KNEE Right 3/21/2017    Procedure: ARTHROPLASTY KNEE;  Surgeon: Moisés Olson MD;  Location: [facility] OR     ARTHROSCOPY KNEE       BIOPSY BREAST  2000    left - free of disease      SECTION        SECTION       CHOLECYSTECTOMY       COLONOSCOPY  2008    repeat in 10 years     HEAD & NECK SURGERY  2014    hematoma on head drained     HYSTERECTOMY       IR CONSULTATION FOR IR EXAM  2019     ORTHOPEDIC SURGERY  2014    right shoulder replacement     shoulder x2  2009    left         Family History:  Family History   Problem Relation Age of Onset     Cancer Mother         skin     Breast Cancer Mother      Cerebrovascular Disease Mother      Alzheimer Disease Mother      Myocardial Infarction Father 76        myocardial infarction - cause of death     Cancer Daughter         cervical      Diabetes Maternal Grandfather      Heart Disease Maternal Grandfather         MI     Diabetes Maternal Grandmother      Diabetes Paternal Grandmother      Diabetes Paternal Grandfather        Social History:   reports that she has been smoking cigarettes. She started  smoking about 56 years ago. She has a 79.50 pack-year smoking history. She has never used smokeless tobacco. She reports current alcohol use. She reports that she does not use drugs.      Review of Systems:  Review of Systems   Constitutional: Positive for fatigue. Negative for appetite change, fever and unexpected weight change.   HENT:   Negative for hearing loss, trouble swallowing and voice change.    Eyes: Negative for eye problems.   Respiratory: Positive for chest tightness, cough, shortness of breath and wheezing.    Cardiovascular: Positive for leg swelling. Negative for chest pain and palpitations.   Gastrointestinal: Positive for constipation. Negative for abdominal pain, blood in stool and diarrhea.   Genitourinary: Negative for bladder incontinence, dysuria, frequency and hematuria.    Musculoskeletal: Positive for arthralgias, back pain, myalgias and neck pain. Negative for gait problem.   Skin: Negative for itching, rash and wound.   Neurological: Negative for dizziness, extremity weakness, gait problem, light-headedness, numbness, seizures and speech difficulty.   Hematological: Negative for adenopathy.   Psychiatric/Behavioral: Positive for depression. Negative for confusion and sleep disturbance. The patient is nervous/anxious.    All other systems reviewed and are negative.      The remainder of a 10 point review of systems is negative.    Physical Examination:  GENERAL:  Chronically ill appearing  HEENT:  Oral cavity moist  NECK:  No palpable lymphadenopathy.  No JVD  CV:  Regularrate and rhythm.  No murmur appreciated.  No clicks or rubs.  LUNGS:  Markedly diminished breath sounds with poor air exchange bilaterally to auscultation.  ABDOMEN:  Soft and nontender.  No palpable masses.  Bowel sounds present.  EXTREM:  Trace edema.  NEURO:  Nonfocal  PSYCH:  Alert, oriented, and affect bright     Laboratory/Imaging:  Reviewed in detail      Assessment/Plan:  Centrilobular emphysema (H)  I have  reviewed the role of palliative care and hospice with patient and daughter.  Discussed the need to rule out secondary causes of dyspnea.  On CT lung cancer screening she was noted to have advanced coronary calcium scores.      Recommendations:  In light of her advanced coronary calcium I have recommended she undergo a Lexiscan stress test to further outline her coronary anatomy as this may be contributing to her dyspnea.  She she will remain on palliative service's is until reevaluation.      Tavares Carrillo MD, FAAFP, [facility]  Hospice and Palliative Care

## 2021-07-19 NOTE — TELEPHONE ENCOUNTER
3:47 PM    Reason for Call: Phone Call    Description: pt states dr was suppose to set her up with test / please call pt    Was an appointment offered for this call? No  If yes : Appointment type              Date    Preferred method for responding to this message: Telephone Call  What is your phone number ? [phone]    If we cannot reach you directly, may we leave a detailed response at the number you provided? No    Can this message wait until your PCP/provider returns, if available today? YES, No, provider in    Molly Zhu

## 2021-07-23 NOTE — PROGRESS NOTES
"Palliative Maintenance Phone Follow Up         Clinical Concerns:  Client states she's \"tired all the time\" and no energy. She claims she was told she only has 6 months left to live.        Medication Reconciliation:  No recent changes in med that are not found in Epic. Client reports she has a hosp. Bed in her living room currently. She reports she will need to call pharmacy for refill of her nystatin powder because her rash is coming back between her legs.            Upcoming Appointments:   Client states she has a \"heart test\" that was ordered but is not scheduled yet. She reports \"this is the last test they will do, running out of options\".       Resources and Interventions:  Client reports she woke up with her right breast \"soaking wet\". She states there is a hx of breast ca in her family and she is over due for a mammogram. I advised her to call the clinic to schedule this and she agrees she will do that.       Plan:   Client was advised to call [facility]ospice office if she needs anything, otherwise we will follow-up with a phone call in approx 1 month.   Client is moving from her apartment to a house. Her daughter bought a house so she will be living with her. The house is all one level.       "

## 2021-07-27 NOTE — PROGRESS NOTES
Hospice/Palliative Care Consultation        Reason for Consultation:  Sydni Hardin, 71 year old, female is seen for evaluation of COPD (chronic obstructive pulmonary disease).  She is seen at her residence as it is leaving the home requires a considerable and taxing effort.  Family is present.     Advanced Directives:  Full     Hospice/Palliative Care Goals:  Symptom control     Attestation:   Total time spent on evaluation, counseling, and coordination of care:  60 minutes.      History of Present Illness:       Nutritional Status:  Weight stable     Performance Status:    ECO       Karnofsky:  60       PPS:  60        Current Outpatient Medications:      albuterol (PROAIR HFA/PROVENTIL HFA/VENTOLIN HFA) 108 (90 Base) MCG/ACT inhaler, INHALE 1 OR 2 PUFFS INTO LUNGS EVERY 4 HOURS AS NEEDED, Disp: 18 g, Rfl: 0     albuterol (PROVENTIL) (2.5 MG/3ML) 0.083% neb solution, USE 1 VIAL IN NEBULIZER 4 TIMES PER DAY AS NEEDED, Disp: 90 mL, Rfl: 3     ALPRAZolam (XANAX) 1 MG tablet, Take 1 tablet (1 mg) by mouth 3 times daily as needed for anxiety, Disp: 90 tablet, Rfl: 0     aspirin 81 MG EC tablet, Take 1 tablet (81 mg) by mouth daily, Disp: 100 tablet, Rfl: 3     atorvastatin (LIPITOR) 40 MG tablet, Take 1 tablet (40 mg) by mouth daily, Disp: 90 tablet, Rfl: 3     benazepril (LOTENSIN) 10 MG tablet, Take 1 tablet (10 mg) by mouth daily, Disp: 90 tablet, Rfl: 0     benzonatate (TESSALON) 200 MG capsule, TAKE 1 CAPSULE BY MOUTH 3 TIMES A DAY AS NEEDED FOR COUGH, Disp: 60 capsule, Rfl: 0     butalbital-acetaminophen-caffeine (FIORICET/ESGIC) -40 MG tablet, Take 1 tablet by mouth every 6 hours as needed for headaches, Disp: 30 tablet, Rfl: 3     doxepin (SINEQUAN) 10 MG capsule, TAKE 1 CAPSULE BY MOUTH AT BEDTIME, Disp: 90 capsule, Rfl: 0     EPINEPHrine (EPIPEN 2-JAIRO) 0.3 MG/0.3ML injection 2-pack, INJECT 1 SYRINGE INTRAMUSCULARLY ONCE AS NEEDED FOR ANAPHYLAXIS, Disp: 2 each, Rfl: 0     escitalopram (LEXAPRO)  "20 MG tablet, TAKE 1 TABLET BY MOUTH 2 TIMES A DAY, Disp: 180 tablet, Rfl: 0     hydrochlorothiazide (HYDRODIURIL) 25 MG tablet, TAKE 1 TABLET BY MOUTH DAILY, Disp: 90 tablet, Rfl: 0     ipratropium - albuterol 0.5 mg/2.5 mg/3 mL (DUONEB) 0.5-2.5 (3) MG/3ML neb solution, NEBULIZE CONTENTS OF 1 VIAL FOUR TIMES A DAY, Disp: 360 mL, Rfl: 0     meloxicam (MOBIC) 15 MG tablet, Take 1 tablet (15 mg) by mouth daily, Disp: 90 tablet, Rfl: 3     meloxicam (MOBIC) 15 MG tablet, TAKE 1 TABLET BY MOUTH DAILY, Disp: 90 tablet, Rfl: 0     montelukast (SINGULAIR) 10 MG tablet, TAKE 1 TABLET BY MOUTH DAILY AT BEDTIME, Disp: 90 tablet, Rfl: 0     nystatin (MYCOSTATIN) 628411 UNIT/ML suspension, SWISH AND SWALLOW 1 TEASPOONFUL (5ML) BY MOUTH FOUR TIMES A DAY, Disp: 473 mL, Rfl: 0     order for DME, Equipment being ordered: nebulizer tubing supplies, dx emphysema FAX TO [facility] IN [place], Disp: 1 Device, Rfl: 0     oxyCODONE-acetaminophen (PERCOCET) 5-325 MG tablet, Take 1 tablet by mouth every 6 hours as needed for severe pain TAKE 1 TABLET BY MOUTH EVERY 6 HOURS AS NEEDED FOR PAIN, Disp: 120 tablet, Rfl: 0     potassium chloride ER (K-TAB/KLOR-CON) 10 MEQ CR tablet, TAKE 1 TABLET BY MOUTH 2 TIMES DAILY WITH FOOD, PREFERS YELLOW SMALL ONES, Disp: 180 tablet, Rfl: 0     predniSONE (DELTASONE) 5 MG tablet, Take 1 tablet (5 mg) by mouth daily, Disp: 90 tablet, Rfl: 0     temazepam (RESTORIL) 30 MG capsule, TAKE 1 CAPSULE BY MOUTH NIGHTLY AS NEEDED FOR SLEEP, Disp: 90 capsule, Rfl: 1     triamcinolone (KENALOG) 0.1 % external cream, Apply topically 2 times daily, Disp: 30 g, Rfl: 1     triamcinolone (KENALOG) 0.1 % external lotion, Apply topically 2 times daily, Disp: 60 mL, Rfl: 0              Allergies   Allergen Reactions     Bee Pollen Other (See Comments)       Throat and eyes close up     Codeine Difficulty breathing       \"My throat closes shut.\"     Takes Percocet at home     Keflex [Cephalexin Hcl] Hives     Sulfonamide " Derivatives Other (See Comments)       Throat closes     Ciprofloxacin Rash       Rash, bumps on lips     Bupropion Hcl Other (See Comments)       Made her very irritable     Celecoxib Other (See Comments) and Cough       Celebrex - wheeze  Takes ASA at home     Cheese         swiss     Clonazepam Hives       Takes Xanax at home     Erythromycin Other (See Comments)       Erythromycin base - abdominal pain     Lisinopril Other (See Comments)       Zestril - headaches     Nifedipine Hives       Procardia     Strawberries [Strawberry]       Tomato       Tramadol Hcl         Takes percocet at home         Past Medical History:       Past Medical History:   Diagnosis Date     Abnormal arterial blood gases 2006     Anxiety 10/24/2011     Calculus of gallbladder without mention of cholecystitis or obstruction 2006     Depression 2012     Headache(784.0) 12/10/2003     History of CVA (cerebrovascular accident) 2011     HTN (hypertension) 2012     Insomnia 3/13/2012     Lipoma of other skin and subcutaneous tissue 2006     Nonallopathic lesion of cervical region, not elsewhere classified 2003     Osteoarthrosis, unspecified whether generalized or localized, lower leg 10/4/2001     Special screening for malignant neoplasms, colon 2006     Urinary tract infection, site not specified 2007                  Past Surgical History:   Procedure Laterality Date     ARTHROPLASTY KNEE Right 3/21/2017     Procedure: ARTHROPLASTY KNEE;  Surgeon: Moisés Olson MD;  Location: [facility]     ARTHROSCOPY KNEE         BIOPSY BREAST   2000     left - free of disease      SECTION          SECTION         CHOLECYSTECTOMY        COLONOSCOPY   2008     repeat in 10 years     HEAD & NECK SURGERY        hematoma on head drained     HYSTERECTOMY         IR CONSULTATION FOR IR EXAM   2019     ORTHOPEDIC SURGERY   2014     right shoulder replacement     shoulder x2    2009     left            Family History:        Family History   Problem Relation Age of Onset     Cancer Mother           skin     Breast Cancer Mother       Cerebrovascular Disease Mother       Alzheimer Disease Mother       Myocardial Infarction Father 76         myocardial infarction - cause of death     Cancer Daughter           cervical      Diabetes Maternal Grandfather       Heart Disease Maternal Grandfather           MI     Diabetes Maternal Grandmother       Diabetes Paternal Grandmother       Diabetes Paternal Grandfather           Social History:   reports that she has been smoking cigarettes. She started smoking about 56 years ago. She has a 79.50 pack-year smoking history. She has never used smokeless tobacco. She reports current alcohol use. She reports that she does not use drugs.        Review of Systems:  Review of Systems - Oncology     The remainder of a 10 point review of systems is negative.     Physical Examination:  GENERAL:  Chronically ill appearing  HEENT:  Oral cavity moist without lesion  NECK:  No palpable lymphadenopathy.  No JVD  CV:  Regular rate and rhythm.  No murmur appreciated.  No clicks or rubs.  LUNGS:  Markedly diminished breath sounds with occasional wheeze to auscultation.  ABDOMEN:  Soft and nontender.  No palpable masses.  Bowel sounds present.  EXTREM:  Trace edema.  NEURO:  Nonfocal  PSYCH:  Alert and oriented.  Affect bright     Laboratory/Imaging:  Reviewed        Assessment/Plan:       Recommendations:          Tavares Carrillo MD, FAAFP, [facility]  Hospice and Palliative Care

## 2021-07-28 NOTE — TELEPHONE ENCOUNTER
Received a PA from [employer] for Nystatin [id number]/GM powder. Submitted on CMM. Waiting for a response.

## 2021-07-29 NOTE — TELEPHONE ENCOUNTER
Received an APPROVAL from [facility] for Nystatin [id number]/gm powder. Effective 07/28/2021 to 12/31/2021. Forms scanned to [facility].

## 2021-08-03 NOTE — ED NOTES
Pt comes in with reports on COPD exacerbation  She stated she has hollie smoking , pt smokes 1 ppd. Pt has increased SOB.  Pt continuously wear 5L o2 at home pt requesting O2 to be increased to 6 L via NC for comfort O2 sats 100%.  Pt also reports increased swelling BLE. No chest pain

## 2021-08-03 NOTE — ED PROVIDER NOTES
"  History     Chief Complaint   Patient presents with     Shortness of Breath     HPI  Sydni Hardin is a 71 year old female who is transported to the emergency department by EMS complaining of shortness of breath.  Patient states she has been short of breath \"for a while\".  She does have oxygen dependent chronic obstructive pulmonary disease.  She states she recently completed a course of Augmentin which does not seem to have helped.  She states she takes 5 mg of prednisone daily.  She states that she has been using her nebulizer treatments.  She was given a nebulizer treatment by EMS personnel.  She states she also used one about 1:00 this morning.  She states that she has not having pain in her chest.  She states that she does have a cough but that it is nonproductive.  She denies any abdominal discomfort.  She denies any recent change in her bowel or bladder habits.  She is on 5 L of oxygen at home.  On 5 L here her saturations are in the high 90s.  Patient also relates that she has swelling in her lower extremities.  She states it seems to be a worse in the left lower leg than on the right side.    Allergies:  Allergies   Allergen Reactions     Bee Pollen Other (See Comments)     Throat and eyes close up     Codeine Difficulty breathing     \"My throat closes shut.\"     Takes Percocet at home     Keflex [Cephalexin Hcl] Hives     Sulfonamide Derivatives Other (See Comments)     Throat closes     Ciprofloxacin Rash     Rash, bumps on lips     Bupropion Hcl Other (See Comments)     Made her very irritable     Celecoxib Other (See Comments) and Cough     Celebrex - wheeze  Takes ASA at home     Cheese      swiss     Clonazepam Hives     Takes Xanax at home     Erythromycin Other (See Comments)     Erythromycin base - abdominal pain     Lisinopril Other (See Comments)     Zestril - headaches     Nifedipine Hives     Procardia     Strawberries [Strawberry]      Tomato      Tramadol Hcl      Takes percocet at " home       Problem List:    Patient Active Problem List    Diagnosis Date Noted     Chronic, continuous use of opioids 05/17/2021     Priority: Medium     Adjustment disorder with anxious mood 11/03/2020     Priority: Medium     Centrilobular emphysema (H) 10/16/2019     Priority: Medium     Obstructive chronic bronchitis without exacerbation (H) 10/09/2019     Priority: Medium     Last Assessment & Plan:   The pt was set up with an Oxygo portable concentrator. She needed level 3 to keep Sats above 90% with activity. Her and her daughter were instructed on the care and use of the equipment. A stationary concentrator was also delivered to her home for night time use.       Palliative care patient 10/01/2019     Priority: Medium     Esophageal reflux 07/15/2019     Priority: Medium     Obesity, morbid (H) 01/14/2019     Priority: Medium     S/P total knee replacement using cement, right 03/21/2017     Priority: Medium     Primary osteoarthritis of both knees 10/05/2016     Priority: Medium     IC (interstitial cystitis) 10/28/2015     Priority: Medium     SCC (squamous cell carcinoma), face 08/05/2015     Priority: Medium     Lung nodules 06/19/2015     Priority: Medium     Tobacco use disorder 06/19/2015     Priority: Medium     Dysthymia 12/31/2014     Priority: Medium     CKD (chronic kidney disease) stage 3, GFR 30-59 ml/min 02/05/2014     Priority: Medium     Essential hypertension 01/05/2012     Priority: Medium     Asthma 03/20/2009     Priority: Medium     Primary osteoarthritis involving multiple joints 10/04/2001     Priority: Medium        Past Medical History:    Past Medical History:   Diagnosis Date     Abnormal arterial blood gases 4/26/2006     Anxiety 10/24/2011     Calculus of gallbladder without mention of cholecystitis or obstruction 2/2/2006     Depression 1/5/2012     Headache(784.0) 12/10/2003     History of CVA (cerebrovascular accident) 9/28/2011     HTN (hypertension) 1/5/2012     Insomnia  3/13/2012     Lipoma of other skin and subcutaneous tissue 2006     Nonallopathic lesion of cervical region, not elsewhere classified 2003     Osteoarthrosis, unspecified whether generalized or localized, lower leg 10/4/2001     Special screening for malignant neoplasms, colon 2006     Urinary tract infection, site not specified 2007       Past Surgical History:    Past Surgical History:   Procedure Laterality Date     ARTHROPLASTY KNEE Right 3/21/2017    Procedure: ARTHROPLASTY KNEE;  Surgeon: Moisés Olson MD;  Location: [facility] OR     ARTHROSCOPY KNEE       BIOPSY BREAST  2000    left - free of disease      SECTION        SECTION       CHOLECYSTECTOMY       COLONOSCOPY  2008    repeat in 10 years     HEAD & NECK SURGERY      hematoma on head drained     HYSTERECTOMY       IR CONSULTATION FOR IR EXAM  2019     ORTHOPEDIC SURGERY  2014    right shoulder replacement     shoulder x2  2009    left       Family History:    Family History   Problem Relation Age of Onset     Cancer Mother         skin     Breast Cancer Mother      Cerebrovascular Disease Mother      Alzheimer Disease Mother      Myocardial Infarction Father 76        myocardial infarction - cause of death     Cancer Daughter         cervical      Diabetes Maternal Grandfather      Heart Disease Maternal Grandfather         MI     Diabetes Maternal Grandmother      Diabetes Paternal Grandmother      Diabetes Paternal Grandfather        Social History:  Marital Status:   [5]  Social History     Tobacco Use     Smoking status: Current Every Day Smoker     Packs/day: 1.50     Years: 53.00     Pack years: 79.50     Types: Cigarettes     Start date: 1965     Smokeless tobacco: Never Used     Tobacco comment: pt enrolled in quit plan 19   Substance Use Topics     Alcohol use: Yes     Alcohol/week: 0.0 standard drinks     Comment: rarely     Drug use: No        Medications:     albuterol (PROAIR HFA/PROVENTIL HFA/VENTOLIN HFA) 108 (90 Base) MCG/ACT inhaler  albuterol (PROVENTIL) (2.5 MG/3ML) 0.083% neb solution  ALPRAZolam (XANAX) 1 MG tablet  aspirin 81 MG EC tablet  atorvastatin (LIPITOR) 40 MG tablet  benazepril (LOTENSIN) 10 MG tablet  benzonatate (TESSALON) 200 MG capsule  doxepin (SINEQUAN) 10 MG capsule  doxycycline hyclate (VIBRAMYCIN) 100 MG capsule  escitalopram (LEXAPRO) 20 MG tablet  guaiFENesin (MUCINEX) 600 MG 12 hr tablet  hydrochlorothiazide (HYDRODIURIL) 25 MG tablet  ipratropium - albuterol 0.5 mg/2.5 mg/3 mL (DUONEB) 0.5-2.5 (3) MG/3ML neb solution  meloxicam (MOBIC) 15 MG tablet  meloxicam (MOBIC) 15 MG tablet  montelukast (SINGULAIR) 10 MG tablet  oxyCODONE-acetaminophen (PERCOCET) 5-325 MG tablet  potassium chloride ER (K-TAB/KLOR-CON) 10 MEQ CR tablet  predniSONE (DELTASONE) 20 MG tablet  temazepam (RESTORIL) 30 MG capsule  amoxicillin-clavulanate (AUGMENTIN) 875-125 MG tablet  butalbital-acetaminophen-caffeine (ESGIC) -40 MG tablet  EPINEPHrine (EPIPEN 2-JAIRO) 0.3 MG/0.3ML injection 2-pack  nystatin (MYCOSTATIN) 095996 UNIT/GM external powder  nystatin (MYCOSTATIN) 793357 UNIT/ML suspension  order for DME  triamcinolone (KENALOG) 0.1 % external cream  triamcinolone (KENALOG) 0.1 % external lotion  triamcinolone (KENALOG) 0.1 % external ointment          Review of Systems   Constitutional: Negative.    HENT: Negative.    Eyes: Negative.    Respiratory: Positive for shortness of breath and wheezing.    Cardiovascular: Negative.    Gastrointestinal: Negative.    Genitourinary: Negative.    Musculoskeletal: Positive for joint swelling.   Skin: Negative.    Neurological: Negative.      Please see history of chief complaint.  All other systems reviewed and found unremarkable.  Physical Exam   BP: 144/85  Pulse: 74  Temp: 97.5  F (36.4  C)  Resp: 20  SpO2: 97 %      Physical Exam this is a 71-year-old female who is awake alert oriented person place and time.  She is  very pleasant and cooperative with my exam.  She does not appear acutely dyspneic at rest.  HEENT normocephalic extraocular muscles intact pupils equally round and reactive to light and oropharynx is clear.  Neck is supple his range of motion without pain.  On exam patient has mild end expiratory wheezes bilaterally.  No rhonchi.  Heart maintains a regular rate and rhythm.  S1 and S2 sounds are appreciated.  Abdomen is obese is nontender no mass no organomegaly no rebound.  Extremities of range of motion 5/5 strength brisk peripheral pulses brisk capillary refill.  Patient has 1+ edema pretibially on the right and 2+ edema in the pretibial area on the left.  Neurologic exam no focal deficit.  Dermatologic exam no diffuse skin rashes or lesions.    ED Course        Patient remained very stable throughout her stay in the department.  I discussed lab and imaging results with her.  She is much relieved that she will not require hospital admission.  We will increase her dose of prednisone for the next week.  I will also started on a course of doxycycline.  I will advise the patient to be reassessed by her primary care provider as soon as possible this week.  I will also advise her to return if her symptoms are not improving or if they seem to be getting worse.         {EKG done and interpreted by myself.  It shows a normal sinus rhythm.  The ventricular rate is 70 bpm.  IA interval is 170 ms.  Corrected QT is 468 ms.  There is no ST segment elevation or depression.  There is no inappropriate T wave inversion.  There does not appear to be evidence of acute ischemic change by my interpretation.             Results for orders placed or performed during the hospital encounter of 08/03/21 (from the past 24 hour(s))   CBC with platelets differential    Narrative    The following orders were created for panel order CBC with platelets differential.  Procedure                               Abnormality         Status                      ---------                               -----------         ------                     CBC with platelets and d...[[id number]]                      Final result                 Please view results for these tests on the individual orders.   INR   Result Value Ref Range    INR 0.94 0.85 - 1.15   Comprehensive metabolic panel   Result Value Ref Range    Sodium 140 133 - 144 mmol/L    Potassium 3.6 3.4 - 5.3 mmol/L    Chloride 106 94 - 109 mmol/L    Carbon Dioxide (CO2) 30 20 - 32 mmol/L    Anion Gap 4 3 - 14 mmol/L    Urea Nitrogen 13 7 - 30 mg/dL    Creatinine 1.08 (H) 0.52 - 1.04 mg/dL    Calcium 8.9 8.5 - 10.1 mg/dL    Glucose 96 70 - 99 mg/dL    Alkaline Phosphatase 109 40 - 150 U/L    AST 17 0 - 45 U/L    ALT 25 0 - 50 U/L    Protein Total 6.0 (L) 6.8 - 8.8 g/dL    Albumin 3.2 (L) 3.4 - 5.0 g/dL    Bilirubin Total 0.3 0.2 - 1.3 mg/dL    GFR Estimate 51 (L) >60 mL/min/1.73m2   Troponin I   Result Value Ref Range    Troponin I 0.017 0.000 - 0.045 ug/L   Blood gas venous and oxyhgb   Result Value Ref Range    pH Venous 7.34 7.32 - 7.43    pCO2 Venous 57 (H) 40 - 50 mm Hg    pO2 Venous 39 25 - 47 mm Hg    Bicarbonate Venous 31 (H) 21 - 28 mmol/L    FIO2 6     Oxyhemoglobin Venous 68 (L) 70 - 75 %    Base Excess/Deficit (+/-) 3.8 (H) -7.7 - 1.9 mmol/L   Nt probnp inpatient (BNP)   Result Value Ref Range    N terminal Pro BNP Inpatient 720 0 - 900 pg/mL   CBC with platelets and differential   Result Value Ref Range    WBC Count 10.1 4.0 - 11.0 10e3/uL    RBC Count 4.33 3.80 - 5.20 10e6/uL    Hemoglobin 13.4 11.7 - 15.7 g/dL    Hematocrit 40.7 35.0 - 47.0 %    MCV 94 78 - 100 fL    MCH 30.9 26.5 - 33.0 pg    MCHC 32.9 31.5 - 36.5 g/dL    RDW 13.3 10.0 - 15.0 %    Platelet Count 311 150 - 450 10e3/uL    % Neutrophils 58 %    % Lymphocytes 28 %    % Monocytes 8 %    % Eosinophils 5 %    % Basophils 1 %    % Immature Granulocytes 0 %    NRBCs per 100 WBC 0 <1 /100    Absolute Neutrophils 5.9 1.6 - 8.3 10e3/uL     Absolute Lymphocytes 2.9 0.8 - 5.3 10e3/uL    Absolute Monocytes 0.8 0.0 - 1.3 10e3/uL    Absolute Eosinophils 0.5 0.0 - 0.7 10e3/uL    Absolute Basophils 0.1 0.0 - 0.2 10e3/uL    Absolute Immature Granulocytes 0.0 <=0.0 10e3/uL    Absolute NRBCs 0.0 10e3/uL   Symptomatic COVID-19 Virus (Coronavirus) by PCR Nasopharyngeal    Specimen: Nasopharyngeal; Swab    Narrative    The following orders were created for panel order Symptomatic COVID-19 Virus (Coronavirus) by PCR Nasopharyngeal.  Procedure                               Abnormality         Status                     ---------                               -----------         ------                     SARS-COV2 (COVID-19) Vir...[[id number]]  Normal              Final result                 Please view results for these tests on the individual orders.   [place] Draw    Narrative    The following orders were created for panel order [place] Draw.  Procedure                               Abnormality         Status                     ---------                               -----------         ------                     Extra Red Top Tube[[id number]]                               Final result                 Please view results for these tests on the individual orders.   Extra Red Top Tube   Result Value Ref Range    Hold Specimen [facility]    SARS-COV2 (COVID-19) Virus RT-PCR    Specimen: Nasopharyngeal; Swab   Result Value Ref Range    SARS CoV2 PCR Negative Negative    Narrative    Testing was performed using the Xpert Xpress SARS-CoV-2 Assay on the   Cepheid Gene-Xpert Instrument Systems. Additional information about   this Emergency Use Authorization (EUA) assay can be found via the Lab   Guide. This test should be ordered for the detection of SARS-CoV-2 in   individuals who meet SARS-CoV-2 clinical and/or epidemiological   criteria. Test performance is unknown in asymptomatic patients. This   test is for in vitro diagnostic use under the FDA EUA for   laboratories  certified under CLIA to perform high complexity testing.   This test has not been FDA cleared or approved. A negative result   does not rule out the presence of PCR inhibitors in the specimen or   target RNA in concentration below the limit of detection for the   assay. The possibility of a false negative should be considered if   the patient's recent exposure or clinical presentation suggests   COVID-19. This test was validated by [facility] laboratory. This laboratory is certified under the Clinical Laboratory Improve  ment Amendments (CLIA) as qualified to perform high complexity testing.   CTA Chest with Contrast    Narrative    PROCEDURE: CTA CHEST WITH CONTRAST 8/3/2021 8:49 AM    HISTORY: PE suspected, high prob; SOB (shortness of breath)    COMPARISONS: None.    Meds/Dose Given: ISOVUE 370 100ml    TECHNIQUE: Axial postcontrast enhanced images with coronal and  sagittal reformatted images including MIP reconstructions.    FINDINGS: There is excellent opacification of pulmonary vessels. There  is no pulmonary embolus.    There is at least moderate change of centrilobular emphysema with some  linear scar or atelectasis. No large confluent infiltrate is seen.    No pleural or pericardial effusion is seen. There is no aneurysm.  There is some coronary artery calcification.    Limited images through the upper abdomen show no significant  abnormality. Bone windows show mild degenerative change.         Impression    IMPRESSION: No pulmonary embolus. Centrilobular emphysema with some  linear scar or atelectasis.    BRANDON MCLAUGHLIN MD         SYSTEM ID:  [id number]   US Lower Extremity Venous Duplex Left    Narrative    PROCEDURE: US LOWER EXTREMITY VENOUS DUPLEX LEFT 8/3/2021 9:17 AM    HISTORY: left lower leg pain/edema    COMPARISONS: None.    TECHNIQUE: Grayscale and color Doppler evaluation.    FINDINGS: There is no deep venous thrombosis. Veins are normally  compressible along their entire  course and no intraluminal filling  defect is seen. There is normal phasicity and normal augmentation of  flow with compression distally.    There is a fluid collection along the anterolateral knee, not a  typical popliteal cyst given location. It may be fluid in the  suprapatellar bursa.         Impression    IMPRESSION: No DVT.    BRANDON MCLAUGHLIN MD         SYSTEM ID:  [id number]       Medications   methylPREDNISolone sodium succinate (solu-MEDROL) injection 125 mg (has no administration in time range)   sodium chloride (PF) 0.9% PF flush 100 mL (100 mLs Intravenous Given 8/3/21 0836)   iopamidol (ISOVUE-370) solution 100 mL (100 mLs Intravenous Given 8/3/21 0836)       Assessments & Plan (with Medical Decision Making)     I have reviewed the nursing notes.    I have reviewed the findings, diagnosis, plan and need for follow up with the patient.  Plan is to discharge the patient with appropriate prescriptions, discharge and follow-up instructions.    New Prescriptions    DOXYCYCLINE HYCLATE (VIBRAMYCIN) 100 MG CAPSULE    Take 1 capsule (100 mg) by mouth 2 times daily for 7 days    PREDNISONE (DELTASONE) 20 MG TABLET    Take two tablets (= 40mg) each day for 5 (five) days       Final diagnoses:   Acute exacerbation of chronic obstructive pulmonary disease (COPD) (H)       8/3/2021   [facility]     Scooter Trinidad DO  08/03/21 [id number]

## 2021-08-03 NOTE — DISCHARGE INSTRUCTIONS

## 2021-08-03 NOTE — ED NOTES
Discharge instructions reviewed with patient. Rx has been e-scribed to pharmacy of choice.  Encouraged to return with new or worsening symptoms. No questions or concerns. Pt daughter to drive her home. To her vehicle via wc.  Copy of AVS in hand on discharge.

## 2021-08-04 NOTE — PROGRESS NOTES
Clinic Care Coordination Contact  Care Team Conversations    CC reviewed the chart after recent ER visit.  CC called Dr. Carrillo to discuss as patient has been involved with palliative care and hospice for quite sometime.  Dr. Carrillo agrees CC can take her off CC list at this time.  Patient is followed closely by Palliative Care/Hospice and they will be seeing her in the home for ER follow up.  CC will continue no further outreach. Dr. Carrillo will let CC know if this need changes.  Toyin Uribe RN  Care Coordination

## 2021-08-05 NOTE — PROGRESS NOTES
Palliative Maintenance Phone Follow Up         Clinical Concerns:  Client called and states that she has been in the ED yesterday and she feels the same way today. She sounded short of breath with talking, having to take a second between words.  She states she just awoke at noon today and did a Duoneb. She also states she took her medications around 1pm.        Medication Reconciliation:  She confirms that she is on her steriod burst.  She also confirms that she has both Duonebs and also albuterol nebs in the home, but can't find the albuterol. Instructed to wait until 1600 to have another Duoneb. Also instructed she can alternate the two medicines, with example of 1200 duoneb 1500 albuteroal 1800 duoneb etc. She voiced understanding           Upcoming Appointments:  She states that Dr. Carrillo is supposed to see her within two days of her ED discharge. Updated that Dr. Carrillo is not able to visit in the home until Tuesday next week. She is ok with this. Dr. Carrillo also is able to see. Will update Sydni with time of appointment once scheduled.          Resources and Interventions:    Instructed to call with any additional concerns. Also if needed to seek treatment again through UC/ED if needed. Also updated her home address as she has recently moved from [facility] to [facility].       Plan:   Dr. Carrillo to be scheduled by palliative office for Tuesday 8/10/2021.

## 2021-08-10 NOTE — PROGRESS NOTES
Palliative Care Follow Up Visit      History of Present Illness:  Sydni Hardin, 71 year old, female is seen for Palliative Care follow up visit.  *** suffers from ***.      Advanced Directives:  ***      Current symptom management challenges:    ***        Current Outpatient Medications   Medication Sig Dispense Refill     albuterol (PROAIR HFA/PROVENTIL HFA/VENTOLIN HFA) 108 (90 Base) MCG/ACT inhaler INHALE 1 OR 2 PUFFS INTO LUNGS EVERY 4 HOURS AS NEEDED 18 g 0     albuterol (PROVENTIL) (2.5 MG/3ML) 0.083% neb solution USE 1 VIAL IN NEBULIZER 4 TIMES PER DAY AS NEEDED 90 mL 3     ALPRAZolam (XANAX) 1 MG tablet TAKE 1 TABLET BY MOUTH 3 TIMES DAILY 90 tablet 0     amoxicillin-clavulanate (AUGMENTIN) 875-125 MG tablet Take 1 tablet by mouth 2 times daily 14 tablet 0     aspirin 81 MG EC tablet Take 1 tablet (81 mg) by mouth daily 100 tablet 3     atorvastatin (LIPITOR) 40 MG tablet Take 1 tablet (40 mg) by mouth daily 90 tablet 3     benazepril (LOTENSIN) 10 MG tablet TAKE 1 TABLET BY MOUTH DAILY 90 tablet 0     benzonatate (TESSALON) 200 MG capsule TAKE 1 CAPSULE BY MOUTH 3 TIMES A DAY AS NEEDED FOR COUGH 60 capsule 0     butalbital-acetaminophen-caffeine (ESGIC) -40 MG tablet TAKE 1 TABLET BY MOUTH EVERY 6 HOURS AS NEEDED FOR HEADACHES 30 tablet 0     doxepin (SINEQUAN) 10 MG capsule TAKE 1 CAPSULE BY MOUTH AT BEDTIME 90 capsule 0     doxycycline hyclate (VIBRAMYCIN) 100 MG capsule Take 1 capsule (100 mg) by mouth 2 times daily for 7 days 14 capsule 0     EPINEPHrine (EPIPEN 2-JAIRO) 0.3 MG/0.3ML injection 2-pack INJECT 1 SYRINGE INTRAMUSCULARLY ONCE AS NEEDED FOR ANAPHYLAXIS 2 each 0     escitalopram (LEXAPRO) 20 MG tablet TAKE 1 TABLET BY MOUTH 2 TIMES A  tablet 0     guaiFENesin (MUCINEX) 600 MG 12 hr tablet Take 2 tablets (1,200 mg) by mouth 2 times daily 120 tablet 3     hydrochlorothiazide (HYDRODIURIL) 25 MG tablet Take 1 tablet (25 mg) by mouth daily 90 tablet 0     ipratropium - albuterol  "0.5 mg/2.5 mg/3 mL (DUONEB) 0.5-2.5 (3) MG/3ML neb solution NEBULIZE CONTENTS OF 1 VIAL FOUR TIMES A  mL 0     meloxicam (MOBIC) 15 MG tablet Take 1 tablet (15 mg) by mouth daily 90 tablet 0     meloxicam (MOBIC) 15 MG tablet Take 1 tablet (15 mg) by mouth daily 90 tablet 3     montelukast (SINGULAIR) 10 MG tablet TAKE 1 TABLET BY MOUTH DAILY AT BEDTIME 90 tablet 0     nystatin (MYCOSTATIN) 055287 UNIT/GM external powder APPLY AFTER WASHING FOUR TIMES A DAY 60 g 0     nystatin (MYCOSTATIN) 876916 UNIT/ML suspension SWISH AND SWALLOW 1 TEASPOONFUL (5ML) BY MOUTH FOUR TIMES A  mL 0     order for DME Equipment being ordered: nebulizer tubing supplies, dx emphysema  [facility]     oxyCODONE-acetaminophen (PERCOCET) 5-325 MG tablet TAKE 1 TABLET BY MOUTH EVERY 6 HOURS AS NEEDED FOR PAIN 120 tablet 0     potassium chloride ER (K-TAB/KLOR-CON) 10 MEQ CR tablet TAKE 1 TABLET BY MOUTH 2 TIMES DAILY WITH FOOD, (PREFERS YELLOW SMALL ONES) 180 tablet 0     predniSONE (DELTASONE) 20 MG tablet Take two tablets (= 40mg) each day for 5 (five) days 10 tablet 0     temazepam (RESTORIL) 30 MG capsule TAKE 1 CAPSULE BY MOUTH NIGHTLY AS NEEDED FOR SLEEP 90 capsule 0     triamcinolone (KENALOG) 0.1 % external cream Apply topically 2 times daily 30 g 1     triamcinolone (KENALOG) 0.1 % external lotion Apply topically 2 times daily 60 mL 0     triamcinolone (KENALOG) 0.1 % external ointment Apply thinly to affected area twice daily, do not use longer than two weeks. 30 g 0           Allergies   Allergen Reactions     Bee Pollen Other (See Comments)     Throat and eyes close up     Codeine Difficulty breathing     \"My throat closes shut.\"     Takes Percocet at home     Keflex [Cephalexin Hcl] Hives     Sulfonamide Derivatives Other (See Comments)     Throat closes     Ciprofloxacin Rash     Rash, bumps on lips     Bupropion Hcl Other (See Comments)     Made her very irritable     Celecoxib Other " (See Comments) and Cough     Celebrex - wheeze  Takes ASA at home     Cheese      swiss     Clonazepam Hives     Takes Xanax at home     Erythromycin Other (See Comments)     Erythromycin base - abdominal pain     Lisinopril Other (See Comments)     Zestril - headaches     Nifedipine Hives     Procardia     Strawberries [Strawberry]      Tomato      Tramadol Hcl      Takes percocet at home       Past Medical History:  Past Medical History:   Diagnosis Date     Abnormal arterial blood gases 2006     Anxiety 10/24/2011     Calculus of gallbladder without mention of cholecystitis or obstruction 2006     Depression 2012     Headache(784.0) 12/10/2003     History of CVA (cerebrovascular accident) 2011     HTN (hypertension) 2012     Insomnia 3/13/2012     Lipoma of other skin and subcutaneous tissue 2006     Nonallopathic lesion of cervical region, not elsewhere classified 2003     Osteoarthrosis, unspecified whether generalized or localized, lower leg 10/4/2001     Special screening for malignant neoplasms, colon 2006     Urinary tract infection, site not specified 2007           Past Surgical History:   Procedure Laterality Date     ARTHROPLASTY KNEE Right 3/21/2017    Procedure: ARTHROPLASTY KNEE;  Surgeon: Moisés Olson MD;  Location: [facility]     ARTHROSCOPY KNEE       BIOPSY BREAST  2000    left - free of disease      SECTION        SECTION       CHOLECYSTECTOMY       COLONOSCOPY  2008    repeat in 10 years     HEAD & NECK SURGERY      hematoma on head drained     HYSTERECTOMY       IR CONSULTATION FOR IR EXAM  2019     ORTHOPEDIC SURGERY  2014    right shoulder replacement     shoulder x2  2009    left           Family History:  Family History   Problem Relation Age of Onset     Cancer Mother         skin     Breast Cancer Mother      Cerebrovascular Disease Mother      Alzheimer Disease Mother      Myocardial Infarction Father  76        myocardial infarction - cause of death     Cancer Daughter         cervical      Diabetes Maternal Grandfather      Heart Disease Maternal Grandfather         MI     Diabetes Maternal Grandmother      Diabetes Paternal Grandmother      Diabetes Paternal Grandfather          Social History:  Social History     Socioeconomic History     Marital status:      Spouse name: Not on file     Number of children: Not on file     Years of education: Not on file     Highest education level: Not on file   Occupational History     Not on file   Tobacco Use     Smoking status: Current Every Day Smoker     Packs/day: 1.50     Years: 53.00     Pack years: 79.50     Types: Cigarettes     Start date: 1/1/1965     Smokeless tobacco: Never Used     Tobacco comment: pt enrolled in quit plan 5/2/19   Substance and Sexual Activity     Alcohol use: Yes     Alcohol/week: 0.0 standard drinks     Comment: rarely     Drug use: No     Sexual activity: Never   Other Topics Concern      Service No     Blood Transfusions Yes     Comment: Permits if needed     Caffeine Concern Yes     Comment: 2 cups     Occupational Exposure No     Hobby Hazards No     Sleep Concern Yes     Comment: doxipen and xanax at bedtime     Stress Concern No     Weight Concern No     Special Diet No     Back Care No     Exercise No     Bike Helmet Not Asked     Seat Belt No     Comment: encouraged to wear seat belt for safety     Self-Exams Yes     Parent/sibling w/ CABG, MI or angioplasty before 65F 55M? Yes     Comment: father   Social History Narrative     Not on file     Social Determinants of Health     Financial Resource Strain:      Difficulty of Paying Living Expenses:    Food Insecurity:      Worried About Running Out of Food in the Last Year:      Ran Out of Food in the Last Year:    Transportation Needs:      Lack of Transportation (Medical):      Lack of Transportation (Non-Medical):    Physical Activity:      Days of Exercise per Week:       Minutes of Exercise per Session:    Stress:      Feeling of Stress :    Social Connections:      Frequency of Communication with Friends and Family:      Frequency of Social Gatherings with Friends and Family:      Attends Confucianism Services:      Active Member of Clubs or Organizations:      Attends Club or Organization Meetings:      Marital Status:    Intimate Partner Violence:      Fear of Current or Ex-Partner:      Emotionally Abused:      Physically Abused:      Sexually Abused:            Palliative Review of Systems:   Performance status: ***      Nutritional status: ***     Symptoms: Pain is ***.  *** notes *** dyspnea at rest and minimal exertion. There is *** *** cough.  *** nausea, *** vomiting, *** diarrhea.  *** notes *** constipation.  There is *** mucositis,  *** dry mouth, *** thrush, or oral lesions. *** notes *** urinary frequency, ***urgency, *** dysuria, *** incontinence, or urinary retention. There is *** skin breakdown. *** evidence of rashes or suspicious lesion.  Sleep is ***.  *** notes *** fatigue.  There is *** medication induced sedation.  There is *** anxiety.  *** notes *** depressive symptoms.  Patient *** cognitive/memory problems.      Concerns/worries:  ***    Physical Examination:  Physical Exam       Laboratory/Imaging:  Admission on 08/03/2021, Discharged on 08/03/2021   Component Date Value Ref Range Status     INR 08/03/2021 0.94  0.85 - 1.15 Final    Effective 7/11/2021, the reference range for this assay has changed.     Sodium 08/03/2021 140  133 - 144 mmol/L Final     Potassium 08/03/2021 3.6  3.4 - 5.3 mmol/L Final     Chloride 08/03/2021 106  94 - 109 mmol/L Final     Carbon Dioxide (CO2) 08/03/2021 30  20 - 32 mmol/L Final     Anion Gap 08/03/2021 4  3 - 14 mmol/L Final     Urea Nitrogen 08/03/2021 13  7 - 30 mg/dL Final     Creatinine 08/03/2021 1.08* 0.52 - 1.04 mg/dL Final     Calcium 08/03/2021 8.9  8.5 - 10.1 mg/dL Final     Glucose 08/03/2021 96  70 - 99  mg/dL Final     Alkaline Phosphatase 08/03/2021 109  40 - 150 U/L Final     AST 08/03/2021 17  0 - 45 U/L Final     ALT 08/03/2021 25  0 - 50 U/L Final     Protein Total 08/03/2021 6.0* 6.8 - 8.8 g/dL Final     Albumin 08/03/2021 3.2* 3.4 - 5.0 g/dL Final     Bilirubin Total 08/03/2021 0.3  0.2 - 1.3 mg/dL Final     GFR Estimate 08/03/2021 51* >60 mL/min/1.73m2 Final    As of July 11, 2021, eGFR is calculated by the CKD-EPI creatinine equation, without race adjustment. eGFR can be influenced by muscle mass, exercise, and diet. The reported eGFR is an estimation only and is only applicable if the renal function is stable.     Troponin I 08/03/2021 0.017  0.000 - 0.045 ug/L Final    The 99th percentile for upper reference range is 0.045ug/L.  Troponin values in the range of 0.045 - 0.120 ug/L may be associated with risks of adverse clinical events.     pH Venous 08/03/2021 7.34  7.32 - 7.43 Final     pCO2 Venous 08/03/2021 57* 40 - 50 mm Hg Final     pO2 Venous 08/03/2021 39  25 - 47 mm Hg Final     Bicarbonate Venous 08/03/2021 31* 21 - 28 mmol/L Final     FIO2 08/03/2021 6   Final    6 liter     Oxyhemoglobin Venous 08/03/2021 68* 70 - 75 % Final     Base Excess/Deficit (+/-) 08/03/2021 3.8* -7.7 - 1.9 mmol/L Final     N terminal Pro BNP Inpatient 08/03/2021 720  0 - 900 pg/mL Final    Reference range shown and results flagged as abnormal are suggested inpatient cut points for confirming diagnosis if CHF in an acute setting. Establishing a baseline value for each individual patient is useful for follow-up. An inpatient or emergency department NT-proPBNP <300 pg/mL effectively rules out acute CHF, with 99% negative predictive value.    The outpatient non-acute reference range for ruling out CHF is:  0-125 pg/mL (age 18 to less than 75)  0-450 pg/mL (age 75 yrs and older)      WBC Count 08/03/2021 10.1  4.0 - 11.0 10e3/uL Final     RBC Count 08/03/2021 4.33  3.80 - 5.20 10e6/uL Final     Hemoglobin 08/03/2021 13.4   11.7 - 15.7 g/dL Final     Hematocrit 08/03/2021 40.7  35.0 - 47.0 % Final     MCV 08/03/2021 94  78 - 100 fL Final     MCH 08/03/2021 30.9  26.5 - 33.0 pg Final     MCHC 08/03/2021 32.9  31.5 - 36.5 g/dL Final     RDW 08/03/2021 13.3  10.0 - 15.0 % Final     Platelet Count 08/03/2021 311  150 - 450 10e3/uL Final     % Neutrophils 08/03/2021 58  % Final     % Lymphocytes 08/03/2021 28  % Final     % Monocytes 08/03/2021 8  % Final     % Eosinophils 08/03/2021 5  % Final     % Basophils 08/03/2021 1  % Final     % Immature Granulocytes 08/03/2021 0  % Final     NRBCs per 100 WBC 08/03/2021 0  <1 /100 Final     Absolute Neutrophils 08/03/2021 5.9  1.6 - 8.3 10e3/uL Final     Absolute Lymphocytes 08/03/2021 2.9  0.8 - 5.3 10e3/uL Final     Absolute Monocytes 08/03/2021 0.8  0.0 - 1.3 10e3/uL Final     Absolute Eosinophils 08/03/2021 0.5  0.0 - 0.7 10e3/uL Final     Absolute Basophils 08/03/2021 0.1  0.0 - 0.2 10e3/uL Final     Absolute Immature Granulocytes 08/03/2021 0.0  <=0.0 10e3/uL Final     Absolute NRBCs 08/03/2021 0.0  10e3/uL Final     Hold Specimen 08/03/2021 [facility]   Final     SARS CoV2 PCR 08/03/2021 Negative  Negative Final    NEGATIVE: SARS-CoV-2 (COVID-19) RNA not detected, presumed negative.           Assessment/Plan:  ***    Attestation:   Total time spent on review of pertinent clinical information, patient visit, counseling, and coordination of care:  *** minutes.     Tavares Carrillo MD  [facility]  Hospice and Palliative Care

## 2021-08-11 NOTE — TELEPHONE ENCOUNTER
Duoneb      Last Written Prescription Date:  07/06/21  Last Fill Quantity: 360ml,   # refills: 0  Last Office Visit: 08/10/21  Future Office visit:

## 2021-08-12 NOTE — TELEPHONE ENCOUNTER
ALPRAZolam (XANAX) 1 MG tablet      Last Written Prescription Date:  7/13/21  Last Fill Quantity: 90,   # refills: 0  Last Office Visit: 8/10/21  Future Office visit:       Routing refill request to provider for review/approval because:

## 2021-08-16 NOTE — TELEPHONE ENCOUNTER
Patient did not show for her Nuclear Medicine Stress Test which was scheduled for today, 8-16-21 at 0630.   I did walk the lobby looking for her and she was not here.    2 isotopes wasted.

## 2021-08-25 NOTE — TELEPHONE ENCOUNTER
singulair      Last Written Prescription Date:  5/19/21  Last Fill Quantity: 90,   # refills: 0  Last Office Visit: 8/10/21  Future Office visit:     prednisone      Last Written Prescription Date:  8/3/21  Last Fill Quantity: 10,   # refills: 0  Last Office Visit: 8/10/21  Future Office visit:       Routing refill request to provider for review/approval because:  Drug not active on patient's medication list

## 2021-08-27 PROBLEM — J96.11 CHRONIC RESPIRATORY FAILURE WITH HYPOXIA (H): Status: ACTIVE | Noted: 2021-01-01

## 2021-08-27 NOTE — TELEPHONE ENCOUNTER
Percocet  Last Written Prescription Date:  07/13/21  Last Fill Quantity: 120,   # refills: 0  Last Office Visit: 08/10/21  Future Office visit:

## 2021-08-27 NOTE — PROGRESS NOTES
Hospice/Palliative Care Consultation      Reason for Consultation:  Sydni Hardin, 71 year old, female is seen for reevaluation of COPD (chronic obstructive pulmonary disease) and consideration of Hospice care .  She is seen at her residence as it is leaving the home requires a considerable and taxing effort.  Family is present.    Advanced Directives:  DNR POLST    Hospice/Palliative Care Goals:  Symptom control    Attestation:   Total time spent on evaluation, counseling, and coordination of care:  60 minutes.     History of Present Illness:  Sydni is seen in follow-up of COPD.  She underwent pulmonary function testing.  Complete pulmonary function tests were not able to be performed so she underwent spirometry with and without bronchodilator.  She has a 82-pack-year history of smoking.  While there her saturations were 87% on room air.  She continues to have 4 to 5 L/min at home and uses primarily at night and with activity.    Her forced vital capacity was 27% of predicted her FEV1 was 25% of predicted predicted her FEV1/FVC was 90% of predicted with a bronchodilator response.  Her F EF 2575 was 20% of predicted.  Her expiratory time was less than 6 seconds which does underestimate the degree of risk obstruction.  This was consistent with severe COPD.    She underwent echocardiogram in April 2021.  This showed her to have an ejection fraction of 60 to 65% with with mild mitral insufficiency.  Her right ventricular systolic pressure was 9 mm above the right atrial pressure.    Unfortunately she presented to the ER with increasing dypnea. Evaluation there showed increased edema in bilateral lower extremities left greater than right.  Venous duplex was performed and showed no evidence of DVT.  CT angiogram of the chest was done to rule out pulmonary embolism.  This was negative for thrombus.  Again advanced coronary calcium was noted as well as findings consistent with emphysema. She was placed on a  prednisone burst as well as doxycycline and discharged        Nutritional Status:  Weight stable    Performance Status:    ECO     Modified Britt Dyspnea Scale:  6    Current Outpatient Medications:      ALPRAZolam (XANAX) 1 MG tablet, TAKE 1 TABLET BY MOUTH 3 TIMES DAILY, Disp: 90 tablet, Rfl: 0     albuterol (PROAIR HFA/PROVENTIL HFA/VENTOLIN HFA) 108 (90 Base) MCG/ACT inhaler, INHALE 1 OR 2 PUFFS INTO LUNGS EVERY 4 HOURS AS NEEDED, Disp: 18 g, Rfl: 0     albuterol (PROVENTIL) (2.5 MG/3ML) 0.083% neb solution, USE 1 VIAL IN NEBULIZER 4 TIMES PER DAY AS NEEDED, Disp: 90 mL, Rfl: 3     amoxicillin-clavulanate (AUGMENTIN) 875-125 MG tablet, Take 1 tablet by mouth 2 times daily, Disp: 14 tablet, Rfl: 0     aspirin 81 MG EC tablet, Take 1 tablet (81 mg) by mouth daily, Disp: 100 tablet, Rfl: 3     atorvastatin (LIPITOR) 40 MG tablet, Take 1 tablet (40 mg) by mouth daily, Disp: 90 tablet, Rfl: 3     benazepril (LOTENSIN) 10 MG tablet, TAKE 1 TABLET BY MOUTH DAILY, Disp: 90 tablet, Rfl: 0     benzonatate (TESSALON) 200 MG capsule, TAKE 1 CAPSULE BY MOUTH 3 TIMES A DAY AS NEEDED FOR COUGH, Disp: 60 capsule, Rfl: 0     butalbital-acetaminophen-caffeine (ESGIC) -40 MG tablet, TAKE 1 TABLET BY MOUTH EVERY 6 HOURS AS NEEDED FOR HEADACHES, Disp: 30 tablet, Rfl: 0     doxepin (SINEQUAN) 10 MG capsule, TAKE 1 CAPSULE BY MOUTH AT BEDTIME, Disp: 90 capsule, Rfl: 0     EPINEPHrine (EPIPEN 2-JAIRO) 0.3 MG/0.3ML injection 2-pack, INJECT 1 SYRINGE INTRAMUSCULARLY ONCE AS NEEDED FOR ANAPHYLAXIS, Disp: 2 each, Rfl: 0     escitalopram (LEXAPRO) 20 MG tablet, TAKE 1 TABLET BY MOUTH 2 TIMES A DAY, Disp: 180 tablet, Rfl: 0     guaiFENesin (MUCINEX) 600 MG 12 hr tablet, Take 2 tablets (1,200 mg) by mouth 2 times daily, Disp: 120 tablet, Rfl: 3     hydrochlorothiazide (HYDRODIURIL) 25 MG tablet, Take 1 tablet (25 mg) by mouth daily, Disp: 90 tablet, Rfl: 0     ipratropium - albuterol 0.5 mg/2.5 mg/3 mL (DUONEB) 0.5-2.5 (3) MG/3ML neb  "solution, NEBULIZE CONTENTS OF 1 VIAL FOUR TIMES A DAY, Disp: 360 mL, Rfl: 0     meloxicam (MOBIC) 15 MG tablet, Take 1 tablet (15 mg) by mouth daily, Disp: 90 tablet, Rfl: 0     meloxicam (MOBIC) 15 MG tablet, Take 1 tablet (15 mg) by mouth daily, Disp: 90 tablet, Rfl: 3     montelukast (SINGULAIR) 10 MG tablet, TAKE 1 TABLET BY MOUTH DAILY AT BEDTIME, Disp: 90 tablet, Rfl: 0     nystatin (MYCOSTATIN) 538633 UNIT/GM external powder, Apply topically 2 times daily as needed (red rash as directed), Disp: 60 g, Rfl: 1     nystatin (MYCOSTATIN) 886443 UNIT/ML suspension, SWISH AND SWALLOW 1 TEASPOONFUL (5ML) BY MOUTH FOUR TIMES A DAY, Disp: 473 mL, Rfl: 0     order for DME, Equipment being ordered: nebulizer tubing supplies, dx emphysema FAX TO [facility] IN [place], Disp: 1 Device, Rfl: 0     oxyCODONE-acetaminophen (PERCOCET) 5-325 MG tablet, TAKE 1 TABLET BY MOUTH EVERY 6 HOURS AS NEEDED FOR PAIN, Disp: 120 tablet, Rfl: 0     potassium chloride ER (K-TAB/KLOR-CON) 10 MEQ CR tablet, TAKE 1 TABLET BY MOUTH 2 TIMES DAILY WITH FOOD, (PREFERS YELLOW SMALL ONES), Disp: 180 tablet, Rfl: 0     predniSONE (DELTASONE) 20 MG tablet, Take two tablets (= 40mg) each day for 5 (five) days, Disp: 10 tablet, Rfl: 0     predniSONE (DELTASONE) 5 MG tablet, TAKE 1 TABLET BY MOUTH DAILY, Disp: 90 tablet, Rfl: 0     temazepam (RESTORIL) 30 MG capsule, TAKE 1 CAPSULE BY MOUTH NIGHTLY AS NEEDED FOR SLEEP, Disp: 90 capsule, Rfl: 0     triamcinolone (KENALOG) 0.1 % external cream, Apply topically 2 times daily, Disp: 30 g, Rfl: 1     triamcinolone (KENALOG) 0.1 % external lotion, Apply topically 2 times daily, Disp: 60 mL, Rfl: 0     triamcinolone (KENALOG) 0.1 % external ointment, Apply thinly to affected area twice daily, do not use longer than two weeks., Disp: 30 g, Rfl: 0      Allergies   Allergen Reactions     Bee Pollen Other (See Comments)     Throat and eyes close up     Codeine Difficulty breathing     \"My throat closes shut.\"   "   Takes Percocet at home     Keflex [Cephalexin Hcl] Hives     Sulfonamide Derivatives Other (See Comments)     Throat closes     Ciprofloxacin Rash     Rash, bumps on lips     Bupropion Hcl Other (See Comments)     Made her very irritable     Celecoxib Other (See Comments) and Cough     Celebrex - wheeze  Takes ASA at home     Cheese      swiss     Clonazepam Hives     Takes Xanax at home     Erythromycin Other (See Comments)     Erythromycin base - abdominal pain     Lisinopril Other (See Comments)     Zestril - headaches     Nifedipine Hives     Procardia     Strawberries [Strawberry]      Tomato      Tramadol Hcl      Takes percocet at home       Past Medical History:  Past Medical History:   Diagnosis Date     Abnormal arterial blood gases 2006     Anxiety 10/24/2011     Calculus of gallbladder without mention of cholecystitis or obstruction 2006     Depression 2012     Headache(784.0) 12/10/2003     History of CVA (cerebrovascular accident) 2011     HTN (hypertension) 2012     Insomnia 3/13/2012     Lipoma of other skin and subcutaneous tissue 2006     Nonallopathic lesion of cervical region, not elsewhere classified 2003     Osteoarthrosis, unspecified whether generalized or localized, lower leg 10/4/2001     Special screening for malignant neoplasms, colon 2006     Urinary tract infection, site not specified 2007         Past Surgical History:   Procedure Laterality Date     ARTHROPLASTY KNEE Right 3/21/2017    Procedure: ARTHROPLASTY KNEE;  Surgeon: Moisés Olson MD;  Location: [facility] OR     ARTHROSCOPY KNEE       BIOPSY BREAST  2000    left - free of disease      SECTION        SECTION       CHOLECYSTECTOMY       COLONOSCOPY  2008    repeat in 10 years     HEAD & NECK SURGERY      hematoma on head drained     HYSTERECTOMY       IR CONSULTATION FOR IR EXAM  2019     ORTHOPEDIC SURGERY  2014    right shoulder replacement      shoulder x2  2009    left         Family History:  Family History   Problem Relation Age of Onset     Cancer Mother         skin     Breast Cancer Mother      Cerebrovascular Disease Mother      Alzheimer Disease Mother      Myocardial Infarction Father 76        myocardial infarction - cause of death     Cancer Daughter         cervical      Diabetes Maternal Grandfather      Heart Disease Maternal Grandfather         MI     Diabetes Maternal Grandmother      Diabetes Paternal Grandmother      Diabetes Paternal Grandfather        Social History:   reports that she has been smoking cigarettes. She started smoking about 56 years ago. She has a 79.50 pack-year smoking history. She has never used smokeless tobacco. She reports current alcohol use. She reports that she does not use drugs.      Review of Systems:  Review of Systems   Constitutional: Positive for fatigue. Negative for appetite change, fever and unexpected weight change.   HENT:   Negative for hearing loss, trouble swallowing and voice change.    Eyes: Negative for eye problems.   Respiratory: Positive for chest tightness, cough, shortness of breath and wheezing.    Cardiovascular: Positive for leg swelling. Negative for chest pain and palpitations.   Gastrointestinal: Positive for constipation. Negative for abdominal pain, blood in stool and diarrhea.   Genitourinary: Negative for bladder incontinence, dysuria, frequency and hematuria.    Musculoskeletal: Positive for arthralgias, back pain, myalgias and neck pain. Negative for gait problem.   Skin: Negative for itching, rash and wound.   Neurological: Negative for dizziness, extremity weakness, gait problem, light-headedness, numbness, seizures and speech difficulty.   Hematological: Negative for adenopathy.   Psychiatric/Behavioral: Positive for depression. Negative for confusion and sleep disturbance. The patient is nervous/anxious.    All other systems reviewed and are negative.      The remainder  of a 10 point review of systems is negative.    Physical Examination:  GENERAL:  Chronically ill appearing  HEENT:  Oral cavity moist  NECK:  No palpable lymphadenopathy.  No JVD  CV:  Regularrate and rhythm.  No murmur appreciated.  No clicks or rubs.  LUNGS:  Markedly diminished breath sounds with poor air exchange bilaterally to auscultation.  ABDOMEN:  Soft and nontender.  No palpable masses.  Bowel sounds present.  EXTREM:  Trace edema.  NEURO:  Nonfocal  PSYCH:  Alert, oriented, and affect bright     Laboratory/Imaging:  Reviewed in detail      Assessment/Plan:  Centrilobular emphysema (H)  Severe as evidenced by spirometry, CT scan of the chest, as well as chest xray findings.  She has had exacerbations and is steroid and oxygen dependent.      Recommendations:  Reviewed diagnostic studies in detail.  Sydni wishes to not undergo further evaluation of her probable coronary heart disease as well as no hospitalizations. In light of the severity of her COPD (chronic obstructive pulmonary disease), probable coronary heart disease, as well as multiple comorbidities have recommended Hospice care.       Tavares Carrilol MD, FAAFP, [facility]  Hospice and Palliative Care

## 2021-08-30 PROBLEM — Z51.5 HOSPICE CARE PATIENT: Status: ACTIVE | Noted: 2021-01-01

## 2021-08-30 NOTE — PROGRESS NOTES
PHYSICIAN CERTIFICATION OF TERMINAL ILLNESS FOR HOSPICE BENEFIT    August 28, 2021    Sydni Hardin    1950      Effective Date of Certification    Start Date:  8/28/2021      End Date:  11/25/2021    Terminal Diagnosis:     End stage COPD (chronic obstructive pulmonary disease)       Secondary Diagnoses:     Chronic respiratory failure with hypoxia     Tobacco abuse disorder      Prognosis Related Comorbidities:    Coronary heart disease     Chronic kidney disease 3    Hypertension       Narrative Statement:  Client suffers from end stage COPD (chronic obstructive pulmonary disease) complicated by chronic respiratory failure with hypoxia as well as ongoing tobacco abuse . She is on maximal tolerated medical therapy. ** Client resides at home and requires assistance with ADL's. Sydni scores a 60 on the Karnofsky Scale and 60 on the Palliative Performance Scale. Goals of symptom control will be met.  Because of the progressive nature of the illness, anticipated disease trajectory, as well as associated comorbidities, client qualifies for hospice care.             I certify that this patient is terminally ill and has a life expectancy of 6 months or less if the terminal illness runs its anticipated course.        Attestation:  I confirm that this narrative was composed by myself and is based on review of the medical record and/or examination of the patient.            Tavares Carrillo MD

## 2021-08-30 NOTE — TELEPHONE ENCOUNTER
esgic      Last Written Prescription Date:  6/16/21  Last Fill Quantity: 30,   # refills: 0  Last Office Visit: 8/10/21  Future Office visit:       Routing refill request to provider for review/approval because:  Drug not on the [facility], [facility] or [facility] refill protocol or controlled substance  doxepin      Last Written Prescription Date:  6/25/21  Last Fill Quantity: 90,   # refills: 0

## 2021-08-30 NOTE — TELEPHONE ENCOUNTER
temazepam      Last Written Prescription Date:  6/23/21  Last Fill Quantity: 90,   # refills: 0  Last Office Visit: 8/10/21  Future Office visit:       Routing refill request to provider for review/approval because:  Drug not on the [facility], P or [facility] refill protocol or controlled substance

## 2021-08-30 NOTE — TELEPHONE ENCOUNTER
tessalon      Last Written Prescription Date:  5/4/21  Last Fill Quantity: 60,   # refills: 0  Last Office Visit: 8/10/21  Future Office visit:       lipitor      Last Written Prescription Date:  12/9/2020  Last Fill Quantity: 90,   # refills: 3  Last Office Visit: 8/10/21  Future Office visit:

## 2021-09-20 NOTE — TELEPHONE ENCOUNTER
Albuterol, Duoneb      Last Written Prescription Date:  5.14.21, 8.11.21  Last Fill Quantity: #18g, #360,   # refills: 0  Last Office Visit: 8.10.21  Future Office visit:       Routing refill request to provider for review/approval because:

## 2021-10-04 NOTE — TELEPHONE ENCOUNTER
prednisone      Last Written Prescription Date:  8/5/21  Last Fill Quantity: 90,   # refills: 0  Last Office Visit: 8/10/21  Future Office visit:

## 2021-10-10 NOTE — DISCHARGE INSTRUCTIONS
1) Follow the aftercare instructions provided  2) Your sutures must be removed in 10 to 12 days  3) Continue with all your previously prescribed medications.

## 2021-10-10 NOTE — ED NOTES
"Pt is agitated, \"you stitched me up! I I just want to go home\" Pt wants to go home and smoke.  MD updated.   "

## 2021-10-10 NOTE — ED PROVIDER NOTES
"  History     Chief Complaint   Patient presents with     Fall     HPI  Sydni Hardin is a 71 year old female who presents today status post fall.  Patient was walking her home and tripped on a cord and fell backwards hitting the back of her head.  Patient she hit the back of her head on a wooden table.  No loss of consciousness.  Patient suffered a laceration to the back of her head.  Patient is on aspirin.  Patient has otherwise been at baseline state of health.  No additional complaints.    Allergies:  Allergies   Allergen Reactions     Bee Pollen Other (See Comments)     Throat and eyes close up     Codeine Difficulty breathing     \"My throat closes shut.\"     Takes Percocet at home     Keflex [Cephalexin Hcl] Hives     Sulfonamide Derivatives Other (See Comments)     Throat closes     Ciprofloxacin Rash     Rash, bumps on lips     Bupropion Hcl Other (See Comments)     Made her very irritable     Celecoxib Other (See Comments) and Cough     Celebrex - wheeze  Takes ASA at home     Cheese      swiss     Clonazepam Hives     Takes Xanax at home     Erythromycin Other (See Comments)     Erythromycin base - abdominal pain     Lisinopril Other (See Comments)     Zestril - headaches     Nifedipine Hives     Procardia     Strawberries [Strawberry]      Tomato      Tramadol Hcl      Takes percocet at home       Problem List:    Patient Active Problem List    Diagnosis Date Noted     Hospice care patient 08/28/2021     Priority: Medium     Chronic respiratory failure with hypoxia (H) 08/27/2021     Priority: Medium     Chronic, continuous use of opioids 05/17/2021     Priority: Medium     Adjustment disorder with anxious mood 11/03/2020     Priority: Medium     Centrilobular emphysema (H) 10/16/2019     Priority: Medium     Obstructive chronic bronchitis without exacerbation (H) 10/09/2019     Priority: Medium     Last Assessment & Plan:   The pt was set up with an Oxygo portable concentrator. She needed level 3 to " keep Sats above 90% with activity. Her and her daughter were instructed on the care and use of the equipment. A stationary concentrator was also delivered to her home for night time use.       Palliative care patient 10/01/2019     Priority: Medium     Esophageal reflux 07/15/2019     Priority: Medium     Obesity, morbid (H) 01/14/2019     Priority: Medium     S/P total knee replacement using cement, right 03/21/2017     Priority: Medium     Primary osteoarthritis of both knees 10/05/2016     Priority: Medium     IC (interstitial cystitis) 10/28/2015     Priority: Medium     SCC (squamous cell carcinoma), face 08/05/2015     Priority: Medium     Lung nodules 06/19/2015     Priority: Medium     Tobacco use disorder 06/19/2015     Priority: Medium     Dysthymia 12/31/2014     Priority: Medium     CKD (chronic kidney disease) stage 3, GFR 30-59 ml/min (H) 02/05/2014     Priority: Medium     Essential hypertension 01/05/2012     Priority: Medium     Asthma 03/20/2009     Priority: Medium     Primary osteoarthritis involving multiple joints 10/04/2001     Priority: Medium        Past Medical History:    Past Medical History:   Diagnosis Date     Abnormal arterial blood gases 4/26/2006     Anxiety 10/24/2011     Calculus of gallbladder without mention of cholecystitis or obstruction 2/2/2006     Depression 1/5/2012     Headache(784.0) 12/10/2003     History of CVA (cerebrovascular accident) 9/28/2011     HTN (hypertension) 1/5/2012     Insomnia 3/13/2012     Lipoma of other skin and subcutaneous tissue 1/18/2006     Nonallopathic lesion of cervical region, not elsewhere classified 12/18/2003     Osteoarthrosis, unspecified whether generalized or localized, lower leg 10/4/2001     Special screening for malignant neoplasms, colon 12/7/2006     Urinary tract infection, site not specified 4/30/2007       Past Surgical History:    Past Surgical History:   Procedure Laterality Date     ARTHROPLASTY KNEE Right 3/21/2017     Procedure: ARTHROPLASTY KNEE;  Surgeon: Moisés Olson MD;  Location: [facility]     ARTHROSCOPY KNEE       BIOPSY BREAST  2000    left - free of disease      SECTION        SECTION       CHOLECYSTECTOMY  2006     COLONOSCOPY  2008    repeat in 10 years     HEAD & NECK SURGERY      hematoma on head drained     HYSTERECTOMY       IR CONSULTATION FOR IR EXAM  2019     ORTHOPEDIC SURGERY  2014    right shoulder replacement     shoulder x2  2009    left       Family History:    Family History   Problem Relation Age of Onset     Cancer Mother         skin     Breast Cancer Mother      Cerebrovascular Disease Mother      Alzheimer Disease Mother      Myocardial Infarction Father 76        myocardial infarction - cause of death     Cancer Daughter         cervical      Diabetes Maternal Grandfather      Heart Disease Maternal Grandfather         MI     Diabetes Maternal Grandmother      Diabetes Paternal Grandmother      Diabetes Paternal Grandfather        Social History:  Marital Status:   [5]  Social History     Tobacco Use     Smoking status: Current Every Day Smoker     Packs/day: 1.50     Years: 53.00     Pack years: 79.50     Types: Cigarettes     Start date: 1965     Smokeless tobacco: Never Used     Tobacco comment: pt enrolled in quit plan 19   Substance Use Topics     Alcohol use: Yes     Alcohol/week: 0.0 standard drinks     Comment: rarely     Drug use: No        Medications:    ALPRAZolam (XANAX) 1 MG tablet  atorvastatin (LIPITOR) 40 MG tablet  benzonatate (TESSALON) 200 MG capsule  albuterol (PROAIR HFA/PROVENTIL HFA/VENTOLIN HFA) 108 (90 Base) MCG/ACT inhaler  albuterol (PROVENTIL) (2.5 MG/3ML) 0.083% neb solution  amoxicillin-clavulanate (AUGMENTIN) 875-125 MG tablet  aspirin 81 MG EC tablet  benazepril (LOTENSIN) 10 MG tablet  butalbital-acetaminophen-caffeine (ESGIC) -40 MG tablet  doxepin (SINEQUAN) 10 MG capsule  EPINEPHrine (EPIPEN 2-JAIRO) 0.3  MG/0.3ML injection 2-pack  escitalopram (LEXAPRO) 20 MG tablet  guaiFENesin (MUCINEX) 600 MG 12 hr tablet  hydrochlorothiazide (HYDRODIURIL) 25 MG tablet  ipratropium - albuterol 0.5 mg/2.5 mg/3 mL (DUONEB) 0.5-2.5 (3) MG/3ML neb solution  meloxicam (MOBIC) 15 MG tablet  meloxicam (MOBIC) 15 MG tablet  montelukast (SINGULAIR) 10 MG tablet  nystatin (MYCOSTATIN) 449296 UNIT/GM external powder  nystatin (MYCOSTATIN) 837131 UNIT/ML suspension  order for DME  oxyCODONE-acetaminophen (PERCOCET) 5-325 MG tablet  potassium chloride ER (K-TAB/KLOR-CON) 10 MEQ CR tablet  predniSONE (DELTASONE) 10 MG tablet  predniSONE (DELTASONE) 20 MG tablet  predniSONE (DELTASONE) 5 MG tablet  temazepam (RESTORIL) 30 MG capsule  triamcinolone (KENALOG) 0.1 % external cream  triamcinolone (KENALOG) 0.1 % external lotion  triamcinolone (KENALOG) 0.1 % external ointment          Review of Systems   Constitutional: Negative.  Negative for chills and fever.   HENT: Negative.    Eyes: Negative.    Respiratory: Negative.    Cardiovascular: Negative.    Gastrointestinal: Negative.    Endocrine: Negative.    Genitourinary: Negative.    Musculoskeletal: Negative.  Negative for neck pain and neck stiffness.   Skin: Negative.    Allergic/Immunologic: Negative.    Neurological: Negative.  Negative for light-headedness.   Hematological: Negative.    Psychiatric/Behavioral: Negative.  Negative for self-injury, sleep disturbance and suicidal ideas. The patient is not nervous/anxious.        Physical Exam          Physical Exam  Constitutional:       General: She is not in acute distress.     Appearance: Normal appearance. She is normal weight. She is not toxic-appearing.   HENT:      Head: Normocephalic.      Comments: Jagged 1 inch occipital laceration with frayed wound edges.  Eyes:      Extraocular Movements: Extraocular movements intact.      Pupils: Pupils are equal, round, and reactive to light.   Pulmonary:      Effort: Pulmonary effort is normal.    Skin:     General: Skin is warm.      Capillary Refill: Capillary refill takes less than 2 seconds.   Neurological:      General: No focal deficit present.      Mental Status: She is alert.   Psychiatric:         Mood and Affect: Mood normal.         Behavior: Behavior normal.         ED Course        Procedures     Consent obtained. The wound was anesthestized with * ml of 1% lidocaine (no epi) by local infiltration. Wound was irrigated copiously with 500 ml of NS by jet propulsion. Under direct light and in a bloodless field, bottom of the wound seen and no foreign body or tendon injury noted. Under sterile conditions, wound closed with four 4-0 non-absorbable interrupted stitches. Wound edges well approximated. No complications. Bleeding controlled.       Head CT - negative (unofficial)       Results for orders placed or performed during the hospital encounter of 10/10/21 (from the past 24 hour(s))   Hemoglobin   Result Value Ref Range    Hemoglobin 14.4 11.7 - 15.7 g/dL       Medications - No data to display    Assessments & Plan (with Medical Decision Making)     71-year-old female who presents today status post fall.  Fall was accidental when she tripped over her oxygen cord at home.  No loss of consciousness.  Patient arrived with a laceration that was repaired primarily.  Head CT done due to patient's use of aspirin in the setting of head trauma and was negative.  Patient is going to be discharged home.  Patient declined hemoglobin check.  Patient understands to return if she develops any new or worsening symptoms.  Close head injury instructions given to daughter who was at bedside. Patient does not want to wait for results of CT scan. Will be contacted if any acute findings noted.     Due to the nature of this electronic medical record, laboratory results, imaging results, diagnosis, other information and medications reported above may not represent information available to me at the the time of my care  and disposition. Medications reported above may have not been ordered by me.     Portions of the record may have been created with voice recognition software. Occasional wrong-word or 'sound-a- like' substitution may have occurred due to the inherent limitations of voice recognition software. Though the chart has been reviewed, there may be inadvertent transcription errors. Read the chart carefully and recognize, using context, where substitutions have occurred.       New Prescriptions    No medications on file       Final diagnoses:   Closed head injury, initial encounter   Laceration of occipital scalp, initial encounter       10/10/2021   [facility]     Abilio Olmstead MD  10/12/21 1953

## 2021-10-20 NOTE — TELEPHONE ENCOUNTER
ktab      Last Written Prescription Date:  6/16/21  Last Fill Quantity: 180,   # refills: 0  Last Office Visit: 8/28/21  Future Office visit:

## 2021-10-20 NOTE — PROGRESS NOTES
Hospice Acute Visit    SUBJECTIVE:  Sydni Hardin, 71 year old, female is seen for acute hospice visit with the following medical problems:    Fall  Sydni is seen for acute hospice visit secondary to a fall.  Apparently she tripped in her house and sustained a laceration on her scalp.  This necessitated an ER visit where this was repaired with four 4-0 Ethilon sutures.  She is on aspirin.  A CT scan of her head performed at that time to rule out bleed was negative.  She generally feels well.  Sutures are to be removed.    She does note good urination.  Has had intermittent constipation.    Request medication refill.    Emergency room records are reviewed.      Current Outpatient Medications   Medication Sig Dispense Refill     ALPRAZolam (XANAX) 1 MG tablet TAKE 1 TABLET BY MOUTH 3 TIMES DAILY 90 tablet 0     atorvastatin (LIPITOR) 40 MG tablet TAKE 1 TABLET BY MOUTH DAILY 90 tablet 0     benzonatate (TESSALON) 200 MG capsule TAKE 1 CAPSULE BY MOUTH 3 TIMES A DAY AS NEEDED FOR COUGH 60 capsule 0     potassium chloride ER (K-TAB/KLOR-CON) 10 MEQ CR tablet TAKE 1 TABLET BY MOUTH 2 TIMES DAILY WITH FOOD, (PREFERS YELLOW SMALL ONES) 180 tablet 0     albuterol (PROAIR HFA/PROVENTIL HFA/VENTOLIN HFA) 108 (90 Base) MCG/ACT inhaler INHALE 1 OR 2 PUFFS INTO LUNGS EVERY 4 HOURS AS NEEDED 18 g 0     albuterol (PROVENTIL) (2.5 MG/3ML) 0.083% neb solution USE 1 VIAL IN NEBULIZER 4 TIMES PER DAY AS NEEDED 90 mL 3     amoxicillin-clavulanate (AUGMENTIN) 875-125 MG tablet Take 1 tablet by mouth 2 times daily 14 tablet 0     aspirin 81 MG EC tablet Take 1 tablet (81 mg) by mouth daily 100 tablet 3     benazepril (LOTENSIN) 10 MG tablet TAKE 1 TABLET BY MOUTH DAILY 90 tablet 0     butalbital-acetaminophen-caffeine (ESGIC) -40 MG tablet TAKE 1 TABLET BY MOUTH EVERY 6 HOURS AS NEEDED FOR HEADACHES 30 tablet 0     doxepin (SINEQUAN) 10 MG capsule TAKE 1 CAPSULE BY MOUTH AT BEDTIME 90 capsule 0     EPINEPHrine (EPIPEN 2-JAIRO)  0.3 MG/0.3ML injection 2-pack INJECT 1 SYRINGE INTRAMUSCULARLY ONCE AS NEEDED FOR ANAPHYLAXIS 2 each 0     escitalopram (LEXAPRO) 20 MG tablet TAKE 1 TABLET BY MOUTH 2 TIMES A  tablet 0     guaiFENesin (MUCINEX) 600 MG 12 hr tablet Take 2 tablets (1,200 mg) by mouth 2 times daily 120 tablet 3     hydrochlorothiazide (HYDRODIURIL) 25 MG tablet Take 1 tablet (25 mg) by mouth daily 90 tablet 0     ipratropium - albuterol 0.5 mg/2.5 mg/3 mL (DUONEB) 0.5-2.5 (3) MG/3ML neb solution NEBULIZE CONTENTS OF 1 VIAL FOUR TIMES A  mL 0     meloxicam (MOBIC) 15 MG tablet Take 1 tablet (15 mg) by mouth daily 90 tablet 0     meloxicam (MOBIC) 15 MG tablet Take 1 tablet (15 mg) by mouth daily 90 tablet 3     montelukast (SINGULAIR) 10 MG tablet TAKE 1 TABLET BY MOUTH DAILY AT BEDTIME 90 tablet 0     nystatin (MYCOSTATIN) 212434 UNIT/GM external powder Apply topically 2 times daily as needed (red rash as directed) 60 g 1     nystatin (MYCOSTATIN) 882974 UNIT/ML suspension SWISH AND SWALLOW 1 TEASPOONFUL (5ML) BY MOUTH FOUR TIMES A  mL 0     order for DME Equipment being ordered: nebulizer tubing supplies, dx emphysema  FAX TO [facility] IN [place] 1 Device 0     oxyCODONE-acetaminophen (PERCOCET) 5-325 MG tablet TAKE 1 TABLET BY MOUTH EVERY 6 HOURS AS NEEDED FOR PAIN 120 tablet 0     predniSONE (DELTASONE) 10 MG tablet Take 1 tablet (10 mg) by mouth daily 90 tablet 0     predniSONE (DELTASONE) 20 MG tablet Take two tablets (= 40mg) each day for 5 (five) days 10 tablet 0     predniSONE (DELTASONE) 5 MG tablet TAKE 1 TABLET BY MOUTH DAILY 90 tablet 0     temazepam (RESTORIL) 30 MG capsule TAKE 1 CAPSULE BY MOUTH NIGHTLY AS NEEDED FOR SLEEP 90 capsule 0     triamcinolone (KENALOG) 0.1 % external cream Apply topically 2 times daily 30 g 1     triamcinolone (KENALOG) 0.1 % external lotion Apply topically 2 times daily 60 mL 0     triamcinolone (KENALOG) 0.1 % external ointment Apply thinly to affected area twice  "daily, do not use longer than two weeks. 30 g 0        Allergies   Allergen Reactions     Bee Pollen Other (See Comments)     Throat and eyes close up     Codeine Difficulty breathing     \"My throat closes shut.\"     Takes Percocet at home     Keflex [Cephalexin Hcl] Hives     Sulfonamide Derivatives Other (See Comments)     Throat closes     Ciprofloxacin Rash     Rash, bumps on lips     Bupropion Hcl Other (See Comments)     Made her very irritable     Celecoxib Other (See Comments) and Cough     Celebrex - wheeze  Takes ASA at home     Cheese      swiss     Clonazepam Hives     Takes Xanax at home     Erythromycin Other (See Comments)     Erythromycin base - abdominal pain     Lisinopril Other (See Comments)     Zestril - headaches     Nifedipine Hives     Procardia     Strawberries [Strawberry]      Tomato      Tramadol Hcl      Takes percocet at home       Past Medical History:   Diagnosis Date     Abnormal arterial blood gases 2006     Anxiety 10/24/2011     Calculus of gallbladder without mention of cholecystitis or obstruction 2006     Depression 2012     Headache(784.0) 12/10/2003     History of CVA (cerebrovascular accident) 2011     HTN (hypertension) 2012     Insomnia 3/13/2012     Lipoma of other skin and subcutaneous tissue 2006     Nonallopathic lesion of cervical region, not elsewhere classified 2003     Osteoarthrosis, unspecified whether generalized or localized, lower leg 10/4/2001     Special screening for malignant neoplasms, colon 2006     Urinary tract infection, site not specified 2007     Past Surgical History:   Procedure Laterality Date     ARTHROPLASTY KNEE Right 3/21/2017    Procedure: ARTHROPLASTY KNEE;  Surgeon: Moisés Olson MD;  Location: [facility]     ARTHROSCOPY KNEE       BIOPSY BREAST  2000    left - free of disease      SECTION        SECTION       CHOLECYSTECTOMY       COLONOSCOPY  2008    repeat in 10 years "     HEAD & NECK SURGERY  2014    hematoma on head drained     HYSTERECTOMY       IR CONSULTATION FOR IR EXAM  2/28/2019     ORTHOPEDIC SURGERY  02/04/2014    right shoulder replacement     shoulder x2  2009    left     Family History   Problem Relation Age of Onset     Cancer Mother         skin     Breast Cancer Mother      Cerebrovascular Disease Mother      Alzheimer Disease Mother      Myocardial Infarction Father 76        myocardial infarction - cause of death     Cancer Daughter         cervical      Diabetes Maternal Grandfather      Heart Disease Maternal Grandfather         MI     Diabetes Maternal Grandmother      Diabetes Paternal Grandmother      Diabetes Paternal Grandfather      Social History     Socioeconomic History     Marital status:      Spouse name: Not on file     Number of children: Not on file     Years of education: Not on file     Highest education level: Not on file   Occupational History     Not on file   Tobacco Use     Smoking status: Current Every Day Smoker     Packs/day: 1.50     Years: 53.00     Pack years: 79.50     Types: Cigarettes     Start date: 1/1/1965     Smokeless tobacco: Never Used     Tobacco comment: pt enrolled in quit plan 5/2/19   Substance and Sexual Activity     Alcohol use: Yes     Alcohol/week: 0.0 standard drinks     Comment: rarely     Drug use: No     Sexual activity: Never   Other Topics Concern      Service No     Blood Transfusions Yes     Comment: Permits if needed     Caffeine Concern Yes     Comment: 2 cups     Occupational Exposure No     Hobby Hazards No     Sleep Concern Yes     Comment: doxipen and xanax at bedtime     Stress Concern No     Weight Concern No     Special Diet No     Back Care No     Exercise No     Bike Helmet Not Asked     Seat Belt No     Comment: encouraged to wear seat belt for safety     Self-Exams Yes     Parent/sibling w/ CABG, MI or angioplasty before 65F 55M? Yes     Comment: father   Social History Narrative      Not on file     Social Determinants of Health     Financial Resource Strain:      Difficulty of Paying Living Expenses:    Food Insecurity:      Worried About Running Out of Food in the Last Year:      Ran Out of Food in the Last Year:    Transportation Needs:      Lack of Transportation (Medical):      Lack of Transportation (Non-Medical):    Physical Activity:      Days of Exercise per Week:      Minutes of Exercise per Session:    Stress:      Feeling of Stress :    Social Connections:      Frequency of Communication with Friends and Family:      Frequency of Social Gatherings with Friends and Family:      Attends [employer] Services:      Active Member of Clubs or Organizations:      Attends Club or Organization Meetings:      Marital Status:    Intimate Partner Violence:      Fear of Current or Ex-Partner:      Emotionally Abused:      Physically Abused:      Sexually Abused:          Review Of Systems  Constitutional, neuro, ENT, endocrine, pulmonary, cardiac, gastrointestinal, genitourinary, musculoskeletal, integument and psychiatric systems are negative, except as otherwise noted.    OBJECTIVE:  There were no vitals taken for this visit.      Exam:  Physical Exam  Constitutional:       General: She is not in acute distress.     Appearance: She is well-developed.   HENT:      Head: Normocephalic.      Right Ear: Tympanic membrane, ear canal and external ear normal.      Left Ear: Tympanic membrane, ear canal and external ear normal.      Nose: Nose normal.   Eyes:      Conjunctiva/sclera: Conjunctivae normal.   Neck:      Thyroid: No thyromegaly.      Vascular: No JVD.   Cardiovascular:      Rate and Rhythm: Normal rate and regular rhythm.      Pulses: Intact distal pulses.      Heart sounds: Normal heart sounds. No murmur heard.  No friction rub. No gallop.    Pulmonary:      Effort: Pulmonary effort is normal.      Breath sounds: Normal breath sounds. No rales.   Musculoskeletal:      Cervical back:  Neck supple.   Skin:     General: Skin is warm and dry.      Comments: Examination of the scalp shows intact wound with normal healing   Neurological:      Mental Status: She is alert and oriented to person, place, and time.       Other exam not repeated    Labs:  Admission on 10/10/2021, Discharged on 10/10/2021   Component Date Value Ref Range Status     Hemoglobin 10/10/2021 14.4  11.7 - 15.7 g/dL Final       ASSESSMENT/PLAN:  Fall, subsequent encounter  Sutures to be removed.  Medications refilled.  Routine Hospice follow up            Tavares Carrillo MD, CD

## 2022-01-01 NOTE — PROGRESS NOTES
"Sydni Hardin is a 70 year old female who is being evaluated via a billable telephone visit.      The patient has been notified of following:     \"This telephone visit will be conducted via a call between you and your physician/provider. We have found that certain health care needs can be provided without the need for a physical exam.  This service lets us provide the care you need with a short phone conversation.  If a prescription is necessary we can send it directly to your pharmacy.  If lab work is needed we can place an order for that and you can then stop by our lab to have the test done at a later time.    Telephone visits are billed at different rates depending on your insurance coverage. During this emergency period, for some insurers they may be billed the same as an in-person visit.  Please reach out to your insurance provider with any questions.    If during the course of the call the physician/provider feels a telephone visit is not appropriate, you will not be charged for this service.\"    Patient has given verbal consent for Telephone visit?  Yes    What phone number would you like to be contacted at? [phone]    How would you like to obtain your AVS?     Subjective     Sydni Hardin is a 70 year old female who presents via phone visit today for the following health issues:    HPI     Concern - swelling   Onset: 2 months  Description: bilateral feet/ankles  Intensity: moderate  Progression of Symptoms:  same  Accompanying Signs & Symptoms: swelling   Previous history of similar problem: yes  Precipitating factors:        Worsened by: none  Alleviating factors:        Improved by: elevation   Therapies tried and outcome: elevation      Sydni continues to have bilateral ankle swelling.  Chronic in natures.  She has a history of COPD (chronic obstructive pulmonary disease)     In addition, she continues to have joint pain. Sydni has a history of osteoarthritis and has previously underwent " knee replacement.    Requests medication refill.       Review of Systems   Constitutional, HEENT, cardiovascular, pulmonary, gi and gu systems are negative, except as otherwise noted.       Objective          Vitals:  No vitals were obtained today due to virtual visit.    healthy, alert and no distress  PSYCH: Alert and oriented times 3; coherent speech, normal   rate and volume, able to articulate logical thoughts, able   to abstract reason, no tangential thoughts, no hallucinations   or delusions  Her affect is normal  RESP: No cough, no audible wheezing, able to talk in full sentences  Remainder of exam unable to be completed due to telephone visits    Admission on 08/18/2020, Discharged on 08/18/2020   Component Date Value Ref Range Status     Sodium 08/18/2020 129* 133 - 144 mmol/L Final     Potassium 08/18/2020 4.4  3.4 - 5.3 mmol/L Final     Chloride 08/18/2020 95  94 - 109 mmol/L Final     Carbon Dioxide 08/18/2020 27  20 - 32 mmol/L Final     Anion Gap 08/18/2020 7  3 - 14 mmol/L Final     Glucose 08/18/2020 108* 70 - 99 mg/dL Final     Urea Nitrogen 08/18/2020 18  7 - 30 mg/dL Final     Creatinine 08/18/2020 0.96  0.52 - 1.04 mg/dL Final     GFR Estimate 08/18/2020 60* >60 mL/min/[1.73_m2] Final    Comment: Non  GFR Calc  Starting 12/18/2018, serum creatinine based estimated GFR (eGFR) will be   calculated using the Chronic Kidney Disease Epidemiology Collaboration   (CKD-EPI) equation.       GFR Estimate If Black 08/18/2020 69  >60 mL/min/[1.73_m2] Final    Comment:  GFR Calc  Starting 12/18/2018, serum creatinine based estimated GFR (eGFR) will be   calculated using the Chronic Kidney Disease Epidemiology Collaboration   (CKD-EPI) equation.       Calcium 08/18/2020 9.5  8.5 - 10.1 mg/dL Final     WBC 08/18/2020 15.7* 4.0 - 11.0 10e9/L Final     RBC Count 08/18/2020 4.19  3.8 - 5.2 10e12/L Final     Hemoglobin 08/18/2020 13.2  11.7 - 15.7 g/dL Final     Hematocrit  08/18/2020 38.7  35.0 - 47.0 % Final     MCV 08/18/2020 92  78 - 100 fl Final     MCH 08/18/2020 31.5  26.5 - 33.0 pg Final     MCHC 08/18/2020 34.1  31.5 - 36.5 g/dL Final     RDW 08/18/2020 12.8  10.0 - 15.0 % Final     Platelet Count 08/18/2020 316  150 - 450 10e9/L Final     Diff Method 08/18/2020 Automated Method   Final     % Neutrophils 08/18/2020 84.2  % Final     % Lymphocytes 08/18/2020 10.1  % Final     % Monocytes 08/18/2020 4.3  % Final     % Eosinophils 08/18/2020 0.6  % Final     % Basophils 08/18/2020 0.4  % Final     % Immature Granulocytes 08/18/2020 0.4  % Final     Nucleated RBCs 08/18/2020 0  0 /100 Final     Absolute Neutrophil 08/18/2020 13.2* 1.6 - 8.3 10e9/L Final     Absolute Lymphocytes 08/18/2020 1.6  0.8 - 5.3 10e9/L Final     Absolute Monocytes 08/18/2020 0.7  0.0 - 1.3 10e9/L Final     Absolute Eosinophils 08/18/2020 0.1  0.0 - 0.7 10e9/L Final     Absolute Basophils 08/18/2020 0.1  0.0 - 0.2 10e9/L Final     Abs Immature Granulocytes 08/18/2020 0.1  0 - 0.4 10e9/L Final     Absolute Nucleated RBC 08/18/2020 0.0   Final     Color Urine 08/18/2020 Light Yellow   Final     Appearance Urine 08/18/2020 Clear   Final     Glucose Urine 08/18/2020 Negative  NEG^Negative mg/dL Final     Bilirubin Urine 08/18/2020 Negative  NEG^Negative Final     Ketones Urine 08/18/2020 Negative  NEG^Negative mg/dL Final     Specific Gravity Urine 08/18/2020 1.011  1.003 - 1.035 Final     Blood Urine 08/18/2020 Negative  NEG^Negative Final     pH Urine 08/18/2020 7.0  4.7 - 8.0 pH Final     Protein Albumin Urine 08/18/2020 Negative  NEG^Negative mg/dL Final     Urobilinogen mg/dL 08/18/2020 Normal  0.0 - 2.0 mg/dL Final     Nitrite Urine 08/18/2020 Negative  NEG^Negative Final     Leukocyte Esterase Urine 08/18/2020 Negative  NEG^Negative Final     Source 08/18/2020 Midstream Urine   Final           Assessment/Plan:    Assessment & Plan     COPD exacerbation (H)  She is oxygen dependent and uses nebulizer.   Supplies ordered.  Follow up one month.  - Nebulizer and Supplies Order for DME - ONLY FOR DME    Psychophysiologic insomnia  Continue temazepam at bedtime.   - temazepam (RESTORIL) 30 MG capsule; TAKE 1 CAPSULE BY MOUTH NIGHTLY AS NEEDED FOR SLEEP    Primary osteoarthritis involving multiple joints  Continue pain control.         See Patient Instructions    No follow-ups on file.    Tavares Carrillo MD  [facility] - [place]    Phone call duration:  18 minutes                   chart(s)

## 2022-03-07 NOTE — TELEPHONE ENCOUNTER
2:20 PM    Reason for Call: Phone Call    Description: Pt would like to know if Dr. Carrillo can place orders for her Chest CT. States she knows she needs it done, but isn't sure if they are doing them in the hospital yet. Please place orders if they are and please call her back to let her know.    Was an appointment offered for this call? No  If yes : Appointment type              Date    Preferred method for responding to this message: Telephone Call  What is your phone number ? [phone]    If we cannot reach you directly, may we leave a detailed response at the number you provided? Yes    Can this message wait until your PCP/provider returns, if available today? YES, pt is aware Dr. Carrillo is not in until Wednesday (5-20-20 doing virtual visits), states she  Can wait until then    Farzana Boyer    
If can wait until Wednesday for Dr Carrillo, not sure why it was routed to me?  
Pt called, states that she had a missed call from the clinic. Thinks that it was in regard to chest CT. Please advise. Thank you!    
Congruent

## 2022-11-27 NOTE — PLAN OF CARE
[facility] Inpatient Admission Note:    Patient admitted to 3230/3230-1 at approximately 0430 via cart accompanied by daughter from emergency room . Report received from Liana in SBAR format at 0406 via telephone. Patient transferred to bed via slide board.. Patient is alert and oriented X 3, reports pain; rates at 5 on 0-10 scale.  Patient oriented to room, unit, hourly rounding, and plan of care. Explained admission packet and patient handbook with patient bill of rights brochure. Will continue to monitor and document as needed.     Inpatient Nursing criteria listed below was met:    Health care directives status obtained and documented: Yes    Care Everywhere authorization obtained No    MRSA swab completed for patient 65 years and older: Yes    Patient identifies a surrogate decision maker: Yes If yes, who:Devorah Contact Information:see facesheet    Core Measure diagnosis present:No.      If initial lactic acid >2.0, repeat lactic acid drawn within one hour of arrival to unit: NA. If no, state reason: Not drawn in ER    Vaccination assessment and education completed: Yes   Vaccinations received prior to admission: Pneumovax no  Influenza(seasonal)  NO   Vaccination(s) ordered: pneumococcal vaccine    Clergy visit ordered if patient requests: No    Skin issues/needs documented: Yes    Isolation Patient: no Education given, correct sign in place and documentation row added to PCS:  No    Fall Prevention Yes: Care plan updated, education given and documented, sticker and magnet in place: Yes    Care Plan initiated: Yes    Education Documented (including assessment): Yes    Patient has discharge needs : Yes If yes, please explain:possibly home oxygen needs       . ,

## 2023-10-06 NOTE — TELEPHONE ENCOUNTER
Error I called the pt no answer.  
I called [facility] they did not receive the Rx sent this am. Please resend.  
I called the pt and notified.  
Pt calls she states to call [facility] they have a question regarding her Rx. I called the pharmacy the Dx listed is not covered by the pt's insurance. Acute bronchitis is not covered either. Is there anything else that can be used? I called the pt and notified. Dr Alarcon has already left for the day.  
Sent and thanks.  Gibran Alarcon    
That doesn't make any sense at all.  I resent with dx of LRTI, which is also true.  Thanks.   Gibran Alarcon    
Her/She

## (undated) DEVICE — GLV-9.0 PROTEXIS PI CLASSIC LF/PF

## (undated) DEVICE — IMMOB-KNEE 20 INCH

## (undated) DEVICE — FOOT PADS-TOTAL KNEE POSITIONER

## (undated) DEVICE — SUTURE-VICRYL 2-0 CT-1 J945H

## (undated) DEVICE — IRRIGATION-H2O 1000ML BAG

## (undated) DEVICE — SOL-NACL 0.9% 1000ML

## (undated) DEVICE — IRRIGATION-H2O 1000ML

## (undated) DEVICE — NDL-22G X 1 1/2" NON-SAFETY

## (undated) DEVICE — SYRINGE-10CC LUER LOCK

## (undated) DEVICE — BLADE-SAGITTAL DUAL CUT 25MM X 100MM X 1.27MM

## (undated) DEVICE — IRRIGATION-NACL 1000ML

## (undated) DEVICE — DRSG-KERLIX ROLL 4.5 X 4.1YD

## (undated) DEVICE — COVER-MAYO STAND 23" X 55"

## (undated) DEVICE — LABEL-STERILE PREPRINTED FOR OR

## (undated) DEVICE — GLV-8.5 ORTHO PROTEXIS PI LF/PF

## (undated) DEVICE — SUTURE-ETHILON 2-0 PSLX 1697H

## (undated) DEVICE — PULSE LAVAGE IRRIGATION SYSTEM

## (undated) DEVICE — DRSG-XEROFORM 1X8

## (undated) DEVICE — DRAPE-ISOLATION BAG

## (undated) DEVICE — BLADE-SAGITTAL NARROW THICK NO-OFFSET

## (undated) DEVICE — SCREW-FEMALE HEX 2.5MM/25MM LENGTH

## (undated) DEVICE — GOWN-SURG XL LVL 3 REINFORCED

## (undated) DEVICE — DRILL PIN HEADLESS TROCAR

## (undated) DEVICE — BDG-COBAN 6 INCH

## (undated) DEVICE — SUTURE-VICRYL 1 CTB-1 JB947

## (undated) DEVICE — TRAY-SKIN PREP POVIDONE/IODINE

## (undated) DEVICE — SCD SLEEVE-KNEE REG.

## (undated) DEVICE — GLV-8.5 PROTEXIS PI CLASSIC LF/PF

## (undated) DEVICE — PACK-KNEE-CUSTOM

## (undated) DEVICE — MARKER-SKIN REG

## (undated) DEVICE — CEMENT MIXER-MIXEVAC III

## (undated) DEVICE — Device

## (undated) DEVICE — CAUTERY PAD-POLYHESIVE II ADULT

## (undated) DEVICE — SUTURE-PDS 1 CTXB ZB371

## (undated) DEVICE — LIGHT HANDLE COVER

## (undated) DEVICE — APPLICATOR-CHLORAPREP 26ML TINTED CHG 2%+ 70% IPA-SURGICAL

## (undated) DEVICE — CUFF-DISP STERILE 30IN SINGLE BLADDER

## (undated) DEVICE — DRAPE-U DRAPE-CLEAR 47" X 51"

## (undated) DEVICE — BDG-ESMARK 6 INCH X 9 FT

## (undated) DEVICE — BDG-ELASTIC 6 INCH DBL. STERILE

## (undated) RX ORDER — ONDANSETRON 2 MG/ML
INJECTION INTRAMUSCULAR; INTRAVENOUS
Status: DISPENSED
Start: 2017-03-21

## (undated) RX ORDER — DEXAMETHASONE SODIUM PHOSPHATE 10 MG/ML
INJECTION, SOLUTION INTRAMUSCULAR; INTRAVENOUS
Status: DISPENSED
Start: 2017-03-21

## (undated) RX ORDER — PROPOFOL 10 MG/ML
INJECTION, EMULSION INTRAVENOUS
Status: DISPENSED
Start: 2017-03-21

## (undated) RX ORDER — LIDOCAINE HYDROCHLORIDE 20 MG/ML
INJECTION, SOLUTION EPIDURAL; INFILTRATION; INTRACAUDAL; PERINEURAL
Status: DISPENSED
Start: 2017-03-13

## (undated) RX ORDER — FENTANYL CITRATE 50 UG/ML
INJECTION, SOLUTION INTRAMUSCULAR; INTRAVENOUS
Status: DISPENSED
Start: 2017-03-13

## (undated) RX ORDER — FENTANYL CITRATE 50 UG/ML
INJECTION, SOLUTION INTRAMUSCULAR; INTRAVENOUS
Status: DISPENSED
Start: 2017-03-21

## (undated) RX ORDER — PROPOFOL 10 MG/ML
INJECTION, EMULSION INTRAVENOUS
Status: DISPENSED
Start: 2017-03-13

## (undated) RX ORDER — DEXAMETHASONE SODIUM PHOSPHATE 10 MG/ML
INJECTION, SOLUTION INTRAMUSCULAR; INTRAVENOUS
Status: DISPENSED
Start: 2017-03-13

## (undated) RX ORDER — ONDANSETRON 2 MG/ML
INJECTION INTRAMUSCULAR; INTRAVENOUS
Status: DISPENSED
Start: 2017-03-13

## (undated) RX ORDER — LIDOCAINE HYDROCHLORIDE 20 MG/ML
INJECTION, SOLUTION EPIDURAL; INFILTRATION; INTRACAUDAL; PERINEURAL
Status: DISPENSED
Start: 2017-03-21